# Patient Record
Sex: FEMALE | Race: WHITE | NOT HISPANIC OR LATINO | Employment: OTHER | URBAN - METROPOLITAN AREA
[De-identification: names, ages, dates, MRNs, and addresses within clinical notes are randomized per-mention and may not be internally consistent; named-entity substitution may affect disease eponyms.]

---

## 2017-01-11 ENCOUNTER — GENERIC CONVERSION - ENCOUNTER (OUTPATIENT)
Dept: OTHER | Facility: OTHER | Age: 57
End: 2017-01-11

## 2017-02-08 ENCOUNTER — GENERIC CONVERSION - ENCOUNTER (OUTPATIENT)
Dept: OTHER | Facility: OTHER | Age: 57
End: 2017-02-08

## 2017-02-13 ENCOUNTER — GENERIC CONVERSION - ENCOUNTER (OUTPATIENT)
Dept: OTHER | Facility: OTHER | Age: 57
End: 2017-02-13

## 2017-02-23 ENCOUNTER — ALLSCRIPTS OFFICE VISIT (OUTPATIENT)
Dept: FAMILY MEDICINE CLINIC | Facility: CLINIC | Age: 57
End: 2017-02-23
Payer: COMMERCIAL

## 2017-02-23 PROCEDURE — T1015 CLINIC SERVICE: HCPCS | Performed by: NURSE PRACTITIONER

## 2017-02-23 PROCEDURE — 83036 HEMOGLOBIN GLYCOSYLATED A1C: CPT | Performed by: NURSE PRACTITIONER

## 2017-03-10 ENCOUNTER — GENERIC CONVERSION - ENCOUNTER (OUTPATIENT)
Dept: OTHER | Facility: OTHER | Age: 57
End: 2017-03-10

## 2017-04-06 ENCOUNTER — ALLSCRIPTS OFFICE VISIT (OUTPATIENT)
Dept: FAMILY MEDICINE CLINIC | Facility: CLINIC | Age: 57
End: 2017-04-06
Payer: COMMERCIAL

## 2017-04-06 PROCEDURE — T1015 CLINIC SERVICE: HCPCS | Performed by: NURSE PRACTITIONER

## 2017-05-23 ENCOUNTER — ALLSCRIPTS OFFICE VISIT (OUTPATIENT)
Dept: FAMILY MEDICINE CLINIC | Facility: CLINIC | Age: 57
End: 2017-05-23
Payer: COMMERCIAL

## 2017-05-23 LAB — HBA1C MFR BLD HPLC: 6.7 %

## 2017-05-23 PROCEDURE — T1015 CLINIC SERVICE: HCPCS | Performed by: NURSE PRACTITIONER

## 2017-05-23 PROCEDURE — 83036 HEMOGLOBIN GLYCOSYLATED A1C: CPT | Performed by: NURSE PRACTITIONER

## 2017-05-26 ENCOUNTER — TRANSCRIBE ORDERS (OUTPATIENT)
Dept: ADMINISTRATIVE | Facility: HOSPITAL | Age: 57
End: 2017-05-26

## 2017-05-26 DIAGNOSIS — J44.9 OBSTRUCTIVE CHRONIC BRONCHITIS WITHOUT EXACERBATION (HCC): Primary | ICD-10-CM

## 2017-06-09 ENCOUNTER — GENERIC CONVERSION - ENCOUNTER (OUTPATIENT)
Dept: OTHER | Facility: OTHER | Age: 57
End: 2017-06-09

## 2017-06-09 ENCOUNTER — HOSPITAL ENCOUNTER (OUTPATIENT)
Dept: PULMONOLOGY | Facility: HOSPITAL | Age: 57
Discharge: HOME | End: 2017-06-09
Payer: COMMERCIAL

## 2017-06-09 DIAGNOSIS — J44.9 OBSTRUCTIVE CHRONIC BRONCHITIS WITHOUT EXACERBATION (HCC): ICD-10-CM

## 2017-06-09 PROCEDURE — 94727 GAS DIL/WSHOT DETER LNG VOL: CPT

## 2017-06-09 PROCEDURE — 94060 EVALUATION OF WHEEZING: CPT

## 2017-06-09 PROCEDURE — 94760 N-INVAS EAR/PLS OXIMETRY 1: CPT

## 2017-06-09 PROCEDURE — 94729 DIFFUSING CAPACITY: CPT

## 2017-06-09 RX ORDER — ALBUTEROL SULFATE 2.5 MG/3ML
2.5 SOLUTION RESPIRATORY (INHALATION) ONCE AS NEEDED
Status: COMPLETED | OUTPATIENT
Start: 2017-06-09 | End: 2017-06-09

## 2017-06-09 RX ADMIN — ALBUTEROL SULFATE 2.5 MG: 2.5 SOLUTION RESPIRATORY (INHALATION) at 11:22

## 2017-08-02 ENCOUNTER — APPOINTMENT (OUTPATIENT)
Dept: LAB | Facility: HOSPITAL | Age: 57
End: 2017-08-02
Payer: COMMERCIAL

## 2017-08-02 ENCOUNTER — TRANSCRIBE ORDERS (OUTPATIENT)
Dept: ADMINISTRATIVE | Facility: HOSPITAL | Age: 57
End: 2017-08-02

## 2017-08-02 DIAGNOSIS — Z79.899 ENCOUNTER FOR LONG-TERM (CURRENT) USE OF OTHER MEDICATIONS: Primary | ICD-10-CM

## 2017-08-02 DIAGNOSIS — Z79.899 ENCOUNTER FOR LONG-TERM (CURRENT) USE OF OTHER MEDICATIONS: ICD-10-CM

## 2017-08-02 LAB
ALBUMIN SERPL BCP-MCNC: 3.7 G/DL (ref 3.5–5)
ALP SERPL-CCNC: 55 U/L (ref 46–116)
ALT SERPL W P-5'-P-CCNC: 28 U/L (ref 12–78)
AST SERPL W P-5'-P-CCNC: 14 U/L (ref 5–45)
BASOPHILS # BLD AUTO: 0 THOUSANDS/ΜL (ref 0–0.1)
BASOPHILS NFR BLD AUTO: 0 % (ref 0–1)
BILIRUB DIRECT SERPL-MCNC: 0.07 MG/DL (ref 0–0.2)
BILIRUB SERPL-MCNC: 0.3 MG/DL (ref 0.2–1)
CHOLEST SERPL-MCNC: 271 MG/DL (ref 50–200)
EOSINOPHIL # BLD AUTO: 0.02 THOUSAND/ΜL (ref 0–0.61)
EOSINOPHIL NFR BLD AUTO: 0 % (ref 0–6)
ERYTHROCYTE [DISTWIDTH] IN BLOOD BY AUTOMATED COUNT: 13.4 % (ref 11.6–15.1)
EST. AVERAGE GLUCOSE BLD GHB EST-MCNC: 157 MG/DL
GLUCOSE P FAST SERPL-MCNC: 141 MG/DL (ref 65–99)
HBA1C MFR BLD: 7.1 % (ref 4.2–6.3)
HCT VFR BLD AUTO: 41.4 % (ref 34.8–46.1)
HDLC SERPL-MCNC: 34 MG/DL (ref 40–60)
HGB BLD-MCNC: 14 G/DL (ref 11.5–15.4)
LDLC SERPL CALC-MCNC: 195 MG/DL (ref 0–100)
LYMPHOCYTES # BLD AUTO: 1.75 THOUSANDS/ΜL (ref 0.6–4.47)
LYMPHOCYTES NFR BLD AUTO: 33 % (ref 14–44)
MCH RBC QN AUTO: 30.8 PG (ref 26.8–34.3)
MCHC RBC AUTO-ENTMCNC: 33.8 G/DL (ref 31.4–37.4)
MCV RBC AUTO: 91 FL (ref 82–98)
MONOCYTES # BLD AUTO: 0.47 THOUSAND/ΜL (ref 0.17–1.22)
MONOCYTES NFR BLD AUTO: 9 % (ref 4–12)
NEUTROPHILS # BLD AUTO: 3.01 THOUSANDS/ΜL (ref 1.85–7.62)
NEUTS SEG NFR BLD AUTO: 58 % (ref 43–75)
PLATELET # BLD AUTO: 164 THOUSANDS/UL (ref 149–390)
PMV BLD AUTO: 10.1 FL (ref 8.9–12.7)
PROT SERPL-MCNC: 6.8 G/DL (ref 6.4–8.2)
RBC # BLD AUTO: 4.55 MILLION/UL (ref 3.81–5.12)
TRIGL SERPL-MCNC: 208 MG/DL
WBC # BLD AUTO: 5.25 THOUSAND/UL (ref 4.31–10.16)

## 2017-08-02 PROCEDURE — 83036 HEMOGLOBIN GLYCOSYLATED A1C: CPT

## 2017-08-02 PROCEDURE — 82947 ASSAY GLUCOSE BLOOD QUANT: CPT

## 2017-08-02 PROCEDURE — 36415 COLL VENOUS BLD VENIPUNCTURE: CPT

## 2017-08-02 PROCEDURE — 85025 COMPLETE CBC W/AUTO DIFF WBC: CPT

## 2017-08-02 PROCEDURE — 80076 HEPATIC FUNCTION PANEL: CPT

## 2017-08-02 PROCEDURE — 80061 LIPID PANEL: CPT

## 2017-08-23 ENCOUNTER — ALLSCRIPTS OFFICE VISIT (OUTPATIENT)
Dept: FAMILY MEDICINE CLINIC | Facility: CLINIC | Age: 57
End: 2017-08-23
Payer: COMMERCIAL

## 2017-08-23 PROCEDURE — T1015 CLINIC SERVICE: HCPCS | Performed by: FAMILY MEDICINE

## 2017-09-20 ENCOUNTER — GENERIC CONVERSION - ENCOUNTER (OUTPATIENT)
Dept: OTHER | Facility: OTHER | Age: 57
End: 2017-09-20

## 2017-09-20 ENCOUNTER — APPOINTMENT (OUTPATIENT)
Dept: FAMILY MEDICINE CLINIC | Facility: CLINIC | Age: 57
End: 2017-09-20
Payer: COMMERCIAL

## 2017-09-20 PROCEDURE — T1015 CLINIC SERVICE: HCPCS | Performed by: FAMILY MEDICINE

## 2017-09-26 ENCOUNTER — HOSPITAL ENCOUNTER (EMERGENCY)
Facility: HOSPITAL | Age: 57
Discharge: HOME | End: 2017-09-26
Attending: EMERGENCY MEDICINE
Payer: COMMERCIAL

## 2017-09-26 VITALS
TEMPERATURE: 98 F | HEIGHT: 67 IN | WEIGHT: 230 LBS | DIASTOLIC BLOOD PRESSURE: 85 MMHG | HEART RATE: 84 BPM | BODY MASS INDEX: 36.1 KG/M2 | RESPIRATION RATE: 18 BRPM | SYSTOLIC BLOOD PRESSURE: 130 MMHG | OXYGEN SATURATION: 97 %

## 2017-09-26 DIAGNOSIS — J02.9 ACUTE VIRAL PHARYNGITIS: Primary | ICD-10-CM

## 2017-09-26 PROCEDURE — 99283 EMERGENCY DEPT VISIT LOW MDM: CPT

## 2017-09-26 RX ORDER — GABAPENTIN 600 MG/1
300 TABLET ORAL 3 TIMES DAILY
COMMUNITY
End: 2018-07-09 | Stop reason: SDUPTHER

## 2017-09-26 RX ORDER — LEVOTHYROXINE SODIUM 0.03 MG/1
25 TABLET ORAL DAILY
COMMUNITY
End: 2018-04-19

## 2017-09-26 RX ORDER — CHLORHEXIDINE GLUCONATE 0.12 MG/ML
15 RINSE ORAL 2 TIMES DAILY
Qty: 120 ML | Refills: 0 | Status: SHIPPED | OUTPATIENT
Start: 2017-09-26 | End: 2019-06-21

## 2017-09-26 RX ORDER — HYDROXYZINE HYDROCHLORIDE 25 MG/1
25 TABLET, FILM COATED ORAL DAILY PRN
COMMUNITY
End: 2019-06-21

## 2017-09-26 RX ORDER — LISINOPRIL 2.5 MG/1
2.5 TABLET ORAL DAILY
COMMUNITY
End: 2019-06-21

## 2017-09-26 RX ADMIN — DEXAMETHASONE SODIUM PHOSPHATE 10 MG: 10 INJECTION INTRAMUSCULAR; INTRAVENOUS at 10:31

## 2017-09-26 NOTE — ED PROVIDER NOTES
History  Chief Complaint   Patient presents with    Allergic Reaction     Patient states she was started on lisinopril last week and her tongue began swelling last night     Patient complains of soreness and swelling in her throat and left anterior neck  She thought her tongue was large this morning as well  She reports having upper respiratory symptoms for the past 2 weeks for which she saw her PCP and was told it was viral pharyngitis  A week ago she was started on low-dose lisinopril for her kidneys  She has no difficulty breathing or swallowing  She continues to smoke  No recent travel or known sick contacts  Denies fever/chills, LH/dizziness, CP, SOB, cough, abdominal pain, n/v/d  Denies other complaints  History provided by:  Patient and medical records  Allergic Reaction   Presenting symptoms: swelling    Presenting symptoms: no difficulty breathing, no difficulty swallowing, no itching, no rash and no wheezing    Swelling:     Location:  Neck    Onset quality:  Unable to specify    Duration:  1 day    Timing:  Constant    Progression:  Unchanged    Chronicity:  New  Severity:  Mild  Duration:  1 day  Prior allergic episodes:  No prior episodes  Sore Throat   Location:  Generalized  Quality:  Unable to specify  Severity:  No pain  Onset quality:  Gradual  Duration:  2 weeks  Timing:  Constant  Progression:  Worsening  Chronicity:  New  Relieved by:  None tried  Worsened by:  Nothing  Ineffective treatments:  None tried  Associated symptoms: adenopathy    Associated symptoms: no abdominal pain, no chest pain, no chills, no cough, no drooling, no ear pain, no epistaxis, no fever, no headaches, no neck stiffness, no rash, no rhinorrhea, no shortness of breath, no trouble swallowing and no voice change    Risk factors: no sick contacts        Prior to Admission Medications   Prescriptions Last Dose Informant Patient Reported? Taking?    Calcium Carbonate-Vit D-Min (CALCIUM 1200 PO)   Yes Yes Sig: Take 1,200 mg by mouth daily  Cariprazine HCl (VRAYLAR) 3 MG CAPS   Yes Yes   Sig: Take 3 mg by mouth daily at bedtime  atorvastatin (LIPITOR) 40 mg tablet   Yes Yes   Sig: Take 40 mg by mouth    gabapentin (NEURONTIN) 600 MG tablet   Yes Yes   Sig: Take 300 mg by mouth 3 (three) times a day   hydrOXYzine HCL (ATARAX) 25 mg tablet   Yes Yes   Sig: Take 25 mg by mouth daily as needed for itching   levothyroxine 25 mcg tablet   Yes Yes   Sig: Take 25 mcg by mouth daily   lisinopril (ZESTRIL) 2 5 mg tablet   Yes Yes   Sig: Take 2 5 mg by mouth daily   metFORMIN (GLUCOPHAGE) 500 mg tablet   Yes Yes   Sig: Take 500 mg by mouth 2 (two) times a day with meals   pantoprazole (PROTONIX) 20 mg tablet   Yes Yes   Sig: Take 20 mg by mouth 2 (two) times a day     traZODone (DESYREL) 150 mg tablet   Yes Yes   Sig: Take 150 mg by mouth daily at bedtime  3 tablets at bedtime   vilazodone (VIIBRYD) 40 mg tablet   Yes Yes   Sig: Take 40 mg by mouth daily  Facility-Administered Medications: None       Past Medical History:   Diagnosis Date    Colitis     COPD (chronic obstructive pulmonary disease)     GERD (gastroesophageal reflux disease)     Sleep apnea        Past Surgical History:   Procedure Laterality Date    IVC FILTER INSERTION         History reviewed  No pertinent family history  I have reviewed and agree with the history as documented  Social History   Substance Use Topics    Smoking status: Current Every Day Smoker    Smokeless tobacco: Never Used    Alcohol use No        Review of Systems   Constitutional: Negative for chills and fever  HENT: Positive for sore throat  Negative for congestion, drooling, ear pain, facial swelling, nosebleeds, rhinorrhea, trouble swallowing and voice change  Eyes: Negative  Respiratory: Negative for cough, chest tightness, shortness of breath and wheezing  Cardiovascular: Negative for chest pain, palpitations and leg swelling     Gastrointestinal: Negative for abdominal pain, diarrhea, nausea and vomiting  Genitourinary: Negative for dysuria, flank pain, frequency and urgency  Musculoskeletal: Negative for back pain, neck pain and neck stiffness  Skin: Negative for itching, pallor and rash  Neurological: Negative for dizziness, syncope, weakness, light-headedness, numbness and headaches  Hematological: Positive for adenopathy  Psychiatric/Behavioral: Negative for confusion  The patient is not nervous/anxious  All other systems reviewed and are negative  Physical Exam  ED Triage Vitals [09/26/17 1010]   Temperature Pulse Respirations Blood Pressure SpO2   98 °F (36 7 °C) 84 18 130/85 97 %      Temp Source Heart Rate Source Patient Position - Orthostatic VS BP Location FiO2 (%)   Temporal Monitor Sitting Left arm --      Pain Score       No Pain           Physical Exam   Constitutional: She is oriented to person, place, and time  She appears well-developed and well-nourished  No distress  HENT:   Head: Normocephalic  Right Ear: External ear normal    Left Ear: External ear normal    Numerous scattered ulcerative lesions on the soft palate, posterior oropharynx and bilateral tonsillar pillars without exudate or edema  No tongue edema suggestive of angioedema  Full range of motion of the tongue without difficulty  Eyes: EOM are normal  Pupils are equal, round, and reactive to light  Neck: Normal range of motion  Neck supple  Cardiovascular: Normal rate and regular rhythm  No murmur heard  Pulmonary/Chest: Effort normal and breath sounds normal    Abdominal: Soft  Bowel sounds are normal  She exhibits no distension  There is no tenderness  Musculoskeletal: Normal range of motion  She exhibits no edema or tenderness  Lymphadenopathy:     She has cervical adenopathy (Left greater than right, anterior cervical chains)  Neurological: She is alert and oriented to person, place, and time  No cranial nerve deficit   She exhibits normal muscle tone  Skin: Skin is warm and dry  No rash noted  She is not diaphoretic  No erythema  No pallor  Psychiatric: She has a normal mood and affect  Her behavior is normal    Vitals reviewed  ED Medications  Medications   dexamethasone (DECADRON) injection 10 mg (10 mg Oral Given 9/26/17 1031)       Diagnostic Studies  Labs Reviewed - No data to display    No orders to display       Procedures  Procedures      Phone Contacts  ED Phone Contact    ED Course  ED Course                                MDM  Number of Diagnoses or Management Options  Acute viral pharyngitis:   Diagnosis management comments: DDx:  Sore throat/neck swelling - viral pharyngitis, lymphadenopathy, doubt angioedema, peritonsillar abscess, retropharyngeal abscess, strep or mono  A/P: Will treat symptoms and recommend follow-up with PCP  Amount and/or Complexity of Data Reviewed  Review and summarize past medical records: yes      CritCare Time    Disposition  Final diagnoses:   Acute viral pharyngitis     ED Disposition     ED Disposition Condition Comment    Discharge  Dilcia Trotter discharge to home/self care  Condition at discharge: Stable        Follow-up Information     Follow up With Specialties Details Why [address], [address] [facility] Schedule an appointment as soon as possible for a visit If symptoms worsen [facility]  [address]   [phone]          Discharge Medication List as of 9/26/2017 10:34 AM      START taking these medications    Details   chlorhexidine (PERIDEX) 0 12 % solution Apply 15 mL to the mouth or throat 2 (two) times a day, Starting Tue 9/26/2017, Print         CONTINUE these medications which have NOT CHANGED    Details   atorvastatin (LIPITOR) 40 mg tablet Take 40 mg by mouth , Until Discontinued, Historical Med      Calcium Carbonate-Vit D-Min (CALCIUM 1200 PO) Take 1,200 mg by mouth daily  , Until Discontinued, Historical Med Cariprazine HCl (VRAYLAR) 3 MG CAPS Take 3 mg by mouth daily at bedtime  , Until Discontinued, Historical Med      gabapentin (NEURONTIN) 600 MG tablet Take 300 mg by mouth 3 (three) times a day, Historical Med      hydrOXYzine HCL (ATARAX) 25 mg tablet Take 25 mg by mouth daily as needed for itching, Historical Med      levothyroxine 25 mcg tablet Take 25 mcg by mouth daily, Historical Med      lisinopril (ZESTRIL) 2 5 mg tablet Take 2 5 mg by mouth daily, Historical Med      metFORMIN (GLUCOPHAGE) 500 mg tablet Take 500 mg by mouth 2 (two) times a day with meals, Historical Med      pantoprazole (PROTONIX) 20 mg tablet Take 20 mg by mouth 2 (two) times a day  , Historical Med      traZODone (DESYREL) 150 mg tablet Take 150 mg by mouth daily at bedtime  3 tablets at bedtime, Until Discontinued, Historical Med      vilazodone (VIIBRYD) 40 mg tablet Take 40 mg by mouth daily  , Until Discontinued, Historical Med           No discharge procedures on file      ED Provider  Electronically Signed by       Antonio Elizondo DO  09/26/17 1302

## 2017-09-26 NOTE — DISCHARGE INSTRUCTIONS

## 2017-10-25 NOTE — PROGRESS NOTES
Assessment   1  Diabetes mellitus type II, controlled (250 00) (E11 9)  2  Gastroesophageal reflux disease with esophagitis (530 11) (K21 0)    Plan    Diabetes mellitus type II, controlled    · Start: OneTouch Ultra 2 w/Device Kit; TEST BLOOD SUGARS 3 TIMES A DAY  Diabetes mellitus with neurological manifestation    · Renew: Lisinopril 2 5 MG Oral Tablet; One daily to protect kidneys  Gastroesophageal reflux disease with esophagitis    · Renew: Pantoprazole Sodium 20 MG Oral Tablet Delayed Release; TAKE 1 TABLET    TWICE DAILY  Hypercholesterolemia    · Renew: Atorvastatin Calcium 40 MG Oral Tablet (Lipitor); Take 1 tablet by mouth at    bedtime  Hypothyroidism    · Renew: Levothyroxine Sodium 25 MCG Oral Tablet (Synthroid); one tablet daily take on    an empty stomach 45 minutes before breakfast  Neuropathy    · Renew: Gabapentin 600 MG Oral Tablet; TAKE 1 TABLET 3 times daily      Januvia 50 MG Oral Tablet; TAKE ONE TABLET BY MOUTH ONCE DAILY; Therapy: 23Knn5942 to (Mare Vogt)  Requested for: 65Zmj8323; Last Rx:[id number]; Status: ACTIVE Ordered     Rx By: Del Espino; Dispense: 90 Days ; #:1 X 90 Tablet Bottle; Refill: 0;      For: Diabetes mellitus type II, controlled; HUGO = N; Verified Transmission to [id number] Last Updated By: System, [employer]; 9/1/2017 12:12:38 PM       Discussion/Summary      Follow up in 3 months  The patient was counseled regarding instructions for management,-- importance of compliance with treatment  total time of encounter was 20 minutes-- and-- 10 minutes was spent counseling  Chief Complaint   pt is here for a check up, she states she has BW results      History of Present Illness   62year old female here today for follow up  SHe reports loose stools daily after every meal  she has been seen by dr Antonella Vicente, colonoscopy in 2016  SHe was started on steroids, states she felt bloated, pain, with worsening symptoms  She has known type 2 dm   SHe has not been taking her medications daily, no unknown reason  Review of Systems        Constitutional: No fever, no chills, feels well, no tiredness, no recent weight gain or weight loss  Eyes: No complaints of eye pain, no red eyes, no eyesight problems, no discharge, no dry eyes, no itching of eyes  ENT: no complaints of earache, no loss of hearing, no nose bleeds, no nasal discharge, no sore throat, no hoarseness  Cardiovascular: No complaints of slow heart rate, no fast heart rate, no chest pain, no palpitations, no leg claudication, no lower extremity edema  Respiratory: No complaints of shortness of breath, no wheezing, no cough, no SOB on exertion, no orthopnea, no PND  Gastrointestinal: diarrhea  Genitourinary: No complaints of dysuria, no incontinence, no pelvic pain, no dysmenorrhea, no vaginal discharge or bleeding  Musculoskeletal: No complaints of arthralgias, no myalgias, no joint swelling or stiffness, no limb pain or swelling  Integumentary: No complaints of skin rash or lesions, no itching, no skin wounds, no breast pain or lump  Neurological: No complaints of headache, no confusion, no convulsions, no numbness, no dizziness or fainting, no tingling, no limb weakness, no difficulty walking  Psychiatric: Not suicidal, no sleep disturbance, no anxiety or depression, no change in personality, no emotional problems  Endocrine: No complaints of proptosis, no hot flashes, no muscle weakness, no deepening of the voice, no feelings of weakness  Hematologic/Lymphatic: No complaints of swollen glands, no swollen glands in the neck, does not bleed easily, does not bruise easily  Active Problems   1  Acute laryngitis (464 00) (J04 0)  2  Acute pharyngitis, unspecified etiology (462) (J02 9)  3  Acute upper respiratory infection (465 9) (J06 9)  4  Blood in stool (578 1) (K92 1)  5  Cataract (366 9) (H26 9)  6   Chronic low back pain (724 2,338 29) (M54 5,G89 29)  7  Chronic obstructive pulmonary disease (496) (J44 9)  8  COPD exacerbation (491 21) (J44 1)  9  Cramps of lower extremity (729 82) (R25 2)  10  Deep vein thrombophlebitis of leg, unspecified laterality (451 19) (I80 209)  11  Depression (311) (F32 9)  12  Diabetes mellitus type II, controlled (250 00) (E11 9)  13  Diabetes mellitus with neurological manifestation (250 60) (E11 49)  14  Diarrhea (787 91) (R19 7)  15  Double vision (368 2) (H53 2)  16  Edema (782 3) (R60 9)  17  Encounter for screening colonoscopy (V76 51) (Z12 11)  18  Encounter for screening mammogram for malignant neoplasm of breast (V76 12)      (Z12 31)  19  Encounter for smoking cessation counseling (V65 42,305 1) (Z71 6,Z72 0)  20  Epidermal cyst (706 2) (L72 0)  21  Fatigue (780 79) (R53 83)  22  Flu vaccine need (V04 81) (Z23)  23  Gastroesophageal reflux disease with esophagitis (530 11) (K21 0)  24  Hand joint pain, unspecified laterality  25  Headache (784 0) (R51)  26  Hypercholesterolemia (272 0) (E78 00)  27  Hypercoagulable state (289 81) (D68 59)  28  Hypertension (401 9) (I10)  29  Hypothyroidism (244 9) (E03 9)  30  Hypoxia (799 02) (R09 02)  31  Labyrinthitis (386 30) (H83 09)  32  Mammogram abnormal (793 80) (R92 8)  33  Migraine (346 90) (G43 909)  34  Myositis (729 1) (M60 9)  35  Nausea (787 02) (R11 0)  36  Neuropathy (355 9) (G62 9)  37  Osteoporosis screening (V82 81) (Z13 820)  38  Pulmonary embolism (415 19) (I26 99)  39  Sciatica (724 3) (M54 30)  40  Shortness of breath (786 05) (R06 02)  41  Stroke Syndrome (436)  42  Tobacco use (305 1) (Z72 0)    Past Medical History   1  History of Acute deep vein thrombosis of lower limb, unspecified laterality  2  History of Acute upper respiratory infection (465 9) (J06 9)  3  History of Anxiety disorder (300 00) (F41 9)  4  History of Chronic obstructive pulmonary disease with acute exacerbation (491 21)     (J44 1)  5   Deep vein thrombophlebitis of leg, unspecified laterality (451 19) (I80 209)  6  History of Depression (311) (F32 9)  7  History of acute sinusitis (V12 69) (Z87 09)  8  History of bipolar disorder (V11 1) (Z86 59)  9  History of chronic obstructive lung disease (V12 69) (Z87 09)  10  History of diarrhea (V12 79) (Z87 898)  11  History of influenza (V12 09) (Z87 09)  12  History of transient cerebral ischemia (V12 54) (Z86 73)  13  Pulmonary embolism (415 19) (I26 99)  14  History of Pulmonary Embolism (V12 55)  15  History of Rales (786 7) (R09 89)     The active problems and past medical history were reviewed and updated today  Surgical History   1  History of Interruption Inferior Vena Cava [place] Filter Placement  2  History of Right Breast Lumpectomy     The surgical history was reviewed and updated today  Family History   Mother   1  Family history of Breast Cancer (V16 3)  2  Family history of Chronic Obstructive Pulmonary Disease  3  Family history of Coronary Artery Disease (V17 49)  Father   4  Family history of Coronary Artery Disease (V17 49)  5  Family history of Stroke Syndrome (V17 1)     The family history was reviewed and updated today  Social History    · Current Every Day Smoker (305 1)   · Tobacco use (305 1) (Z72 0)  The social history was reviewed and updated today  The social history was reviewed and is unchanged  Current Meds   1  Albuterol Sulfate (2 5 MG/3ML) 0 083% Inhalation Nebulization Solution; USE 1 UNIT     DOSE VIA NEBULIZER  4 TIMES A DAY AS NEEDED; Therapy: 89PYO1572 to (Dian Stevens)  Requested for: 60HRW8378; Last     Rx:90Eah6510 Ordered  2  Atorvastatin Calcium 40 MG Oral Tablet; Take 1 tablet by mouth at bedtime; Therapy: 74QVJ1776 to (Evaluate:18Jan2017)  Requested for: 32HDK8920; Last     Rx:01Zwe6068 Ordered  3  Butalbital-APAP-Caffeine -40 MG Oral Capsule; One capsule every 8 hours as     needed for headache;      Therapy: 70OTQ4529 to (Last Rx:06Apr2017) Requested for: 03FXG3971 Ordered  4  Dulera 100-5 MCG/ACT Inhalation Aerosol; INHALE 2 PUFFS TWICE DAILY  RINSE     MOUTH AFTER USE; Therapy: 38FSC7733 to (Last Rx:23May2017)  Requested for: 36HTL5806 Ordered  5  Fluticasone Propionate 50 MCG/ACT Nasal Suspension; use 2 sprays in each nostril     once daily; Therapy: 62GVK7257 to (Last Rx:64Gig0718)  Requested for: 91Xge9527 Ordered  6  Gabapentin 600 MG Oral Tablet; TAKE 1 TABLET 3 times daily; Therapy: 60Pho8584 to (Evaluate:15Bnc1048)  Requested for: 72Utb5972; Last     Rx:00Kae7399 Ordered  7  Levothyroxine Sodium 25 MCG Oral Tablet; one tablet daily take on an empty stomach 45     minutes before breakfast;     Therapy: 33Poh9702 to (Evaluate:16May2016)  Requested for: 44VJV4809; Last     Rx:17Jan2016 Ordered  8  Lisinopril 2 5 MG Oral Tablet; One daily to protect kidneys; Therapy: 95RMF7899 to (Evaluate:14Mar2017)  Requested for: 57KZZ9690; Last     Rx:90Vwe2501 Ordered  9  Nicoderm CQ 21 MG/24HR Transdermal Patch 24 Hour; APPLY 1 PATCH DAILY AS     DIRECTED; Therapy: 16VRB9420 to (0474 [id number] 19 07)  Requested for: 55YZD6633; Last     Rx:92Rzn4030 Ordered  10  OneTouch Delica Lancets Fine Miscellaneous; USE AS DIRECTED; Therapy: 07FZB0972 to (Rita Chacon)  Requested for: 27WTT9436; Last      Rx:39Cud4983 Ordered  11  OneTouch Ultra Blue In [employer]; USE 1 STRIP DAILY; Therapy: 62LCU9011 to (Evaluate:52Dvf9613)  Requested for: 84VGS2923; Last      Rx:96Sit8334 Ordered  12  Pantoprazole Sodium 20 MG Oral Tablet Delayed Release; TAKE 1 TABLET TWICE DAILY       Requested for: 42Ved0615; Last Rx:75Fsf6826 Ordered  13  TraZODone HCl - 50 MG Oral Tablet; Therapy: 76Lnr3862 to Recorded  14  Ventolin  (90 Base) MCG/ACT Inhalation Aerosol Solution; INHALE 2 PUFFS 15      MINUTES PRIOR TO EXERCISE; Therapy: 02Vfo7475 to (Last Rx:23Nov2016)  Requested for: 23Nov2016 Ordered  15   Viibryd 10 MG Oral Tablet; Therapy: 23Aug2017 to Recorded     The medication list was reviewed and updated today  Allergies   1  Augmentin TABS    Vitals   Vital Signs    Recorded: 23Aug2017 01:13PM   Temperature 96 9 F   Heart Rate 88   Respiration 16   Systolic 912   Diastolic 72   Height 5 ft 6 in   Weight 232 lb    BMI Calculated 37 45   BSA Calculated 2 13   O2 Saturation 96     Physical Exam        Constitutional      General appearance: Abnormal   obese  Pulmonary      Respiratory effort: No increased work of breathing or signs of respiratory distress  Auscultation of lungs: Clear to auscultation  Cardiovascular      Auscultation of heart: Normal rate and rhythm, normal S1 and S2, without murmurs  Examination of extremities for edema and/or varicosities: Normal        Abdomen      Abdomen: Non-tender, no masses  Skin      Skin and subcutaneous tissue: Normal without rashes or lesions  Psychiatric      Orientation to person, place, and time: Normal        Mood and affect: Normal           Future Appointments      Date/Time Provider Specialty Site   11/28/2017 11:15 AM Jennifer Patricia, [address] [place] [address]     Signatures    Electronically signed by : SHU Grant;  Aug 23 2017  1:59PM EST                       (Author)     Electronically signed by : Adán January, DO; Sep  1 2017  5:30PM EST                       (Author)     Electronically signed by : Amy Mejia MD; Sahil 10 2018  2:54PM EST                       (Author)

## 2017-11-16 ENCOUNTER — ALLSCRIPTS OFFICE VISIT (OUTPATIENT)
Dept: FAMILY MEDICINE CLINIC | Facility: CLINIC | Age: 57
End: 2017-11-16
Payer: COMMERCIAL

## 2017-11-16 DIAGNOSIS — R49.0 DYSPHONIA: ICD-10-CM

## 2017-11-16 DIAGNOSIS — R92.8 OTHER ABNORMAL AND INCONCLUSIVE FINDINGS ON DIAGNOSTIC IMAGING OF BREAST: ICD-10-CM

## 2017-11-16 DIAGNOSIS — E11.9 TYPE 2 DIABETES MELLITUS WITHOUT COMPLICATIONS (HCC): ICD-10-CM

## 2017-11-16 DIAGNOSIS — R53.83 OTHER FATIGUE: ICD-10-CM

## 2017-11-16 DIAGNOSIS — Z12.31 ENCOUNTER FOR SCREENING MAMMOGRAM FOR MALIGNANT NEOPLASM OF BREAST: ICD-10-CM

## 2017-11-16 DIAGNOSIS — N64.59 OTHER SIGNS AND SYMPTOMS IN BREAST (CODE): ICD-10-CM

## 2017-11-16 DIAGNOSIS — J44.9 CHRONIC OBSTRUCTIVE PULMONARY DISEASE (HCC): ICD-10-CM

## 2017-11-16 PROCEDURE — T1015 CLINIC SERVICE: HCPCS | Performed by: FAMILY MEDICINE

## 2017-11-20 ENCOUNTER — TRANSCRIBE ORDERS (OUTPATIENT)
Dept: ADMINISTRATIVE | Facility: HOSPITAL | Age: 57
End: 2017-11-20

## 2017-11-20 DIAGNOSIS — R49.8 NEUROLOGIC VOICE DISORDER: Primary | ICD-10-CM

## 2017-11-21 NOTE — PROGRESS NOTES
Assessment    1  Chronic hoarseness (784 42) (R49 0)   2  Chronic obstructive pulmonary disease (496) (J44 9)   3  Fatigue (780 79) (R53 83)   4  Diabetes mellitus type II, controlled (250 00) (E11 9)    Plan  Chronic hoarseness    · (1) CBC/ PLT (NO DIFF); Status:Active; Requested for:16Nov2017;    · (1) TSH; Status:Active; Requested for:16Nov2017;    · CT SOFT TISSUE NECK W WO CONTRAST; Status:Need Information - FinancialAuthorization; Requested for:16Nov2017;   Chronic obstructive pulmonary disease    · * XR CHEST PA & LATERAL; Status:Active; Requested for:16Nov2017;   Diabetes mellitus type II, controlled    · (1) HEMOGLOBIN A1C; Status:Active; Requested for:16Nov2017;    · (1) MICROALBUMIN CREATININE RATIO, RANDOM URINE; Status:Active; Requestedfor:16Nov2017;   Fatigue    · (1) IRON; Status:Active; Requested for:16Nov2017;    · (1) VITAMIN B12; Status:Active; Requested for:16Nov2017;    · (1) VITAMIN D 25-HYDROXY; Status:Active; Requested for:16Nov2017;     Discussion/Summary    Follow-up in 1-2 weeks  Complete diagnostic studies and lab testing  The patient was counseled regarding instructions for management,-- importance of compliance with treatment  total time of encounter was 20 minutes-- and-- 10 minutes was spent counseling  Possible side effects of new medications were reviewed with the patient/guardian today  The treatment plan was reviewed with the patient/guardian  The patient/guardian understands and agrees with the treatment plan      Chief Complaint  pt states she isn't feeling her normal self, she states she is getting these pounding headaches not like her normal ones  She still doesn't have her voice  History of Present Illness  HPI: 19-year-old female presents to the office today with her boyfriend for acute sick visit  Patient reports not feeling well over the last few months  Patient is a 2 pack smoker daily  She has no intentions of quitting   She reports her voice has raspy and hoarse, worsening over the last few months  She denies cough or shortness of breath  She has frequent headaches  She has known type 2 diabetes  She reports stopping her metformin  Her last A1c in August was 7 1  She has known mental health problems she is followed by Dr Juice [facility]   Constitutional: No fever, no chills, feels well, no tiredness, no recent weight gain or loss  ENT: hoarseness  Cardiovascular: no complaints of slow or fast heart rate, no chest pain, no palpitations, no leg claudication or lower extremity edema  Respiratory: no complaints of shortness of breath, no wheezing, no dyspnea on exertion, no orthopnea or PND  Breasts: no complaints of breast pain, breast lump or nipple discharge  Gastrointestinal: no complaints of abdominal pain, no constipation, no nausea or diarrhea, no vomiting, no bloody stools  Genitourinary: no complaints of dysuria, no incontinence, no pelvic pain, no dysmenorrhea, no vaginal discharge or abnormal vaginal bleeding  Musculoskeletal: no complaints of arthralgia, no myalgia, no joint swelling or stiffness, no limb pain or swelling  Integumentary: no complaints of skin rash or lesion, no itching or dry skin, no skin wounds  Neurological: no complaints of headache, no confusion, no numbness or tingling, no dizziness or fainting  Active Problems    1  Acute laryngitis (464 00) (J04 0)   2  Acute pharyngitis, unspecified etiology (462) (J02 9)   3  Acute upper respiratory infection (465 9) (J06 9)   4  Blood in stool (578 1) (K92 1)   5  Cataract (366 9) (H26 9)   6  Cervical cancer screening (V76 2) (Z12 4)   7  Chronic low back pain (724 2,338 29) (M54 5,G89 29)   8  Chronic obstructive pulmonary disease (496) (J44 9)   9  COPD exacerbation (491 21) (J44 1)   10  Cramps of lower extremity (729 82) (R25 2)   11  Deep vein thrombophlebitis of leg, unspecified laterality (451 19) (I80 209)   12  Depression (311) (F32 9)   13   Diabetes mellitus type II, controlled (250 00) (E11 9)   14  Diabetes mellitus with neurological manifestation (250 60) (E11 49)   15  Diarrhea (787 91) (R19 7)   16  Double vision (368 2) (H53 2)   17  Edema (782 3) (R60 9)   18  Encounter for screening colonoscopy (V76 51) (Z12 11)   19  Encounter for screening mammogram for malignant neoplasm of breast (V76 12)  (Z12 31)   20  Encounter for smoking cessation counseling (V65 42,305 1) (Z71 6,Z72 0)   21  Epidermal cyst (706 2) (L72 0)   22  Fatigue (780 79) (R53 83)   23  Flu vaccine need (V04 81) (Z23)   24  Gastroesophageal reflux disease with esophagitis (530 11) (K21 0)   25  Hand joint pain, unspecified laterality   26  Headache (784 0) (R51)   27  Hypercholesterolemia (272 0) (E78 00)   28  Hypercoagulable state (289 81) (D68 59)   29  Hypertension (401 9) (I10)   30  Hypothyroidism (244 9) (E03 9)   31  Hypoxia (799 02) (R09 02)   32  Labyrinthitis (386 30) (H83 09)   33  Mammogram abnormal (793 80) (R92 8)   34  Migraine (346 90) (G43 909)   35  Myositis (729 1) (M60 9)   36  Nausea (787 02) (R11 0)   37  Neuropathy (355 9) (G62 9)   38  Osteoporosis screening (V82 81) (Z13 820)   39  Pulmonary embolism (415 19) (I26 99)   40  Sciatica (724 3) (M54 30)   41  Shortness of breath (786 05) (R06 02)   42  Stroke Syndrome (436)   43  Tobacco use (305 1) (Z72 0)   44  Type 2 diabetes mellitus (250 00) (E11 9)    Past Medical History    1  History of Acute deep vein thrombosis of lower limb, unspecified laterality   2  History of Acute upper respiratory infection (465 9) (J06 9)   3  History of Anxiety disorder (300 00) (F41 9)   4  History of Chronic obstructive pulmonary disease with acute exacerbation (491 21) (J44 1)   5  Deep vein thrombophlebitis of leg, unspecified laterality (451 19) (I80 209)   6  History of Depression (311) (F32 9)   7  History of acute sinusitis (V12 69) (Z87 09)   8  History of allergic rhinitis (V12 69) (Z87 09)   9   History of bipolar disorder (V11 1) (Z86 59)   10  History of chronic obstructive lung disease (V12 69) (Z87 09)   11  History of diarrhea (V12 79) (Z87 898)   12  History of influenza (V12 09) (Z87 09)   13  History of transient cerebral ischemia (V12 54) (Z86 73)   14  Pulmonary embolism (415 19) (I26 99)   15  History of Pulmonary Embolism (V12 55)   16  History of Rales (786 7) (R09 89)  Active Problems And Past Medical History Reviewed: The active problems and past medical history were reviewed and updated today  Family History  Mother    1  Family history of Breast Cancer (V16 3)   2  Family history of Chronic Obstructive Pulmonary Disease   3  Family history of Coronary Artery Disease (V17 49)  Father    4  Family history of Coronary Artery Disease (V17 49)   5  Family history of Stroke Syndrome (V17 1)  Family History Reviewed: The family history was reviewed and updated today  Social History   · Current Every Day Smoker (305 1)   · Tobacco use (305 1) (Z72 0)  The social history was reviewed and updated today  The social history was reviewed and is unchanged  Surgical History    1  History of Interruption Inferior Vena Cava Mike Filter Placement   2  History of Right Breast Lumpectomy  Surgical History Reviewed: The surgical history was reviewed and updated today  Current Meds   1  Albuterol Sulfate (2 5 MG/3ML) 0 083% Inhalation Nebulization Solution; USE 1 UNIT DOSE VIA NEBULIZER  4 TIMES A DAY AS NEEDED; Therapy: 58NCI3849 to (Evaluate:18Jan2018)  Requested for: 33RZU0610; Last Rx:95Nox2399 Ordered   2  Atorvastatin Calcium 40 MG Oral Tablet; Take 1 tablet by mouth at bedtime; Therapy: 09APE1401 to (Evaluate:00Ipm0042)  Requested for: 89Wsx9416; Last Rx:77Gnm2379 Ordered   3  Butalbital-APAP-Caffeine -40 MG Oral Capsule; One capsule every 8 hours as needed for headache; Therapy: 30QFU7223 to (Last Rx:06Apr2017)  Requested for: 06Apr2017 Ordered   4   Dulera 100-5 MCG/ACT Inhalation Aerosol; INHALE 2 PUFFS TWICE DAILY  RINSE MOUTH AFTER USE; Therapy: 94CPW0923 to (Last Rx:91Kcf8072)  Requested for: 92UBZ4939 Ordered   5  Fluticasone Propionate 50 MCG/ACT Nasal Suspension; use 2 sprays in each nostril once daily; Therapy: 82BZT6806 to (Last Collin Newbury)  Requested for: 19KMF9721 Ordered   6  Gabapentin 600 MG Oral Tablet; TAKE 1 TABLET 3 times daily; Therapy: 91Dsb6968 to (Evaluate:18Tqp7668)  Requested for: 12Tuu4924; Last Rx:06Yoh8211 Ordered   7  Levothyroxine Sodium 25 MCG Oral Tablet; one tablet daily take on an empty stomach 45 minutes before breakfast; Therapy: 26Vtl5724 to (Evaluate:53Gjv5317)  Requested for: 73Xle7731; Last Rx:61Seu4035 Ordered   8  Lisinopril 2 5 MG Oral Tablet; One daily to protect kidneys; Therapy: 36MZI9302 to (Evaluate:18Jan2018)  Requested for: 98ULN4144; Last Rx:10Hoa5903 Ordered   9  MetFORMIN HCl - 500 MG Oral Tablet; Take 1 tablet twice daily with meals; Therapy: 41PPD8678 to (Last Collin Newbury)  Requested for: 58IVM5843 Ordered   10  Nicoderm CQ 21 MG/24HR Transdermal Patch 24 Hour; APPLY 1 PATCH DAILY AS  DIRECTED; Therapy: 98VTR7963 to (0474 [id number] 19 [id number])  Requested for: 39IXO3301; Last  Rx:03Shh7435 Ordered   11  OneTouch Delica Lancets Fine Miscellaneous; USE AS DIRECTED; Therapy: 98SFB6878 to (Leticia Jameson)  Requested for: 30KPZ7650; Last  Rx:43Via9274 Ordered   12  OneTouch Ultra 2 w/Device Kit; TEST BLOOD SUGARS 3 TIMES A DAY; Therapy: 85Lwe9393 to (Last Tiara Goo)  Requested for: 17Dzo1930 Ordered   13  OneTouch Ultra Blue In [employer]; USE 1 STRIP DAILY; Therapy: 62IYJ3687 to (Evaluate:03Kda9441)  Requested for: 60KAM2017; Last  Rx:76Bol3698 Ordered   14  Pantoprazole Sodium 20 MG Oral Tablet Delayed Release; TAKE 1 TABLET TWICE  DAILY  Requested for: 37Jtl3083; Last Rx:91Rkj8504 Ordered   15  TraZODone HCl - 50 MG Oral Tablet; Therapy: 23Aug2017 to Recorded   16   Ventolin  (90 Base) MCG/ACT Inhalation Aerosol Solution; INHALE 2 PUFFS 15  MINUTES PRIOR TO EXERCISE; Therapy: 64Doc3579 to (Last Rx:23Nov2016)  Requested for: 23Nov2016 Ordered   17  Viibryd 10 MG Oral Tablet; Therapy: 80Alr7584 to Recorded    The medication list was reviewed and updated today  Allergies  1  Augmentin TABS    Vitals   Recorded: 95QGZ3083 12:54PM   Temperature 97 4 F   Heart Rate 89   Respiration 16   Systolic 527   Diastolic 62   Height 5 ft 6 in   Weight 237 lb    BMI Calculated 38 25   BSA Calculated 2 15   O2 Saturation 94       Physical Exam   Constitutional  General appearance: Abnormal   obese  Ears, Nose, Mouth, and Throat  External inspection of ears and nose: Normal    Otoscopic examination: Tympanic membranes translucent with normal light reflex  Canals patent without erythema  Nasal mucosa, septum, and turbinates: Normal without edema or erythema  Oropharynx: Normal with no erythema, edema, exudate or lesions  Pulmonary  Respiratory effort: No increased work of breathing or signs of respiratory distress  Auscultation of lungs: Clear to auscultation  Cardiovascular  Auscultation of heart: Normal rate and rhythm, normal S1 and S2, without murmurs  Examination of extremities for edema and/or varicosities: Normal    Abdomen  Abdomen: Non-tender, no masses  Skin  Skin and subcutaneous tissue: Normal without rashes or lesions  Psychiatric  Orientation to person, place, and time: Normal    Mood and affect: Normal          Future Appointments    Date/Time Provider Specialty Site   11/28/2017 11:15 AM Luzmaria Terrell, [address] [place] [address]   11/30/2017 01:15 PM Luzmaria Terrell, [address] [place] [address]       Signatures   Electronically signed by :  SHU King; Nov 16 2017  1:26PM EST                       (Author)    Electronically signed by : Miller Robert DO; Nov 20 2017 12:27PM EST                       (Author)

## 2017-11-24 ENCOUNTER — HOSPITAL ENCOUNTER (OUTPATIENT)
Dept: RADIOLOGY | Facility: HOSPITAL | Age: 57
Discharge: HOME | End: 2017-11-24
Payer: COMMERCIAL

## 2017-11-24 ENCOUNTER — HOSPITAL ENCOUNTER (OUTPATIENT)
Dept: CT IMAGING | Facility: HOSPITAL | Age: 57
Discharge: HOME | End: 2017-11-24
Payer: COMMERCIAL

## 2017-11-24 DIAGNOSIS — R49.8 NEUROLOGIC VOICE DISORDER: ICD-10-CM

## 2017-11-24 DIAGNOSIS — J44.9 CHRONIC OBSTRUCTIVE PULMONARY DISEASE (HCC): ICD-10-CM

## 2017-11-24 PROCEDURE — 71020 HB CHEST X-RAY 2VW FRONTAL&LATL: CPT

## 2017-11-27 ENCOUNTER — APPOINTMENT (OUTPATIENT)
Dept: LAB | Facility: HOSPITAL | Age: 57
End: 2017-11-27
Payer: COMMERCIAL

## 2017-11-27 ENCOUNTER — TRANSCRIBE ORDERS (OUTPATIENT)
Dept: ADMINISTRATIVE | Facility: HOSPITAL | Age: 57
End: 2017-11-27

## 2017-11-27 DIAGNOSIS — R53.83 OTHER FATIGUE: ICD-10-CM

## 2017-11-27 DIAGNOSIS — R49.0 DYSPHONIA: ICD-10-CM

## 2017-11-27 DIAGNOSIS — E11.9 TYPE 2 DIABETES MELLITUS WITHOUT COMPLICATIONS (HCC): ICD-10-CM

## 2017-11-27 LAB
25(OH)D3 SERPL-MCNC: 32.8 NG/ML (ref 30–100)
BUN SERPL-MCNC: 3 MG/DL (ref 5–25)
CREAT SERPL-MCNC: 0.91 MG/DL (ref 0.6–1.3)
CREAT UR-MCNC: 85.6 MG/DL
ERYTHROCYTE [DISTWIDTH] IN BLOOD BY AUTOMATED COUNT: 13.6 % (ref 11.6–15.1)
EST. AVERAGE GLUCOSE BLD GHB EST-MCNC: 203 MG/DL
GFR SERPL CREATININE-BSD FRML MDRD: 70 ML/MIN/1.73SQ M
HBA1C MFR BLD: 8.7 % (ref 4.2–6.3)
HCT VFR BLD AUTO: 40.9 % (ref 34.8–46.1)
HGB BLD-MCNC: 13.6 G/DL (ref 11.5–15.4)
IRON SERPL-MCNC: 48 UG/DL (ref 50–170)
MCH RBC QN AUTO: 30.8 PG (ref 26.8–34.3)
MCHC RBC AUTO-ENTMCNC: 33.3 G/DL (ref 31.4–37.4)
MCV RBC AUTO: 93 FL (ref 82–98)
MICROALBUMIN UR-MCNC: 6.6 MG/L (ref 0–20)
MICROALBUMIN/CREAT 24H UR: 8 MG/G CREATININE (ref 0–30)
PLATELET # BLD AUTO: 194 THOUSANDS/UL (ref 149–390)
PMV BLD AUTO: 10.4 FL (ref 8.9–12.7)
RBC # BLD AUTO: 4.42 MILLION/UL (ref 3.81–5.12)
TSH SERPL DL<=0.05 MIU/L-ACNC: 2.21 UIU/ML (ref 0.36–3.74)
VIT B12 SERPL-MCNC: 522 PG/ML (ref 100–900)
WBC # BLD AUTO: 7.49 THOUSAND/UL (ref 4.31–10.16)

## 2017-11-27 PROCEDURE — 83540 ASSAY OF IRON: CPT

## 2017-11-27 PROCEDURE — 82043 UR ALBUMIN QUANTITATIVE: CPT

## 2017-11-27 PROCEDURE — 82306 VITAMIN D 25 HYDROXY: CPT

## 2017-11-27 PROCEDURE — 82565 ASSAY OF CREATININE: CPT

## 2017-11-27 PROCEDURE — 82570 ASSAY OF URINE CREATININE: CPT

## 2017-11-27 PROCEDURE — 83036 HEMOGLOBIN GLYCOSYLATED A1C: CPT

## 2017-11-27 PROCEDURE — 84520 ASSAY OF UREA NITROGEN: CPT

## 2017-11-27 PROCEDURE — 82607 VITAMIN B-12: CPT

## 2017-11-27 PROCEDURE — 85027 COMPLETE CBC AUTOMATED: CPT

## 2017-11-27 PROCEDURE — 36415 COLL VENOUS BLD VENIPUNCTURE: CPT

## 2017-11-27 PROCEDURE — 84443 ASSAY THYROID STIM HORMONE: CPT

## 2017-11-30 ENCOUNTER — HOSPITAL ENCOUNTER (OUTPATIENT)
Dept: CT IMAGING | Facility: HOSPITAL | Age: 57
Discharge: HOME | End: 2017-11-30
Payer: COMMERCIAL

## 2017-11-30 PROCEDURE — 70491 CT SOFT TISSUE NECK W/DYE: CPT

## 2017-11-30 RX ADMIN — IOHEXOL 85 ML: 350 INJECTION, SOLUTION INTRAVENOUS at 13:29

## 2017-12-05 ENCOUNTER — APPOINTMENT (OUTPATIENT)
Dept: FAMILY MEDICINE CLINIC | Facility: CLINIC | Age: 57
End: 2017-12-05
Payer: COMMERCIAL

## 2017-12-05 ENCOUNTER — GENERIC CONVERSION - ENCOUNTER (OUTPATIENT)
Dept: OTHER | Facility: OTHER | Age: 57
End: 2017-12-05

## 2017-12-05 PROCEDURE — T1015 CLINIC SERVICE: HCPCS | Performed by: FAMILY MEDICINE

## 2017-12-18 ENCOUNTER — GENERIC CONVERSION - ENCOUNTER (OUTPATIENT)
Dept: OTHER | Facility: OTHER | Age: 57
End: 2017-12-18

## 2017-12-18 ENCOUNTER — APPOINTMENT (OUTPATIENT)
Dept: FAMILY MEDICINE CLINIC | Facility: CLINIC | Age: 57
End: 2017-12-18
Payer: COMMERCIAL

## 2017-12-18 PROCEDURE — T1015 CLINIC SERVICE: HCPCS | Performed by: FAMILY MEDICINE

## 2018-01-02 DIAGNOSIS — N63.0 MASS OF BREAST: ICD-10-CM

## 2018-01-12 VITALS
BODY MASS INDEX: 35.68 KG/M2 | RESPIRATION RATE: 16 BRPM | HEART RATE: 75 BPM | HEIGHT: 66 IN | TEMPERATURE: 97.3 F | OXYGEN SATURATION: 90 % | DIASTOLIC BLOOD PRESSURE: 70 MMHG | WEIGHT: 222 LBS | SYSTOLIC BLOOD PRESSURE: 120 MMHG

## 2018-01-13 VITALS
RESPIRATION RATE: 16 BRPM | OXYGEN SATURATION: 93 % | HEIGHT: 66 IN | SYSTOLIC BLOOD PRESSURE: 122 MMHG | WEIGHT: 228 LBS | TEMPERATURE: 97.7 F | HEART RATE: 87 BPM | DIASTOLIC BLOOD PRESSURE: 80 MMHG | BODY MASS INDEX: 36.64 KG/M2

## 2018-01-13 VITALS
SYSTOLIC BLOOD PRESSURE: 120 MMHG | OXYGEN SATURATION: 94 % | RESPIRATION RATE: 16 BRPM | HEART RATE: 89 BPM | DIASTOLIC BLOOD PRESSURE: 62 MMHG | BODY MASS INDEX: 38.09 KG/M2 | TEMPERATURE: 97.4 F | HEIGHT: 66 IN | WEIGHT: 237 LBS

## 2018-01-14 VITALS
SYSTOLIC BLOOD PRESSURE: 148 MMHG | WEIGHT: 224 LBS | BODY MASS INDEX: 36 KG/M2 | DIASTOLIC BLOOD PRESSURE: 90 MMHG | RESPIRATION RATE: 16 BRPM | HEIGHT: 66 IN | TEMPERATURE: 97.2 F | OXYGEN SATURATION: 97 % | HEART RATE: 89 BPM

## 2018-01-15 VITALS
HEIGHT: 66 IN | TEMPERATURE: 96.9 F | WEIGHT: 232 LBS | DIASTOLIC BLOOD PRESSURE: 72 MMHG | HEART RATE: 88 BPM | OXYGEN SATURATION: 96 % | RESPIRATION RATE: 16 BRPM | SYSTOLIC BLOOD PRESSURE: 114 MMHG | BODY MASS INDEX: 37.28 KG/M2

## 2018-01-15 NOTE — MISCELLANEOUS
Message  Reviewed lab results with patient, advised on viral infection, self limiting  Encourage fluids and rest  Discussed adding Vit D3 to daily regimen  Active Problems    1  Acute laryngitis (464 00) (J04 0)   2  Acute pharyngitis, unspecified etiology (462) (J02 9)   3  Cataract (366 9) (H26 9)   4  Chronic low back pain (724 2,338 29) (M54 5,G89 29)   5  Chronic obstructive pulmonary disease (496) (J44 9)   6  COPD exacerbation (491 21) (J44 1)   7  Cramps of lower extremity (729 82) (R25 2)   8  Deep vein thrombophlebitis of leg, unspecified laterality (451 19) (I80 209)   9  Depression (311) (F32 9)   10  Diabetes mellitus with neurological manifestation (250 60) (E11 49)   11  Diarrhea (787 91) (R19 7)   12  Double vision (368 2) (H53 2)   13  Edema (782 3) (R60 9)   14  Encounter for screening mammogram for malignant neoplasm of breast (V76 12)    (Z12 31)   15  Epidermal cyst (706 2) (L72 0)   16  Fatigue (780 79) (R53 83)   17  Flu vaccine need (V04 81) (Z23)   18  Gastroesophageal reflux disease with esophagitis (530 11) (K21 0)   19  Headache (784 0) (R51)   20  Hypercholesterolemia (272 0) (E78 0)   21  Hypercoagulable state (289 81) (D68 59)   22  Hypertension (401 9) (I10)   23  Hypothyroidism (244 9) (E03 9)   24  Hypoxia (799 02) (R09 02)   25  Labyrinthitis (386 30) (H83 09)   26  Mammogram abnormal (793 80) (R92 8)   27  Myositis (729 1) (M60 9)   28  Nausea (787 02) (R11 0)   29  Osteoporosis screening (V82 81) (Z13 820)   30  Pulmonary embolism (415 19) (I26 99)   31  Sciatica (724 3) (M54 30)   32  Shortness of breath (786 05) (R06 02)   33  Stroke Syndrome (436)   34  Tobacco use (305 1) (Z72 0)    Current Meds   1  Abilify 15 MG Oral Tablet (ARIPiprazole); TAKE 1 TABLET DAILY; Therapy: 42YEH2536 to Recorded   2  Albuterol Sulfate (2 5 MG/3ML) 0 083% Inhalation Nebulization Solution; USE 1 UNIT   DOSE VIA NEBULIZER  4 TIMES A DAY AS NEEDED;    Therapy: 20Nov2013 to (Adalberto Ache)  Requested for: 29TIJ1259; Last   Rx:50Ijg2594 Ordered   3  Benztropine Mesylate 2 MG Oral Tablet; TAKE 1 TABLET DAILY AS DIRECTED; Therapy: 69JAN5581 to Recorded   4  ClonazePAM 0 5 MG Oral Tablet; Take 1 tablet twice daily as needed Recorded   5  Entocort EC 3 MG Oral Capsule Extended Release 24 Hour (Budesonide ER); Therapy: (Recorded:36Vny9637) to Recorded   6  Kombiglyze XR 5-1000 MG Oral Tablet Extended Release 24 Hour; one tablet daily for   Diabetes; Therapy: 54Twb9199 to (Evaluate:02Apr2016)  Requested for: 34QYD0264; Last   Rx:05Jov0879 Ordered   7  Levothyroxine Sodium 25 MCG Oral Tablet (Synthroid); one tablet daily take on an   empty stomach 45 minutes before breakfast;   Therapy: 28Fqr6994 to (Last Rx:08Pfl8268)  Requested for: 60Gpm3268 Ordered   8  Lipitor 40 MG Oral Tablet (Atorvastatin Calcium); Therapy: (Recorded:34Tve6964) to Recorded   9  Meclizine HCl - 25 MG Oral Tablet; Take 1 three times daily as needed; Therapy: 55YQS0596 to (Last Rx:94Vez7483)  Requested for: 38FGK2407 Ordered   10  OneTouch Delica Lancets Fine Miscellaneous; USE AS DIRECTED; Therapy: 76WKV6197 to (Jae Josselyn)  Requested for: 99ZQU0772; Last    Rx:92Gbq1711 Ordered   11  OneTouch Ultra Blue In [employer]; USE 1 STRIP DAILY; Therapy: 74VXE5621 to (Evaluate:34Fys2971)  Requested for: 37GBI0839; Last    Rx:31Iqj0080 Ordered   12  Protonix TBEC (Pantoprazole Sodium); Therapy: (Recorded:88Ecm1724) to Recorded   13  Ventolin  (90 Base) MCG/ACT Inhalation Aerosol Solution; INHALE 2 PUFFS    EVERY 4 HOURS AS NEEDED; Therapy: 40Sof3451 to (Jamaal Elida)  Requested for: 03Pjh0840; Last    Rx:97Rzf8692 Ordered    Allergies    1  Augmentin TABS    Signatures   Electronically signed by :  SHU Vanegas; Jan 11 2016 12:15PM EST                       (Author)

## 2018-01-15 NOTE — PROGRESS NOTES
Chief Complaint  flu shot      Active Problems    1  Acute laryngitis (464 00) (J04 0)   2  Acute pharyngitis, unspecified etiology (462) (J02 9)   3  Blood in stool (578 1) (K92 1)   4  Cataract (366 9) (H26 9)   5  Chronic low back pain (724 2,338 29) (M54 5,G89 29)   6  Chronic obstructive pulmonary disease (496) (J44 9)   7  COPD exacerbation (491 21) (J44 1)   8  Cramps of lower extremity (729 82) (R25 2)   9  Deep vein thrombophlebitis of leg, unspecified laterality (451 19) (I80 209)   10  Depression (311) (F32 9)   11  Diabetes mellitus type II, controlled (250 00) (E11 9)   12  Diabetes mellitus with neurological manifestation (250 60) (E11 49)   13  Diarrhea (787 91) (R19 7)   14  Double vision (368 2) (H53 2)   15  Edema (782 3) (R60 9)   16  Encounter for screening mammogram for malignant neoplasm of breast (V76 12)    (Z12 31)   17  Epidermal cyst (706 2) (L72 0)   18  Fatigue (780 79) (R53 83)   19  Flu vaccine need (V04 81) (Z23)   20  Gastroesophageal reflux disease with esophagitis (530 11) (K21 0)   21  Hand joint pain, unspecified laterality   22  Headache (784 0) (R51)   23  Hypercholesterolemia (272 0) (E78 00)   24  Hypercoagulable state (289 81) (D68 59)   25  Hypertension (401 9) (I10)   26  Hypothyroidism (244 9) (E03 9)   27  Hypoxia (799 02) (R09 02)   28  Labyrinthitis (386 30) (H83 09)   29  Mammogram abnormal (793 80) (R92 8)   30  Myositis (729 1) (M60 9)   31  Nausea (787 02) (R11 0)   32  Neuropathy (355 9) (G62 9)   33  Osteoporosis screening (V82 81) (Z13 820)   34  Pulmonary embolism (415 19) (I26 99)   35  Sciatica (724 3) (M54 30)   36  Shortness of breath (786 05) (R06 02)   37  Stroke Syndrome (436)   38  Tobacco use (305 1) (Z72 0)    Current Meds   1  Abilify 15 MG Oral Tablet; TAKE 1 TABLET DAILY; Therapy: 37SNK1737 to Recorded   2   Albuterol Sulfate (2 5 MG/3ML) 0 083% Inhalation Nebulization Solution; USE 1 UNIT   DOSE VIA NEBULIZER  4 TIMES A DAY AS NEEDED; Therapy: 45XQV0975 to (Simone Mend)  Requested for: 35ODZ4663; Last   Rx:07Jky9817 Ordered   3  Atorvastatin Calcium 40 MG Oral Tablet; Take 1 tablet by mouth at bedtime; Therapy: 01BVE2000 to (Evaluate:18Jan2017)  Requested for: 45KXW8646; Last   Rx:00Jwn0956 Ordered   4  ClonazePAM 0 5 MG Oral Tablet; Take 1 tablet twice daily as needed Recorded   5  Entocort EC 3 MG CP24;   Therapy: (Recorded:79Ejh2654) to Recorded   6  Gabapentin 600 MG Oral Tablet; TAKE 1 TABLET 3 times daily; Therapy: 41Rqt0354 to (Evaluate:70Dgj1863)  Requested for: 65Jxb9714; Last   Rx:47Qpy4132 Ordered   7  Kombiglyze XR 5-1000 MG Oral Tablet Extended Release 24 Hour; take 1 tablet by   mouth once daily; Therapy: 95Ipl6905 to (Evaluate:06Jan2017)  Requested for: 89ITR4798; Last   Rx:08Oct2016 Ordered   8  Levothyroxine Sodium 25 MCG Oral Tablet; one tablet daily take on an empty stomach   45 minutes before breakfast;   Therapy: 69Qwa5585 to (Evaluate:30Nrv7820)  Requested for: 53RAD0454; Last   Rx:17Jan2016 Ordered   9  Meloxicam 7 5 MG Oral Tablet; TAKE 1 TABLET DAILY; Therapy: 23Zyw7159 to (Evaluate:82Imk7201)  Requested for: 44Zgi9468; Last   Rx:19Znu7244 Ordered   10  OneTouch Delica Lancets Fine Miscellaneous; USE AS DIRECTED; Therapy: 04ZMA3950 to (Margi Piedra)  Requested for: 70TVL3666; Last    Rx:65Dsj0409 Ordered   11  OneTouch Ultra Blue In [employer]; USE 1 STRIP DAILY; Therapy: 33EXH6682 to (Evaluate:36Fjl0663)  Requested for: 45NMR5516; Last    Rx:72Xfp6010 Ordered   12  Pantoprazole Sodium 20 MG Oral Tablet Delayed Release; TAKE 1 TABLET TWICE    DAILY  Requested for: 26Xme9445; Last Rx:16Ulf8985 Ordered   13  Ventolin  (90 Base) MCG/ACT Inhalation Aerosol Solution; inhale 2 puffs every 4    hours as needed; Therapy: 35Wuy8651 to (Suyapa Null)  Requested for: 35QET4892; Last    Rx:99Eho2519 Ordered    Allergies    1   Augmentin TABS    Vitals  Signs    Systolic: 168  Diastolic: 80  Heart Rate: 77  Respiration: 16  Temperature: 96 8 F  O2 Saturation: 96  Height: 5 ft 6 in  Weight: 218 lb   BMI Calculated: 35 19  BSA Calculated: 2 07    Future Appointments    Date/Time Provider Specialty Site   11/23/2016 01:15 PM Jacquelin Shake, [address] [place] [address] 22     Signatures   Electronically signed by : Mauricio Carpenter, ; Nov 1 2016 10:28AM EST                       (Author)    Electronically signed by :  SHU López; Nov 1 2016 10:30AM EST                       (Author)    Electronically signed by : Iván Acosta MD; Jan 16 2017  8:18AM EST                       (Author)

## 2018-01-22 VITALS
TEMPERATURE: 97.1 F | RESPIRATION RATE: 16 BRPM | BODY MASS INDEX: 36.8 KG/M2 | DIASTOLIC BLOOD PRESSURE: 68 MMHG | HEIGHT: 66 IN | HEART RATE: 81 BPM | SYSTOLIC BLOOD PRESSURE: 104 MMHG | OXYGEN SATURATION: 96 % | WEIGHT: 229 LBS

## 2018-01-24 VITALS
RESPIRATION RATE: 16 BRPM | TEMPERATURE: 97.8 F | HEIGHT: 66 IN | SYSTOLIC BLOOD PRESSURE: 108 MMHG | BODY MASS INDEX: 38.25 KG/M2 | HEART RATE: 82 BPM | OXYGEN SATURATION: 98 % | WEIGHT: 238 LBS | DIASTOLIC BLOOD PRESSURE: 68 MMHG

## 2018-01-24 VITALS
HEART RATE: 98 BPM | SYSTOLIC BLOOD PRESSURE: 124 MMHG | TEMPERATURE: 98.3 F | BODY MASS INDEX: 37.93 KG/M2 | DIASTOLIC BLOOD PRESSURE: 74 MMHG | WEIGHT: 236 LBS | OXYGEN SATURATION: 96 % | RESPIRATION RATE: 16 BRPM | HEIGHT: 66 IN

## 2018-01-24 DIAGNOSIS — J41.0 SIMPLE CHRONIC BRONCHITIS (HCC): Primary | ICD-10-CM

## 2018-01-30 DIAGNOSIS — J41.0 SIMPLE CHRONIC BRONCHITIS (HCC): ICD-10-CM

## 2018-02-19 DIAGNOSIS — E11.9 TYPE 2 DIABETES MELLITUS WITHOUT COMPLICATION, WITHOUT LONG-TERM CURRENT USE OF INSULIN (HCC): Primary | ICD-10-CM

## 2018-04-19 ENCOUNTER — OFFICE VISIT (OUTPATIENT)
Dept: FAMILY MEDICINE CLINIC | Facility: CLINIC | Age: 58
End: 2018-04-19
Payer: COMMERCIAL

## 2018-04-19 VITALS
TEMPERATURE: 97.7 F | OXYGEN SATURATION: 96 % | HEART RATE: 87 BPM | DIASTOLIC BLOOD PRESSURE: 68 MMHG | BODY MASS INDEX: 37.98 KG/M2 | WEIGHT: 242 LBS | RESPIRATION RATE: 18 BRPM | HEIGHT: 67 IN | SYSTOLIC BLOOD PRESSURE: 114 MMHG

## 2018-04-19 DIAGNOSIS — G43.809 OTHER MIGRAINE WITHOUT STATUS MIGRAINOSUS, NOT INTRACTABLE: ICD-10-CM

## 2018-04-19 DIAGNOSIS — E11.9 TYPE 2 DIABETES MELLITUS WITHOUT COMPLICATION, WITHOUT LONG-TERM CURRENT USE OF INSULIN (HCC): Primary | ICD-10-CM

## 2018-04-19 PROBLEM — R49.0 CHRONIC HOARSENESS: Status: ACTIVE | Noted: 2017-11-16

## 2018-04-19 PROBLEM — G43.909 MIGRAINE: Status: ACTIVE | Noted: 2017-04-06

## 2018-04-19 PROBLEM — R91.1 PULMONARY NODULE SEEN ON IMAGING STUDY: Status: ACTIVE | Noted: 2017-12-05

## 2018-04-19 LAB — SL AMB POCT HEMOGLOBIN AIC: 8.5

## 2018-04-19 PROCEDURE — 83036 HEMOGLOBIN GLYCOSYLATED A1C: CPT | Performed by: FAMILY MEDICINE

## 2018-04-19 PROCEDURE — T1015 CLINIC SERVICE: HCPCS | Performed by: FAMILY MEDICINE

## 2018-04-19 RX ORDER — LANCETS 33 GAUGE
EACH MISCELLANEOUS
COMMUNITY
Start: 2014-01-29 | End: 2020-09-08 | Stop reason: SDUPTHER

## 2018-04-19 RX ORDER — AMITRIPTYLINE HYDROCHLORIDE 100 MG/1
50 TABLET, FILM COATED ORAL
Qty: 30 TABLET | Refills: 1 | Status: SHIPPED | OUTPATIENT
Start: 2018-04-19 | End: 2018-04-25 | Stop reason: SDUPTHER

## 2018-04-19 RX ORDER — ALBUTEROL SULFATE 90 UG/1
2 AEROSOL, METERED RESPIRATORY (INHALATION)
COMMUNITY
Start: 2015-09-14 | End: 2018-10-15 | Stop reason: SDUPTHER

## 2018-04-19 RX ORDER — BLOOD-GLUCOSE METER
EACH MISCELLANEOUS
COMMUNITY
Start: 2017-08-23

## 2018-04-19 RX ORDER — TOPIRAMATE 50 MG/1
50 TABLET, FILM COATED ORAL
Refills: 0 | COMMUNITY
Start: 2018-03-20 | End: 2018-04-19 | Stop reason: DRUGHIGH

## 2018-04-19 RX ORDER — ALBUTEROL SULFATE 2.5 MG/3ML
1 SOLUTION RESPIRATORY (INHALATION) 4 TIMES DAILY PRN
COMMUNITY
Start: 2013-11-20 | End: 2018-10-15 | Stop reason: SDUPTHER

## 2018-04-19 RX ORDER — FLUTICASONE PROPIONATE 50 MCG
2 SPRAY, SUSPENSION (ML) NASAL DAILY PRN
COMMUNITY
Start: 2017-09-20 | End: 2020-09-23 | Stop reason: SDUPTHER

## 2018-04-19 RX ORDER — CARIPRAZINE 4.5 MG/1
1 CAPSULE, GELATIN COATED ORAL
Refills: 0 | COMMUNITY
Start: 2018-04-11 | End: 2018-07-20

## 2018-04-19 RX ORDER — LEVOTHYROXINE SODIUM 0.03 MG/1
1 TABLET ORAL
COMMUNITY
Start: 2015-09-14 | End: 2018-05-08 | Stop reason: SDUPTHER

## 2018-04-25 DIAGNOSIS — G43.809 OTHER MIGRAINE WITHOUT STATUS MIGRAINOSUS, NOT INTRACTABLE: ICD-10-CM

## 2018-04-25 DIAGNOSIS — E11.9 TYPE 2 DIABETES MELLITUS WITHOUT COMPLICATION, WITHOUT LONG-TERM CURRENT USE OF INSULIN (HCC): ICD-10-CM

## 2018-04-25 RX ORDER — AMITRIPTYLINE HYDROCHLORIDE 100 MG/1
50 TABLET, FILM COATED ORAL
Qty: 30 TABLET | Refills: 0 | Status: SHIPPED | OUTPATIENT
Start: 2018-04-25 | End: 2018-07-09 | Stop reason: SDUPTHER

## 2018-04-26 ENCOUNTER — OFFICE VISIT (OUTPATIENT)
Dept: FAMILY MEDICINE CLINIC | Facility: CLINIC | Age: 58
End: 2018-04-26
Payer: COMMERCIAL

## 2018-04-26 VITALS
HEART RATE: 98 BPM | HEIGHT: 67 IN | OXYGEN SATURATION: 89 % | WEIGHT: 244 LBS | DIASTOLIC BLOOD PRESSURE: 80 MMHG | SYSTOLIC BLOOD PRESSURE: 120 MMHG | RESPIRATION RATE: 18 BRPM | BODY MASS INDEX: 38.3 KG/M2 | TEMPERATURE: 97.7 F

## 2018-04-26 DIAGNOSIS — H66.90 ACUTE OTITIS MEDIA, UNSPECIFIED OTITIS MEDIA TYPE: Primary | ICD-10-CM

## 2018-04-26 PROCEDURE — T1015 CLINIC SERVICE: HCPCS | Performed by: FAMILY MEDICINE

## 2018-04-26 RX ORDER — PREDNISONE 10 MG/1
10 TABLET ORAL DAILY
Qty: 3 TABLET | Refills: 0 | Status: SHIPPED | OUTPATIENT
Start: 2018-04-26 | End: 2019-01-22

## 2018-04-26 RX ORDER — AZITHROMYCIN 250 MG/1
250 TABLET, FILM COATED ORAL DAILY
Qty: 6 TABLET | Refills: 0 | Status: SHIPPED | OUTPATIENT
Start: 2018-04-26 | End: 2018-05-01

## 2018-04-26 NOTE — PROGRESS NOTES
OFFICE VISIT  Angelica Mendieta 62 y o  female MRN: [id number]      Assessment / Plan:  Diagnoses and all orders for this visit:    Acute otitis media, unspecified otitis media type  -     predniSONE 10 mg tablet; Take 1 tablet (10 mg total) by mouth daily  -     azithromycin (ZITHROMAX) 250 mg tablet; Take 1 tablet (250 mg total) by mouth daily for 5 days 2 pills today, then one daily for 4 more days          Reason For Visit / Chief Complaint  Chief Complaint   Patient presents with   Mariann Renny     left ear pain        HPI:  Angelica Mendieta is a 62 y o  female presents today with left ear pain, started Saturday   She reports ear feeling "rumbling", she has pain today,     Historical Information   Past Medical History:   Diagnosis Date    Chronic back pain     Colitis     COPD (chronic obstructive pulmonary disease) (HCC)     Depression     DVT of lower limb, acute (HCC)     GERD (gastroesophageal reflux disease)     Sleep apnea      Past Surgical History:   Procedure Laterality Date    BREAST LUMPECTOMY Right     IVC FILTER INSERTION       Social History   History   Alcohol Use No     History   Drug Use No     History   Smoking Status    Current Every Day Smoker   Smokeless Tobacco    Never Used     Family History   Problem Relation Age of Onset    Breast cancer Mother     COPD Mother     Coronary artery disease Mother     Coronary artery disease Father     Stroke Father        Meds/Allergies   Allergies   Allergen Reactions    Augmentin [Amoxicillin-Pot Clavulanate] GI Intolerance       Meds:    Current Outpatient Prescriptions:     albuterol (2 5 mg/3 mL) 0 083 % nebulizer solution, Inhale 1 each 4 (four) times a day as needed, Disp: , Rfl:     albuterol (VENTOLIN HFA) 90 mcg/act inhaler, Inhale 2 puffs, Disp: , Rfl:     amitriptyline (ELAVIL) 100 mg tablet, Take 0 5 tablets (50 mg total) by mouth daily at bedtime, Disp: 30 tablet, Rfl: 0    atorvastatin (LIPITOR) 40 mg tablet, Take 40 mg by mouth , Disp: , Rfl:     azithromycin (ZITHROMAX) 250 mg tablet, Take 1 tablet (250 mg total) by mouth daily for 5 days 2 pills today, then one daily for 4 more days, Disp: 6 tablet, Rfl: 0    Blood Glucose Monitoring Suppl (ONE TOUCH ULTRA 2) w/Device KIT, by Does not apply route, Disp: , Rfl:     Calcium Carbonate-Vit D-Min (CALCIUM 1200 PO), Take 1,200 mg by mouth daily  , Disp: , Rfl:     chlorhexidine (PERIDEX) 0 12 % solution, Apply 15 mL to the mouth or throat 2 (two) times a day, Disp: 120 mL, Rfl: 0    CVS NTS STEP 1 21 MG/24HR TD 24 hr patch, APPLY 1 PATCH DAILY AS DIRECTED , Disp: 21 patch, Rfl: 0    fluticasone (FLONASE) 50 mcg/act nasal spray, 2 sprays into each nostril daily, Disp: , Rfl:     gabapentin (NEURONTIN) 600 MG tablet, Take 300 mg by mouth 3 (three) times a day, Disp: , Rfl:     glucose blood (ONE TOUCH ULTRA TEST) test strip, 1 Squirt by In Vitro route daily, Disp: , Rfl:     hydrOXYzine HCL (ATARAX) 25 mg tablet, Take 25 mg by mouth daily as needed for itching, Disp: , Rfl:     levothyroxine 25 mcg tablet, Take 1 tablet by mouth, Disp: , Rfl:     lisinopril (ZESTRIL) 2 5 mg tablet, Take 2 5 mg by mouth daily, Disp: , Rfl:     metFORMIN (GLUCOPHAGE) 1000 MG tablet, Take 1 tablet (1,000 mg total) by mouth 2 (two) times a day with meals, Disp: 60 tablet, Rfl: 0    Mometasone Furo-Formoterol Fum (DULERA) 100-5 MCG/ACT AERO, Inhale 2 puffs 2 (two) times a day, Disp: , Rfl:     ONETOUCH DELICA LANCETS O MISC, by Does not apply route, Disp: , Rfl:     pantoprazole (PROTONIX) 20 mg tablet, Take 20 mg by mouth 2 (two) times a day  , Disp: , Rfl:     predniSONE 10 mg tablet, Take 1 tablet (10 mg total) by mouth daily, Disp: 3 tablet, Rfl: 0    vilazodone (VIIBRYD) 40 mg tablet, Take 40 mg by mouth daily  , Disp: , Rfl:     VRAYLAR 4 5 MG CAPS, Take 1 capsule by mouth daily at bedtime, Disp: , Rfl: 0      REVIEW OF SYSTEMS  A comprehensive review of systems was negative except for: Ears, nose, mouth, throat, and face: positive for earaches      Current Vitals:   Blood Pressure: 120/80 (04/26/18 1040)  Pulse: 98 (04/26/18 1040)  Temperature: 97 7 °F (36 5 °C) (04/26/18 1040)  Respirations: 18 (04/26/18 1040)  Height: 5' 7" (170 2 cm) (04/26/18 1040)  Weight - Scale: 111 kg (244 lb) (04/26/18 1040)  SpO2: (!) 89 % (04/26/18 1040)  [unfilled]    PHYSICAL EXAMS:  General appearance: alert and oriented, in no acute distress  Head: Normocephalic, without obvious abnormality, atraumatic  Eyes: conjunctivae/corneas clear  PERRL, EOM's intact  Fundi benign  Ears: abnormal TM left ear - bulging  Nose: Nares normal  Septum midline  Mucosa normal  No drainage or sinus tenderness  Throat: lips, mucosa, and tongue normal; teeth and gums normal  Lungs: clear to auscultation bilaterally  Heart: regular rate and rhythm, S1, S2 normal, no murmur, click, rub or LONVAT{XMS/QT:11        Follow up at this office if not better     Counseling / Coordination of Care  Total floor / unit time spent today 20 minutes  Greater than 50% of total time was spent with the patient and / or family counseling and / or coordination of care

## 2018-05-07 DIAGNOSIS — E78.2 MIXED HYPERLIPIDEMIA: Primary | ICD-10-CM

## 2018-05-07 RX ORDER — ATORVASTATIN CALCIUM 40 MG/1
TABLET, FILM COATED ORAL
Qty: 30 TABLET | Refills: 3 | Status: SHIPPED | OUTPATIENT
Start: 2018-05-07 | End: 2018-07-09 | Stop reason: SDUPTHER

## 2018-05-08 DIAGNOSIS — K21.9 GERD WITHOUT ESOPHAGITIS: Primary | ICD-10-CM

## 2018-05-08 DIAGNOSIS — E03.9 ACQUIRED HYPOTHYROIDISM: ICD-10-CM

## 2018-05-08 RX ORDER — LEVOTHYROXINE SODIUM 0.03 MG/1
TABLET ORAL
Qty: 30 TABLET | Refills: 3 | Status: SHIPPED | OUTPATIENT
Start: 2018-05-08 | End: 2018-10-22 | Stop reason: SDUPTHER

## 2018-05-08 RX ORDER — PANTOPRAZOLE SODIUM 20 MG/1
TABLET, DELAYED RELEASE ORAL
Qty: 60 TABLET | Refills: 3 | Status: SHIPPED | OUTPATIENT
Start: 2018-05-08 | End: 2018-10-22 | Stop reason: SDUPTHER

## 2018-05-10 RX ORDER — TRAZODONE HYDROCHLORIDE 150 MG/1
150 TABLET ORAL
COMMUNITY
Start: 2018-05-06

## 2018-05-11 ENCOUNTER — OFFICE VISIT (OUTPATIENT)
Dept: FAMILY MEDICINE CLINIC | Facility: CLINIC | Age: 58
End: 2018-05-11
Payer: COMMERCIAL

## 2018-05-11 VITALS
OXYGEN SATURATION: 91 % | HEART RATE: 100 BPM | WEIGHT: 241 LBS | BODY MASS INDEX: 37.83 KG/M2 | HEIGHT: 67 IN | DIASTOLIC BLOOD PRESSURE: 78 MMHG | SYSTOLIC BLOOD PRESSURE: 139 MMHG | TEMPERATURE: 98.2 F

## 2018-05-11 DIAGNOSIS — H66.005 RECURRENT ACUTE SUPPURATIVE OTITIS MEDIA WITHOUT SPONTANEOUS RUPTURE OF LEFT TYMPANIC MEMBRANE: Primary | ICD-10-CM

## 2018-05-11 PROCEDURE — T1015 CLINIC SERVICE: HCPCS | Performed by: FAMILY MEDICINE

## 2018-05-11 RX ORDER — SULFAMETHOXAZOLE AND TRIMETHOPRIM 800; 160 MG/1; MG/1
1 TABLET ORAL EVERY 12 HOURS SCHEDULED
Qty: 20 TABLET | Refills: 0 | Status: SHIPPED | OUTPATIENT
Start: 2018-05-11 | End: 2018-05-11 | Stop reason: SDUPTHER

## 2018-05-11 RX ORDER — SULFAMETHOXAZOLE AND TRIMETHOPRIM 800; 160 MG/1; MG/1
1 TABLET ORAL EVERY 12 HOURS SCHEDULED
Qty: 20 TABLET | Refills: 0 | Status: SHIPPED | OUTPATIENT
Start: 2018-05-11 | End: 2018-05-21

## 2018-05-11 NOTE — PROGRESS NOTES
Assessment/Plan:     Diagnoses and all orders for this visit:    Recurrent acute suppurative otitis media without spontaneous rupture of left tympanic membrane  -     Discontinue: sulfamethoxazole-trimethoprim (BACTRIM DS) 800-160 mg per tablet; Take 1 tablet by mouth every 12 (twelve) hours for 10 days  -     sulfamethoxazole-trimethoprim (BACTRIM DS) 800-160 mg per tablet; Take 1 tablet by mouth every 12 (twelve) hours for 10 days      Discussion/Plan:  Recurrent otitis media of left ear - Bactrim x 10 days sent to pharmacy  Advised rest, hydration, supportive measures  Advised to take with food  F/U if not better    Subjective:      Patient ID: Marizol Mejia is a 62 y o  female  Chief Complaint   Patient presents with   Cheryal Snider     left ear       Patient presents to the office today c/o left ear pain  Patient was seen in office on 4/26/18 and was diagnosed with acute OM of left ear  She was given Z pack and prednisone  Patient states this helped, but pain came back approximately 2 days ago  She is experiencing "thumping" noise in ear, headache, and ear pain           The following portions of the patient's history were reviewed and updated as appropriate: allergies, current medications, past family history, past medical history, past social history, past surgical history and problem list   Patient Active Problem List   Diagnosis    Cataract    Chronic hoarseness    Chronic low back pain    Chronic obstructive pulmonary disease ([address] )    Deep vein thrombophlebitis of leg, unspecified laterality ([address] )    Depression    Diabetes mellitus with neurological manifestation ([address] )    Gastroesophageal reflux disease with esophagitis    Headache    Hypercholesterolemia    Hypercoagulable state ([address] )    Hypertension    Hypothyroidism    Mammogram abnormal    Migraine    Myositis    Neuropathy    Pulmonary embolism ([address] )    Pulmonary nodule seen on imaging study    Type 2 diabetes mellitus ([address] )    Acute ill-defined cerebrovascular disease     Current Outpatient Prescriptions on File Prior to Visit   Medication Sig Dispense Refill    albuterol (2 5 mg/3 mL) 0 083 % nebulizer solution Inhale 1 each 4 (four) times a day as needed      albuterol (VENTOLIN HFA) 90 mcg/act inhaler Inhale 2 puffs      amitriptyline (ELAVIL) 100 mg tablet Take 0 5 tablets (50 mg total) by mouth daily at bedtime 30 tablet 0    atorvastatin (LIPITOR) 40 mg tablet TAKE 1 TABLET BY MOUTH AT BEDTIME 30 tablet 3    Blood Glucose Monitoring Suppl (ONE TOUCH ULTRA 2) w/Device KIT by Does not apply route      Calcium Carbonate-Vit D-Min (CALCIUM 1200 PO) Take 1,200 mg by mouth daily   chlorhexidine (PERIDEX) 0 12 % solution Apply 15 mL to the mouth or throat 2 (two) times a day 120 mL 0    CVS NTS STEP 1 21 MG/24HR TD 24 hr patch APPLY 1 PATCH DAILY AS DIRECTED  21 patch 0    fluticasone (FLONASE) 50 mcg/act nasal spray 2 sprays into each nostril daily      gabapentin (NEURONTIN) 600 MG tablet Take 300 mg by mouth 3 (three) times a day      glucose blood (ONE TOUCH ULTRA TEST) test strip 1 Squirt by In Vitro route daily      hydrOXYzine HCL (ATARAX) 25 mg tablet Take 25 mg by mouth daily as needed for itching      levothyroxine 25 mcg tablet ONE TABLET DAILY TAKE ON AN EMPTY STOMACH 45 MINUTES BEFORE BREAKFAST 30 tablet 3    lisinopril (ZESTRIL) 2 5 mg tablet Take 2 5 mg by mouth daily      metFORMIN (GLUCOPHAGE) 1000 MG tablet Take 1 tablet (1,000 mg total) by mouth 2 (two) times a day with meals 60 tablet 0    Mometasone Furo-Formoterol Fum (DULERA) 100-5 MCG/ACT AERO Inhale 2 puffs 2 (two) times a day      ONETOUCH DELICA LANCETS 03R MISC by Does not apply route      pantoprazole (PROTONIX) 20 mg tablet TAKE 1 TABLET TWICE DAILY   60 tablet 3    predniSONE 10 mg tablet Take 1 tablet (10 mg total) by mouth daily 3 tablet 0    traZODone (DESYREL) 150 mg tablet       vilazodone (VIIBRYD) 40 mg tablet Take 40 mg by mouth daily   VRAYLAR 4 5 MG CAPS Take 1 capsule by mouth daily at bedtime  0     No current facility-administered medications on file prior to visit  Review of Systems   Constitutional: Negative  HENT: Positive for ear pain  Negative for congestion, postnasal drip, rhinorrhea and sore throat  Respiratory: Negative  Cardiovascular: Negative  Gastrointestinal: Negative  Genitourinary: Negative  Neurological: Positive for headaches  Negative for dizziness, syncope and weakness  Objective:      /78 (BP Location: Left arm, Patient Position: Sitting, Cuff Size: Large)   Pulse 100   Temp 98 2 °F (36 8 °C) (Tympanic)   Ht 5' 7" (1 702 m)   Wt 109 kg (241 lb)   SpO2 91%   BMI 37 75 kg/m²          Physical Exam   Constitutional: She is oriented to person, place, and time  She appears well-developed and well-nourished  HENT:   Head: Normocephalic and atraumatic  Right Ear: Hearing, tympanic membrane, external ear and ear canal normal    Left Ear: Hearing, external ear and ear canal normal  Tympanic membrane is erythematous  A middle ear effusion is present  Eyes: Conjunctivae are normal  Pupils are equal, round, and reactive to light  Neck: Neck supple  Cardiovascular: Normal rate and regular rhythm  Pulmonary/Chest: Effort normal  She has rhonchi  Abdominal: Soft  Bowel sounds are normal    Lymphadenopathy:     She has no cervical adenopathy  Neurological: She is alert and oriented to person, place, and time  Skin: Skin is warm and dry  Psychiatric: She has a normal mood and affect   Her behavior is normal

## 2018-07-09 DIAGNOSIS — G43.809 OTHER MIGRAINE WITHOUT STATUS MIGRAINOSUS, NOT INTRACTABLE: ICD-10-CM

## 2018-07-09 DIAGNOSIS — E11.9 DIABETES MELLITUS WITHOUT COMPLICATION (HCC): ICD-10-CM

## 2018-07-09 DIAGNOSIS — E78.2 MIXED HYPERLIPIDEMIA: ICD-10-CM

## 2018-07-09 DIAGNOSIS — G89.29 OTHER CHRONIC PAIN: Primary | ICD-10-CM

## 2018-07-09 RX ORDER — GABAPENTIN 600 MG/1
TABLET ORAL
Qty: 90 TABLET | Refills: 3 | Status: SHIPPED | OUTPATIENT
Start: 2018-07-09 | End: 2018-11-12 | Stop reason: SDUPTHER

## 2018-07-09 RX ORDER — AMITRIPTYLINE HYDROCHLORIDE 100 MG/1
50 TABLET, FILM COATED ORAL
Qty: 30 TABLET | Refills: 3 | Status: SHIPPED | OUTPATIENT
Start: 2018-07-09 | End: 2019-05-17 | Stop reason: SDUPTHER

## 2018-07-09 RX ORDER — ATORVASTATIN CALCIUM 40 MG/1
40 TABLET, FILM COATED ORAL
Qty: 30 TABLET | Refills: 3 | Status: SHIPPED | OUTPATIENT
Start: 2018-07-09 | End: 2018-10-22 | Stop reason: SDUPTHER

## 2018-07-20 ENCOUNTER — OFFICE VISIT (OUTPATIENT)
Dept: FAMILY MEDICINE CLINIC | Facility: CLINIC | Age: 58
End: 2018-07-20
Payer: COMMERCIAL

## 2018-07-20 VITALS
HEART RATE: 103 BPM | HEIGHT: 67 IN | OXYGEN SATURATION: 92 % | WEIGHT: 246 LBS | TEMPERATURE: 99.4 F | DIASTOLIC BLOOD PRESSURE: 74 MMHG | BODY MASS INDEX: 38.61 KG/M2 | RESPIRATION RATE: 18 BRPM | SYSTOLIC BLOOD PRESSURE: 120 MMHG

## 2018-07-20 DIAGNOSIS — E11.8 TYPE 2 DIABETES MELLITUS WITH COMPLICATION, UNSPECIFIED WHETHER LONG TERM INSULIN USE: Primary | ICD-10-CM

## 2018-07-20 LAB — SL AMB POCT HEMOGLOBIN AIC: 9.9

## 2018-07-20 PROCEDURE — 82570 ASSAY OF URINE CREATININE: CPT | Performed by: NURSE PRACTITIONER

## 2018-07-20 PROCEDURE — T1015 CLINIC SERVICE: HCPCS | Performed by: FAMILY MEDICINE

## 2018-07-20 PROCEDURE — 83036 HEMOGLOBIN GLYCOSYLATED A1C: CPT | Performed by: FAMILY MEDICINE

## 2018-07-20 PROCEDURE — 82043 UR ALBUMIN QUANTITATIVE: CPT | Performed by: NURSE PRACTITIONER

## 2018-07-20 RX ORDER — CARIPRAZINE 6 MG/1
6 CAPSULE, GELATIN COATED ORAL DAILY
Refills: 0 | COMMUNITY
Start: 2018-05-10

## 2018-07-20 NOTE — PROGRESS NOTES
OFFICE VISIT  Malcolm Acosta 62 y o  female MRN: [id number]      Assessment / Plan:  Diagnoses and all orders for this visit:    Type 2 diabetes mellitus with complication, unspecified whether long term insulin use (HCC)  -     POCT hemoglobin A1c  -     sitaGLIPtin (JANUVIA) 50 mg tablet; Take 1 tablet (50 mg total) by mouth daily  -     Ambulatory referral to Diabetic Education; Future  -     Comprehensive metabolic panel; Future  -     TSH, 3rd generation with T4 reflex; Future  -     Microalbumin / creatinine urine ratio  -     Lipid Panel with Direct LDL reflex; Future  -     CBC and differential; Future        Where a medical alert identification to carry  Check your feet each day for sores, make sure your socks and shoes fit correctly  Did not trim year nails  Established with a podiatrist   Maintain a healthy weight, this can help you control her diabetes  Follow a proper meal plan  Keep track of her carbohydrates, eat low-fat foods, low-salt, high-fiber foods, limit alcohol  Exercise can help her blood sugar level study, decrease her risk of heart disease, and help you lose weight  Smoking cessation advised if applicable  Good blood pressure control is recommended  A normal blood pressure is 119/78 or lower  In neural blood pressure can help prevent certain complications from diabetes  Recommend yearly eye exams to assess for retinopathy  Consider vaccination against flu, pneumonia, and hepatitis  Reason For Visit / Chief Complaint  Chief Complaint   Patient presents with    Diabetes     A1C        HPI:  Malcolm Acosta is a 62 y o  female presents today for diabetes check up  She is on oral agents, glucophage only, She does not check her sugars  She denies any highs or lows  Her AIC today is 9 9  She is using "tons" of sugar, per boyfriend  She has had some weight gain       Historical Information   Past Medical History:   Diagnosis Date    Chronic back pain     Colitis     [facility] obstructive [facility]) [facility] [address]     Depression     DVT of lower limb, acute (HCC)     GERD (gastroesophageal reflux disease)     Sleep apnea      Past Surgical History:   Procedure Laterality Date    BREAST LUMPECTOMY Right     IVC FILTER INSERTION       Social History   History   Alcohol Use No     History   Drug Use No     History   Smoking Status    Current Every Day Smoker   Smokeless Tobacco    Never Used     Family History   Problem Relation Age of Onset    Breast cancer Mother     COPD Mother     Coronary artery disease Mother     Coronary artery disease Father     Stroke Father        Meds/Allergies   Allergies   Allergen Reactions    Augmentin [Amoxicillin-Pot Clavulanate] GI Intolerance       Meds:    Current Outpatient Prescriptions:     albuterol (2 5 mg/3 mL) 0 083 % nebulizer solution, Inhale 1 each 4 (four) times a day as needed, Disp: , Rfl:     albuterol (VENTOLIN HFA) 90 mcg/act inhaler, Inhale 2 puffs, Disp: , Rfl:     amitriptyline (ELAVIL) 100 mg tablet, Take 0 5 tablets (50 mg total) by mouth daily at bedtime, Disp: 30 tablet, Rfl: 3    atorvastatin (LIPITOR) 40 mg tablet, Take 1 tablet (40 mg total) by mouth daily at bedtime, Disp: 30 tablet, Rfl: 3    Blood Glucose Monitoring Suppl (ONE TOUCH ULTRA 2) w/Device KIT, by Does not apply route, Disp: , Rfl:     Calcium Carbonate-Vit D-Min (CALCIUM 1200 PO), Take 1,200 mg by mouth daily  , Disp: , Rfl:     chlorhexidine (PERIDEX) 0 12 % solution, Apply 15 mL to the mouth or throat 2 (two) times a day, Disp: 120 mL, Rfl: 0    CVS NTS STEP 1 21 MG/24HR TD 24 hr patch, APPLY 1 PATCH DAILY AS DIRECTED , Disp: 21 patch, Rfl: 0    fluticasone (FLONASE) 50 mcg/act nasal spray, 2 sprays into each nostril daily, Disp: , Rfl:     gabapentin (NEURONTIN) 600 MG tablet, TAKE 1 TABLET 3 TIMES DAILY, Disp: 90 tablet, Rfl: 3    glucose blood (ONE TOUCH ULTRA TEST) test strip, 1 Squirt by In Vitro route daily, Disp: , Rfl:    hydrOXYzine HCL (ATARAX) 25 mg tablet, Take 25 mg by mouth daily as needed for itching, Disp: , Rfl:     levothyroxine 25 mcg tablet, ONE TABLET DAILY TAKE ON AN EMPTY STOMACH 45 MINUTES BEFORE BREAKFAST, Disp: 30 tablet, Rfl: 3    lisinopril (ZESTRIL) 2 5 mg tablet, Take 2 5 mg by mouth daily, Disp: , Rfl:     metFORMIN (GLUCOPHAGE) 1000 MG tablet, Take 1 tablet (1,000 mg total) by mouth 2 (two) times a day with meals, Disp: 60 tablet, Rfl: 0    metFORMIN (GLUCOPHAGE) 500 mg tablet, Take 1 tablet (500 mg total) by mouth 2 (two) times a day with meals, Disp: 60 tablet, Rfl: 3    Mometasone Furo-Formoterol Fum (DULERA) 100-5 MCG/ACT AERO, Inhale 2 puffs 2 (two) times a day, Disp: , Rfl:     ONETOUCH DELICA LANCETS 49L MISC, by Does not apply route, Disp: , Rfl:     pantoprazole (PROTONIX) 20 mg tablet, TAKE 1 TABLET TWICE DAILY  , Disp: 60 tablet, Rfl: 3    predniSONE 10 mg tablet, Take 1 tablet (10 mg total) by mouth daily, Disp: 3 tablet, Rfl: 0    sitaGLIPtin (JANUVIA) 50 mg tablet, Take 1 tablet (50 mg total) by mouth daily, Disp: 30 tablet, Rfl: 0    traZODone (DESYREL) 150 mg tablet, , Disp: , Rfl:     vilazodone (VIIBRYD) 40 mg tablet, Take 40 mg by mouth daily  , Disp: , Rfl:     VRAYLAR 6 MG capsule, Take 6 mg by mouth daily, Disp: , Rfl: 0      REVIEW OF SYSTEMS  A comprehensive review of systems was negative        Current Vitals:   Blood Pressure: 120/74 (07/20/18 1131)  Pulse: 103 (07/20/18 1131)  Temperature: 99 4 °F (37 4 °C) (07/20/18 1131)  Respirations: 18 (07/20/18 1131)  Height: 5' 7" (170 2 cm) (07/20/18 1131)  Weight - Scale: 112 kg (246 lb) (07/20/18 1131)  SpO2: 92 % (07/20/18 1131)  [unfilled]    PHYSICAL EXAMS:  General appearance: alert and oriented, in no acute distress and mildly obese  Lungs: clear to auscultation bilaterally  Heart: regular rate and rhythm, S1, S2 normal, no murmur, click, rub or gallop  Extremities: extremities normal, warm and well-perfused; no cyanosis, clubbing, or edema  Pulses: 2+ and symmetric  Skin: Skin color, texture, turgor normal  No rashes or lesions  Neurologic: Grossly normal{YES/NO:20        Follow up at this office in 3 months     Counseling / Coordination of Care  Total floor / unit time spent today 20 minutes  Greater than 50% of total time was spent with the patient and / or family counseling and / or coordination of care

## 2018-07-23 ENCOUNTER — TELEPHONE (OUTPATIENT)
Dept: FAMILY MEDICINE CLINIC | Facility: CLINIC | Age: 58
End: 2018-07-23

## 2018-07-23 DIAGNOSIS — E11.65 UNCONTROLLED TYPE 2 DIABETES MELLITUS WITH HYPERGLYCEMIA, WITHOUT LONG-TERM CURRENT USE OF INSULIN (HCC): Primary | ICD-10-CM

## 2018-07-23 NOTE — TELEPHONE ENCOUNTER
Pt referred to the DSMT program by Heath White  Call to pt today  Agreeable to start the DSMT program  Appt made for Initial assessment  7/25/18 at 11am at the [place] office  (pt uses [facility] transportation)  Will then come to August classes at [address] [facility] information to call her insurance to be sure the program is covered  Pt notes she eill call now and asked that she call me back with approval information

## 2018-07-23 NOTE — TELEPHONE ENCOUNTER
Pt called back and states her insurance smith not cover the DSMT program  Aware I would notify her provider and call her back     Esther, please advise

## 2018-07-24 NOTE — TELEPHONE ENCOUNTER
Call to [facility] [place] [facility]  Was advised by agent they do not cover for Diabetes education except for kids under age of 24   Note to provider and pt notified as well

## 2018-07-24 NOTE — TELEPHONE ENCOUNTER
Called and left a message for pt today  Will cancel tomorrow appt for initial assessment   Will attempt to contact her insurance for further details

## 2018-08-14 DIAGNOSIS — E11.9 DIABETES MELLITUS WITHOUT COMPLICATION (HCC): Primary | ICD-10-CM

## 2018-09-19 ENCOUNTER — LAB REQUISITION (OUTPATIENT)
Dept: LAB | Facility: HOSPITAL | Age: 58
End: 2018-09-19
Payer: COMMERCIAL

## 2018-09-19 DIAGNOSIS — E11.8 TYPE 2 DIABETES MELLITUS WITH COMPLICATION, UNSPECIFIED WHETHER LONG TERM INSULIN USE: ICD-10-CM

## 2018-09-19 DIAGNOSIS — Z79.899 OTHER LONG TERM (CURRENT) DRUG THERAPY: ICD-10-CM

## 2018-09-19 LAB
ALBUMIN SERPL BCP-MCNC: 3.9 G/DL (ref 3.5–5)
ALBUMIN SERPL BCP-MCNC: 4 G/DL (ref 3.5–5)
ALP SERPL-CCNC: 61 U/L (ref 46–116)
ALP SERPL-CCNC: 62 U/L (ref 46–116)
ALT SERPL W P-5'-P-CCNC: 43 U/L (ref 12–78)
ALT SERPL W P-5'-P-CCNC: 45 U/L (ref 12–78)
ANION GAP SERPL CALCULATED.3IONS-SCNC: 9 MMOL/L (ref 4–13)
AST SERPL W P-5'-P-CCNC: 21 U/L (ref 5–45)
AST SERPL W P-5'-P-CCNC: 21 U/L (ref 5–45)
BASOPHILS # BLD AUTO: 0 THOUSANDS/ΜL (ref 0–0.1)
BASOPHILS NFR BLD AUTO: 0 % (ref 0–1)
BILIRUB DIRECT SERPL-MCNC: 0.08 MG/DL (ref 0–0.2)
BILIRUB SERPL-MCNC: 0.26 MG/DL (ref 0.2–1)
BILIRUB SERPL-MCNC: 0.3 MG/DL (ref 0.2–1)
BUN SERPL-MCNC: 7 MG/DL (ref 5–25)
CALCIUM SERPL-MCNC: 9 MG/DL (ref 8.3–10.1)
CHLORIDE SERPL-SCNC: 104 MMOL/L (ref 100–108)
CHOLEST SERPL-MCNC: 117 MG/DL (ref 50–200)
CO2 SERPL-SCNC: 27 MMOL/L (ref 21–32)
CREAT SERPL-MCNC: 0.87 MG/DL (ref 0.6–1.3)
CREAT UR-MCNC: 50 MG/DL
EOSINOPHIL # BLD AUTO: 0.08 THOUSAND/ΜL (ref 0–0.61)
EOSINOPHIL NFR BLD AUTO: 1 % (ref 0–6)
ERYTHROCYTE [DISTWIDTH] IN BLOOD BY AUTOMATED COUNT: 13.4 % (ref 11.6–15.1)
EST. AVERAGE GLUCOSE BLD GHB EST-MCNC: 180 MG/DL
GFR SERPL CREATININE-BSD FRML MDRD: 74 ML/MIN/1.73SQ M
GLUCOSE P FAST SERPL-MCNC: 111 MG/DL (ref 65–99)
HBA1C MFR BLD: 7.9 % (ref 4.2–6.3)
HCT VFR BLD AUTO: 44.1 % (ref 34.8–46.1)
HDLC SERPL-MCNC: 36 MG/DL (ref 40–60)
HGB BLD-MCNC: 14.1 G/DL (ref 11.5–15.4)
IMM GRANULOCYTES # BLD AUTO: 0.02 THOUSAND/UL (ref 0–0.2)
IMM GRANULOCYTES NFR BLD AUTO: 0 % (ref 0–2)
LDLC SERPL CALC-MCNC: 44 MG/DL (ref 0–100)
LYMPHOCYTES # BLD AUTO: 2.3 THOUSANDS/ΜL (ref 0.6–4.47)
LYMPHOCYTES NFR BLD AUTO: 31 % (ref 14–44)
MCH RBC QN AUTO: 31 PG (ref 26.8–34.3)
MCHC RBC AUTO-ENTMCNC: 32 G/DL (ref 31.4–37.4)
MCV RBC AUTO: 97 FL (ref 82–98)
MICROALBUMIN UR-MCNC: 15.8 MG/L (ref 0–20)
MICROALBUMIN/CREAT 24H UR: 32 MG/G CREATININE (ref 0–30)
MONOCYTES # BLD AUTO: 0.52 THOUSAND/ΜL (ref 0.17–1.22)
MONOCYTES NFR BLD AUTO: 7 % (ref 4–12)
NEUTROPHILS # BLD AUTO: 4.56 THOUSANDS/ΜL (ref 1.85–7.62)
NEUTS SEG NFR BLD AUTO: 61 % (ref 43–75)
NRBC BLD AUTO-RTO: 0 /100 WBCS
PLATELET # BLD AUTO: 185 THOUSANDS/UL (ref 149–390)
PMV BLD AUTO: 10.5 FL (ref 8.9–12.7)
POTASSIUM SERPL-SCNC: 3.8 MMOL/L (ref 3.5–5.3)
PROT SERPL-MCNC: 6.8 G/DL (ref 6.4–8.2)
PROT SERPL-MCNC: 6.9 G/DL (ref 6.4–8.2)
RBC # BLD AUTO: 4.55 MILLION/UL (ref 3.81–5.12)
SODIUM SERPL-SCNC: 140 MMOL/L (ref 136–145)
TRIGL SERPL-MCNC: 186 MG/DL
TSH SERPL DL<=0.05 MIU/L-ACNC: 3.64 UIU/ML (ref 0.36–3.74)
WBC # BLD AUTO: 7.48 THOUSAND/UL (ref 4.31–10.16)

## 2018-09-19 PROCEDURE — 84443 ASSAY THYROID STIM HORMONE: CPT | Performed by: NURSE PRACTITIONER

## 2018-09-19 PROCEDURE — 85025 COMPLETE CBC W/AUTO DIFF WBC: CPT | Performed by: NURSE PRACTITIONER

## 2018-09-19 PROCEDURE — 83036 HEMOGLOBIN GLYCOSYLATED A1C: CPT | Performed by: PSYCHIATRY & NEUROLOGY

## 2018-09-19 PROCEDURE — 80076 HEPATIC FUNCTION PANEL: CPT | Performed by: PSYCHIATRY & NEUROLOGY

## 2018-09-19 PROCEDURE — 80053 COMPREHEN METABOLIC PANEL: CPT | Performed by: NURSE PRACTITIONER

## 2018-09-19 PROCEDURE — 80061 LIPID PANEL: CPT | Performed by: NURSE PRACTITIONER

## 2018-10-15 ENCOUNTER — OFFICE VISIT (OUTPATIENT)
Dept: FAMILY MEDICINE CLINIC | Facility: CLINIC | Age: 58
End: 2018-10-15
Payer: COMMERCIAL

## 2018-10-15 VITALS
RESPIRATION RATE: 18 BRPM | HEIGHT: 67 IN | WEIGHT: 236 LBS | TEMPERATURE: 99 F | OXYGEN SATURATION: 90 % | SYSTOLIC BLOOD PRESSURE: 122 MMHG | BODY MASS INDEX: 37.04 KG/M2 | DIASTOLIC BLOOD PRESSURE: 72 MMHG | HEART RATE: 98 BPM

## 2018-10-15 DIAGNOSIS — E11.65 TYPE 2 DIABETES MELLITUS WITH HYPERGLYCEMIA, WITHOUT LONG-TERM CURRENT USE OF INSULIN (HCC): ICD-10-CM

## 2018-10-15 DIAGNOSIS — J44.1 COPD EXACERBATION (HCC): Primary | ICD-10-CM

## 2018-10-15 PROCEDURE — T1015 CLINIC SERVICE: HCPCS | Performed by: FAMILY MEDICINE

## 2018-10-15 RX ORDER — AZITHROMYCIN 250 MG/1
250 TABLET, FILM COATED ORAL DAILY
Qty: 6 TABLET | Refills: 0 | Status: SHIPPED | OUTPATIENT
Start: 2018-10-15 | End: 2018-10-20

## 2018-10-15 RX ORDER — ALBUTEROL SULFATE 90 UG/1
2 AEROSOL, METERED RESPIRATORY (INHALATION) EVERY 4 HOURS PRN
Qty: 1 INHALER | Refills: 3 | Status: SHIPPED | OUTPATIENT
Start: 2018-10-15 | End: 2020-09-08 | Stop reason: SDUPTHER

## 2018-10-15 RX ORDER — PREDNISONE 10 MG/1
TABLET ORAL
Qty: 18 TABLET | Refills: 0 | Status: SHIPPED | OUTPATIENT
Start: 2018-10-15 | End: 2018-11-29

## 2018-10-15 RX ORDER — ALBUTEROL SULFATE 2.5 MG/3ML
2.5 SOLUTION RESPIRATORY (INHALATION) 4 TIMES DAILY PRN
Qty: 120 VIAL | Refills: 3 | Status: SHIPPED | OUTPATIENT
Start: 2018-10-15 | End: 2021-05-04 | Stop reason: SDUPTHER

## 2018-10-15 NOTE — PROGRESS NOTES
OFFICE VISIT  Serena Cardona 62 y o  female MRN: [id number]      Assessment / Plan:  Diagnoses and all orders for this visit:    COPD exacerbation ([address] )  -     albuterol (2 5 mg/3 mL) 0 083 % nebulizer solution; Take 1 vial (2 5 mg total) by nebulization 4 (four) times a day as needed for shortness of breath  -     glucose blood (ONE TOUCH ULTRA TEST) test strip; 1 each by Other route daily  -     albuterol (VENTOLIN HFA) 90 mcg/act inhaler; Inhale 2 puffs every 4 (four) hours as needed for wheezing  -     azithromycin (ZITHROMAX) 250 mg tablet; Take 1 tablet (250 mg total) by mouth daily for 5 days 2 pills today, then one daily for 4 more days  -     predniSONE 10 mg tablet; 30 mg by mouth daily for 3 days, then 20 mg by mouth daily for 3 days, then 10 mg by mouth daily for 3 days, then stop    Type 2 diabetes mellitus with hyperglycemia, without long-term current use of insulin ([facility])  -     mometasone-formoterol (DULERA) 100-5 MCG/ACT inhaler; Inhale 2 puffs 2 (two) times a day          Reason For Visit / Chief Complaint  Chief Complaint   Patient presents with    Cold Like Symptoms        HPI:  Serena Cardona is a 62 y o  female who reports having congestion  She reports feeling hot  She has not taken her temperature  She has nasal drainage, clear to yellow  She is a smoker  She has not been using her inhalers  She reports wheeze with sob       Historical Information   Past Medical History:   Diagnosis Date    Chronic back pain     Colitis     COPD (chronic obstructive pulmonary disease) ([facility])     Depression     DVT of lower limb, acute ([facility])     GERD (gastroesophageal reflux disease)     Sleep apnea      Past Surgical History:   Procedure Laterality Date    BREAST LUMPECTOMY Right     IVC FILTER INSERTION       Social History   History   Alcohol Use No     History   Drug Use No     History   Smoking Status    Current Every Day Smoker   Smokeless Tobacco    Never Used     Family History   Problem Relation Age of Onset    Breast cancer Mother     COPD Mother     Coronary artery disease Mother     Coronary artery disease Father     Stroke Father        Meds/Allergies   Allergies   Allergen Reactions    Augmentin [Amoxicillin-Pot Clavulanate] GI Intolerance       Meds:    Current Outpatient Prescriptions:     albuterol (2 5 mg/3 mL) 0 083 % nebulizer solution, Take 1 vial (2 5 mg total) by nebulization 4 (four) times a day as needed for shortness of breath, Disp: 120 vial, Rfl: 3    albuterol (VENTOLIN HFA) 90 mcg/act inhaler, Inhale 2 puffs every 4 (four) hours as needed for wheezing, Disp: 1 Inhaler, Rfl: 3    amitriptyline (ELAVIL) 100 mg tablet, Take 0 5 tablets (50 mg total) by mouth daily at bedtime, Disp: 30 tablet, Rfl: 3    atorvastatin (LIPITOR) 40 mg tablet, Take 1 tablet (40 mg total) by mouth daily at bedtime, Disp: 30 tablet, Rfl: 3    azithromycin (ZITHROMAX) 250 mg tablet, Take 1 tablet (250 mg total) by mouth daily for 5 days 2 pills today, then one daily for 4 more days, Disp: 6 tablet, Rfl: 0    Blood Glucose Monitoring Suppl (ONE TOUCH ULTRA 2) w/Device KIT, by Does not apply route, Disp: , Rfl:     Calcium Carbonate-Vit D-Min (CALCIUM 1200 PO), Take 1,200 mg by mouth daily  , Disp: , Rfl:     chlorhexidine (PERIDEX) 0 12 % solution, Apply 15 mL to the mouth or throat 2 (two) times a day, Disp: 120 mL, Rfl: 0    [employer] NTS STEP 1 21 MG/24HR TD 24 hr patch, APPLY 1 PATCH DAILY AS DIRECTED , Disp: 21 patch, Rfl: 0    fluticasone (FLONASE) 50 mcg/act nasal spray, 2 sprays into each nostril daily, Disp: , Rfl:     gabapentin (NEURONTIN) 600 MG tablet, TAKE 1 TABLET 3 TIMES DAILY, Disp: 90 tablet, Rfl: 3    glucose blood (ONE TOUCH ULTRA TEST) test strip, 1 each by Other route daily, Disp: 100 each, Rfl: 1    hydrOXYzine HCL (ATARAX) 25 mg tablet, Take 25 mg by mouth daily as needed for itching, Disp: , Rfl:     levothyroxine 25 mcg tablet, ONE TABLET DAILY TAKE ON AN EMPTY STOMACH 45 MINUTES BEFORE BREAKFAST, Disp: 30 tablet, Rfl: 3    Linagliptin (TRADJENTA) 5 MG TABS, Take 5 mg by mouth daily, Disp: 30 tablet, Rfl: 3    lisinopril (ZESTRIL) 2 5 mg tablet, Take 2 5 mg by mouth daily, Disp: , Rfl:     metFORMIN (GLUCOPHAGE) 1000 MG tablet, Take 1 tablet (1,000 mg total) by mouth 2 (two) times a day with meals, Disp: 60 tablet, Rfl: 0    metFORMIN (GLUCOPHAGE) 500 mg tablet, Take 1 tablet (500 mg total) by mouth 2 (two) times a day with meals, Disp: 60 tablet, Rfl: 3    mometasone-formoterol (DULERA) 100-5 MCG/ACT inhaler, Inhale 2 puffs 2 (two) times a day, Disp: 1 Inhaler, Rfl: 3    ONETOUCH DELICA LANCETS 14Y MISC, by Does not apply route, Disp: , Rfl:     pantoprazole (PROTONIX) 20 mg tablet, TAKE 1 TABLET TWICE DAILY  , Disp: 60 tablet, Rfl: 3    predniSONE 10 mg tablet, Take 1 tablet (10 mg total) by mouth daily, Disp: 3 tablet, Rfl: 0    predniSONE 10 mg tablet, 30 mg by mouth daily for 3 days, then 20 mg by mouth daily for 3 days, then 10 mg by mouth daily for 3 days, then stop, Disp: 18 tablet, Rfl: 0    sitaGLIPtin (JANUVIA) 50 mg tablet, Take 1 tablet (50 mg total) by mouth daily, Disp: 30 tablet, Rfl: 0    traZODone (DESYREL) 150 mg tablet, , Disp: , Rfl:     vilazodone (VIIBRYD) 40 mg tablet, Take 40 mg by mouth daily  , Disp: , Rfl:     VRAYLAR 6 MG capsule, Take 6 mg by mouth daily, Disp: , Rfl: 0      REVIEW OF SYSTEMS  Review of Systems   Constitutional: Negative for activity change, chills, fatigue and fever  HENT: Positive for rhinorrhea  Negative for congestion, ear discharge, ear pain, sinus pain, sinus pressure, sore throat, tinnitus and trouble swallowing  Eyes: Negative for photophobia, pain, discharge, itching and visual disturbance  Respiratory: Positive for cough, shortness of breath and wheezing  Negative for chest tightness  Cardiovascular: Negative for chest pain and leg swelling     Gastrointestinal: Negative for abdominal distention, abdominal pain, constipation, diarrhea, nausea and vomiting  Endocrine: Negative for polydipsia, polyphagia and polyuria  Genitourinary: Negative for dysuria and frequency  Musculoskeletal: Negative for arthralgias, myalgias, neck pain and neck stiffness  Skin: Negative for color change  Neurological: Negative for dizziness, syncope, weakness, numbness and headaches  Hematological: Does not bruise/bleed easily  Psychiatric/Behavioral: Negative for behavioral problems, confusion, self-injury, sleep disturbance and suicidal ideas  The patient is not nervous/anxious  Current Vitals:   Blood Pressure: 122/72 (10/15/18 1023)  Pulse: 98 (10/15/18 1023)  Temperature: 99 °F (37 2 °C) (10/15/18 1023)  Respirations: 18 (10/15/18 1023)  Height: 5' 7" (170 2 cm) (10/15/18 1023)  Weight - Scale: 107 kg (236 lb) (10/15/18 1023)  SpO2: 90 % (10/15/18 1023)  [unfilled]    PHYSICAL EXAMS:  Physical Exam   Constitutional: She is oriented to person, place, and time  She appears well-developed and well-nourished  HENT:   Head: Normocephalic  Right Ear: External ear normal    Left Ear: External ear normal    Nose: Rhinorrhea present  Mouth/Throat: Oropharynx is clear and moist    Eyes: Pupils are equal, round, and reactive to light  Conjunctivae are normal    Neck: Neck supple  Cardiovascular: Normal rate and regular rhythm  Pulmonary/Chest: Effort normal  She has wheezes  Abdominal: Soft  Bowel sounds are normal  She exhibits no distension  There is no tenderness  Musculoskeletal: Normal range of motion  Neurological: She is alert and oriented to person, place, and time  Skin: Skin is warm and dry  Psychiatric: She has a normal mood and affect  Follow up at this office in if not better     Counseling / Coordination of Care  Total floor / unit time spent today 20 minutes    Greater than 50% of total time was spent with the patient and / or family counseling and / or coordination of care

## 2018-10-22 ENCOUNTER — OFFICE VISIT (OUTPATIENT)
Dept: FAMILY MEDICINE CLINIC | Facility: CLINIC | Age: 58
End: 2018-10-22
Payer: COMMERCIAL

## 2018-10-22 VITALS
OXYGEN SATURATION: 90 % | HEART RATE: 104 BPM | WEIGHT: 236 LBS | HEIGHT: 67 IN | SYSTOLIC BLOOD PRESSURE: 122 MMHG | TEMPERATURE: 99.2 F | BODY MASS INDEX: 37.04 KG/M2 | DIASTOLIC BLOOD PRESSURE: 68 MMHG | RESPIRATION RATE: 18 BRPM

## 2018-10-22 DIAGNOSIS — K21.9 GERD WITHOUT ESOPHAGITIS: ICD-10-CM

## 2018-10-22 DIAGNOSIS — R91.1 PULMONARY NODULE: ICD-10-CM

## 2018-10-22 DIAGNOSIS — E03.9 ACQUIRED HYPOTHYROIDISM: ICD-10-CM

## 2018-10-22 DIAGNOSIS — Z79.4 TYPE 2 DIABETES MELLITUS WITH HYPERGLYCEMIA, WITH LONG-TERM CURRENT USE OF INSULIN (HCC): Primary | ICD-10-CM

## 2018-10-22 DIAGNOSIS — E78.2 MIXED HYPERLIPIDEMIA: ICD-10-CM

## 2018-10-22 DIAGNOSIS — E11.65 TYPE 2 DIABETES MELLITUS WITH HYPERGLYCEMIA, WITH LONG-TERM CURRENT USE OF INSULIN (HCC): Primary | ICD-10-CM

## 2018-10-22 PROCEDURE — T1015 CLINIC SERVICE: HCPCS | Performed by: FAMILY MEDICINE

## 2018-10-22 RX ORDER — ATORVASTATIN CALCIUM 40 MG/1
40 TABLET, FILM COATED ORAL
Qty: 90 TABLET | Refills: 0 | Status: SHIPPED | OUTPATIENT
Start: 2018-10-22 | End: 2019-01-25 | Stop reason: SDUPTHER

## 2018-10-22 RX ORDER — PANTOPRAZOLE SODIUM 20 MG/1
20 TABLET, DELAYED RELEASE ORAL DAILY
Qty: 180 TABLET | Refills: 0 | Status: SHIPPED | OUTPATIENT
Start: 2018-10-22 | End: 2019-04-29 | Stop reason: SDUPTHER

## 2018-10-22 RX ORDER — LEVOTHYROXINE SODIUM 0.03 MG/1
25 TABLET ORAL
Qty: 30 TABLET | Refills: 0 | Status: SHIPPED | OUTPATIENT
Start: 2018-10-22 | End: 2020-07-23 | Stop reason: ALTCHOICE

## 2018-10-22 NOTE — PROGRESS NOTES
OFFICE VISIT  Vanessa Santana 62 y o  female MRN: [id number]      Assessment / Plan:  Diagnoses and all orders for this visit:    Type 2 diabetes mellitus with hyperglycemia, with long-term current use of insulin ([facility])    Mixed hyperlipidemia  -     atorvastatin (LIPITOR) 40 mg tablet; Take 1 tablet (40 mg total) by mouth daily at bedtime    Acquired hypothyroidism  -     levothyroxine 25 mcg tablet; Take 1 tablet (25 mcg total) by mouth daily before breakfast Take on empty stomach 45 minutes before    GERD without esophagitis  -     pantoprazole (PROTONIX) 20 mg tablet; Take 1 tablet (20 mg total) by mouth daily    Pulmonary nodule  -     CT chest high resolution; Future      Reminder given to complete mammogram/US  Reason For Visit / Chief Complaint  Chief Complaint   Patient presents with    Diabetes     Had A1C last month thru labs  HPI:  Vanessa Santana is a 62 y o  female who presents today for diabetes check up  She had adjustments made through medication and lifestyle changes  She reports checking her sugars in am 3x weekly  Sugars reported 124-138  She reports having symptoms of hypoglycemia under 120  She has had weight loss with dietary changes  She continues to smoke two pack daily with congestion       Historical Information   Past Medical History:   Diagnosis Date    Chronic back pain     Colitis     COPD (chronic obstructive pulmonary disease) ([facility])     Depression     DVT of lower limb, acute ([facility])     GERD (gastroesophageal reflux disease)     Sleep apnea      Past Surgical History:   Procedure Laterality Date    BREAST LUMPECTOMY Right     IVC FILTER INSERTION       Social History   History   Alcohol Use No     History   Drug Use No     History   Smoking Status    Current Every Day Smoker   Smokeless Tobacco    Never Used     Family History   Problem Relation Age of Onset    Breast cancer Mother     COPD Mother     Coronary artery disease Mother     Coronary artery disease Father     Stroke Father        Meds/Allergies   Allergies   Allergen Reactions    Augmentin [Amoxicillin-Pot Clavulanate] GI Intolerance       Meds:    Current Outpatient Prescriptions:     albuterol (2 5 mg/3 mL) 0 083 % nebulizer solution, Take 1 vial (2 5 mg total) by nebulization 4 (four) times a day as needed for shortness of breath, Disp: 120 vial, Rfl: 3    albuterol (VENTOLIN HFA) 90 mcg/act inhaler, Inhale 2 puffs every 4 (four) hours as needed for wheezing, Disp: 1 Inhaler, Rfl: 3    amitriptyline (ELAVIL) 100 mg tablet, Take 0 5 tablets (50 mg total) by mouth daily at bedtime, Disp: 30 tablet, Rfl: 3    atorvastatin (LIPITOR) 40 mg tablet, Take 1 tablet (40 mg total) by mouth daily at bedtime, Disp: 90 tablet, Rfl: 0    Blood Glucose Monitoring Suppl (ONE TOUCH ULTRA 2) w/Device KIT, by Does not apply route, Disp: , Rfl:     Calcium Carbonate-Vit D-Min (CALCIUM 1200 PO), Take 1,200 mg by mouth daily  , Disp: , Rfl:     chlorhexidine (PERIDEX) 0 12 % solution, Apply 15 mL to the mouth or throat 2 (two) times a day, Disp: 120 mL, Rfl: 0    [employer]S NTS STEP 1 21 MG/24HR TD 24 hr patch, APPLY 1 PATCH DAILY AS DIRECTED , Disp: 21 patch, Rfl: 0    fluticasone (FLONASE) 50 mcg/act nasal spray, 2 sprays into each nostril daily, Disp: , Rfl:     gabapentin (NEURONTIN) 600 MG tablet, TAKE 1 TABLET 3 TIMES DAILY, Disp: 90 tablet, Rfl: 3    glucose blood (ONE TOUCH ULTRA TEST) test strip, 1 each by Other route daily, Disp: 100 each, Rfl: 1    hydrOXYzine HCL (ATARAX) 25 mg tablet, Take 25 mg by mouth daily as needed for itching, Disp: , Rfl:     levothyroxine 25 mcg tablet, Take 1 tablet (25 mcg total) by mouth daily before breakfast Take on empty stomach 45 minutes before, Disp: 30 tablet, Rfl: 0    Linagliptin (TRADJENTA) 5 MG TABS, Take 5 mg by mouth daily, Disp: 30 tablet, Rfl: 3    lisinopril (ZESTRIL) 2 5 mg tablet, Take 2 5 mg by mouth daily, Disp: , Rfl:     metFORMIN (GLUCOPHAGE) 1000 MG tablet, Take 1 tablet (1,000 mg total) by mouth 2 (two) times a day with meals, Disp: 60 tablet, Rfl: 0    metFORMIN (GLUCOPHAGE) 500 mg tablet, Take 1 tablet (500 mg total) by mouth 2 (two) times a day with meals, Disp: 60 tablet, Rfl: 3    mometasone-formoterol (DULERA) 100-5 MCG/ACT inhaler, Inhale 2 puffs 2 (two) times a day, Disp: 1 Inhaler, Rfl: 3    ONETOUCH DELICA LANCETS 63Q MISC, by Does not apply route, Disp: , Rfl:     pantoprazole (PROTONIX) 20 mg tablet, Take 1 tablet (20 mg total) by mouth daily, Disp: 180 tablet, Rfl: 0    predniSONE 10 mg tablet, Take 1 tablet (10 mg total) by mouth daily, Disp: 3 tablet, Rfl: 0    predniSONE 10 mg tablet, 30 mg by mouth daily for 3 days, then 20 mg by mouth daily for 3 days, then 10 mg by mouth daily for 3 days, then stop, Disp: 18 tablet, Rfl: 0    sitaGLIPtin (JANUVIA) 50 mg tablet, Take 1 tablet (50 mg total) by mouth daily, Disp: 30 tablet, Rfl: 0    traZODone (DESYREL) 150 mg tablet, , Disp: , Rfl:     vilazodone (VIIBRYD) 40 mg tablet, Take 40 mg by mouth daily  , Disp: , Rfl:     VRAYLAR 6 MG capsule, Take 6 mg by mouth daily, Disp: , Rfl: 0      REVIEW OF SYSTEMS  Review of Systems   Constitutional: Negative for chills, fatigue and fever  HENT: Positive for rhinorrhea  Negative for congestion, ear discharge, ear pain, sore throat, trouble swallowing and voice change  Eyes: Negative for pain and redness  Respiratory: Positive for cough  Negative for chest tightness, shortness of breath and wheezing  Gastrointestinal: Negative for abdominal pain, blood in stool, constipation, diarrhea, nausea and vomiting  Endocrine: Negative for cold intolerance, heat intolerance, polydipsia, polyphagia and polyuria  Genitourinary: Negative for decreased urine volume, dysuria, frequency and urgency  Musculoskeletal: Negative for arthralgias, back pain, myalgias and neck pain  Skin: Negative for color change and rash     Neurological: Negative for dizziness, syncope, weakness, light-headedness, numbness and headaches  Psychiatric/Behavioral: Negative for sleep disturbance and suicidal ideas  The patient is not nervous/anxious  Current Vitals:   Blood Pressure: 122/68 (10/22/18 0943)  Pulse: 104 (10/22/18 0943)  Temperature: 99 2 °F (37 3 °C) (10/22/18 0943)  Respirations: 18 (10/22/18 0943)  Height: 5' 7" (170 2 cm) (10/22/18 0943)  Weight - Scale: 107 kg (236 lb) (10/22/18 0943)  SpO2: 90 % (10/22/18 0943)  [unfilled]    PHYSICAL EXAMS:  Physical Exam   Constitutional: She is oriented to person, place, and time  She appears well-developed and well-nourished  HENT:   Head: Normocephalic  Right Ear: External ear normal    Left Ear: External ear normal    Mouth/Throat: Oropharynx is clear and moist    Eyes: Pupils are equal, round, and reactive to light  Conjunctivae are normal    Neck: Neck supple  Cardiovascular: Normal rate and regular rhythm  Pulmonary/Chest: Effort normal  She has wheezes  Abdominal: Soft  Bowel sounds are normal  She exhibits no distension  There is no tenderness  Musculoskeletal: Normal range of motion  Neurological: She is alert and oriented to person, place, and time  Skin: Skin is warm and dry  Psychiatric: She has a normal mood and affect  Follow up at this office in 2 months     Counseling / Coordination of Care  Total floor / unit time spent today 20 minutes  Greater than 50% of total time was spent with the patient and / or family counseling and / or coordination of care

## 2018-11-05 DIAGNOSIS — E11.9 DIABETES MELLITUS WITHOUT COMPLICATION (HCC): ICD-10-CM

## 2018-11-12 DIAGNOSIS — G89.29 OTHER CHRONIC PAIN: ICD-10-CM

## 2018-11-12 RX ORDER — GABAPENTIN 600 MG/1
TABLET ORAL
Qty: 90 TABLET | Refills: 1 | Status: SHIPPED | OUTPATIENT
Start: 2018-11-12 | End: 2019-01-06 | Stop reason: SDUPTHER

## 2018-11-29 DIAGNOSIS — E11.9 TYPE 2 DIABETES MELLITUS WITHOUT COMPLICATION, WITHOUT LONG-TERM CURRENT USE OF INSULIN (HCC): ICD-10-CM

## 2019-01-06 DIAGNOSIS — G89.29 OTHER CHRONIC PAIN: ICD-10-CM

## 2019-01-07 RX ORDER — GABAPENTIN 600 MG/1
TABLET ORAL
Qty: 90 TABLET | Refills: 1 | Status: SHIPPED | OUTPATIENT
Start: 2019-01-07 | End: 2019-03-30 | Stop reason: SDUPTHER

## 2019-01-22 ENCOUNTER — OFFICE VISIT (OUTPATIENT)
Dept: FAMILY MEDICINE CLINIC | Facility: CLINIC | Age: 59
End: 2019-01-22
Payer: COMMERCIAL

## 2019-01-22 VITALS
DIASTOLIC BLOOD PRESSURE: 80 MMHG | HEIGHT: 67 IN | BODY MASS INDEX: 37.04 KG/M2 | RESPIRATION RATE: 18 BRPM | OXYGEN SATURATION: 97 % | SYSTOLIC BLOOD PRESSURE: 122 MMHG | TEMPERATURE: 97.7 F | WEIGHT: 236 LBS | HEART RATE: 98 BPM

## 2019-01-22 DIAGNOSIS — Z11.59 NEED FOR HEPATITIS C SCREENING TEST: ICD-10-CM

## 2019-01-22 DIAGNOSIS — E03.9 ACQUIRED HYPOTHYROIDISM: ICD-10-CM

## 2019-01-22 DIAGNOSIS — J43.8 OTHER EMPHYSEMA (HCC): ICD-10-CM

## 2019-01-22 DIAGNOSIS — Z23 NEED FOR PNEUMOCOCCAL VACCINATION: ICD-10-CM

## 2019-01-22 DIAGNOSIS — Z23 FLU VACCINE NEED: ICD-10-CM

## 2019-01-22 DIAGNOSIS — E11.8 TYPE 2 DIABETES MELLITUS WITH COMPLICATION, UNSPECIFIED WHETHER LONG TERM INSULIN USE: Primary | ICD-10-CM

## 2019-01-22 DIAGNOSIS — I10 ESSENTIAL HYPERTENSION: ICD-10-CM

## 2019-01-22 LAB — SL AMB POCT HEMOGLOBIN AIC: 6.3 (ref ?–6.5)

## 2019-01-22 PROCEDURE — T1015 CLINIC SERVICE: HCPCS | Performed by: FAMILY MEDICINE

## 2019-01-22 PROCEDURE — 83036 HEMOGLOBIN GLYCOSYLATED A1C: CPT | Performed by: FAMILY MEDICINE

## 2019-01-22 RX ORDER — CLONAZEPAM 0.5 MG/1
0.5 TABLET ORAL
Refills: 0 | COMMUNITY
Start: 2018-12-06 | End: 2019-06-21

## 2019-01-22 NOTE — PROGRESS NOTES
OFFICE VISIT  Blanca Serra 62 y o  female MRN: [id number]      Assessment / Plan:  Diagnoses and all orders for this visit:    Type 2 diabetes mellitus with complication, unspecified whether long term insulin use ([facility])  -     POCT hemoglobin A1c  -     CBC and differential; Future  -     Comprehensive metabolic panel; Future  -     Lipid Panel with Direct LDL reflex; Future  -     TSH, 3rd generation with Free T4 reflex; Future    Other emphysema ([address] )    Essential hypertension    Acquired hypothyroidism    Need for hepatitis C screening test  -     Hepatitis C antibody; Future      Type 2 dm- AIC good control, continue with lifestyle changes, weight loss, appt with eye doctor in June, est with [facility], appt Feb    COPD- no changes in inhalers, discussed smoking cessation  Ess HTN- no changes, good control today, lifestyle  changes advised  Hypothyroidism - obtain tsh level today  Preventative measures- mammogram scheduled this week, hep c screen ordered today  Colonoscopy UTD, completed 2 years ago  Reminder to obtain pap test      Reason For Visit / Chief Complaint  Chief Complaint   Patient presents with    Diabetes     A1C        HPI:  Blanca Serra is a 62 y o  female who presents today for diabetes check up  Pt is taking Glucophage and tradjenta  She is checking his sugars in frequently  Previous AIC 7 9, today she is down to 6 3  She is taking her medications regularly  She has known copd, remains smoking one pack daily, no intentions on quitting  She is using dulera and ventolin daily  She is also est with Dr Duncan Lira, known depression, symptoms controlled, feels good today       Historical Information   Past Medical History:   Diagnosis Date    Chronic back pain     Colitis     COPD (chronic obstructive pulmonary disease) ([facility]CC)     Depression     DVT of lower limb, acute (HCC)     GERD (gastroesophageal reflux disease)     Sleep apnea      Past Surgical History: Procedure Laterality Date    BREAST LUMPECTOMY Right     IVC FILTER INSERTION       Social History   History   Alcohol Use No     History   Drug Use No     History   Smoking Status    Current Every Day Smoker   Smokeless Tobacco    Never Used     Family History   Problem Relation Age of Onset    Breast cancer Mother     COPD Mother     Coronary artery disease Mother     Coronary artery disease Father     Stroke Father        Meds/Allergies   Allergies   Allergen Reactions    Augmentin [Amoxicillin-Pot Clavulanate] GI Intolerance       Meds:    Current Outpatient Prescriptions:     albuterol (2 5 mg/3 mL) 0 083 % nebulizer solution, Take 1 vial (2 5 mg total) by nebulization 4 (four) times a day as needed for shortness of breath, Disp: 120 vial, Rfl: 3    albuterol (VENTOLIN HFA) 90 mcg/act inhaler, Inhale 2 puffs every 4 (four) hours as needed for wheezing, Disp: 1 Inhaler, Rfl: 3    amitriptyline (ELAVIL) 100 mg tablet, Take 0 5 tablets (50 mg total) by mouth daily at bedtime, Disp: 30 tablet, Rfl: 3    atorvastatin (LIPITOR) 40 mg tablet, Take 1 tablet (40 mg total) by mouth daily at bedtime, Disp: 90 tablet, Rfl: 0    Blood Glucose Monitoring Suppl (ONE TOUCH ULTRA 2) w/Device KIT, by Does not apply route, Disp: , Rfl:     Calcium Carbonate-Vit D-Min (CALCIUM 1200 PO), Take 1,200 mg by mouth daily  , Disp: , Rfl:     chlorhexidine (PERIDEX) 0 12 % solution, Apply 15 mL to the mouth or throat 2 (two) times a day, Disp: 120 mL, Rfl: 0    [employer]S NTS STEP 1 21 MG/24HR TD 24 hr patch, APPLY 1 PATCH DAILY AS DIRECTED , Disp: 21 patch, Rfl: 0    fluticasone (FLONASE) 50 mcg/act nasal spray, 2 sprays into each nostril daily, Disp: , Rfl:     gabapentin (NEURONTIN) 600 MG tablet, take 1 tablet by mouth three times a day, Disp: 90 tablet, Rfl: 1    glucose blood (ONE TOUCH ULTRA TEST) test strip, 1 each by Other route daily, Disp: 100 each, Rfl: 1    hydrOXYzine HCL (ATARAX) 25 mg tablet, Take 25 mg by mouth daily as needed for itching, Disp: , Rfl:     levothyroxine 25 mcg tablet, Take 1 tablet (25 mcg total) by mouth daily before breakfast Take on empty stomach 45 minutes before, Disp: 30 tablet, Rfl: 0    Linagliptin (TRADJENTA) 5 MG TABS, Take 5 mg by mouth daily, Disp: 30 tablet, Rfl: 3    lisinopril (ZESTRIL) 2 5 mg tablet, Take 2 5 mg by mouth daily, Disp: , Rfl:     metFORMIN (GLUCOPHAGE) 1000 MG tablet, Take 1 tablet (1,000 mg total) by mouth 2 (two) times a day with meals, Disp: 60 tablet, Rfl: 2    mometasone-formoterol (DULERA) 100-5 MCG/ACT inhaler, Inhale 2 puffs 2 (two) times a day, Disp: 1 Inhaler, Rfl: 3    ONETOUCH DELICA LANCETS 23L MISC, by Does not apply route, Disp: , Rfl:     pantoprazole (PROTONIX) 20 mg tablet, Take 1 tablet (20 mg total) by mouth daily, Disp: 180 tablet, Rfl: 0    sitaGLIPtin (JANUVIA) 50 mg tablet, Take 1 tablet (50 mg total) by mouth daily, Disp: 30 tablet, Rfl: 0    traZODone (DESYREL) 150 mg tablet, , Disp: , Rfl:     vilazodone (VIIBRYD) 40 mg tablet, Take 40 mg by mouth daily  , Disp: , Rfl:     VRAYLAR 6 MG capsule, Take 6 mg by mouth daily, Disp: , Rfl: 0      REVIEW OF SYSTEMS  Review of Systems   Constitutional: Negative for chills, fatigue and fever  HENT: Negative for congestion, ear discharge, ear pain, sore throat, trouble swallowing and voice change  Eyes: Negative for pain and redness  Respiratory: Negative for cough, chest tightness, shortness of breath and wheezing  Gastrointestinal: Negative for abdominal pain, blood in stool, constipation, diarrhea, nausea and vomiting  Endocrine: Negative for cold intolerance, heat intolerance, polydipsia, polyphagia and polyuria  Genitourinary: Negative for decreased urine volume, dysuria, frequency and urgency  Musculoskeletal: Negative for arthralgias, back pain, myalgias and neck pain  Skin: Negative for color change and rash     Neurological: Negative for dizziness, syncope, weakness, light-headedness, numbness and headaches  Psychiatric/Behavioral: Negative for sleep disturbance and suicidal ideas  The patient is not nervous/anxious  Current Vitals:   Blood Pressure: 122/80 (01/22/19 0801)  Pulse: 98 (01/22/19 0801)  Temperature: 97 7 °F (36 5 °C) (01/22/19 0801)  Respirations: 18 (01/22/19 0801)  Height: 5' 7" (170 2 cm) (01/22/19 0801)  Weight - Scale: 107 kg (236 lb) (01/22/19 0801)  SpO2: 97 % (01/22/19 0801)  [unfilled]    PHYSICAL EXAMS:  Physical Exam   Constitutional: She is oriented to person, place, and time  She appears well-developed and well-nourished  HENT:   Head: Normocephalic  Right Ear: External ear normal    Left Ear: External ear normal    Mouth/Throat: Oropharynx is clear and moist    Eyes: Pupils are equal, round, and reactive to light  Conjunctivae are normal    Neck: Neck supple  Cardiovascular: Normal rate and regular rhythm  Pulmonary/Chest: Effort normal and breath sounds normal    Abdominal: Soft  Bowel sounds are normal  She exhibits no distension  There is no tenderness  Musculoskeletal: Normal range of motion  Neurological: She is alert and oriented to person, place, and time  Skin: Skin is warm and dry  Psychiatric: She has a normal mood and affect  Follow up at this office in 3 months     Counseling / Coordination of Care  Total floor / unit time spent today 20 minutes  Greater than 50% of total time was spent with the patient and / or family counseling and / or coordination of care

## 2019-01-25 DIAGNOSIS — E11.9 DIABETES MELLITUS WITHOUT COMPLICATION (HCC): ICD-10-CM

## 2019-01-25 DIAGNOSIS — E78.2 MIXED HYPERLIPIDEMIA: ICD-10-CM

## 2019-01-25 RX ORDER — LINAGLIPTIN 5 MG/1
TABLET, FILM COATED ORAL
Qty: 30 TABLET | Refills: 3 | Status: SHIPPED | OUTPATIENT
Start: 2019-01-25 | End: 2019-03-11

## 2019-01-25 RX ORDER — ATORVASTATIN CALCIUM 40 MG/1
TABLET, FILM COATED ORAL
Qty: 90 TABLET | Refills: 0 | Status: SHIPPED | OUTPATIENT
Start: 2019-01-25 | End: 2019-04-29 | Stop reason: SDUPTHER

## 2019-03-03 DIAGNOSIS — E11.65 TYPE 2 DIABETES MELLITUS WITH HYPERGLYCEMIA, WITHOUT LONG-TERM CURRENT USE OF INSULIN (HCC): ICD-10-CM

## 2019-03-03 DIAGNOSIS — E11.9 TYPE 2 DIABETES MELLITUS WITHOUT COMPLICATION, WITHOUT LONG-TERM CURRENT USE OF INSULIN (HCC): ICD-10-CM

## 2019-03-05 DIAGNOSIS — E11.9 TYPE 2 DIABETES MELLITUS WITHOUT COMPLICATION, WITHOUT LONG-TERM CURRENT USE OF INSULIN (HCC): ICD-10-CM

## 2019-03-07 ENCOUNTER — APPOINTMENT (OUTPATIENT)
Dept: LAB | Facility: HOSPITAL | Age: 59
End: 2019-03-07
Attending: PSYCHIATRY & NEUROLOGY
Payer: COMMERCIAL

## 2019-03-07 ENCOUNTER — TRANSCRIBE ORDERS (OUTPATIENT)
Dept: ADMINISTRATIVE | Facility: HOSPITAL | Age: 59
End: 2019-03-07

## 2019-03-07 DIAGNOSIS — Z11.59 NEED FOR HEPATITIS C SCREENING TEST: ICD-10-CM

## 2019-03-07 DIAGNOSIS — E11.8 TYPE 2 DIABETES MELLITUS WITH COMPLICATION, UNSPECIFIED WHETHER LONG TERM INSULIN USE: ICD-10-CM

## 2019-03-07 DIAGNOSIS — Z79.899 LONG TERM USE OF DRUG: Primary | ICD-10-CM

## 2019-03-07 DIAGNOSIS — Z79.899 LONG TERM USE OF DRUG: ICD-10-CM

## 2019-03-07 LAB
ALBUMIN SERPL BCP-MCNC: 4.2 G/DL (ref 3.5–5.7)
ALP SERPL-CCNC: 51 U/L (ref 40–150)
ALT SERPL W P-5'-P-CCNC: 17 U/L (ref 7–52)
ANION GAP SERPL CALCULATED.3IONS-SCNC: 8 MMOL/L (ref 4–13)
AST SERPL W P-5'-P-CCNC: 12 U/L (ref 13–39)
BASOPHILS # BLD AUTO: 0 THOUSANDS/ΜL (ref 0–0.1)
BASOPHILS NFR BLD AUTO: 0 % (ref 0–2)
BILIRUB DIRECT SERPL-MCNC: 0.1 MG/DL (ref 0–0.2)
BILIRUB SERPL-MCNC: 0.3 MG/DL (ref 0.2–1)
BUN SERPL-MCNC: 10 MG/DL (ref 7–25)
CALCIUM SERPL-MCNC: 9.6 MG/DL (ref 8.6–10.5)
CHLORIDE SERPL-SCNC: 101 MMOL/L (ref 98–107)
CHOLEST SERPL-MCNC: 165 MG/DL (ref 0–200)
CO2 SERPL-SCNC: 30 MMOL/L (ref 21–31)
CREAT SERPL-MCNC: 0.82 MG/DL (ref 0.6–1.2)
EOSINOPHIL # BLD AUTO: 0.1 THOUSAND/ΜL (ref 0–0.61)
EOSINOPHIL NFR BLD AUTO: 1 % (ref 0–5)
ERYTHROCYTE [DISTWIDTH] IN BLOOD BY AUTOMATED COUNT: 14.4 % (ref 11.5–14.5)
EST. AVERAGE GLUCOSE BLD GHB EST-MCNC: 160 MG/DL
GFR SERPL CREATININE-BSD FRML MDRD: 79 ML/MIN/1.73SQ M
GLUCOSE P FAST SERPL-MCNC: 146 MG/DL (ref 65–99)
HBA1C MFR BLD: 7.2 % (ref 4.2–6.3)
HCT VFR BLD AUTO: 42.1 % (ref 42–47)
HCV AB SER QL: NORMAL
HDLC SERPL-MCNC: 42 MG/DL (ref 40–60)
HGB BLD-MCNC: 14.3 G/DL (ref 12–16)
LDLC SERPL CALC-MCNC: 79 MG/DL (ref 75–193)
LYMPHOCYTES # BLD AUTO: 1.8 THOUSANDS/ΜL (ref 0.6–4.47)
LYMPHOCYTES NFR BLD AUTO: 24 % (ref 21–51)
MCH RBC QN AUTO: 31.5 PG (ref 26–34)
MCHC RBC AUTO-ENTMCNC: 34 G/DL (ref 31–37)
MCV RBC AUTO: 93 FL (ref 81–99)
MONOCYTES # BLD AUTO: 0.6 THOUSAND/ΜL (ref 0.17–1.22)
MONOCYTES NFR BLD AUTO: 8 % (ref 2–12)
NEUTROPHILS # BLD AUTO: 5.1 THOUSANDS/ΜL (ref 1.4–6.5)
NEUTS SEG NFR BLD AUTO: 67 % (ref 42–75)
NRBC BLD AUTO-RTO: 0 /100 WBCS
PLATELET # BLD AUTO: 196 THOUSANDS/UL (ref 149–390)
PMV BLD AUTO: 8.3 FL (ref 8.6–11.7)
POTASSIUM SERPL-SCNC: 4.3 MMOL/L (ref 3.5–5.5)
PROT SERPL-MCNC: 7.1 G/DL (ref 6.4–8.9)
RBC # BLD AUTO: 4.55 MILLION/UL (ref 3.9–5.2)
SODIUM SERPL-SCNC: 139 MMOL/L (ref 134–143)
TRIGL SERPL-MCNC: 222 MG/DL (ref 44–166)
TSH SERPL DL<=0.05 MIU/L-ACNC: 4.01 UIU/ML (ref 0.45–5.33)
WBC # BLD AUTO: 7.5 THOUSAND/UL (ref 4.8–10.8)

## 2019-03-07 PROCEDURE — 82248 BILIRUBIN DIRECT: CPT

## 2019-03-07 PROCEDURE — 86803 HEPATITIS C AB TEST: CPT

## 2019-03-07 PROCEDURE — 80061 LIPID PANEL: CPT

## 2019-03-07 PROCEDURE — 85025 COMPLETE CBC W/AUTO DIFF WBC: CPT

## 2019-03-07 PROCEDURE — 83036 HEMOGLOBIN GLYCOSYLATED A1C: CPT

## 2019-03-07 PROCEDURE — 36415 COLL VENOUS BLD VENIPUNCTURE: CPT

## 2019-03-07 PROCEDURE — 84443 ASSAY THYROID STIM HORMONE: CPT

## 2019-03-07 PROCEDURE — 80053 COMPREHEN METABOLIC PANEL: CPT

## 2019-03-11 DIAGNOSIS — E11.8 TYPE 2 DIABETES MELLITUS WITH COMPLICATION, UNSPECIFIED WHETHER LONG TERM INSULIN USE: ICD-10-CM

## 2019-03-30 DIAGNOSIS — G89.29 OTHER CHRONIC PAIN: ICD-10-CM

## 2019-04-02 RX ORDER — GABAPENTIN 600 MG/1
TABLET ORAL
Qty: 90 TABLET | Refills: 1 | Status: SHIPPED | OUTPATIENT
Start: 2019-04-02 | End: 2019-05-28 | Stop reason: SDUPTHER

## 2019-04-22 ENCOUNTER — OFFICE VISIT (OUTPATIENT)
Dept: FAMILY MEDICINE CLINIC | Facility: HOME HEALTHCARE | Age: 59
End: 2019-04-22
Payer: COMMERCIAL

## 2019-04-22 VITALS
HEIGHT: 67 IN | BODY MASS INDEX: 37.35 KG/M2 | WEIGHT: 238 LBS | DIASTOLIC BLOOD PRESSURE: 72 MMHG | HEART RATE: 121 BPM | RESPIRATION RATE: 18 BRPM | OXYGEN SATURATION: 90 % | TEMPERATURE: 99 F | SYSTOLIC BLOOD PRESSURE: 112 MMHG

## 2019-04-22 DIAGNOSIS — E03.9 ACQUIRED HYPOTHYROIDISM: ICD-10-CM

## 2019-04-22 DIAGNOSIS — E11.9 TYPE 2 DIABETES MELLITUS WITHOUT COMPLICATION, WITHOUT LONG-TERM CURRENT USE OF INSULIN (HCC): Primary | ICD-10-CM

## 2019-04-22 PROCEDURE — T1015 CLINIC SERVICE: HCPCS | Performed by: FAMILY MEDICINE

## 2019-04-29 DIAGNOSIS — E78.2 MIXED HYPERLIPIDEMIA: ICD-10-CM

## 2019-04-29 DIAGNOSIS — K21.9 GERD WITHOUT ESOPHAGITIS: ICD-10-CM

## 2019-04-29 RX ORDER — ATORVASTATIN CALCIUM 40 MG/1
TABLET, FILM COATED ORAL
Qty: 90 TABLET | Refills: 0 | Status: SHIPPED | OUTPATIENT
Start: 2019-04-29 | End: 2019-11-04 | Stop reason: SDUPTHER

## 2019-04-29 RX ORDER — PANTOPRAZOLE SODIUM 20 MG/1
TABLET, DELAYED RELEASE ORAL
Qty: 180 TABLET | Refills: 0 | Status: SHIPPED | OUTPATIENT
Start: 2019-04-29 | End: 2019-10-22 | Stop reason: SDUPTHER

## 2019-04-30 LAB
LEFT EYE DIABETIC RETINOPATHY: NORMAL
RIGHT EYE DIABETIC RETINOPATHY: NORMAL

## 2019-05-17 DIAGNOSIS — G43.809 OTHER MIGRAINE WITHOUT STATUS MIGRAINOSUS, NOT INTRACTABLE: ICD-10-CM

## 2019-05-20 RX ORDER — AMITRIPTYLINE HYDROCHLORIDE 100 MG/1
TABLET, FILM COATED ORAL
Qty: 30 TABLET | Refills: 0 | Status: SHIPPED | OUTPATIENT
Start: 2019-05-20 | End: 2019-07-15 | Stop reason: SDUPTHER

## 2019-05-26 DIAGNOSIS — G89.29 OTHER CHRONIC PAIN: ICD-10-CM

## 2019-05-26 DIAGNOSIS — E11.9 DIABETES MELLITUS WITHOUT COMPLICATION (HCC): ICD-10-CM

## 2019-05-28 DIAGNOSIS — G89.29 OTHER CHRONIC PAIN: ICD-10-CM

## 2019-05-28 RX ORDER — LINAGLIPTIN 5 MG/1
TABLET, FILM COATED ORAL
Qty: 30 TABLET | Refills: 3 | Status: SHIPPED | OUTPATIENT
Start: 2019-05-28 | End: 2019-06-26 | Stop reason: SDUPTHER

## 2019-05-28 RX ORDER — GABAPENTIN 600 MG/1
TABLET ORAL
Qty: 90 TABLET | Refills: 1 | OUTPATIENT
Start: 2019-05-28

## 2019-05-28 RX ORDER — GABAPENTIN 600 MG/1
600 TABLET ORAL 3 TIMES DAILY
Qty: 90 TABLET | Refills: 1 | Status: SHIPPED | OUTPATIENT
Start: 2019-05-28 | End: 2019-06-26 | Stop reason: SDUPTHER

## 2019-06-21 ENCOUNTER — OFFICE VISIT (OUTPATIENT)
Dept: FAMILY MEDICINE CLINIC | Facility: HOME HEALTHCARE | Age: 59
End: 2019-06-21
Payer: COMMERCIAL

## 2019-06-21 VITALS
DIASTOLIC BLOOD PRESSURE: 62 MMHG | OXYGEN SATURATION: 98 % | WEIGHT: 237 LBS | HEIGHT: 67 IN | RESPIRATION RATE: 16 BRPM | TEMPERATURE: 97.1 F | HEART RATE: 101 BPM | SYSTOLIC BLOOD PRESSURE: 110 MMHG | BODY MASS INDEX: 37.2 KG/M2

## 2019-06-21 DIAGNOSIS — E11.9 TYPE 2 DIABETES MELLITUS WITHOUT COMPLICATION, WITHOUT LONG-TERM CURRENT USE OF INSULIN (HCC): Primary | ICD-10-CM

## 2019-06-21 DIAGNOSIS — I10 ESSENTIAL HYPERTENSION: ICD-10-CM

## 2019-06-21 LAB — SL AMB POCT HEMOGLOBIN AIC: 7 (ref ?–6.5)

## 2019-06-21 PROCEDURE — T1015 CLINIC SERVICE: HCPCS | Performed by: FAMILY MEDICINE

## 2019-06-21 PROCEDURE — 83036 HEMOGLOBIN GLYCOSYLATED A1C: CPT | Performed by: FAMILY MEDICINE

## 2019-06-21 RX ORDER — BENZTROPINE MESYLATE 0.5 MG/1
0.5 TABLET ORAL
COMMUNITY

## 2019-06-26 DIAGNOSIS — E11.9 TYPE 2 DIABETES MELLITUS WITHOUT COMPLICATION, WITHOUT LONG-TERM CURRENT USE OF INSULIN (HCC): ICD-10-CM

## 2019-06-26 DIAGNOSIS — E11.9 DIABETES MELLITUS WITHOUT COMPLICATION (HCC): ICD-10-CM

## 2019-06-26 DIAGNOSIS — G89.29 OTHER CHRONIC PAIN: ICD-10-CM

## 2019-06-26 RX ORDER — GABAPENTIN 600 MG/1
600 TABLET ORAL 3 TIMES DAILY
Qty: 270 TABLET | Refills: 0 | Status: SHIPPED | OUTPATIENT
Start: 2019-06-26 | End: 2020-01-13 | Stop reason: SDUPTHER

## 2019-07-05 DIAGNOSIS — E11.65 TYPE 2 DIABETES MELLITUS WITH HYPERGLYCEMIA, WITHOUT LONG-TERM CURRENT USE OF INSULIN (HCC): ICD-10-CM

## 2019-07-15 DIAGNOSIS — G43.809 OTHER MIGRAINE WITHOUT STATUS MIGRAINOSUS, NOT INTRACTABLE: ICD-10-CM

## 2019-07-15 RX ORDER — AMITRIPTYLINE HYDROCHLORIDE 100 MG/1
50 TABLET, FILM COATED ORAL
Qty: 30 TABLET | Refills: 0 | Status: SHIPPED | OUTPATIENT
Start: 2019-07-15 | End: 2019-09-06 | Stop reason: SDUPTHER

## 2019-09-03 ENCOUNTER — OFFICE VISIT (OUTPATIENT)
Dept: FAMILY MEDICINE CLINIC | Facility: HOME HEALTHCARE | Age: 59
End: 2019-09-03
Payer: COMMERCIAL

## 2019-09-03 VITALS
RESPIRATION RATE: 18 BRPM | DIASTOLIC BLOOD PRESSURE: 64 MMHG | OXYGEN SATURATION: 93 % | WEIGHT: 238 LBS | BODY MASS INDEX: 37.35 KG/M2 | HEIGHT: 67 IN | HEART RATE: 98 BPM | TEMPERATURE: 97.5 F | SYSTOLIC BLOOD PRESSURE: 120 MMHG

## 2019-09-03 DIAGNOSIS — M54.42 CHRONIC BILATERAL LOW BACK PAIN WITH LEFT-SIDED SCIATICA: Primary | ICD-10-CM

## 2019-09-03 DIAGNOSIS — R91.8 MULTIPLE LUNG NODULES ON CT: ICD-10-CM

## 2019-09-03 DIAGNOSIS — J44.9 CHRONIC OBSTRUCTIVE PULMONARY DISEASE, UNSPECIFIED COPD TYPE (HCC): ICD-10-CM

## 2019-09-03 DIAGNOSIS — G89.29 CHRONIC BILATERAL LOW BACK PAIN WITH LEFT-SIDED SCIATICA: Primary | ICD-10-CM

## 2019-09-03 PROCEDURE — T1015 CLINIC SERVICE: HCPCS | Performed by: FAMILY MEDICINE

## 2019-09-03 RX ORDER — NAPROXEN 500 MG/1
500 TABLET ORAL 2 TIMES DAILY WITH MEALS
Qty: 30 TABLET | Refills: 0 | Status: SHIPPED | OUTPATIENT
Start: 2019-09-03 | End: 2020-01-28 | Stop reason: SDUPTHER

## 2019-09-03 NOTE — PATIENT INSTRUCTIONS
Take naproxen with food  Make sure follow-up with pulmonary secondary to your COPD and lung nodules that were noted on previous CT of neck

## 2019-09-03 NOTE — PROGRESS NOTES
Assessment/Plan:    No problem-specific Assessment & Plan notes found for this encounter  Diagnoses and all orders for this visit:    Chronic bilateral low back pain with left-sided sciatica  -     naproxen (NAPROSYN) 500 mg tablet; Take 1 tablet (500 mg total) by mouth 2 (two) times a day with meals  -     XR spine lumbar minimum 4 views non injury; Future    Chronic obstructive pulmonary disease, unspecified COPD type ([facility] [address] )  -     Ambulatory referral to Pulmonology; Future    Multiple lung nodules on CT  -     Ambulatory referral to Pulmonology; Future         Lumbar x-ray and naproxen ordered  Instructed to take naproxen with food  Denies any history peptic ulcer disease  Normal renal function noted on labs  Patient scheduled to return on September 23rd for regular office visit  Will re-evaluate her symptoms at that time and place orthopedic consult if necessary  Patient instructed to go to the emergency room with any worsening  Symptoms or pain  Subjective:      Patient ID: Frances Hoffman is a 61 y o  female  HPI  35-year-old female presents today for left leg pain and lumbar pain  Which started approximately 9 years ago  Waxing and waning in nature  Patient states pain is progressive  over past week  Kevin Garza Described as combination of numbness and occasional shooting pain  Mostly anterior left thigh with radiation down back of leg to the calf  No calf pain with palpation or edema noted  Patient denies any recent trauma however has noticed some lower back pain over the past week or so  Patient denies any saddle paresthesia, loss of bowel or bladder control  Patient has never seen anyone in the past for the symptoms  Pain is exacerbated with flexion and extension at waste  She has no lumbar or back x-rays on file  However reviewing radiology reports noted history of lung nodules on CT of neck 2017    Patient was post of follow-up with pulmonary and has high-resolution CT chest ordered however failed to do so  The following portions of the patient's history were reviewed and updated as appropriate: allergies, current medications, past family history, past medical history, past social history, past surgical history and problem list     Review of Systems   Constitutional: Negative for activity change, appetite change, chills, diaphoresis, fatigue and fever  HENT: Negative  Negative for congestion, postnasal drip, rhinorrhea, sinus pressure, sinus pain, sneezing and sore throat  Respiratory: Negative for apnea, cough, choking, chest tightness, shortness of breath, wheezing and stridor  Cardiovascular: Negative  Negative for chest pain, palpitations and leg swelling  Gastrointestinal: Negative  Negative for abdominal distention, abdominal pain, anal bleeding, blood in stool, constipation, diarrhea, nausea, rectal pain and vomiting  Genitourinary: Negative  Musculoskeletal: Negative for myalgias, neck pain and neck stiffness  Skin: Negative for rash  Neurological: Negative for dizziness and headaches  Psychiatric/Behavioral: Negative  Objective:      /64   Pulse 98   Temp 97 5 °F (36 4 °C)   Resp 18   Ht 5' 7" (1 702 m)   Wt 108 kg (238 lb)   SpO2 93%   BMI 37 28 kg/m²          Physical Exam   Constitutional: She is oriented to person, place, and time  No distress  Eyes: Pupils are equal, round, and reactive to light  Conjunctivae are normal    Neck: Normal range of motion  Neck supple  No tracheal deviation present  Cardiovascular: Normal rate, regular rhythm, normal heart sounds and intact distal pulses  Pulmonary/Chest: Effort normal and breath sounds normal    Abdominal: Soft  Bowel sounds are normal  She exhibits no distension and no mass  There is no tenderness  There is no rebound and no guarding  No hernia  Musculoskeletal: She exhibits no edema or deformity          Lumbar back: She exhibits normal range of motion, no bony tenderness, no swelling, no edema, no deformity, no laceration, no pain, no spasm and normal pulse  Back:         Left upper leg: She exhibits no tenderness, no bony tenderness, no swelling, no edema, no deformity and no laceration  Legs:  Lymphadenopathy:     She has no cervical adenopathy  Neurological: She is alert and oriented to person, place, and time  She displays normal reflexes  No sensory deficit  She exhibits normal muscle tone  Coordination normal    Skin: Skin is warm and dry  Capillary refill takes less than 2 seconds  No rash noted  She is not diaphoretic  Psychiatric: She has a normal mood and affect  Nursing note and vitals reviewed

## 2019-09-06 DIAGNOSIS — G43.809 OTHER MIGRAINE WITHOUT STATUS MIGRAINOSUS, NOT INTRACTABLE: ICD-10-CM

## 2019-09-06 RX ORDER — AMITRIPTYLINE HYDROCHLORIDE 100 MG/1
TABLET, FILM COATED ORAL
Qty: 30 TABLET | Refills: 0 | Status: SHIPPED | OUTPATIENT
Start: 2019-09-06 | End: 2019-11-09 | Stop reason: SDUPTHER

## 2019-09-10 ENCOUNTER — HOSPITAL ENCOUNTER (OUTPATIENT)
Dept: RADIOLOGY | Facility: HOSPITAL | Age: 59
Discharge: HOME | End: 2019-09-10
Payer: COMMERCIAL

## 2019-09-10 DIAGNOSIS — G89.29 CHRONIC BILATERAL LOW BACK PAIN WITH LEFT-SIDED SCIATICA: ICD-10-CM

## 2019-09-10 DIAGNOSIS — M54.42 CHRONIC BILATERAL LOW BACK PAIN WITH LEFT-SIDED SCIATICA: ICD-10-CM

## 2019-09-10 PROCEDURE — 72110 X-RAY EXAM L-2 SPINE 4/>VWS: CPT

## 2019-09-18 ENCOUNTER — TELEPHONE (OUTPATIENT)
Dept: FAMILY MEDICINE CLINIC | Facility: HOME HEALTHCARE | Age: 59
End: 2019-09-18

## 2019-10-22 DIAGNOSIS — K21.9 GERD WITHOUT ESOPHAGITIS: ICD-10-CM

## 2019-10-22 RX ORDER — PANTOPRAZOLE SODIUM 20 MG/1
20 TABLET, DELAYED RELEASE ORAL DAILY
Qty: 180 TABLET | Refills: 0 | Status: SHIPPED | OUTPATIENT
Start: 2019-10-22 | End: 2019-12-13

## 2019-11-04 DIAGNOSIS — E11.9 DIABETES MELLITUS WITHOUT COMPLICATION (HCC): ICD-10-CM

## 2019-11-04 DIAGNOSIS — E78.2 MIXED HYPERLIPIDEMIA: ICD-10-CM

## 2019-11-04 RX ORDER — ATORVASTATIN CALCIUM 40 MG/1
40 TABLET, FILM COATED ORAL
Qty: 90 TABLET | Refills: 0 | Status: SHIPPED | OUTPATIENT
Start: 2019-11-04 | End: 2020-02-20 | Stop reason: SDUPTHER

## 2019-11-09 DIAGNOSIS — G43.809 OTHER MIGRAINE WITHOUT STATUS MIGRAINOSUS, NOT INTRACTABLE: ICD-10-CM

## 2019-11-13 RX ORDER — AMITRIPTYLINE HYDROCHLORIDE 100 MG/1
TABLET, FILM COATED ORAL
Qty: 30 TABLET | Refills: 0 | Status: SHIPPED | OUTPATIENT
Start: 2019-11-13 | End: 2020-01-10

## 2019-11-27 ENCOUNTER — CLINICAL SUPPORT (OUTPATIENT)
Dept: FAMILY MEDICINE CLINIC | Facility: HOME HEALTHCARE | Age: 59
End: 2019-11-27

## 2019-11-27 DIAGNOSIS — E11.9 TYPE 2 DIABETES MELLITUS WITHOUT COMPLICATION, WITHOUT LONG-TERM CURRENT USE OF INSULIN (HCC): Primary | ICD-10-CM

## 2019-12-13 ENCOUNTER — OFFICE VISIT (OUTPATIENT)
Dept: FAMILY MEDICINE CLINIC | Facility: HOME HEALTHCARE | Age: 59
End: 2019-12-13
Payer: COMMERCIAL

## 2019-12-13 VITALS
BODY MASS INDEX: 37.04 KG/M2 | TEMPERATURE: 97.6 F | SYSTOLIC BLOOD PRESSURE: 128 MMHG | HEART RATE: 102 BPM | OXYGEN SATURATION: 93 % | RESPIRATION RATE: 18 BRPM | WEIGHT: 236 LBS | HEIGHT: 67 IN | DIASTOLIC BLOOD PRESSURE: 78 MMHG

## 2019-12-13 DIAGNOSIS — E11.9 TYPE 2 DIABETES MELLITUS WITHOUT COMPLICATION, WITHOUT LONG-TERM CURRENT USE OF INSULIN (HCC): Primary | ICD-10-CM

## 2019-12-13 DIAGNOSIS — Z12.39 BREAST CANCER SCREENING: ICD-10-CM

## 2019-12-13 DIAGNOSIS — K21.9 GERD WITHOUT ESOPHAGITIS: ICD-10-CM

## 2019-12-13 DIAGNOSIS — Z12.4 CERVICAL CANCER SCREENING: ICD-10-CM

## 2019-12-13 DIAGNOSIS — K51.919 ULCERATIVE COLITIS WITH COMPLICATION, UNSPECIFIED LOCATION (HCC): ICD-10-CM

## 2019-12-13 LAB — SL AMB POCT HEMOGLOBIN AIC: 6.9 (ref ?–6.5)

## 2019-12-13 PROCEDURE — T1015 CLINIC SERVICE: HCPCS | Performed by: FAMILY MEDICINE

## 2019-12-13 PROCEDURE — 83036 HEMOGLOBIN GLYCOSYLATED A1C: CPT | Performed by: FAMILY MEDICINE

## 2019-12-13 RX ORDER — PANTOPRAZOLE SODIUM 20 MG/1
20 TABLET, DELAYED RELEASE ORAL
Qty: 120 TABLET | Refills: 0 | Status: SHIPPED | OUTPATIENT
Start: 2019-12-13 | End: 2020-01-13 | Stop reason: SDUPTHER

## 2019-12-13 NOTE — PROGRESS NOTES
[address]       NAME: Chely Marinelli is a 61 y o  female  : 1960    MRN: [id number]  DATE: 2019  TIME: 2:56 PM    Assessment and Plan   Diagnoses and all orders for this visit:    Type 2 diabetes mellitus without complication, without long-term current use of insulin ([facility] [address] )  Comments:  A 1c stable, will check labs at next visit  Orders:  -     POCT hemoglobin A1c    Ulcerative colitis with complication, unspecified location [facility])  Comments: Follow-up with GI  Orders:  -     Ambulatory referral to Gastroenterology; Future    GERD without esophagitis  Comments:  B i d  Protonix x2 months  Follow up with GI  Orders:  -     pantoprazole (PROTONIX) 20 mg tablet; Take 1 tablet (20 mg total) by mouth 2 (two) times daily after meals    Breast cancer screening  -     Ambulatory referral to Gynecology; Future    Cervical cancer screening  -     Mammo screening bilateral w cad; Future        No problem-specific Assessment & Plan notes found for this encounter  Patient Instructions           Chief Complaint     Chief Complaint   Patient presents with    Diabetes     f/u    Diarrhea     patient states she wants something for her ulcerative colitis    Heartburn         History of Present Illness       Xiomara Angeles is here for routine check up, pt has known type 2 diabetes, AIC 6 9  She is on oral agents long-term and has tolerated them well  She denies any s/s of hypo/hyperglycema  Reports history of ulcerative colitis for which she saw GI a couple of years ago  She feels her symptoms are worsening, and would like to reestablish with them  Also complaining of reflux  Previously was treated with Protonix twice daily, which did relieve symptoms  She would like to try this again  I have agreed to do this 1 time, with the understanding that she follow up with GI he may need a scope  She verbalizes agreement and understanding of this    Denies any bloody stools or unintentional weight loss  Review of Systems   Review of Systems   Constitutional: Negative  HENT: Negative  Respiratory: Negative  Gastrointestinal: Positive for diarrhea  Negative for abdominal distention, abdominal pain, anal bleeding, blood in stool, constipation and nausea  Genitourinary: Negative  Musculoskeletal: Negative  Neurological: Negative  Psychiatric/Behavioral: Negative  Current Medications       Current Outpatient Medications:     albuterol (2 5 mg/3 mL) 0 083 % nebulizer solution, Take 1 vial (2 5 mg total) by nebulization 4 (four) times a day as needed for shortness of breath, Disp: 120 vial, Rfl: 3    albuterol (VENTOLIN HFA) 90 mcg/act inhaler, Inhale 2 puffs every 4 (four) hours as needed for wheezing, Disp: 1 Inhaler, Rfl: 3    amitriptyline (ELAVIL) 100 mg tablet, take 1/2 tablet by mouth at bedtime, Disp: 30 tablet, Rfl: 0    atorvastatin (LIPITOR) 40 mg tablet, Take 1 tablet (40 mg total) by mouth daily at bedtime, Disp: 90 tablet, Rfl: 0    benztropine (COGENTIN) 0 5 mg tablet, Take 0 5 mg by mouth 2 (two) times a day, Disp: , Rfl:     Blood Glucose Monitoring Suppl (ONE TOUCH ULTRA 2) w/Device KIT, by Does not apply route, Disp: , Rfl:     Calcium Carbonate-Vit D-Min (CALCIUM 1200 PO), Take 1,200 mg by mouth daily  , Disp: , Rfl:     DULERA 100-5 MCG/ACT inhaler, inhale 2 puffs by mouth twice a day, Disp: 13 g, Rfl: 3    fluticasone (FLONASE) 50 mcg/act nasal spray, 2 sprays into each nostril daily, Disp: , Rfl:     gabapentin (NEURONTIN) 600 MG tablet, Take 1 tablet (600 mg total) by mouth 3 (three) times a day, Disp: 270 tablet, Rfl: 0    glucose blood (ONE TOUCH ULTRA TEST) test strip, 1 each by Other route daily, Disp: 100 each, Rfl: 1    levothyroxine 25 mcg tablet, Take 1 tablet (25 mcg total) by mouth daily before breakfast Take on empty stomach 45 minutes before, Disp: 30 tablet, Rfl: 0    linaGLIPtin (TRADJENTA) 5 MG TABS, Take 5 mg by mouth daily, Disp: 90 tablet, Rfl: 0    metFORMIN (GLUCOPHAGE) 1000 MG tablet, Take 1 tablet (1,000 mg total) by mouth 2 (two) times a day with meals, Disp: 180 tablet, Rfl: 0    naproxen (NAPROSYN) 500 mg tablet, Take 1 tablet (500 mg total) by mouth 2 (two) times a day with meals, Disp: 30 tablet, Rfl: 0    ONETOUCH DELICA LANCETS 80H MISC, by Does not apply route, Disp: , Rfl:     pantoprazole (PROTONIX) 20 mg tablet, Take 1 tablet (20 mg total) by mouth 2 (two) times daily after meals, Disp: 120 tablet, Rfl: 0    traZODone (DESYREL) 150 mg tablet, 150 mg daily at bedtime , Disp: , Rfl:     vilazodone (VIIBRYD) 40 mg tablet, Take 40 mg by mouth daily  , Disp: , Rfl:     VRAYLAR 6 MG capsule, Take 6 mg by mouth daily, Disp: , Rfl: 0    Current Allergies     Allergies as of 12/13/2019 - Reviewed 12/13/2019   Allergen Reaction Noted    Augmentin [amoxicillin-pot clavulanate] GI Intolerance 08/10/2016            The following portions of the patient's history were reviewed and updated as appropriate: allergies, current medications, past family history, past medical history, past social history, past surgical history and problem list      Past Medical History:   Diagnosis Date    Chronic back pain     Colitis     [facility]chronic obstructive pulmonary disease) ([facility] [address] )     Depression     DVT of lower limb, acute ([facility] [address] )     GERD (gastroesophageal reflux disease)     Sleep apnea        Past Surgical History:   Procedure Laterality Date    BREAST LUMPECTOMY Right     IVC FILTER INSERTION         Family History   Problem Relation Age of Onset    Breast cancer Mother     COPD Mother     Coronary artery disease Mother     Coronary artery disease Father     Stroke Father          Medications have been verified          Objective   /78   Pulse 102   Temp 97 6 °F (36 4 °C)   Resp 18   Ht 5' 7" (1 702 m)   Wt 107 kg (236 lb)   SpO2 93%   BMI 36 96 kg/m²        Physical Exam     Physical Exam   Constitutional: She is oriented to person, place, and time  She appears well-developed and well-nourished  HENT:   Mouth/Throat: Oropharynx is clear and moist    Eyes: Pupils are equal, round, and reactive to light  Conjunctivae and EOM are normal    Neck: Normal range of motion  Neck supple  Cardiovascular: Normal rate, regular rhythm, normal heart sounds and intact distal pulses  Pulmonary/Chest: Effort normal and breath sounds normal    Abdominal: Soft  Bowel sounds are normal    Musculoskeletal: Normal range of motion  Neurological: She is alert and oriented to person, place, and time  Skin: Skin is warm and dry  Capillary refill takes less than 2 seconds  Psychiatric: She has a normal mood and affect  Her behavior is normal  Judgment and thought content normal    Nursing note and vitals reviewed

## 2019-12-13 NOTE — PATIENT INSTRUCTIONS
Where a medical alert identification to carry  Check your feet each day for sores, make sure your socks and shoes fit correctly  Did not trim year nails  Established with a podiatrist   Maintain a healthy weight, this can help you control her diabetes  Follow a proper meal plan  Keep track of her carbohydrates, eat low-fat foods, low-salt, high-fiber foods, limit alcohol  Exercise can help her blood sugar level study, decrease her risk of heart disease, and help you lose weight  Smoking cessation advised if applicable  Good blood pressure control is recommended  A normal blood pressure is 119/78 or lower  In neural blood pressure can help prevent certain complications from diabetes  Recommend yearly eye exams to assess for retinopathy  Consider vaccination against flu, pneumonia, and hepatitis  Regular exercise and physical activity may help you control your weight  Diet and exercise play an important role in controlling your weight  Exercise strengthens your heart and improves your circulation  This lowers risks of heart disease  Exercise can lower your blood sugar level and help your insulin work better  This will cut down your risk of type 2 diabetes  Exercise can improve your mood and make you feel more relaxed  This can reduce your risk of depression  Hyperlipidemia-eat a heart healthy diet, this includes reduction of saturated fat and trans fat  Reducing red meat and home health     Regular exercise  40 minutes of aerobic exercise or vigorous walking can lower cholesterol and blood pressure  Avoid tobacco smoke  Smoking lowers HDL cholesterol  This places a person at risk for coronary artery disease  Weight loss recommended  Being overweight or obese tends to raise LDL cholesterol and lower HDL cholesterol

## 2019-12-19 DIAGNOSIS — E11.9 TYPE 2 DIABETES MELLITUS WITHOUT COMPLICATION, WITHOUT LONG-TERM CURRENT USE OF INSULIN (HCC): ICD-10-CM

## 2020-01-02 DIAGNOSIS — E11.9 TYPE 2 DIABETES MELLITUS WITHOUT COMPLICATION, WITHOUT LONG-TERM CURRENT USE OF INSULIN (HCC): ICD-10-CM

## 2020-01-10 DIAGNOSIS — G43.809 OTHER MIGRAINE WITHOUT STATUS MIGRAINOSUS, NOT INTRACTABLE: ICD-10-CM

## 2020-01-10 RX ORDER — AMITRIPTYLINE HYDROCHLORIDE 100 MG/1
TABLET, FILM COATED ORAL
Qty: 30 TABLET | Refills: 0 | Status: SHIPPED | OUTPATIENT
Start: 2020-01-10 | End: 2020-04-03 | Stop reason: SDUPTHER

## 2020-01-13 DIAGNOSIS — G89.29 OTHER CHRONIC PAIN: ICD-10-CM

## 2020-01-13 DIAGNOSIS — K21.9 GERD WITHOUT ESOPHAGITIS: ICD-10-CM

## 2020-01-13 RX ORDER — GABAPENTIN 600 MG/1
600 TABLET ORAL 3 TIMES DAILY
Qty: 270 TABLET | Refills: 0 | Status: SHIPPED | OUTPATIENT
Start: 2020-01-13 | End: 2020-06-26 | Stop reason: SDUPTHER

## 2020-01-13 RX ORDER — PANTOPRAZOLE SODIUM 20 MG/1
20 TABLET, DELAYED RELEASE ORAL
Qty: 180 TABLET | Refills: 0 | Status: SHIPPED | OUTPATIENT
Start: 2020-01-13 | End: 2020-06-26 | Stop reason: SDUPTHER

## 2020-01-21 ENCOUNTER — OFFICE VISIT (OUTPATIENT)
Dept: FAMILY MEDICINE CLINIC | Facility: HOME HEALTHCARE | Age: 60
End: 2020-01-21
Payer: COMMERCIAL

## 2020-01-21 VITALS
SYSTOLIC BLOOD PRESSURE: 120 MMHG | HEART RATE: 100 BPM | OXYGEN SATURATION: 92 % | TEMPERATURE: 98.2 F | BODY MASS INDEX: 37.26 KG/M2 | HEIGHT: 67 IN | DIASTOLIC BLOOD PRESSURE: 70 MMHG | RESPIRATION RATE: 18 BRPM | WEIGHT: 237.4 LBS

## 2020-01-21 DIAGNOSIS — J40 BRONCHITIS: ICD-10-CM

## 2020-01-21 DIAGNOSIS — E55.9 VITAMIN D DEFICIENCY: Primary | ICD-10-CM

## 2020-01-21 PROCEDURE — T1015 CLINIC SERVICE: HCPCS | Performed by: FAMILY MEDICINE

## 2020-01-21 RX ORDER — DEXTROMETHORPHAN HYDROBROMIDE AND PROMETHAZINE HYDROCHLORIDE 15; 6.25 MG/5ML; MG/5ML
5 SOLUTION ORAL 4 TIMES DAILY PRN
Qty: 180 ML | Refills: 0 | Status: SHIPPED | OUTPATIENT
Start: 2020-01-21 | End: 2020-01-31

## 2020-01-21 RX ORDER — METHYLPREDNISOLONE 4 MG/1
TABLET ORAL
Qty: 1 EACH | Refills: 0 | Status: SHIPPED | OUTPATIENT
Start: 2020-01-21 | End: 2020-04-14 | Stop reason: ALTCHOICE

## 2020-01-21 RX ORDER — ACETAMINOPHEN 500 MG
1 TABLET ORAL DAILY
Qty: 90 EACH | Refills: 1 | Status: SHIPPED | OUTPATIENT
Start: 2020-01-21 | End: 2020-06-26 | Stop reason: SDUPTHER

## 2020-01-21 RX ORDER — AZITHROMYCIN 250 MG/1
TABLET, FILM COATED ORAL
Qty: 6 TABLET | Refills: 0 | Status: SHIPPED | OUTPATIENT
Start: 2020-01-21 | End: 2020-01-25

## 2020-01-21 RX ORDER — MINERAL OIL/PETROLATUM,WHITE 42.5-57.3%
OINTMENT (GRAM) OPHTHALMIC (EYE)
Qty: 1 EACH | Refills: 0 | Status: SHIPPED | OUTPATIENT
Start: 2020-01-21 | End: 2020-04-14 | Stop reason: ALTCHOICE

## 2020-01-21 NOTE — PROGRESS NOTES
Subjective     Dain Jim is a 61 y o  female who presents for evaluation of symptoms of a URI  Symptoms include congestion, low grade fever, nasal congestion, non productive cough, post nasal drip and productive cough with  yellow colored sputum  Onset of symptoms was 1 week ago and has been unchanged since that time  Treatment to date: none  The following portions of the patient's history were reviewed and updated as appropriate: allergies, current medications, past family history, past medical history, past social history, past surgical history and problem list     Review of Systems  Constitutional: negative except for chills  Ears, nose, mouth, throat, and face: negative except for hoarseness, nasal congestion and sore throat  Respiratory: negative except for cough, sputum, wheezing and smoker  Cardiovascular: negative  Gastrointestinal: negative  Integument/breast: negative  Objective     /70 (BP Location: Right arm, Patient Position: Sitting, Cuff Size: Adult)   Pulse 100   Temp 98 2 °F (36 8 °C) (Temporal)   Resp 18   Ht 5' 7" (1 702 m)   Wt 108 kg (237 lb 6 4 oz)   SpO2 92%   BMI 37 18 kg/m²   General appearance: alert and oriented, in no acute distress  Ears: normal TM's and external ear canals both ears  Nose: Nares normal  Septum midline  Mucosa normal  No drainage or sinus tenderness  , no sinus tenderness  Throat: lips, mucosa, and tongue normal; teeth and gums normal  Lungs: wheezes  Heart: regular rate and rhythm, S1, S2 normal, no murmur, click, rub or gallop  Skin: Skin color, texture, turgor normal  No rashes or lesions  Assessment/Plan     Ricci Song was seen today for uri  Diagnoses and all orders for this visit:    Vitamin D deficiency  -     Calcium Carbonate-Vit D-Min (CALCIUM 1200) 0693-0637 MG-UNIT CHEW; Chew 1 each daily    Bronchitis  Comments:  Complicated bronchitis  Patient is a smoker    Start supportive measures and oral steroids, hold off on picking up the antibiotic to see if this helps  Orders:  -     methylPREDNISolone 4 MG tablet therapy pack; Use as directed on package  -     Humidifiers (COOL MIST HUMIDIFIER 0 8 GAL) MISC; by Does not apply route daily at bedtime  -     Promethazine-DM (PHENERGAN-DM) 6 25-15 mg/5 mL oral syrup; Take 5 mL by mouth 4 (four) times a day as needed for cough for up to 10 days  -     sodium chloride (OCEAN) 0 65 % nasal spray; 1 spray into each nostril as needed for congestion  -     azithromycin (ZITHROMAX) 250 mg tablet; Take 2 tablets today then 1 tablet daily x 4 days     Patient to start steroids, hold off on filling antibiotic unless not improving within a few days  Does verbalize agreement understanding of this plan  Discussed diagnosis and treatment of URI  Suggested symptomatic OTC remedies  Nasal saline spray for congestion  Follow up as needed  Joey Woo

## 2020-01-28 DIAGNOSIS — M54.42 CHRONIC BILATERAL LOW BACK PAIN WITH LEFT-SIDED SCIATICA: ICD-10-CM

## 2020-01-28 DIAGNOSIS — G89.29 CHRONIC BILATERAL LOW BACK PAIN WITH LEFT-SIDED SCIATICA: ICD-10-CM

## 2020-01-28 RX ORDER — NAPROXEN 500 MG/1
500 TABLET ORAL 2 TIMES DAILY WITH MEALS
Qty: 30 TABLET | Refills: 0 | Status: SHIPPED | OUTPATIENT
Start: 2020-01-28 | End: 2020-07-23 | Stop reason: ALTCHOICE

## 2020-02-12 ENCOUNTER — TELEPHONE (OUTPATIENT)
Dept: ADMINISTRATIVE | Facility: OTHER | Age: 60
End: 2020-02-12

## 2020-02-12 NOTE — LETTER
Diabetic Eye Exam Form    Date Requested: 20  Patient: Jonah Mix  Patient : 1960   Referring Provider: SHU Sharma    Dilated Retinal Exam, Optomap-Iris Exam, or Fundus Photography Done         Yes (Kasaan one above)         No     Date of Diabetic Eye Exam ______________________________  Left Eye      Exam did show retinopathy    Exam did not show retinopathy         Mild       Moderate       None       Proliferative       Severe     Right Eye     Exam did show retinopathy    Exam did not show retinopathy         Mild       Moderate       None       Proliferative       Severe     Comments __________________________________________________________    Practice Providing Exam ______________________________________________    Exam Performed By (print name) _______________________________________      Provider Signature ___________________________________________________      These reports are needed for  compliance    Please fax this completed form and a copy of the Diabetic Eye Exam report to our office located at [place] [address] as soon as possible to [phone] mary ann Jo: Phone [phone]    We thank you for your assistance in treating our mutual patient
Encephalopathy

## 2020-02-12 NOTE — TELEPHONE ENCOUNTER
Upon review of the In Basket request we were able to locate, review, and update the patient chart as requested for Diabetic Eye Exam     Any additional questions or concerns should be emailed to the Practice Liaisons via [email or web address] email, please do not reply via In Basket      Thank you  Jerman Gaytan

## 2020-02-12 NOTE — TELEPHONE ENCOUNTER
Upon review of the In Basket request and the patient's chart, initial outreach has been made via fax, please see Contacts section for details  A second outreach attempt will be made within 3 business days      Thank you  Gabino Lopez

## 2020-02-12 NOTE — TELEPHONE ENCOUNTER
----- Message from Uzair Umana MA sent at 2/11/2020  4:11 PM EST -----  02/11/20 4:12 PM    Hello, our patient Narciso Sandhoff has had Diabetic Eye Exam completed/performed  Please assist in updating the patient chart by making an External outreach to [facility] facility located in [place]  The date of service is sometime in the past year as per patient      Thank you,  Uzair Umana MA  [address]

## 2020-02-20 DIAGNOSIS — E11.9 DIABETES MELLITUS WITHOUT COMPLICATION (HCC): ICD-10-CM

## 2020-02-20 DIAGNOSIS — E78.2 MIXED HYPERLIPIDEMIA: ICD-10-CM

## 2020-02-21 RX ORDER — ATORVASTATIN CALCIUM 40 MG/1
40 TABLET, FILM COATED ORAL
Qty: 90 TABLET | Refills: 0 | Status: SHIPPED | OUTPATIENT
Start: 2020-02-21 | End: 2020-07-23 | Stop reason: SDUPTHER

## 2020-04-03 DIAGNOSIS — G43.809 OTHER MIGRAINE WITHOUT STATUS MIGRAINOSUS, NOT INTRACTABLE: ICD-10-CM

## 2020-04-03 RX ORDER — AMITRIPTYLINE HYDROCHLORIDE 100 MG/1
50 TABLET, FILM COATED ORAL
Qty: 30 TABLET | Refills: 1 | Status: SHIPPED | OUTPATIENT
Start: 2020-04-03 | End: 2020-08-03 | Stop reason: SDUPTHER

## 2020-04-07 DIAGNOSIS — E11.9 TYPE 2 DIABETES MELLITUS WITHOUT COMPLICATION, WITHOUT LONG-TERM CURRENT USE OF INSULIN (HCC): ICD-10-CM

## 2020-04-14 ENCOUNTER — TELEMEDICINE (OUTPATIENT)
Dept: FAMILY MEDICINE CLINIC | Facility: HOME HEALTHCARE | Age: 60
End: 2020-04-14

## 2020-04-14 DIAGNOSIS — E11.9 TYPE 2 DIABETES MELLITUS WITHOUT COMPLICATION, WITHOUT LONG-TERM CURRENT USE OF INSULIN (HCC): Primary | ICD-10-CM

## 2020-04-14 DIAGNOSIS — E11.9 TYPE 2 DIABETES MELLITUS WITHOUT COMPLICATION, WITHOUT LONG-TERM CURRENT USE OF INSULIN (HCC): ICD-10-CM

## 2020-04-27 ENCOUNTER — TRANSCRIBE ORDERS (OUTPATIENT)
Dept: ADMINISTRATIVE | Facility: HOSPITAL | Age: 60
End: 2020-04-27

## 2020-04-27 ENCOUNTER — APPOINTMENT (OUTPATIENT)
Dept: LAB | Facility: HOSPITAL | Age: 60
End: 2020-04-27
Payer: COMMERCIAL

## 2020-04-27 DIAGNOSIS — Z79.899 PHARMACOLOGIC THERAPY: Primary | ICD-10-CM

## 2020-04-27 DIAGNOSIS — Z79.899 PHARMACOLOGIC THERAPY: ICD-10-CM

## 2020-04-27 LAB
ALBUMIN SERPL BCP-MCNC: 3.9 G/DL (ref 3.5–5)
ALP SERPL-CCNC: 82 U/L (ref 46–116)
ALT SERPL W P-5'-P-CCNC: 41 U/L (ref 12–78)
ANION GAP SERPL CALCULATED.3IONS-SCNC: 6 MMOL/L (ref 4–13)
AST SERPL W P-5'-P-CCNC: 26 U/L (ref 5–45)
BASOPHILS # BLD AUTO: 0.02 THOUSANDS/ΜL (ref 0–0.1)
BASOPHILS NFR BLD AUTO: 0 % (ref 0–1)
BILIRUB SERPL-MCNC: 0.4 MG/DL (ref 0.2–1)
BUN SERPL-MCNC: 9 MG/DL (ref 5–25)
CALCIUM SERPL-MCNC: 9.1 MG/DL (ref 8.3–10.1)
CHLORIDE SERPL-SCNC: 99 MMOL/L (ref 100–108)
CHOLEST SERPL-MCNC: 168 MG/DL (ref 50–200)
CO2 SERPL-SCNC: 30 MMOL/L (ref 21–32)
CREAT SERPL-MCNC: 1.13 MG/DL (ref 0.6–1.3)
EOSINOPHIL # BLD AUTO: 0.11 THOUSAND/ΜL (ref 0–0.61)
EOSINOPHIL NFR BLD AUTO: 2 % (ref 0–6)
ERYTHROCYTE [DISTWIDTH] IN BLOOD BY AUTOMATED COUNT: 13.1 % (ref 11.6–15.1)
EST. AVERAGE GLUCOSE BLD GHB EST-MCNC: 220 MG/DL
GFR SERPL CREATININE-BSD FRML MDRD: 53 ML/MIN/1.73SQ M
GLUCOSE P FAST SERPL-MCNC: 308 MG/DL (ref 65–99)
HBA1C MFR BLD: 9.3 %
HCT VFR BLD AUTO: 40.3 % (ref 34.8–46.1)
HDLC SERPL-MCNC: 28 MG/DL
HGB BLD-MCNC: 13.4 G/DL (ref 11.5–15.4)
IMM GRANULOCYTES # BLD AUTO: 0.04 THOUSAND/UL (ref 0–0.2)
IMM GRANULOCYTES NFR BLD AUTO: 1 % (ref 0–2)
LDLC SERPL CALC-MCNC: 66 MG/DL (ref 0–100)
LYMPHOCYTES # BLD AUTO: 2.08 THOUSANDS/ΜL (ref 0.6–4.47)
LYMPHOCYTES NFR BLD AUTO: 32 % (ref 14–44)
MCH RBC QN AUTO: 31.5 PG (ref 26.8–34.3)
MCHC RBC AUTO-ENTMCNC: 33.3 G/DL (ref 31.4–37.4)
MCV RBC AUTO: 95 FL (ref 82–98)
MONOCYTES # BLD AUTO: 0.43 THOUSAND/ΜL (ref 0.17–1.22)
MONOCYTES NFR BLD AUTO: 7 % (ref 4–12)
NEUTROPHILS # BLD AUTO: 3.89 THOUSANDS/ΜL (ref 1.85–7.62)
NEUTS SEG NFR BLD AUTO: 58 % (ref 43–75)
NONHDLC SERPL-MCNC: 140 MG/DL
NRBC BLD AUTO-RTO: 0 /100 WBCS
PLATELET # BLD AUTO: 184 THOUSANDS/UL (ref 149–390)
PMV BLD AUTO: 10.2 FL (ref 8.9–12.7)
POTASSIUM SERPL-SCNC: 4 MMOL/L (ref 3.5–5.3)
PROT SERPL-MCNC: 7.4 G/DL (ref 6.4–8.2)
RBC # BLD AUTO: 4.25 MILLION/UL (ref 3.81–5.12)
SODIUM SERPL-SCNC: 135 MMOL/L (ref 136–145)
T4 FREE SERPL-MCNC: 1.03 NG/DL (ref 0.76–1.46)
TRIGL SERPL-MCNC: 372 MG/DL
TSH SERPL DL<=0.05 MIU/L-ACNC: 3.7 UIU/ML (ref 0.36–3.74)
WBC # BLD AUTO: 6.57 THOUSAND/UL (ref 4.31–10.16)

## 2020-04-27 PROCEDURE — 80061 LIPID PANEL: CPT

## 2020-04-27 PROCEDURE — 83036 HEMOGLOBIN GLYCOSYLATED A1C: CPT

## 2020-04-27 PROCEDURE — 84443 ASSAY THYROID STIM HORMONE: CPT

## 2020-04-27 PROCEDURE — 3046F HEMOGLOBIN A1C LEVEL >9.0%: CPT | Performed by: INTERNAL MEDICINE

## 2020-04-27 PROCEDURE — 85025 COMPLETE CBC W/AUTO DIFF WBC: CPT

## 2020-04-27 PROCEDURE — 36415 COLL VENOUS BLD VENIPUNCTURE: CPT

## 2020-04-27 PROCEDURE — 80053 COMPREHEN METABOLIC PANEL: CPT

## 2020-04-27 PROCEDURE — 84439 ASSAY OF FREE THYROXINE: CPT

## 2020-05-21 ENCOUNTER — TELEMEDICINE (OUTPATIENT)
Dept: FAMILY MEDICINE CLINIC | Facility: HOME HEALTHCARE | Age: 60
End: 2020-05-21
Payer: COMMERCIAL

## 2020-05-21 DIAGNOSIS — K21.9 GERD WITHOUT ESOPHAGITIS: ICD-10-CM

## 2020-05-21 PROCEDURE — G0071 COMM SVCS BY RHC/FQHC 5 MIN: HCPCS | Performed by: NURSE PRACTITIONER

## 2020-05-28 ENCOUNTER — OFFICE VISIT (OUTPATIENT)
Dept: FAMILY MEDICINE CLINIC | Facility: HOME HEALTHCARE | Age: 60
End: 2020-05-28
Payer: COMMERCIAL

## 2020-05-28 VITALS
WEIGHT: 242 LBS | HEART RATE: 101 BPM | OXYGEN SATURATION: 94 % | BODY MASS INDEX: 37.98 KG/M2 | HEIGHT: 67 IN | SYSTOLIC BLOOD PRESSURE: 110 MMHG | DIASTOLIC BLOOD PRESSURE: 60 MMHG | RESPIRATION RATE: 18 BRPM | TEMPERATURE: 97.5 F

## 2020-05-28 DIAGNOSIS — Z13.0 SCREENING FOR ENDOCRINE, NUTRITIONAL, METABOLIC AND IMMUNITY DISORDER: ICD-10-CM

## 2020-05-28 DIAGNOSIS — R94.4 DECREASED GFR: Primary | ICD-10-CM

## 2020-05-28 DIAGNOSIS — Z13.21 SCREENING FOR ENDOCRINE, NUTRITIONAL, METABOLIC AND IMMUNITY DISORDER: ICD-10-CM

## 2020-05-28 DIAGNOSIS — Z13.228 SCREENING FOR ENDOCRINE, NUTRITIONAL, METABOLIC AND IMMUNITY DISORDER: ICD-10-CM

## 2020-05-28 DIAGNOSIS — Z12.4 CERVICAL CANCER SCREENING: ICD-10-CM

## 2020-05-28 DIAGNOSIS — E11.9 TYPE 2 DIABETES MELLITUS WITHOUT COMPLICATION, WITHOUT LONG-TERM CURRENT USE OF INSULIN (HCC): ICD-10-CM

## 2020-05-28 DIAGNOSIS — Z13.29 SCREENING FOR ENDOCRINE, NUTRITIONAL, METABOLIC AND IMMUNITY DISORDER: ICD-10-CM

## 2020-05-28 LAB
25(OH)D3 SERPL-MCNC: 33.1 NG/ML (ref 30–100)
ALBUMIN SERPL BCP-MCNC: 4.1 G/DL (ref 3.5–5)
ALP SERPL-CCNC: 65 U/L (ref 46–116)
ALT SERPL W P-5'-P-CCNC: 41 U/L (ref 12–78)
ANION GAP SERPL CALCULATED.3IONS-SCNC: 5 MMOL/L (ref 4–13)
AST SERPL W P-5'-P-CCNC: 22 U/L (ref 5–45)
BILIRUB SERPL-MCNC: 0.34 MG/DL (ref 0.2–1)
BUN SERPL-MCNC: 16 MG/DL (ref 5–25)
CALCIUM SERPL-MCNC: 9.3 MG/DL (ref 8.3–10.1)
CHLORIDE SERPL-SCNC: 102 MMOL/L (ref 100–108)
CHOLEST SERPL-MCNC: 195 MG/DL (ref 50–200)
CO2 SERPL-SCNC: 28 MMOL/L (ref 21–32)
CREAT SERPL-MCNC: 0.98 MG/DL (ref 0.6–1.3)
CREAT UR-MCNC: 72.1 MG/DL
GFR SERPL CREATININE-BSD FRML MDRD: 63 ML/MIN/1.73SQ M
GLUCOSE P FAST SERPL-MCNC: 165 MG/DL (ref 65–99)
HDLC SERPL-MCNC: 32 MG/DL
LDLC SERPL CALC-MCNC: 113 MG/DL (ref 0–100)
MICROALBUMIN UR-MCNC: 10.8 MG/L (ref 0–20)
MICROALBUMIN/CREAT 24H UR: 15 MG/G CREATININE (ref 0–30)
POTASSIUM SERPL-SCNC: 4.2 MMOL/L (ref 3.5–5.3)
PROT SERPL-MCNC: 7.7 G/DL (ref 6.4–8.2)
SODIUM SERPL-SCNC: 135 MMOL/L (ref 136–145)
TRIGL SERPL-MCNC: 250 MG/DL

## 2020-05-28 PROCEDURE — 82570 ASSAY OF URINE CREATININE: CPT | Performed by: NURSE PRACTITIONER

## 2020-05-28 PROCEDURE — T1015 CLINIC SERVICE: HCPCS | Performed by: FAMILY MEDICINE

## 2020-05-28 PROCEDURE — 87389 HIV-1 AG W/HIV-1&-2 AB AG IA: CPT | Performed by: NURSE PRACTITIONER

## 2020-05-28 PROCEDURE — 82306 VITAMIN D 25 HYDROXY: CPT | Performed by: NURSE PRACTITIONER

## 2020-05-28 PROCEDURE — 82043 UR ALBUMIN QUANTITATIVE: CPT | Performed by: NURSE PRACTITIONER

## 2020-05-28 PROCEDURE — 80061 LIPID PANEL: CPT | Performed by: NURSE PRACTITIONER

## 2020-05-28 PROCEDURE — 80053 COMPREHEN METABOLIC PANEL: CPT | Performed by: NURSE PRACTITIONER

## 2020-05-28 PROCEDURE — 3061F NEG MICROALBUMINURIA REV: CPT | Performed by: INTERNAL MEDICINE

## 2020-05-29 LAB — HIV 1+2 AB+HIV1 P24 AG SERPL QL IA: NORMAL

## 2020-06-03 DIAGNOSIS — E11.9 DIABETES MELLITUS WITHOUT COMPLICATION (HCC): ICD-10-CM

## 2020-06-11 ENCOUNTER — OFFICE VISIT (OUTPATIENT)
Dept: FAMILY MEDICINE CLINIC | Facility: HOME HEALTHCARE | Age: 60
End: 2020-06-11
Payer: COMMERCIAL

## 2020-06-11 VITALS
RESPIRATION RATE: 18 BRPM | HEIGHT: 67 IN | BODY MASS INDEX: 37.98 KG/M2 | SYSTOLIC BLOOD PRESSURE: 134 MMHG | DIASTOLIC BLOOD PRESSURE: 82 MMHG | OXYGEN SATURATION: 97 % | WEIGHT: 242 LBS | HEART RATE: 103 BPM | TEMPERATURE: 98.3 F

## 2020-06-11 DIAGNOSIS — E11.9 DIABETES MELLITUS WITHOUT COMPLICATION (HCC): ICD-10-CM

## 2020-06-11 DIAGNOSIS — J30.2 SEASONAL ALLERGIES: ICD-10-CM

## 2020-06-11 DIAGNOSIS — J44.9 CHRONIC OBSTRUCTIVE PULMONARY DISEASE, UNSPECIFIED COPD TYPE (HCC): ICD-10-CM

## 2020-06-11 DIAGNOSIS — E78.1 HYPERTRIGLYCERIDEMIA: Primary | ICD-10-CM

## 2020-06-11 PROCEDURE — T1015 CLINIC SERVICE: HCPCS | Performed by: FAMILY MEDICINE

## 2020-06-11 RX ORDER — LORATADINE 10 MG/1
10 TABLET ORAL DAILY
Qty: 30 TABLET | Refills: 2 | Status: SHIPPED | OUTPATIENT
Start: 2020-06-11 | End: 2020-09-08 | Stop reason: SDUPTHER

## 2020-06-11 RX ORDER — FENOFIBRATE 48 MG/1
48 TABLET, COATED ORAL DAILY
Qty: 30 TABLET | Refills: 2 | Status: SHIPPED | OUTPATIENT
Start: 2020-06-11 | End: 2020-07-23 | Stop reason: SDUPTHER

## 2020-06-16 DIAGNOSIS — E11.9 DIABETES MELLITUS WITHOUT COMPLICATION (HCC): ICD-10-CM

## 2020-06-16 DIAGNOSIS — E13.40 OTHER SPECIFIED DIABETES MELLITUS WITH DIABETIC NEUROPATHY, UNSPECIFIED WHETHER LONG TERM INSULIN USE (HCC): Primary | ICD-10-CM

## 2020-06-16 RX ORDER — PEN NEEDLE, DIABETIC 30 GX3/16"
NEEDLE, DISPOSABLE MISCELLANEOUS DAILY
Qty: 90 EACH | Refills: 2 | Status: SHIPPED | OUTPATIENT
Start: 2020-06-16 | End: 2020-07-23 | Stop reason: SDUPTHER

## 2020-06-18 DIAGNOSIS — E11.9 DIABETES MELLITUS WITHOUT COMPLICATION (HCC): Primary | ICD-10-CM

## 2020-06-18 DIAGNOSIS — J44.1 COPD EXACERBATION (HCC): ICD-10-CM

## 2020-06-18 RX ORDER — BLOOD-GLUCOSE METER
EACH MISCELLANEOUS DAILY
Qty: 100 EACH | Refills: 0 | Status: SHIPPED | OUTPATIENT
Start: 2020-06-18

## 2020-06-22 ENCOUNTER — TELEPHONE (OUTPATIENT)
Dept: FAMILY MEDICINE CLINIC | Facility: HOME HEALTHCARE | Age: 60
End: 2020-06-22

## 2020-06-26 DIAGNOSIS — K21.9 GERD WITHOUT ESOPHAGITIS: ICD-10-CM

## 2020-06-26 DIAGNOSIS — E55.9 VITAMIN D DEFICIENCY: ICD-10-CM

## 2020-06-26 DIAGNOSIS — G89.29 OTHER CHRONIC PAIN: ICD-10-CM

## 2020-06-26 RX ORDER — ACETAMINOPHEN 500 MG
1 TABLET ORAL DAILY
Qty: 90 EACH | Refills: 1 | Status: SHIPPED | OUTPATIENT
Start: 2020-06-26 | End: 2020-09-08 | Stop reason: SDUPTHER

## 2020-06-26 RX ORDER — GABAPENTIN 600 MG/1
600 TABLET ORAL 3 TIMES DAILY
Qty: 270 TABLET | Refills: 0 | Status: SHIPPED | OUTPATIENT
Start: 2020-06-26 | End: 2020-09-08 | Stop reason: SDUPTHER

## 2020-06-26 RX ORDER — PANTOPRAZOLE SODIUM 20 MG/1
20 TABLET, DELAYED RELEASE ORAL
Qty: 180 TABLET | Refills: 0 | Status: SHIPPED | OUTPATIENT
Start: 2020-06-26 | End: 2020-07-23 | Stop reason: ALTCHOICE

## 2020-07-10 ENCOUNTER — CONSULT (OUTPATIENT)
Dept: NEPHROLOGY | Facility: CLINIC | Age: 60
End: 2020-07-10
Payer: COMMERCIAL

## 2020-07-10 VITALS
DIASTOLIC BLOOD PRESSURE: 82 MMHG | WEIGHT: 233 LBS | BODY MASS INDEX: 36.57 KG/M2 | SYSTOLIC BLOOD PRESSURE: 138 MMHG | TEMPERATURE: 97.5 F | HEART RATE: 100 BPM | OXYGEN SATURATION: 91 % | HEIGHT: 67 IN

## 2020-07-10 DIAGNOSIS — N18.2 STAGE 2 CHRONIC KIDNEY DISEASE: Primary | ICD-10-CM

## 2020-07-10 DIAGNOSIS — E11.9 TYPE 2 DIABETES MELLITUS WITHOUT COMPLICATION, WITHOUT LONG-TERM CURRENT USE OF INSULIN (HCC): ICD-10-CM

## 2020-07-10 DIAGNOSIS — R94.4 DECREASED GFR: ICD-10-CM

## 2020-07-10 DIAGNOSIS — N17.9 ACUTE RENAL FAILURE, UNSPECIFIED ACUTE RENAL FAILURE TYPE (HCC): ICD-10-CM

## 2020-07-10 DIAGNOSIS — E78.00 HYPERCHOLESTEROLEMIA: ICD-10-CM

## 2020-07-10 PROCEDURE — 3066F NEPHROPATHY DOC TX: CPT | Performed by: INTERNAL MEDICINE

## 2020-07-10 PROCEDURE — 99244 OFF/OP CNSLTJ NEW/EST MOD 40: CPT | Performed by: INTERNAL MEDICINE

## 2020-07-10 PROCEDURE — 4010F ACE/ARB THERAPY RXD/TAKEN: CPT | Performed by: INTERNAL MEDICINE

## 2020-07-10 PROCEDURE — 3046F HEMOGLOBIN A1C LEVEL >9.0%: CPT | Performed by: INTERNAL MEDICINE

## 2020-07-10 RX ORDER — LISINOPRIL 2.5 MG/1
2.5 TABLET ORAL DAILY
Qty: 90 TABLET | Refills: 1 | Status: SHIPPED | OUTPATIENT
Start: 2020-07-10 | End: 2020-09-08 | Stop reason: SDUPTHER

## 2020-07-10 NOTE — ASSESSMENT & PLAN NOTE
She had an episode of acute kidney injury with a drop in her GFR  To 53 mils per minute which corresponded to a creatinine of 1 13 mg/dL  She did lose 10 lb of weight and her kidney function has improved  However she has not reached a prior level of functioning last year with a creatinine of 0 8  Explained at length what the meaning of the creatinine and GFR were  She understands that dietary indiscretion will increase the work of her kidneys and possibly cause renal decompensation  Will obtain a kidney ultrasound  Would aim for a blood pressure of less than 130/80 and an LDL of 70 or less  I have asked her to avoid Naprosyn completely  Long-term pantoprazole can be nephrotoxic in cause interstitial nephritis    She might want to consider switching to Pepcid which is less problematic

## 2020-07-10 NOTE — ASSESSMENT & PLAN NOTE
I explained the meaning of the kidney stage is and her last GFR was 63 mils per minute which is stage II and acceptable for an adult    Start lisinopril 2 5mg daily for renal protection

## 2020-07-10 NOTE — PROGRESS NOTES
[address] [facility], [place]    Name: Seble Mariano  YOB: 1960      Assessment/Plan:         Problem List Items Addressed This Visit        Endocrine    Type 2 diabetes mellitus ([address] )      Other Visit Diagnoses     Decreased GFR                Subjective:      Patient ID: Seble Mariano is a 61 y o  female  HPI  Was noted to have a decline in kidney function in April with a creatinine of 0 82 -> 1 13 mg/dl (eGFR of 53ml/min)  She states she was eating without discretion and her A1c was 9 3%  She is following a diet now and her sugars are under better control  She does take naproxen intermittently and has been on pantoprazole for a long time   She does not take any other NSAIDs or decongestants    Repeat labs 5/28/2020 revealed a creatinine of 0 98 mg/dl (eGFR  63ml/min)    She has known diabetes for 10 years  No retinopathy but has cataracts  She denies any cardiac disease but has  family history of kidney disease with diabetic nephropathy and ESRD on dialysis in her sister  Microalbumin/Creat ratio was 15 (WNL)  The following portions of the patient's history were reviewed and updated as appropriate: allergies, current medications, past family history, past medical history, past social history, past surgical history and problem list     Review of Systems   Constitutional: Negative for appetite change and fatigue  HENT: Negative for hearing loss  Eyes: Positive for visual disturbance  Diplopia   Respiratory: Negative for cough, chest tightness and shortness of breath  Cardiovascular: Negative for chest pain, palpitations and leg swelling  Genitourinary: Negative for decreased urine volume, difficulty urinating, dysuria and hematuria  Musculoskeletal: Negative for arthralgias and back pain  Neurological: Positive for headaches  Negative for dizziness, weakness and light-headedness     Hematological: Does not bruise/bleed easily           Social History     Socioeconomic History    Marital status: Legally      Spouse name: None    Number of children: None    Years of education: None    Highest education level: None   Occupational History    None   Social Needs    Financial resource strain: None    Food insecurity:     Worry: None     Inability: None    Transportation needs:     Medical: None     Non-medical: None   Tobacco Use    Smoking status: Current Every Day Smoker     Packs/day: 1 00     Types: Cigarettes    Smokeless tobacco: Never Used   Substance and Sexual Activity    Alcohol use: No    Drug use: No    Sexual activity: None   Lifestyle    Physical activity:     Days per week: None     Minutes per session: None    Stress: None   Relationships    Social connections:     Talks on phone: None     Gets together: None     Attends Orthodox service: None     Active member of club or organization: None     Attends meetings of clubs or organizations: None     Relationship status: None    Intimate partner violence:     Fear of current or ex partner: None     Emotionally abused: None     Physically abused: None     Forced sexual activity: None   Other Topics Concern    None   Social History Narrative    None     Past Medical History:   Diagnosis Date    Chronic back pain     Colitis     COPD (chronic obstructive pulmonary disease) ([address] )     Depression     DVT of lower limb, acute ([address] )     GERD (gastroesophageal reflux disease)     Sleep apnea      Past Surgical History:   Procedure Laterality Date    BREAST LUMPECTOMY Right     IVC FILTER INSERTION         Current Outpatient Medications:     albuterol (VENTOLIN HFA) 90 mcg/act inhaler, Inhale 2 puffs every 4 (four) hours as needed for wheezing, Disp: 1 Inhaler, Rfl: 3    amitriptyline (ELAVIL) 100 mg tablet, Take 0 5 tablets (50 mg total) by mouth daily at bedtime, Disp: 30 tablet, Rfl: 1    atorvastatin (LIPITOR) 40 mg tablet, Take 1 tablet (40 mg total) by mouth daily at bedtime, Disp: 90 tablet, Rfl: 0    benztropine (COGENTIN) 0 5 mg tablet, Take 0 5 mg by mouth 2 (two) times a day, Disp: , Rfl:     Blood Glucose Monitoring Suppl (ONE TOUCH ULTRA 2) w/Device KIT, by Does not apply route, Disp: , Rfl:     Blood Glucose Monitoring Suppl (ONE TOUCH ULTRA 2) w/Device KIT, by Does not apply route daily, Disp: 100 each, Rfl: 0    Calcium Carbonate-Vit D-Min (CALCIUM 1200) 5365-1359 MG-UNIT CHEW, Chew 1 each daily, Disp: 90 each, Rfl: 1    DULERA 100-5 MCG/ACT inhaler, inhale 2 puffs by mouth twice a day, Disp: 13 g, Rfl: 3    fenofibrate (TRICOR) 48 mg tablet, Take 1 tablet (48 mg total) by mouth daily, Disp: 30 tablet, Rfl: 2    fluticasone (FLONASE) 50 mcg/act nasal spray, 2 sprays into each nostril daily as needed for allergies , Disp: , Rfl:     gabapentin (NEURONTIN) 600 MG tablet, Take 1 tablet (600 mg total) by mouth 3 (three) times a day, Disp: 270 tablet, Rfl: 0    glucose blood (ONE TOUCH ULTRA TEST) test strip, 1 each by Other route daily, Disp: 100 each, Rfl: 1    Insulin Pen Needle (PEN NEEDLES) 32G X 4 MM MISC, by Does not apply route daily, Disp: 90 each, Rfl: 2    linaGLIPtin (Tradjenta) 5 MG TABS, Take 5 mg by mouth daily, Disp: 90 tablet, Rfl: 0    liraglutide (VICTOZA) injection, Inject 0 1 mL (0 6 mg total) under the skin daily for 7 days, THEN 0 2 mL (1 2 mg total) daily  , Disp: 7 mL, Rfl: 0    loratadine (CLARITIN) 10 mg tablet, Take 1 tablet (10 mg total) by mouth daily, Disp: 30 tablet, Rfl: 2    metFORMIN (GLUCOPHAGE) 1000 MG tablet, Take 1 tablet (1,000 mg total) by mouth 2 (two) times a day with meals, Disp: 180 tablet, Rfl: 0    naproxen (NAPROSYN) 500 mg tablet, Take 1 tablet (500 mg total) by mouth 2 (two) times a day with meals (Patient taking differently: Take 500 mg by mouth 2 (two) times a day as needed for mild pain ), Disp: 30 tablet, Rfl: 0    ONETOUCH DELICA LANCETS 42S MISC, by Does not apply route, Disp: , Rfl:     pantoprazole (PROTONIX) 20 mg tablet, Take 1 tablet (20 mg total) by mouth 2 (two) times daily after meals, Disp: 180 tablet, Rfl: 0    traZODone (DESYREL) 150 mg tablet, 150 mg daily at bedtime , Disp: , Rfl:     vilazodone (VIIBRYD) 40 mg tablet, Take 40 mg by mouth daily  , Disp: , Rfl:     VRAYLAR 6 MG capsule, Take 6 mg by mouth daily, Disp: , Rfl: 0    albuterol (2 5 mg/3 mL) 0 083 % nebulizer solution, Take 1 vial (2 5 mg total) by nebulization 4 (four) times a day as needed for shortness of breath (Patient not taking: Reported on 7/10/2020), Disp: 120 vial, Rfl: 3    levothyroxine 25 mcg tablet, Take 1 tablet (25 mcg total) by mouth daily before breakfast Take on empty stomach 45 minutes before (Patient not taking: Reported on 7/10/2020), Disp: 30 tablet, Rfl: 0    sodium chloride (OCEAN) 0 65 % nasal spray, 1 spray into each nostril as needed for congestion, Disp: 30 mL, Rfl: 0    Lab Results   Component Value Date     01/08/2016    SODIUM 135 (L) 05/28/2020    K 4 2 05/28/2020     05/28/2020    CO2 28 05/28/2020    ANIONGAP 10 01/08/2016    AGAP 5 05/28/2020    BUN 16 05/28/2020    CREATININE 0 98 05/28/2020    GLUC 132 09/24/2016    GLUF 165 (H) 05/28/2020    CALCIUM 9 3 05/28/2020    AST 22 05/28/2020    ALT 41 05/28/2020    ALKPHOS 65 05/28/2020    PROT 7 5 11/30/2015    TP 7 7 05/28/2020    BILITOT 0 53 11/30/2015    TBILI 0 34 05/28/2020    EGFR 63 05/28/2020     Lab Results   Component Value Date    WBC 6 57 04/27/2020    HGB 13 4 04/27/2020    HCT 40 3 04/27/2020    MCV 95 04/27/2020     04/27/2020     Lab Results   Component Value Date    CHOLESTEROL 195 05/28/2020    CHOLESTEROL 168 04/27/2020    CHOLESTEROL 165 03/07/2019     Lab Results   Component Value Date    HDL 32 (L) 05/28/2020    HDL 28 (L) 04/27/2020    HDL 42 03/07/2019     Lab Results   Component Value Date    LDLCALC 113 (H) 05/28/2020    LDLCALC 66 04/27/2020    LDLCALC 79 03/07/2019     Lab Results   Component Value Date    TRIG 250 (H) 05/28/2020    TRIG 372 (H) 04/27/2020    TRIG 222 (H) 03/07/2019     No results found for: [place], [place]  Lab Results   Component Value Date    XGI6ETOKLKPU 3 700 04/27/2020     Lab Results   Component Value Date    CALCIUM 9 3 05/28/2020     No results found for: SPEP, UPEP  No results found for: MICROALBUR, DGEH27OIL        Objective:      /82 (BP Location: Left arm, Patient Position: Sitting, Cuff Size: Standard)   Pulse 100   Temp 97 5 °F (36 4 °C) (Tympanic)   Ht 5' 7" (1 702 m)   Wt 106 kg (233 lb)   SpO2 91%   BMI 36 49 kg/m²          Physical Exam   Constitutional: She is oriented to person, place, and time  She appears well-developed and well-nourished  No distress  HENT:   Head: Normocephalic and atraumatic  Right Ear: External ear normal    Left Ear: External ear normal    Neck: Normal range of motion  Neck supple  No JVD present  Cardiovascular: Normal rate, regular rhythm and normal heart sounds  Pulmonary/Chest: Effort normal and breath sounds normal  No respiratory distress  Abdominal: Soft  Bowel sounds are normal  There is no tenderness  Musculoskeletal: Normal range of motion  She exhibits no edema  Neurological: She is oriented to person, place, and time  Skin: Skin is dry  She is not diaphoretic  Psychiatric: She has a normal mood and affect   Her behavior is normal  Judgment and thought content normal

## 2020-07-20 DIAGNOSIS — E11.9 TYPE 2 DIABETES MELLITUS WITHOUT COMPLICATION, WITHOUT LONG-TERM CURRENT USE OF INSULIN (HCC): ICD-10-CM

## 2020-07-23 ENCOUNTER — OFFICE VISIT (OUTPATIENT)
Dept: FAMILY MEDICINE CLINIC | Facility: HOME HEALTHCARE | Age: 60
End: 2020-07-23
Payer: COMMERCIAL

## 2020-07-23 VITALS
HEART RATE: 100 BPM | BODY MASS INDEX: 36.98 KG/M2 | OXYGEN SATURATION: 93 % | TEMPERATURE: 98.6 F | SYSTOLIC BLOOD PRESSURE: 108 MMHG | RESPIRATION RATE: 18 BRPM | HEIGHT: 67 IN | WEIGHT: 235.6 LBS | DIASTOLIC BLOOD PRESSURE: 70 MMHG

## 2020-07-23 DIAGNOSIS — E78.2 MIXED HYPERLIPIDEMIA: ICD-10-CM

## 2020-07-23 DIAGNOSIS — E78.1 HYPERTRIGLYCERIDEMIA: ICD-10-CM

## 2020-07-23 DIAGNOSIS — E11.9 DIABETES MELLITUS WITHOUT COMPLICATION (HCC): Primary | ICD-10-CM

## 2020-07-23 DIAGNOSIS — K64.4 EXTERNAL HEMORRHOID: ICD-10-CM

## 2020-07-23 PROCEDURE — T1015 CLINIC SERVICE: HCPCS | Performed by: FAMILY MEDICINE

## 2020-07-23 PROCEDURE — 3008F BODY MASS INDEX DOCD: CPT | Performed by: INTERNAL MEDICINE

## 2020-07-23 RX ORDER — FENOFIBRATE 48 MG/1
48 TABLET, COATED ORAL DAILY
Qty: 30 TABLET | Refills: 2 | Status: SHIPPED | OUTPATIENT
Start: 2020-07-23 | End: 2020-09-08 | Stop reason: SDUPTHER

## 2020-07-23 RX ORDER — HYDROCORTISONE 25 MG/G
CREAM TOPICAL 2 TIMES DAILY
Qty: 28 G | Refills: 0 | Status: SHIPPED | OUTPATIENT
Start: 2020-07-23 | End: 2021-09-22

## 2020-07-23 RX ORDER — ATORVASTATIN CALCIUM 40 MG/1
40 TABLET, FILM COATED ORAL
Qty: 90 TABLET | Refills: 0 | Status: SHIPPED | OUTPATIENT
Start: 2020-07-23 | End: 2020-09-08 | Stop reason: SDUPTHER

## 2020-07-23 RX ORDER — PEN NEEDLE, DIABETIC 30 GX3/16"
NEEDLE, DISPOSABLE MISCELLANEOUS DAILY
Qty: 90 EACH | Refills: 2 | Status: SHIPPED | OUTPATIENT
Start: 2020-07-23 | End: 2020-09-08 | Stop reason: SDUPTHER

## 2020-07-23 NOTE — PROGRESS NOTES
Assessment/Plan:    No problem-specific Assessment & Plan notes found for this encounter  Diagnoses and all orders for this visit:    Diabetes mellitus without complication ([address] )  -     liraglutide (VICTOZA) injection; Inject 0 2 mL (1 2 mg total) under the skin daily  -     Insulin Pen Needle (PEN NEEDLES) 32G X 4 MM MISC; by Does not apply route daily    Mixed hyperlipidemia  -     atorvastatin (LIPITOR) 40 mg tablet; Take 1 tablet (40 mg total) by mouth daily at bedtime    External hemorrhoid  -     hydrocortisone (ANUSOL-HC) 2 5 % rectal cream; Apply topically 2 (two) times a day for 7 days    Hypertriglyceridemia  -     fenofibrate (TRICOR) 48 mg tablet; Take 1 tablet (48 mg total) by mouth daily    Other orders  -     Famotidine (PEPCID PO); Take 1 tablet by mouth 2 (two) times a day as needed  -     Cancel: HEMOGLOBIN A1C W/ EAG ESTIMATION; Future        -patient's home blood sugars largely at goal  Cont  Current regimen  Unfortunately her A1C is not due for another 4 days  Instead of risking insurance not covering the cost, she will return Monday to have A1C drawn  We will call with any medication adjustments  -educated patient on benefit of statin therapy in treatment of type 2 DM  She will restart medication  Refill sent to [facility]  -urged compliance with inhaler medications, specifically daily use of dulera for maintenance treatment    -will treat hemorrhoid with steroid cream  Advised patient to call office for appointment if they do not resolve after 1 week  -RTC 3 months or sooner if needed     Subjective:      Patient ID: Carina Asif is a 61 y o  female presents for follow up  She has been monitoring her sugars which have been ranging from 108-190  She is compliant with her regimen of metformin, Tradjenta, and victoza (1 2 mg) She denies episodes of hypoglycemia  Her BP is at goal on her current regimen       She reported she is currently not taking her statin because she "ran out " She will restart if ordered  She has also not started the Tricor  She reports her SOB has mildly increased with the humidity  She admits to noncompliance with her inhaler regimen  She has a history of external hemorrhoids which have been "acting up " She is using Preparation H without relief  They are tender but not bleeding  No other complaints today  HPI    The following portions of the patient's history were reviewed and updated as appropriate: allergies, current medications, past family history, past medical history, past social history, past surgical history and problem list     Review of Systems   Constitutional: Negative  HENT: Negative  Eyes: Negative  Respiratory: Positive for shortness of breath  Negative for cough, wheezing and stridor  Cardiovascular: Negative for chest pain, palpitations and leg swelling  Gastrointestinal: Negative  Endocrine: Negative  Genitourinary: Negative  External hemorroids   Musculoskeletal: Negative  Skin: Negative  Allergic/Immunologic: Negative  Neurological: Negative  Hematological: Negative  Psychiatric/Behavioral: Negative  Objective:      /70 (BP Location: Right arm, Patient Position: Sitting, Cuff Size: Adult)   Pulse 100   Temp 98 6 °F (37 °C) (Tympanic)   Resp 18   Ht 5' 7" (1 702 m)   Wt 107 kg (235 lb 9 6 oz)   SpO2 93%   BMI 36 90 kg/m²          Physical Exam   Constitutional: She is oriented to person, place, and time  She appears well-developed and well-nourished  No distress  HENT:   Head: Normocephalic and atraumatic  Eyes: Pupils are equal, round, and reactive to light  EOM are normal    Neck: Normal range of motion  No thyromegaly present  Cardiovascular: Normal rate, regular rhythm, normal heart sounds and intact distal pulses  No murmur heard  Pulmonary/Chest: Effort normal and breath sounds normal  No respiratory distress  She has no wheezes  Abdominal: Soft   Normal appearance and bowel sounds are normal  She exhibits no distension  There is no tenderness  Genitourinary:   Genitourinary Comments: Rectal exam declined by patient   Musculoskeletal: Normal range of motion  Lymphadenopathy:     She has no cervical adenopathy  Neurological: She is alert and oriented to person, place, and time  Skin: Skin is warm and dry  Capillary refill takes less than 2 seconds  Psychiatric: She has a normal mood and affect  Her behavior is normal  Judgment and thought content normal    Vitals reviewed

## 2020-07-24 ENCOUNTER — HOSPITAL ENCOUNTER (OUTPATIENT)
Dept: ULTRASOUND IMAGING | Facility: HOSPITAL | Age: 60
Discharge: HOME | End: 2020-07-24
Attending: INTERNAL MEDICINE
Payer: COMMERCIAL

## 2020-07-24 DIAGNOSIS — R94.4 DECREASED GFR: ICD-10-CM

## 2020-07-24 PROCEDURE — 76770 US EXAM ABDO BACK WALL COMP: CPT

## 2020-08-03 DIAGNOSIS — G43.809 OTHER MIGRAINE WITHOUT STATUS MIGRAINOSUS, NOT INTRACTABLE: ICD-10-CM

## 2020-08-03 RX ORDER — AMITRIPTYLINE HYDROCHLORIDE 100 MG/1
50 TABLET, FILM COATED ORAL
Qty: 30 TABLET | Refills: 1 | Status: SHIPPED | OUTPATIENT
Start: 2020-08-03 | End: 2020-09-08 | Stop reason: SDUPTHER

## 2020-08-08 ENCOUNTER — HOSPITAL ENCOUNTER (EMERGENCY)
Facility: HOSPITAL | Age: 60
Discharge: HOME | End: 2020-08-08
Attending: EMERGENCY MEDICINE | Admitting: EMERGENCY MEDICINE
Payer: COMMERCIAL

## 2020-08-08 VITALS
BODY MASS INDEX: 36.88 KG/M2 | WEIGHT: 235.45 LBS | SYSTOLIC BLOOD PRESSURE: 131 MMHG | HEART RATE: 103 BPM | RESPIRATION RATE: 20 BRPM | DIASTOLIC BLOOD PRESSURE: 65 MMHG | TEMPERATURE: 96.5 F | OXYGEN SATURATION: 95 %

## 2020-08-08 DIAGNOSIS — L08.9: Primary | ICD-10-CM

## 2020-08-08 DIAGNOSIS — S00.462A: Primary | ICD-10-CM

## 2020-08-08 PROCEDURE — 99281 EMR DPT VST MAYX REQ PHY/QHP: CPT

## 2020-08-08 PROCEDURE — 99283 EMERGENCY DEPT VISIT LOW MDM: CPT | Performed by: EMERGENCY MEDICINE

## 2020-08-08 RX ORDER — DOXYCYCLINE HYCLATE 100 MG/1
100 CAPSULE ORAL EVERY 12 HOURS SCHEDULED
Qty: 20 CAPSULE | Refills: 0 | Status: SHIPPED | OUTPATIENT
Start: 2020-08-08 | End: 2020-08-18

## 2020-08-08 NOTE — ED PROVIDER NOTES
History  Chief Complaint   Patient presents with    Insect Bite     insect bite behind left ear which is swollen and painful into ear and jaw     HPI     Pt presents from home, hx of DM, COPD, c/o left ear pain over the external pinna and anti-helix after she was bit by an "insect of some sort," approx 2 days ago  Pt states it is swollen and red, however there is no obvious signs of infection  Pt o/w denies any symptoms  Pt denies ha, fevers, cough, cp, sob, n/v/d/c, abd pain, dysuria, focal def or syncope  Prior to Admission Medications   Prescriptions Last Dose Informant Patient Reported? Taking?    Blood Glucose Monitoring Suppl (ONE TOUCH ULTRA 2) w/Device KIT  Self Yes No   Sig: by Does not apply route   Blood Glucose Monitoring Suppl (ONE TOUCH ULTRA 2) w/Device KIT  Self No No   Sig: by Does not apply route daily   Calcium Carbonate-Vit D-Min (CALCIUM 1200) 4324-0963 MG-UNIT CHEW  Self No No   Sig: Chew 1 each daily   DULERA 100-5 MCG/ACT inhaler  Self No No   Sig: inhale 2 puffs by mouth twice a day   Famotidine (PEPCID PO)   Yes No   Sig: Take 1 tablet by mouth 2 (two) times a day as needed   Insulin Pen Needle (PEN NEEDLES) 32G X 4 MM MISC   No No   Sig: by Does not apply route daily   ONETOUCH DELICA LANCETS 72L MISC  Self Yes No   Sig: by Does not apply route   VRAYLAR 6 MG capsule  Self Yes No   Sig: Take 6 mg by mouth daily   albuterol (2 5 mg/3 mL) 0 083 % nebulizer solution  Self No No   Sig: Take 1 vial (2 5 mg total) by nebulization 4 (four) times a day as needed for shortness of breath   Patient not taking: Reported on 7/10/2020   albuterol (VENTOLIN HFA) 90 mcg/act inhaler  Self No No   Sig: Inhale 2 puffs every 4 (four) hours as needed for wheezing   amitriptyline (ELAVIL) 100 mg tablet   No No   Sig: Take 0 5 tablets (50 mg total) by mouth daily at bedtime   atorvastatin (LIPITOR) 40 mg tablet   No No   Sig: Take 1 tablet (40 mg total) by mouth daily at bedtime   benztropine (COGENTIN) 0 5 mg tablet  Self Yes No   Sig: Take 0 5 mg by mouth 2 (two) times a day   fenofibrate (TRICOR) 48 mg tablet   No No   Sig: Take 1 tablet (48 mg total) by mouth daily   fluticasone (FLONASE) 50 mcg/act nasal spray  Self Yes No   Si sprays into each nostril daily as needed for allergies    gabapentin (NEURONTIN) 600 MG tablet  Self No No   Sig: Take 1 tablet (600 mg total) by mouth 3 (three) times a day   glucose blood (ONE TOUCH ULTRA TEST) test strip  Self No No   Si each by Other route daily   hydrocortisone (ANUSOL-HC) 2 5 % rectal cream   No No   Sig: Apply topically 2 (two) times a day for 7 days   linaGLIPtin (Tradjenta) 5 MG TABS  Self No No   Sig: Take 5 mg by mouth daily   liraglutide (VICTOZA) injection   No No   Sig: Inject 0 2 mL (1 2 mg total) under the skin daily   lisinopril (ZESTRIL) 2 5 mg tablet   No No   Sig: Take 1 tablet (2 5 mg total) by mouth daily   loratadine (CLARITIN) 10 mg tablet  Self No No   Sig: Take 1 tablet (10 mg total) by mouth daily   metFORMIN (GLUCOPHAGE) 1000 MG tablet   No No   Sig: Take 1 tablet (1,000 mg total) by mouth 2 (two) times a day with meals   sodium chloride (OCEAN) 0 65 % nasal spray   No No   Si spray into each nostril as needed for congestion   traZODone (DESYREL) 150 mg tablet  Self Yes No   Si mg daily at bedtime    vilazodone (VIIBRYD) 40 mg tablet  Self Yes No   Sig: Take 40 mg by mouth daily        Facility-Administered Medications: None       Past Medical History:   Diagnosis Date    Chronic back pain     Colitis     COPD (chronic obstructive pulmonary disease) (HCC)     Depression     DVT of lower limb, acute (HCC)     GERD (gastroesophageal reflux disease)     Sleep apnea        Past Surgical History:   Procedure Laterality Date    BREAST LUMPECTOMY Right     IVC FILTER INSERTION         Family History   Problem Relation Age of Onset    Breast cancer Mother     COPD Mother     Coronary artery disease Mother     Coronary artery disease Father     Stroke Father      I have reviewed and agree with the history as documented  E-Cigarette/Vaping    E-Cigarette Use Never User      E-Cigarette/Vaping Substances     Social History     Tobacco Use    Smoking status: Current Every Day Smoker     Packs/day: 1 00     Types: Cigarettes    Smokeless tobacco: Never Used   Substance Use Topics    Alcohol use: No    Drug use: No       Review of Systems   Constitutional: Negative for activity change, appetite change, diaphoresis, fatigue and fever  HENT: Positive for ear pain  Negative for congestion, facial swelling, mouth sores and trouble swallowing  Eyes: Negative for photophobia, discharge and visual disturbance  Respiratory: Negative for apnea, cough, shortness of breath and wheezing  Cardiovascular: Negative for chest pain and leg swelling  Gastrointestinal: Negative for abdominal pain, constipation, diarrhea, nausea and vomiting  Endocrine: Negative for heat intolerance and polydipsia  Genitourinary: Negative for dysuria, flank pain, frequency and hematuria  Musculoskeletal: Negative for back pain, gait problem, myalgias and neck pain  Skin: Negative for rash and wound  Allergic/Immunologic: Negative for immunocompromised state  Neurological: Negative for dizziness, syncope, weakness, light-headedness and headaches  Hematological: Negative for adenopathy  Psychiatric/Behavioral: Negative for agitation, confusion and self-injury  The patient is not nervous/anxious  Physical Exam  Physical Exam  Vitals signs and nursing note reviewed  Constitutional:       General: She is not in acute distress  Appearance: She is well-developed  She is not diaphoretic  HENT:      Head: Normocephalic and atraumatic  Right Ear: External ear normal       Left Ear: External ear normal       Nose: Nose normal       Mouth/Throat:      Pharynx: No oropharyngeal exudate     Eyes:      General: No scleral icterus  Right eye: No discharge  Left eye: No discharge  Conjunctiva/sclera: Conjunctivae normal       Pupils: Pupils are equal, round, and reactive to light  Neck:      Musculoskeletal: Normal range of motion and neck supple  Thyroid: No thyromegaly  Vascular: No JVD  Trachea: No tracheal deviation  Cardiovascular:      Rate and Rhythm: Normal rate and regular rhythm  Heart sounds: Normal heart sounds  No murmur  Pulmonary:      Effort: Pulmonary effort is normal  No respiratory distress  Breath sounds: Normal breath sounds  No stridor  No wheezing or rales  Chest:      Chest wall: No tenderness  Abdominal:      General: Bowel sounds are normal  There is no distension  Palpations: Abdomen is soft  There is no mass  Tenderness: There is no abdominal tenderness  There is no guarding or rebound  Musculoskeletal: Normal range of motion  General: No tenderness or deformity  Lymphadenopathy:      Cervical: No cervical adenopathy  Skin:     General: Skin is warm and dry  Coloration: Skin is not pale  Findings: No erythema or rash  Neurological:      Mental Status: She is alert and oriented to person, place, and time  Cranial Nerves: No cranial nerve deficit  Motor: No abnormal muscle tone  Coordination: Coordination normal       Deep Tendon Reflexes: Reflexes are normal and symmetric  Reflexes normal    Psychiatric:         Behavior: Behavior normal          Thought Content:  Thought content normal          Judgment: Judgment normal          Vital Signs  ED Triage Vitals [08/08/20 1416]   Temperature Pulse Respirations Blood Pressure SpO2   (!) 96 5 °F (35 8 °C) 103 20 131/65 95 %      Temp Source Heart Rate Source Patient Position - Orthostatic VS BP Location FiO2 (%)   Temporal Monitor Sitting Left arm --      Pain Score       8           Vitals:    08/08/20 1416   BP: 131/65   Pulse: 103   Patient Position - Orthostatic VS: Sitting         Visual Acuity      ED Medications  Medications - No data to display    Diagnostic Studies  Results Reviewed     None                 No orders to display              Procedures  Procedures         ED Course       US AUDIT      Most Recent Value   Initial Alcohol Screen: US AUDIT-C    1  How often do you have a drink containing alcohol?  0 Filed at: 08/08/2020 1417   2  How many drinks containing alcohol do you have on a typical day you are drinking? 0 Filed at: 08/08/2020 1417   3b  FEMALE Any Age, or MALE 65+: How often do you have 4 or more drinks on one occassion? 0 Filed at: 08/08/2020 1417   Audit-C Score  0 Filed at: 08/08/2020 1417                  HUSAM/DAST-10      Most Recent Value   How many times in the past year have you    Used an illegal drug or used a prescription medication for non-medical reasons? Never Filed at: 08/08/2020 1417                                [facility]  Number of Diagnoses or Management Options  Insect bite of ear, infected, left, initial encounter:   Diagnosis management comments: IMP: allergic reaction to an insect bite versus cellulitis, musc pain  Doubt meningitis, bacteremia, surgical abd process  Plan: Pt is well appearing         Disposition  Final diagnoses:   Insect bite of ear, infected, left, initial encounter     Time reflects when diagnosis was documented in both MDM as applicable and the Disposition within this note     Time User Action Codes Description Comment    8/8/2020  2:32 PM Clementina Lafleur Add [[id number],  L08 9] Insect bite of ear, infected, left, initial encounter       ED Disposition     ED Disposition Condition Date/Time Comment    Discharge Stable Sat Aug 8, 2020 [address] discharge to home/self care              Follow-up Information     Follow up With Specialties Details Why Contact Info    SHU Avendaño Nurse Practitioner Schedule an appointment as soon as possible for a visit in 2 days As needed  Return immediately, If symptoms worsen [address]  [phone]            Patient's Medications   Discharge Prescriptions    DOXYCYCLINE HYCLATE (VIBRAMYCIN) 100 MG CAPSULE    Take 1 capsule (100 mg total) by mouth every 12 (twelve) hours for 10 days       Start Date: 8/8/2020  End Date: 8/18/2020       Order Dose: 100 mg       Quantity: 20 capsule    Refills: 0     No discharge procedures on file      PDMP Review     None          ED Provider  Electronically Signed by           George Tinoco DO  08/12/20 [id number]

## 2020-09-08 DIAGNOSIS — G89.29 OTHER CHRONIC PAIN: ICD-10-CM

## 2020-09-08 DIAGNOSIS — E11.9 TYPE 2 DIABETES MELLITUS WITHOUT COMPLICATION, WITHOUT LONG-TERM CURRENT USE OF INSULIN (HCC): ICD-10-CM

## 2020-09-08 DIAGNOSIS — J30.2 SEASONAL ALLERGIES: ICD-10-CM

## 2020-09-08 DIAGNOSIS — E11.9 DIABETES MELLITUS WITHOUT COMPLICATION (HCC): ICD-10-CM

## 2020-09-08 DIAGNOSIS — J44.1 COPD EXACERBATION (HCC): ICD-10-CM

## 2020-09-08 DIAGNOSIS — G43.809 OTHER MIGRAINE WITHOUT STATUS MIGRAINOSUS, NOT INTRACTABLE: ICD-10-CM

## 2020-09-08 DIAGNOSIS — E78.2 MIXED HYPERLIPIDEMIA: ICD-10-CM

## 2020-09-08 DIAGNOSIS — J40 BRONCHITIS: ICD-10-CM

## 2020-09-08 DIAGNOSIS — E55.9 VITAMIN D DEFICIENCY: ICD-10-CM

## 2020-09-08 DIAGNOSIS — R94.4 DECREASED GFR: ICD-10-CM

## 2020-09-08 DIAGNOSIS — E78.1 HYPERTRIGLYCERIDEMIA: ICD-10-CM

## 2020-09-08 RX ORDER — LINAGLIPTIN 5 MG/1
5 TABLET, FILM COATED ORAL DAILY
Qty: 90 TABLET | Refills: 0 | Status: SHIPPED | OUTPATIENT
Start: 2020-09-08 | End: 2020-12-01

## 2020-09-08 RX ORDER — ALBUTEROL SULFATE 90 UG/1
2 AEROSOL, METERED RESPIRATORY (INHALATION) EVERY 4 HOURS PRN
Qty: 1 INHALER | Refills: 3 | Status: SHIPPED | OUTPATIENT
Start: 2020-09-08 | End: 2021-05-04 | Stop reason: SDUPTHER

## 2020-09-08 RX ORDER — FENOFIBRATE 48 MG/1
48 TABLET, COATED ORAL DAILY
Qty: 30 TABLET | Refills: 2 | Status: SHIPPED | OUTPATIENT
Start: 2020-09-08 | End: 2020-12-16

## 2020-09-08 RX ORDER — AMITRIPTYLINE HYDROCHLORIDE 100 MG/1
50 TABLET, FILM COATED ORAL
Qty: 30 TABLET | Refills: 1 | Status: SHIPPED | OUTPATIENT
Start: 2020-09-08 | End: 2021-03-29

## 2020-09-08 RX ORDER — LANCETS 33 GAUGE
EACH MISCELLANEOUS ONCE
Qty: 100 EACH | Refills: 5 | Status: SHIPPED | OUTPATIENT
Start: 2020-09-08 | End: 2020-09-08

## 2020-09-08 RX ORDER — ACETAMINOPHEN 500 MG
1 TABLET ORAL DAILY
Qty: 90 EACH | Refills: 1 | Status: SHIPPED | OUTPATIENT
Start: 2020-09-08 | End: 2021-09-22

## 2020-09-08 RX ORDER — GABAPENTIN 600 MG/1
600 TABLET ORAL 3 TIMES DAILY
Qty: 270 TABLET | Refills: 0 | Status: SHIPPED | OUTPATIENT
Start: 2020-09-08 | End: 2020-09-23 | Stop reason: SDUPTHER

## 2020-09-08 RX ORDER — LISINOPRIL 2.5 MG/1
2.5 TABLET ORAL DAILY
Qty: 90 TABLET | Refills: 1 | Status: SHIPPED | OUTPATIENT
Start: 2020-09-08 | End: 2021-03-16

## 2020-09-08 RX ORDER — LORATADINE 10 MG/1
10 TABLET ORAL DAILY
Qty: 30 TABLET | Refills: 2 | Status: SHIPPED | OUTPATIENT
Start: 2020-09-08 | End: 2022-07-22

## 2020-09-08 RX ORDER — ATORVASTATIN CALCIUM 40 MG/1
40 TABLET, FILM COATED ORAL
Qty: 90 TABLET | Refills: 0 | Status: SHIPPED | OUTPATIENT
Start: 2020-09-08 | End: 2021-10-14 | Stop reason: SDUPTHER

## 2020-09-08 RX ORDER — PEN NEEDLE, DIABETIC 30 GX3/16"
NEEDLE, DISPOSABLE MISCELLANEOUS DAILY
Qty: 90 EACH | Refills: 2 | Status: SHIPPED | OUTPATIENT
Start: 2020-09-08 | End: 2020-11-17 | Stop reason: SDUPTHER

## 2020-09-17 ENCOUNTER — OFFICE VISIT (OUTPATIENT)
Dept: FAMILY MEDICINE CLINIC | Facility: HOME HEALTHCARE | Age: 60
End: 2020-09-17
Payer: COMMERCIAL

## 2020-09-17 VITALS
OXYGEN SATURATION: 90 % | DIASTOLIC BLOOD PRESSURE: 70 MMHG | TEMPERATURE: 97.4 F | HEART RATE: 116 BPM | WEIGHT: 235 LBS | SYSTOLIC BLOOD PRESSURE: 118 MMHG | HEIGHT: 67 IN | BODY MASS INDEX: 36.88 KG/M2 | RESPIRATION RATE: 18 BRPM

## 2020-09-17 DIAGNOSIS — Z23 ENCOUNTER FOR IMMUNIZATION: ICD-10-CM

## 2020-09-17 DIAGNOSIS — E13.40 OTHER SPECIFIED DIABETES MELLITUS WITH DIABETIC NEUROPATHY, UNSPECIFIED WHETHER LONG TERM INSULIN USE (HCC): Primary | ICD-10-CM

## 2020-09-17 DIAGNOSIS — Z12.11 SCREENING FOR COLORECTAL CANCER: ICD-10-CM

## 2020-09-17 DIAGNOSIS — Z12.12 SCREENING FOR COLORECTAL CANCER: ICD-10-CM

## 2020-09-17 DIAGNOSIS — Z12.11 COLON CANCER SCREENING: ICD-10-CM

## 2020-09-17 DIAGNOSIS — F17.200 SMOKER: ICD-10-CM

## 2020-09-17 DIAGNOSIS — Z12.4 SCREENING FOR CERVICAL CANCER: ICD-10-CM

## 2020-09-17 LAB — SL AMB POCT HEMOGLOBIN AIC: 6.9 (ref ?–6.5)

## 2020-09-17 PROCEDURE — 83036 HEMOGLOBIN GLYCOSYLATED A1C: CPT | Performed by: FAMILY MEDICINE

## 2020-09-17 PROCEDURE — T1015 CLINIC SERVICE: HCPCS | Performed by: FAMILY MEDICINE

## 2020-09-17 RX ORDER — PANTOPRAZOLE SODIUM 40 MG/1
40 TABLET, DELAYED RELEASE ORAL 2 TIMES DAILY
COMMUNITY
End: 2020-11-12 | Stop reason: SDUPTHER

## 2020-09-17 NOTE — PATIENT INSTRUCTIONS
Smoking cessation advised  Reduction of personal exposure to occupation dusts, fumes, and gases  Reduction to indoor and outdoor air pollutants  Advised influenza vaccine  Pneumococcal vaccine advised  Where a medical alert identification to carry  Check your feet each day for sores, make sure your socks and shoes fit correctly  Did not trim year nails  Established with a podiatrist   Maintain a healthy weight, this can help you control her diabetes  Follow a proper meal plan  Keep track of her carbohydrates, eat low-fat foods, low-salt, high-fiber foods, limit alcohol  Exercise can help her blood sugar level study, decrease her risk of heart disease, and help you lose weight  Smoking cessation advised if applicable  Good blood pressure control is recommended  A normal blood pressure is 119/78 or lower  In neural blood pressure can help prevent certain complications from diabetes  Recommend yearly eye exams to assess for retinopathy  Consider vaccination against flu, pneumonia, and hepatitis  Check blood pressure regularly  Purchase home monitoring of blood pressure device if insurance does not cover  Eat a healthy diet, reviewed DASH diet  Do not add salt salt to food  Reduce saturated fats  Encourage physical activity, 30 minutes daily  Limit alcohol use  Smoking cessation if applicable  Stress reduction  Regular exercise and physical activity may help you control your weight  Diet and exercise play an important role in controlling your weight  Exercise strengthens your heart and improves your circulation  This lowers risks of heart disease  Exercise can lower your blood sugar level and help your insulin work better  This will cut down your risk of type 2 diabetes  Exercise can improve your mood and make you feel more relaxed  This can reduce your risk of depression

## 2020-09-17 NOTE — PROGRESS NOTES
[address]       NAME: Arnold Summers is a 61 y o  female  : 1960    MRN: [id number]  DATE: 2020  TIME: 8:53 AM    Assessment and Plan   Diagnoses and all orders for this visit:    Other specified diabetes mellitus with diabetic neuropathy, unspecified whether long term insulin use (HCC)  -     POCT hemoglobin A1c    Screening for colorectal cancer  -     Ambulatory referral to Gastroenterology; Future    Encounter for immunization  -     TDAP VACCINE GREATER THAN OR EQUAL TO 8YO IM    Screening for cervical cancer  -     Ambulatory referral to Obstetrics / Gynecology; Future    Smoker  -     CT lung screening program; Future    Colon cancer screening  -     Ambulatory referral to Gastroenterology; Future    Other orders  -     pantoprazole (PROTONIX) 40 mg tablet; Take 40 mg by mouth 2 (two) times a day        No problem-specific Assessment & Plan notes found for this encounter  Patient Instructions           Chief Complaint     Chief Complaint   Patient presents with    Diabetes     routine check per pt         History of Present Illness       Tanya Hilliard presents for routine follow-up  She is due for A1c, previous A1c 9 3 has much improved today, to 6 9  Denies any episodes of hypoglycemia  She has been working on diet and walking daily  Reports compliance with medication regimen  Recently restarted statin for hyperlipidemia, will consider recheck in about 3 months  Her previous complaint of hemorrhoids have resolved  She has a long history of Chronic Obstructive Pulmonary Disease and has home oxygen  She reports oxygen requirements are stable, she does present to office without oxygen, 90% on room air  Denies any shortness of breath or fevers  She reports that she is now compliant with her inhaler regimen  Unfortunately she continues to smoke, she is not ready for smoking cessation today but does agree to try to decrease her smoking    She is a candidate for CT lung cancer screening  She is due for colonoscopy, GI referral placed again  No bloody stools  Review of Systems   Review of Systems   Constitutional: Negative  Negative for chills, fatigue and fever  HENT: Negative  Negative for sinus pain  Respiratory: Negative  Negative for cough and shortness of breath  Cardiovascular: Negative  Gastrointestinal: Negative  Negative for blood in stool, constipation, diarrhea, nausea and vomiting  Genitourinary: Negative  Musculoskeletal: Positive for arthralgias  Skin: Negative  Negative for rash  Neurological: Negative  Psychiatric/Behavioral: Negative  Negative for suicidal ideas           Current Medications       Current Outpatient Medications:     albuterol (2 5 mg/3 mL) 0 083 % nebulizer solution, Take 1 vial (2 5 mg total) by nebulization 4 (four) times a day as needed for shortness of breath, Disp: 120 vial, Rfl: 3    albuterol (Ventolin HFA) 90 mcg/act inhaler, Inhale 2 puffs every 4 (four) hours as needed for wheezing, Disp: 1 Inhaler, Rfl: 3    amitriptyline (ELAVIL) 100 mg tablet, Take 0 5 tablets (50 mg total) by mouth daily at bedtime, Disp: 30 tablet, Rfl: 1    atorvastatin (LIPITOR) 40 mg tablet, Take 1 tablet (40 mg total) by mouth daily at bedtime, Disp: 90 tablet, Rfl: 0    benztropine (COGENTIN) 0 5 mg tablet, Take 0 5 mg by mouth daily at bedtime , Disp: , Rfl:     Blood Glucose Monitoring Suppl (ONE TOUCH ULTRA 2) w/Device KIT, by Does not apply route, Disp: , Rfl:     Blood Glucose Monitoring Suppl (ONE TOUCH ULTRA 2) w/Device KIT, by Does not apply route daily, Disp: 100 each, Rfl: 0    Calcium Carbonate-Vit D-Min (Calcium 1200) 3007-1704 MG-UNIT CHEW, Chew 1 each daily, Disp: 90 each, Rfl: 1    DULERA 100-5 MCG/ACT inhaler, inhale 2 puffs by mouth twice a day, Disp: 13 g, Rfl: 3    fenofibrate (TRICOR) 48 mg tablet, Take 1 tablet (48 mg total) by mouth daily, Disp: 30 tablet, Rfl: 2    fluticasone (FLONASE) 50 mcg/act nasal spray, 2 sprays into each nostril daily as needed for allergies , Disp: , Rfl:     gabapentin (NEURONTIN) 600 MG tablet, Take 1 tablet (600 mg total) by mouth 3 (three) times a day, Disp: 270 tablet, Rfl: 0    glucose blood (ONE TOUCH ULTRA TEST) test strip, 1 each by Other route daily, Disp: 100 each, Rfl: 1    hydrocortisone (ANUSOL-HC) 2 5 % rectal cream, Apply topically 2 (two) times a day for 7 days, Disp: 28 g, Rfl: 0    Insulin Pen Needle (Pen Needles) 32G X 4 MM MISC, by Does not apply route daily, Disp: 90 each, Rfl: 2    linaGLIPtin (Tradjenta) 5 MG TABS, Take 5 mg by mouth daily, Disp: 90 tablet, Rfl: 0    liraglutide (VICTOZA) injection, Inject 0 2 mL (1 2 mg total) under the skin daily, Disp: 6 mL, Rfl: 0    lisinopril (ZESTRIL) 2 5 mg tablet, Take 1 tablet (2 5 mg total) by mouth daily, Disp: 90 tablet, Rfl: 1    loratadine (CLARITIN) 10 mg tablet, Take 1 tablet (10 mg total) by mouth daily, Disp: 30 tablet, Rfl: 2    metFORMIN (GLUCOPHAGE) 1000 MG tablet, Take 1 tablet (1,000 mg total) by mouth 2 (two) times a day with meals, Disp: 180 tablet, Rfl: 0    pantoprazole (PROTONIX) 40 mg tablet, Take 40 mg by mouth 2 (two) times a day, Disp: , Rfl:     sodium chloride (OCEAN) 0 65 % nasal spray, 1 spray into each nostril as needed for congestion, Disp: 30 mL, Rfl: 0    traZODone (DESYREL) 150 mg tablet, 150 mg daily at bedtime , Disp: , Rfl:     vilazodone (VIIBRYD) 40 mg tablet, Take 40 mg by mouth daily  , Disp: , Rfl:     VRAYLAR 6 MG capsule, Take 6 mg by mouth daily, Disp: , Rfl: 0    Current Allergies     Allergies as of 09/17/2020 - Reviewed 09/17/2020   Allergen Reaction Noted    Amoxicillin-pot clavulanate GI Intolerance 08/10/2016            The following portions of the patient's history were reviewed and updated as appropriate: allergies, current medications, past family history, past medical history, past social history, past surgical history and problem list      Past Medical History:   Diagnosis Date    Chronic back pain     Colitis     COPD (chronic obstructive pulmonary disease) (HCC)     Depression     DVT of lower limb, acute (HCC)     GERD (gastroesophageal reflux disease)     Sleep apnea        Past Surgical History:   Procedure Laterality Date    BREAST LUMPECTOMY Right     IVC FILTER INSERTION         Family History   Problem Relation Age of Onset    Breast cancer Mother     COPD Mother     Coronary artery disease Mother     Coronary artery disease Father     Stroke Father          Medications have been verified  Objective   /70 (BP Location: Right arm, Patient Position: Sitting, Cuff Size: Large)   Pulse (!) 116   Temp (!) 97 4 °F (36 3 °C) (Temporal)   Resp 18   Ht 5' 7" (1 702 m)   Wt 107 kg (235 lb)   SpO2 90%   BMI 36 81 kg/m²        Physical Exam     Physical Exam  Vitals signs and nursing note reviewed  Constitutional:       Appearance: Normal appearance  HENT:      Mouth/Throat:      Mouth: Mucous membranes are moist       Pharynx: Oropharynx is clear  Eyes:      Conjunctiva/sclera: Conjunctivae normal       Pupils: Pupils are equal, round, and reactive to light  Neck:      Musculoskeletal: Normal range of motion and neck supple  Cardiovascular:      Rate and Rhythm: Normal rate and regular rhythm  Pulses: Normal pulses  Heart sounds: Normal heart sounds  Pulmonary:      Effort: Pulmonary effort is normal  No respiratory distress  Breath sounds: Normal breath sounds  Comments: Diminished breath sounds bilaterally with no signs of respiratory distress  Abdominal:      General: Bowel sounds are normal       Palpations: Abdomen is soft  Musculoskeletal: Normal range of motion  Skin:     General: Skin is warm and dry  Capillary Refill: Capillary refill takes less than 2 seconds  Neurological:      Mental Status: She is alert and oriented to person, place, and time  Psychiatric:         Mood and Affect: Mood normal          Behavior: Behavior normal          Thought Content:  Thought content normal          Judgment: Judgment normal

## 2020-09-23 DIAGNOSIS — G89.29 OTHER CHRONIC PAIN: ICD-10-CM

## 2020-09-23 DIAGNOSIS — J30.2 SEASONAL ALLERGIES: Primary | ICD-10-CM

## 2020-09-23 RX ORDER — FLUTICASONE PROPIONATE 50 MCG
2 SPRAY, SUSPENSION (ML) NASAL DAILY PRN
Qty: 1 BOTTLE | Refills: 3 | Status: SHIPPED | OUTPATIENT
Start: 2020-09-23 | End: 2021-09-22

## 2020-09-23 RX ORDER — GABAPENTIN 600 MG/1
600 TABLET ORAL 3 TIMES DAILY
Qty: 270 TABLET | Refills: 0 | Status: SHIPPED | OUTPATIENT
Start: 2020-09-23 | End: 2021-03-15

## 2020-10-09 ENCOUNTER — OFFICE VISIT (OUTPATIENT)
Dept: FAMILY MEDICINE CLINIC | Facility: HOME HEALTHCARE | Age: 60
End: 2020-10-09
Payer: COMMERCIAL

## 2020-10-09 VITALS
RESPIRATION RATE: 18 BRPM | SYSTOLIC BLOOD PRESSURE: 128 MMHG | TEMPERATURE: 98.2 F | HEART RATE: 120 BPM | WEIGHT: 232.4 LBS | BODY MASS INDEX: 36.47 KG/M2 | DIASTOLIC BLOOD PRESSURE: 70 MMHG | OXYGEN SATURATION: 94 % | HEIGHT: 67 IN

## 2020-10-09 DIAGNOSIS — T14.8XXA MUSCLE STRAIN: ICD-10-CM

## 2020-10-09 DIAGNOSIS — M54.42 CHRONIC BILATERAL LOW BACK PAIN WITH LEFT-SIDED SCIATICA: Primary | ICD-10-CM

## 2020-10-09 DIAGNOSIS — G89.29 CHRONIC BILATERAL LOW BACK PAIN WITH LEFT-SIDED SCIATICA: Primary | ICD-10-CM

## 2020-10-09 DIAGNOSIS — G89.29 CHRONIC BILATERAL LOW BACK PAIN WITH RIGHT-SIDED SCIATICA: ICD-10-CM

## 2020-10-09 DIAGNOSIS — Z23 IMMUNIZATION DUE: ICD-10-CM

## 2020-10-09 DIAGNOSIS — M54.41 CHRONIC BILATERAL LOW BACK PAIN WITH RIGHT-SIDED SCIATICA: ICD-10-CM

## 2020-10-09 PROCEDURE — T1015 CLINIC SERVICE: HCPCS | Performed by: FAMILY MEDICINE

## 2020-10-09 RX ORDER — METHYLPREDNISOLONE 4 MG/1
TABLET ORAL
Qty: 1 EACH | Refills: 0 | Status: SHIPPED | OUTPATIENT
Start: 2020-10-09 | End: 2021-01-19 | Stop reason: ALTCHOICE

## 2020-10-09 RX ORDER — CYCLOBENZAPRINE HCL 10 MG
10 TABLET ORAL 3 TIMES DAILY PRN
Qty: 42 TABLET | Refills: 0 | Status: SHIPPED | OUTPATIENT
Start: 2020-10-09 | End: 2020-10-24

## 2020-10-24 DIAGNOSIS — M54.41 CHRONIC BILATERAL LOW BACK PAIN WITH RIGHT-SIDED SCIATICA: ICD-10-CM

## 2020-10-24 DIAGNOSIS — G89.29 CHRONIC BILATERAL LOW BACK PAIN WITH RIGHT-SIDED SCIATICA: ICD-10-CM

## 2020-10-24 RX ORDER — CYCLOBENZAPRINE HCL 10 MG
TABLET ORAL
Qty: 42 TABLET | Refills: 0 | Status: SHIPPED | OUTPATIENT
Start: 2020-10-24 | End: 2021-09-28 | Stop reason: ALTCHOICE

## 2020-11-12 DIAGNOSIS — K21.9 GERD WITHOUT ESOPHAGITIS: Primary | ICD-10-CM

## 2020-11-12 RX ORDER — PANTOPRAZOLE SODIUM 40 MG/1
40 TABLET, DELAYED RELEASE ORAL 2 TIMES DAILY
Qty: 180 TABLET | Refills: 0 | Status: SHIPPED | OUTPATIENT
Start: 2020-11-12 | End: 2021-02-05

## 2020-11-17 DIAGNOSIS — E11.9 DIABETES MELLITUS WITHOUT COMPLICATION (HCC): ICD-10-CM

## 2020-11-17 RX ORDER — PEN NEEDLE, DIABETIC 30 GX3/16"
NEEDLE, DISPOSABLE MISCELLANEOUS DAILY
Qty: 90 EACH | Refills: 2 | Status: SHIPPED | OUTPATIENT
Start: 2020-11-17 | End: 2022-02-28

## 2020-12-01 DIAGNOSIS — E11.9 DIABETES MELLITUS WITHOUT COMPLICATION (HCC): ICD-10-CM

## 2020-12-01 RX ORDER — LINAGLIPTIN 5 MG/1
TABLET, FILM COATED ORAL
Qty: 90 TABLET | Refills: 0 | Status: SHIPPED | OUTPATIENT
Start: 2020-12-01 | End: 2021-03-01

## 2020-12-16 DIAGNOSIS — E78.1 HYPERTRIGLYCERIDEMIA: ICD-10-CM

## 2020-12-16 RX ORDER — FENOFIBRATE 48 MG/1
TABLET, COATED ORAL
Qty: 30 TABLET | Refills: 2 | Status: SHIPPED | OUTPATIENT
Start: 2020-12-16 | End: 2021-03-16

## 2021-01-19 ENCOUNTER — TELEMEDICINE (OUTPATIENT)
Dept: FAMILY MEDICINE CLINIC | Facility: HOME HEALTHCARE | Age: 61
End: 2021-01-19
Payer: COMMERCIAL

## 2021-01-19 DIAGNOSIS — Z12.4 SCREENING FOR CERVICAL CANCER: ICD-10-CM

## 2021-01-19 DIAGNOSIS — E11.9 TYPE 2 DIABETES MELLITUS WITHOUT COMPLICATION, WITHOUT LONG-TERM CURRENT USE OF INSULIN (HCC): Primary | ICD-10-CM

## 2021-01-19 DIAGNOSIS — Z12.39 ENCOUNTER FOR SCREENING FOR MALIGNANT NEOPLASM OF BREAST, UNSPECIFIED SCREENING MODALITY: ICD-10-CM

## 2021-01-19 DIAGNOSIS — E13.40 OTHER SPECIFIED DIABETES MELLITUS WITH DIABETIC NEUROPATHY, UNSPECIFIED WHETHER LONG TERM INSULIN USE (HCC): ICD-10-CM

## 2021-01-19 DIAGNOSIS — E03.9 ACQUIRED HYPOTHYROIDISM: ICD-10-CM

## 2021-01-19 DIAGNOSIS — J43.8 OTHER EMPHYSEMA (HCC): ICD-10-CM

## 2021-01-19 DIAGNOSIS — E78.1 HYPERTRIGLYCERIDEMIA: ICD-10-CM

## 2021-01-19 PROCEDURE — 99213 OFFICE O/P EST LOW 20 MIN: CPT | Performed by: FAMILY MEDICINE

## 2021-01-19 NOTE — PATIENT INSTRUCTIONS
Regular exercise and physical activity may help you control your weight  Diet and exercise play an important role in controlling your weight  Exercise strengthens your heart and improves your circulation  This lowers risks of heart disease  Exercise can lower your blood sugar level and help your insulin work better  This will cut down your risk of type 2 diabetes  Exercise can improve your mood and make you feel more relaxed  This can reduce your risk of depression  Where a medical alert identification to carry  Check your feet each day for sores, make sure your socks and shoes fit correctly  Did not trim year nails  Established with a podiatrist   Maintain a healthy weight, this can help you control her diabetes  Follow a proper meal plan  Keep track of her carbohydrates, eat low-fat foods, low-salt, high-fiber foods, limit alcohol  Exercise can help her blood sugar level study, decrease her risk of heart disease, and help you lose weight  Smoking cessation advised if applicable  Good blood pressure control is recommended  A normal blood pressure is 119/78 or lower  In neural blood pressure can help prevent certain complications from diabetes  Recommend yearly eye exams to assess for retinopathy  Consider vaccination against flu, pneumonia, and hepatitis  Hyperlipidemia-eat a heart healthy diet, this includes reduction of saturated fat and trans fat  Reducing red meat and home health     Regular exercise  40 minutes of aerobic exercise or vigorous walking can lower cholesterol and blood pressure  Avoid tobacco smoke  Smoking lowers HDL cholesterol  This places a person at risk for coronary artery disease  Weight loss recommended  Being overweight or obese tends to raise LDL cholesterol and lower HDL cholesterol  Smoking cessation advised  Reduction of personal exposure to occupation dusts, fumes, and gases  Reduction to indoor and outdoor air pollutants  Advised influenza vaccine   Pneumococcal vaccine advised

## 2021-01-19 NOTE — PROGRESS NOTES
Virtual Brief Visit  It was my intent to perform this visit via video technology but the patient was not able to do a video connections of the visit was completed via audio telephone only  Assessment/Plan:    Problem List Items Addressed This Visit        Endocrine    Diabetes mellitus with neurological manifestation ([facility] )    Hypothyroidism    Relevant Orders    TSH, 3rd generation with Free T4 reflex    Type 2 diabetes mellitus ([facility] ) - Primary    Relevant Orders    HEMOGLOBIN A1C W/ EAG ESTIMATION    Comprehensive metabolic panel       Respiratory    Chronic obstructive pulmonary disease ([facility] )      Other Visit Diagnoses     Encounter for screening for malignant neoplasm of breast, unspecified screening modality        Relevant Orders    Mammo screening bilateral w 3d & cad    Screening for cervical cancer        Relevant Orders    Ambulatory referral to Obstetrics / Gynecology    Hypertriglyceridemia        Relevant Orders    Lipid Panel with Direct LDL reflex                Reason for visit is   Chief Complaint   Patient presents with    Virtual Brief Visit        Encounter provider Asberry Opitz, CRNP    Provider located at [address] [place] [address]  [phone]    Recent Visits  No visits were found meeting these conditions  Showing recent visits within past 7 days and meeting all other requirements     Today's Visits  Date Type Provider Dept   [facility] today's visits and meeting all other requirements     Future Appointments  No visits were found meeting these conditions  Showing future appointments within next 150 days and meeting all other requirements        After connecting through telephone, the patient was identified by name and date of birth   Gilbert Lamb was informed that this is a telemedicine visit and that the visit is being conducted through telephone  My office door was closed  No one else was in the room  She acknowledged consent and understanding of privacy and security of the platform  The patient has agreed to participate and understands she can discontinue the visit at any time  Patient is aware this is a billable service  Subjective    Gumaro Jaffe is a 61 y o  female for follow up  Ezequiel Link presents  Via tele visit for follow-up  She has a history of diabetes, A1c is due  Also due for repeat lipid panel, and CMP  Previous complaint of back pain has resolved  She does have a history of Chronic Obstructive Pulmonary Disease, with chronic home oxygen  She reports oxygen requirements are stable  Follow-up in 1 month to review labs and address plan of care              Past Medical History:   Diagnosis Date    Chronic back pain     Colitis     COPD (chronic obstructive pulmonary disease) (HCC)     Depression     DVT of lower limb, acute (HCC)     GERD (gastroesophageal reflux disease)     Sleep apnea        Past Surgical History:   Procedure Laterality Date    BREAST LUMPECTOMY Right     IVC FILTER INSERTION         Current Outpatient Medications   Medication Sig Dispense Refill    albuterol (2 5 mg/3 mL) 0 083 % nebulizer solution Take 1 vial (2 5 mg total) by nebulization 4 (four) times a day as needed for shortness of breath 120 vial 3    albuterol (Ventolin HFA) 90 mcg/act inhaler Inhale 2 puffs every 4 (four) hours as needed for wheezing 1 Inhaler 3    amitriptyline (ELAVIL) 100 mg tablet Take 0 5 tablets (50 mg total) by mouth daily at bedtime 30 tablet 1    atorvastatin (LIPITOR) 40 mg tablet Take 1 tablet (40 mg total) by mouth daily at bedtime 90 tablet 0    benztropine (COGENTIN) 0 5 mg tablet Take 0 5 mg by mouth daily at bedtime       Blood Glucose Monitoring Suppl (ONE TOUCH ULTRA 2) w/Device KIT by Does not apply route      Blood Glucose Monitoring Suppl (ONE TOUCH ULTRA 2) w/Device KIT by Does not apply route daily 100 each 0    Calcium Carbonate-Vit D-Min (Calcium 1200) 7722-7306 MG-UNIT CHEW Chew 1 each daily 90 each 1    cyclobenzaprine (FLEXERIL) 10 mg tablet take 1 tablet by mouth three times a day if needed for muscle spasm for up to 14 days 42 tablet 0    DULERA 100-5 MCG/ACT inhaler inhale 2 puffs by mouth twice a day 13 g 3    fenofibrate (TRICOR) 48 mg tablet take 1 tablet by mouth once daily 30 tablet 2    fluticasone (FLONASE) 50 mcg/act nasal spray 2 sprays into each nostril daily as needed for allergies 1 Bottle 3    gabapentin (NEURONTIN) 600 MG tablet Take 1 tablet (600 mg total) by mouth 3 (three) times a day 270 tablet 0    hydrocortisone (ANUSOL-HC) 2 5 % rectal cream Apply topically 2 (two) times a day for 7 days 28 g 0    Insulin Pen Needle (Pen Needles) 32G X 4 MM MISC Use daily 90 each 2    liraglutide (VICTOZA) injection Inject 0 2 mL (1 2 mg total) under the skin daily 6 mL 0    lisinopril (ZESTRIL) 2 5 mg tablet Take 1 tablet (2 5 mg total) by mouth daily 90 tablet 1    loratadine (CLARITIN) 10 mg tablet Take 1 tablet (10 mg total) by mouth daily 30 tablet 2    metFORMIN (GLUCOPHAGE) 1000 MG tablet Take 1 tablet (1,000 mg total) by mouth 2 (two) times a day with meals 180 tablet 0    pantoprazole (PROTONIX) 40 mg tablet Take 1 tablet (40 mg total) by mouth 2 (two) times a day 180 tablet 0    sodium chloride (OCEAN) 0 65 % nasal spray 1 spray into each nostril as needed for congestion 30 mL 0    Tradjenta 5 MG TABS take 1 tablet by mouth daily 90 tablet 0    traZODone (DESYREL) 150 mg tablet 150 mg daily at bedtime       vilazodone (VIIBRYD) 40 mg tablet Take 40 mg by mouth daily   VRAYLAR 6 MG capsule Take 6 mg by mouth daily  0     No current facility-administered medications for this visit  Allergies   Allergen Reactions    Amoxicillin-Pot Clavulanate GI Intolerance       Review of Systems   Constitutional: Negative    Negative for chills, fatigue and fever    HENT: Negative  Negative for sinus pain  Respiratory: Negative  Negative for cough and shortness of breath  Cardiovascular: Negative  Gastrointestinal: Negative  Negative for blood in stool, constipation, diarrhea, nausea and vomiting  Genitourinary: Negative  Musculoskeletal: Positive for arthralgias  Skin: Negative  Negative for rash  Neurological: Negative  Psychiatric/Behavioral: Negative  Negative for suicidal ideas  There were no vitals filed for this visit  I spent 15 minutes with patient today in which greater than 50% of the time was spent in counseling/coordination of care regarding assessment and plan of care    Levi Kong acknowledges that she has consented to an online visit or consultation  She understands that the online visit is based solely on information provided by her, and that, in the absence of a face-to-face physical evaluation by the physician, the diagnosis she receives is both limited and provisional in terms of accuracy and completeness  This is not intended to replace a full medical face-to-face evaluation by the physician  Elieser Moody understands and accepts these terms

## 2021-02-03 DIAGNOSIS — K21.9 GERD WITHOUT ESOPHAGITIS: ICD-10-CM

## 2021-02-05 RX ORDER — PANTOPRAZOLE SODIUM 40 MG/1
TABLET, DELAYED RELEASE ORAL
Qty: 180 TABLET | Refills: 0 | Status: SHIPPED | OUTPATIENT
Start: 2021-02-05 | End: 2021-05-04

## 2021-02-08 DIAGNOSIS — E11.9 DIABETES MELLITUS WITHOUT COMPLICATION (HCC): ICD-10-CM

## 2021-02-08 RX ORDER — LIRAGLUTIDE 6 MG/ML
INJECTION SUBCUTANEOUS
Qty: 6 ML | Refills: 1 | Status: SHIPPED | OUTPATIENT
Start: 2021-02-08 | End: 2021-10-07 | Stop reason: SDUPTHER

## 2021-02-17 DIAGNOSIS — Z12.4 SCREENING FOR CERVICAL CANCER: Primary | ICD-10-CM

## 2021-03-01 DIAGNOSIS — E11.9 DIABETES MELLITUS WITHOUT COMPLICATION (HCC): ICD-10-CM

## 2021-03-01 RX ORDER — LINAGLIPTIN 5 MG/1
TABLET, FILM COATED ORAL
Qty: 90 TABLET | Refills: 0 | Status: SHIPPED | OUTPATIENT
Start: 2021-03-01 | End: 2021-06-01

## 2021-03-14 DIAGNOSIS — G89.29 OTHER CHRONIC PAIN: ICD-10-CM

## 2021-03-15 RX ORDER — GABAPENTIN 600 MG/1
TABLET ORAL
Qty: 270 TABLET | Refills: 0 | Status: SHIPPED | OUTPATIENT
Start: 2021-03-15 | End: 2021-06-10

## 2021-03-16 DIAGNOSIS — E78.1 HYPERTRIGLYCERIDEMIA: ICD-10-CM

## 2021-03-16 DIAGNOSIS — R94.4 DECREASED GFR: ICD-10-CM

## 2021-03-16 RX ORDER — LISINOPRIL 2.5 MG/1
TABLET ORAL
Qty: 90 TABLET | Refills: 1 | Status: SHIPPED | OUTPATIENT
Start: 2021-03-16 | End: 2021-09-10

## 2021-03-16 RX ORDER — FENOFIBRATE 48 MG/1
TABLET, COATED ORAL
Qty: 30 TABLET | Refills: 2 | Status: SHIPPED | OUTPATIENT
Start: 2021-03-16 | End: 2022-03-28 | Stop reason: SDUPTHER

## 2021-03-27 DIAGNOSIS — G43.809 OTHER MIGRAINE WITHOUT STATUS MIGRAINOSUS, NOT INTRACTABLE: ICD-10-CM

## 2021-03-29 RX ORDER — AMITRIPTYLINE HYDROCHLORIDE 100 MG/1
50 TABLET, FILM COATED ORAL
Qty: 45 TABLET | Refills: 1 | Status: SHIPPED | OUTPATIENT
Start: 2021-03-29 | End: 2021-09-28

## 2021-05-04 ENCOUNTER — TELEMEDICINE (OUTPATIENT)
Dept: FAMILY MEDICINE CLINIC | Facility: HOME HEALTHCARE | Age: 61
End: 2021-05-04
Payer: COMMERCIAL

## 2021-05-04 DIAGNOSIS — E11.9 TYPE 2 DIABETES MELLITUS WITHOUT COMPLICATION, WITHOUT LONG-TERM CURRENT USE OF INSULIN (HCC): ICD-10-CM

## 2021-05-04 DIAGNOSIS — K21.9 GERD WITHOUT ESOPHAGITIS: ICD-10-CM

## 2021-05-04 DIAGNOSIS — J44.1 CHRONIC OBSTRUCTIVE PULMONARY DISEASE WITH ACUTE EXACERBATION (HCC): Primary | ICD-10-CM

## 2021-05-04 DIAGNOSIS — J44.1 COPD EXACERBATION (HCC): ICD-10-CM

## 2021-05-04 PROCEDURE — G0071 COMM SVCS BY RHC/FQHC 5 MIN: HCPCS | Performed by: FAMILY MEDICINE

## 2021-05-04 RX ORDER — ALBUTEROL SULFATE 90 UG/1
2 AEROSOL, METERED RESPIRATORY (INHALATION) EVERY 4 HOURS PRN
Qty: 18 G | Refills: 4 | Status: SHIPPED | OUTPATIENT
Start: 2021-05-04

## 2021-05-04 RX ORDER — ALBUTEROL SULFATE 2.5 MG/3ML
2.5 SOLUTION RESPIRATORY (INHALATION) EVERY 4 HOURS PRN
Qty: 120 ML | Refills: 3 | Status: SHIPPED | OUTPATIENT
Start: 2021-05-04

## 2021-05-04 RX ORDER — PANTOPRAZOLE SODIUM 40 MG/1
TABLET, DELAYED RELEASE ORAL
Qty: 180 TABLET | Refills: 0 | Status: SHIPPED | OUTPATIENT
Start: 2021-05-04 | End: 2021-08-02

## 2021-05-04 NOTE — PROGRESS NOTES
Virtual Brief Visit    Assessment/Plan:    Problem List Items Addressed This Visit        Respiratory    Chronic obstructive pulmonary disease ([address] ) - Primary    Relevant Medications    mometasone-formoterol (Dulera) 100-5 MCG/ACT inhaler    albuterol (Ventolin HFA) 90 mcg/act inhaler    albuterol (2 5 mg/3 mL) 0 083 % nebulizer solution      Other Visit Diagnoses     COPD exacerbation (HCC)        Relevant Medications    mometasone-formoterol (Dulera) 100-5 MCG/ACT inhaler    albuterol (Ventolin HFA) 90 mcg/act inhaler    albuterol (2 5 mg/3 mL) 0 083 % nebulizer solution          Plan:  Patient ran out of her albuterol and Dulera  Reorder sent to pharmacy  Patient instructed to use her Dulera daily and for the next several days to her albuterol nebulization or HFA every 4-6 hours  Prednisone burst 40 mg x 5 days  Patient instructed to go to the emergency room with any worsening symptoms  Patient instructed to call office tomorrow with update  Patient also instructed to use her oxygen at 3 liters/minute continuously as instructed in the past   patient also instructed not to let her pulmonary medications run out and to call for refills  Reason for visit is   Chief Complaint   Patient presents with    COPD      worsening COPD symptoms x1 week  Encounter provider Zabrina Roa PA-C    Provider located at [address] [place] [address]  [phone]    Recent Visits  No visits were found meeting these conditions  Showing recent visits within past 7 days and meeting all other requirements     Today's Visits  Date Type Provider Dept   05/04/21 Justine Lin PA-C [facility] today's visits and meeting all other requirements     Future Appointments  No visits were found meeting these conditions     Showing future appointments within next 150 days and meeting all other requirements        After connecting through   Telephone and patient was informed that this is not a secure, HIPAA-compliant platform  She agrees to proceed  , the patient was identified by name and date of birth  Thaddeus Harris was informed that this is a telemedicine visit and that the visit is being conducted through telephone and patient was informed that this is not a secure, HIPAA-compliant platform  She agrees to proceed     My office door was closed  No one else was in the room  She acknowledged consent and understanding of privacy and security of the platform  The patient has agreed to participate and understands she can discontinue the visit at any time  Patient is aware this is a billable service  Subjective    Thaddeus Harris is a 64 y o  female   HPI    66-year-old female called in today for sick visit  Patient having worsening shortness of breath for approximately 1 week  Patient has chronic hypoxic respiratory failure on 3 L O2  Patient was supposed to wear continuously however states only wears at bedtime  Patient also has Dulera albuterol HFA/ nebulization as ran out of her medications over a month ago  Patient denies any fevers, chills  Denies any chest pain  Positive cough mostly nonproductive  No other complaints at this time  Positive dyspnea with rest and with activity  Questionable wheezing    Past Medical History:   Diagnosis Date    Chronic back pain     Colitis     COPD (chronic obstructive pulmonary disease) (HCC)     Depression     DVT of lower limb, acute (HCC)     GERD (gastroesophageal reflux disease)     Sleep apnea        Past Surgical History:   Procedure Laterality Date    BREAST LUMPECTOMY Right     IVC FILTER INSERTION         Current Outpatient Medications   Medication Sig Dispense Refill    albuterol (2 5 mg/3 mL) 0 083 % nebulizer solution Take 1 vial (2 5 mg total) by nebulization every 4 (four) hours as needed for shortness of breath 120 mL 3    albuterol (Ventolin HFA) 90 mcg/act inhaler Inhale 2 puffs every 4 (four) hours as needed for wheezing 18 g 4    amitriptyline (ELAVIL) 100 mg tablet Take 0 5 tablets (50 mg total) by mouth daily at bedtime 45 tablet 1    atorvastatin (LIPITOR) 40 mg tablet Take 1 tablet (40 mg total) by mouth daily at bedtime 90 tablet 0    benztropine (COGENTIN) 0 5 mg tablet Take 0 5 mg by mouth daily at bedtime       Blood Glucose Monitoring Suppl (ONE TOUCH ULTRA 2) w/Device KIT by Does not apply route      Blood Glucose Monitoring Suppl (ONE TOUCH ULTRA 2) w/Device KIT by Does not apply route daily 100 each 0    Calcium Carbonate-Vit D-Min (Calcium 1200) 9412-5467 MG-UNIT CHEW Chew 1 each daily 90 each 1    cyclobenzaprine (FLEXERIL) 10 mg tablet take 1 tablet by mouth three times a day if needed for muscle spasm for up to 14 days 42 tablet 0    fenofibrate (TRICOR) 48 mg tablet take 1 tablet by mouth once daily 30 tablet 2    fluticasone (FLONASE) 50 mcg/act nasal spray 2 sprays into each nostril daily as needed for allergies 1 Bottle 3    gabapentin (NEURONTIN) 600 MG tablet take 1 tablet by mouth three times a day 270 tablet 0    hydrocortisone (ANUSOL-HC) 2 5 % rectal cream Apply topically 2 (two) times a day for 7 days 28 g 0    Insulin Pen Needle (Pen Needles) 32G X 4 MM MISC Use daily 90 each 2    lisinopril (ZESTRIL) 2 5 mg tablet take 1 tablet by mouth once daily 90 tablet 1    loratadine (CLARITIN) 10 mg tablet Take 1 tablet (10 mg total) by mouth daily 30 tablet 2    metFORMIN (GLUCOPHAGE) 1000 MG tablet take 1 tablet by mouth twice a day with food 180 tablet 0    mometasone-formoterol (Dulera) 100-5 MCG/ACT inhaler Inhale 2 puffs 2 (two) times a day Rinse mouth after use   13 g 3    pantoprazole (PROTONIX) 40 mg tablet take 1 tablet by mouth twice a day 180 tablet 0    sodium chloride (OCEAN) 0 65 % nasal spray 1 spray into each nostril as needed for congestion 30 mL 0    Tradjenta 5 MG TABS take 1 tablet by mouth once daily 90 tablet 0    traZODone (DESYREL) 150 mg tablet 150 mg daily at bedtime       Victoza injection inject 1 2 milligram subcutaneously daily 6 mL 1    vilazodone (VIIBRYD) 40 mg tablet Take 40 mg by mouth daily   VRAYLAR 6 MG capsule Take 6 mg by mouth daily  0     No current facility-administered medications for this visit  Allergies   Allergen Reactions    Amoxicillin-Pot Clavulanate GI Intolerance       Review of Systems    Per HPI   all other systems negative  There were no vitals filed for this visit  patient afebrile   no conversational dyspnea noted  No respiratory distress noted  I spent Fifteen minutes directly with the patient during this visit    Levi Kong acknowledges that she has consented to an online visit or consultation  She understands that the online visit is based solely on information provided by her, and that, in the absence of a face-to-face physical evaluation by the physician, the diagnosis she receives is both limited and provisional in terms of accuracy and completeness  This is not intended to replace a full medical face-to-face evaluation by the physician  Serena Cardona understands and accepts these terms

## 2021-05-05 ENCOUNTER — TELEPHONE (OUTPATIENT)
Dept: FAMILY MEDICINE CLINIC | Facility: HOME HEALTHCARE | Age: 61
End: 2021-05-05

## 2021-05-05 DIAGNOSIS — J44.1 CHRONIC OBSTRUCTIVE PULMONARY DISEASE WITH ACUTE EXACERBATION (HCC): Primary | ICD-10-CM

## 2021-05-05 DIAGNOSIS — J44.1 COPD EXACERBATION (HCC): ICD-10-CM

## 2021-05-05 RX ORDER — PREDNISONE 20 MG/1
40 TABLET ORAL DAILY
Qty: 10 TABLET | Refills: 0 | Status: SHIPPED | OUTPATIENT
Start: 2021-05-05 | End: 2021-05-10

## 2021-05-05 NOTE — TELEPHONE ENCOUNTER
Pt called office stating Normal needs new script for oxygen if need is now continuous  I called [facility] to confirm  They need new order for DME for oxygen stating the 3L continuous need  They also need "testing" (records)  I did explain that you will not be in the office until tomorrow  She verbalized understanding

## 2021-05-05 NOTE — TELEPHONE ENCOUNTER
Pt was to have prednisone sent to pharmacy yesterday per Anthony's note  However, this was not sent to pharmacy  Please send to pharmacy as Shi Pascual is not in the office today  Thank you!

## 2021-05-31 DIAGNOSIS — E11.9 DIABETES MELLITUS WITHOUT COMPLICATION (HCC): ICD-10-CM

## 2021-06-01 RX ORDER — LINAGLIPTIN 5 MG/1
TABLET, FILM COATED ORAL
Qty: 90 TABLET | Refills: 0 | Status: SHIPPED | OUTPATIENT
Start: 2021-06-01 | End: 2021-08-30

## 2021-06-03 ENCOUNTER — TELEPHONE (OUTPATIENT)
Dept: FAMILY MEDICINE CLINIC | Facility: CLINIC | Age: 61
End: 2021-06-03

## 2021-06-03 DIAGNOSIS — R91.1 PULMONARY NODULE SEEN ON IMAGING STUDY: ICD-10-CM

## 2021-06-03 DIAGNOSIS — J43.9 PULMONARY EMPHYSEMA, UNSPECIFIED EMPHYSEMA TYPE (HCC): ICD-10-CM

## 2021-06-03 DIAGNOSIS — J44.1 CHRONIC OBSTRUCTIVE PULMONARY DISEASE WITH ACUTE EXACERBATION (HCC): Primary | ICD-10-CM

## 2021-06-03 DIAGNOSIS — J44.9 CHRONIC OBSTRUCTIVE PULMONARY DISEASE, UNSPECIFIED COPD TYPE (HCC): ICD-10-CM

## 2021-06-03 DIAGNOSIS — R91.8 MULTIPLE PULMONARY NODULES DETERMINED BY COMPUTED TOMOGRAPHY OF LUNG: ICD-10-CM

## 2021-06-03 NOTE — TELEPHONE ENCOUNTER
I called the pt to find out when she is available to have an appt with pulmonary, she did not answer but I left a message on her machine to call when she is available

## 2021-06-03 NOTE — TELEPHONE ENCOUNTER
Please call pulmonary to make an appointment  Patient was supposed to follow up with Pulmonary back in September 2019 for COPD and lung nodules

## 2021-06-03 NOTE — TELEPHONE ENCOUNTER
Pt called and asked if you could send a prescription for portable oxygen to [facility] at [address]    His Direct line is [phone]

## 2021-06-07 NOTE — TELEPHONE ENCOUNTER
Pt called back  I helped pt schedule with pulmonology in [place] for 06/23/21 at 1:40pm  Pt was given phone number  Pt declined address, saying she knows where it is  Pt states she thought she was supposed to get a ct of her lungs to follow up on nodules  Is this something she is supposed to get now or is it just the referral to pulmonology?

## 2021-06-07 NOTE — TELEPHONE ENCOUNTER
I am sure pulmonary will reorder chest Ct/imaging for evaluation of pulmonary nodules  If for some reason they do not I will order the CT at next visit

## 2021-06-10 DIAGNOSIS — G89.29 OTHER CHRONIC PAIN: ICD-10-CM

## 2021-06-10 RX ORDER — GABAPENTIN 600 MG/1
TABLET ORAL
Qty: 270 TABLET | Refills: 0 | Status: SHIPPED | OUTPATIENT
Start: 2021-06-10 | End: 2021-09-10

## 2021-06-23 ENCOUNTER — CONSULT (OUTPATIENT)
Dept: PULMONOLOGY | Facility: CLINIC | Age: 61
End: 2021-06-23
Payer: COMMERCIAL

## 2021-06-23 VITALS
TEMPERATURE: 97.2 F | HEIGHT: 67 IN | DIASTOLIC BLOOD PRESSURE: 63 MMHG | WEIGHT: 237 LBS | BODY MASS INDEX: 37.2 KG/M2 | HEART RATE: 109 BPM | OXYGEN SATURATION: 94 % | SYSTOLIC BLOOD PRESSURE: 100 MMHG

## 2021-06-23 DIAGNOSIS — E66.01 CLASS 2 SEVERE OBESITY DUE TO EXCESS CALORIES WITH SERIOUS COMORBIDITY AND BODY MASS INDEX (BMI) OF 37.0 TO 37.9 IN ADULT (HCC): ICD-10-CM

## 2021-06-23 DIAGNOSIS — J44.9 CHRONIC OBSTRUCTIVE PULMONARY DISEASE, UNSPECIFIED COPD TYPE (HCC): ICD-10-CM

## 2021-06-23 DIAGNOSIS — R91.8 MULTIPLE PULMONARY NODULES DETERMINED BY COMPUTED TOMOGRAPHY OF LUNG: ICD-10-CM

## 2021-06-23 DIAGNOSIS — E13.40 OTHER SPECIFIED DIABETES MELLITUS WITH DIABETIC NEUROPATHY, UNSPECIFIED WHETHER LONG TERM INSULIN USE (HCC): ICD-10-CM

## 2021-06-23 DIAGNOSIS — J96.11 CHRONIC HYPOXEMIC RESPIRATORY FAILURE (HCC): Primary | ICD-10-CM

## 2021-06-23 DIAGNOSIS — F17.210 CIGARETTE NICOTINE DEPENDENCE WITHOUT COMPLICATION: ICD-10-CM

## 2021-06-23 PROBLEM — E11.9 TYPE 2 DIABETES MELLITUS (HCC): Status: RESOLVED | Noted: 2017-09-20 | Resolved: 2021-06-23

## 2021-06-23 PROBLEM — E66.812 CLASS 2 SEVERE OBESITY DUE TO EXCESS CALORIES WITH SERIOUS COMORBIDITY AND BODY MASS INDEX (BMI) OF 37.0 TO 37.9 IN ADULT (HCC): Status: ACTIVE | Noted: 2021-06-23

## 2021-06-23 PROCEDURE — 3008F BODY MASS INDEX DOCD: CPT | Performed by: INTERNAL MEDICINE

## 2021-06-23 PROCEDURE — 94010 BREATHING CAPACITY TEST: CPT | Performed by: INTERNAL MEDICINE

## 2021-06-23 PROCEDURE — 94618 PULMONARY STRESS TESTING: CPT | Performed by: INTERNAL MEDICINE

## 2021-06-23 PROCEDURE — 99245 OFF/OP CONSLTJ NEW/EST HI 55: CPT | Performed by: INTERNAL MEDICINE

## 2021-06-23 RX ORDER — TIOTROPIUM BROMIDE INHALATION SPRAY 3.12 UG/1
2 SPRAY, METERED RESPIRATORY (INHALATION) DAILY
Qty: 4 G | Refills: 3 | Status: SHIPPED | OUTPATIENT
Start: 2021-06-23 | End: 2021-09-22 | Stop reason: SDUPTHER

## 2021-06-23 NOTE — ASSESSMENT & PLAN NOTE
-  Needs massive weight loss for meaningful chance of survival   -  Suspect her obesity in addition to deconditioning is biggest contributor to her breathlessness

## 2021-06-23 NOTE — ASSESSMENT & PLAN NOTE
-  Office 6 minutes test today shows patient does not require oxygen with ambulation  Instructed her that she does not need to use  It with ambulation  She should continue using it nightly  -Check nocturnal pulse ox study to see if she still needs oxygen at night  - Does not qualify for Inogene since 6MWT shows she does not need oxygen

## 2021-06-23 NOTE — ASSESSMENT & PLAN NOTE
- no evidence of fixed obstruction on pulmonary function testing in the office today  She also did not have fixed obstruction on testing in 2017  Unclear she carries a diagnosis of COPD  Await CT scan to evaluate for emphysema     -discontinue Dulera  Start Spiriva daily   -  Instructed to use albuterol only as needed  -Increase regular activity  Consider pulmonary rehab in future     -she declines COVID vaccine despite my encouragement to receive it

## 2021-06-23 NOTE — PROGRESS NOTES
Pulmonary Consultation   Leda Pearson 64 y o  female MRN: [id number]  6/23/2021      Referring provider: Ruby Jones PA-C  Reason for consult: multiple pulmonary nodules    Assessment and Plan:  Diabetes mellitus with neurological manifestation [facility])    Lab Results   Component Value Date    HGBA1C 6 9 (A) 09/17/2020      monitor blood sugars carefully  Modified diet  Insulin per regimen  Chronic hypoxemic respiratory failure (HCC)  -  Office 6 minutes test today shows patient does not require oxygen with ambulation  Instructed her that she does not need to use  It with ambulation  She should continue using it nightly  -Check nocturnal pulse ox study to see if she still needs oxygen at night  - Does not qualify for Inogene since 6MWT shows she does not need oxygen  Chronic obstructive pulmonary disease (HCC)  - no evidence of fixed obstruction on pulmonary function testing in the office today  She also did not have fixed obstruction on testing in 2017  Unclear she carries a diagnosis of COPD  Await CT scan to evaluate for emphysema     -discontinue Dulera  Start Spiriva daily   -  Instructed to use albuterol only as needed  -Increase regular activity  Consider pulmonary rehab in future     -she declines COVID vaccine despite my encouragement to receive it  Cigarette nicotine dependence without complication  - encouraged cessation  She is not ready to quit  -qualifies for yearly screening CT scan  Order today  Class 2 severe obesity due to excess calories with serious comorbidity and body mass index (BMI) of 37 0 to 37 9 in adult [facility])  -  Needs massive weight loss for meaningful chance of survival   -  Suspect her obesity in addition to deconditioning is biggest contributor to her breathlessness        Diagnoses and all orders for this visit:    Chronic hypoxemic respiratory failure (HCC)  -     POCT 6 minute walk  -     Pulse oximetry overnight    Chronic obstructive pulmonary disease, unspecified COPD type ([facility] )  -     Ambulatory referral to Pulmonology  -     POCT spirometry  -     tiotropium (Spiriva Respimat) 2 5 MCG/ACT AERS inhaler; Inhale 2 puffs daily  -     Complete PFT without post bronchodilator; Future    Multiple pulmonary nodules determined by computed tomography of lung  -     Ambulatory referral to Pulmonology    Cigarette nicotine dependence without complication  -     CT lung screening program; Future    Other specified diabetes mellitus with diabetic neuropathy, unspecified whether long term insulin use (HCC)    Class 2 severe obesity due to excess calories with serious comorbidity and body mass index (BMI) of 37 0 to 37 9 in [facility])      Plan of care discussed in detail with patient  Concerns and questions addressed  Return for follow-up in 4-6 weeks  History of Present Illness   HPI:  Serena Cardona is a 64 y o  female who presents for evaluation of COPD and lung nodules  Has known COPD  Recent flare treated with steroids, feels better  States it was her first exacerbation  Has shortness of breath on exertion  Can ambulate about a block and then stops to catch her breath  She can go up flight of stairs but will be winded at the top  Been on oxygen for sleep for many years  She was recently told to wear oxygen at 3L continuously when she had her COPD flare  Wants Rx for Inogene  She uses her albuterol nebulizer as needed, which is not often  She uses her Albuterol inhaler as needed, which is once daily  She is on [place]  She uses it once daily as needed  Rinses mouth after use  Known ZOË, had two sleep studies in past  Can not tolerate CPAP  She is here today with her sister        Review of Systems    Positive as mentioned above and negative otherwise    Historical Information   Past Medical History:   Diagnosis Date    Chronic back pain     Colitis     COPD (chronic obstructive pulmonary disease) (HCC)     Depression  DVT of lower limb, acute (HCC)     GERD (gastroesophageal reflux disease)     Sleep apnea      Past Surgical History:   Procedure Laterality Date    BREAST LUMPECTOMY Right     IVC FILTER INSERTION       Family History   Problem Relation Age of Onset    Breast cancer Mother     COPD Mother     Coronary artery disease Mother     Coronary artery disease Father     Stroke Father      Grandfather - lung disease, heavy smoker    Occupational History: 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    Social History: smokes 2-3 ppd x 45 years; cut back to <1ppd few months ago  Pets: dog  Secondhand smoke exposure: yes    Meds/Allergies     Current Outpatient Medications:     albuterol (2 5 mg/3 mL) 0 083 % nebulizer solution, Take 1 vial (2 5 mg total) by nebulization every 4 (four) hours as needed for shortness of breath, Disp: 120 mL, Rfl: 3    albuterol (Ventolin HFA) 90 mcg/act inhaler, Inhale 2 puffs every 4 (four) hours as needed for wheezing, Disp: 18 g, Rfl: 4    amitriptyline (ELAVIL) 100 mg tablet, Take 0 5 tablets (50 mg total) by mouth daily at bedtime, Disp: 45 tablet, Rfl: 1    atorvastatin (LIPITOR) 40 mg tablet, Take 1 tablet (40 mg total) by mouth daily at bedtime, Disp: 90 tablet, Rfl: 0    benztropine (COGENTIN) 0 5 mg tablet, Take 0 5 mg by mouth daily at bedtime , Disp: , Rfl:     Blood Glucose Monitoring Suppl (ONE TOUCH ULTRA 2) w/Device KIT, by Does not apply route, Disp: , Rfl:     Blood Glucose Monitoring Suppl (ONE TOUCH ULTRA 2) w/Device KIT, by Does not apply route daily, Disp: 100 each, Rfl: 0    Calcium Carbonate-Vit D-Min (Calcium 1200) 0783-2506 MG-UNIT CHEW, Chew 1 each daily, Disp: 90 each, Rfl: 1    cyclobenzaprine (FLEXERIL) 10 mg tablet, take 1 tablet by mouth three times a day if needed for muscle spasm for up to 14 days, Disp: 42 tablet, Rfl: 0    fenofibrate (TRICOR) 48 mg tablet, take 1 tablet by mouth once daily, Disp: 30 tablet, Rfl: 2    fluticasone (FLONASE) 50 mcg/act nasal spray, 2 sprays into each nostril daily as needed for allergies, Disp: 1 Bottle, Rfl: 3    gabapentin (NEURONTIN) 600 MG tablet, take 1 tablet by mouth three times a day, Disp: 270 tablet, Rfl: 0    Insulin Pen Needle (Pen Needles) 32G X 4 MM MISC, Use daily, Disp: 90 each, Rfl: 2    lisinopril (ZESTRIL) 2 5 mg tablet, take 1 tablet by mouth once daily, Disp: 90 tablet, Rfl: 1    loratadine (CLARITIN) 10 mg tablet, Take 1 tablet (10 mg total) by mouth daily, Disp: 30 tablet, Rfl: 2    metFORMIN (GLUCOPHAGE) 1000 MG tablet, take 1 tablet by mouth twice a day with food, Disp: 180 tablet, Rfl: 0    mometasone-formoterol (Dulera) 100-5 MCG/ACT inhaler, Inhale 2 puffs 2 (two) times a day Rinse mouth after use , Disp: 13 g, Rfl: 3    pantoprazole (PROTONIX) 40 mg tablet, take 1 tablet by mouth twice a day, Disp: 180 tablet, Rfl: 0    sodium chloride (OCEAN) 0 65 % nasal spray, 1 spray into each nostril as needed for congestion, Disp: 30 mL, Rfl: 0    Tradjenta 5 MG TABS, take 1 tablet by mouth once daily, Disp: 90 tablet, Rfl: 0    traZODone (DESYREL) 150 mg tablet, 150 mg daily at bedtime , Disp: , Rfl:     Victoza injection, inject 1 2 milligram subcutaneously daily, Disp: 6 mL, Rfl: 1    vilazodone (VIIBRYD) 40 mg tablet, Take 40 mg by mouth daily  , Disp: , Rfl:     VRAYLAR 6 MG capsule, Take 6 mg by mouth daily, Disp: , Rfl: 0    hydrocortisone (ANUSOL-HC) 2 5 % rectal cream, Apply topically 2 (two) times a day for 7 days, Disp: 28 g, Rfl: 0  Allergies   Allergen Reactions    Amoxicillin-Pot Clavulanate GI Intolerance       Vitals: Blood pressure 100/63, pulse (!) 109, temperature (!) 97 2 °F (36 2 °C), temperature source Tympanic, height 5' 7" (1 702 m), weight 108 kg (237 lb), SpO2 94 %  , Body mass index is 37 12 kg/m²   Oxygen Therapy  SpO2: 94 %  Oxygen Therapy: None (Room air)    Physical Exam    GEN: WDWN, nad, comfortable  HEENT: NCAT, EOMI  CVS: Regular, no m/r/g  LUNGS: CTA b/l, no wheezing  ABD: soft  EXT: No c/c/e  NEURO: No focal deficits  MS: Moving all extremities  SKIN: warm, dry  PSYCH: calm, cooperative      Labs: I have personally reviewed pertinent lab results  Lab Results   Component Value Date    WBC 6 57 2020    HGB 13 4 2020    HCT 40 3 2020    MCV 95 2020     2020     Lab Results   Component Value Date    GLUCOSE 104 2016    CALCIUM 9 3 2020     2016    K 4 2 2020    CO2 28 2020     2020    BUN 16 2020    CREATININE 0 98 2020     Lab Results   Component Value Date    IGE 4 2016     Lab Results   Component Value Date    ALT 41 2020    AST 22 2020    ALKPHOS 65 2020    BILITOT 0 53 2015       Labs per my interpretation show   Normal renal function, mild hyponatremia in May 2020  Imaging and other studies: I have personally reviewed pertinent films in PACS   two-view chest x-ray 2017: Normal chest    Pulmonary function testin  Per my review no significant obstruction, possible air trapping, normal diffusion  [facility]

## 2021-06-23 NOTE — ASSESSMENT & PLAN NOTE
- encouraged cessation  She is not ready to quit  -qualifies for yearly screening CT scan  Order today

## 2021-06-23 NOTE — ASSESSMENT & PLAN NOTE
Lab Results   Component Value Date    HGBA1C 6 9 (A) 09/17/2020      monitor blood sugars carefully  Modified diet  Insulin per regimen

## 2021-06-24 ENCOUNTER — TELEPHONE (OUTPATIENT)
Dept: FAMILY MEDICINE CLINIC | Facility: HOME HEALTHCARE | Age: 61
End: 2021-06-24

## 2021-06-24 ENCOUNTER — TELEPHONE (OUTPATIENT)
Dept: PULMONOLOGY | Facility: CLINIC | Age: 61
End: 2021-06-24

## 2021-06-24 DIAGNOSIS — K64.4 EXTERNAL HEMORRHOIDS: Primary | ICD-10-CM

## 2021-06-24 NOTE — TELEPHONE ENCOUNTER
Pt has hemorrhoids and would like to know if you could prescribe anything to help with this  She has tried OTC A&D ointment and preparation H without relief  Please advise

## 2021-07-01 ENCOUNTER — HOSPITAL ENCOUNTER (OUTPATIENT)
Dept: PULMONOLOGY | Facility: HOSPITAL | Age: 61
Discharge: HOME | End: 2021-07-01
Attending: INTERNAL MEDICINE
Payer: COMMERCIAL

## 2021-07-01 ENCOUNTER — HOSPITAL ENCOUNTER (OUTPATIENT)
Dept: CT IMAGING | Facility: HOSPITAL | Age: 61
Discharge: HOME | End: 2021-07-01
Attending: INTERNAL MEDICINE
Payer: COMMERCIAL

## 2021-07-01 DIAGNOSIS — F17.210 CIGARETTE NICOTINE DEPENDENCE WITHOUT COMPLICATION: ICD-10-CM

## 2021-07-01 DIAGNOSIS — J44.9 CHRONIC OBSTRUCTIVE PULMONARY DISEASE, UNSPECIFIED COPD TYPE (HCC): ICD-10-CM

## 2021-07-01 PROCEDURE — 94729 DIFFUSING CAPACITY: CPT | Performed by: INTERNAL MEDICINE

## 2021-07-01 PROCEDURE — 94727 GAS DIL/WSHOT DETER LNG VOL: CPT

## 2021-07-01 PROCEDURE — 94010 BREATHING CAPACITY TEST: CPT | Performed by: INTERNAL MEDICINE

## 2021-07-01 PROCEDURE — 71271 CT THORAX LUNG CANCER SCR C-: CPT

## 2021-07-01 PROCEDURE — 94729 DIFFUSING CAPACITY: CPT

## 2021-07-01 PROCEDURE — 94760 N-INVAS EAR/PLS OXIMETRY 1: CPT

## 2021-07-01 PROCEDURE — 94010 BREATHING CAPACITY TEST: CPT

## 2021-07-01 PROCEDURE — 94727 GAS DIL/WSHOT DETER LNG VOL: CPT | Performed by: INTERNAL MEDICINE

## 2021-07-14 ENCOUNTER — TELEPHONE (OUTPATIENT)
Dept: PULMONOLOGY | Facility: CLINIC | Age: 61
End: 2021-07-14

## 2021-07-22 ENCOUNTER — TELEPHONE (OUTPATIENT)
Dept: PULMONOLOGY | Facility: CLINIC | Age: 61
End: 2021-07-22

## 2021-07-22 NOTE — TELEPHONE ENCOUNTER
Pt's PCP called and asked if you could take over oxygen recert since she is already a patient here  They will fax the form over   She has an appt tomorrow and I added it to the appt notes

## 2021-07-23 ENCOUNTER — TELEPHONE (OUTPATIENT)
Dept: PULMONOLOGY | Facility: CLINIC | Age: 61
End: 2021-07-23

## 2021-07-23 ENCOUNTER — OFFICE VISIT (OUTPATIENT)
Dept: PULMONOLOGY | Facility: CLINIC | Age: 61
End: 2021-07-23
Payer: COMMERCIAL

## 2021-07-23 VITALS
SYSTOLIC BLOOD PRESSURE: 115 MMHG | DIASTOLIC BLOOD PRESSURE: 70 MMHG | TEMPERATURE: 98.3 F | BODY MASS INDEX: 37.29 KG/M2 | HEIGHT: 67 IN | HEART RATE: 103 BPM | WEIGHT: 237.6 LBS | OXYGEN SATURATION: 92 %

## 2021-07-23 DIAGNOSIS — J43.9 PULMONARY EMPHYSEMA, UNSPECIFIED EMPHYSEMA TYPE (HCC): Primary | ICD-10-CM

## 2021-07-23 DIAGNOSIS — F17.210 CIGARETTE NICOTINE DEPENDENCE WITHOUT COMPLICATION: ICD-10-CM

## 2021-07-23 DIAGNOSIS — J96.11 CHRONIC HYPOXEMIC RESPIRATORY FAILURE (HCC): ICD-10-CM

## 2021-07-23 DIAGNOSIS — N63.0 BREAST NODULE: ICD-10-CM

## 2021-07-23 DIAGNOSIS — R06.02 SHORTNESS OF BREATH: ICD-10-CM

## 2021-07-23 PROCEDURE — 3008F BODY MASS INDEX DOCD: CPT | Performed by: PHYSICIAN ASSISTANT

## 2021-07-23 PROCEDURE — 99214 OFFICE O/P EST MOD 30 MIN: CPT | Performed by: PHYSICIAN ASSISTANT

## 2021-07-23 NOTE — ASSESSMENT & PLAN NOTE
Still smoking about a pack a day  She has no interest in quitting as of right now  I encouraged her that if she ever changed her mind of be happy to help her  Due for low-dose lung cancer screening CT July 2022

## 2021-07-23 NOTE — ASSESSMENT & PLAN NOTE
Reviewed low-dose lung cancer screening CT  No suspicious nodules but there was a right breast nodule that was highly concerning considering her strong family history of breast cancer  I encouraged her to reach out to her PCP to arrange a mammogram previously ordered but not scheduled  She states that she will but I also asked Piotr Olmos to reach out to their office as well just in case

## 2021-07-23 NOTE — ASSESSMENT & PLAN NOTE
No fixed obstruction on  Spirometry that I was able to view  For some reason I cannot see full pulmonary function test done 07/01/2021  None the less, she does have emphysema seen on her CT careful will continue treatment with Spiriva Respimat 2 puffs daily  Continue albuterol HFA/ nebs q 6 hours p r n  Efraín Pedroza

## 2021-07-23 NOTE — PROGRESS NOTES
Pulmonary Follow Up Note   Angelica Mendieta 64 y o  female MRN: [id number]  7/23/2021      Assessment:    Chronic obstructive pulmonary disease ([facility] [address] )  No fixed obstruction on  Spirometry that I was able to view  For some reason I cannot see full pulmonary function test done 07/01/2021  None the less, she does have emphysema seen on her CT careful will continue treatment with Spiriva Respimat 2 puffs daily  Continue albuterol HFA/ nebs q 6 hours p r n  Lynita Ped Chronic hypoxemic respiratory failure (HCC)   Resent overnight pulse ox to reassess if she needs O2 during sleeping hours  Breast nodule   Reviewed low-dose lung cancer screening CT  No suspicious nodules but there was a right breast nodule that was highly concerning considering her strong family history of breast cancer  I encouraged her to reach out to her PCP to arrange a mammogram previously ordered but not scheduled  She states that she will but I also asked Rickey Gallegos to reach out to their office as well just in case  Cigarette nicotine dependence without complication    Still smoking about a pack a day  She has no interest in quitting as of right now  I encouraged her that if she ever changed her mind of be happy to help her  Due for low-dose lung cancer screening CT July 2022  Shortness of breath  In addition to treatment above  Will also check an echocardiogram to rule out cardiac etiology  Plan:    Diagnoses and all orders for this visit:    Pulmonary emphysema, unspecified emphysema type ([facility] [address] )    Shortness of breath  -     Echo complete with contrast if indicated; Future    Chronic hypoxemic respiratory failure (HCC)  -     Pulse oximetry overnight    Cigarette nicotine dependence without complication    Breast nodule        Return in about 3 months (around 10/23/2021)  History of Present Illness   HPI:  Angelica Mendieta is a 64 y o  female who   Presents to the office today for 6 week follow-up    Patient was seen by Dr Tico Weaver in June for COPD, chronic hypoxic respiratory failure, nicotine dependence, ZOË, pulmonary nodules  At that visit she was switched from [facility] to [employer]  Today's visit is to see how she is doing and to go over lung cancer screening CT  Patient  States she had pulmonary function tests but I am unable to view results for some reason at this time  Patient reports/Spiriva seems to be helping better with her chronic dyspnea  She also has a chronic cough which she relates this to her smoking  Only 1st thing in the morning productive of clear sputum  No hemoptysis  Denies wheezing or chest tightness  She does tell me that she has substernal chest pain from time to time described as sharp and usually only last few seconds and goes away on its own  Denies dizziness or headaches  Denies diaphoresis  She denies nausea, vomiting, abdominal pain or diarrhea  She rarely uses her rescue inhaler or nebulizer  Patient continues to smoke and states that she has no desire to quit smoking at this time  She is averaging about a pack a day  Patient has known ZOË but cannot tolerate CPAP  She has been wearing oxygen at night  For many years  Review of Systems   All other systems reviewed and are negative        Historical Information   Past Medical History:   Diagnosis Date    Chronic back pain     Colitis     COPD (chronic obstructive pulmonary disease) (HCC)     Depression     DVT of lower limb, acute (HCC)     GERD (gastroesophageal reflux disease)     Sleep apnea      Past Surgical History:   Procedure Laterality Date    BREAST LUMPECTOMY Right     IVC FILTER INSERTION       Family History   Problem Relation Age of Onset    Breast cancer Mother     COPD Mother     Coronary artery disease Mother     Coronary artery disease Father     Stroke Father        Social History     Tobacco Use   Smoking Status Current Every Day Smoker    Packs/day: 1 50    Types: Cigarettes   Smokeless Tobacco Never Used         Meds/Allergies     Current Outpatient Medications:     albuterol (2 5 mg/3 mL) 0 083 % nebulizer solution, Take 1 vial (2 5 mg total) by nebulization every 4 (four) hours as needed for shortness of breath, Disp: 120 mL, Rfl: 3    albuterol (Ventolin HFA) 90 mcg/act inhaler, Inhale 2 puffs every 4 (four) hours as needed for wheezing, Disp: 18 g, Rfl: 4    amitriptyline (ELAVIL) 100 mg tablet, Take 0 5 tablets (50 mg total) by mouth daily at bedtime, Disp: 45 tablet, Rfl: 1    atorvastatin (LIPITOR) 40 mg tablet, Take 1 tablet (40 mg total) by mouth daily at bedtime, Disp: 90 tablet, Rfl: 0    benztropine (COGENTIN) 0 5 mg tablet, Take 0 5 mg by mouth daily at bedtime , Disp: , Rfl:     Blood Glucose Monitoring Suppl (ONE TOUCH ULTRA 2) w/Device KIT, by Does not apply route, Disp: , Rfl:     Blood Glucose Monitoring Suppl (ONE TOUCH ULTRA 2) w/Device KIT, by Does not apply route daily, Disp: 100 each, Rfl: 0    cyclobenzaprine (FLEXERIL) 10 mg tablet, take 1 tablet by mouth three times a day if needed for muscle spasm for up to 14 days, Disp: 42 tablet, Rfl: 0    fenofibrate (TRICOR) 48 mg tablet, take 1 tablet by mouth once daily, Disp: 30 tablet, Rfl: 2    gabapentin (NEURONTIN) 600 MG tablet, take 1 tablet by mouth three times a day, Disp: 270 tablet, Rfl: 0    Insulin Pen Needle (Pen Needles) 32G X 4 MM MISC, Use daily, Disp: 90 each, Rfl: 2    lisinopril (ZESTRIL) 2 5 mg tablet, take 1 tablet by mouth once daily, Disp: 90 tablet, Rfl: 1    loratadine (CLARITIN) 10 mg tablet, Take 1 tablet (10 mg total) by mouth daily, Disp: 30 tablet, Rfl: 2    metFORMIN (GLUCOPHAGE) 1000 MG tablet, take 1 tablet by mouth twice a day with food, Disp: 180 tablet, Rfl: 0    pantoprazole (PROTONIX) 40 mg tablet, take 1 tablet by mouth twice a day, Disp: 180 tablet, Rfl: 0    tiotropium (Spiriva Respimat) 2 5 MCG/ACT AERS inhaler, Inhale 2 puffs daily, Disp: 4 g, Rfl: 3    Tradjenta 5 MG TABS, take 1 tablet by mouth once daily, Disp: 90 tablet, Rfl: 0    traZODone (DESYREL) 150 mg tablet, 150 mg daily at bedtime , Disp: , Rfl:     Victoza injection, inject 1 2 milligram subcutaneously daily, Disp: 6 mL, Rfl: 1    vilazodone (VIIBRYD) 40 mg tablet, Take 40 mg by mouth daily  , Disp: , Rfl:     VRAYLAR 6 MG capsule, Take 6 mg by mouth daily, Disp: , Rfl: 0    benzocaine (AMERICAINE) 20 % rectal ointment, Insert into the rectum every 3 (three) hours as needed for itching or hemorrhoids (Patient not taking: Reported on 7/23/2021), Disp: 28 4 g, Rfl: 0    Calcium Carbonate-Vit D-Min (Calcium 1200) 3060-1647 MG-UNIT CHEW, Chew 1 each daily (Patient not taking: Reported on 7/23/2021), Disp: 90 each, Rfl: 1    fluticasone (FLONASE) 50 mcg/act nasal spray, 2 sprays into each nostril daily as needed for allergies (Patient not taking: Reported on 7/23/2021), Disp: 1 Bottle, Rfl: 3    hydrocortisone (ANUSOL-HC) 2 5 % rectal cream, Apply topically 2 (two) times a day for 7 days, Disp: 28 g, Rfl: 0    sodium chloride (OCEAN) 0 65 % nasal spray, 1 spray into each nostril as needed for congestion, Disp: 30 mL, Rfl: 0  Allergies   Allergen Reactions    Amoxicillin-Pot Clavulanate GI Intolerance       Vitals: Blood pressure 115/70, pulse 103, temperature 98 3 °F (36 8 °C), temperature source Tympanic, height 5' 7" (1 702 m), weight 108 kg (237 lb 9 6 oz), SpO2 92 %  Body mass index is 37 21 kg/m²  Oxygen Therapy  SpO2: 92 %  Oxygen Therapy: None (Room air)    Physical Exam  Physical Exam  Vitals reviewed  Constitutional:       Appearance: Normal appearance  She is well-developed  She is obese  HENT:      Head: Normocephalic and atraumatic  Right Ear: External ear normal       Left Ear: External ear normal    Eyes:      Extraocular Movements: Extraocular movements intact  Pupils: Pupils are equal, round, and reactive to light     Cardiovascular:      Rate and Rhythm: Normal rate and regular rhythm  Pulses: Normal pulses  Heart sounds: Normal heart sounds  No murmur heard  Pulmonary:      Effort: Pulmonary effort is normal  No respiratory distress  Breath sounds: Normal breath sounds  No stridor  No wheezing, rhonchi or rales  Abdominal:      Palpations: Abdomen is soft  Tenderness: There is no abdominal tenderness  Hernia: No hernia is present  Musculoskeletal:         General: No swelling, tenderness or deformity  Normal range of motion  Cervical back: Normal range of motion and neck supple  Skin:     General: Skin is warm and dry  Capillary Refill: Capillary refill takes less than 2 seconds  Neurological:      General: No focal deficit present  Mental Status: She is alert and oriented to person, place, and time  Mental status is at baseline  Psychiatric:         Behavior: Behavior normal          Thought Content: Thought content normal          Judgment: Judgment normal          Labs: I have personally reviewed pertinent lab results  , ABG: No results found for: PHART, [id number], PO2ART, PYG8ZKO, I4OWSTQW, BEART, SOURCE, BNP: No results found for: BNP, CBC: No results found for: WBC, HGB, HCT, MCV, PLT, ADJUSTEDWBC, MCH, MCHC, RDW, MPV, NRBC, CMP: No results found for: SODIUM, K, CL, CO2, ANIONGAP, BUN, CREATININE, GLUCOSE, CALCIUM, AST, ALT, ALKPHOS, PROT, BILITOT, EGFR, PT/INR: No results found for: PT, INR, Troponin: No results found for: TROPONINI  Lab Results   Component Value Date    WBC 6 57 04/27/2020    HGB 13 4 04/27/2020    HCT 40 3 04/27/2020    MCV 95 04/27/2020     04/27/2020     Lab Results   Component Value Date    GLUCOSE 104 01/08/2016    CALCIUM 9 3 05/28/2020     01/08/2016    K 4 2 05/28/2020    CO2 28 05/28/2020     05/28/2020    BUN 16 05/28/2020    CREATININE 0 98 05/28/2020     Lab Results   Component Value Date    IGE 4 01/08/2016     Lab Results   Component Value Date    ALT 41 05/28/2020    AST 22 05/28/2020    ALKPHOS 65 05/28/2020    BILITOT 0 53 11/30/2015       Imaging and other studies: I have personally reviewed pertinent reports  and I have personally reviewed pertinent films in PACS       CT lung cancer screening 07/01/2021   Mild background emphysema  Stable 2 mm nodule in right apex  No new or suspicious pulmonary nodules  Right upper lobe breast nodule measuring 1 6 x 1 6 cm with adjacent smaller 7 mm nodule  Pulmonary function testing:  Performed   Spirometry 06/23/2021  FEV1/FVC ratio  78%   FEV1  64% predicted  FVC  65% predicted   impression: No obstruction, possible restriction

## 2021-08-02 DIAGNOSIS — K21.9 GERD WITHOUT ESOPHAGITIS: ICD-10-CM

## 2021-08-02 DIAGNOSIS — E11.9 TYPE 2 DIABETES MELLITUS WITHOUT COMPLICATION, WITHOUT LONG-TERM CURRENT USE OF INSULIN (HCC): ICD-10-CM

## 2021-08-02 DIAGNOSIS — Z12.31 ENCOUNTER FOR SCREENING MAMMOGRAM FOR MALIGNANT NEOPLASM OF BREAST: Primary | ICD-10-CM

## 2021-08-02 RX ORDER — PANTOPRAZOLE SODIUM 40 MG/1
TABLET, DELAYED RELEASE ORAL
Qty: 180 TABLET | Refills: 0 | Status: SHIPPED | OUTPATIENT
Start: 2021-08-02 | End: 2021-10-26

## 2021-08-03 ENCOUNTER — HOSPITAL ENCOUNTER (OUTPATIENT)
Dept: NON INVASIVE DIAGNOSTICS | Facility: HOSPITAL | Age: 61
Discharge: HOME | End: 2021-08-03
Payer: COMMERCIAL

## 2021-08-03 DIAGNOSIS — R06.02 SHORTNESS OF BREATH: ICD-10-CM

## 2021-08-03 PROCEDURE — 93306 TTE W/DOPPLER COMPLETE: CPT

## 2021-08-03 PROCEDURE — 93306 TTE W/DOPPLER COMPLETE: CPT | Performed by: INTERNAL MEDICINE

## 2021-08-03 RX ADMIN — PERFLUTREN 6 ML/MIN: 6.52 INJECTION, SUSPENSION INTRAVENOUS at 15:49

## 2021-08-10 ENCOUNTER — VBI (OUTPATIENT)
Dept: ADMINISTRATIVE | Facility: OTHER | Age: 61
End: 2021-08-10

## 2021-08-23 ENCOUNTER — TELEPHONE (OUTPATIENT)
Dept: PULMONOLOGY | Facility: CLINIC | Age: 61
End: 2021-08-23

## 2021-08-23 NOTE — TELEPHONE ENCOUNTER
Pt called, states someone called her about her pulmonary test results, not sure if you call er or not?

## 2021-08-24 NOTE — TELEPHONE ENCOUNTER
Reviewed results of pulmonary function tests and echocardiogram with patient today in detail  Answered all questions  She agrees to as planned

## 2021-08-27 NOTE — TELEPHONE ENCOUNTER
Pt would like another call back  She has additional questions from the conversation she had on 8/24  Ex: Is there a treatment, med or does she just have to live with it

## 2021-08-27 NOTE — TELEPHONE ENCOUNTER
Called patient again because she had questions specifically about her echo  She wanted to know if there was anything else she should be doing  I explained that she has mild diastolic dysfunction but no overt signs of CHF or significant pulmonary hypertension  I recommended that she maintain a heart healthy diet, try to exercise, and make sure she keeps her BP under control  I also reviewed signs and symptoms to look for in cases of CHF exacerbation as in worsening SOB, orthopnea, ALESSANDRO, abdominal bloating, and weight gain  She states that she does have belly bloating now that is worse after she eats but denies other symptoms  It gest so bad sometimes that it causes pain under her breasts/epigastric area  I recommended that she contact her PCP regarding this  Also instructed her to go to urgent care/ED should she have any worsening symptoms over the weekend  She verbalized understanding and agrees to the plan       Milad Mitchell PA-C

## 2021-08-30 DIAGNOSIS — E11.9 DIABETES MELLITUS WITHOUT COMPLICATION (HCC): ICD-10-CM

## 2021-08-30 RX ORDER — LINAGLIPTIN 5 MG/1
TABLET, FILM COATED ORAL
Qty: 90 TABLET | Refills: 0 | Status: SHIPPED | OUTPATIENT
Start: 2021-08-30 | End: 2021-12-03

## 2021-09-01 ENCOUNTER — TELEPHONE (OUTPATIENT)
Dept: FAMILY MEDICINE CLINIC | Facility: CLINIC | Age: 61
End: 2021-09-01

## 2021-09-01 NOTE — TELEPHONE ENCOUNTER
Patient left a message asking for a return call with more information regarding her heart condition

## 2021-09-02 NOTE — TELEPHONE ENCOUNTER
Nadeen Briceno can you call patient schedule an appointment so we can address any of her questions    Thank you

## 2021-09-09 DIAGNOSIS — G89.29 OTHER CHRONIC PAIN: ICD-10-CM

## 2021-09-09 DIAGNOSIS — R94.4 DECREASED GFR: ICD-10-CM

## 2021-09-10 ENCOUNTER — OFFICE VISIT (OUTPATIENT)
Dept: FAMILY MEDICINE CLINIC | Facility: HOME HEALTHCARE | Age: 61
End: 2021-09-10
Payer: COMMERCIAL

## 2021-09-10 VITALS
HEIGHT: 67 IN | RESPIRATION RATE: 18 BRPM | HEART RATE: 108 BPM | DIASTOLIC BLOOD PRESSURE: 60 MMHG | TEMPERATURE: 97.5 F | BODY MASS INDEX: 37.23 KG/M2 | SYSTOLIC BLOOD PRESSURE: 120 MMHG | OXYGEN SATURATION: 92 % | WEIGHT: 237.2 LBS

## 2021-09-10 DIAGNOSIS — G47.33 OBSTRUCTIVE SLEEP APNEA SYNDROME: ICD-10-CM

## 2021-09-10 DIAGNOSIS — F31.9 BIPOLAR 1 DISORDER (HCC): ICD-10-CM

## 2021-09-10 DIAGNOSIS — E11.65 TYPE 2 DIABETES MELLITUS WITH HYPERGLYCEMIA, WITHOUT LONG-TERM CURRENT USE OF INSULIN (HCC): ICD-10-CM

## 2021-09-10 DIAGNOSIS — J43.9 PULMONARY EMPHYSEMA, UNSPECIFIED EMPHYSEMA TYPE (HCC): ICD-10-CM

## 2021-09-10 DIAGNOSIS — I27.20 PULMONARY HYPERTENSION (HCC): ICD-10-CM

## 2021-09-10 DIAGNOSIS — N63.0 BREAST NODULE: ICD-10-CM

## 2021-09-10 DIAGNOSIS — F17.210 CIGARETTE NICOTINE DEPENDENCE WITHOUT COMPLICATION: ICD-10-CM

## 2021-09-10 DIAGNOSIS — E13.40 OTHER SPECIFIED DIABETES MELLITUS WITH DIABETIC NEUROPATHY, UNSPECIFIED WHETHER LONG TERM INSULIN USE (HCC): ICD-10-CM

## 2021-09-10 DIAGNOSIS — Z82.49 FAMILY HISTORY OF HEART DISEASE: ICD-10-CM

## 2021-09-10 DIAGNOSIS — F25.9 SCHIZOAFFECTIVE DISORDER, UNSPECIFIED TYPE (HCC): ICD-10-CM

## 2021-09-10 DIAGNOSIS — Z53.20 COLONOSCOPY REFUSED: ICD-10-CM

## 2021-09-10 DIAGNOSIS — J96.11 CHRONIC HYPOXEMIC RESPIRATORY FAILURE (HCC): ICD-10-CM

## 2021-09-10 DIAGNOSIS — E66.01 CLASS 2 SEVERE OBESITY DUE TO EXCESS CALORIES WITH SERIOUS COMORBIDITY AND BODY MASS INDEX (BMI) OF 37.0 TO 37.9 IN ADULT (HCC): ICD-10-CM

## 2021-09-10 DIAGNOSIS — Z00.00 ANNUAL PHYSICAL EXAM: Primary | ICD-10-CM

## 2021-09-10 LAB — SL AMB POCT HEMOGLOBIN AIC: 9.3 (ref ?–6.5)

## 2021-09-10 PROCEDURE — 4010F ACE/ARB THERAPY RXD/TAKEN: CPT | Performed by: INTERNAL MEDICINE

## 2021-09-10 PROCEDURE — T1015 CLINIC SERVICE: HCPCS | Performed by: FAMILY MEDICINE

## 2021-09-10 PROCEDURE — 83036 HEMOGLOBIN GLYCOSYLATED A1C: CPT | Performed by: FAMILY MEDICINE

## 2021-09-10 RX ORDER — LISINOPRIL 2.5 MG/1
TABLET ORAL
Qty: 90 TABLET | Refills: 1 | Status: SHIPPED | OUTPATIENT
Start: 2021-09-10 | End: 2022-03-07

## 2021-09-10 RX ORDER — CLONAZEPAM 0.5 MG/1
0.5 TABLET ORAL DAILY PRN
COMMUNITY
Start: 2021-08-18

## 2021-09-10 RX ORDER — GABAPENTIN 600 MG/1
TABLET ORAL
Qty: 270 TABLET | Refills: 0 | Status: SHIPPED | OUTPATIENT
Start: 2021-09-10 | End: 2021-12-09

## 2021-09-10 NOTE — PROGRESS NOTES
Patient's shoes and socks removed  Right Foot/Ankle   Right Foot Inspection  Skin Exam: skin normal, skin intact, callus and callus no dry skin, no warmth, no erythema, no maceration, no abnormal color, no pre-ulcer and no ulcer                            Sensory       Monofilament testing: diminished      Left Foot/Ankle  Left Foot Inspection  Skin Exam: skin normal, skin intact and callusno dry skin, no warmth, no erythema, no maceration, normal color, no pre-ulcer and no ulcer                                         Sensory       Monofilament: diminished    Assign Risk Category:  No deformity present; Loss of protective sensation;  No weak pulses       Risk: 1

## 2021-09-10 NOTE — PATIENT INSTRUCTIONS

## 2021-09-10 NOTE — PROGRESS NOTES
[facility]    NAME: Arabella Link  AGE: 64 y o  SEX: female  : 1960     DATE: 9/10/2021     Assessment and Plan:     Problem List Items Addressed This Visit        Endocrine    Diabetes mellitus with neurological manifestation ([facility] )    Relevant Orders    POCT hemoglobin A1c (Completed)       Respiratory    Chronic hypoxemic respiratory failure (HCC)    Sleep apnea       Other    Cigarette nicotine dependence without complication    Class 2 severe obesity due to excess calories with serious comorbidity and body mass index (BMI) of 37 0 to 37 9 in adult [facility])    Breast nodule      Other Visit Diagnoses     Annual physical exam    -  Primary    Type 2 diabetes mellitus with hyperglycemia, without long-term current use of insulin (HCC)        Relevant Orders    Microalbumin / creatinine urine ratio    Ambulatory referral to Cardiology    Pulmonary hypertension [facility])        Relevant Orders    Ambulatory referral to Cardiology    Family history of heart disease        Relevant Orders    Ambulatory referral to Cardiology    Colonoscopy refused        Pulmonary emphysema, unspecified emphysema type ([facility] )        Bipolar 1 disorder ([place] [address] )        Schizoaffective disorder, unspecified type ([place] [address] )              Echocardiogram reviewed with patient  Mild pulmonary hypertension noted with PA P of 30 mmHg  Estimated EF 60  Right breast nodule noted on CT chest patient has mammogram pending which was ordered last visit however patient states she promises she will have a completed  Patient has not been taking her diabetic medications as prescribed  Patient states only occasionally takes her Victoza  Patient following with psychiatry  Patient with known sleep apnea refused to wear her CPAP    Had discussion that her ZOË will complicate her under lying pulmonary hypertension  And increase chances of stroke and worsening cardiac disease  Immunizations and preventive care screenings were discussed with patient today  Appropriate education was printed on patient's after visit summary  Counseling:  Alcohol/drug use: discussed moderation in alcohol intake, the recommendations for healthy alcohol use, and avoidance of illicit drug use  Dental Health: discussed importance of regular tooth brushing, flossing, and dental visits  · Exercise: the importance of regular exercise/physical activity was discussed  Recommend exercise 3-5 times per week for at least 30 minutes  BMI Counseling: Body mass index is 37 15 kg/m²  The BMI is above normal  Nutrition recommendations include decreasing portion sizes, encouraging healthy choices of fruits and vegetables, decreasing fast food intake, consuming healthier snacks, limiting drinks that contain sugar, moderation in carbohydrate intake, increasing intake of lean protein, reducing intake of saturated and trans fat and reducing intake of cholesterol  Exercise recommendations include exercising 3-5 times per week  Patient refused nutritionist      Tobacco Cessation Counseling: The patient is sincerely urged to quit consumption of tobacco  She is not ready to quit tobacco        Return in 3 months (on 12/10/2021)  Chief Complaint:     Chief Complaint   Patient presents with    Follow-up     had an echocardiogram in August and wants to follow up      History of Present Illness:     Adult Annual Physical   Patient here for a comprehensive physical exam  The patient reports No new complaints today       Diet and Physical Activity  · Diet/Nutrition: poor diet and frequent junk food  · Exercise: walking        Depression Screening  PHQ-9 Depression Screening    PHQ-9:   Frequency of the following problems over the past two weeks:      Little interest or pleasure in doing things: 0 - not at all  Feeling down, depressed, or hopeless: 1 - several days  Trouble falling or staying asleep, or sleeping too much: 3 - nearly every day  Feeling tired or having little energy: 3 - nearly every day  Poor appetite or overeating: 3 - nearly every day  Feeling bad about yourself - or that you are a failure or have let yourself or your family down: 0 - not at all  Trouble concentrating on things, such as reading the newspaper or watching television: 0 - not at all  Moving or speaking so slowly that other people could have noticed  Or the opposite - being so fidgety or restless that you have been moving around a lot more than usual: 0 - not at all  Thoughts that you would be better off dead, or of hurting yourself in some way: 0 - not at all  PHQ-2 Score: 1  PHQ-9 Score: 10       General Health  · Sleep: gets more than 8 hours of sleep on average  · Hearing: normal - bilateral   · Vision: goes for regular eye exams  · Dental: Patient has dentures  /GYN Health  · Patient is: postmenopaus     Review of Systems:     Review of Systems   Constitutional: Negative for chills and fever  HENT: Negative  Respiratory: Negative for cough  Shortness of breath:  occasional dyspnea with exertion  Cardiovascular: Negative for chest pain  Gastrointestinal: Negative for abdominal pain  Genitourinary: Negative  Neurological: Negative for seizures  Headaches:  occasional headaches  Hematological: Negative for adenopathy  Does not bruise/bleed easily  Psychiatric/Behavioral: Negative for confusion, hallucinations, self-injury and suicidal ideas  All other systems reviewed and are negative       Past Medical History:     Past Medical History:   Diagnosis Date    Chronic back pain     Colitis     COPD (chronic obstructive pulmonary disease) (HCC)     Depression     DVT of lower limb, acute (HCC)     GERD (gastroesophageal reflux disease)     Sleep apnea       Past Surgical History:     Past Surgical History:   Procedure Laterality Date    BREAST LUMPECTOMY Right     IVC FILTER INSERTION        Social History:     Social History     Socioeconomic History    Marital status: Legally      Spouse name: None    Number of children: None    Years of education: None    Highest education level: None   Occupational History    None   Tobacco Use    Smoking status: Current Every Day Smoker     Packs/day: 1 50     Types: Cigarettes    Smokeless tobacco: Never Used   Vaping Use    Vaping Use: Never used   Substance and Sexual Activity    Alcohol use: No    Drug use: No    Sexual activity: None   Other Topics Concern    None   Social History Narrative    None     Social Determinants of Health     Financial Resource Strain:     Difficulty of Paying Living Expenses:    Food Insecurity:     Worried About Running Out of Food in the Last Year:     Ran Out of Food in the Last Year:    Transportation Needs:     Lack of Transportation (Medical):      Lack of Transportation (Non-Medical):    Physical Activity:     Days of Exercise per Week:     Minutes of Exercise per Session:    Stress:     Feeling of Stress :    Social Connections:     Frequency of Communication with Friends and Family:     Frequency of Social Gatherings with Friends and Family:     Attends Congregational Services:     Active Member of Clubs or Organizations:     Attends Club or Organization Meetings:     Marital Status:    Intimate Partner Violence:     Fear of Current or Ex-Partner:     Emotionally Abused:     Physically Abused:     Sexually Abused:       Family History:     Family History   Problem Relation Age of Onset    Breast cancer Mother     COPD Mother     Coronary artery disease Mother     Coronary artery disease Father     Stroke Father       Current Medications:     Current Outpatient Medications   Medication Sig Dispense Refill    albuterol (2 5 mg/3 mL) 0 083 % nebulizer solution Take 1 vial (2 5 mg total) by nebulization every 4 (four) hours as needed for shortness of breath 120 mL 3    albuterol (Ventolin HFA) 90 mcg/act inhaler Inhale 2 puffs every 4 (four) hours as needed for wheezing 18 g 4    amitriptyline (ELAVIL) 100 mg tablet Take 0 5 tablets (50 mg total) by mouth daily at bedtime 45 tablet 1    atorvastatin (LIPITOR) 40 mg tablet Take 1 tablet (40 mg total) by mouth daily at bedtime 90 tablet 0    benztropine (COGENTIN) 0 5 mg tablet Take 0 5 mg by mouth daily at bedtime       Blood Glucose Monitoring Suppl (ONE TOUCH ULTRA 2) w/Device KIT by Does not apply route      Blood Glucose Monitoring Suppl (ONE TOUCH ULTRA 2) w/Device KIT by Does not apply route daily 100 each 0    clonazePAM (KlonoPIN) 0 5 mg tablet Take 0 5 mg by mouth daily as needed      cyclobenzaprine (FLEXERIL) 10 mg tablet take 1 tablet by mouth three times a day if needed for muscle spasm for up to 14 days 42 tablet 0    fenofibrate (TRICOR) 48 mg tablet take 1 tablet by mouth once daily 30 tablet 2    gabapentin (NEURONTIN) 600 MG tablet take 1 tablet by mouth three times a day 270 tablet 0    Insulin Pen Needle (Pen Needles) 32G X 4 MM MISC Use daily 90 each 2    lisinopril (ZESTRIL) 2 5 mg tablet take 1 tablet by mouth once daily 90 tablet 1    loratadine (CLARITIN) 10 mg tablet Take 1 tablet (10 mg total) by mouth daily 30 tablet 2    metFORMIN (GLUCOPHAGE) 1000 MG tablet take 1 tablet by mouth twice a day with food 180 tablet 0    pantoprazole (PROTONIX) 40 mg tablet take 1 tablet by mouth twice a day 180 tablet 0    tiotropium (Spiriva Respimat) 2 5 MCG/ACT AERS inhaler Inhale 2 puffs daily 4 g 3    Tradjenta 5 MG TABS take 1 tablet by mouth once daily 90 tablet 0    traZODone (DESYREL) 150 mg tablet 150 mg daily at bedtime       Victoza injection inject 1 2 milligram subcutaneously daily 6 mL 1    vilazodone (VIIBRYD) 40 mg tablet Take 40 mg by mouth daily        VRAYLAR 6 MG capsule Take 6 mg by mouth daily  0    benzocaine (AMERICAINE) 20 % rectal ointment Insert into the rectum every 3 (three) hours as needed for itching or hemorrhoids (Patient not taking: Reported on 7/23/2021) 28 4 g 0    Calcium Carbonate-Vit D-Min (Calcium 1200) 6648-0366 MG-UNIT CHEW Chew 1 each daily (Patient not taking: Reported on 7/23/2021) 90 each 1    fluticasone (FLONASE) 50 mcg/act nasal spray 2 sprays into each nostril daily as needed for allergies (Patient not taking: Reported on 7/23/2021) 1 Bottle 3    hydrocortisone (ANUSOL-HC) 2 5 % rectal cream Apply topically 2 (two) times a day for 7 days (Patient not taking: Reported on 9/10/2021) 28 g 0    sodium chloride (OCEAN) 0 65 % nasal spray 1 spray into each nostril as needed for congestion (Patient not taking: Reported on 9/10/2021) 30 mL 0     No current facility-administered medications for this visit  Allergies: Allergies   Allergen Reactions    Amoxicillin-Pot Clavulanate GI Intolerance      Physical Exam:     /60   Pulse (!) 108   Temp 97 5 °F (36 4 °C) (Temporal)   Resp 18   Ht 5' 7" (1 702 m)   Wt 108 kg (237 lb 3 2 oz)   SpO2 92%   BMI 37 15 kg/m²     Physical Exam  Vitals reviewed  Constitutional:       General: She is not in acute distress  Appearance: She is obese  HENT:      Head: Normocephalic  Nose: Nose normal       Mouth/Throat:      Mouth: Mucous membranes are moist       Pharynx: Oropharynx is clear  Eyes:      General: No scleral icterus  Conjunctiva/sclera: Conjunctivae normal    Cardiovascular:      Rate and Rhythm: Regular rhythm  Tachycardia present  Heart sounds: No murmur heard  Pulmonary:      Effort: Pulmonary effort is normal       Breath sounds: Normal breath sounds  Abdominal:      General: Bowel sounds are normal       Tenderness: There is no abdominal tenderness  Musculoskeletal:      Cervical back: Neck supple  Right lower leg: No edema  Left lower leg: No edema  Lymphadenopathy:      Cervical: No cervical adenopathy     Neurological:      Mental Status: She is alert and oriented to person, place, and time  Psychiatric:         Mood and Affect: Mood normal           Suleiman Choi PA-C  [facility]  BMI Counseling: Body mass index is 37 15 kg/m²  The BMI is above normal  Nutrition recommendations include reducing portion sizes, decreasing overall calorie intake, 3-5 servings of fruits/vegetables daily, reducing fast food intake, consuming healthier snacks, decreasing soda and/or juice intake, moderation in carbohydrate intake, increasing intake of lean protein, reducing intake of saturated fat and trans fat and reducing intake of cholesterol  Exercise recommendations include exercising 3-5 times per week

## 2021-09-13 ENCOUNTER — CLINICAL SUPPORT (OUTPATIENT)
Dept: FAMILY MEDICINE CLINIC | Facility: HOME HEALTHCARE | Age: 61
End: 2021-09-13
Payer: COMMERCIAL

## 2021-09-13 LAB
CREAT UR-MCNC: 186 MG/DL
MICROALBUMIN UR-MCNC: 42.3 MG/L (ref 0–20)
MICROALBUMIN/CREAT 24H UR: 23 MG/G CREATININE (ref 0–30)

## 2021-09-13 PROCEDURE — 82043 UR ALBUMIN QUANTITATIVE: CPT | Performed by: PHYSICIAN ASSISTANT

## 2021-09-13 PROCEDURE — 83036 HEMOGLOBIN GLYCOSYLATED A1C: CPT | Performed by: NURSE PRACTITIONER

## 2021-09-13 PROCEDURE — 80061 LIPID PANEL: CPT | Performed by: NURSE PRACTITIONER

## 2021-09-13 PROCEDURE — 84443 ASSAY THYROID STIM HORMONE: CPT | Performed by: NURSE PRACTITIONER

## 2021-09-13 PROCEDURE — 3060F POS MICROALBUMINURIA REV: CPT | Performed by: INTERNAL MEDICINE

## 2021-09-13 PROCEDURE — 82570 ASSAY OF URINE CREATININE: CPT | Performed by: PHYSICIAN ASSISTANT

## 2021-09-13 PROCEDURE — 80053 COMPREHEN METABOLIC PANEL: CPT | Performed by: NURSE PRACTITIONER

## 2021-09-20 ENCOUNTER — TELEPHONE (OUTPATIENT)
Dept: ADMINISTRATIVE | Facility: OTHER | Age: 61
End: 2021-09-20

## 2021-09-20 ENCOUNTER — HOSPITAL ENCOUNTER (OUTPATIENT)
Dept: MAMMOGRAPHY | Facility: HOSPITAL | Age: 61
Discharge: HOME | End: 2021-09-20
Payer: COMMERCIAL

## 2021-09-20 VITALS — WEIGHT: 237 LBS | BODY MASS INDEX: 37.2 KG/M2 | HEIGHT: 67 IN

## 2021-09-20 DIAGNOSIS — Z12.31 ENCOUNTER FOR SCREENING MAMMOGRAM FOR MALIGNANT NEOPLASM OF BREAST: ICD-10-CM

## 2021-09-20 PROCEDURE — 77067 SCR MAMMO BI INCL CAD: CPT

## 2021-09-20 PROCEDURE — 77063 BREAST TOMOSYNTHESIS BI: CPT

## 2021-09-22 ENCOUNTER — OFFICE VISIT (OUTPATIENT)
Dept: PULMONOLOGY | Facility: CLINIC | Age: 61
End: 2021-09-22
Payer: COMMERCIAL

## 2021-09-22 VITALS
SYSTOLIC BLOOD PRESSURE: 102 MMHG | OXYGEN SATURATION: 91 % | TEMPERATURE: 98.5 F | BODY MASS INDEX: 36.88 KG/M2 | WEIGHT: 235 LBS | DIASTOLIC BLOOD PRESSURE: 62 MMHG | HEIGHT: 67 IN | HEART RATE: 105 BPM

## 2021-09-22 DIAGNOSIS — E66.01 CLASS 2 SEVERE OBESITY DUE TO EXCESS CALORIES WITH SERIOUS COMORBIDITY AND BODY MASS INDEX (BMI) OF 37.0 TO 37.9 IN ADULT (HCC): ICD-10-CM

## 2021-09-22 DIAGNOSIS — J43.2 CENTRILOBULAR EMPHYSEMA (HCC): ICD-10-CM

## 2021-09-22 DIAGNOSIS — G47.33 OBSTRUCTIVE SLEEP APNEA SYNDROME: ICD-10-CM

## 2021-09-22 DIAGNOSIS — N63.0 BREAST NODULE: ICD-10-CM

## 2021-09-22 DIAGNOSIS — J44.9 CHRONIC OBSTRUCTIVE PULMONARY DISEASE, UNSPECIFIED COPD TYPE (HCC): ICD-10-CM

## 2021-09-22 DIAGNOSIS — F17.210 CIGARETTE NICOTINE DEPENDENCE WITHOUT COMPLICATION: ICD-10-CM

## 2021-09-22 DIAGNOSIS — J96.11 CHRONIC HYPOXEMIC RESPIRATORY FAILURE (HCC): Primary | ICD-10-CM

## 2021-09-22 PROBLEM — G47.34 NOCTURNAL HYPOXIA: Status: ACTIVE | Noted: 2021-09-22

## 2021-09-22 PROCEDURE — 99214 OFFICE O/P EST MOD 30 MIN: CPT | Performed by: INTERNAL MEDICINE

## 2021-09-22 PROCEDURE — 3008F BODY MASS INDEX DOCD: CPT | Performed by: INTERNAL MEDICINE

## 2021-09-22 RX ORDER — TIOTROPIUM BROMIDE INHALATION SPRAY 3.12 UG/1
2 SPRAY, METERED RESPIRATORY (INHALATION) DAILY
Qty: 4 G | Refills: 3 | Status: SHIPPED | OUTPATIENT
Start: 2021-09-22

## 2021-09-22 NOTE — ASSESSMENT & PLAN NOTE
- Smokes 1 ppd, does not want to quit  Reviewed deleterious effects of smoking on her health including MI, cad, PVD, worsened emphysema, etc    - qualifies for screening CT scan  Next CT 7/22  She is agreeable, order at next visit

## 2021-09-22 NOTE — PROGRESS NOTES
Pulmonary Follow Up Note   Marizol Mejia 64 y o  female MRN: [id number]  9/22/2021      Referring provider: Hina Reno PA-C    Assessment and Plan:    Centrilobular emphysema ([address] )  No diagnosis of COPD based on PFTs  Has emphysema on imaging   - Cont  Spiriva daily  - Cont  Albuterol as needed  - Does not want to go to pulmonary rehab although I do recommend it    - does not want flu shot this year  Cigarette nicotine dependence without complication  - Smokes 1 ppd, does not want to quit  Reviewed deleterious effects of smoking on her health including MI, cad, PVD, worsened emphysema, etc    - qualifies for screening CT scan  Next CT 7/22  She is agreeable, order at next visit  Class 2 severe obesity due to excess calories with serious comorbidity and body mass index (BMI) of 37 0 to 37 9 in [facility])  - She would like to lose weight  Discussed limiting "junk food" and processed foods  Breast nodule  - Mammogram findings noted  Planned for follow up test later this month  Obstructive sleep apnea syndrome  Known sleep apnea, can't tolerate CPAP  Cont  Oxygen with sleep  Chronic hypoxemic respiratory failure (HCC)  - cont oxygen with all sleep  Diagnoses and all orders for this visit:    Chronic hypoxemic respiratory failure (HCC)    Centrilobular emphysema (HCC)    Cigarette nicotine dependence without complication    Class 2 severe obesity due to excess calories with serious comorbidity and body mass index (BMI) of 37 0 to 37 9 in [facility])    Breast nodule    Obstructive sleep apnea syndrome    Chronic obstructive pulmonary disease, unspecified COPD type (HCC)  -     tiotropium (Spiriva Respimat) 2 5 MCG/ACT AERS inhaler; Inhale 2 puffs daily    Discussed with pt and sister  Return in six months  History of Present Illness   HPI:  Marizol Mejia is a 64 y o  female who presents for follow up of COPD and chronic hypoxic respiratory failure  Last seen here in late July  Since then, she states that she has ongoing breathlessness with exertion  She estimates she can walk about half block and then has to stop  Thinks her breathing is better than when she was here in July  Has a chronic dry cough  Worse in AM      Uses Spiriva daily  Likes it better than Dulera  Uses Albuterol as needed, which is once a day  Barely uses nebulizer  Sedentary lifestyle  No hospital or ER visits since she was here in July  Last need for Prednisone was in August      States she saw her PCP last week and her DM was "out of control"  Here with her sister who provides additional history      Review of Systems  Positive as mentioned above and negative otherwise    Historical Information   Past Medical History:   Diagnosis Date    Chronic back pain     Colitis     COPD (chronic obstructive pulmonary disease) (HCC)     Depression     DVT of lower limb, acute (HCC)     GERD (gastroesophageal reflux disease)     Sleep apnea      Past Surgical History:   Procedure Laterality Date    BREAST LUMPECTOMY Right     IVC FILTER INSERTION       Family History   Problem Relation Age of Onset    Breast cancer Mother 67    COPD Mother     Coronary artery disease Mother     Coronary artery disease Father     Stroke Father     Lung cancer Sister     Breast cancer Maternal Grandmother     Colon cancer Paternal Grandfather     No Known Problems Maternal Aunt     No Known Problems Maternal Aunt     No Known Problems Maternal Aunt     No Known Problems Paternal Aunt          Meds/Allergies     Current Outpatient Medications:     albuterol (2 5 mg/3 mL) 0 083 % nebulizer solution, Take 1 vial (2 5 mg total) by nebulization every 4 (four) hours as needed for shortness of breath, Disp: 120 mL, Rfl: 3    albuterol (Ventolin HFA) 90 mcg/act inhaler, Inhale 2 puffs every 4 (four) hours as needed for wheezing, Disp: 18 g, Rfl: 4    amitriptyline (ELAVIL) 100 mg tablet, Take 0 5 tablets (50 mg total) by mouth daily at bedtime, Disp: 45 tablet, Rfl: 1    atorvastatin (LIPITOR) 40 mg tablet, Take 1 tablet (40 mg total) by mouth daily at bedtime, Disp: 90 tablet, Rfl: 0    benzocaine (AMERICAINE) 20 % rectal ointment, Insert into the rectum every 3 (three) hours as needed for itching or hemorrhoids (Patient not taking: Reported on 7/23/2021), Disp: 28 4 g, Rfl: 0    benztropine (COGENTIN) 0 5 mg tablet, Take 0 5 mg by mouth daily at bedtime , Disp: , Rfl:     Blood Glucose Monitoring Suppl (ONE TOUCH ULTRA 2) w/Device KIT, by Does not apply route, Disp: , Rfl:     Blood Glucose Monitoring Suppl (ONE TOUCH ULTRA 2) w/Device KIT, by Does not apply route daily, Disp: 100 each, Rfl: 0    Calcium Carbonate-Vit D-Min (Calcium 1200) 5824-3006 MG-UNIT CHEW, Chew 1 each daily (Patient not taking: Reported on 7/23/2021), Disp: 90 each, Rfl: 1    clonazePAM (KlonoPIN) 0 5 mg tablet, Take 0 5 mg by mouth daily as needed, Disp: , Rfl:     cyclobenzaprine (FLEXERIL) 10 mg tablet, take 1 tablet by mouth three times a day if needed for muscle spasm for up to 14 days, Disp: 42 tablet, Rfl: 0    fenofibrate (TRICOR) 48 mg tablet, take 1 tablet by mouth once daily, Disp: 30 tablet, Rfl: 2    fluticasone (FLONASE) 50 mcg/act nasal spray, 2 sprays into each nostril daily as needed for allergies (Patient not taking: Reported on 7/23/2021), Disp: 1 Bottle, Rfl: 3    gabapentin (NEURONTIN) 600 MG tablet, take 1 tablet by mouth three times a day, Disp: 270 tablet, Rfl: 0    hydrocortisone (ANUSOL-HC) 2 5 % rectal cream, Apply topically 2 (two) times a day for 7 days (Patient not taking: Reported on 9/10/2021), Disp: 28 g, Rfl: 0    Insulin Pen Needle (Pen Needles) 32G X 4 MM MISC, Use daily, Disp: 90 each, Rfl: 2    lisinopril (ZESTRIL) 2 5 mg tablet, take 1 tablet by mouth once daily, Disp: 90 tablet, Rfl: 1    loratadine (CLARITIN) 10 mg tablet, Take 1 tablet (10 mg total) by mouth daily, Disp: 30 tablet, Rfl: 2   metFORMIN (GLUCOPHAGE) 1000 MG tablet, take 1 tablet by mouth twice a day with food, Disp: 180 tablet, Rfl: 0    pantoprazole (PROTONIX) 40 mg tablet, take 1 tablet by mouth twice a day, Disp: 180 tablet, Rfl: 0    sodium chloride (OCEAN) 0 65 % nasal spray, 1 spray into each nostril as needed for congestion (Patient not taking: Reported on 9/10/2021), Disp: 30 mL, Rfl: 0    tiotropium (Spiriva Respimat) 2 5 MCG/ACT AERS inhaler, Inhale 2 puffs daily, Disp: 4 g, Rfl: 3    Tradjenta 5 MG TABS, take 1 tablet by mouth once daily, Disp: 90 tablet, Rfl: 0    traZODone (DESYREL) 150 mg tablet, 150 mg daily at bedtime , Disp: , Rfl:     Victoza injection, inject 1 2 milligram subcutaneously daily, Disp: 6 mL, Rfl: 1    vilazodone (VIIBRYD) 40 mg tablet, Take 40 mg by mouth daily  , Disp: , Rfl:     VRAYLAR 6 MG capsule, Take 6 mg by mouth daily, Disp: , Rfl: 0  Allergies   Allergen Reactions    Amoxicillin-Pot Clavulanate GI Intolerance       Vitals: Blood pressure 102/62, pulse 105, temperature 98 5 °F (36 9 °C), height 5' 7" (1 702 m), weight 107 kg (235 lb), SpO2 91 %  Body mass index is 36 81 kg/m²  Oxygen Therapy  SpO2: 91 %      Physical Exam  GEN: NAD, comfortable  HEENT: NCAT; EOMI  CVS: Regular  Lungs: CTA b/l  Abd: soft  Ext: no c/c/e  Skin: no visible rash  Neuro: nonfocal  Psych: cooperative    Labs: I have personally reviewed pertinent lab results    Lab Results   Component Value Date    WBC 6 57 04/27/2020    HGB 13 4 04/27/2020    HCT 40 3 04/27/2020    MCV 95 04/27/2020     04/27/2020     Lab Results   Component Value Date    GLUCOSE 104 01/08/2016    CALCIUM 9 3 09/13/2021     01/08/2016    K 4 6 09/13/2021    CO2 25 09/13/2021     09/13/2021    BUN 26 (H) 09/13/2021    CREATININE 1 34 (H) 09/13/2021     Lab Results   Component Value Date    IGE 4 01/08/2016     Lab Results   Component Value Date    ALT 28 09/13/2021    AST 22 09/13/2021    ALKPHOS 68 09/13/2021    BILITOT 0 53 2015       Labs per my interpretation show elevated creatinine, normal hemoglobin    Imaging and other studies: I have personally reviewed pertinent films in PACS  CT chest per my review shows emphysema, right breast nodules  Pulmonary function testin/21 per my review show no obstruction, air trapping, decreased diffusion    EKG, Pathology, and Other Studies: I have personally reviewed pertinent reports  ECHO : EF 60%    DANIKA Juarez [facility]

## 2021-09-22 NOTE — ASSESSMENT & PLAN NOTE
No diagnosis of COPD based on PFTs  Has emphysema on imaging   - Cont  Spiriva daily  - Cont  Albuterol as needed  - Does not want to go to pulmonary rehab although I do recommend it    - does not want flu shot this year

## 2021-09-25 DIAGNOSIS — G43.809 OTHER MIGRAINE WITHOUT STATUS MIGRAINOSUS, NOT INTRACTABLE: ICD-10-CM

## 2021-09-28 RX ORDER — AMITRIPTYLINE HYDROCHLORIDE 100 MG/1
TABLET, FILM COATED ORAL
Qty: 45 TABLET | Refills: 1 | Status: SHIPPED | OUTPATIENT
Start: 2021-09-28 | End: 2022-07-12

## 2021-09-30 ENCOUNTER — HOSPITAL ENCOUNTER (OUTPATIENT)
Dept: ULTRASOUND IMAGING | Facility: HOSPITAL | Age: 61
Discharge: HOME | End: 2021-09-30
Payer: COMMERCIAL

## 2021-09-30 DIAGNOSIS — R92.8 ABNORMAL MAMMOGRAM: ICD-10-CM

## 2021-09-30 PROCEDURE — 76642 ULTRASOUND BREAST LIMITED: CPT

## 2021-10-07 DIAGNOSIS — E11.9 DIABETES MELLITUS WITHOUT COMPLICATION (HCC): ICD-10-CM

## 2021-10-07 DIAGNOSIS — E11.65 TYPE 2 DIABETES MELLITUS WITH HYPERGLYCEMIA, WITHOUT LONG-TERM CURRENT USE OF INSULIN (HCC): Primary | ICD-10-CM

## 2021-10-07 RX ORDER — BLOOD SUGAR DIAGNOSTIC
1 STRIP MISCELLANEOUS DAILY
Qty: 100 STRIP | Refills: 2 | Status: SHIPPED | OUTPATIENT
Start: 2021-10-07

## 2021-10-07 RX ORDER — LIRAGLUTIDE 6 MG/ML
1.2 INJECTION SUBCUTANEOUS DAILY
Qty: 6 ML | Refills: 5 | Status: SHIPPED | OUTPATIENT
Start: 2021-10-07 | End: 2021-12-20

## 2021-10-12 ENCOUNTER — CONSULT (OUTPATIENT)
Dept: CARDIOLOGY CLINIC | Facility: HOSPITAL | Age: 61
End: 2021-10-12
Payer: COMMERCIAL

## 2021-10-12 VITALS
DIASTOLIC BLOOD PRESSURE: 94 MMHG | HEIGHT: 66 IN | SYSTOLIC BLOOD PRESSURE: 154 MMHG | BODY MASS INDEX: 36.9 KG/M2 | HEART RATE: 99 BPM | WEIGHT: 229.6 LBS

## 2021-10-12 DIAGNOSIS — R07.9 CHEST PAIN, UNSPECIFIED TYPE: ICD-10-CM

## 2021-10-12 DIAGNOSIS — E78.2 MIXED HYPERLIPIDEMIA: ICD-10-CM

## 2021-10-12 DIAGNOSIS — E11.65 TYPE 2 DIABETES MELLITUS WITH HYPERGLYCEMIA, WITHOUT LONG-TERM CURRENT USE OF INSULIN (HCC): ICD-10-CM

## 2021-10-12 DIAGNOSIS — I27.20 PULMONARY HYPERTENSION (HCC): ICD-10-CM

## 2021-10-12 DIAGNOSIS — I80.209 DEEP VEIN THROMBOPHLEBITIS OF LEG, UNSPECIFIED LATERALITY (HCC): ICD-10-CM

## 2021-10-12 DIAGNOSIS — Z82.49 FAMILY HISTORY OF HEART DISEASE: ICD-10-CM

## 2021-10-12 DIAGNOSIS — R06.00 DOE (DYSPNEA ON EXERTION): Primary | ICD-10-CM

## 2021-10-12 PROBLEM — R06.09 DOE (DYSPNEA ON EXERTION): Status: ACTIVE | Noted: 2021-07-23

## 2021-10-12 PROCEDURE — 99204 OFFICE O/P NEW MOD 45 MIN: CPT | Performed by: INTERNAL MEDICINE

## 2021-10-12 PROCEDURE — 93000 ELECTROCARDIOGRAM COMPLETE: CPT | Performed by: INTERNAL MEDICINE

## 2021-10-12 RX ORDER — CLOPIDOGREL 300 MG/1
300 TABLET, FILM COATED ORAL ONCE
Qty: 1 TABLET | Refills: 0 | Status: SHIPPED | OUTPATIENT
Start: 2021-10-12 | End: 2021-11-04 | Stop reason: ALTCHOICE

## 2021-10-12 RX ORDER — ASPIRIN 81 MG/1
81 TABLET ORAL DAILY
Qty: 90 TABLET | Refills: 3
Start: 2021-10-12

## 2021-10-13 ENCOUNTER — HOSPITAL ENCOUNTER (OUTPATIENT)
Dept: ULTRASOUND IMAGING | Facility: HOSPITAL | Age: 61
Discharge: HOME | End: 2021-10-13
Payer: COMMERCIAL

## 2021-10-13 ENCOUNTER — HOSPITAL ENCOUNTER (OUTPATIENT)
Dept: MAMMOGRAPHY | Facility: HOSPITAL | Age: 61
Discharge: HOME | End: 2021-10-13
Payer: COMMERCIAL

## 2021-10-13 VITALS — DIASTOLIC BLOOD PRESSURE: 59 MMHG | SYSTOLIC BLOOD PRESSURE: 112 MMHG

## 2021-10-13 DIAGNOSIS — R92.8 ABNORMAL MAMMOGRAM: ICD-10-CM

## 2021-10-13 PROCEDURE — 88305 TISSUE EXAM BY PATHOLOGIST: CPT | Performed by: PATHOLOGY

## 2021-10-13 PROCEDURE — 19084 BX BREAST ADD LESION US IMAG: CPT

## 2021-10-13 PROCEDURE — A4648 IMPLANTABLE TISSUE MARKER: HCPCS

## 2021-10-13 PROCEDURE — 88361 TUMOR IMMUNOHISTOCHEM/COMPUT: CPT | Performed by: PATHOLOGY

## 2021-10-13 PROCEDURE — 19083 BX BREAST 1ST LESION US IMAG: CPT

## 2021-10-13 PROCEDURE — 88341 IMHCHEM/IMCYTCHM EA ADD ANTB: CPT | Performed by: PATHOLOGY

## 2021-10-13 PROCEDURE — 88342 IMHCHEM/IMCYTCHM 1ST ANTB: CPT | Performed by: PATHOLOGY

## 2021-10-13 RX ORDER — LIDOCAINE HYDROCHLORIDE 10 MG/ML
8 INJECTION, SOLUTION EPIDURAL; INFILTRATION; INTRACAUDAL; PERINEURAL
Status: COMPLETED | OUTPATIENT
Start: 2021-10-13 | End: 2021-10-13

## 2021-10-13 RX ADMIN — LIDOCAINE HYDROCHLORIDE 8 ML: 10 INJECTION, SOLUTION EPIDURAL; INFILTRATION; INTRACAUDAL; PERINEURAL at 09:07

## 2021-10-14 ENCOUNTER — PREP FOR PROCEDURE (OUTPATIENT)
Dept: CARDIOLOGY CLINIC | Facility: CLINIC | Age: 61
End: 2021-10-14

## 2021-10-14 DIAGNOSIS — R07.9 CHEST PAIN, UNSPECIFIED TYPE: Primary | ICD-10-CM

## 2021-10-14 DIAGNOSIS — E78.2 MIXED HYPERLIPIDEMIA: ICD-10-CM

## 2021-10-14 RX ORDER — ATORVASTATIN CALCIUM 40 MG/1
40 TABLET, FILM COATED ORAL
Qty: 90 TABLET | Refills: 1 | Status: SHIPPED | OUTPATIENT
Start: 2021-10-14 | End: 2022-03-28 | Stop reason: SDUPTHER

## 2021-10-18 ENCOUNTER — TELEPHONE (OUTPATIENT)
Dept: MAMMOGRAPHY | Facility: CLINIC | Age: 61
End: 2021-10-18

## 2021-10-18 ENCOUNTER — LAB (OUTPATIENT)
Dept: LAB | Facility: HOSPITAL | Age: 61
End: 2021-10-18
Attending: INTERNAL MEDICINE
Payer: COMMERCIAL

## 2021-10-18 DIAGNOSIS — E78.2 MIXED HYPERLIPIDEMIA: ICD-10-CM

## 2021-10-18 DIAGNOSIS — R07.9 CHEST PAIN, UNSPECIFIED TYPE: ICD-10-CM

## 2021-10-18 DIAGNOSIS — R06.00 DOE (DYSPNEA ON EXERTION): ICD-10-CM

## 2021-10-18 LAB
ALBUMIN SERPL BCP-MCNC: 3.9 G/DL (ref 3.5–5)
ALP SERPL-CCNC: 58 U/L (ref 46–116)
ALT SERPL W P-5'-P-CCNC: 28 U/L (ref 12–78)
ANION GAP SERPL CALCULATED.3IONS-SCNC: 10 MMOL/L (ref 4–13)
AST SERPL W P-5'-P-CCNC: 16 U/L (ref 5–45)
BASOPHILS # BLD AUTO: 0.01 THOUSANDS/ΜL (ref 0–0.1)
BASOPHILS NFR BLD AUTO: 0 % (ref 0–1)
BILIRUB SERPL-MCNC: 0.37 MG/DL (ref 0.2–1)
BUN SERPL-MCNC: 20 MG/DL (ref 5–25)
CALCIUM SERPL-MCNC: 9.2 MG/DL (ref 8.3–10.1)
CHLORIDE SERPL-SCNC: 102 MMOL/L (ref 100–108)
CHOLEST SERPL-MCNC: 228 MG/DL (ref 50–200)
CO2 SERPL-SCNC: 27 MMOL/L (ref 21–32)
CREAT SERPL-MCNC: 1.25 MG/DL (ref 0.6–1.3)
EOSINOPHIL # BLD AUTO: 0.01 THOUSAND/ΜL (ref 0–0.61)
EOSINOPHIL NFR BLD AUTO: 0 % (ref 0–6)
ERYTHROCYTE [DISTWIDTH] IN BLOOD BY AUTOMATED COUNT: 13.5 % (ref 11.6–15.1)
GFR SERPL CREATININE-BSD FRML MDRD: 47 ML/MIN/1.73SQ M
GLUCOSE P FAST SERPL-MCNC: 109 MG/DL (ref 65–99)
HCT VFR BLD AUTO: 39.9 % (ref 34.8–46.1)
HDLC SERPL-MCNC: 29 MG/DL
HGB BLD-MCNC: 12.9 G/DL (ref 11.5–15.4)
IMM GRANULOCYTES # BLD AUTO: 0.02 THOUSAND/UL (ref 0–0.2)
IMM GRANULOCYTES NFR BLD AUTO: 0 % (ref 0–2)
INR PPP: 1 (ref 0.84–1.19)
LDLC SERPL CALC-MCNC: 129 MG/DL (ref 0–100)
LYMPHOCYTES # BLD AUTO: 1.99 THOUSANDS/ΜL (ref 0.6–4.47)
LYMPHOCYTES NFR BLD AUTO: 29 % (ref 14–44)
MCH RBC QN AUTO: 29.5 PG (ref 26.8–34.3)
MCHC RBC AUTO-ENTMCNC: 32.3 G/DL (ref 31.4–37.4)
MCV RBC AUTO: 91 FL (ref 82–98)
MONOCYTES # BLD AUTO: 0.55 THOUSAND/ΜL (ref 0.17–1.22)
MONOCYTES NFR BLD AUTO: 8 % (ref 4–12)
NEUTROPHILS # BLD AUTO: 4.26 THOUSANDS/ΜL (ref 1.85–7.62)
NEUTS SEG NFR BLD AUTO: 63 % (ref 43–75)
NRBC BLD AUTO-RTO: 0 /100 WBCS
PLATELET # BLD AUTO: 201 THOUSANDS/UL (ref 149–390)
PMV BLD AUTO: 9.8 FL (ref 8.9–12.7)
POTASSIUM SERPL-SCNC: 4.7 MMOL/L (ref 3.5–5.3)
PROT SERPL-MCNC: 7.4 G/DL (ref 6.4–8.2)
PROTHROMBIN TIME: 12.7 SECONDS (ref 11.6–14.5)
RBC # BLD AUTO: 4.37 MILLION/UL (ref 3.81–5.12)
SODIUM SERPL-SCNC: 139 MMOL/L (ref 136–145)
TRIGL SERPL-MCNC: 348 MG/DL
WBC # BLD AUTO: 6.84 THOUSAND/UL (ref 4.31–10.16)

## 2021-10-18 PROCEDURE — 80061 LIPID PANEL: CPT

## 2021-10-18 PROCEDURE — 36415 COLL VENOUS BLD VENIPUNCTURE: CPT

## 2021-10-18 PROCEDURE — 80053 COMPREHEN METABOLIC PANEL: CPT

## 2021-10-18 PROCEDURE — 85610 PROTHROMBIN TIME: CPT

## 2021-10-18 PROCEDURE — 85025 COMPLETE CBC W/AUTO DIFF WBC: CPT

## 2021-10-26 ENCOUNTER — TELEPHONE (OUTPATIENT)
Dept: OTHER | Facility: OTHER | Age: 61
End: 2021-10-26

## 2021-10-26 DIAGNOSIS — E11.9 TYPE 2 DIABETES MELLITUS WITHOUT COMPLICATION, WITHOUT LONG-TERM CURRENT USE OF INSULIN (HCC): ICD-10-CM

## 2021-10-26 DIAGNOSIS — K21.9 GERD WITHOUT ESOPHAGITIS: ICD-10-CM

## 2021-10-26 RX ORDER — PANTOPRAZOLE SODIUM 40 MG/1
TABLET, DELAYED RELEASE ORAL
Qty: 180 TABLET | Refills: 0 | Status: SHIPPED | OUTPATIENT
Start: 2021-10-26 | End: 2022-01-24

## 2021-10-27 ENCOUNTER — CONSULT (OUTPATIENT)
Dept: SURGERY | Facility: CLINIC | Age: 61
End: 2021-10-27
Payer: COMMERCIAL

## 2021-10-27 VITALS
DIASTOLIC BLOOD PRESSURE: 66 MMHG | WEIGHT: 231 LBS | RESPIRATION RATE: 18 BRPM | HEIGHT: 66 IN | TEMPERATURE: 97.6 F | BODY MASS INDEX: 37.12 KG/M2 | HEART RATE: 112 BPM | SYSTOLIC BLOOD PRESSURE: 132 MMHG

## 2021-10-27 DIAGNOSIS — C50.211 MALIGNANT NEOPLASM OF UPPER-INNER QUADRANT OF RIGHT BREAST IN FEMALE, ESTROGEN RECEPTOR POSITIVE (HCC): ICD-10-CM

## 2021-10-27 DIAGNOSIS — Z17.0 MALIGNANT NEOPLASM OF UPPER-INNER QUADRANT OF RIGHT BREAST IN FEMALE, ESTROGEN RECEPTOR POSITIVE (HCC): ICD-10-CM

## 2021-10-27 DIAGNOSIS — C50.911 MUCINOUS CARCINOMA OF BREAST, RIGHT (HCC): ICD-10-CM

## 2021-10-27 DIAGNOSIS — C50.411 PRIMARY CANCER OF UPPER OUTER QUADRANT OF RIGHT BREAST (HCC): Primary | ICD-10-CM

## 2021-10-27 PROCEDURE — 3008F BODY MASS INDEX DOCD: CPT | Performed by: SURGERY

## 2021-10-27 PROCEDURE — 99204 OFFICE O/P NEW MOD 45 MIN: CPT | Performed by: SURGERY

## 2021-10-27 RX ORDER — SODIUM CHLORIDE 9 MG/ML
125 INJECTION, SOLUTION INTRAVENOUS CONTINUOUS
Status: CANCELLED | OUTPATIENT
Start: 2021-10-27

## 2021-10-27 RX ORDER — CLOPIDOGREL BISULFATE 75 MG/1
TABLET ORAL
COMMUNITY
Start: 2021-10-20 | End: 2021-11-04 | Stop reason: ALTCHOICE

## 2021-10-27 RX ORDER — ASPIRIN 81 MG/1
324 TABLET, CHEWABLE ORAL ONCE
Status: CANCELLED | OUTPATIENT
Start: 2021-10-27 | End: 2021-10-27

## 2021-10-28 ENCOUNTER — HOSPITAL ENCOUNTER (OUTPATIENT)
Facility: HOSPITAL | Age: 61
Setting detail: OUTPATIENT SURGERY
Discharge: HOME | End: 2021-10-28
Attending: INTERNAL MEDICINE | Admitting: INTERNAL MEDICINE
Payer: COMMERCIAL

## 2021-10-28 VITALS
DIASTOLIC BLOOD PRESSURE: 75 MMHG | BODY MASS INDEX: 36.26 KG/M2 | OXYGEN SATURATION: 90 % | RESPIRATION RATE: 18 BRPM | SYSTOLIC BLOOD PRESSURE: 119 MMHG | HEART RATE: 94 BPM | WEIGHT: 231 LBS | HEIGHT: 67 IN

## 2021-10-28 DIAGNOSIS — R07.9 CHEST PAIN: Primary | ICD-10-CM

## 2021-10-28 DIAGNOSIS — R07.9 CHEST PAIN, UNSPECIFIED TYPE: ICD-10-CM

## 2021-10-28 DIAGNOSIS — I25.10 MILD CORONARY ARTERY DISEASE: ICD-10-CM

## 2021-10-28 LAB
ANION GAP SERPL CALCULATED.3IONS-SCNC: -3 MMOL/L (ref 4–13)
ATRIAL RATE: 92 BPM
BUN SERPL-MCNC: 20 MG/DL (ref 5–25)
CALCIUM SERPL-MCNC: 8.9 MG/DL (ref 8.3–10.1)
CHLORIDE SERPL-SCNC: 111 MMOL/L (ref 100–108)
CO2 SERPL-SCNC: 29 MMOL/L (ref 21–32)
CREAT SERPL-MCNC: 0.99 MG/DL (ref 0.6–1.3)
GFR SERPL CREATININE-BSD FRML MDRD: 62 ML/MIN/1.73SQ M
GLUCOSE P FAST SERPL-MCNC: 122 MG/DL (ref 65–99)
GLUCOSE SERPL-MCNC: 122 MG/DL (ref 65–140)
P AXIS: 71 DEGREES
POTASSIUM SERPL-SCNC: 4.3 MMOL/L (ref 3.5–5.3)
PR INTERVAL: 136 MS
QRS AXIS: 47 DEGREES
QRSD INTERVAL: 82 MS
QT INTERVAL: 356 MS
QTC INTERVAL: 440 MS
SODIUM SERPL-SCNC: 137 MMOL/L (ref 136–145)
T WAVE AXIS: 54 DEGREES
VENTRICULAR RATE: 92 BPM

## 2021-10-28 PROCEDURE — 99153 MOD SED SAME PHYS/QHP EA: CPT | Performed by: INTERNAL MEDICINE

## 2021-10-28 PROCEDURE — 93454 CORONARY ARTERY ANGIO S&I: CPT | Performed by: INTERNAL MEDICINE

## 2021-10-28 PROCEDURE — C1894 INTRO/SHEATH, NON-LASER: HCPCS | Performed by: INTERNAL MEDICINE

## 2021-10-28 PROCEDURE — C1769 GUIDE WIRE: HCPCS | Performed by: INTERNAL MEDICINE

## 2021-10-28 PROCEDURE — 80048 BASIC METABOLIC PNL TOTAL CA: CPT | Performed by: INTERNAL MEDICINE

## 2021-10-28 PROCEDURE — 99152 MOD SED SAME PHYS/QHP 5/>YRS: CPT | Performed by: INTERNAL MEDICINE

## 2021-10-28 PROCEDURE — 93010 ELECTROCARDIOGRAM REPORT: CPT | Performed by: INTERNAL MEDICINE

## 2021-10-28 PROCEDURE — 93005 ELECTROCARDIOGRAM TRACING: CPT

## 2021-10-28 RX ORDER — NITROGLYCERIN 20 MG/100ML
INJECTION INTRAVENOUS AS NEEDED
Status: DISCONTINUED | OUTPATIENT
Start: 2021-10-28 | End: 2021-10-28 | Stop reason: HOSPADM

## 2021-10-28 RX ORDER — SODIUM CHLORIDE 9 MG/ML
50 INJECTION, SOLUTION INTRAVENOUS CONTINUOUS
Status: DISPENSED | OUTPATIENT
Start: 2021-10-28 | End: 2021-10-28

## 2021-10-28 RX ORDER — VERAPAMIL HYDROCHLORIDE 2.5 MG/ML
INJECTION, SOLUTION INTRAVENOUS AS NEEDED
Status: DISCONTINUED | OUTPATIENT
Start: 2021-10-28 | End: 2021-10-28 | Stop reason: HOSPADM

## 2021-10-28 RX ORDER — ACETAMINOPHEN 325 MG/1
650 TABLET ORAL EVERY 4 HOURS PRN
Status: DISCONTINUED | OUTPATIENT
Start: 2021-10-28 | End: 2021-10-28 | Stop reason: HOSPADM

## 2021-10-28 RX ORDER — ONDANSETRON 2 MG/ML
4 INJECTION INTRAMUSCULAR; INTRAVENOUS EVERY 6 HOURS PRN
Status: DISCONTINUED | OUTPATIENT
Start: 2021-10-28 | End: 2021-10-28 | Stop reason: HOSPADM

## 2021-10-28 RX ORDER — LIDOCAINE HYDROCHLORIDE 10 MG/ML
INJECTION, SOLUTION EPIDURAL; INFILTRATION; INTRACAUDAL; PERINEURAL AS NEEDED
Status: DISCONTINUED | OUTPATIENT
Start: 2021-10-28 | End: 2021-10-28 | Stop reason: HOSPADM

## 2021-10-28 RX ORDER — FENTANYL CITRATE 50 UG/ML
INJECTION, SOLUTION INTRAMUSCULAR; INTRAVENOUS AS NEEDED
Status: DISCONTINUED | OUTPATIENT
Start: 2021-10-28 | End: 2021-10-28 | Stop reason: HOSPADM

## 2021-10-28 RX ORDER — ASPIRIN 81 MG/1
324 TABLET, CHEWABLE ORAL ONCE
Status: COMPLETED | OUTPATIENT
Start: 2021-10-28 | End: 2021-10-28

## 2021-10-28 RX ORDER — MIDAZOLAM HYDROCHLORIDE 2 MG/2ML
INJECTION, SOLUTION INTRAMUSCULAR; INTRAVENOUS AS NEEDED
Status: DISCONTINUED | OUTPATIENT
Start: 2021-10-28 | End: 2021-10-28 | Stop reason: HOSPADM

## 2021-10-28 RX ORDER — SODIUM CHLORIDE 9 MG/ML
125 INJECTION, SOLUTION INTRAVENOUS CONTINUOUS
Status: DISPENSED | OUTPATIENT
Start: 2021-10-28 | End: 2021-10-28

## 2021-10-28 RX ORDER — HEPARIN SODIUM 1000 [USP'U]/ML
INJECTION, SOLUTION INTRAVENOUS; SUBCUTANEOUS AS NEEDED
Status: DISCONTINUED | OUTPATIENT
Start: 2021-10-28 | End: 2021-10-28 | Stop reason: HOSPADM

## 2021-10-28 RX ADMIN — SODIUM CHLORIDE 315 ML: 0.9 INJECTION, SOLUTION INTRAVENOUS at 07:48

## 2021-10-28 RX ADMIN — ASPIRIN 81 MG CHEWABLE TABLET 324 MG: 81 TABLET CHEWABLE at 07:48

## 2021-11-01 ENCOUNTER — OFFICE VISIT (OUTPATIENT)
Dept: SURGERY | Facility: CLINIC | Age: 61
End: 2021-11-01
Payer: COMMERCIAL

## 2021-11-01 ENCOUNTER — TELEPHONE (OUTPATIENT)
Dept: GENETICS | Facility: CLINIC | Age: 61
End: 2021-11-01

## 2021-11-01 DIAGNOSIS — C50.411 PRIMARY CANCER OF UPPER OUTER QUADRANT OF RIGHT BREAST (HCC): Primary | ICD-10-CM

## 2021-11-01 DIAGNOSIS — C50.211 MALIGNANT NEOPLASM OF UPPER-INNER QUADRANT OF RIGHT BREAST IN FEMALE, ESTROGEN RECEPTOR POSITIVE (HCC): ICD-10-CM

## 2021-11-01 DIAGNOSIS — Z17.0 MALIGNANT NEOPLASM OF UPPER-INNER QUADRANT OF RIGHT BREAST IN FEMALE, ESTROGEN RECEPTOR POSITIVE (HCC): ICD-10-CM

## 2021-11-01 DIAGNOSIS — Z72.0 TOBACCO ABUSE: ICD-10-CM

## 2021-11-01 DIAGNOSIS — C50.911 MUCINOUS CARCINOMA OF BREAST, RIGHT (HCC): ICD-10-CM

## 2021-11-01 PROCEDURE — 99214 OFFICE O/P EST MOD 30 MIN: CPT | Performed by: SURGERY

## 2021-11-01 RX ORDER — SODIUM CHLORIDE, SODIUM LACTATE, POTASSIUM CHLORIDE, CALCIUM CHLORIDE 600; 310; 30; 20 MG/100ML; MG/100ML; MG/100ML; MG/100ML
125 INJECTION, SOLUTION INTRAVENOUS CONTINUOUS
Status: CANCELLED | OUTPATIENT
Start: 2021-11-09

## 2021-11-01 RX ORDER — LEVOFLOXACIN 5 MG/ML
500 INJECTION, SOLUTION INTRAVENOUS ONCE
Status: CANCELLED | OUTPATIENT
Start: 2021-11-09 | End: 2021-11-01

## 2021-11-01 RX ORDER — NICOTINE 21 MG/24HR
1 PATCH, TRANSDERMAL 24 HOURS TRANSDERMAL EVERY 24 HOURS
Qty: 28 PATCH | Refills: 0 | Status: SHIPPED | OUTPATIENT
Start: 2021-11-01 | End: 2021-12-07 | Stop reason: ALTCHOICE

## 2021-11-02 ENCOUNTER — LAB (OUTPATIENT)
Dept: LAB | Facility: HOSPITAL | Age: 61
End: 2021-11-02
Attending: SURGERY
Payer: COMMERCIAL

## 2021-11-02 ENCOUNTER — HOSPITAL ENCOUNTER (OUTPATIENT)
Dept: NON INVASIVE DIAGNOSTICS | Facility: HOSPITAL | Age: 61
Discharge: HOME | End: 2021-11-02
Attending: SURGERY
Payer: COMMERCIAL

## 2021-11-02 ENCOUNTER — HOSPITAL ENCOUNTER (OUTPATIENT)
Dept: RADIOLOGY | Facility: HOSPITAL | Age: 61
Discharge: HOME | End: 2021-11-02
Attending: SURGERY
Payer: COMMERCIAL

## 2021-11-02 DIAGNOSIS — Z17.0 MALIGNANT NEOPLASM OF UPPER-INNER QUADRANT OF RIGHT BREAST IN FEMALE, ESTROGEN RECEPTOR POSITIVE (HCC): ICD-10-CM

## 2021-11-02 DIAGNOSIS — C50.211 MALIGNANT NEOPLASM OF UPPER-INNER QUADRANT OF RIGHT BREAST IN FEMALE, ESTROGEN RECEPTOR POSITIVE (HCC): ICD-10-CM

## 2021-11-02 DIAGNOSIS — C50.911 MUCINOUS CARCINOMA OF BREAST, RIGHT (HCC): ICD-10-CM

## 2021-11-02 DIAGNOSIS — C50.411 PRIMARY CANCER OF UPPER OUTER QUADRANT OF RIGHT BREAST (HCC): ICD-10-CM

## 2021-11-02 LAB
ANION GAP SERPL CALCULATED.3IONS-SCNC: 10 MMOL/L (ref 4–13)
BUN SERPL-MCNC: 17 MG/DL (ref 5–25)
CALCIUM SERPL-MCNC: 9.1 MG/DL (ref 8.3–10.1)
CHLORIDE SERPL-SCNC: 100 MMOL/L (ref 100–108)
CO2 SERPL-SCNC: 27 MMOL/L (ref 21–32)
CREAT SERPL-MCNC: 1.24 MG/DL (ref 0.6–1.3)
ERYTHROCYTE [DISTWIDTH] IN BLOOD BY AUTOMATED COUNT: 14.2 % (ref 11.6–15.1)
GFR SERPL CREATININE-BSD FRML MDRD: 47 ML/MIN/1.73SQ M
GLUCOSE SERPL-MCNC: 164 MG/DL (ref 65–140)
HCT VFR BLD AUTO: 38.7 % (ref 34.8–46.1)
HGB BLD-MCNC: 12.3 G/DL (ref 11.5–15.4)
MCH RBC QN AUTO: 28.9 PG (ref 26.8–34.3)
MCHC RBC AUTO-ENTMCNC: 31.8 G/DL (ref 31.4–37.4)
MCV RBC AUTO: 91 FL (ref 82–98)
PLATELET # BLD AUTO: 217 THOUSANDS/UL (ref 149–390)
PMV BLD AUTO: 9.3 FL (ref 8.9–12.7)
POTASSIUM SERPL-SCNC: 4.4 MMOL/L (ref 3.5–5.3)
RBC # BLD AUTO: 4.25 MILLION/UL (ref 3.81–5.12)
SODIUM SERPL-SCNC: 137 MMOL/L (ref 136–145)
WBC # BLD AUTO: 7.61 THOUSAND/UL (ref 4.31–10.16)

## 2021-11-02 PROCEDURE — 36415 COLL VENOUS BLD VENIPUNCTURE: CPT

## 2021-11-02 PROCEDURE — 93005 ELECTROCARDIOGRAM TRACING: CPT

## 2021-11-02 PROCEDURE — 80048 BASIC METABOLIC PNL TOTAL CA: CPT

## 2021-11-02 PROCEDURE — 85027 COMPLETE CBC AUTOMATED: CPT

## 2021-11-02 PROCEDURE — 71046 X-RAY EXAM CHEST 2 VIEWS: CPT

## 2021-11-03 ENCOUNTER — TELEPHONE (OUTPATIENT)
Dept: SURGERY | Facility: CLINIC | Age: 61
End: 2021-11-03

## 2021-11-03 LAB
ATRIAL RATE: 104 BPM
P AXIS: 73 DEGREES
PR INTERVAL: 136 MS
QRS AXIS: 59 DEGREES
QRSD INTERVAL: 74 MS
QT INTERVAL: 334 MS
QTC INTERVAL: 439 MS
T WAVE AXIS: 49 DEGREES
VENTRICULAR RATE: 104 BPM

## 2021-11-03 PROCEDURE — 93010 ELECTROCARDIOGRAM REPORT: CPT | Performed by: INTERNAL MEDICINE

## 2021-11-04 ENCOUNTER — ANESTHESIA EVENT (OUTPATIENT)
Dept: PERIOP | Facility: HOSPITAL | Age: 61
End: 2021-11-04
Payer: COMMERCIAL

## 2021-11-04 ENCOUNTER — APPOINTMENT (OUTPATIENT)
Dept: LAB | Facility: HOSPITAL | Age: 61
End: 2021-11-04
Attending: ANESTHESIOLOGY
Payer: COMMERCIAL

## 2021-11-04 DIAGNOSIS — Z01.818 PRE-OP TESTING: ICD-10-CM

## 2021-11-04 LAB
EST. AVERAGE GLUCOSE BLD GHB EST-MCNC: 160 MG/DL
HBA1C MFR BLD: 7.2 %

## 2021-11-04 PROCEDURE — 83036 HEMOGLOBIN GLYCOSYLATED A1C: CPT

## 2021-11-04 PROCEDURE — 36415 COLL VENOUS BLD VENIPUNCTURE: CPT

## 2021-11-04 RX ORDER — OFLOXACIN 3 MG/ML
SOLUTION/ DROPS OPHTHALMIC
COMMUNITY
Start: 2021-10-29 | End: 2022-03-28 | Stop reason: ALTCHOICE

## 2021-11-04 RX ORDER — PREDNISOLONE ACETATE 10 MG/ML
SUSPENSION/ DROPS OPHTHALMIC
COMMUNITY
Start: 2021-10-29 | End: 2022-03-28 | Stop reason: ALTCHOICE

## 2021-11-05 ENCOUNTER — TELEPHONE (OUTPATIENT)
Dept: GENETICS | Facility: CLINIC | Age: 61
End: 2021-11-05

## 2021-11-08 ENCOUNTER — CONSULT (OUTPATIENT)
Dept: FAMILY MEDICINE CLINIC | Facility: HOME HEALTHCARE | Age: 61
End: 2021-11-08
Payer: COMMERCIAL

## 2021-11-08 VITALS
TEMPERATURE: 97.6 F | WEIGHT: 224 LBS | OXYGEN SATURATION: 93 % | HEIGHT: 67 IN | SYSTOLIC BLOOD PRESSURE: 122 MMHG | HEART RATE: 100 BPM | BODY MASS INDEX: 35.16 KG/M2 | DIASTOLIC BLOOD PRESSURE: 70 MMHG | RESPIRATION RATE: 18 BRPM

## 2021-11-08 DIAGNOSIS — I10 PRIMARY HYPERTENSION: ICD-10-CM

## 2021-11-08 DIAGNOSIS — Z72.0 TOBACCO ABUSE: ICD-10-CM

## 2021-11-08 DIAGNOSIS — G47.33 OBSTRUCTIVE SLEEP APNEA SYNDROME: ICD-10-CM

## 2021-11-08 DIAGNOSIS — E11.65 TYPE 2 DIABETES MELLITUS WITH HYPERGLYCEMIA, WITHOUT LONG-TERM CURRENT USE OF INSULIN (HCC): ICD-10-CM

## 2021-11-08 DIAGNOSIS — Z01.818 PREOPERATIVE CLEARANCE: Primary | ICD-10-CM

## 2021-11-08 DIAGNOSIS — Z17.0 MALIGNANT NEOPLASM OF UPPER-INNER QUADRANT OF RIGHT BREAST IN FEMALE, ESTROGEN RECEPTOR POSITIVE (HCC): ICD-10-CM

## 2021-11-08 DIAGNOSIS — F25.9 SCHIZOAFFECTIVE DISORDER, UNSPECIFIED TYPE (HCC): ICD-10-CM

## 2021-11-08 DIAGNOSIS — C50.211 MALIGNANT NEOPLASM OF UPPER-INNER QUADRANT OF RIGHT BREAST IN FEMALE, ESTROGEN RECEPTOR POSITIVE (HCC): ICD-10-CM

## 2021-11-08 DIAGNOSIS — J43.2 CENTRILOBULAR EMPHYSEMA (HCC): ICD-10-CM

## 2021-11-08 DIAGNOSIS — F31.9 BIPOLAR 1 DISORDER (HCC): ICD-10-CM

## 2021-11-08 PROCEDURE — T1015 CLINIC SERVICE: HCPCS | Performed by: FAMILY MEDICINE

## 2021-11-09 ENCOUNTER — ANESTHESIA (OUTPATIENT)
Dept: PERIOP | Facility: HOSPITAL | Age: 61
End: 2021-11-09
Payer: COMMERCIAL

## 2021-11-09 ENCOUNTER — HOSPITAL ENCOUNTER (OUTPATIENT)
Facility: HOSPITAL | Age: 61
Setting detail: OUTPATIENT SURGERY
Discharge: HOME HEALTH | End: 2021-11-10
Attending: SURGERY | Admitting: SURGERY
Payer: COMMERCIAL

## 2021-11-09 ENCOUNTER — HOSPITAL ENCOUNTER (OUTPATIENT)
Dept: NUCLEAR MEDICINE | Facility: HOSPITAL | Age: 61
Discharge: HOME | End: 2021-11-09
Attending: SURGERY
Payer: COMMERCIAL

## 2021-11-09 DIAGNOSIS — N18.2 STAGE 2 CHRONIC KIDNEY DISEASE: Primary | ICD-10-CM

## 2021-11-09 DIAGNOSIS — C50.411 PRIMARY CANCER OF UPPER OUTER QUADRANT OF RIGHT BREAST (HCC): ICD-10-CM

## 2021-11-09 DIAGNOSIS — Z17.0 MALIGNANT NEOPLASM OF UPPER-INNER QUADRANT OF RIGHT BREAST IN FEMALE, ESTROGEN RECEPTOR POSITIVE (HCC): ICD-10-CM

## 2021-11-09 DIAGNOSIS — C50.211 MALIGNANT NEOPLASM OF UPPER-INNER QUADRANT OF RIGHT BREAST IN FEMALE, ESTROGEN RECEPTOR POSITIVE (HCC): ICD-10-CM

## 2021-11-09 DIAGNOSIS — C50.911 MUCINOUS CARCINOMA OF BREAST, RIGHT (HCC): ICD-10-CM

## 2021-11-09 LAB
GLUCOSE SERPL-MCNC: 125 MG/DL (ref 65–140)
GLUCOSE SERPL-MCNC: 95 MG/DL (ref 65–140)

## 2021-11-09 PROCEDURE — 82948 REAGENT STRIP/BLOOD GLUCOSE: CPT

## 2021-11-09 PROCEDURE — 19307 MAST MOD RAD: CPT | Performed by: SURGERY

## 2021-11-09 PROCEDURE — 88342 IMHCHEM/IMCYTCHM 1ST ANTB: CPT | Performed by: PATHOLOGY

## 2021-11-09 PROCEDURE — 38792 RA TRACER ID OF SENTINL NODE: CPT | Performed by: SURGERY

## 2021-11-09 PROCEDURE — 88307 TISSUE EXAM BY PATHOLOGIST: CPT | Performed by: PATHOLOGY

## 2021-11-09 PROCEDURE — A9541 TC99M SULFUR COLLOID: HCPCS

## 2021-11-09 PROCEDURE — 88341 IMHCHEM/IMCYTCHM EA ADD ANTB: CPT | Performed by: PATHOLOGY

## 2021-11-09 PROCEDURE — 19307 MAST MOD RAD: CPT | Performed by: PHYSICIAN ASSISTANT

## 2021-11-09 PROCEDURE — 78195 LYMPH SYSTEM IMAGING: CPT

## 2021-11-09 RX ORDER — HYDROMORPHONE HCL IN WATER/PF 6 MG/30 ML
0.2 PATIENT CONTROLLED ANALGESIA SYRINGE INTRAVENOUS
Status: DISCONTINUED | OUTPATIENT
Start: 2021-11-09 | End: 2021-11-09 | Stop reason: HOSPADM

## 2021-11-09 RX ORDER — ONDANSETRON 2 MG/ML
4 INJECTION INTRAMUSCULAR; INTRAVENOUS EVERY 6 HOURS PRN
Status: DISCONTINUED | OUTPATIENT
Start: 2021-11-09 | End: 2021-11-10 | Stop reason: HOSPADM

## 2021-11-09 RX ORDER — AMITRIPTYLINE HYDROCHLORIDE 25 MG/1
50 TABLET, FILM COATED ORAL
Status: DISCONTINUED | OUTPATIENT
Start: 2021-11-09 | End: 2021-11-10 | Stop reason: HOSPADM

## 2021-11-09 RX ORDER — FENTANYL CITRATE/PF 50 MCG/ML
50 SYRINGE (ML) INJECTION
Status: DISCONTINUED | OUTPATIENT
Start: 2021-11-09 | End: 2021-11-09 | Stop reason: HOSPADM

## 2021-11-09 RX ORDER — LISINOPRIL 2.5 MG/1
2.5 TABLET ORAL DAILY
Status: DISCONTINUED | OUTPATIENT
Start: 2021-11-10 | End: 2021-11-10

## 2021-11-09 RX ORDER — PANTOPRAZOLE SODIUM 40 MG/1
40 TABLET, DELAYED RELEASE ORAL
Status: DISCONTINUED | OUTPATIENT
Start: 2021-11-09 | End: 2021-11-10 | Stop reason: HOSPADM

## 2021-11-09 RX ORDER — FENTANYL CITRATE 50 UG/ML
INJECTION, SOLUTION INTRAMUSCULAR; INTRAVENOUS AS NEEDED
Status: DISCONTINUED | OUTPATIENT
Start: 2021-11-09 | End: 2021-11-09

## 2021-11-09 RX ORDER — ROCURONIUM BROMIDE 10 MG/ML
INJECTION, SOLUTION INTRAVENOUS AS NEEDED
Status: DISCONTINUED | OUTPATIENT
Start: 2021-11-09 | End: 2021-11-09

## 2021-11-09 RX ORDER — ALBUTEROL SULFATE 90 UG/1
AEROSOL, METERED RESPIRATORY (INHALATION) AS NEEDED
Status: DISCONTINUED | OUTPATIENT
Start: 2021-11-09 | End: 2021-11-09

## 2021-11-09 RX ORDER — LORATADINE 10 MG/1
10 TABLET ORAL DAILY
Status: DISCONTINUED | OUTPATIENT
Start: 2021-11-09 | End: 2021-11-10 | Stop reason: HOSPADM

## 2021-11-09 RX ORDER — BENZTROPINE MESYLATE 0.5 MG/1
0.5 TABLET ORAL
Status: DISCONTINUED | OUTPATIENT
Start: 2021-11-09 | End: 2021-11-10 | Stop reason: HOSPADM

## 2021-11-09 RX ORDER — GABAPENTIN 600 MG/1
600 TABLET ORAL 3 TIMES DAILY
Status: DISCONTINUED | OUTPATIENT
Start: 2021-11-09 | End: 2021-11-10 | Stop reason: HOSPADM

## 2021-11-09 RX ORDER — ATORVASTATIN CALCIUM 40 MG/1
40 TABLET, FILM COATED ORAL
Status: DISCONTINUED | OUTPATIENT
Start: 2021-11-09 | End: 2021-11-10 | Stop reason: HOSPADM

## 2021-11-09 RX ORDER — VILAZODONE HYDROCHLORIDE 40 MG/1
40 TABLET ORAL DAILY
Status: DISCONTINUED | OUTPATIENT
Start: 2021-11-10 | End: 2021-11-10 | Stop reason: HOSPADM

## 2021-11-09 RX ORDER — PROPOFOL 10 MG/ML
INJECTION, EMULSION INTRAVENOUS AS NEEDED
Status: DISCONTINUED | OUTPATIENT
Start: 2021-11-09 | End: 2021-11-09

## 2021-11-09 RX ORDER — NEOSTIGMINE METHYLSULFATE 1 MG/ML
INJECTION INTRAVENOUS AS NEEDED
Status: DISCONTINUED | OUTPATIENT
Start: 2021-11-09 | End: 2021-11-09

## 2021-11-09 RX ORDER — MIDAZOLAM HYDROCHLORIDE 2 MG/2ML
INJECTION, SOLUTION INTRAMUSCULAR; INTRAVENOUS AS NEEDED
Status: DISCONTINUED | OUTPATIENT
Start: 2021-11-09 | End: 2021-11-09

## 2021-11-09 RX ORDER — EPHEDRINE SULFATE 50 MG/ML
INJECTION INTRAVENOUS AS NEEDED
Status: DISCONTINUED | OUTPATIENT
Start: 2021-11-09 | End: 2021-11-09

## 2021-11-09 RX ORDER — ACETAMINOPHEN 325 MG/1
650 TABLET ORAL EVERY 6 HOURS PRN
Status: DISCONTINUED | OUTPATIENT
Start: 2021-11-09 | End: 2021-11-10 | Stop reason: HOSPADM

## 2021-11-09 RX ORDER — NICOTINE 21 MG/24HR
1 PATCH, TRANSDERMAL 24 HOURS TRANSDERMAL EVERY 24 HOURS
Status: DISCONTINUED | OUTPATIENT
Start: 2021-11-09 | End: 2021-11-10 | Stop reason: HOSPADM

## 2021-11-09 RX ORDER — ONDANSETRON 2 MG/ML
INJECTION INTRAMUSCULAR; INTRAVENOUS AS NEEDED
Status: DISCONTINUED | OUTPATIENT
Start: 2021-11-09 | End: 2021-11-09

## 2021-11-09 RX ORDER — LEVOFLOXACIN 5 MG/ML
500 INJECTION, SOLUTION INTRAVENOUS ONCE
Status: COMPLETED | OUTPATIENT
Start: 2021-11-09 | End: 2021-11-09

## 2021-11-09 RX ORDER — LIDOCAINE HYDROCHLORIDE 10 MG/ML
INJECTION, SOLUTION EPIDURAL; INFILTRATION; INTRACAUDAL; PERINEURAL AS NEEDED
Status: DISCONTINUED | OUTPATIENT
Start: 2021-11-09 | End: 2021-11-09

## 2021-11-09 RX ORDER — MAGNESIUM HYDROXIDE 1200 MG/15ML
LIQUID ORAL AS NEEDED
Status: DISCONTINUED | OUTPATIENT
Start: 2021-11-09 | End: 2021-11-09 | Stop reason: HOSPADM

## 2021-11-09 RX ORDER — HEPARIN SODIUM 5000 [USP'U]/ML
5000 INJECTION, SOLUTION INTRAVENOUS; SUBCUTANEOUS EVERY 8 HOURS SCHEDULED
Status: DISCONTINUED | OUTPATIENT
Start: 2021-11-10 | End: 2021-11-10 | Stop reason: HOSPADM

## 2021-11-09 RX ORDER — ASPIRIN 81 MG/1
81 TABLET ORAL DAILY
Status: DISCONTINUED | OUTPATIENT
Start: 2021-11-09 | End: 2021-11-10 | Stop reason: HOSPADM

## 2021-11-09 RX ORDER — GLYCOPYRROLATE 0.2 MG/ML
INJECTION INTRAMUSCULAR; INTRAVENOUS AS NEEDED
Status: DISCONTINUED | OUTPATIENT
Start: 2021-11-09 | End: 2021-11-09

## 2021-11-09 RX ORDER — FENOFIBRATE 48 MG/1
48 TABLET, COATED ORAL DAILY
Status: DISCONTINUED | OUTPATIENT
Start: 2021-11-10 | End: 2021-11-10 | Stop reason: HOSPADM

## 2021-11-09 RX ORDER — SODIUM CHLORIDE, SODIUM LACTATE, POTASSIUM CHLORIDE, CALCIUM CHLORIDE 600; 310; 30; 20 MG/100ML; MG/100ML; MG/100ML; MG/100ML
100 INJECTION, SOLUTION INTRAVENOUS CONTINUOUS
Status: DISPENSED | OUTPATIENT
Start: 2021-11-09 | End: 2021-11-09

## 2021-11-09 RX ORDER — HYDROMORPHONE HCL/PF 1 MG/ML
0.5 SYRINGE (ML) INJECTION
Status: DISCONTINUED | OUTPATIENT
Start: 2021-11-09 | End: 2021-11-10 | Stop reason: HOSPADM

## 2021-11-09 RX ORDER — HYDROCODONE BITARTRATE AND ACETAMINOPHEN 5; 325 MG/1; MG/1
1 TABLET ORAL EVERY 6 HOURS PRN
Status: DISCONTINUED | OUTPATIENT
Start: 2021-11-09 | End: 2021-11-10 | Stop reason: HOSPADM

## 2021-11-09 RX ORDER — TRAZODONE HYDROCHLORIDE 150 MG/1
150 TABLET ORAL
Status: DISCONTINUED | OUTPATIENT
Start: 2021-11-09 | End: 2021-11-10 | Stop reason: HOSPADM

## 2021-11-09 RX ORDER — SODIUM CHLORIDE, SODIUM LACTATE, POTASSIUM CHLORIDE, CALCIUM CHLORIDE 600; 310; 30; 20 MG/100ML; MG/100ML; MG/100ML; MG/100ML
125 INJECTION, SOLUTION INTRAVENOUS CONTINUOUS
Status: DISCONTINUED | OUTPATIENT
Start: 2021-11-09 | End: 2021-11-10 | Stop reason: HOSPADM

## 2021-11-09 RX ORDER — HYDROCODONE BITARTRATE AND ACETAMINOPHEN 5; 325 MG/1; MG/1
2 TABLET ORAL EVERY 6 HOURS PRN
Status: DISCONTINUED | OUTPATIENT
Start: 2021-11-09 | End: 2021-11-10 | Stop reason: HOSPADM

## 2021-11-09 RX ORDER — CLONAZEPAM 0.5 MG/1
0.5 TABLET ORAL DAILY PRN
Status: DISCONTINUED | OUTPATIENT
Start: 2021-11-09 | End: 2021-11-10 | Stop reason: HOSPADM

## 2021-11-09 RX ORDER — ALBUTEROL SULFATE 90 UG/1
2 AEROSOL, METERED RESPIRATORY (INHALATION) EVERY 4 HOURS PRN
Status: DISCONTINUED | OUTPATIENT
Start: 2021-11-09 | End: 2021-11-10 | Stop reason: HOSPADM

## 2021-11-09 RX ADMIN — HYDROCODONE BITARTRATE AND ACETAMINOPHEN 1 TABLET: 5; 325 TABLET ORAL at 17:25

## 2021-11-09 RX ADMIN — GABAPENTIN 600 MG: 600 TABLET, FILM COATED ORAL at 21:24

## 2021-11-09 RX ADMIN — PANTOPRAZOLE SODIUM 40 MG: 40 TABLET, DELAYED RELEASE ORAL at 17:25

## 2021-11-09 RX ADMIN — LORATADINE 10 MG: 10 TABLET ORAL at 17:24

## 2021-11-09 RX ADMIN — AMITRIPTYLINE HYDROCHLORIDE 50 MG: 25 TABLET, FILM COATED ORAL at 21:25

## 2021-11-09 RX ADMIN — ROCURONIUM BROMIDE 50 MG: 10 INJECTION INTRAVENOUS at 13:52

## 2021-11-09 RX ADMIN — ONDANSETRON 4 MG: 2 INJECTION INTRAMUSCULAR; INTRAVENOUS at 15:24

## 2021-11-09 RX ADMIN — ROCURONIUM BROMIDE 10 MG: 10 INJECTION INTRAVENOUS at 14:29

## 2021-11-09 RX ADMIN — LIDOCAINE HYDROCHLORIDE 50 MG: 10 INJECTION, SOLUTION EPIDURAL; INFILTRATION; INTRACAUDAL; PERINEURAL at 13:52

## 2021-11-09 RX ADMIN — FENTANYL CITRATE 50 MCG: 50 INJECTION INTRAMUSCULAR; INTRAVENOUS at 13:51

## 2021-11-09 RX ADMIN — EPHEDRINE SULFATE 10 MG: 50 INJECTION, SOLUTION INTRAVENOUS at 15:38

## 2021-11-09 RX ADMIN — EPHEDRINE SULFATE 10 MG: 50 INJECTION, SOLUTION INTRAVENOUS at 15:32

## 2021-11-09 RX ADMIN — MIDAZOLAM HYDROCHLORIDE 2 MG: 1 INJECTION, SOLUTION INTRAMUSCULAR; INTRAVENOUS at 13:50

## 2021-11-09 RX ADMIN — NICOTINE 1 PATCH: 21 PATCH, EXTENDED RELEASE TRANSDERMAL at 17:26

## 2021-11-09 RX ADMIN — HYDROMORPHONE HYDROCHLORIDE 0.5 MG: 1 INJECTION, SOLUTION INTRAMUSCULAR; INTRAVENOUS; SUBCUTANEOUS at 19:43

## 2021-11-09 RX ADMIN — NEOSTIGMINE METHYLSULFATE 3 MG: 1 INJECTION INTRAVENOUS at 15:38

## 2021-11-09 RX ADMIN — PROPOFOL 150 MG: 10 INJECTION, EMULSION INTRAVENOUS at 13:52

## 2021-11-09 RX ADMIN — GABAPENTIN 600 MG: 600 TABLET, FILM COATED ORAL at 17:25

## 2021-11-09 RX ADMIN — FENTANYL CITRATE 50 MCG: 50 INJECTION INTRAMUSCULAR; INTRAVENOUS at 14:25

## 2021-11-09 RX ADMIN — LEVOFLOXACIN: 5 INJECTION, SOLUTION INTRAVENOUS at 13:45

## 2021-11-09 RX ADMIN — ALBUTEROL SULFATE 2 PUFF: 90 AEROSOL, METERED RESPIRATORY (INHALATION) at 15:56

## 2021-11-09 RX ADMIN — TRAZODONE HYDROCHLORIDE 150 MG: 150 TABLET ORAL at 21:25

## 2021-11-09 RX ADMIN — BENZTROPINE MESYLATE 0.5 MG: 0.5 TABLET ORAL at 21:25

## 2021-11-09 RX ADMIN — GLYCOPYRROLATE 0.4 MG: 0.2 INJECTION, SOLUTION INTRAMUSCULAR; INTRAVENOUS at 15:38

## 2021-11-09 RX ADMIN — SODIUM CHLORIDE, SODIUM LACTATE, POTASSIUM CHLORIDE, AND CALCIUM CHLORIDE 125 ML/HR: .6; .31; .03; .02 INJECTION, SOLUTION INTRAVENOUS at 16:27

## 2021-11-09 RX ADMIN — SODIUM CHLORIDE, SODIUM LACTATE, POTASSIUM CHLORIDE, AND CALCIUM CHLORIDE 100 ML/HR: .6; .31; .03; .02 INJECTION, SOLUTION INTRAVENOUS at 17:31

## 2021-11-09 RX ADMIN — ATORVASTATIN CALCIUM 40 MG: 40 TABLET, FILM COATED ORAL at 21:25

## 2021-11-09 RX ADMIN — METFORMIN HYDROCHLORIDE 1000 MG: 500 TABLET, FILM COATED ORAL at 17:24

## 2021-11-09 RX ADMIN — FENTANYL CITRATE 50 MCG: 50 INJECTION INTRAMUSCULAR; INTRAVENOUS at 16:25

## 2021-11-09 RX ADMIN — ASPIRIN 81 MG: 81 TABLET, COATED ORAL at 17:25

## 2021-11-09 RX ADMIN — CLONAZEPAM 0.5 MG: 0.5 TABLET ORAL at 17:25

## 2021-11-09 RX ADMIN — SODIUM CHLORIDE, SODIUM LACTATE, POTASSIUM CHLORIDE, AND CALCIUM CHLORIDE 125 ML/HR: .6; .31; .03; .02 INJECTION, SOLUTION INTRAVENOUS at 11:21

## 2021-11-10 VITALS
DIASTOLIC BLOOD PRESSURE: 60 MMHG | HEIGHT: 67 IN | HEART RATE: 89 BPM | RESPIRATION RATE: 16 BRPM | TEMPERATURE: 96.5 F | SYSTOLIC BLOOD PRESSURE: 110 MMHG | WEIGHT: 204 LBS | OXYGEN SATURATION: 98 % | BODY MASS INDEX: 32.02 KG/M2

## 2021-11-10 LAB
ALBUMIN SERPL BCP-MCNC: 3.6 G/DL (ref 3.5–5.7)
ALP SERPL-CCNC: 44 U/L (ref 55–165)
ALT SERPL W P-5'-P-CCNC: 13 U/L (ref 7–52)
ANION GAP SERPL CALCULATED.3IONS-SCNC: 4 MMOL/L (ref 4–13)
AST SERPL W P-5'-P-CCNC: 12 U/L (ref 13–39)
BASOPHILS # BLD AUTO: 0 THOUSANDS/ΜL (ref 0–0.1)
BASOPHILS NFR BLD AUTO: 0 % (ref 0–2)
BILIRUB SERPL-MCNC: 0.4 MG/DL (ref 0.2–1)
BUN SERPL-MCNC: 16 MG/DL (ref 7–25)
CALCIUM SERPL-MCNC: 8.5 MG/DL (ref 8.6–10.5)
CHLORIDE SERPL-SCNC: 101 MMOL/L (ref 98–107)
CO2 SERPL-SCNC: 30 MMOL/L (ref 21–31)
CREAT SERPL-MCNC: 0.96 MG/DL (ref 0.6–1.2)
EOSINOPHIL # BLD AUTO: 0 THOUSAND/ΜL (ref 0–0.61)
EOSINOPHIL NFR BLD AUTO: 0 % (ref 0–5)
ERYTHROCYTE [DISTWIDTH] IN BLOOD BY AUTOMATED COUNT: 15.2 % (ref 11.5–14.5)
GFR SERPL CREATININE-BSD FRML MDRD: 64 ML/MIN/1.73SQ M
GLUCOSE SERPL-MCNC: 111 MG/DL (ref 65–140)
GLUCOSE SERPL-MCNC: 121 MG/DL (ref 65–140)
GLUCOSE SERPL-MCNC: 134 MG/DL (ref 65–99)
HCT VFR BLD AUTO: 33.9 % (ref 42–47)
HGB BLD-MCNC: 11.3 G/DL (ref 12–16)
LYMPHOCYTES # BLD AUTO: 1.6 THOUSANDS/ΜL (ref 0.6–4.47)
LYMPHOCYTES NFR BLD AUTO: 22 % (ref 21–51)
MCH RBC QN AUTO: 29.7 PG (ref 26–34)
MCHC RBC AUTO-ENTMCNC: 33.3 G/DL (ref 31–37)
MCV RBC AUTO: 89 FL (ref 81–99)
MONOCYTES # BLD AUTO: 0.6 THOUSAND/ΜL (ref 0.17–1.22)
MONOCYTES NFR BLD AUTO: 9 % (ref 2–12)
NEUTROPHILS # BLD AUTO: 5.1 THOUSANDS/ΜL (ref 1.4–6.5)
NEUTS SEG NFR BLD AUTO: 69 % (ref 42–75)
PLATELET # BLD AUTO: 183 THOUSANDS/UL (ref 149–390)
PMV BLD AUTO: 7.8 FL (ref 8.6–11.7)
POTASSIUM SERPL-SCNC: 4.2 MMOL/L (ref 3.5–5.5)
PROT SERPL-MCNC: 6.2 G/DL (ref 6.4–8.9)
RBC # BLD AUTO: 3.79 MILLION/UL (ref 3.9–5.2)
SODIUM SERPL-SCNC: 135 MMOL/L (ref 134–143)
WBC # BLD AUTO: 7.3 THOUSAND/UL (ref 4.8–10.8)

## 2021-11-10 PROCEDURE — 99244 OFF/OP CNSLTJ NEW/EST MOD 40: CPT | Performed by: NURSE PRACTITIONER

## 2021-11-10 PROCEDURE — 99024 POSTOP FOLLOW-UP VISIT: CPT | Performed by: PHYSICIAN ASSISTANT

## 2021-11-10 PROCEDURE — 82948 REAGENT STRIP/BLOOD GLUCOSE: CPT

## 2021-11-10 PROCEDURE — 80053 COMPREHEN METABOLIC PANEL: CPT | Performed by: NURSE PRACTITIONER

## 2021-11-10 PROCEDURE — 85025 COMPLETE CBC W/AUTO DIFF WBC: CPT | Performed by: NURSE PRACTITIONER

## 2021-11-10 RX ORDER — LISINOPRIL 2.5 MG/1
2.5 TABLET ORAL DAILY
Status: DISCONTINUED | OUTPATIENT
Start: 2021-11-11 | End: 2021-11-10 | Stop reason: HOSPADM

## 2021-11-10 RX ORDER — HYDROCODONE BITARTRATE AND ACETAMINOPHEN 5; 325 MG/1; MG/1
1 TABLET ORAL EVERY 6 HOURS PRN
Qty: 12 TABLET | Refills: 0 | Status: SHIPPED | OUTPATIENT
Start: 2021-11-10 | End: 2021-11-20

## 2021-11-10 RX ADMIN — LORATADINE 10 MG: 10 TABLET ORAL at 08:40

## 2021-11-10 RX ADMIN — HEPARIN SODIUM 5000 UNITS: 5000 INJECTION, SOLUTION INTRAVENOUS; SUBCUTANEOUS at 06:07

## 2021-11-10 RX ADMIN — CARIPRAZINE 6 MG: 4.5 CAPSULE, GELATIN COATED ORAL at 08:39

## 2021-11-10 RX ADMIN — VILAZODONE HYDROCHLORIDE 40 MG: 40 TABLET ORAL at 08:40

## 2021-11-10 RX ADMIN — FENOFIBRATE 48 MG: 48 TABLET, FILM COATED ORAL at 08:40

## 2021-11-10 RX ADMIN — GABAPENTIN 600 MG: 600 TABLET, FILM COATED ORAL at 08:40

## 2021-11-10 RX ADMIN — SITAGLIPTIN 25 MG: 25 TABLET, FILM COATED ORAL at 08:40

## 2021-11-10 RX ADMIN — METFORMIN HYDROCHLORIDE 1000 MG: 500 TABLET, FILM COATED ORAL at 08:40

## 2021-11-10 RX ADMIN — PANTOPRAZOLE SODIUM 40 MG: 40 TABLET, DELAYED RELEASE ORAL at 06:07

## 2021-11-10 RX ADMIN — LISINOPRIL 2.5 MG: 2.5 TABLET ORAL at 08:40

## 2021-11-10 RX ADMIN — HYDROCODONE BITARTRATE AND ACETAMINOPHEN 2 TABLET: 5; 325 TABLET ORAL at 00:52

## 2021-11-10 RX ADMIN — ASPIRIN 81 MG: 81 TABLET, COATED ORAL at 08:41

## 2021-11-10 RX ADMIN — TIOTROPIUM BROMIDE 18 MCG: 18 CAPSULE ORAL; RESPIRATORY (INHALATION) at 08:39

## 2021-11-10 RX ADMIN — SODIUM CHLORIDE, SODIUM LACTATE, POTASSIUM CHLORIDE, AND CALCIUM CHLORIDE 125 ML/HR: .6; .31; .03; .02 INJECTION, SOLUTION INTRAVENOUS at 00:52

## 2021-11-15 ENCOUNTER — OFFICE VISIT (OUTPATIENT)
Dept: SURGERY | Facility: CLINIC | Age: 61
End: 2021-11-15

## 2021-11-15 VITALS — TEMPERATURE: 97.2 F

## 2021-11-15 DIAGNOSIS — Z17.0 MALIGNANT NEOPLASM OF UPPER-INNER QUADRANT OF RIGHT BREAST IN FEMALE, ESTROGEN RECEPTOR POSITIVE (HCC): Primary | ICD-10-CM

## 2021-11-15 DIAGNOSIS — C50.211 MALIGNANT NEOPLASM OF UPPER-INNER QUADRANT OF RIGHT BREAST IN FEMALE, ESTROGEN RECEPTOR POSITIVE (HCC): Primary | ICD-10-CM

## 2021-11-15 LAB — SCAN RESULT: NORMAL

## 2021-11-15 PROCEDURE — 99024 POSTOP FOLLOW-UP VISIT: CPT | Performed by: PHYSICIAN ASSISTANT

## 2021-11-17 ENCOUNTER — TELEPHONE (OUTPATIENT)
Dept: GENETICS | Facility: CLINIC | Age: 61
End: 2021-11-17

## 2021-11-17 ENCOUNTER — TELEPHONE (OUTPATIENT)
Dept: SURGERY | Facility: CLINIC | Age: 61
End: 2021-11-17

## 2021-11-22 ENCOUNTER — OFFICE VISIT (OUTPATIENT)
Dept: SURGERY | Facility: CLINIC | Age: 61
End: 2021-11-22

## 2021-11-22 VITALS — TEMPERATURE: 98 F

## 2021-11-22 DIAGNOSIS — C50.411 PRIMARY CANCER OF UPPER OUTER QUADRANT OF RIGHT BREAST (HCC): ICD-10-CM

## 2021-11-22 DIAGNOSIS — C50.211 MALIGNANT NEOPLASM OF UPPER-INNER QUADRANT OF RIGHT BREAST IN FEMALE, ESTROGEN RECEPTOR POSITIVE (HCC): Primary | ICD-10-CM

## 2021-11-22 DIAGNOSIS — Z17.0 MALIGNANT NEOPLASM OF UPPER-INNER QUADRANT OF RIGHT BREAST IN FEMALE, ESTROGEN RECEPTOR POSITIVE (HCC): Primary | ICD-10-CM

## 2021-11-22 PROCEDURE — 99024 POSTOP FOLLOW-UP VISIT: CPT | Performed by: SURGERY

## 2021-11-23 ENCOUNTER — TELEPHONE (OUTPATIENT)
Dept: SURGERY | Facility: CLINIC | Age: 61
End: 2021-11-23

## 2021-11-29 ENCOUNTER — PATIENT OUTREACH (OUTPATIENT)
Dept: OTHER | Facility: CLINIC | Age: 61
End: 2021-11-29

## 2021-11-29 ENCOUNTER — VBI (OUTPATIENT)
Dept: ADMINISTRATIVE | Facility: OTHER | Age: 61
End: 2021-11-29

## 2021-11-30 ENCOUNTER — TELEPHONE (OUTPATIENT)
Dept: HEMATOLOGY ONCOLOGY | Facility: CLINIC | Age: 61
End: 2021-11-30

## 2021-11-30 ENCOUNTER — DOCUMENTATION (OUTPATIENT)
Dept: HEMATOLOGY ONCOLOGY | Facility: CLINIC | Age: 61
End: 2021-11-30

## 2021-12-01 DIAGNOSIS — E11.9 DIABETES MELLITUS WITHOUT COMPLICATION (HCC): ICD-10-CM

## 2021-12-02 ENCOUNTER — TELEPHONE (OUTPATIENT)
Dept: FAMILY MEDICINE CLINIC | Facility: HOME HEALTHCARE | Age: 61
End: 2021-12-02

## 2021-12-03 ENCOUNTER — OFFICE VISIT (OUTPATIENT)
Dept: CARDIOLOGY CLINIC | Facility: HOSPITAL | Age: 61
End: 2021-12-03
Payer: COMMERCIAL

## 2021-12-03 VITALS
BODY MASS INDEX: 34.56 KG/M2 | SYSTOLIC BLOOD PRESSURE: 126 MMHG | DIASTOLIC BLOOD PRESSURE: 68 MMHG | HEIGHT: 67 IN | WEIGHT: 220.2 LBS | HEART RATE: 88 BPM

## 2021-12-03 DIAGNOSIS — E78.00 HYPERCHOLESTEROLEMIA: Primary | ICD-10-CM

## 2021-12-03 DIAGNOSIS — R06.00 DOE (DYSPNEA ON EXERTION): ICD-10-CM

## 2021-12-03 DIAGNOSIS — R07.9 CHEST PAIN, UNSPECIFIED TYPE: ICD-10-CM

## 2021-12-03 DIAGNOSIS — Z72.0 TOBACCO ABUSE: ICD-10-CM

## 2021-12-03 DIAGNOSIS — I73.9 CLAUDICATION (HCC): ICD-10-CM

## 2021-12-03 PROCEDURE — 99214 OFFICE O/P EST MOD 30 MIN: CPT | Performed by: INTERNAL MEDICINE

## 2021-12-03 RX ORDER — LINAGLIPTIN 5 MG/1
TABLET, FILM COATED ORAL
Qty: 90 TABLET | Refills: 0 | Status: SHIPPED | OUTPATIENT
Start: 2021-12-03 | End: 2021-12-28 | Stop reason: ALTCHOICE

## 2021-12-07 ENCOUNTER — OFFICE VISIT (OUTPATIENT)
Dept: FAMILY MEDICINE CLINIC | Facility: HOME HEALTHCARE | Age: 61
End: 2021-12-07
Payer: COMMERCIAL

## 2021-12-07 ENCOUNTER — PATIENT OUTREACH (OUTPATIENT)
Dept: OTHER | Facility: CLINIC | Age: 61
End: 2021-12-07

## 2021-12-07 VITALS
HEART RATE: 104 BPM | DIASTOLIC BLOOD PRESSURE: 54 MMHG | WEIGHT: 220.2 LBS | SYSTOLIC BLOOD PRESSURE: 114 MMHG | BODY MASS INDEX: 34.49 KG/M2 | OXYGEN SATURATION: 93 % | RESPIRATION RATE: 16 BRPM | TEMPERATURE: 99.3 F

## 2021-12-07 DIAGNOSIS — Z90.11 STATUS POST RIGHT MASTECTOMY: ICD-10-CM

## 2021-12-07 DIAGNOSIS — C50.411 PRIMARY CANCER OF UPPER OUTER QUADRANT OF RIGHT BREAST (HCC): Primary | ICD-10-CM

## 2021-12-07 DIAGNOSIS — Z72.0 TOBACCO ABUSE: ICD-10-CM

## 2021-12-07 DIAGNOSIS — J43.9 PULMONARY EMPHYSEMA, UNSPECIFIED EMPHYSEMA TYPE (HCC): ICD-10-CM

## 2021-12-07 DIAGNOSIS — F31.9 BIPOLAR 1 DISORDER (HCC): ICD-10-CM

## 2021-12-07 DIAGNOSIS — F25.9 SCHIZOAFFECTIVE DISORDER, UNSPECIFIED TYPE (HCC): ICD-10-CM

## 2021-12-07 DIAGNOSIS — Z12.11 COLON CANCER SCREENING: ICD-10-CM

## 2021-12-07 DIAGNOSIS — E11.65 TYPE 2 DIABETES MELLITUS WITH HYPERGLYCEMIA, WITHOUT LONG-TERM CURRENT USE OF INSULIN (HCC): ICD-10-CM

## 2021-12-07 DIAGNOSIS — E78.00 HYPERCHOLESTEROLEMIA: ICD-10-CM

## 2021-12-07 DIAGNOSIS — F17.210 CIGARETTE NICOTINE DEPENDENCE WITHOUT COMPLICATION: ICD-10-CM

## 2021-12-07 PROCEDURE — T1015 CLINIC SERVICE: HCPCS | Performed by: PHYSICIAN ASSISTANT

## 2021-12-07 RX ORDER — POLYETHYLENE GLYCOL 3350 17 G
4 POWDER IN PACKET (EA) ORAL AS NEEDED
Qty: 100 EACH | Refills: 0 | Status: SHIPPED | OUTPATIENT
Start: 2021-12-07 | End: 2022-07-22

## 2021-12-08 ENCOUNTER — HOSPITAL ENCOUNTER (OUTPATIENT)
Dept: NON INVASIVE DIAGNOSTICS | Facility: HOSPITAL | Age: 61
Discharge: HOME | End: 2021-12-08
Attending: INTERNAL MEDICINE
Payer: COMMERCIAL

## 2021-12-08 DIAGNOSIS — R07.9 CHEST PAIN, UNSPECIFIED TYPE: ICD-10-CM

## 2021-12-08 DIAGNOSIS — E78.00 HYPERCHOLESTEROLEMIA: ICD-10-CM

## 2021-12-08 PROCEDURE — 93226 XTRNL ECG REC<48 HR SCAN A/R: CPT

## 2021-12-08 PROCEDURE — 93225 XTRNL ECG REC<48 HRS REC: CPT

## 2021-12-09 DIAGNOSIS — G89.29 OTHER CHRONIC PAIN: ICD-10-CM

## 2021-12-09 RX ORDER — GABAPENTIN 600 MG/1
TABLET ORAL
Qty: 270 TABLET | Refills: 0 | Status: SHIPPED | OUTPATIENT
Start: 2021-12-09 | End: 2022-03-07

## 2021-12-13 PROCEDURE — 93227 XTRNL ECG REC<48 HR R&I: CPT | Performed by: INTERNAL MEDICINE

## 2021-12-17 ENCOUNTER — HOSPITAL ENCOUNTER (OUTPATIENT)
Dept: NON INVASIVE DIAGNOSTICS | Facility: HOSPITAL | Age: 61
Discharge: HOME | End: 2021-12-17
Attending: INTERNAL MEDICINE
Payer: COMMERCIAL

## 2021-12-17 DIAGNOSIS — E78.00 HYPERCHOLESTEROLEMIA: ICD-10-CM

## 2021-12-17 DIAGNOSIS — I73.9 CLAUDICATION (HCC): ICD-10-CM

## 2021-12-17 PROCEDURE — 93922 UPR/L XTREMITY ART 2 LEVELS: CPT | Performed by: SURGERY

## 2021-12-17 PROCEDURE — 93925 LOWER EXTREMITY STUDY: CPT | Performed by: SURGERY

## 2021-12-17 PROCEDURE — 93925 LOWER EXTREMITY STUDY: CPT

## 2021-12-17 PROCEDURE — 93923 UPR/LXTR ART STDY 3+ LVLS: CPT

## 2021-12-20 DIAGNOSIS — E11.9 DIABETES MELLITUS WITHOUT COMPLICATION (HCC): ICD-10-CM

## 2021-12-20 RX ORDER — LIRAGLUTIDE 6 MG/ML
INJECTION SUBCUTANEOUS
Qty: 6 ML | Refills: 5 | Status: SHIPPED | OUTPATIENT
Start: 2021-12-20

## 2021-12-22 ENCOUNTER — OFFICE VISIT (OUTPATIENT)
Dept: SURGERY | Facility: CLINIC | Age: 61
End: 2021-12-22

## 2021-12-22 VITALS
BODY MASS INDEX: 34.53 KG/M2 | DIASTOLIC BLOOD PRESSURE: 60 MMHG | SYSTOLIC BLOOD PRESSURE: 110 MMHG | WEIGHT: 220 LBS | TEMPERATURE: 97.9 F | HEART RATE: 103 BPM | HEIGHT: 67 IN

## 2021-12-22 DIAGNOSIS — C50.411 PRIMARY CANCER OF UPPER OUTER QUADRANT OF RIGHT BREAST (HCC): Primary | ICD-10-CM

## 2021-12-22 PROCEDURE — 99024 POSTOP FOLLOW-UP VISIT: CPT | Performed by: SURGERY

## 2021-12-22 PROCEDURE — 3008F BODY MASS INDEX DOCD: CPT | Performed by: INTERNAL MEDICINE

## 2021-12-28 ENCOUNTER — TELEPHONE (OUTPATIENT)
Dept: FAMILY MEDICINE CLINIC | Facility: HOME HEALTHCARE | Age: 61
End: 2021-12-28

## 2022-01-06 ENCOUNTER — CONSULT (OUTPATIENT)
Dept: HEMATOLOGY ONCOLOGY | Facility: CLINIC | Age: 62
End: 2022-01-06
Payer: COMMERCIAL

## 2022-01-06 ENCOUNTER — TELEPHONE (OUTPATIENT)
Dept: VASCULAR SURGERY | Facility: CLINIC | Age: 62
End: 2022-01-06

## 2022-01-06 VITALS
OXYGEN SATURATION: 96 % | BODY MASS INDEX: 35.5 KG/M2 | RESPIRATION RATE: 20 BRPM | HEIGHT: 67 IN | TEMPERATURE: 97.2 F | DIASTOLIC BLOOD PRESSURE: 62 MMHG | HEART RATE: 102 BPM | SYSTOLIC BLOOD PRESSURE: 124 MMHG | WEIGHT: 226.2 LBS

## 2022-01-06 DIAGNOSIS — C50.211 MALIGNANT NEOPLASM OF UPPER-INNER QUADRANT OF RIGHT BREAST IN FEMALE, ESTROGEN RECEPTOR POSITIVE (HCC): Primary | ICD-10-CM

## 2022-01-06 DIAGNOSIS — Z15.89 GENE MUTATION: ICD-10-CM

## 2022-01-06 DIAGNOSIS — Z17.0 MALIGNANT NEOPLASM OF UPPER-INNER QUADRANT OF RIGHT BREAST IN FEMALE, ESTROGEN RECEPTOR POSITIVE (HCC): Primary | ICD-10-CM

## 2022-01-06 DIAGNOSIS — Z79.811 AROMATASE INHIBITOR USE: ICD-10-CM

## 2022-01-06 PROCEDURE — 3008F BODY MASS INDEX DOCD: CPT | Performed by: INTERNAL MEDICINE

## 2022-01-06 PROCEDURE — 99245 OFF/OP CONSLTJ NEW/EST HI 55: CPT | Performed by: INTERNAL MEDICINE

## 2022-01-06 RX ORDER — ANASTROZOLE 1 MG/1
1 TABLET ORAL DAILY
Qty: 30 TABLET | Refills: 4 | Status: SHIPPED | OUTPATIENT
Start: 2022-01-06 | End: 2022-07-22

## 2022-01-12 ENCOUNTER — TELEPHONE (OUTPATIENT)
Dept: GENETICS | Facility: CLINIC | Age: 62
End: 2022-01-12

## 2022-01-12 NOTE — TELEPHONE ENCOUNTER
I called [place] to schedule a new patient appointment with the [facility]       Outcome:   I left a voicemail to patient regarding Dr Adri Sesay referral for genetic counseling  I encouraged patient to call back at [phone]  Patient spoke with Melissa Victor in November to review her genetic test results  Follow-up:   At this time the referral will be closed and we will wait to hear back from the patient regarding scheduling this appointment

## 2022-01-23 DIAGNOSIS — E11.9 TYPE 2 DIABETES MELLITUS WITHOUT COMPLICATION, WITHOUT LONG-TERM CURRENT USE OF INSULIN (HCC): ICD-10-CM

## 2022-01-23 DIAGNOSIS — K21.9 GERD WITHOUT ESOPHAGITIS: ICD-10-CM

## 2022-01-24 ENCOUNTER — TELEPHONE (OUTPATIENT)
Dept: HEMATOLOGY ONCOLOGY | Facility: CLINIC | Age: 62
End: 2022-01-24

## 2022-01-24 RX ORDER — PANTOPRAZOLE SODIUM 40 MG/1
TABLET, DELAYED RELEASE ORAL
Qty: 180 TABLET | Refills: 0 | Status: SHIPPED | OUTPATIENT
Start: 2022-01-24 | End: 2022-03-28 | Stop reason: SDUPTHER

## 2022-01-24 NOTE — TELEPHONE ENCOUNTER
Pt phoned to report that she stopped taking Anastrazole 2 days ago due to side effects  Pt stated she had diarrhea very bad and could not stop going  Pt also c/o tiredness and weak  I asked pt to return a call to me next Mon to see how she is doing as we can change the medication if need be  Pt verbalized understanding

## 2022-02-25 DIAGNOSIS — E11.9 DIABETES MELLITUS WITHOUT COMPLICATION (HCC): ICD-10-CM

## 2022-02-27 DIAGNOSIS — E11.9 TYPE 2 DIABETES MELLITUS WITHOUT COMPLICATION, WITHOUT LONG-TERM CURRENT USE OF INSULIN (HCC): ICD-10-CM

## 2022-02-28 RX ORDER — PEN NEEDLE, DIABETIC 32GX 5/32"
NEEDLE, DISPOSABLE MISCELLANEOUS
Qty: 90 EACH | Refills: 3 | Status: SHIPPED | OUTPATIENT
Start: 2022-02-28

## 2022-02-28 NOTE — TELEPHONE ENCOUNTER
Awaiting monitor results    Was he symptomatic?  If no, then proceed/resume Cardiac rehab phase 2     If yes, then hold off on rehab for now pending monitor results to assess PVC burden   Please see below

## 2022-03-02 ENCOUNTER — CONSULT (OUTPATIENT)
Dept: VASCULAR SURGERY | Facility: HOSPITAL | Age: 62
End: 2022-03-02
Attending: INTERNAL MEDICINE
Payer: COMMERCIAL

## 2022-03-02 VITALS
WEIGHT: 223 LBS | DIASTOLIC BLOOD PRESSURE: 70 MMHG | HEIGHT: 67 IN | HEART RATE: 96 BPM | SYSTOLIC BLOOD PRESSURE: 124 MMHG | BODY MASS INDEX: 35 KG/M2

## 2022-03-02 DIAGNOSIS — I73.9 PERIPHERAL ARTERIAL DISEASE (HCC): Primary | ICD-10-CM

## 2022-03-02 PROCEDURE — 4004F PT TOBACCO SCREEN RCVD TLK: CPT | Performed by: NURSE PRACTITIONER

## 2022-03-02 PROCEDURE — 99243 OFF/OP CNSLTJ NEW/EST LOW 30: CPT | Performed by: NURSE PRACTITIONER

## 2022-03-02 NOTE — PROGRESS NOTES
Assessment/Plan:    Peripheral arterial disease ([facility] [address] )  64 y o  female patient w/ a PMH significant for DM, HLD, chronic and ongoing tobacco use-about 64 pack years, currently smoking up to a pack and half a day, COPD, a history of DVT and IVC filter presents to the vascular office today w/ c/o BLE pain with ambulation and to review CAESAR duplex results  Imaging   CAESAR duplex (12/17/21): CAESAR duplex demonstrates diffuse disease throughout the BL femoral-popliteal arteries without significant focal stenosis  R STEFANI 1 13- normal range (Prior: 1 23), Metatarsal pressure of 140 mmHg, Great toe pressure of 94 mmHg, within the healing range  L STEFANI 1 08-normal range (Prior: 1 23), Metatarsal pressure of 121 mmHg, Great toe pressure of 76 mmHg, within the healing range  PVR/ PPG tracings are normal      Recommendations   Diffuse arterial disease throughout the femoral-popliteal arteries without focal stenosis  BL STEFANI's within the normal category and GT pressures are within healing range   We discussed the pathophysiology of arterial occlusive disease, available treatment options and indications for treatment   Continue medical optimization  Currently on ASA and statin   Goal hgb A1c<7 0, LDL<100, BP <140/90   Recommend walking program, ambulating at least 30 minutes 3-5 times weekly   Discussed the importance of smoking cessation  She defers   Recommend moisturization of the lower extremities, avoidance of injury and close observation of bilateral feet for any new wounds   Discussed that a portion of her symptoms could be neurogenic in nature as she does report a history of back pain  Review of a previous XR shows multilevel spondylosis with a grade 1 spondylolisthesis at the L3-4 level  An MRI would be beneficial for further evaluation of possible stenosis  She will follow up with her pain specialist     Nhung Rhodes Recommend repeat CAESAR duplex and follow up in 1 year      Instructed patient to contact office with any new symptoms or concerns  Diagnoses and all orders for this visit:    Peripheral arterial disease ([address] )  -     Ambulatory referral to Vascular Surgery  -     VAS lower limb arterial duplex, complete bilateral; Future          Subjective:      Patient ID: Renny Jim is a 64 y o  female  HPI:     64 y o  female with a past medical history significant for DM, HLD, chronic and ongoing tobacco use-about 64 pack years, currently smoking up to a pack and half a day, COPD, a history of DVT and IVC filter presents to the vascular office today w/ c/o BLE pain with ambulation and to review CAESAR duplex results  Patient is new and referred by Mary Alice Morgan MD  Patient is here to review CAESAR duplex completed on 12/17/21  Patient reports BLE cramping w/ walking after 1 block  Patient denies open wounds or ulcers  Patient does have a history of DVT w/ IVC filter  Patient is taking ASA 81mg and Atorvastatin  She is a current smoker (1 ppd)  Review of Systems   Constitutional: Negative  HENT: Negative  Eyes: Negative  Respiratory: Negative  Cardiovascular: Negative  Gastrointestinal: Negative  Endocrine: Negative  Genitourinary: Negative  Musculoskeletal: Negative  Skin: Negative  Allergic/Immunologic: Negative  Neurological: Negative  Hematological: Negative  Psychiatric/Behavioral: Negative  The following portions of the patient's history were reviewed and updated as appropriate: allergies, current medications, past family history, past medical history, past social history, past surgical history, and problem list     I have reviewed and made appropriate changes to the review of systems input by the medical assistant      Vitals:    03/02/22 1301   BP: 124/70   BP Location: Left arm   Patient Position: Sitting   Cuff Size: Standard   Pulse: 96   Weight: 101 kg (223 lb)   Height: 5' 7" (1 702 m)       Patient Active Problem List   Diagnosis  Cataract    Chronic hoarseness    Chronic low back pain    Centrilobular emphysema (HCC)    Deep vein thrombophlebitis of leg, unspecified laterality (HCC)    Depression    Diabetes mellitus with neurological manifestation (HCC)    Gastroesophageal reflux disease with esophagitis    Headache    Hypercholesterolemia    Hypercoagulable state ([address] )    Hypertension    Hypothyroidism    Mammogram abnormal    Migraine    Myositis    Neuropathy    Pulmonary embolism (HCC)    Pulmonary nodule seen on imaging study    Type 2 diabetes mellitus with hyperglycemia, without long-term current use of insulin (HCC)    Stage 2 chronic kidney disease    Acute renal failure (HCC)    Insect bite of ear, infected, left, initial encounter    Chronic hypoxemic respiratory failure (HCC)    Cigarette nicotine dependence without complication    Class 2 severe obesity due to excess calories with serious comorbidity and body mass index (BMI) of 37 0 to 37 9 in adult (HCC)    Breast nodule    MARQUEZ (dyspnea on exertion)    Sleep apnea    Obstructive sleep apnea syndrome    Family history of heart disease    Pulmonary hypertension (HCC)    Chest pain    Primary fibromyalgia syndrome    Primary cancer of upper outer quadrant of right breast ([address] )    Malignant neoplasm of upper-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, estrogen receptor positive ([address] )    Mucinous carcinoma of breast, right ([address] )    Tobacco abuse    Peripheral arterial disease ([address] )       Past Surgical History:   Procedure Laterality Date    BREAST LUMPECTOMY Right     CARDIAC CATHETERIZATION N/A 10/28/2021    Procedure: Cardiac Coronary Angiogram;  Surgeon: Dorothy George DO;  Location: [facility];   Service: Cardiology    IVC FILTER INSERTION      MASTECTOMY W/ SENTINEL NODE BIOPSY Right 11/9/2021    Procedure: BREAST MASTECTOMY WITH BIOPSY LYMPH NODE SENTINEL and axillary node dissection  ([place] Lymph Node Injection @ 12:30);  Surgeon: Claudette Brock, MD;  Location: [address] [place] [address] MAIN OR;  Service: General    US GUIDANCE BREAST BIOPSY RIGHT EACH ADDITIONAL Right 10/13/2021    US GUIDANCE BREAST BIOPSY RIGHT EACH ADDITIONAL Right 10/13/2021    US GUIDED BREAST BIOPSY RIGHT COMPLETE Right 10/13/2021       Family History   Problem Relation Age of Onset    Breast cancer Mother 67    COPD Mother     Coronary artery disease Mother     Coronary artery disease Father     Stroke Father     Lung cancer Sister     Breast cancer Maternal Grandmother     Colon cancer Paternal Grandfather     No Known Problems Maternal Aunt     No Known Problems Maternal Aunt     No Known Problems Maternal Aunt     No Known Problems Paternal Aunt     Breast cancer Cousin        Social History     Socioeconomic History    Marital status: Legally      Spouse name: Not on file    Number of children: Not on file    Years of education: Not on file    Highest education level: Not on file   Occupational History    Not on file   Tobacco Use    Smoking status: Current Every Day Smoker     Packs/day: 1 50     Types: Cigarettes     Last attempt to quit: 2021     Years since quittin 3    Smokeless tobacco: Never Used   Vaping Use    Vaping Use: Never used   Substance and Sexual Activity    Alcohol use: Never    Drug use: Never    Sexual activity: Not Currently   Other Topics Concern    Not on file   Social History Narrative    Not on file     Social Determinants of Health     Financial Resource Strain: Not on file   Food Insecurity: Not on file   Transportation Needs: No Transportation Needs    Lack of Transportation (Medical): No    Lack of Transportation (Non-Medical):  No   Physical Activity: Not on file   Stress: Not on file   Social Connections: Not on file   Intimate Partner Violence: Not on file   Housing Stability: Not on file       Allergies   Allergen Reactions    Amoxicillin-Pot Clavulanate GI Intolerance         Current Outpatient Medications:     albuterol (2 5 mg/3 mL) 0 083 % nebulizer solution, Take 1 vial (2 5 mg total) by nebulization every 4 (four) hours as needed for shortness of breath, Disp: 120 mL, Rfl: 3    albuterol (Ventolin HFA) 90 mcg/act inhaler, Inhale 2 puffs every 4 (four) hours as needed for wheezing, Disp: 18 g, Rfl: 4    amitriptyline (ELAVIL) 100 mg tablet, take 1/2 tablet by mouth once daily at bedtime, Disp: 45 tablet, Rfl: 1    anastrozole (ARIMIDEX) 1 mg tablet, Take 1 tablet (1 mg total) by mouth daily, Disp: 30 tablet, Rfl: 4    aspirin (ECOTRIN LOW STRENGTH) 81 mg EC tablet, Take 1 tablet (81 mg total) by mouth daily, Disp: 90 tablet, Rfl: 3    BD Pen Needle Jasmine 2nd Gen 32G X 4 MM MISC, use 1 PEN NEEDLE to inject MEDICATION subcutaneously once daily, Disp: 90 each, Rfl: 3    benztropine (COGENTIN) 0 5 mg tablet, Take 0 5 mg by mouth daily at bedtime  , Disp: , Rfl:     Blood Glucose Monitoring Suppl (ONE TOUCH ULTRA 2) w/Device KIT, by Does not apply route, Disp: , Rfl:     Blood Glucose Monitoring Suppl (ONE TOUCH ULTRA 2) w/Device KIT, by Does not apply route daily, Disp: 100 each, Rfl: 0    clonazePAM (KlonoPIN) 0 5 mg tablet, Take 0 5 mg by mouth daily as needed  , Disp: , Rfl:     gabapentin (NEURONTIN) 600 MG tablet, take 1 tablet by mouth three times a day, Disp: 270 tablet, Rfl: 0    glucose blood (OneTouch Ultra) test strip, Use 1 each daily Use as instructed, Disp: 100 strip, Rfl: 2    lisinopril (ZESTRIL) 2 5 mg tablet, take 1 tablet by mouth once daily, Disp: 90 tablet, Rfl: 1    metFORMIN (GLUCOPHAGE) 1000 MG tablet, take 1 tablet by mouth twice a day with food, Disp: 180 tablet, Rfl: 3    nicotine polacrilex (COMMIT) 4 MG lozenge, Apply 1 lozenge (4 mg total) to the mouth or throat as needed for smoking cessation, Disp: 100 each, Rfl: 0    pantoprazole (PROTONIX) 40 mg tablet, take 1 tablet by mouth twice a day, Disp: 180 tablet, Rfl: 0    tiotropium (Spiriva Respimat) 2  5 MCG/ACT AERS inhaler, Inhale 2 puffs daily, Disp: 4 g, Rfl: 3    traZODone (DESYREL) 150 mg tablet, 150 mg daily at bedtime , Disp: , Rfl:     Victoza injection, inject 1 2 milligram subcutaneously daily, Disp: 6 mL, Rfl: 5    vilazodone (VIIBRYD) 40 mg tablet, Take 40 mg by mouth daily  , Disp: , Rfl:     VRAYLAR 6 MG capsule, Take 6 mg by mouth daily, Disp: , Rfl: 0    atorvastatin (LIPITOR) 40 mg tablet, Take 1 tablet (40 mg total) by mouth daily at bedtime (Patient not taking: Reported on 3/2/2022 ), Disp: 90 tablet, Rfl: 1    fenofibrate (TRICOR) 48 mg tablet, take 1 tablet by mouth once daily (Patient not taking: Reported on 3/2/2022), Disp: 30 tablet, Rfl: 2    loratadine (CLARITIN) 10 mg tablet, Take 1 tablet (10 mg total) by mouth daily, Disp: 30 tablet, Rfl: 2    ofloxacin (OCUFLOX) 0 3 % ophthalmic solution, instill 1 drop into left eye four times a day 3 DAYS PRIOR TO COLTON     (REFER TO PRESCRIPTION NOTES)  (Patient not taking: Reported on 3/2/2022), Disp: , Rfl:     prednisoLONE acetate (PRED FORTE) 1 % ophthalmic suspension, instill 1 drop into left eye 6 TIMES A DAY AND TO BE STARTED AFTER SURGERY IS COMPLETED (Patient not taking: Reported on 3/2/2022), Disp: , Rfl:       Objective:    /70 (BP Location: Left arm, Patient Position: Sitting, Cuff Size: Standard)   Pulse 96   Ht 5' 7" (1 702 m)   Wt 101 kg (223 lb)   BMI 34 93 kg/m²        Physical Exam  Vitals reviewed  HENT:      Head: Normocephalic  Mouth/Throat:      Mouth: Mucous membranes are moist    Eyes:      Extraocular Movements: Extraocular movements intact  Cardiovascular:      Rate and Rhythm: Normal rate and regular rhythm  Pulses:           Femoral pulses are 2+ on the right side and 2+ on the left side  Popliteal pulses are 2+ on the right side and 2+ on the left side  Dorsalis pedis pulses are 2+ on the right side and 2+ on the left side          Posterior tibial pulses are 1+ on the right side and 1+ on the left side  Heart sounds: Normal heart sounds  No murmur heard  No friction rub  No gallop  Pulmonary:      Effort: Pulmonary effort is normal       Breath sounds: Normal breath sounds  No wheezing, rhonchi or rales  Musculoskeletal:         General: Normal range of motion  Right lower leg: No edema  Left lower leg: No edema  Skin:     General: Skin is warm and dry  Capillary Refill: Capillary refill takes less than 2 seconds  Findings: No erythema, lesion, rash or wound  Neurological:      General: No focal deficit present  Mental Status: She is alert and oriented to person, place, and time  Cranial Nerves: Cranial nerves are intact  Motor: Motor function is intact  Gait: Gait is intact     Psychiatric:         Mood and Affect: Mood normal          Behavior: Behavior normal              Ajay Nam [address] [facility]  Vascular Surgery  3/2/2022 1:42 PM

## 2022-03-02 NOTE — ASSESSMENT & PLAN NOTE
64 y o  female patient w/ a PMH significant for DM, HLD, chronic and ongoing tobacco use-about 64 pack years, currently smoking up to a pack and half a day, COPD, a history of DVT and IVC filter presents to the vascular office today w/ c/o BLE pain with ambulation and to review CAESAR duplex results  Imaging   CAESAR duplex (12/17/21): CAESAR duplex demonstrates diffuse disease throughout the BL femoral-popliteal arteries without significant focal stenosis  R STEFANI 1 13- normal range (Prior: 1 23), Metatarsal pressure of 140 mmHg, Great toe pressure of 94 mmHg, within the healing range  L STEFANI 1 08-normal range (Prior: 1 23), Metatarsal pressure of 121 mmHg, Great toe pressure of 76 mmHg, within the healing range  PVR/ PPG tracings are normal      Recommendations   Diffuse arterial disease throughout the femoral-popliteal arteries without focal stenosis  BL STEFANI's within the normal category and GT pressures are within healing range   We discussed the pathophysiology of arterial occlusive disease, available treatment options and indications for treatment   Continue medical optimization  Currently on ASA and statin   Goal hgb A1c<7 0, LDL<100, BP <140/90   Recommend walking program, ambulating at least 30 minutes 3-5 times weekly   Discussed the importance of smoking cessation  She defers   Recommend moisturization of the lower extremities, avoidance of injury and close observation of bilateral feet for any new wounds   Discussed that a portion of her symptoms could be neurogenic in nature as she does report a history of back pain  Review of a previous XR shows multilevel spondylosis with a grade 1 spondylolisthesis at the L3-4 level  An MRI would be beneficial for further evaluation of possible stenosis  She will follow up with her pain specialist     Kathleen Saul Recommend repeat CAESAR duplex and follow up in 1 year   Instructed patient to contact office with any new symptoms or concerns

## 2022-03-02 NOTE — PATIENT INSTRUCTIONS
Peripheral Artery Disease   WHAT YOU NEED TO KNOW:   Peripheral artery disease (PAD) is narrow, weak, or blocked arteries  It may affect any arteries outside of your heart and brain  PAD is usually the result of a buildup of fat and cholesterol, also called plaque, along your artery walls  Inflammation, a blood clot, or abnormal cell growth could also block your arteries  PAD prevents normal blood flow to your legs and arms  You are at risk of an amputation if poor blood flow keeps wounds from healing or causes gangrene (tissue death)  Without treatment, PAD can also cause a heart attack or stroke  DISCHARGE INSTRUCTIONS:   Call your local emergency number (911 in the [address] [facility]) if:   · You have any of the following signs of a heart attack:      ? Squeezing, pressure, or pain in your chest    ? You may  also have any of the following:     § Discomfort or pain in your back, neck, jaw, stomach, or arm    § Shortness of breath    § Nausea or vomiting    § Lightheadedness or a sudden cold sweat    · You have any of the following signs of a stroke:      ? Numbness or drooping on one side of your face     ? Weakness in an arm or leg    ? Confusion or difficulty speaking    ? Dizziness, a severe headache, or vision loss    Return to the emergency department if:   · You have sores or wounds that will not heal     · You notice black or discolored skin on your arm or leg  · Your skin is cool to the touch  Call your doctor if:   · You have leg pain when you walk 1/8 mile (200 meters) or less, even with treatment  · Your legs are red, dry, or pale, even with treatment  · You have questions or concerns about your condition or care  Manage PAD:   · Walk for 30 to 60 minutes at least 4 times a week  Your healthcare provider may also refer you to a supervised exercise program  The program helps increase how far you can walk without pain  It also helps you stay active in normal daily activities  · Do not smoke  Nicotine and other chemicals in cigarettes and cigars can worsen PAD  They can also increase your risk for a heart attack or stroke  Ask your healthcare provider for information if you currently smoke and need help to quit  E-cigarettes or smokeless tobacco still contain nicotine  Talk to your healthcare provider before you use these products  · Manage other health conditions  Take your medicines as directed and follow your healthcare provider's instructions if you have high blood pressure or high cholesterol  Perform foot care and check your blood sugar levels as directed if you have diabetes  · Eat heart-healthy foods  Eat whole grains, fruits, and vegetables every day  Limit salt and high-fat foods  Ask your healthcare provider for more information on a heart healthy diet  Ask what a healthy weight is for you  Your healthcare provider can help you create a healthy weight-loss plan, if needed  Medicines: You may need any of the following:  · Antiplatelets , such as aspirin, help prevent blood clots  Take your antiplatelet medicine exactly as directed  These medicines make it more likely for you to bleed or bruise  If you are told to take aspirin, do not take acetaminophen or ibuprofen instead  · Statin medicine  helps lower your cholesterol and prevents your PAD from getting worse  · Take your medicine as directed  Contact your healthcare provider if you think your medicine is not helping or if you have side effects  Tell him or her if you are allergic to any medicine  Keep a list of the medicines, vitamins, and herbs you take  Include the amounts, and when and why you take them  Bring the list or the pill bottles to follow-up visits  Carry your medicine list with you in case of an emergency  Follow up with your doctor as directed:  Write down your questions so you remember to ask them during your visits    © Copyright [employer] 2022 Information is for End User's use only and may not be sold, redistributed or otherwise used for commercial purposes  All illustrations and images included in CareNotes® are the copyrighted property of [employer]  or Lisseth Hernandes  The above information is an  only  It is not intended as medical advice for individual conditions or treatments  Talk to your doctor, nurse or pharmacist before following any medical regimen to see if it is safe and effective for you

## 2022-03-07 DIAGNOSIS — I10 PRIMARY HYPERTENSION: Primary | ICD-10-CM

## 2022-03-07 DIAGNOSIS — G89.29 OTHER CHRONIC PAIN: ICD-10-CM

## 2022-03-07 DIAGNOSIS — R94.4 DECREASED GFR: ICD-10-CM

## 2022-03-07 PROCEDURE — 4010F ACE/ARB THERAPY RXD/TAKEN: CPT | Performed by: NURSE PRACTITIONER

## 2022-03-07 RX ORDER — LISINOPRIL 2.5 MG/1
TABLET ORAL
Qty: 90 TABLET | Refills: 1 | Status: SHIPPED | OUTPATIENT
Start: 2022-03-07 | End: 2022-07-20

## 2022-03-07 RX ORDER — GABAPENTIN 600 MG/1
TABLET ORAL
Qty: 270 TABLET | Refills: 1 | Status: SHIPPED | OUTPATIENT
Start: 2022-03-07

## 2022-03-16 ENCOUNTER — TELEPHONE (OUTPATIENT)
Dept: PLASTIC SURGERY | Facility: CLINIC | Age: 62
End: 2022-03-16

## 2022-03-23 ENCOUNTER — OFFICE VISIT (OUTPATIENT)
Dept: SURGERY | Facility: CLINIC | Age: 62
End: 2022-03-23
Payer: COMMERCIAL

## 2022-03-23 VITALS
OXYGEN SATURATION: 99 % | WEIGHT: 216 LBS | HEIGHT: 67 IN | SYSTOLIC BLOOD PRESSURE: 120 MMHG | RESPIRATION RATE: 16 BRPM | BODY MASS INDEX: 33.9 KG/M2 | TEMPERATURE: 97.3 F | DIASTOLIC BLOOD PRESSURE: 68 MMHG | HEART RATE: 98 BPM

## 2022-03-23 DIAGNOSIS — C50.411 PRIMARY CANCER OF UPPER OUTER QUADRANT OF RIGHT BREAST (HCC): Primary | ICD-10-CM

## 2022-03-23 PROCEDURE — 99213 OFFICE O/P EST LOW 20 MIN: CPT | Performed by: SURGERY

## 2022-03-23 NOTE — ASSESSMENT & PLAN NOTE
Patient returns for 3 month follow-up status post right mastectomy for the definitive treatment of her multifocal stage I A right breast carcinoma on November 9, 2021  Patient denies all new complaints referable to her breasts  She is scheduled to see a plastic surgery regarding breast reduction mammoplasty on the left side on August 6th 2021  Today on physical examination she is a well-nourished well-appearing female  She is in no acute distress  Awake alert oriented  Pleasant competent reliable as a historian  She has no cervical supraclavicular or axillary lymphadenopathy bilaterally  The right chest scar smooth and flat without signs of locally recurrent disease  The left breast is pendulous  No dominant lumps masses skin changes or nipple retraction  The patient is due for her next left mammogram on September 20, 2022  I have asked that she return to the office after that mammogram to undergo her next physical examination

## 2022-03-23 NOTE — PROGRESS NOTES
Assessment/Plan:    Primary cancer of upper outer quadrant of right breast [facility])  Patient returns for 3 month follow-up status post right mastectomy for the definitive treatment of her multifocal stage I A right breast carcinoma on November 9, 2021  Patient denies all new complaints referable to her breasts  She is scheduled to see a plastic surgery regarding breast reduction mammoplasty on the left side on August 6th 2021  Today on physical examination she is a well-nourished well-appearing female  She is in no acute distress  Awake alert oriented  Pleasant competent reliable as a historian  She has no cervical supraclavicular or axillary lymphadenopathy bilaterally  The right chest scar smooth and flat without signs of locally recurrent disease  The left breast is pendulous  No dominant lumps masses skin changes or nipple retraction  The patient is due for her next left mammogram on September 20, 2022  I have asked that she return to the office after that mammogram to undergo her next physical examination  Subjective:      Patient ID: Unique Oakley is a 64 y o  female  Patient came in today for 3 mo s/p right breast mastectomy w/ sln bx 11-9-2021  Patient states she has been having a numbing feeling on her right breast where the surgery was done, patient is unsure how long she had the numbness but she explain every since she has healed the numbing has occur  Patient has no complaints of discharge coming from the nipple or any pain around the breast area  Patient also explain that the cancer pills gives her side affects of nausea, diarrhea and stomach pain so she stopped taking it  No fevers or chills  Sami BRADFORD MA       The following portions of the patient's history were reviewed and updated as appropriate: allergies, current medications, past family history, past medical history, past social history, past surgical history and problem list     Review of Systems Constitutional: Negative for chills and fever  HENT: Negative for ear pain and sore throat  Eyes: Negative for pain and visual disturbance  Respiratory: Negative for cough and shortness of breath  Cardiovascular: Negative for chest pain and palpitations  Gastrointestinal: Negative for abdominal pain and vomiting  Genitourinary: Negative for dysuria and hematuria  Musculoskeletal: Negative for arthralgias and back pain  Skin: Negative for color change and rash  Neurological: Negative for seizures and syncope  All other systems reviewed and are negative  Objective:      /68 (BP Location: Left arm, Patient Position: Sitting, Cuff Size: Standard)   Pulse 98   Temp (!) 97 3 °F (36 3 °C) (Temporal)   Resp 16   Ht 5' 7" (1 702 m)   Wt 98 kg (216 lb)   SpO2 99%   BMI 33 83 kg/m²          Physical Exam  Constitutional:       Appearance: She is well-developed  HENT:      Head: Normocephalic and atraumatic  Eyes:      Conjunctiva/sclera: Conjunctivae normal       Pupils: Pupils are equal, round, and reactive to light  Cardiovascular:      Rate and Rhythm: Normal rate and regular rhythm  Pulmonary:      Effort: Pulmonary effort is normal       Breath sounds: Normal breath sounds  Abdominal:      General: Bowel sounds are normal       Palpations: Abdomen is soft  Musculoskeletal:         General: Normal range of motion  Cervical back: Normal range of motion and neck supple  Skin:     General: Skin is warm and dry  Comments: Breast exam as described above   Neurological:      Mental Status: She is alert and oriented to person, place, and time  Psychiatric:         Behavior: Behavior normal          Thought Content:  Thought content normal          Judgment: Judgment normal

## 2022-03-23 NOTE — LETTER
March 23, 2022     RAHEL Baeza [address]  [address]  [address]    Patient: Zaid Romero   YOB: 1960   Date of Visit: 3/23/2022       Dear Dr Eli Soliman: Thank you for referring Stephany Rod to me for evaluation  Below are my notes for this consultation  If you have questions, please do not hesitate to call me  I look forward to following your patient along with you  Sincerely,        Page Pike MD        CC: No Recipients  Page Pike MD  3/23/2022  1:20 PM  Incomplete  Assessment/Plan:    Primary cancer of upper outer quadrant of right breast [facility])  Patient returns for 3 month follow-up status post right mastectomy for the definitive treatment of her multifocal stage I A right breast carcinoma on November 9, 2021  Patient denies all new complaints referable to her breasts  She is scheduled to see a plastic surgery regarding breast reduction mammoplasty on the left side on August 6th 2021  Today on physical examination she is a well-nourished well-appearing female  She is in no acute distress  Awake alert oriented  Pleasant competent reliable as a historian  She has no cervical supraclavicular or axillary lymphadenopathy bilaterally  The right chest scar smooth and flat without signs of locally recurrent disease  The left breast is pendulous  No dominant lumps masses skin changes or nipple retraction  The patient is due for her next left mammogram on September 20, 2022  I have asked that she return to the office after that mammogram to undergo her next physical examination  Subjective:      Patient ID: Zaid Romero is a 64 y o  female  Patient came in today for 3 mo s/p right breast mastectomy w/ sln bx 11-9-2021   Patient states she has been having a numbing feeling on her right breast where the surgery was done, patient is unsure how long she had the numbness but she explain every since she has healed the numbing has occur  Patient has no complaints of discharge coming from the nipple or any pain around the breast area  Patient also explain that the cancer pills gives her side affects of nausea, diarrhea and stomach pain so she stopped taking it  No fevers or chills  Sami BRADFORD MA       The following portions of the patient's history were reviewed and updated as appropriate: allergies, current medications, past family history, past medical history, past social history, past surgical history and problem list     Review of Systems   Constitutional: Negative for chills and fever  HENT: Negative for ear pain and sore throat  Eyes: Negative for pain and visual disturbance  Respiratory: Negative for cough and shortness of breath  Cardiovascular: Negative for chest pain and palpitations  Gastrointestinal: Negative for abdominal pain and vomiting  Genitourinary: Negative for dysuria and hematuria  Musculoskeletal: Negative for arthralgias and back pain  Skin: Negative for color change and rash  Neurological: Negative for seizures and syncope  All other systems reviewed and are negative  Objective:      /68 (BP Location: Left arm, Patient Position: Sitting, Cuff Size: Standard)   Pulse 98   Temp (!) 97 3 °F (36 3 °C) (Temporal)   Resp 16   Ht 5' 7" (1 702 m)   Wt 98 kg (216 lb)   SpO2 99%   BMI 33 83 kg/m²          Physical Exam  Constitutional:       Appearance: She is well-developed  HENT:      Head: Normocephalic and atraumatic  Eyes:      Conjunctiva/sclera: Conjunctivae normal       Pupils: Pupils are equal, round, and reactive to light  Cardiovascular:      Rate and Rhythm: Normal rate and regular rhythm  Pulmonary:      Effort: Pulmonary effort is normal       Breath sounds: Normal breath sounds  Abdominal:      General: Bowel sounds are normal       Palpations: Abdomen is soft  Musculoskeletal:         General: Normal range of motion        Cervical back: Normal range of motion and neck supple  Skin:     General: Skin is warm and dry  Comments: Breast exam as described above   Neurological:      Mental Status: She is alert and oriented to person, place, and time  Psychiatric:         Behavior: Behavior normal          Thought Content:  Thought content normal          Judgment: Judgment normal

## 2022-03-28 ENCOUNTER — OFFICE VISIT (OUTPATIENT)
Dept: FAMILY MEDICINE CLINIC | Facility: HOME HEALTHCARE | Age: 62
End: 2022-03-28
Payer: COMMERCIAL

## 2022-03-28 VITALS
RESPIRATION RATE: 16 BRPM | BODY MASS INDEX: 33.53 KG/M2 | HEART RATE: 90 BPM | OXYGEN SATURATION: 96 % | SYSTOLIC BLOOD PRESSURE: 124 MMHG | DIASTOLIC BLOOD PRESSURE: 68 MMHG | WEIGHT: 213.6 LBS | TEMPERATURE: 97.6 F | HEIGHT: 67 IN

## 2022-03-28 DIAGNOSIS — E11.65 TYPE 2 DIABETES MELLITUS WITH HYPERGLYCEMIA, WITHOUT LONG-TERM CURRENT USE OF INSULIN (HCC): Primary | ICD-10-CM

## 2022-03-28 DIAGNOSIS — E78.2 MIXED HYPERLIPIDEMIA: ICD-10-CM

## 2022-03-28 DIAGNOSIS — Z17.0 MALIGNANT NEOPLASM OF UPPER-INNER QUADRANT OF RIGHT BREAST IN FEMALE, ESTROGEN RECEPTOR POSITIVE (HCC): ICD-10-CM

## 2022-03-28 DIAGNOSIS — Z12.12 SCREENING FOR COLORECTAL CANCER: ICD-10-CM

## 2022-03-28 DIAGNOSIS — C50.211 MALIGNANT NEOPLASM OF UPPER-INNER QUADRANT OF RIGHT BREAST IN FEMALE, ESTROGEN RECEPTOR POSITIVE (HCC): ICD-10-CM

## 2022-03-28 DIAGNOSIS — Z12.4 SCREENING FOR CERVICAL CANCER: ICD-10-CM

## 2022-03-28 DIAGNOSIS — Z12.11 SCREENING FOR COLORECTAL CANCER: ICD-10-CM

## 2022-03-28 DIAGNOSIS — R19.7 FREQUENT DIARRHEA: ICD-10-CM

## 2022-03-28 DIAGNOSIS — E78.1 HYPERTRIGLYCERIDEMIA: ICD-10-CM

## 2022-03-28 DIAGNOSIS — K21.9 GERD WITHOUT ESOPHAGITIS: ICD-10-CM

## 2022-03-28 LAB — SL AMB POCT HEMOGLOBIN AIC: 6.6 (ref ?–6.5)

## 2022-03-28 PROCEDURE — 3008F BODY MASS INDEX DOCD: CPT | Performed by: NURSE PRACTITIONER

## 2022-03-28 PROCEDURE — 83036 HEMOGLOBIN GLYCOSYLATED A1C: CPT | Performed by: FAMILY MEDICINE

## 2022-03-28 PROCEDURE — T1015 CLINIC SERVICE: HCPCS | Performed by: FAMILY MEDICINE

## 2022-03-28 PROCEDURE — 3044F HG A1C LEVEL LT 7.0%: CPT | Performed by: NURSE PRACTITIONER

## 2022-03-28 RX ORDER — PANTOPRAZOLE SODIUM 40 MG/1
40 TABLET, DELAYED RELEASE ORAL DAILY
Qty: 90 TABLET | Refills: 1 | Status: SHIPPED | OUTPATIENT
Start: 2022-03-28 | End: 2022-04-05 | Stop reason: SDUPTHER

## 2022-03-28 RX ORDER — ATORVASTATIN CALCIUM 40 MG/1
40 TABLET, FILM COATED ORAL
Qty: 90 TABLET | Refills: 1 | Status: SHIPPED | OUTPATIENT
Start: 2022-03-28 | End: 2022-03-31 | Stop reason: SDUPTHER

## 2022-03-28 RX ORDER — FENOFIBRATE 48 MG/1
48 TABLET, COATED ORAL DAILY
Qty: 30 TABLET | Refills: 5 | Status: SHIPPED | OUTPATIENT
Start: 2022-03-28 | End: 2022-07-22

## 2022-03-28 NOTE — PROGRESS NOTES
[address]       NAME: Estelita Mcrae is a 64 y o  female  : 1960    MRN: [id number]  DATE: 2022  TIME: 12:31 PM    Assessment and Plan   Diagnoses and all orders for this visit:    Type 2 diabetes mellitus with hyperglycemia, without long-term current use of insulin (HCC)  -     POCT hemoglobin A1c    Malignant neoplasm of upper-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, estrogen receptor positive ([facility] )    Screening for colorectal cancer  -     Ambulatory referral for Colonoscopy; Future    Frequent diarrhea  -     Ambulatory Referral to Gastroenterology; Future  -     Clostridium difficile toxin by PCR; Future    Mixed hyperlipidemia  -     Lipid Panel with Direct LDL reflex; Future  -     atorvastatin (LIPITOR) 40 mg tablet; Take 1 tablet (40 mg total) by mouth daily at bedtime    Hypertriglyceridemia  -     fenofibrate (TRICOR) 48 mg tablet; Take 1 tablet (48 mg total) by mouth daily    Screening for cervical cancer  -     Liquid-based pap, screening ([facility]); Future    GERD without esophagitis  -     Ambulatory Referral to Gastroenterology; Future  -     pantoprazole (PROTONIX) 40 mg tablet; Take 1 tablet (40 mg total) by mouth daily  -     H  pylori antigen, stool; Future    Tobacco Cessation Counseling: Tobacco cessation counseling and education was provided  The patient is sincerely urged to quit consumption of tobacco  She is not ready to quit tobacco  The numerous health risks of tobacco consumption were discussed  If she decides to quit, there are a number of helpful adjunctive aids, and she can see me to discuss nicotine replacement therapy,  or bupropion anytime in the future  Gastroenterology referral   Patient with chronic GERD and states has been having persistent diarrhea for approximately 2 months  Patient had lab work pending from last visit states will have completed tomorrow  Will continue her metformin and Victoza  A1c 6 6 today    Will continue to work on diabetic diet, increase activity  Smoking cessation discussed and encouraged  Patient follow-up in 3 months or sooner if needed instructed to call with any questions or concerns      Chief Complaint     Chief Complaint   Patient presents with    Follow-up    Diabetes         History of Present Illness       HPI    71-year-old female here today for diabetes type 2 follow-up visit  Patient's A1c today is 6 6  Improved from previous  Patient remains on Victoza daily and metformin 1000 mg b i d   Patient's only other complaint right now is that she has been having frequent diarrhea for approximately 2 months  Denies any recent antibiotic use or history of C diff  Patient also states has been having chronic reflux/ GERD symptoms for years  Had discontinued her Protonix because "  Woman at  [employer] told her Protonix was no good   "  Patient will re-initiate her Protonix  Patient states does note occasional abdominal bloating and diarrhea with milk products  Patient continues to follow Hematology-Oncology for malignant breast CA  Review of Systems   Review of Systems     some muscle weakness due to deconditioning  Positive dyspnea with exertion  Denies fevers, chills, headaches, shortness of breath at rest, chest pain, abdominal pain, nausea or vomiting  Denies any constipation  Has concerns she may have IBS      Current Medications       Current Outpatient Medications:     albuterol (2 5 mg/3 mL) 0 083 % nebulizer solution, Take 1 vial (2 5 mg total) by nebulization every 4 (four) hours as needed for shortness of breath, Disp: 120 mL, Rfl: 3    albuterol (Ventolin HFA) 90 mcg/act inhaler, Inhale 2 puffs every 4 (four) hours as needed for wheezing, Disp: 18 g, Rfl: 4    amitriptyline (ELAVIL) 100 mg tablet, take 1/2 tablet by mouth once daily at bedtime, Disp: 45 tablet, Rfl: 1    aspirin (ECOTRIN LOW STRENGTH) 81 mg EC tablet, Take 1 tablet (81 mg total) by mouth daily, Disp: 90 tablet, Rfl: 3    BD Pen Needle Jasmine 2nd Gen 32G X 4 MM MISC, use 1 PEN NEEDLE to inject MEDICATION subcutaneously once daily, Disp: 90 each, Rfl: 3    benztropine (COGENTIN) 0 5 mg tablet, Take 0 5 mg by mouth daily at bedtime  , Disp: , Rfl:     Blood Glucose Monitoring Suppl (ONE TOUCH ULTRA 2) w/Device KIT, by Does not apply route, Disp: , Rfl:     Blood Glucose Monitoring Suppl (ONE TOUCH ULTRA 2) w/Device KIT, by Does not apply route daily, Disp: 100 each, Rfl: 0    clonazePAM (KlonoPIN) 0 5 mg tablet, Take 0 5 mg by mouth daily as needed  , Disp: , Rfl:     gabapentin (NEURONTIN) 600 MG tablet, take 1 tablet by mouth three times a day, Disp: 270 tablet, Rfl: 1    glucose blood (OneTouch Ultra) test strip, Use 1 each daily Use as instructed, Disp: 100 strip, Rfl: 2    lisinopril (ZESTRIL) 2 5 mg tablet, take 1 tablet by mouth once daily, Disp: 90 tablet, Rfl: 1    loratadine (CLARITIN) 10 mg tablet, Take 1 tablet (10 mg total) by mouth daily, Disp: 30 tablet, Rfl: 2    metFORMIN (GLUCOPHAGE) 1000 MG tablet, take 1 tablet by mouth twice a day with food, Disp: 180 tablet, Rfl: 3    nicotine polacrilex (COMMIT) 4 MG lozenge, Apply 1 lozenge (4 mg total) to the mouth or throat as needed for smoking cessation, Disp: 100 each, Rfl: 0    tiotropium (Spiriva Respimat) 2 5 MCG/ACT AERS inhaler, Inhale 2 puffs daily, Disp: 4 g, Rfl: 3    traZODone (DESYREL) 150 mg tablet, 150 mg daily at bedtime , Disp: , Rfl:     Victoza injection, inject 1 2 milligram subcutaneously daily, Disp: 6 mL, Rfl: 5    vilazodone (VIIBRYD) 40 mg tablet, Take 40 mg by mouth daily  , Disp: , Rfl:     VRAYLAR 6 MG capsule, Take 6 mg by mouth daily, Disp: , Rfl: 0    anastrozole (ARIMIDEX) 1 mg tablet, Take 1 tablet (1 mg total) by mouth daily (Patient not taking: Reported on 3/23/2022 ), Disp: 30 tablet, Rfl: 4    atorvastatin (LIPITOR) 40 mg tablet, Take 1 tablet (40 mg total) by mouth daily at bedtime, Disp: 90 tablet, Rfl: 1   fenofibrate (TRICOR) 48 mg tablet, Take 1 tablet (48 mg total) by mouth daily, Disp: 30 tablet, Rfl: 5    pantoprazole (PROTONIX) 40 mg tablet, Take 1 tablet (40 mg total) by mouth daily, Disp: 90 tablet, Rfl: 1    Current Allergies     Allergies as of 03/28/2022 - Reviewed 03/28/2022   Allergen Reaction Noted    Amoxicillin-pot clavulanate GI Intolerance 08/10/2016    Anastrozole Other (See Comments) 03/23/2022            The following portions of the patient's history were reviewed and updated as appropriate: allergies, current medications, past family history, past medical history, past social history, past surgical history and problem list      Past Medical History:   Diagnosis Date    Cancer ([facility] )     right breast    Chronic back pain     Colitis     COPD (chronic obstructive pulmonary disease) ([facility] )     Depression     Diabetes mellitus ([facility] )     DVT of lower limb, acute ([place] [id number] ) 2004    GERD (gastroesophageal reflux disease)     Hyperlipidemia     Pneumonia     Rapid heart rate     Sleep apnea     Stroke ([place] [address] )     4 mini strokes       Past Surgical History:   Procedure Laterality Date    BREAST LUMPECTOMY Right     CARDIAC CATHETERIZATION N/A 10/28/2021    Procedure: Cardiac Coronary Angiogram;  Surgeon: Suellen Harrell DO;  Location: [facility];   Service: Cardiology    IVC FILTER INSERTION      MASTECTOMY W/ SENTINEL NODE BIOPSY Right 11/9/2021    Procedure: BREAST MASTECTOMY WITH BIOPSY LYMPH NODE SENTINEL and axillary node dissection  ([place] Lymph Node Injection @ 12:30);  Surgeon: Phuong Perez MD;  [facility] MAIN OR;  Service: General    US GUIDANCE BREAST BIOPSY RIGHT EACH ADDITIONAL Right 10/13/2021    US GUIDANCE BREAST BIOPSY RIGHT EACH ADDITIONAL Right 10/13/2021    US GUIDED BREAST BIOPSY RIGHT COMPLETE Right 10/13/2021       Family History   Problem Relation Age of Onset    Breast cancer Mother 67    COPD Mother     Coronary artery disease Mother    Balbir Singletary Coronary artery disease Father     Stroke Father     Lung cancer Sister     Breast cancer Maternal Grandmother     Colon cancer Paternal Grandfather     No Known Problems Maternal Aunt     No Known Problems Maternal Aunt     No Known Problems Maternal Aunt     No Known Problems Paternal Aunt     Breast cancer Cousin          Medications have been verified  Objective   /68   Pulse 90   Temp 97 6 °F (36 4 °C)   Resp 16   Ht 5' 7" (1 702 m)   Wt 96 9 kg (213 lb 9 6 oz)   SpO2 96%   BMI 33 45 kg/m²        Physical Exam     Physical Exam  Vitals reviewed  Constitutional:       General: She is not in acute distress  Appearance: She is not ill-appearing, toxic-appearing or diaphoretic  HENT:      Head: Normocephalic  Mouth/Throat:      Mouth: Mucous membranes are moist       Pharynx: Oropharynx is clear  Eyes:      General: No scleral icterus  Conjunctiva/sclera: Conjunctivae normal    Cardiovascular:      Rate and Rhythm: Normal rate and regular rhythm  Heart sounds: Normal heart sounds  Pulmonary:      Effort: Pulmonary effort is normal       Breath sounds: Normal breath sounds  Abdominal:      General: Bowel sounds are normal       Palpations: Abdomen is soft  Tenderness: There is no abdominal tenderness  Musculoskeletal:      Cervical back: Neck supple  Right lower leg: No edema  Left lower leg: No edema  Lymphadenopathy:      Cervical: No cervical adenopathy  Neurological:      Mental Status: She is alert and oriented to person, place, and time  Gait: Abnormal gait:   Uses cane     Psychiatric:         Mood and Affect: Mood normal

## 2022-03-29 ENCOUNTER — APPOINTMENT (OUTPATIENT)
Dept: LAB | Facility: HOSPITAL | Age: 62
End: 2022-03-29
Payer: COMMERCIAL

## 2022-03-29 DIAGNOSIS — E78.2 MIXED HYPERLIPIDEMIA: ICD-10-CM

## 2022-03-29 DIAGNOSIS — Z17.0 MALIGNANT NEOPLASM OF UPPER-INNER QUADRANT OF RIGHT BREAST IN FEMALE, ESTROGEN RECEPTOR POSITIVE (HCC): ICD-10-CM

## 2022-03-29 DIAGNOSIS — C50.211 MALIGNANT NEOPLASM OF UPPER-INNER QUADRANT OF RIGHT BREAST IN FEMALE, ESTROGEN RECEPTOR POSITIVE (HCC): ICD-10-CM

## 2022-03-29 LAB
25(OH)D3 SERPL-MCNC: 32.9 NG/ML (ref 30–100)
ALBUMIN SERPL BCP-MCNC: 4.2 G/DL (ref 3.5–5)
ALP SERPL-CCNC: 53 U/L (ref 46–116)
ALT SERPL W P-5'-P-CCNC: 26 U/L (ref 12–78)
ANION GAP SERPL CALCULATED.3IONS-SCNC: 9 MMOL/L (ref 4–13)
AST SERPL W P-5'-P-CCNC: 18 U/L (ref 5–45)
BASOPHILS # BLD AUTO: 0.01 THOUSANDS/ΜL (ref 0–0.1)
BASOPHILS NFR BLD AUTO: 0 % (ref 0–1)
BILIRUB SERPL-MCNC: 0.29 MG/DL (ref 0.2–1)
BUN SERPL-MCNC: 15 MG/DL (ref 5–25)
CALCIUM SERPL-MCNC: 9.8 MG/DL (ref 8.3–10.1)
CHLORIDE SERPL-SCNC: 100 MMOL/L (ref 100–108)
CHOLEST SERPL-MCNC: 267 MG/DL
CO2 SERPL-SCNC: 29 MMOL/L (ref 21–32)
CREAT SERPL-MCNC: 1.25 MG/DL (ref 0.6–1.3)
EOSINOPHIL # BLD AUTO: 0.1 THOUSAND/ΜL (ref 0–0.61)
EOSINOPHIL NFR BLD AUTO: 1 % (ref 0–6)
ERYTHROCYTE [DISTWIDTH] IN BLOOD BY AUTOMATED COUNT: 13 % (ref 11.6–15.1)
GFR SERPL CREATININE-BSD FRML MDRD: 46 ML/MIN/1.73SQ M
GLUCOSE P FAST SERPL-MCNC: 99 MG/DL (ref 65–99)
HCT VFR BLD AUTO: 40.1 % (ref 34.8–46.1)
HDLC SERPL-MCNC: 28 MG/DL
HGB BLD-MCNC: 13.5 G/DL (ref 11.5–15.4)
IMM GRANULOCYTES # BLD AUTO: 0.02 THOUSAND/UL (ref 0–0.2)
IMM GRANULOCYTES NFR BLD AUTO: 0 % (ref 0–2)
LDLC SERPL CALC-MCNC: 169 MG/DL (ref 0–100)
LYMPHOCYTES # BLD AUTO: 2.86 THOUSANDS/ΜL (ref 0.6–4.47)
LYMPHOCYTES NFR BLD AUTO: 34 % (ref 14–44)
MAGNESIUM SERPL-MCNC: 1.6 MG/DL (ref 1.6–2.6)
MCH RBC QN AUTO: 30.8 PG (ref 26.8–34.3)
MCHC RBC AUTO-ENTMCNC: 33.7 G/DL (ref 31.4–37.4)
MCV RBC AUTO: 92 FL (ref 82–98)
MONOCYTES # BLD AUTO: 0.58 THOUSAND/ΜL (ref 0.17–1.22)
MONOCYTES NFR BLD AUTO: 7 % (ref 4–12)
NEUTROPHILS # BLD AUTO: 4.81 THOUSANDS/ΜL (ref 1.85–7.62)
NEUTS SEG NFR BLD AUTO: 58 % (ref 43–75)
NRBC BLD AUTO-RTO: 0 /100 WBCS
PLATELET # BLD AUTO: 250 THOUSANDS/UL (ref 149–390)
PMV BLD AUTO: 9.9 FL (ref 8.9–12.7)
POTASSIUM SERPL-SCNC: 4.4 MMOL/L (ref 3.5–5.3)
PROT SERPL-MCNC: 8 G/DL (ref 6.4–8.2)
RBC # BLD AUTO: 4.38 MILLION/UL (ref 3.81–5.12)
SODIUM SERPL-SCNC: 138 MMOL/L (ref 136–145)
TRIGL SERPL-MCNC: 351 MG/DL
WBC # BLD AUTO: 8.38 THOUSAND/UL (ref 4.31–10.16)

## 2022-03-29 PROCEDURE — 83735 ASSAY OF MAGNESIUM: CPT

## 2022-03-29 PROCEDURE — 85025 COMPLETE CBC W/AUTO DIFF WBC: CPT

## 2022-03-29 PROCEDURE — 36415 COLL VENOUS BLD VENIPUNCTURE: CPT

## 2022-03-29 PROCEDURE — 80061 LIPID PANEL: CPT

## 2022-03-29 PROCEDURE — 80053 COMPREHEN METABOLIC PANEL: CPT

## 2022-03-29 PROCEDURE — 82306 VITAMIN D 25 HYDROXY: CPT

## 2022-03-30 ENCOUNTER — LAB (OUTPATIENT)
Dept: LAB | Facility: HOSPITAL | Age: 62
End: 2022-03-30
Payer: COMMERCIAL

## 2022-03-30 DIAGNOSIS — K21.9 GERD WITHOUT ESOPHAGITIS: ICD-10-CM

## 2022-03-30 DIAGNOSIS — R19.7 FREQUENT DIARRHEA: ICD-10-CM

## 2022-03-30 PROCEDURE — 87338 HPYLORI STOOL AG IA: CPT

## 2022-03-30 PROCEDURE — 87493 C DIFF AMPLIFIED PROBE: CPT

## 2022-03-31 DIAGNOSIS — E78.2 MIXED HYPERLIPIDEMIA: ICD-10-CM

## 2022-03-31 LAB — C DIFF TOX GENS STL QL NAA+PROBE: NEGATIVE

## 2022-03-31 RX ORDER — ATORVASTATIN CALCIUM 80 MG/1
80 TABLET, FILM COATED ORAL
Qty: 30 TABLET | Refills: 5 | Status: SHIPPED | OUTPATIENT
Start: 2022-03-31 | End: 2022-08-10 | Stop reason: ALTCHOICE

## 2022-04-01 DIAGNOSIS — A04.8 H. PYLORI INFECTION: Primary | ICD-10-CM

## 2022-04-01 LAB — H PYLORI AG STL QL IA: POSITIVE

## 2022-04-01 NOTE — RESULT ENCOUNTER NOTE
Called patient  Informed patient that she has H pylori  Educated patient on H pylori  Sent prescription to pharmacy for treatment patient is aware she needs to take 3 tablets 4 times daily for 10 days  Take medication as per prescription    Patient agreeable with plan and will go to pharmacy to  medication

## 2022-04-05 DIAGNOSIS — K21.9 GERD WITHOUT ESOPHAGITIS: ICD-10-CM

## 2022-04-05 DIAGNOSIS — A04.8 H. PYLORI INFECTION: Primary | ICD-10-CM

## 2022-04-05 RX ORDER — TETRACYCLINE HYDROCHLORIDE 500 MG/1
500 CAPSULE ORAL 4 TIMES DAILY
Qty: 40 CAPSULE | Refills: 0 | Status: SHIPPED | OUTPATIENT
Start: 2022-04-05 | End: 2022-04-15

## 2022-04-05 RX ORDER — METRONIDAZOLE 250 MG/1
250 TABLET ORAL 4 TIMES DAILY
Qty: 40 TABLET | Refills: 0 | Status: SHIPPED | OUTPATIENT
Start: 2022-04-05 | End: 2022-04-15

## 2022-04-05 RX ORDER — PANTOPRAZOLE SODIUM 40 MG/1
40 TABLET, DELAYED RELEASE ORAL 2 TIMES DAILY
Qty: 20 TABLET | Refills: 0 | Status: SHIPPED | OUTPATIENT
Start: 2022-04-05

## 2022-04-06 ENCOUNTER — CONSULT (OUTPATIENT)
Dept: PLASTIC SURGERY | Facility: HOSPITAL | Age: 62
End: 2022-04-06
Payer: COMMERCIAL

## 2022-04-06 VITALS
RESPIRATION RATE: 17 BRPM | TEMPERATURE: 97.8 F | WEIGHT: 213 LBS | BODY MASS INDEX: 33.43 KG/M2 | HEART RATE: 98 BPM | DIASTOLIC BLOOD PRESSURE: 71 MMHG | HEIGHT: 67 IN | SYSTOLIC BLOOD PRESSURE: 110 MMHG

## 2022-04-06 DIAGNOSIS — N62 MACROMASTIA: Primary | ICD-10-CM

## 2022-04-06 DIAGNOSIS — N64.89 BREAST ASYMMETRY: ICD-10-CM

## 2022-04-06 PROCEDURE — 3008F BODY MASS INDEX DOCD: CPT | Performed by: SURGERY

## 2022-04-06 PROCEDURE — 4004F PT TOBACCO SCREEN RCVD TLK: CPT | Performed by: SURGERY

## 2022-04-06 PROCEDURE — 99244 OFF/OP CNSLTJ NEW/EST MOD 40: CPT | Performed by: SURGERY

## 2022-04-06 NOTE — LETTER
April 6, 2022     Jolly Zeng MD  [address] [place] [address]    Patient: Ezequiel Conner   YOB: 1960   Date of Visit: 4/6/2022       Dear Dr Cori Hollis:    Thank you for referring Martin Vazquez to me for evaluation  Below are my notes for this consultation  If you have questions, please do not hesitate to call me  I look forward to following your patient along with you  Sincerely,        Sandra Gross MD        CC: No Recipients  Sandra Gross MD  4/6/2022  3:17 PM  Sign when Signing Visit  Assessment/Plan:  Please see HPI  She is interested in left breast reduction for symmetry purposes, status post right mastectomy  The left breast is very large, heavy and pendulous and would be reduced utilizing a free nipple graft technique  I discussed the procedure with her, as well as potential risks, complications limitations  She is aware that she must stop smoking prior to undergoing breast reduction surgery, given the significant risks of wound healing problems, necrosis, etcetera  She will work with her family physician in regard to smoking cessation, we will have Tomas Buck return in 2-3 months for further discussion and to assess her progress regarding smoking cessation  I suggested that she visit the [id number] web site, review the breast reduction section, and download and print out photographs that would indicate her desired postoperative breast volume  She understands, and knows to call this office should she have any questions regarding today's visit  There are no diagnoses linked to this encounter  Subjective: Interested in reduction mammoplasty     Patient ID: Ezequiel Conner is a 64 y o  female  HPI Tomas Buck is a 28-year-old female, referred by Dr Jolly Zeng    Tomas Buck is now 4 months status post right unilateral mastectomy (without reconstruction), she is interested in a left breast reduction for symmetry purposes  She had previously 1 a 44 double D cup bra, she is not quite certain of her desired postoperative breast volume, is considering half off regarding volume reduction  She is a current everyday smoker, 1 pack per day, and we discussed the importance of smoking cessation prior to reduction mammoplasty  The following portions of the patient's history were reviewed and updated as appropriate: allergies, current medications, past family history, past medical history, past social history, past surgical history and problem list     Review of Systems   Constitutional: Negative for chills and fever  HENT: Negative for hearing loss  Eyes: Positive for visual disturbance  Negative for discharge  Respiratory: Positive for shortness of breath  Negative for chest tightness  Shortness of breath with exertion   Cardiovascular: Negative for chest pain and leg swelling  Gastrointestinal: Positive for diarrhea  Negative for blood in stool, constipation and nausea  Genitourinary: Negative for dysuria  Musculoskeletal: Positive for gait problem  Ambulates with cane   Skin: Negative for rash  Allergic/Immunologic: Negative for immunocompromised state  Neurological: Negative for seizures and headaches  Hematological: Does not bruise/bleed easily  Psychiatric/Behavioral: Positive for dysphoric mood  The patient is nervous/anxious  Objective:      /71 (BP Location: Left arm, Patient Position: Sitting)   Pulse 98   Temp 97 8 °F (36 6 °C) (Tympanic)   Resp 17   Ht 5' 7" (1 702 m)   Wt 96 6 kg (213 lb)   BMI 33 36 kg/m²          Physical Exam  HENT:      Head: Normocephalic and atraumatic  Eyes:      Extraocular Movements: Extraocular movements intact  Pupils: Pupils are equal, round, and reactive to light  Pulmonary:      Effort: Pulmonary effort is normal    Abdominal:      Palpations: Abdomen is soft     Musculoskeletal:         General: Normal range of motion  Cervical back: Normal range of motion  Skin:     General: Skin is warm  Comments: Right breast absence status post mastectomy, well-healed mastectomy scar, large, heavy and pendulous left breast, sternal notch to nipple distance is 42 5 cm, inframammary fold nipple distance is 23 25 cm, see photos   Neurological:      Mental Status: She is alert and oriented to person, place, and time     Psychiatric:         Mood and Affect: Mood normal

## 2022-04-06 NOTE — PROGRESS NOTES
Assessment/Plan:  Please see HPI  She is interested in left breast reduction for symmetry purposes, status post right mastectomy  The left breast is very large, heavy and pendulous and would be reduced utilizing a free nipple graft technique  I discussed the procedure with her, as well as potential risks, complications limitations  She is aware that she must stop smoking prior to undergoing breast reduction surgery, given the significant risks of wound healing problems, necrosis, etcetera  She will work with her family physician in regard to smoking cessation, we will have Gurwinder Tan return in 2-3 months for further discussion and to assess her progress regarding smoking cessation  I suggested that she visit the [employer] [id number] [facility] web site, review the breast reduction section, and download and print out photographs that would indicate her desired postoperative breast volume  She understands, and knows to call this office should she have any questions regarding today's visit  There are no diagnoses linked to this encounter  Subjective: Interested in reduction mammoplasty     Patient ID: Mariam Hannah is a 64 y o  female  HPI Gurwinder Tna is a 44-year-old female, referred by Dr Penny Yu  Gurwinder Tan is now 4 months status post right unilateral mastectomy (without reconstruction), she is interested in a left breast reduction for symmetry purposes  She had previously 1 a 44 double D cup bra, she is not quite certain of her desired postoperative breast volume, is considering half off regarding volume reduction  She is a current everyday smoker, 1 pack per day, and we discussed the importance of smoking cessation prior to reduction mammoplasty      The following portions of the patient's history were reviewed and updated as appropriate: allergies, current medications, past family history, past medical history, past social history, past surgical history and problem list     Review of Systems   Constitutional: Negative for chills and fever  HENT: Negative for hearing loss  Eyes: Positive for visual disturbance  Negative for discharge  Respiratory: Positive for shortness of breath  Negative for chest tightness  Shortness of breath with exertion   Cardiovascular: Negative for chest pain and leg swelling  Gastrointestinal: Positive for diarrhea  Negative for blood in stool, constipation and nausea  Genitourinary: Negative for dysuria  Musculoskeletal: Positive for gait problem  Ambulates with cane   Skin: Negative for rash  Allergic/Immunologic: Negative for immunocompromised state  Neurological: Negative for seizures and headaches  Hematological: Does not bruise/bleed easily  Psychiatric/Behavioral: Positive for dysphoric mood  The patient is nervous/anxious  Objective:      /71 (BP Location: Left arm, Patient Position: Sitting)   Pulse 98   Temp 97 8 °F (36 6 °C) (Tympanic)   Resp 17   Ht 5' 7" (1 702 m)   Wt 96 6 kg (213 lb)   BMI 33 36 kg/m²          Physical Exam  HENT:      Head: Normocephalic and atraumatic  Eyes:      Extraocular Movements: Extraocular movements intact  Pupils: Pupils are equal, round, and reactive to light  Pulmonary:      Effort: Pulmonary effort is normal    Abdominal:      Palpations: Abdomen is soft  Musculoskeletal:         General: Normal range of motion  Cervical back: Normal range of motion  Skin:     General: Skin is warm  Comments: Right breast absence status post mastectomy, well-healed mastectomy scar, large, heavy and pendulous left breast, sternal notch to nipple distance is 42 5 cm, inframammary fold nipple distance is 23 25 cm, see photos   Neurological:      Mental Status: She is alert and oriented to person, place, and time     Psychiatric:         Mood and Affect: Mood normal

## 2022-04-10 ENCOUNTER — TELEPHONE (OUTPATIENT)
Dept: OTHER | Facility: OTHER | Age: 62
End: 2022-04-10

## 2022-04-10 NOTE — TELEPHONE ENCOUNTER
Pt called in stating she would like to cancel her 7/6/2022 appt as she has changed her mind about having surgery

## 2022-04-29 ENCOUNTER — VBI (OUTPATIENT)
Dept: ADMINISTRATIVE | Facility: OTHER | Age: 62
End: 2022-04-29

## 2022-04-29 NOTE — TELEPHONE ENCOUNTER
04/29/22 10:38 AM     See documentation in the [employer]       [facility] MA

## 2022-05-02 ENCOUNTER — TELEPHONE (OUTPATIENT)
Dept: PLASTIC SURGERY | Facility: CLINIC | Age: 62
End: 2022-05-02

## 2022-05-02 NOTE — TELEPHONE ENCOUNTER
Called patient to inquire how smoking is going and try to schedule surgery  Patient informed me she is no longer interested in going forward with surgery  I let her know to call if she should change her mind or need us for anything

## 2022-05-11 ENCOUNTER — CONSULT (OUTPATIENT)
Dept: GASTROENTEROLOGY | Facility: CLINIC | Age: 62
End: 2022-05-11
Payer: COMMERCIAL

## 2022-05-11 VITALS
TEMPERATURE: 98 F | SYSTOLIC BLOOD PRESSURE: 157 MMHG | HEART RATE: 112 BPM | DIASTOLIC BLOOD PRESSURE: 76 MMHG | HEIGHT: 67 IN | WEIGHT: 216 LBS | BODY MASS INDEX: 33.9 KG/M2

## 2022-05-11 DIAGNOSIS — Z80.0 FAMILY HISTORY OF COLON CANCER: Primary | ICD-10-CM

## 2022-05-11 DIAGNOSIS — K21.9 GERD WITHOUT ESOPHAGITIS: ICD-10-CM

## 2022-05-11 DIAGNOSIS — R19.7 FREQUENT DIARRHEA: ICD-10-CM

## 2022-05-11 PROCEDURE — 4004F PT TOBACCO SCREEN RCVD TLK: CPT | Performed by: INTERNAL MEDICINE

## 2022-05-11 PROCEDURE — 99244 OFF/OP CNSLTJ NEW/EST MOD 40: CPT | Performed by: INTERNAL MEDICINE

## 2022-05-11 NOTE — PROGRESS NOTES
[facility] - Outpatient Consultation  Mariam Hannah 58 y o  female MRN: [id number]  Encounter: [id number]          ASSESSMENT AND PLAN:      1  Frequent diarrhea  -     Ambulatory Referral to Gastroenterology    2  GERD without esophagitis  -     Ambulatory Referral to Gastroenterology    This is a 61-year-old white female with persistent diarrhea and a family history of colon cancer  She also has a history of colon polyps in the past   I do not have biopsy results of her last colonoscopy however she is due for repeat colonoscopy because for family history  Since she is not currently having any reflux symptoms I would not do any further workup on that at this time  Risks of perforation bleeding were discussed with the patient she is agreeable to proceed with the procedure  Thank you so much for referring this patient        ______________________________________________________________________    HPI:  Bulmaro Castro is a 61-year-old white female with a history of diarrhea for about five years  She had a prior colonoscopy five years ago which revealed some colon polyps but no obvious colitis  No biopsies for microscopic colitis were obtained at that time  She also has a family history of colon cancer (grandfather)  There is no family history of inflammatory bowel disease  She also states that she has a history of gastroesophageal reflux but she is currently asymptomatic  REVIEW OF SYSTEMS:    CONSTITUTIONAL: Denies any fever, chills, rigors, and weight loss  HEENT: No earache or tinnitus  Denies hearing loss or visual disturbances  CARDIOVASCULAR: No chest pain or palpitations  RESPIRATORY: Denies any cough, hemoptysis, shortness of breath or dyspnea on exertion  GASTROINTESTINAL: As noted in the History of Present Illness  GENITOURINARY: No problems with urination  Denies any hematuria or dysuria  NEUROLOGIC: No dizziness or vertigo, denies headaches  MUSCULOSKELETAL: Denies any muscle or joint pain  SKIN: Denies skin rashes or itching  ENDOCRINE: Denies excessive thirst  Denies intolerance to heat or cold  PSYCHOSOCIAL: Denies depression or anxiety  Denies any recent memory loss  Historical Information   Past Medical History:   Diagnosis Date    Cancer [facility])     right breast    Chronic back pain     Colitis     COPD (chronic obstructive pulmonary disease) (HCC)     Depression     Diabetes mellitus ([facility] )     DVT of lower limb, acute ([place] [address] )     GERD (gastroesophageal reflux disease)     Hyperlipidemia     Pneumonia     Rapid heart rate     Sleep apnea     Stroke ([place] [address] )     4 mini strokes     Past Surgical History:   Procedure Laterality Date    BREAST LUMPECTOMY Right     CARDIAC CATHETERIZATION N/A 10/28/2021    Procedure: Cardiac Coronary Angiogram;  Surgeon: Anastasia Aase, DO;  Location:  CARDIAC CATH LAB;   Service: Cardiology    IVC FILTER INSERTION      MASTECTOMY W/ SENTINEL NODE BIOPSY Right 2021    Procedure: BREAST MASTECTOMY WITH BIOPSY LYMPH NODE SENTINEL and axillary node dissection  ([place] Lymph Node Injection @ 12:30);  Surgeon: Polly Sullivan MD;  Location: [address] [place] [address];  Service: General    US GUIDANCE BREAST BIOPSY RIGHT EACH ADDITIONAL Right 10/13/2021    US GUIDANCE BREAST BIOPSY RIGHT EACH ADDITIONAL Right 10/13/2021    US GUIDED BREAST BIOPSY RIGHT COMPLETE Right 10/13/2021     Social History   Social History     Substance and Sexual Activity   Alcohol Use Never     Social History     Substance and Sexual Activity   Drug Use Never     Social History     Tobacco Use   Smoking Status Current Every Day Smoker    Packs/day: 1 50    Types: Cigarettes    Last attempt to quit: 2021    Years since quittin 5   Smokeless Tobacco Never Used     Family History   Problem Relation Age of Onset    Breast cancer Mother 67    COPD Mother     Coronary artery disease Mother     Coronary artery disease Father     Stroke Father     Lung cancer Sister     Breast cancer Maternal Grandmother     Colon cancer Paternal Grandfather     No Known Problems Maternal Aunt     No Known Problems Maternal Aunt     No Known Problems Maternal Aunt     No Known Problems Paternal Aunt     Breast cancer Cousin        Meds/Allergies       Current Outpatient Medications:     albuterol (2 5 mg/3 mL) 0 083 % nebulizer solution    albuterol (Ventolin HFA) 90 mcg/act inhaler    amitriptyline (ELAVIL) 100 mg tablet    anastrozole (ARIMIDEX) 1 mg tablet    aspirin (ECOTRIN LOW STRENGTH) 81 mg EC tablet    atorvastatin (LIPITOR) 80 mg tablet    BD Pen Needle Jasmine 2nd Gen 32G X 4 MM MISC    benztropine (COGENTIN) 0 5 mg tablet    Blood Glucose Monitoring Suppl (ONE TOUCH ULTRA 2) w/Device KIT    Blood Glucose Monitoring Suppl (ONE TOUCH ULTRA 2) w/Device KIT    clonazePAM (KlonoPIN) 0 5 mg tablet    fenofibrate (TRICOR) 48 mg tablet    gabapentin (NEURONTIN) 600 MG tablet    glucose blood (OneTouch Ultra) test strip    lisinopril (ZESTRIL) 2 5 mg tablet    loratadine (CLARITIN) 10 mg tablet    metFORMIN (GLUCOPHAGE) 1000 MG tablet    nicotine polacrilex (COMMIT) 4 MG lozenge    pantoprazole (PROTONIX) 40 mg tablet    tiotropium (Spiriva Respimat) 2 5 MCG/ACT AERS inhaler    traZODone (DESYREL) 150 mg tablet    Victoza injection    vilazodone (VIIBRYD) 40 mg tablet    VRAYLAR 6 MG capsule    Allergies   Allergen Reactions    Amoxicillin-Pot Clavulanate GI Intolerance    Anastrozole Other (See Comments)     Diarrhea, Nausea, Stomach pain            Objective     Blood pressure 157/76, pulse (!) 112, temperature 98 °F (36 7 °C), height 5' 7" (1 702 m), weight 98 kg (216 lb)  Body mass index is 33 83 kg/m²  PHYSICAL EXAM:      General Appearance:   Alert, cooperative, no distress   HEENT:   Normocephalic, atraumatic, anicteric       Neck:  Supple, symmetrical, trachea midline   Lungs:   Clear to auscultation bilaterally; no rales, rhonchi or wheezing; respirations unlabored    Heart[de-identified]   Regular rate and rhythm; no murmur, rub, or gallop  Abdomen:   Soft, non-tender, non-distended; normal bowel sounds; no masses, no organomegaly    Genitalia:   Deferred    Rectal:   Deferred    Extremities:  No cyanosis, clubbing or edema    Pulses:  2+ and symmetric    Skin:  No jaundice, rashes, or lesions    Lymph nodes:  No palpable cervical lymphadenopathy        Lab Results:   No visits with results within 1 Day(s) from this visit  Latest known visit with results is:   Lab on 03/30/2022   Component Date Value    C difficile toxin by PCR 03/30/2022 Negative     H pylori Ag, Stl 03/30/2022 Positive (A)         Radiology Results:   No results found

## 2022-05-16 ENCOUNTER — TELEPHONE (OUTPATIENT)
Dept: FAMILY MEDICINE CLINIC | Facility: HOME HEALTHCARE | Age: 62
End: 2022-05-16

## 2022-05-16 NOTE — TELEPHONE ENCOUNTER
Patient left message asking if you will put her on a water pill because her stomach is very full of water and she can't get rid of it

## 2022-05-16 NOTE — TELEPHONE ENCOUNTER
Please try to schedule patient for an appointment for this week  Patient should be evaluated before prescribing diuretic    Thank you

## 2022-05-17 ENCOUNTER — OFFICE VISIT (OUTPATIENT)
Dept: FAMILY MEDICINE CLINIC | Facility: HOME HEALTHCARE | Age: 62
End: 2022-05-17
Payer: COMMERCIAL

## 2022-05-17 VITALS
OXYGEN SATURATION: 91 % | DIASTOLIC BLOOD PRESSURE: 64 MMHG | RESPIRATION RATE: 18 BRPM | BODY MASS INDEX: 34.06 KG/M2 | HEART RATE: 90 BPM | TEMPERATURE: 98.5 F | SYSTOLIC BLOOD PRESSURE: 108 MMHG | WEIGHT: 217 LBS | HEIGHT: 67 IN

## 2022-05-17 DIAGNOSIS — Z23 ENCOUNTER FOR IMMUNIZATION: ICD-10-CM

## 2022-05-17 DIAGNOSIS — Z72.0 TOBACCO ABUSE: ICD-10-CM

## 2022-05-17 DIAGNOSIS — R60.9 EDEMA, UNSPECIFIED TYPE: Primary | ICD-10-CM

## 2022-05-17 PROCEDURE — T1015 CLINIC SERVICE: HCPCS | Performed by: FAMILY MEDICINE

## 2022-05-17 RX ORDER — FUROSEMIDE 40 MG/1
40 TABLET ORAL DAILY
Qty: 5 TABLET | Refills: 0 | Status: SHIPPED | OUTPATIENT
Start: 2022-05-17 | End: 2022-05-23 | Stop reason: ALTCHOICE

## 2022-05-17 NOTE — PROGRESS NOTES
[address]       NAME: Peter diamond a 58 y o  female  : 1960    MRN: [id number]  DATE: May 19, 2022  TIME: 8:22 AM    Assessment and Plan   Diagnoses and all orders for this visit:    Edema, unspecified type  -     furosemide (LASIX) 40 mg tablet; Take 1 tablet (40 mg total) by mouth in the morning  Encounter for immunization  -     PNEUMOCOCCAL CONJUGATE VACCINE 13-VALENT GREATER THAN 6 MONTHS    Tobacco abuse  -     PNEUMOCOCCAL CONJUGATE VACCINE 13-VALENT GREATER THAN 6 MONTHS        Lasix 40 mg daily x5 days  Patient instructed to call me for re-evaluation if she continues to have edema issues  Patient instructed to call with any questions or concerns  Smoking cessation discussed and encouraged  Chief Complaint     Chief Complaint   Patient presents with    Edema     Retaining fluid         History of Present Illness       HPI    59-year-old female here today for sick visit  Patient requesting diuretics due to increased edema mostly lower extremities  Patient denies any orthopnea, dyspnea above her baseline or chest pain  Denies any fevers or chills  Echocardiogram from 2021 reviewed  Systolic function appeared normal with LV normal size an EF 60%  Patient does have some mild pulmonary hypertension with PA pressure 30 mmHg  Patient has no other complaints at this time    Review of Systems   Review of Systems    As per HPI  All other systems negative    Current Medications       Current Outpatient Medications:     albuterol (2 5 mg/3 mL) 0 083 % nebulizer solution, Take 1 vial (2 5 mg total) by nebulization every 4 (four) hours as needed for shortness of breath, Disp: 120 mL, Rfl: 3    albuterol (Ventolin HFA) 90 mcg/act inhaler, Inhale 2 puffs every 4 (four) hours as needed for wheezing, Disp: 18 g, Rfl: 4    amitriptyline (ELAVIL) 100 mg tablet, take 1/2 tablet by mouth once daily at bedtime, Disp: 45 tablet, Rfl: 1    aspirin (ECOTRIN LOW STRENGTH) 81 mg EC tablet, Take 1 tablet (81 mg total) by mouth daily, Disp: 90 tablet, Rfl: 3    atorvastatin (LIPITOR) 80 mg tablet, Take 1 tablet (80 mg total) by mouth daily at bedtime, Disp: 30 tablet, Rfl: 5    BD Pen Needle Jasmine 2nd Gen 32G X 4 MM MISC, use 1 PEN NEEDLE to inject MEDICATION subcutaneously once daily, Disp: 90 each, Rfl: 3    benztropine (COGENTIN) 0 5 mg tablet, Take 0 5 mg by mouth daily at bedtime  , Disp: , Rfl:     Blood Glucose Monitoring Suppl (ONE TOUCH ULTRA 2) w/Device KIT, by Does not apply route, Disp: , Rfl:     Blood Glucose Monitoring Suppl (ONE TOUCH ULTRA 2) w/Device KIT, by Does not apply route daily, Disp: 100 each, Rfl: 0    clonazePAM (KlonoPIN) 0 5 mg tablet, Take 0 5 mg by mouth daily as needed  , Disp: , Rfl:     fenofibrate (TRICOR) 48 mg tablet, Take 1 tablet (48 mg total) by mouth daily, Disp: 30 tablet, Rfl: 5    furosemide (LASIX) 40 mg tablet, Take 1 tablet (40 mg total) by mouth in the morning , Disp: 5 tablet, Rfl: 0    gabapentin (NEURONTIN) 600 MG tablet, take 1 tablet by mouth three times a day, Disp: 270 tablet, Rfl: 1    glucose blood (OneTouch Ultra) test strip, Use 1 each daily Use as instructed, Disp: 100 strip, Rfl: 2    lisinopril (ZESTRIL) 2 5 mg tablet, take 1 tablet by mouth once daily, Disp: 90 tablet, Rfl: 1    loratadine (CLARITIN) 10 mg tablet, Take 1 tablet (10 mg total) by mouth daily, Disp: 30 tablet, Rfl: 2    metFORMIN (GLUCOPHAGE) 1000 MG tablet, take 1 tablet by mouth twice a day with food, Disp: 180 tablet, Rfl: 3    nicotine polacrilex (COMMIT) 4 MG lozenge, Apply 1 lozenge (4 mg total) to the mouth or throat as needed for smoking cessation, Disp: 100 each, Rfl: 0    pantoprazole (PROTONIX) 40 mg tablet, Take 1 tablet (40 mg total) by mouth 2 (two) times a day, Disp: 20 tablet, Rfl: 0    tiotropium (Spiriva Respimat) 2 5 MCG/ACT AERS inhaler, Inhale 2 puffs daily, Disp: 4 g, Rfl: 3    traZODone (DESYREL) 150 mg tablet, 150 mg daily at bedtime , Disp: , Rfl:     Victoza injection, inject 1 2 milligram subcutaneously daily, Disp: 6 mL, Rfl: 5    vilazodone (VIIBRYD) 40 mg tablet, Take 40 mg by mouth daily  , Disp: , Rfl:     VRAYLAR 6 MG capsule, Take 6 mg by mouth daily, Disp: , Rfl: 0    anastrozole (ARIMIDEX) 1 mg tablet, Take 1 tablet (1 mg total) by mouth daily (Patient not taking: No sig reported), Disp: 30 tablet, Rfl: 4    Current Allergies     Allergies as of 05/17/2022 - Reviewed 05/17/2022   Allergen Reaction Noted    Amoxicillin-pot clavulanate GI Intolerance 08/10/2016    Anastrozole Other (See Comments) 03/23/2022            The following portions of the patient's history were reviewed and updated as appropriate: allergies, current medications, past family history, past medical history, past social history, past surgical history and problem list      Past Medical History:   Diagnosis Date    Cancer ([facility] )     right breast    Chronic back pain     Colitis     COPD (chronic obstructive pulmonary disease) ([facility] )     Depression     Diabetes mellitus ([facility] )     DVT of lower limb, acute ([facility] ) 2004    GERD (gastroesophageal reflux disease)     Hyperlipidemia     Pneumonia     Rapid heart rate     Sleep apnea     Stroke ([facility] )     4 mini strokes       Past Surgical History:   Procedure Laterality Date    BREAST LUMPECTOMY Right     CARDIAC CATHETERIZATION N/A 10/28/2021    Procedure: Cardiac Coronary Angiogram;  Surgeon: Senia Monroe DO;  [facility];   Service: Cardiology    IVC FILTER INSERTION      MASTECTOMY W/ SENTINEL NODE BIOPSY Right 11/9/2021    Procedure: BREAST MASTECTOMY WITH BIOPSY LYMPH NODE SENTINEL and axillary node dissection  ([place] Lymph Node Injection @ 12:30);  Surgeon: Evelyn Kam MD;  Location: [address] [place] [address];  Service: General    US GUIDANCE BREAST BIOPSY RIGHT EACH ADDITIONAL Right 10/13/2021    US GUIDANCE BREAST BIOPSY RIGHT EACH ADDITIONAL Right 10/13/2021    US GUIDED BREAST BIOPSY RIGHT COMPLETE Right 10/13/2021       Family History   Problem Relation Age of Onset    Breast cancer Mother 67    COPD Mother     Coronary artery disease Mother     Coronary artery disease Father     Stroke Father     Lung cancer Sister     Breast cancer Maternal Grandmother     Colon cancer Paternal Grandfather     No Known Problems Maternal Aunt     No Known Problems Maternal Aunt     No Known Problems Maternal Aunt     No Known Problems Paternal Aunt     Breast cancer Cousin          Medications have been verified  Objective   /64 (BP Location: Left arm, Patient Position: Sitting, Cuff Size: Adult)   Pulse 90   Temp 98 5 °F (36 9 °C) (Temporal)   Resp 18   Ht 5' 7" (1 702 m)   Wt 98 4 kg (217 lb)   SpO2 91%   BMI 33 99 kg/m²        Physical Exam     Physical Exam  Vitals reviewed  Constitutional:       General: She is not in acute distress  Appearance: She is not ill-appearing, toxic-appearing or diaphoretic  HENT:      Head: Normocephalic  Neck:      Comments: No JVD noted  Cardiovascular:      Rate and Rhythm: Normal rate and regular rhythm  Pulmonary:      Effort: Pulmonary effort is normal       Breath sounds: Normal breath sounds  Abdominal:      General: Bowel sounds are normal       Palpations: Abdomen is soft  Tenderness: There is no abdominal tenderness  Musculoskeletal:      Right lower leg: Edema present  Left lower leg: Edema present  Neurological:      Mental Status: She is alert and oriented to person, place, and time     Psychiatric:         Mood and Affect: Mood normal

## 2022-05-23 ENCOUNTER — OFFICE VISIT (OUTPATIENT)
Dept: FAMILY MEDICINE CLINIC | Facility: HOME HEALTHCARE | Age: 62
End: 2022-05-23
Payer: COMMERCIAL

## 2022-05-23 VITALS
DIASTOLIC BLOOD PRESSURE: 65 MMHG | HEART RATE: 88 BPM | WEIGHT: 216.4 LBS | BODY MASS INDEX: 33.97 KG/M2 | TEMPERATURE: 98.1 F | HEIGHT: 67 IN | RESPIRATION RATE: 16 BRPM | SYSTOLIC BLOOD PRESSURE: 112 MMHG | OXYGEN SATURATION: 92 %

## 2022-05-23 DIAGNOSIS — R19.7 FREQUENT DIARRHEA: ICD-10-CM

## 2022-05-23 DIAGNOSIS — F17.210 CIGARETTE NICOTINE DEPENDENCE WITHOUT COMPLICATION: ICD-10-CM

## 2022-05-23 DIAGNOSIS — E66.09 CLASS 1 OBESITY DUE TO EXCESS CALORIES WITH SERIOUS COMORBIDITY AND BODY MASS INDEX (BMI) OF 33.0 TO 33.9 IN ADULT: ICD-10-CM

## 2022-05-23 DIAGNOSIS — E65 ABDOMINAL OBESITY: Primary | ICD-10-CM

## 2022-05-23 PROBLEM — E66.811 CLASS 1 OBESITY DUE TO EXCESS CALORIES WITH SERIOUS COMORBIDITY AND BODY MASS INDEX (BMI) OF 33.0 TO 33.9 IN ADULT: Status: ACTIVE | Noted: 2021-06-23

## 2022-05-23 PROCEDURE — 3008F BODY MASS INDEX DOCD: CPT | Performed by: INTERNAL MEDICINE

## 2022-05-23 PROCEDURE — T1015 CLINIC SERVICE: HCPCS | Performed by: FAMILY MEDICINE

## 2022-05-23 NOTE — PROGRESS NOTES
[address]       NAME: Jonah Mix is a 58 y o  female  : 1960    MRN: [id number]  DATE: May 23, 2022  TIME: 12:01 PM    Assessment and Plan   Diagnoses and all orders for this visit:    Abdominal obesity    Class 1 obesity due to excess calories with serious comorbidity and body mass index (BMI) of 33 0 to 33 9 in adult    Cigarette nicotine dependence without complication    Frequent diarrhea        Patient has colonoscopy scheduled  Patient calling gynecology make follow-up appointment  Discussed diet exercise weight loss and lifestyle modifications  Let patient know that she definitely needs to cut back on her caloric intake and increase her activity  Patient instructed to call me with any questions or concerns  Patient follow-up in next 3 months or sooner if needed         Chief Complaint     Chief Complaint   Patient presents with    Edema         History of Present Illness       HPI    51-year-old female presents today complaining of increased abdominal girth  Patient however weight has essentially remained stable however she does have a BMI of 33 8 with a weight of 216 lb  Patient states not very active  Denies any abdominal pain  States does have frequent diarrhea however was evaluated by Gastroenterology  Positive history of H pylori which was treated  C diff was negative  Last A1c 6 6    Review of Systems   Review of Systems    As per HPI   occasional dyspnea with exertion  All other systems negative    Current Medications       Current Outpatient Medications:     albuterol (2 5 mg/3 mL) 0 083 % nebulizer solution, Take 1 vial (2 5 mg total) by nebulization every 4 (four) hours as needed for shortness of breath, Disp: 120 mL, Rfl: 3    albuterol (Ventolin HFA) 90 mcg/act inhaler, Inhale 2 puffs every 4 (four) hours as needed for wheezing, Disp: 18 g, Rfl: 4    amitriptyline (ELAVIL) 100 mg tablet, take 1/2 tablet by mouth once daily at bedtime, Disp: 45 tablet, Rfl: 1    aspirin (ECOTRIN LOW STRENGTH) 81 mg EC tablet, Take 1 tablet (81 mg total) by mouth daily, Disp: 90 tablet, Rfl: 3    atorvastatin (LIPITOR) 80 mg tablet, Take 1 tablet (80 mg total) by mouth daily at bedtime, Disp: 30 tablet, Rfl: 5    BD Pen Needle Jasmine 2nd Gen 32G X 4 MM MISC, use 1 PEN NEEDLE to inject MEDICATION subcutaneously once daily, Disp: 90 each, Rfl: 3    benztropine (COGENTIN) 0 5 mg tablet, Take 0 5 mg by mouth daily at bedtime  , Disp: , Rfl:     Blood Glucose Monitoring Suppl (ONE TOUCH ULTRA 2) w/Device KIT, by Does not apply route, Disp: , Rfl:     Blood Glucose Monitoring Suppl (ONE TOUCH ULTRA 2) w/Device KIT, by Does not apply route daily, Disp: 100 each, Rfl: 0    clonazePAM (KlonoPIN) 0 5 mg tablet, Take 0 5 mg by mouth daily as needed  , Disp: , Rfl:     fenofibrate (TRICOR) 48 mg tablet, Take 1 tablet (48 mg total) by mouth daily, Disp: 30 tablet, Rfl: 5    gabapentin (NEURONTIN) 600 MG tablet, take 1 tablet by mouth three times a day, Disp: 270 tablet, Rfl: 1    glucose blood (OneTouch Ultra) test strip, Use 1 each daily Use as instructed, Disp: 100 strip, Rfl: 2    lisinopril (ZESTRIL) 2 5 mg tablet, take 1 tablet by mouth once daily, Disp: 90 tablet, Rfl: 1    loratadine (CLARITIN) 10 mg tablet, Take 1 tablet (10 mg total) by mouth daily, Disp: 30 tablet, Rfl: 2    metFORMIN (GLUCOPHAGE) 1000 MG tablet, take 1 tablet by mouth twice a day with food, Disp: 180 tablet, Rfl: 3    nicotine polacrilex (COMMIT) 4 MG lozenge, Apply 1 lozenge (4 mg total) to the mouth or throat as needed for smoking cessation, Disp: 100 each, Rfl: 0    pantoprazole (PROTONIX) 40 mg tablet, Take 1 tablet (40 mg total) by mouth 2 (two) times a day, Disp: 20 tablet, Rfl: 0    tiotropium (Spiriva Respimat) 2 5 MCG/ACT AERS inhaler, Inhale 2 puffs daily, Disp: 4 g, Rfl: 3    traZODone (DESYREL) 150 mg tablet, 150 mg daily at bedtime , Disp: , Rfl:     Victoza injection, inject 1 2 milligram subcutaneously daily, Disp: 6 mL, Rfl: 5    vilazodone (VIIBRYD) 40 mg tablet, Take 40 mg by mouth daily  , Disp: , Rfl:     VRAYLAR 6 MG capsule, Take 6 mg by mouth daily, Disp: , Rfl: 0    anastrozole (ARIMIDEX) 1 mg tablet, Take 1 tablet (1 mg total) by mouth daily (Patient not taking: No sig reported), Disp: 30 tablet, Rfl: 4    Current Allergies     Allergies as of 05/23/2022 - Reviewed 05/23/2022   Allergen Reaction Noted    Amoxicillin-pot clavulanate GI Intolerance 08/10/2016    Anastrozole Other (See Comments) 03/23/2022            The following portions of the patient's history were reviewed and updated as appropriate: allergies, current medications, past family history, past medical history, past social history, past surgical history and problem list      Past Medical History:   Diagnosis Date    [facility] )     right breast    Chronic back pain     Colitis     COPD [facility]chronic obstructive pulmonary disease) ([facility] )     Depression     Diabetes mellitus ([facility] )     DVT of lower limb, acute ([facility] ) 2004    GERD (gastroesophageal reflux disease)     Hyperlipidemia     Pneumonia     Rapid heart rate     Sleep apnea     Stroke ([facility] )     4 mini strokes       Past Surgical History:   Procedure Laterality Date    BREAST LUMPECTOMY Right     CARDIAC CATHETERIZATION N/A 10/28/2021    Procedure: Cardiac Coronary Angiogram;  Surgeon: Wilmer Monroe DO;  [facility];   Service: Cardiology    IVC FILTER INSERTION      MASTECTOMY W/ SENTINEL NODE BIOPSY Right 11/9/2021    Procedure: BREAST MASTECTOMY WITH BIOPSY LYMPH NODE SENTINEL and axillary node dissection  ([place] Lymph Node Injection @ 12:30);  Surgeon: Mable Walker MD;  Location: [facility] MAIN OR;  Service: General    US GUIDANCE BREAST BIOPSY RIGHT EACH ADDITIONAL Right 10/13/2021    US GUIDANCE BREAST BIOPSY RIGHT EACH ADDITIONAL Right 10/13/2021    US GUIDED BREAST BIOPSY RIGHT COMPLETE Right 10/13/2021 Family History   Problem Relation Age of Onset    Breast cancer Mother 67    COPD Mother     Coronary artery disease Mother     Coronary artery disease Father     Stroke Father     Lung cancer Sister     Breast cancer Maternal Grandmother     Colon cancer Paternal Grandfather     No Known Problems Maternal Aunt     No Known Problems Maternal Aunt     No Known Problems Maternal Aunt     No Known Problems Paternal Aunt     Breast cancer Cousin          Medications have been verified  Objective   /65   Pulse 88   Temp 98 1 °F (36 7 °C)   Resp 16   Ht 5' 7" (1 702 m)   Wt 98 2 kg (216 lb 6 4 oz)   SpO2 92%   BMI 33 89 kg/m²        Physical Exam     Physical Exam  Vitals reviewed  Constitutional:       General: She is not in acute distress  Appearance: She is obese  She is not ill-appearing, toxic-appearing or diaphoretic  HENT:      Head: Normocephalic  Cardiovascular:      Rate and Rhythm: Normal rate and regular rhythm  Heart sounds: Normal heart sounds  Pulmonary:      Effort: Pulmonary effort is normal       Breath sounds: Normal breath sounds  Abdominal:      General: Bowel sounds are normal       Palpations: Abdomen is soft  There is no mass  Tenderness: There is no abdominal tenderness  There is no right CVA tenderness, left CVA tenderness, guarding or rebound  Hernia: No hernia is present  Musculoskeletal:      Cervical back: Neck supple  Right lower leg: No edema  Left lower leg: No edema  Lymphadenopathy:      Cervical: No cervical adenopathy  Skin:     Capillary Refill: Capillary refill takes less than 2 seconds  Neurological:      General: No focal deficit present  Mental Status: She is alert and oriented to person, place, and time  Motor: No weakness     Psychiatric:         Mood and Affect: Mood normal

## 2022-05-26 ENCOUNTER — TELEPHONE (OUTPATIENT)
Dept: PULMONOLOGY | Facility: CLINIC | Age: 62
End: 2022-05-26

## 2022-05-26 DIAGNOSIS — J96.11 CHRONIC HYPOXEMIC RESPIRATORY FAILURE (HCC): Primary | ICD-10-CM

## 2022-05-26 NOTE — TELEPHONE ENCOUNTER
Pt called stating she needs the doctor to place an order for an overnight pulse-ox test be sent to [address]    Please advise: [phone]

## 2022-06-24 NOTE — TELEPHONE ENCOUNTER
Called and spoke with patient  I reviewed results of overnight pulse ox  She did in fact do it on room air and 109 minutes with SpO2 less than 88%  This will qualify her for supplemental oxygen  Recommend she resume her home oxygen use at 4 liters/minute during hours of sleep  She verbalized understanding and agrees to the plan  Sherial Sessions, not sure if we need to send anything to her medical supply company?

## 2022-06-28 ENCOUNTER — OFFICE VISIT (OUTPATIENT)
Dept: FAMILY MEDICINE CLINIC | Facility: HOME HEALTHCARE | Age: 62
End: 2022-06-28
Payer: COMMERCIAL

## 2022-06-28 VITALS
HEART RATE: 95 BPM | TEMPERATURE: 97.8 F | RESPIRATION RATE: 18 BRPM | HEIGHT: 67 IN | DIASTOLIC BLOOD PRESSURE: 74 MMHG | SYSTOLIC BLOOD PRESSURE: 118 MMHG | BODY MASS INDEX: 34.56 KG/M2 | OXYGEN SATURATION: 96 % | WEIGHT: 220.2 LBS

## 2022-06-28 DIAGNOSIS — M62.830 SPASM OF MUSCLE OF LOWER BACK: Primary | ICD-10-CM

## 2022-06-28 DIAGNOSIS — N18.31 STAGE 3A CHRONIC KIDNEY DISEASE (HCC): ICD-10-CM

## 2022-06-28 PROCEDURE — 3008F BODY MASS INDEX DOCD: CPT | Performed by: INTERNAL MEDICINE

## 2022-06-28 PROCEDURE — T1015 CLINIC SERVICE: HCPCS | Performed by: FAMILY MEDICINE

## 2022-06-28 RX ORDER — CYCLOBENZAPRINE HCL 10 MG
10 TABLET ORAL EVERY 12 HOURS PRN
Qty: 14 TABLET | Refills: 0 | Status: SHIPPED | OUTPATIENT
Start: 2022-06-28 | End: 2022-07-22

## 2022-06-28 NOTE — PROGRESS NOTES
[address]       NAME: Espinoza Hester is a 58 y o  female  : 1960    MRN: [id number]  DATE: 2022  TIME: 2:54 PM    Assessment and Plan   Diagnoses and all orders for this visit:    Spasm of muscle of lower back  -     cyclobenzaprine (FLEXERIL) 10 mg tablet; Take 1 tablet (10 mg total) by mouth every 12 (twelve) hours as needed for muscle spasms    Stage 3a chronic kidney disease ([address] )  -     Ambulatory Referral to Nephrology; Future      Patient states will make appointment for early August to be cleared/ preop clearance for cataract surgery  Patient instructed to call me with any questions or concerns  Patient due for her annual physical late September or early October  Will repeat A1c at that time  Patient's last A1c was 6 6  Patient will take Tylenol as well as Flexeril p r n  for muscle spasms and pain  Will avoid NSAIDs due to chronic kidney disease  Chief Complaint     Chief Complaint   Patient presents with    Follow-up    Flank Pain     Right sided, asking for something for pain  Slid off an air mattress in her sleep  Valenteanthony Nicole History of Present Illness       HPI   78-year-old female here today for sick visit  Patient states was visiting her sister and sleeping on an air mattress when air mattress deflated and she fell off of the mattress on to floor  Patient states elevation of air mattress is only 1-2 feet in height  Did not hit head or lose consciousness  Pain mostly right paraspinal muscles lower and mid back  Patient cannot take NSAIDs secondary to chronic kidney disease  Patient did not take any medication for the pain  Tenderness with palpation  Denies any bony tenderness  Denies any loss of bowel or bladder control  Denies any saddle paresthesias  Denies any chest pain otherwise, shortness of breath, cough or abdominal pain    Review of Systems   Review of Systems    As per HPI   all other systems negative    Current Medications       Current Outpatient Medications:     albuterol (2 5 mg/3 mL) 0 083 % nebulizer solution, Take 1 vial (2 5 mg total) by nebulization every 4 (four) hours as needed for shortness of breath, Disp: 120 mL, Rfl: 3    albuterol (Ventolin HFA) 90 mcg/act inhaler, Inhale 2 puffs every 4 (four) hours as needed for wheezing, Disp: 18 g, Rfl: 4    amitriptyline (ELAVIL) 100 mg tablet, take 1/2 tablet by mouth once daily at bedtime, Disp: 45 tablet, Rfl: 1    aspirin (ECOTRIN LOW STRENGTH) 81 mg EC tablet, Take 1 tablet (81 mg total) by mouth daily, Disp: 90 tablet, Rfl: 3    atorvastatin (LIPITOR) 80 mg tablet, Take 1 tablet (80 mg total) by mouth daily at bedtime, Disp: 30 tablet, Rfl: 5    BD Pen Needle Jasmine 2nd Gen 32G X 4 MM MISC, use 1 PEN NEEDLE to inject MEDICATION subcutaneously once daily, Disp: 90 each, Rfl: 3    benztropine (COGENTIN) 0 5 mg tablet, Take 0 5 mg by mouth daily at bedtime  , Disp: , Rfl:     Blood Glucose Monitoring Suppl (ONE TOUCH ULTRA 2) w/Device KIT, by Does not apply route, Disp: , Rfl:     Blood Glucose Monitoring Suppl (ONE TOUCH ULTRA 2) w/Device KIT, by Does not apply route daily, Disp: 100 each, Rfl: 0    clonazePAM (KlonoPIN) 0 5 mg tablet, Take 0 5 mg by mouth daily as needed  , Disp: , Rfl:     cyclobenzaprine (FLEXERIL) 10 mg tablet, Take 1 tablet (10 mg total) by mouth every 12 (twelve) hours as needed for muscle spasms, Disp: 14 tablet, Rfl: 0    fenofibrate (TRICOR) 48 mg tablet, Take 1 tablet (48 mg total) by mouth daily, Disp: 30 tablet, Rfl: 5    gabapentin (NEURONTIN) 600 MG tablet, take 1 tablet by mouth three times a day, Disp: 270 tablet, Rfl: 1    glucose blood (OneTouch Ultra) test strip, Use 1 each daily Use as instructed, Disp: 100 strip, Rfl: 2    lisinopril (ZESTRIL) 2 5 mg tablet, take 1 tablet by mouth once daily, Disp: 90 tablet, Rfl: 1    metFORMIN (GLUCOPHAGE) 1000 MG tablet, take 1 tablet by mouth twice a day with food, Disp: 180 tablet, Rfl: 3   pantoprazole (PROTONIX) 40 mg tablet, Take 1 tablet (40 mg total) by mouth 2 (two) times a day, Disp: 20 tablet, Rfl: 0    tiotropium (Spiriva Respimat) 2 5 MCG/ACT AERS inhaler, Inhale 2 puffs daily, Disp: 4 g, Rfl: 3    traZODone (DESYREL) 150 mg tablet, 150 mg daily at bedtime , Disp: , Rfl:     Victoza injection, inject 1 2 milligram subcutaneously daily, Disp: 6 mL, Rfl: 5    vilazodone (VIIBRYD) 40 mg tablet, Take 40 mg by mouth daily  , Disp: , Rfl:     VRAYLAR 6 MG capsule, Take 6 mg by mouth daily, Disp: , Rfl: 0    anastrozole (ARIMIDEX) 1 mg tablet, Take 1 tablet (1 mg total) by mouth daily (Patient not taking: No sig reported), Disp: 30 tablet, Rfl: 4    loratadine (CLARITIN) 10 mg tablet, Take 1 tablet (10 mg total) by mouth daily (Patient not taking: Reported on 6/28/2022), Disp: 30 tablet, Rfl: 2    nicotine polacrilex (COMMIT) 4 MG lozenge, Apply 1 lozenge (4 mg total) to the mouth or throat as needed for smoking cessation (Patient not taking: Reported on 6/28/2022), Disp: 100 each, Rfl: 0    Current Allergies     Allergies as of 06/28/2022 - Reviewed 06/28/2022   Allergen Reaction Noted    Amoxicillin-pot clavulanate GI Intolerance 08/10/2016    Anastrozole Other (See Comments) 03/23/2022            The following portions of the patient's history were reviewed and updated as appropriate: allergies, current medications, past family history, past medical history, past social history, past surgical history and problem list      Past Medical History:   Diagnosis Date    Cancer ([facility] )     right breast    Chronic back pain     Colitis     COPD (chronic obstructive pulmonary disease) ([facility] )     Depression     Diabetes mellitus ([facility] )     DVT of lower limb, acute ([facility] ) 2004    GERD (gastroesophageal reflux disease)     Hyperlipidemia     Pneumonia     Rapid heart rate     Sleep apnea     Stroke ([facility] )     4 mini strokes       Past Surgical History:   Procedure Laterality Date    BREAST LUMPECTOMY Right     CARDIAC CATHETERIZATION N/A 10/28/2021    Procedure: Cardiac Coronary Angiogram;  Surgeon: Daniel Ordoñez DO;  Location:  CARDIAC CATH LAB; Service: Cardiology    IVC FILTER INSERTION      MASTECTOMY W/ SENTINEL NODE BIOPSY Right 11/9/2021    Procedure: BREAST MASTECTOMY WITH BIOPSY LYMPH NODE SENTINEL and axillary node dissection  ([facility] Lymph Node Injection @ 12:30);  Surgeon: Alpha Gaucher, MD;  Location: [facility] MAIN OR;  Service: General    US GUIDANCE BREAST BIOPSY RIGHT EACH ADDITIONAL Right 10/13/2021    US GUIDANCE BREAST BIOPSY RIGHT EACH ADDITIONAL Right 10/13/2021    US GUIDED BREAST BIOPSY RIGHT COMPLETE Right 10/13/2021       Family History   Problem Relation Age of Onset    Breast cancer Mother 67    COPD Mother     Coronary artery disease Mother     Coronary artery disease Father     Stroke Father     Lung cancer Sister     Breast cancer Maternal Grandmother     Colon cancer Paternal Grandfather     No Known Problems Maternal Aunt     No Known Problems Maternal Aunt     No Known Problems Maternal Aunt     No Known Problems Paternal Aunt     Breast cancer Cousin          Medications have been verified  Objective   /74 (BP Location: Left arm, Patient Position: Sitting, Cuff Size: Adult)   Pulse 95   Temp 97 8 °F (36 6 °C) (Temporal)   Resp 18   Ht 5' 7" (1 702 m)   Wt 99 9 kg (220 lb 3 2 oz)   SpO2 96%   BMI 34 49 kg/m²        Physical Exam     Physical Exam  Vitals and nursing note reviewed  Constitutional:       General: She is not in acute distress  Appearance: She is not ill-appearing, toxic-appearing or diaphoretic  HENT:      Head: Normocephalic and atraumatic  Nose: Nose normal       Mouth/Throat:      Mouth: Mucous membranes are moist       Pharynx: Oropharynx is clear  Eyes:      General: No scleral icterus       Conjunctiva/sclera: Conjunctivae normal    Cardiovascular:      Rate and Rhythm: Normal rate and regular rhythm  Heart sounds: Normal heart sounds  Pulmonary:      Effort: Pulmonary effort is normal       Breath sounds: Normal breath sounds  Abdominal:      General: Bowel sounds are normal       Palpations: Abdomen is soft  Tenderness: There is no abdominal tenderness  Musculoskeletal:         General: Signs of injury present  No swelling or deformity  Tenderness:  right paraspinal /latissimus muscle tenderness with palpation  No ecchymosis noted  No rib fracture or rib tenderness noted  Cervical back: Normal range of motion and neck supple  No rigidity or tenderness  Right lower leg: No edema  Left lower leg: No edema  Lymphadenopathy:      Cervical: No cervical adenopathy  Skin:     General: Skin is warm and dry  Capillary Refill: Capillary refill takes less than 2 seconds  Neurological:      General: No focal deficit present  Mental Status: She is alert and oriented to person, place, and time     Psychiatric:         Mood and Affect: Mood normal          Behavior: Behavior normal

## 2022-06-29 ENCOUNTER — TELEPHONE (OUTPATIENT)
Dept: GASTROENTEROLOGY | Facility: CLINIC | Age: 62
End: 2022-06-29

## 2022-06-29 DIAGNOSIS — R19.7 DIARRHEA, UNSPECIFIED TYPE: Primary | ICD-10-CM

## 2022-06-29 NOTE — TELEPHONE ENCOUNTER
OV 5/11/22 Dr Yvonne Araiza  Hx: H pylori, Diarrhea, GERD   Colonoscopy: 8/2/22     Please Advise  Pt explained colonoscopy is far out and would like to know what to do about symptoms discussed at [address]  Reports "brown water" diarrhea "I can't hold it in", abdominal cramping, "lost count" regarding how many BMs in a day, and occasional lightheadedness at times  Denies N/V, fevers, BRBRP, melena, decreased oral intake, or any other appreciating symptoms  Pt has trialed imodium for diarrhea but did not work  Recs:  1  Advise to trial pepto for GI symptoms, bland diet, and drink pedialyte for hydration  Avoid anti-diarrheals incase of infeciton  2   Pt would probably benefit from stool tests, please also order H pylori as she was not retested after positive test on 3/30/22

## 2022-06-29 NOTE — TELEPHONE ENCOUNTER
Called patient to se amaral if she would go to a different locations, she would like to go to [facility] to have her procedure done  She ask that I send a message to a provider or nurse stating she has the poop runs  Can you please call the patient at [phone]

## 2022-06-29 NOTE — TELEPHONE ENCOUNTER
I did  call patient back she is going to keep her procedure date, but would like to talk to a nurse or a Dr  About her diarrhea

## 2022-06-29 NOTE — TELEPHONE ENCOUNTER
Patients GI provider:  Dr Lizarraga     Number to return call: [phone]    Reason for call: Pt calling asking if she can reschedule her procedure into July  If not please keep date as 8/02/2022  Pt also stating she has diarrhea every time after she eats      Scheduled procedure/appointment date if applicable: Procedure: 6/60/6871

## 2022-06-30 ENCOUNTER — APPOINTMENT (OUTPATIENT)
Dept: LAB | Facility: HOSPITAL | Age: 62
End: 2022-06-30
Payer: COMMERCIAL

## 2022-06-30 DIAGNOSIS — R19.7 DIARRHEA, UNSPECIFIED TYPE: ICD-10-CM

## 2022-06-30 LAB
IGA SERPL-MCNC: 257 MG/DL (ref 70–400)
TSH SERPL DL<=0.05 MIU/L-ACNC: 4.23 UIU/ML (ref 0.45–4.5)

## 2022-06-30 PROCEDURE — 36415 COLL VENOUS BLD VENIPUNCTURE: CPT

## 2022-06-30 PROCEDURE — 84443 ASSAY THYROID STIM HORMONE: CPT

## 2022-06-30 PROCEDURE — 87505 NFCT AGENT DETECTION GI: CPT

## 2022-06-30 PROCEDURE — 87177 OVA AND PARASITES SMEARS: CPT

## 2022-06-30 PROCEDURE — 82784 ASSAY IGA/IGD/IGG/IGM EACH: CPT

## 2022-06-30 PROCEDURE — 86364 TISS TRNSGLTMNASE EA IG CLAS: CPT

## 2022-06-30 PROCEDURE — 83993 ASSAY FOR CALPROTECTIN FECAL: CPT

## 2022-06-30 PROCEDURE — 87338 HPYLORI STOOL AG IA: CPT

## 2022-06-30 PROCEDURE — 87209 SMEAR COMPLEX STAIN: CPT

## 2022-06-30 NOTE — TELEPHONE ENCOUNTER
Reviewed provider recs with pt and advised to complete tests ASAP  Pt agreeable to recs and had no further questions

## 2022-07-01 LAB
CAMPYLOBACTER DNA SPEC NAA+PROBE: NORMAL
SALMONELLA DNA SPEC QL NAA+PROBE: NORMAL
SHIGA TOXIN STX GENE SPEC NAA+PROBE: NORMAL
SHIGELLA DNA SPEC QL NAA+PROBE: NORMAL
TTG IGA SER-ACNC: <2 U/ML (ref 0–3)

## 2022-07-02 LAB — H PYLORI AG STL QL IA: NEGATIVE

## 2022-07-06 LAB — CALPROTECTIN STL-MCNT: 121 UG/G (ref 0–120)

## 2022-07-07 NOTE — TELEPHONE ENCOUNTER
Stool studies all normal aside for a mildly elevated fecal calprotectin which we will assess on upcoming colonoscopy  Please have her start Imodium for her diarrhea

## 2022-07-08 NOTE — TELEPHONE ENCOUNTER
I called and talked to the patient I gave her results and reminded her of her appointment with Dr Yvonne Araiza MD  Patient expressed understanding

## 2022-07-12 DIAGNOSIS — G43.809 OTHER MIGRAINE WITHOUT STATUS MIGRAINOSUS, NOT INTRACTABLE: ICD-10-CM

## 2022-07-12 RX ORDER — AMITRIPTYLINE HYDROCHLORIDE 100 MG/1
TABLET, FILM COATED ORAL
Qty: 45 TABLET | Refills: 1 | Status: SHIPPED | OUTPATIENT
Start: 2022-07-12

## 2022-07-14 ENCOUNTER — OFFICE VISIT (OUTPATIENT)
Dept: GASTROENTEROLOGY | Facility: CLINIC | Age: 62
End: 2022-07-14
Payer: COMMERCIAL

## 2022-07-14 VITALS
TEMPERATURE: 99 F | SYSTOLIC BLOOD PRESSURE: 118 MMHG | HEIGHT: 67 IN | RESPIRATION RATE: 16 BRPM | OXYGEN SATURATION: 93 % | BODY MASS INDEX: 34.47 KG/M2 | WEIGHT: 219.6 LBS | DIASTOLIC BLOOD PRESSURE: 68 MMHG | HEART RATE: 108 BPM

## 2022-07-14 DIAGNOSIS — R19.7 DIARRHEA, UNSPECIFIED TYPE: Primary | ICD-10-CM

## 2022-07-14 PROCEDURE — 99213 OFFICE O/P EST LOW 20 MIN: CPT | Performed by: INTERNAL MEDICINE

## 2022-07-14 NOTE — PROGRESS NOTES
[facility] - Outpatient Follow-up Note  Huong Daughters 58 y o  female MRN: [id number]  Encounter: [id number]          ASSESSMENT AND PLAN:      1  Diarrhea, unspecified type    This is a 70-year-old white female with diarrhea  She is scheduled for a colonoscopy in two weeks  She has only partially responded to Imodium  And I really may be secondary to the diabetes with an autonomic neuropathy  She does have a history of neuropathy in her feet so this may be most likely cause  Microscopic colitis is also a possibility  We will be doing biopsies of the colon during the colonoscopy to rule out microscopic colitis  Once we have those results we will make a decision regarding the necessary treatment  Thank you very much for referring this patient   ______________________________________________________________________    SUBJECTIVELevern Nena returns to the office because of persistent diarrhea  She is scheduled for colonoscopy early next month  She does have a history of diabetes and the diarrhea does wake her up at night at times  A symptoms are somewhat worse than when I saw her last, two months ago  She has tried Imodium with only temporary relief  She does not use dairy products because that causes her to have more diarrhea  REVIEW OF SYSTEMS IS OTHERWISE NEGATIVE        Historical Information   Past Medical History:   Diagnosis Date    Cancer [facility])     right breast    Chronic back pain     Colitis     [facility]     Depression     [facility] mellitus [facility] )     DVT of lower limb, acute ([facility] ) 2004    GERD (gastroesophageal reflux disease)     Hyperlipidemia     Pneumonia     Rapid heart rate     Sleep apnea     Stroke ([facility] )     4 mini strokes     Past Surgical History:   Procedure Laterality Date    BREAST LUMPECTOMY Right     CARDIAC CATHETERIZATION N/A 10/28/2021    Procedure: Cardiac Coronary Angiogram;  Surgeon: Maikol Phelps DO;  Location:  CARDIAC CATH LAB;   Service: Cardiology    IVC FILTER INSERTION      MASTECTOMY W/ SENTINEL NODE BIOPSY Right 2021    Procedure: BREAST MASTECTOMY WITH BIOPSY LYMPH NODE SENTINEL and axillary node dissection  ([facility] Lymph Node Injection @ 12:30);  Surgeon: Ivone Crowley MD;  Location: [address] [place] [address] OR;  Service: General    US GUIDANCE BREAST BIOPSY RIGHT EACH ADDITIONAL Right 10/13/2021    US GUIDANCE BREAST BIOPSY RIGHT EACH ADDITIONAL Right 10/13/2021    US GUIDED BREAST BIOPSY RIGHT COMPLETE Right 10/13/2021     Social History   Social History     Substance and Sexual Activity   Alcohol Use Never     Social History     Substance and Sexual Activity   Drug Use Never     Social History     Tobacco Use   Smoking Status Current Every Day Smoker    Packs/day: 1 50    Types: Cigarettes    Last attempt to quit: 2021    Years since quittin 6   Smokeless Tobacco Never Used     Family History   Problem Relation Age of Onset    Breast cancer Mother 67    COPD Mother     Coronary artery disease Mother     Coronary artery disease Father     Stroke Father     Lung cancer Sister     Breast cancer Maternal Grandmother     Colon cancer Paternal Grandfather     No Known Problems Maternal Aunt     No Known Problems Maternal Aunt     No Known Problems Maternal Aunt     No Known Problems Paternal Aunt     Breast cancer Cousin        Meds/Allergies       Current Outpatient Medications:     albuterol (2 5 mg/3 mL) 0 083 % nebulizer solution    albuterol (Ventolin HFA) 90 mcg/act inhaler    amitriptyline (ELAVIL) 100 mg tablet    aspirin (ECOTRIN LOW STRENGTH) 81 mg EC tablet    atorvastatin (LIPITOR) 80 mg tablet    BD Pen Needle Jasmine 2nd Gen 32G X 4 MM MISC    benztropine (COGENTIN) 0 5 mg tablet    Blood Glucose Monitoring Suppl (ONE TOUCH ULTRA 2) w/Device KIT    Blood Glucose Monitoring Suppl (ONE TOUCH ULTRA 2) w/Device KIT    clonazePAM (KlonoPIN) 0 5 mg tablet    cyclobenzaprine (FLEXERIL) 10 mg tablet    fenofibrate (TRICOR) 48 mg tablet    gabapentin (NEURONTIN) 600 MG tablet    glucose blood (OneTouch Ultra) test strip    lisinopril (ZESTRIL) 2 5 mg tablet    metFORMIN (GLUCOPHAGE) 1000 MG tablet    nicotine polacrilex (COMMIT) 4 MG lozenge    pantoprazole (PROTONIX) 40 mg tablet    tiotropium (Spiriva Respimat) 2 5 MCG/ACT AERS inhaler    traZODone (DESYREL) 150 mg tablet    Victoza injection    vilazodone (VIIBRYD) 40 mg tablet    VRAYLAR 6 MG capsule    anastrozole (ARIMIDEX) 1 mg tablet    loratadine (CLARITIN) 10 mg tablet    Allergies   Allergen Reactions    Amoxicillin-Pot Clavulanate GI Intolerance    Anastrozole Other (See Comments)     Diarrhea, Nausea, Stomach pain            Objective     Blood pressure 118/68, pulse (!) 108, temperature 99 °F (37 2 °C), temperature source Tympanic, resp  rate 16, height 5' 7" (1 702 m), weight 99 6 kg (219 lb 9 6 oz), SpO2 93 %  Body mass index is 34 39 kg/m²  PHYSICAL EXAM:      General Appearance:   Alert, cooperative, no distress   HEENT:   Normocephalic, atraumatic, anicteric  Neck:  Supple, symmetrical, trachea midline   Lungs:   Clear to auscultation bilaterally; no rales, rhonchi or wheezing; respirations unlabored    Heart[de-identified]   Regular rate and rhythm; no murmur, rub, or gallop  Abdomen:   Soft, non-tender, non-distended; normal bowel sounds; no masses, no organomegaly    Genitalia:   Deferred    Rectal:   Deferred    Extremities:  No cyanosis, clubbing or edema    Pulses:  2+ and symmetric    Skin:  No jaundice, rashes, or lesions    Lymph nodes:  No palpable cervical lymphadenopathy        Lab Results:   No visits with results within 1 Day(s) from this visit     Latest known visit with results is:   Appointment on 06/30/2022   Component Date Value    IGA 06/30/2022 257 0     TISSUE TRANSGLUTAMINASE * 06/30/2022 <2     TSH 3RD GENERATON 06/30/2022 4 234     Calprotectin 06/30/2022 121 (A)    Salmonella sp PCR 06/30/2022 None Detected     Shigella sp/Enteroinvasi* 06/30/2022 None Detected     Campylobacter sp (jejuni* 06/30/2022 None Detected     Shiga toxin 1/Shiga toxi* 06/30/2022 None Detected     H pylori Ag, Stl 06/30/2022 Negative          Radiology Results:   No results found

## 2022-07-15 ENCOUNTER — NURSE TRIAGE (OUTPATIENT)
Dept: OTHER | Facility: OTHER | Age: 62
End: 2022-07-15

## 2022-07-15 NOTE — TELEPHONE ENCOUNTER
Regarding: Colonoscopy prep  ----- Message from Leonides Rincon sent at 7/15/2022  9:36 AM EDT -----  "How do I mix the Murelax for my colonoscopy?"

## 2022-07-15 NOTE — TELEPHONE ENCOUNTER
Patient was not sure she had the correct size bottle of Mralax and wanted to know how many ounces were in 236 grams  Home care advice provided  Patient advised to call back if she has any other questions/concerns       Reason for Disposition   Bowel prep for colonoscopy, questions about    Protocols used: COLONOSCOPY SYMPTOMS AND QUESTIONS-ADULT-AH

## 2022-07-18 ENCOUNTER — TELEPHONE (OUTPATIENT)
Dept: FAMILY MEDICINE CLINIC | Facility: HOME HEALTHCARE | Age: 62
End: 2022-07-18

## 2022-07-20 ENCOUNTER — OFFICE VISIT (OUTPATIENT)
Dept: NEPHROLOGY | Facility: CLINIC | Age: 62
End: 2022-07-20
Payer: COMMERCIAL

## 2022-07-20 ENCOUNTER — APPOINTMENT (EMERGENCY)
Dept: CT IMAGING | Facility: HOSPITAL | Age: 62
DRG: 720 | End: 2022-07-20
Payer: COMMERCIAL

## 2022-07-20 ENCOUNTER — HOSPITAL ENCOUNTER (INPATIENT)
Facility: HOSPITAL | Age: 62
LOS: 2 days | Discharge: HOME | DRG: 720 | End: 2022-07-22
Attending: EMERGENCY MEDICINE | Admitting: INTERNAL MEDICINE
Payer: COMMERCIAL

## 2022-07-20 ENCOUNTER — APPOINTMENT (EMERGENCY)
Dept: RADIOLOGY | Facility: HOSPITAL | Age: 62
DRG: 720 | End: 2022-07-20
Payer: COMMERCIAL

## 2022-07-20 VITALS
WEIGHT: 219 LBS | OXYGEN SATURATION: 93 % | DIASTOLIC BLOOD PRESSURE: 50 MMHG | HEART RATE: 111 BPM | BODY MASS INDEX: 34.3 KG/M2 | SYSTOLIC BLOOD PRESSURE: 100 MMHG

## 2022-07-20 DIAGNOSIS — I95.2 HYPOTENSION DUE TO DRUGS: ICD-10-CM

## 2022-07-20 DIAGNOSIS — N39.0 UTI (URINARY TRACT INFECTION): ICD-10-CM

## 2022-07-20 DIAGNOSIS — N30.00 ACUTE CYSTITIS WITHOUT HEMATURIA: ICD-10-CM

## 2022-07-20 DIAGNOSIS — R00.0 TACHYCARDIA: ICD-10-CM

## 2022-07-20 DIAGNOSIS — R09.02 HYPOXIA: ICD-10-CM

## 2022-07-20 DIAGNOSIS — E86.9 VOLUME DEPLETION: Primary | ICD-10-CM

## 2022-07-20 DIAGNOSIS — J96.01 ACUTE RESPIRATORY FAILURE WITH HYPOXIA (HCC): ICD-10-CM

## 2022-07-20 DIAGNOSIS — R19.7 DIARRHEA: ICD-10-CM

## 2022-07-20 DIAGNOSIS — R53.1 WEAKNESS: Primary | ICD-10-CM

## 2022-07-20 DIAGNOSIS — I10 PRIMARY HYPERTENSION: ICD-10-CM

## 2022-07-20 DIAGNOSIS — N18.31 STAGE 3A CHRONIC KIDNEY DISEASE (HCC): ICD-10-CM

## 2022-07-20 DIAGNOSIS — N28.1 KIDNEY CYSTS: ICD-10-CM

## 2022-07-20 PROBLEM — J44.1 CHRONIC OBSTRUCTIVE PULMONARY DISEASE WITH ACUTE EXACERBATION (HCC): Status: ACTIVE | Noted: 2022-07-20

## 2022-07-20 PROBLEM — A41.9 SEPSIS WITHOUT ACUTE ORGAN DYSFUNCTION (HCC): Status: ACTIVE | Noted: 2022-07-20

## 2022-07-20 PROBLEM — I95.9 HYPOTENSION: Status: ACTIVE | Noted: 2022-07-20

## 2022-07-20 PROBLEM — E86.1 HYPOTENSION DUE TO HYPOVOLEMIA: Status: ACTIVE | Noted: 2022-07-20

## 2022-07-20 PROBLEM — I95.89 HYPOTENSION DUE TO HYPOVOLEMIA: Status: ACTIVE | Noted: 2022-07-20

## 2022-07-20 LAB
2HR DELTA HS TROPONIN: 0 NG/L
4HR DELTA HS TROPONIN: 0 NG/L
ALBUMIN SERPL BCP-MCNC: 3.5 G/DL (ref 3.5–5)
ALP SERPL-CCNC: 63.4 U/L (ref 46–116)
ALT SERPL W P-5'-P-CCNC: 23 U/L (ref 12–78)
ANION GAP SERPL CALCULATED.3IONS-SCNC: 8 MMOL/L (ref 4–13)
APTT PPP: 27 SECONDS (ref 23–37)
AST SERPL W P-5'-P-CCNC: 12 U/L (ref 5–45)
BACTERIA UR QL AUTO: ABNORMAL /HPF
BASOPHILS # BLD AUTO: 0 THOUSANDS/ΜL (ref 0–0.1)
BASOPHILS NFR BLD AUTO: 0 % (ref 0–1)
BILIRUB SERPL-MCNC: 0.22 MG/DL (ref 0.2–1)
BILIRUB UR QL STRIP: NEGATIVE
BUN SERPL-MCNC: 14 MG/DL (ref 5–25)
CALCIUM SERPL-MCNC: 9 MG/DL (ref 8.3–10.1)
CARDIAC TROPONIN I PNL SERPL HS: 5 NG/L
CHLORIDE SERPL-SCNC: 100 MMOL/L (ref 96–108)
CLARITY UR: CLEAR
CO2 SERPL-SCNC: 32 MMOL/L (ref 21–32)
COLOR UR: YELLOW
CREAT SERPL-MCNC: 1.58 MG/DL (ref 0.6–1.3)
D DIMER PPP FEU-MCNC: 0.9 UG/ML FEU
EOSINOPHIL # BLD AUTO: 0.04 THOUSAND/ΜL (ref 0–0.61)
EOSINOPHIL NFR BLD AUTO: 1 % (ref 0–6)
ERYTHROCYTE [DISTWIDTH] IN BLOOD BY AUTOMATED COUNT: 13.2 % (ref 11.6–15.1)
FLUAV RNA RESP QL NAA+PROBE: NEGATIVE
FLUBV RNA RESP QL NAA+PROBE: NEGATIVE
GFR SERPL CREATININE-BSD FRML MDRD: 34 ML/MIN/1.73SQ M
GLUCOSE SERPL-MCNC: 95 MG/DL (ref 65–140)
GLUCOSE UR STRIP-MCNC: NEGATIVE MG/DL
HCT VFR BLD AUTO: 35 % (ref 34.8–46.1)
HGB BLD-MCNC: 11.2 G/DL (ref 11.5–15.4)
HGB UR QL STRIP.AUTO: NEGATIVE
IMM GRANULOCYTES # BLD AUTO: 0.03 THOUSAND/UL (ref 0–0.2)
IMM GRANULOCYTES NFR BLD AUTO: 0 % (ref 0–2)
INR PPP: 1.03 (ref 0.84–1.19)
KETONES UR STRIP-MCNC: NEGATIVE MG/DL
LACTATE SERPL-SCNC: 1.4 MMOL/L (ref 0.5–2)
LEUKOCYTE ESTERASE UR QL STRIP: ABNORMAL
LYMPHOCYTES # BLD AUTO: 2.13 THOUSANDS/ΜL (ref 0.6–4.47)
LYMPHOCYTES NFR BLD AUTO: 31 % (ref 14–44)
MCH RBC QN AUTO: 30.7 PG (ref 26.8–34.3)
MCHC RBC AUTO-ENTMCNC: 32 G/DL (ref 31.4–37.4)
MCV RBC AUTO: 96 FL (ref 82–98)
MONOCYTES # BLD AUTO: 0.6 THOUSAND/ΜL (ref 0.17–1.22)
MONOCYTES NFR BLD AUTO: 9 % (ref 4–12)
NEUTROPHILS # BLD AUTO: 4 THOUSANDS/ΜL (ref 1.85–7.62)
NEUTS SEG NFR BLD AUTO: 59 % (ref 43–75)
NITRITE UR QL STRIP: NEGATIVE
NON-SQ EPI CELLS URNS QL MICRO: ABNORMAL /HPF
NRBC BLD AUTO-RTO: 0 /100 WBCS
NT-PROBNP SERPL-MCNC: 106 PG/ML
PH UR STRIP.AUTO: 5.5 [PH]
PLATELET # BLD AUTO: 178 THOUSANDS/UL (ref 149–390)
PMV BLD AUTO: 9.8 FL (ref 8.9–12.7)
POTASSIUM SERPL-SCNC: 4.4 MMOL/L (ref 3.5–5.3)
PROCALCITONIN SERPL-MCNC: 0.1 NG/ML
PROT SERPL-MCNC: 6.9 G/DL (ref 6.4–8.4)
PROT UR STRIP-MCNC: NEGATIVE MG/DL
PROTHROMBIN TIME: 13.7 SECONDS (ref 11.6–14.5)
RBC # BLD AUTO: 3.65 MILLION/UL (ref 3.81–5.12)
RBC #/AREA URNS AUTO: ABNORMAL /HPF
RSV RNA RESP QL NAA+PROBE: NEGATIVE
SARS-COV-2 RNA RESP QL NAA+PROBE: NEGATIVE
SODIUM SERPL-SCNC: 140 MMOL/L (ref 135–147)
SP GR UR STRIP.AUTO: 1.01 (ref 1–1.03)
UROBILINOGEN UR QL STRIP.AUTO: 0.2 E.U./DL
WBC # BLD AUTO: 6.8 THOUSAND/UL (ref 4.31–10.16)
WBC #/AREA URNS AUTO: ABNORMAL /HPF

## 2022-07-20 PROCEDURE — 71045 X-RAY EXAM CHEST 1 VIEW: CPT

## 2022-07-20 PROCEDURE — 85379 FIBRIN DEGRADATION QUANT: CPT | Performed by: EMERGENCY MEDICINE

## 2022-07-20 PROCEDURE — 96361 HYDRATE IV INFUSION ADD-ON: CPT

## 2022-07-20 PROCEDURE — 84145 PROCALCITONIN (PCT): CPT | Performed by: EMERGENCY MEDICINE

## 2022-07-20 PROCEDURE — 87086 URINE CULTURE/COLONY COUNT: CPT | Performed by: EMERGENCY MEDICINE

## 2022-07-20 PROCEDURE — G1004 CDSM NDSC: HCPCS

## 2022-07-20 PROCEDURE — 99214 OFFICE O/P EST MOD 30 MIN: CPT | Performed by: PHYSICIAN ASSISTANT

## 2022-07-20 PROCEDURE — 83880 ASSAY OF NATRIURETIC PEPTIDE: CPT | Performed by: EMERGENCY MEDICINE

## 2022-07-20 PROCEDURE — 0241U HB NFCT DS VIR RESP RNA 4 TRGT: CPT | Performed by: EMERGENCY MEDICINE

## 2022-07-20 PROCEDURE — 99285 EMERGENCY DEPT VISIT HI MDM: CPT | Performed by: EMERGENCY MEDICINE

## 2022-07-20 PROCEDURE — 81001 URINALYSIS AUTO W/SCOPE: CPT | Performed by: EMERGENCY MEDICINE

## 2022-07-20 PROCEDURE — 36415 COLL VENOUS BLD VENIPUNCTURE: CPT | Performed by: EMERGENCY MEDICINE

## 2022-07-20 PROCEDURE — 85610 PROTHROMBIN TIME: CPT | Performed by: EMERGENCY MEDICINE

## 2022-07-20 PROCEDURE — 87040 BLOOD CULTURE FOR BACTERIA: CPT | Performed by: EMERGENCY MEDICINE

## 2022-07-20 PROCEDURE — 85025 COMPLETE CBC W/AUTO DIFF WBC: CPT | Performed by: EMERGENCY MEDICINE

## 2022-07-20 PROCEDURE — 96360 HYDRATION IV INFUSION INIT: CPT

## 2022-07-20 PROCEDURE — 71275 CT ANGIOGRAPHY CHEST: CPT

## 2022-07-20 PROCEDURE — 80053 COMPREHEN METABOLIC PANEL: CPT | Performed by: EMERGENCY MEDICINE

## 2022-07-20 PROCEDURE — 99223 1ST HOSP IP/OBS HIGH 75: CPT

## 2022-07-20 PROCEDURE — 3066F NEPHROPATHY DOC TX: CPT | Performed by: PHYSICIAN ASSISTANT

## 2022-07-20 PROCEDURE — 85730 THROMBOPLASTIN TIME PARTIAL: CPT | Performed by: EMERGENCY MEDICINE

## 2022-07-20 PROCEDURE — 84484 ASSAY OF TROPONIN QUANT: CPT | Performed by: EMERGENCY MEDICINE

## 2022-07-20 PROCEDURE — 94760 N-INVAS EAR/PLS OXIMETRY 1: CPT

## 2022-07-20 PROCEDURE — 94668 MNPJ CHEST WALL SBSQ: CPT

## 2022-07-20 PROCEDURE — 83605 ASSAY OF LACTIC ACID: CPT | Performed by: EMERGENCY MEDICINE

## 2022-07-20 PROCEDURE — 99285 EMERGENCY DEPT VISIT HI MDM: CPT

## 2022-07-20 RX ORDER — CEFTRIAXONE 1 G/50ML
1000 INJECTION, SOLUTION INTRAVENOUS ONCE
Status: COMPLETED | OUTPATIENT
Start: 2022-07-20 | End: 2022-07-20

## 2022-07-20 RX ORDER — FENOFIBRATE 48 MG/1
48 TABLET, COATED ORAL DAILY
Status: DISCONTINUED | OUTPATIENT
Start: 2022-07-21 | End: 2022-07-22 | Stop reason: HOSPADM

## 2022-07-20 RX ORDER — CYCLOBENZAPRINE HCL 10 MG
10 TABLET ORAL EVERY 12 HOURS PRN
Status: DISCONTINUED | OUTPATIENT
Start: 2022-07-20 | End: 2022-07-22 | Stop reason: HOSPADM

## 2022-07-20 RX ORDER — ACETAMINOPHEN 325 MG/1
650 TABLET ORAL EVERY 6 HOURS PRN
Status: DISCONTINUED | OUTPATIENT
Start: 2022-07-20 | End: 2022-07-22 | Stop reason: HOSPADM

## 2022-07-20 RX ORDER — CEFTRIAXONE 2 G/50ML
2000 INJECTION, SOLUTION INTRAVENOUS EVERY 24 HOURS
Status: DISCONTINUED | OUTPATIENT
Start: 2022-07-20 | End: 2022-07-22 | Stop reason: HOSPADM

## 2022-07-20 RX ORDER — INSULIN LISPRO 100 [IU]/ML
1-6 INJECTION, SOLUTION INTRAVENOUS; SUBCUTANEOUS
Status: DISCONTINUED | OUTPATIENT
Start: 2022-07-21 | End: 2022-07-22 | Stop reason: HOSPADM

## 2022-07-20 RX ORDER — NICOTINE 21 MG/24HR
1 PATCH, TRANSDERMAL 24 HOURS TRANSDERMAL DAILY
Status: DISCONTINUED | OUTPATIENT
Start: 2022-07-21 | End: 2022-07-22 | Stop reason: HOSPADM

## 2022-07-20 RX ORDER — BENZTROPINE MESYLATE 1 MG/1
0.5 TABLET ORAL
Status: DISCONTINUED | OUTPATIENT
Start: 2022-07-20 | End: 2022-07-22 | Stop reason: HOSPADM

## 2022-07-20 RX ORDER — ALBUTEROL SULFATE 2.5 MG/3ML
2.5 SOLUTION RESPIRATORY (INHALATION) EVERY 4 HOURS PRN
Status: DISCONTINUED | OUTPATIENT
Start: 2022-07-20 | End: 2022-07-22 | Stop reason: HOSPADM

## 2022-07-20 RX ORDER — METHYLPREDNISOLONE SODIUM SUCCINATE 40 MG/ML
40 INJECTION, POWDER, LYOPHILIZED, FOR SOLUTION INTRAMUSCULAR; INTRAVENOUS EVERY 8 HOURS
Status: DISCONTINUED | OUTPATIENT
Start: 2022-07-20 | End: 2022-07-22 | Stop reason: HOSPADM

## 2022-07-20 RX ORDER — PANTOPRAZOLE SODIUM 40 MG/1
40 TABLET, DELAYED RELEASE ORAL
Status: DISCONTINUED | OUTPATIENT
Start: 2022-07-21 | End: 2022-07-22 | Stop reason: HOSPADM

## 2022-07-20 RX ORDER — ALBUTEROL SULFATE 2.5 MG/3ML
5 SOLUTION RESPIRATORY (INHALATION) ONCE
Status: COMPLETED | OUTPATIENT
Start: 2022-07-20 | End: 2022-07-20

## 2022-07-20 RX ORDER — VILAZODONE HYDROCHLORIDE 20 MG/1
40 TABLET ORAL DAILY
Status: DISCONTINUED | OUTPATIENT
Start: 2022-07-21 | End: 2022-07-22 | Stop reason: HOSPADM

## 2022-07-20 RX ORDER — ASPIRIN 81 MG/1
81 TABLET ORAL DAILY
Status: DISCONTINUED | OUTPATIENT
Start: 2022-07-21 | End: 2022-07-22 | Stop reason: HOSPADM

## 2022-07-20 RX ORDER — ENOXAPARIN SODIUM 100 MG/ML
40 INJECTION SUBCUTANEOUS DAILY
Status: DISCONTINUED | OUTPATIENT
Start: 2022-07-21 | End: 2022-07-22 | Stop reason: HOSPADM

## 2022-07-20 RX ORDER — TRAZODONE HYDROCHLORIDE 50 MG/1
150 TABLET ORAL
Status: DISCONTINUED | OUTPATIENT
Start: 2022-07-20 | End: 2022-07-22 | Stop reason: HOSPADM

## 2022-07-20 RX ORDER — CLONAZEPAM 0.5 MG/1
0.5 TABLET ORAL DAILY PRN
Status: DISCONTINUED | OUTPATIENT
Start: 2022-07-20 | End: 2022-07-22 | Stop reason: HOSPADM

## 2022-07-20 RX ORDER — AMITRIPTYLINE HYDROCHLORIDE 50 MG/1
50 TABLET, FILM COATED ORAL
Status: DISCONTINUED | OUTPATIENT
Start: 2022-07-20 | End: 2022-07-22 | Stop reason: HOSPADM

## 2022-07-20 RX ORDER — ALBUTEROL SULFATE 90 UG/1
2 AEROSOL, METERED RESPIRATORY (INHALATION) EVERY 4 HOURS PRN
Status: DISCONTINUED | OUTPATIENT
Start: 2022-07-20 | End: 2022-07-22 | Stop reason: HOSPADM

## 2022-07-20 RX ORDER — SODIUM CHLORIDE, SODIUM GLUCONATE, SODIUM ACETATE, POTASSIUM CHLORIDE, MAGNESIUM CHLORIDE, SODIUM PHOSPHATE, DIBASIC, AND POTASSIUM PHOSPHATE .53; .5; .37; .037; .03; .012; .00082 G/100ML; G/100ML; G/100ML; G/100ML; G/100ML; G/100ML; G/100ML
100 INJECTION, SOLUTION INTRAVENOUS CONTINUOUS
Status: DISCONTINUED | OUTPATIENT
Start: 2022-07-20 | End: 2022-07-22 | Stop reason: HOSPADM

## 2022-07-20 RX ORDER — GABAPENTIN 300 MG/1
600 CAPSULE ORAL 3 TIMES DAILY
Status: DISCONTINUED | OUTPATIENT
Start: 2022-07-20 | End: 2022-07-22 | Stop reason: HOSPADM

## 2022-07-20 RX ADMIN — BENZTROPINE MESYLATE 0.5 MG: 1 TABLET ORAL at 22:03

## 2022-07-20 RX ADMIN — ALBUTEROL SULFATE 5 MG: 2.5 SOLUTION RESPIRATORY (INHALATION) at 16:17

## 2022-07-20 RX ADMIN — METHYLPREDNISOLONE SODIUM SUCCINATE 40 MG: 40 INJECTION, POWDER, FOR SOLUTION INTRAMUSCULAR; INTRAVENOUS at 22:02

## 2022-07-20 RX ADMIN — AMITRIPTYLINE HYDROCHLORIDE 50 MG: 50 TABLET, FILM COATED ORAL at 22:03

## 2022-07-20 RX ADMIN — CEFTRIAXONE 2000 MG: 2 INJECTION, SOLUTION INTRAVENOUS at 22:02

## 2022-07-20 RX ADMIN — IPRATROPIUM BROMIDE 0.5 MG: 0.5 SOLUTION RESPIRATORY (INHALATION) at 16:17

## 2022-07-20 RX ADMIN — GABAPENTIN 600 MG: 300 CAPSULE ORAL at 22:02

## 2022-07-20 RX ADMIN — SODIUM CHLORIDE, SODIUM GLUCONATE, SODIUM ACETATE, POTASSIUM CHLORIDE, MAGNESIUM CHLORIDE, SODIUM PHOSPHATE, DIBASIC, AND POTASSIUM PHOSPHATE 100 ML/HR: .53; .5; .37; .037; .03; .012; .00082 INJECTION, SOLUTION INTRAVENOUS at 21:59

## 2022-07-20 RX ADMIN — TRAZODONE HYDROCHLORIDE 150 MG: 50 TABLET ORAL at 22:03

## 2022-07-20 RX ADMIN — IOHEXOL 70 ML: 350 INJECTION, SOLUTION INTRAVENOUS at 19:10

## 2022-07-20 RX ADMIN — CEFTRIAXONE 1000 MG: 1 INJECTION, SOLUTION INTRAVENOUS at 20:22

## 2022-07-20 RX ADMIN — SODIUM CHLORIDE 1000 ML: 0.9 INJECTION, SOLUTION INTRAVENOUS at 16:16

## 2022-07-20 RX ADMIN — SODIUM CHLORIDE 250 ML: 0.9 INJECTION, SOLUTION INTRAVENOUS at 19:54

## 2022-07-20 NOTE — ASSESSMENT & PLAN NOTE
Lab Results   Component Value Date    EGFR 46 03/29/2022    EGFR 64 11/10/2021    EGFR 47 11/02/2021    CREATININE 1 25 03/29/2022    CREATININE 0 96 11/10/2021    CREATININE 1 24 11/02/2021   No recent labs for available for review  Patient is at high risk of acute kidney injury in the setting of prolonged volume depletion  At this time, hold ACE-inhibitor given hypotension and due to risk of FAM in the setting of volume depletion

## 2022-07-20 NOTE — ED NOTES
Patient pulseox found to be 86% on 2L  Patient oxygen increased to 4L via nasal cannula  Pt pulseox now 93% on 4L        Marilu Aguirre RN  07/20/22 [id number]

## 2022-07-20 NOTE — ED PROVIDER NOTES
History  Chief Complaint   Patient presents with    Weakness - Generalized     Sent in by dr James Allen due to feeling lightheaded, tachycardic and hypotensive  Pt complains of generalized weakness       58year old F with a PMHx of DM, COPD, DM, HTN, HLD, hx of unprovoked DVT and PE, hx of stage 1 breast cancer s/p mastectomy, who presents for lightheadedness, tachycardia, SOB  The shortness of breath has been worsening for the past 2 weeks or so, and states that it is worse with laying flat and with exertion  She is not on [facility], but states she had an unprovoked DVT and PE, even requiring an IVC filter in the past  States she was on [facility] for 6 months and taken off  She denies any chest pain  Denies any leg pain or leg swelling  Does have cancer hx  She states that she has not been taking her COPD inhaler treatments recently  She uses home O2 for sleep at night  No recent long distance travel  Patient has had recurrent diarrhea for 1-2 months, has been seen by GI who have scheduled colonoscopy for her (not performed yet)  She states that for the past week or two she had began feeling lightheaded and weak  She was referred to a nephrologist who on their evaluation were concerned for dehydration  Patient's systolic blood pressure was 100 there  She was reportedly dry, and tachycardic at the time as well  She denies any fevers or chills  Occasional headaches but nothing remarkable to her  She denies any URI symptoms except for cough she states is chronic due to her COPD  She denies this worsening  States that this has been going on for several weeks as well  Denies any nausea, does report recurrent diarrhea over 3-4 episodes a day for the past several months  Nonbloody  Denies any urinary changes  Denies abdominal pain  ROS otherwise negative  Prior to Admission Medications   Prescriptions Last Dose Informant Patient Reported? Taking?    BD Pen Needle Jasmine 2nd Gen 32G X 4 MM MISC  Self No No   Sig: use 1 PEN NEEDLE to inject MEDICATION subcutaneously once daily   Blood Glucose Monitoring Suppl (ONE TOUCH ULTRA 2) w/Device KIT  Self Yes No   Sig: by Does not apply route   Blood Glucose Monitoring Suppl (ONE TOUCH ULTRA 2) w/Device KIT  Self No No   Sig: by Does not apply route daily   VRAYLAR 6 MG capsule  Self Yes No   Sig: Take 6 mg by mouth daily   Victoza injection  Self No No   Sig: inject 1 2 milligram subcutaneously daily   albuterol (2 5 mg/3 mL) 0 083 % nebulizer solution  Self No No   Sig: Take 1 vial (2 5 mg total) by nebulization every 4 (four) hours as needed for shortness of breath   albuterol (Ventolin HFA) 90 mcg/act inhaler  Self No No   Sig: Inhale 2 puffs every 4 (four) hours as needed for wheezing   amitriptyline (ELAVIL) 100 mg tablet   No No   Sig: take 1/2 tablet by mouth once daily at bedtime   anastrozole (ARIMIDEX) 1 mg tablet   No No   Sig: Take 1 tablet (1 mg total) by mouth daily   Patient not taking: No sig reported   aspirin (ECOTRIN LOW STRENGTH) 81 mg EC tablet  Self No No   Sig: Take 1 tablet (81 mg total) by mouth daily   atorvastatin (LIPITOR) 80 mg tablet   No No   Sig: Take 1 tablet (80 mg total) by mouth daily at bedtime   benztropine (COGENTIN) 0 5 mg tablet  Self Yes No   Sig: Take 0 5 mg by mouth daily at bedtime     clonazePAM (KlonoPIN) 0 5 mg tablet  Self Yes No   Sig: Take 0 5 mg by mouth daily as needed     cyclobenzaprine (FLEXERIL) 10 mg tablet   No No   Sig: Take 1 tablet (10 mg total) by mouth every 12 (twelve) hours as needed for muscle spasms   fenofibrate (TRICOR) 48 mg tablet   No No   Sig: Take 1 tablet (48 mg total) by mouth daily   gabapentin (NEURONTIN) 600 MG tablet   No No   Sig: take 1 tablet by mouth three times a day   glucose blood (OneTouch Ultra) test strip  Self No No   Sig: Use 1 each daily Use as instructed   loratadine (CLARITIN) 10 mg tablet   No No   Sig: Take 1 tablet (10 mg total) by mouth daily   Patient not taking: Reported on 2022   metFORMIN (GLUCOPHAGE) 1000 MG tablet  Self No No   Sig: take 1 tablet by mouth twice a day with food   nicotine polacrilex (COMMIT) 4 MG lozenge   No No   Sig: Apply 1 lozenge (4 mg total) to the mouth or throat as needed for smoking cessation   pantoprazole (PROTONIX) 40 mg tablet   No No   Sig: Take 1 tablet (40 mg total) by mouth 2 (two) times a day   tiotropium (Spiriva Respimat) 2 5 MCG/ACT AERS inhaler  Self No No   Sig: Inhale 2 puffs daily   traZODone (DESYREL) 150 mg tablet  Self Yes No   Si mg daily at bedtime    vilazodone (VIIBRYD) 40 mg tablet  Self Yes No   Sig: Take 40 mg by mouth daily  Facility-Administered Medications: None       Past Medical History:   Diagnosis Date    Cancer [facility])     right breast    Chronic back pain     Colitis     [facility]     Depression     Diabetes mellitus ([facility] [address] )     [facility], [facility]     GERD (gastroesophageal reflux disease)     Hyperlipidemia     Pneumonia     Rapid heart rate     Sleep apnea     [facility]     4 mini strokes       Past Surgical History:   Procedure Laterality Date    BREAST LUMPECTOMY Right     CARDIAC CATHETERIZATION N/A 10/28/2021    Procedure: Cardiac Coronary Angiogram;  Surgeon: Sukhjinder Davey DO;  [facility];   Service: Cardiology    IVC FILTER INSERTION      MASTECTOMY W/ SENTINEL NODE BIOPSY Right 2021    Procedure: BREAST MASTECTOMY WITH BIOPSY LYMPH NODE SENTINEL and axillary node dissection  ([place] Lymph Node Injection @ 12:30);  Surgeon: Holly Fitzgerald MD;  Location: [facility] MAIN OR;  Service: General    US GUIDANCE BREAST BIOPSY RIGHT EACH ADDITIONAL Right 10/13/2021    US GUIDANCE BREAST BIOPSY RIGHT EACH ADDITIONAL Right 10/13/2021    US GUIDED BREAST BIOPSY RIGHT COMPLETE Right 10/13/2021       Family History   Problem Relation Age of Onset    Breast cancer Mother 67    COPD Mother     Coronary artery disease Mother     Coronary artery disease Father     Stroke Father     Lung cancer Sister     Breast cancer Maternal Grandmother     Colon cancer Paternal Grandfather     No Known Problems Maternal Aunt     No Known Problems Maternal Aunt     No Known Problems Maternal Aunt     No Known Problems Paternal Aunt     Breast cancer Cousin      I have reviewed and agree with the history as documented  E-Cigarette/Vaping    E-Cigarette Use Never User      E-Cigarette/Vaping Substances    Nicotine No     THC No     CBD No     Flavoring No     Other No     Unknown No      Social History     Tobacco Use    Smoking status: Current Every Day Smoker     Packs/day: 1 50     Types: Cigarettes     Last attempt to quit: 2021     Years since quittin 7    Smokeless tobacco: Never Used   Vaping Use    Vaping Use: Never used   Substance Use Topics    Alcohol use: Never    Drug use: Never       Review of Systems   Constitutional: Negative for chills and fever  HENT: Negative for congestion, rhinorrhea and sore throat  Respiratory: Positive for cough and shortness of breath  Cardiovascular: Negative for chest pain and palpitations  Gastrointestinal: Positive for diarrhea  Negative for abdominal pain, constipation, nausea and vomiting  Genitourinary: Negative for difficulty urinating and flank pain  Musculoskeletal: Negative for arthralgias  Neurological: Positive for weakness and light-headedness  Negative for dizziness and headaches  Psychiatric/Behavioral: Negative for agitation, behavioral problems and confusion  All other systems reviewed and are negative  Physical Exam  Physical Exam  Constitutional:       Appearance: She is well-developed  HENT:      Head: Normocephalic and atraumatic  Right Ear: Tympanic membrane normal       Left Ear: Tympanic membrane normal       Nose: Nose normal    Cardiovascular:      Rate and Rhythm: Normal rate and regular rhythm        Heart sounds: Normal heart sounds  No murmur heard  No friction rub  Pulmonary:      Effort: Pulmonary effort is normal  No respiratory distress  Breath sounds: Normal breath sounds  No wheezing or rales  Comments: Decreased breath sounds BL  Abdominal:      General: Bowel sounds are normal  There is no distension  Palpations: Abdomen is soft  Tenderness: There is no abdominal tenderness  Comments: Abdomen is large, however, no tenderness on palpation, no guarding or rigidity   Musculoskeletal:         General: Normal range of motion  Cervical back: Normal range of motion and neck supple  Skin:     General: Skin is warm  Neurological:      Mental Status: She is alert and oriented to person, place, and time  Coordination: Coordination normal    Psychiatric:         Behavior: Behavior normal          Thought Content:  Thought content normal          Judgment: Judgment normal          Vital Signs  ED Triage Vitals   Temperature Pulse Respirations Blood Pressure SpO2   07/20/22 1539 07/20/22 1539 07/20/22 1539 07/20/22 1539 07/20/22 1539   (!) 97 2 °F (36 2 °C) (!) 112 20 104/61 (!) 83 %      Temp src Heart Rate Source Patient Position - Orthostatic VS BP Location FiO2 (%)   -- 07/20/22 1630 -- -- --    Monitor         Pain Score       --                  Vitals:    07/20/22 1930 07/20/22 2000 07/20/22 2030 07/20/22 2056   BP: 137/75 133/78 139/71 109/67   Pulse: 91 92 91 93         Visual Acuity      ED Medications  Medications   albuterol inhalation solution 2 5 mg (has no administration in time range)   albuterol (PROVENTIL HFA,VENTOLIN HFA) inhaler 2 puff (has no administration in time range)   amitriptyline (ELAVIL) tablet 50 mg (has no administration in time range)   aspirin (ECOTRIN LOW STRENGTH) EC tablet 81 mg (has no administration in time range)   benztropine (COGENTIN) tablet 0 5 mg (has no administration in time range)   clonazePAM (KlonoPIN) tablet 0 5 mg (has no administration in time range)   cyclobenzaprine (FLEXERIL) tablet 10 mg (has no administration in time range)   fenofibrate (TRICOR) tablet 48 mg (has no administration in time range)   gabapentin (NEURONTIN) capsule 600 mg (has no administration in time range)   pantoprazole (PROTONIX) EC tablet 40 mg (has no administration in time range)   umeclidinium bromide (INCRUSE ELLIPTA) 62 5 mcg/inh inhaler AEPB 1 puff (has no administration in time range)   traZODone (DESYREL) tablet 150 mg (has no administration in time range)   vilazodone (VIIBRYD) tablet 40 mg (has no administration in time range)   acetaminophen (TYLENOL) tablet 650 mg (has no administration in time range)   nicotine (NICODERM CQ) 21 mg/24 hr TD 24 hr patch 1 patch (has no administration in time range)   methylPREDNISolone sodium succinate (Solu-MEDROL) injection 40 mg (has no administration in time range)   cefTRIAXone (ROCEPHIN) IVPB (premix in dextrose) 2,000 mg 50 mL (has no administration in time range)   enoxaparin (LOVENOX) subcutaneous injection 40 mg (has no administration in time range)   insulin lispro (HumaLOG) 100 units/mL subcutaneous injection 1-6 Units (has no administration in time range)   multi-electrolyte (PLASMALYTE-A/ISOLYTE-S PH 7 4) IV solution (has no administration in time range)   sodium chloride 0 9 % bolus 1,000 mL (0 mL Intravenous Stopped 7/20/22 1816)   albuterol inhalation solution 5 mg (5 mg Nebulization Given 7/20/22 1617)   ipratropium (ATROVENT) 0 02 % inhalation solution 0 5 mg (0 5 mg Nebulization Given 7/20/22 1617)   iohexol (OMNIPAQUE) 350 MG/ML injection (MULTI-DOSE) 70 mL (70 mL Intravenous Given 7/20/22 1910)   sodium chloride 0 9 % bolus 250 mL (250 mL Intravenous New Bag 7/20/22 1954)   cefTRIAXone (ROCEPHIN) IVPB (premix in dextrose) 1,000 mg 50 mL (0 mg Intravenous Stopped 7/20/22 2126)       Diagnostic Studies  Results Reviewed     Procedure Component Value Units Date/Time    HS Troponin I 4hr [[id number]]  (Normal) Collected: 07/20/22 1948    Lab Status: Final result Specimen: Blood from Arm, Left Updated: 07/20/22 2017     hs TnI 4hr 5 ng/L      Delta 4hr hsTnI 0 ng/L     Lactic acid, plasma [[id number]]  (Normal) Collected: 07/20/22 1948    Lab Status: Final result Specimen: Blood from Arm, Left Updated: 07/20/22 2013     LACTIC ACID 1 4 mmol/L     Narrative:      Result may be elevated if tourniquet was used during collection  Bhavik Slater [[id number]]  (Normal) Collected: 07/20/22 1948    Lab Status: Final result Specimen: Blood from Arm, Left Updated: 07/20/22 2009     Protime 13 7 seconds      INR 1 03    APTT [[id number]]  (Normal) Collected: 07/20/22 1948    Lab Status: Final result Specimen: Blood from Arm, Left Updated: 07/20/22 2009     PTT 27 seconds     Urine Microscopic [[id number]]  (Abnormal) Collected: 07/20/22 1946    Lab Status: Final result Specimen: Urine, Clean Catch Updated: 07/20/22 2001     RBC, UA 0-1 /hpf      WBC, UA 10-20 /hpf      Epithelial Cells Occasional /hpf      Bacteria, UA Moderate /hpf     Urine culture [[id number]] Collected: 07/20/22 1946    Lab Status: In process Specimen: Urine, Clean Catch Updated: 07/20/22 2001    Blood culture [[id number]] Collected: 07/20/22 1949    Lab Status: In process Specimen: Blood from Arm, Left Updated: 07/20/22 1953    Blood culture [[id number]] Collected: 07/20/22 1929    Lab Status:  In process Specimen: Blood from Arm, Left Updated: 07/20/22 1953    UA w Reflex to Microscopic w Reflex to Culture [[id number]]  (Abnormal) Collected: 07/20/22 1946    Lab Status: Final result Specimen: Urine, Clean Catch Updated: 07/20/22 1951     Color, UA Yellow     Clarity, UA Clear     Specific Gravity, UA 1 010     pH, UA 5 5     Leukocytes, UA Moderate     Nitrite, UA Negative     Protein, UA Negative mg/dl      Glucose, UA Negative mg/dl      Ketones, UA Negative mg/dl      Urobilinogen, UA 0 2 E U /dl      Bilirubin, UA Negative     Occult Blood, UA Negative    HS Troponin I 2hr [[id number]]  (Normal) Collected: 07/20/22 1817    Lab Status: Final result Specimen: Blood from Arm, Right Updated: 07/20/22 1845     hs TnI 2hr 5 ng/L      Delta 2hr hsTnI 0 ng/L     D-dimer, quantitative [[id number]]  (Abnormal) Resulted: 07/20/22 1754    Lab Status: Final result Specimen: Blood Updated: 07/20/22 1754     D-Dimer, Quant 0 90 ug/ml FEU     Narrative: In the evaluation for possible pulmonary embolism, in the appropriate (Well's Score of 4 or less) patient, the age adjusted d-dimer cutoff for this patient can be calculated as:    Age x 0 01 (in ug/mL) for Age-adjusted D-dimer exclusion threshold for a patient over 50 years  Procalcitonin [[id number]]  (Normal) Collected: 07/20/22 1612    Lab Status: Final result Specimen: Blood from Arm, Right Updated: 07/20/22 1709     Procalcitonin 0 10 ng/ml     COVID/FLU/RSV [[id number]]  (Normal) Collected: 07/20/22 1612    Lab Status: Final result Specimen: Nares from Nose Updated: 07/20/22 1705     SARS-CoV-2 Negative     INFLUENZA A PCR Negative     INFLUENZA B PCR Negative     RSV PCR Negative    Narrative:      FOR PEDIATRIC PATIENTS - copy/paste COVID Guidelines URL to browser: [email or web address]    SARS-CoV-2 assay is a Nucleic Acid Amplification assay intended for the  qualitative detection of nucleic acid from SARS-CoV-2 in nasopharyngeal  swabs  Results are for the presumptive identification of SARS-CoV-2 RNA  Positive results are indicative of infection with SARS-CoV-2, the virus  causing COVID-19, but do not rule out bacterial infection or co-infection  with other viruses  Laboratories within the [place] and its  territories are required to report all positive results to the appropriate  public health authorities   Negative results do not preclude SARS-CoV-2  infection and should not be used as the sole basis for treatment or other  patient management decisions  Negative results must be combined with  clinical observations, patient history, and epidemiological information  This test has not been FDA cleared or approved  This test has been authorized by FDA under an Emergency Use Authorization  (EUA)  This test is only authorized for the duration of time the  declaration that circumstances exist justifying the authorization of the  emergency use of an in vitro diagnostic tests for detection of SARS-CoV-2  virus and/or diagnosis of COVID-19 infection under section 564(b)(1) of  the Act, 21 U  S C  545BQF-5(H)(5), unless the authorization is terminated  or revoked sooner  The test has been validated but independent review by FDA  and CLIA is pending  Test performed using [employer] GeneXpert: This RT-PCR assay targets N2,  a region unique to SARS-CoV-2  A conserved region in the E-gene was chosen  for pan-Sarbecovirus detection which includes SARS-CoV-2      HS Troponin 0hr (reflex protocol) [[id number]]  (Normal) Collected: 07/20/22 1612    Lab Status: Final result Specimen: Blood from Arm, Right Updated: 07/20/22 1647     hs TnI 0hr 5 ng/L     NT-BNP PRO [[id number]]  (Normal) Collected: 07/20/22 1612    Lab Status: Final result Specimen: Blood from Arm, Right Updated: 07/20/22 1646     NT-proBNP 106 0 pg/mL     Comprehensive metabolic panel [[id number]]  (Abnormal) Collected: 07/20/22 1612    Lab Status: Final result Specimen: Blood from Arm, Right Updated: 07/20/22 1646     Sodium 140 mmol/L      Potassium 4 4 mmol/L      Chloride 100 mmol/L      CO2 32 mmol/L      ANION GAP 8 mmol/L      BUN 14 mg/dL      Creatinine 1 58 mg/dL      Glucose 95 mg/dL      Calcium 9 0 mg/dL      AST 12 U/L      ALT 23 U/L      Alkaline Phosphatase 63 4 U/L      Total Protein 6 9 g/dL      Albumin 3 5 g/dL      Total Bilirubin 0 22 mg/dL      eGFR 34 ml/min/1 73sq m     Narrative:      [place] guidelines for Chronic Kidney Disease (CKD):     Stage 1 with normal or high GFR (GFR > 90 mL/min/1 73 square meters)    Stage 2 Mild CKD (GFR = 60-89 mL/min/1 73 square meters)    Stage 3A Moderate CKD (GFR = 45-59 mL/min/1 73 square meters)    Stage 3B Moderate CKD (GFR = 30-44 mL/min/1 73 square meters)    Stage 4 Severe CKD (GFR = 15-29 mL/min/1 73 square meters)    Stage 5 End Stage CKD (GFR <15 mL/min/1 73 square meters)  Note: GFR calculation is accurate only with a steady state creatinine    CBC and differential [[id number]]  (Abnormal) Collected: 07/20/22 1612    Lab Status: Final result Specimen: Blood from Arm, Right Updated: 07/20/22 1624     WBC 6 80 Thousand/uL      RBC 3 65 Million/uL      Hemoglobin 11 2 g/dL      Hematocrit 35 0 %      MCV 96 fL      MCH 30 7 pg      MCHC 32 0 g/dL      RDW 13 2 %      MPV 9 8 fL      Platelets 790 Thousands/uL      nRBC 0 /100 WBCs      Neutrophils Relative 59 %      Immat GRANS % 0 %      Lymphocytes Relative 31 %      Monocytes Relative 9 %      Eosinophils Relative 1 %      Basophils Relative 0 %      Neutrophils Absolute 4 00 Thousands/µL      Immature Grans Absolute 0 03 Thousand/uL      Lymphocytes Absolute 2 13 Thousands/µL      Monocytes Absolute 0 60 Thousand/µL      Eosinophils Absolute 0 04 Thousand/µL      Basophils Absolute 0 00 Thousands/µL                  CTA ED chest PE Study   Final Result by Alize Cortez MD (07/20 1945)      No pulmonary embolus  Redemonstration of mild bilateral upper lobe predominant centrilobular nodules compatible with respiratory bronchiolitis, a smoking-related lung disease  Workstation performed: [id number]         XR chest portable   ED Interpretation by Curtis Wren MD (07/20 1615)   Abnormal   Left lower lung field opacity, pneumonia vs atelectasis? Final Result by Alize Cortez MD (07/20 1622)      Mild opacity in the left costophrenic sulcus, at least in part due to linear atelectasis and pericardial fat pad  Underlying pneumonia cannot be excluded in the appropriate clinical setting  Workstation performed: [id number]                    Procedures  Procedures         ED Course  ED Course as of 07/20/22 2140   Wed Jul 20, 2022   1603 On my evaluation, the patient is on 2L NC, the patient is satting in the 90s and HR in the 90s   1624 Mild opacity in the left costophrenic sulcus, at least in part due to linear atelectasis and pericardial fat pad  Underlying pneumonia cannot be excluded in the appropriate clinical setting  1629 WBC: 6 80   1629 No white count, not SIRS positive, given XR finding and the absence of fevers, will obtain procalcitonin to further delineate infection vs  Atelectasis  1630 Pt reports improvement in breathing with nebulizer treatment   1709 Procalcitonin: 0 10   1803 D-Dimer, Quant(!): 0 90   1803 I discussed the risks and benefits of contrast administration to the patient  Her GFR is 34  I discussed with her that the contrast may further damage her kidneys, and she may even require dialysis, but would be useful in evaluating for PE given her significant hx of DVT/PE, her initial hypoxia and tachycardia  After weighing pros and cons, patient is amenable to receiving the study  1819 On 4L currently  Initial Sepsis Screening     Row Name 07/20/22 1957                Is the patient's history suggestive of a new or worsening infection? Yes (Proceed)  -BC        Suspected source of infection pneumonia  -BC        Are two or more of the following signs & symptoms of infection both present and new to the patient? --        Indicate SIRS criteria Tachypnea > 20 resp per min; Tachycardia > 90 bpm  -BC        If the answer is yes to both questions, suspicion of sepsis is present --        If severe sepsis is present AND tissue hypoperfusion perists in the hour after fluid resuscitation or lactate > 4, the patient meets criteria for SEPTIC SHOCK --        Are any of the following organ dysfunction criteria present within 6 hours of suspected infection and SIRS criteria that are NOT considered to be chronic conditions? No  -BC        Organ dysfunction --        Date of presentation of severe sepsis --        Time of presentation of severe sepsis --        Tissue hypoperfusion persists in the hour after crystalloid fluid administration, evidenced, by either: --        Was hypotension present within one hour of the conclusion of crystalloid fluid administration? --        Date of presentation of septic shock --        Time of presentation of septic shock --              User Key  (r) = Recorded By, (t) = Taken By, (c) = Cosigned By    [address] Name Provider Type    BC Jayne Brady MD Physician                    Rajeev Anguiano' Criteria for PE    Flowsheet Row Most Recent Value   Tee' Criteria for PE    Clinical signs and symptoms of DVT 0 Filed at: 07/20/2022 1800   PE is primary diagnosis or equally likely 0 Filed at: 07/20/2022 1800   HR >100 1 5 Filed at: 07/20/2022 1800   Immobilization at least 3 days or Surgery in the previous 4 weeks 0 Filed at: 07/20/2022 1800   Previous, objectively diagnosed PE or DVT 1 5 Filed at: 07/20/2022 1800   Hemoptysis 0 Filed at: 07/20/2022 1800   Malignancy with treatment within 6 months or palliative 0 Filed at: 07/20/2022 1800   Wells' Criteria Total 3 Filed at: 07/20/2022 1800                MDM  Number of Diagnoses or Management Options  Diarrhea  Hypoxia  Tachycardia  UTI (urinary tract infection)  Weakness  Diagnosis management comments:  Patient's presentation is concerning for a number of pathologies  Given that she has ongoing diarrhea, there is concerned that she may have hypovolemia causing her tachycardia and lightheadedness  However, given that she was also short of breath and hypoxic to the low 80s, with history of DVT and PE not on [facility] and unprovoked, will obtain CTA PE study      Given patient's history of DVT and PE, and she is not on [facility], as well as borderline hypotension as reported by Nephrology office, tachycardia in 110s, and hypoxia in the low 80s on arrival, will perform D-dimer to rule out PE  Unfortunately the patient's D-dimer was positive  Patient does have elevated creatinine and decreased GFR from baseline, GFR 34, however given the context of vital abnormalities: borderline hypotension, hypoxia on 4 L, did not feel that it was appropriate to wait for possible V/Q scan, will perform CTA PE study  Will hydrate with fluids before and after scan  Later in the encounter, the patient was sirs positive given respiratory rate 23 recorded by nursing, rest of sepsis labs ordered which demonstrated a positive urine study for infection  Start the patient's ceftriaxone  Patient does not require sepsis alert, normal lactate  Admitted to [facility]      Disposition  Final diagnoses:   Weakness   Diarrhea   UTI (urinary tract infection)   Tachycardia   Hypoxia     Time reflects when diagnosis was documented in both MDM as applicable and the Disposition within this note     Time User Action Codes Description Comment    7/20/2022  8:13 PM Yvetta Royals Add [R53 1] Weakness     7/20/2022  8:13 PM Yvetta Royals Add [R19 7] Diarrhea     7/20/2022  8:13 PM Byron, [address] [place] [address] [N39 0] UTI (urinary tract infection)     7/20/2022  8:14 PM Yvetta Royals Add [R00 0] Tachycardia     7/20/2022  8:14 PM [address] [R09 02] Hypoxia       ED Disposition     ED Disposition   Admit    Condition   Stable    Date/Time   Wed Jul 20, 2022  8:13 PM    Comment   Case was discussed with TONNY and the patient's admission status was agreed to be Admission Status: inpatient status to the service of Dr Chelsey Andrade              Follow-up Information    None         Current Discharge Medication List      CONTINUE these medications which have NOT CHANGED    Details   albuterol (2 5 mg/3 mL) 0 083 % nebulizer solution Take 1 vial (2 5 mg total) by nebulization every 4 (four) hours as needed for shortness of breath  Qty: 120 mL, Refills: 3    Associated Diagnoses: COPD exacerbation (HCC)      albuterol (Ventolin HFA) 90 mcg/act inhaler Inhale 2 puffs every 4 (four) hours as needed for wheezing  Qty: 18 g, Refills: 4    Comments: Substitution to a formulary equivalent within the same pharmaceutical class is authorized  Associated Diagnoses: COPD exacerbation (HCC)      amitriptyline (ELAVIL) 100 mg tablet take 1/2 tablet by mouth once daily at bedtime  Qty: 45 tablet, Refills: 1    Associated Diagnoses: Other migraine without status migrainosus, not intractable      anastrozole (ARIMIDEX) 1 mg tablet Take 1 tablet (1 mg total) by mouth daily  Qty: 30 tablet, Refills: 4    Associated Diagnoses: Malignant neoplasm of upper-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, estrogen receptor positive (HCC)      aspirin (ECOTRIN LOW STRENGTH) 81 mg EC tablet Take 1 tablet (81 mg total) by mouth daily  Qty: 90 tablet, Refills: 3    Associated Diagnoses: Chest pain, unspecified type; MARQUEZ (dyspnea on exertion)      atorvastatin (LIPITOR) 80 mg tablet Take 1 tablet (80 mg total) by mouth daily at bedtime  Qty: 30 tablet, Refills: 5    Associated Diagnoses: Mixed hyperlipidemia      BD Pen Needle Jasmine 2nd Gen 32G X 4 MM MISC use 1 PEN NEEDLE to inject MEDICATION subcutaneously once daily  Qty: 90 each, Refills: 3    Associated Diagnoses: Diabetes mellitus without complication (HCC)      benztropine (COGENTIN) 0 5 mg tablet Take 0 5 mg by mouth daily at bedtime        !! Blood Glucose Monitoring Suppl (ONE TOUCH ULTRA 2) w/Device KIT by Does not apply route      !!  Blood Glucose Monitoring Suppl (ONE TOUCH ULTRA 2) w/Device KIT by Does not apply route daily  Qty: 100 each, Refills: 0    Associated Diagnoses: Diabetes mellitus without complication (HCC)      clonazePAM (KlonoPIN) 0 5 mg tablet Take 0 5 mg by mouth daily as needed        cyclobenzaprine (FLEXERIL) 10 mg tablet Take 1 tablet (10 mg total) by mouth every 12 (twelve) hours as needed for muscle spasms  Qty: 14 tablet, Refills: 0    Associated Diagnoses: Spasm of muscle of lower back      fenofibrate (TRICOR) 48 mg tablet Take 1 tablet (48 mg total) by mouth daily  Qty: 30 tablet, Refills: 5    Associated Diagnoses: Hypertriglyceridemia      gabapentin (NEURONTIN) 600 MG tablet take 1 tablet by mouth three times a day  Qty: 270 tablet, Refills: 1    Associated Diagnoses: Other chronic pain      glucose blood (OneTouch Ultra) test strip Use 1 each daily Use as instructed  Qty: 100 strip, Refills: 2    Associated Diagnoses: Type 2 diabetes mellitus with hyperglycemia, without long-term current use of insulin ([facility])      loratadine (CLARITIN) 10 mg tablet Take 1 tablet (10 mg total) by mouth daily  Qty: 30 tablet, Refills: 2    Associated Diagnoses: Seasonal allergies      metFORMIN (GLUCOPHAGE) 1000 MG tablet take 1 tablet by mouth twice a day with food  Qty: 180 tablet, Refills: 3    Associated Diagnoses: Type 2 diabetes mellitus without complication, without long-term current use of insulin ([facility])      nicotine polacrilex (COMMIT) 4 MG lozenge Apply 1 lozenge (4 mg total) to the mouth or throat as needed for smoking cessation  Qty: 100 each, Refills: 0    Associated Diagnoses: Tobacco abuse      pantoprazole (PROTONIX) 40 mg tablet Take 1 tablet (40 mg total) by mouth 2 (two) times a day  Qty: 20 tablet, Refills: 0    Associated Diagnoses: H  pylori infection      tiotropium (Spiriva Respimat) 2 5 MCG/ACT AERS inhaler Inhale 2 puffs daily  Qty: 4 g, Refills: 3    Associated Diagnoses: Chronic obstructive pulmonary disease, unspecified COPD type ([facility])      traZODone (DESYREL) 150 mg tablet 150 mg daily at bedtime       Victoza injection inject 1 2 milligram subcutaneously daily  Qty: 6 mL, Refills: 5    Associated Diagnoses: Diabetes mellitus without complication ([facility])      vilazodone (VIIBRYD) 40 mg tablet Take 40 mg by mouth daily  VRAYLAR 6 MG capsule Take 6 mg by mouth daily  Refills: 0       !! - Potential duplicate medications found  Please discuss with provider  No discharge procedures on file      PDMP Review     None          ED Provider  Electronically Signed by           Andrew Yoon MD  07/20/22 8783

## 2022-07-20 NOTE — ED NOTES
Ct scan tech came to nurse states "pt has IV in the mastectomy side and I don't feel comfortable injecting through that line"  When questioned pt , pt had no idea that right arm was not to be used for IV or lab sticks  Iv removed from right arm and limb alert applied  IV restarted in [address] [place] [address]       Mykel Martínez RN  07/20/22 1914

## 2022-07-20 NOTE — PATIENT INSTRUCTIONS
Recommend going to ER due to hypotension, tachycardia, volume depletion in setting of prolonged diarrhea and only 30 ounce fluid intake  At risk for FAM  Please hold lisinopril

## 2022-07-20 NOTE — ASSESSMENT & PLAN NOTE
Likely secondary to volume depletion  Further evaluation at emergency department  Patient is afebrile at this time

## 2022-07-20 NOTE — ASSESSMENT & PLAN NOTE
Blood pressure is 100/50   Afebrile  Patient does not monitor blood pressures at home and reports  Denies headaches, fast heart rate, palpitations  Reports lightheadedness, dizziness for two weeks  Patient reports adherence with antihypertensive regimen and denies adverse effects: lisinopril 2 5 mg daily  She has had longstanding diarrhea  She does feel thirsty  She is trying to drink Gatorade  She drinks half a 64 ounce bottle of Gatorade  She avoids drinking more because of a bloating sensation  Plan:  Hold lisinopril  Recommend presenting to emergency department    Likely needs volume expansion

## 2022-07-20 NOTE — ASSESSMENT & PLAN NOTE
In the setting of prolonged diarrhea, at least 4 episodes per day  She is being worked up by Gastroenterology  However, she reports that she drinks about half of the 64 oz bottle of Gatorade per day and no other fluids  Patient has dry mucous membranes to exam   She is hypotensive and tachycardic  Recommend presenting to emergency department for volume expansion  Her sister will drive her

## 2022-07-21 ENCOUNTER — APPOINTMENT (INPATIENT)
Dept: CT IMAGING | Facility: HOSPITAL | Age: 62
DRG: 720 | End: 2022-07-21
Payer: COMMERCIAL

## 2022-07-21 PROBLEM — A41.89 SEPSIS DUE TO OTHER ETIOLOGY (HCC): Status: ACTIVE | Noted: 2022-07-20

## 2022-07-21 PROBLEM — J96.01 ACUTE RESPIRATORY FAILURE WITH HYPOXIA (HCC): Status: ACTIVE | Noted: 2022-07-21

## 2022-07-21 PROBLEM — R19.7 DIARRHEA OF PRESUMED INFECTIOUS ORIGIN: Status: ACTIVE | Noted: 2022-07-21

## 2022-07-21 LAB
ALBUMIN SERPL BCP-MCNC: 3.4 G/DL (ref 3.5–5)
ALP SERPL-CCNC: 54 U/L (ref 46–116)
ALT SERPL W P-5'-P-CCNC: 21 U/L (ref 12–78)
ANION GAP SERPL CALCULATED.3IONS-SCNC: 11 MMOL/L (ref 4–13)
AST SERPL W P-5'-P-CCNC: 15 U/L (ref 5–45)
BASE EX.OXY STD BLDV CALC-SCNC: 87.9 % (ref 60–80)
BASE EXCESS BLDV CALC-SCNC: 1.2 MMOL/L
BASOPHILS # BLD AUTO: 0 THOUSANDS/ΜL (ref 0–0.1)
BASOPHILS NFR BLD AUTO: 0 % (ref 0–1)
BILIRUB SERPL-MCNC: 0.25 MG/DL (ref 0.2–1)
BUN SERPL-MCNC: 11 MG/DL (ref 5–25)
CALCIUM ALBUM COR SERPL-MCNC: 9.2 MG/DL (ref 8.3–10.1)
CALCIUM SERPL-MCNC: 8.7 MG/DL (ref 8.3–10.1)
CHLORIDE SERPL-SCNC: 100 MMOL/L (ref 96–108)
CO2 SERPL-SCNC: 27 MMOL/L (ref 21–32)
CREAT SERPL-MCNC: 1.39 MG/DL (ref 0.6–1.3)
EOSINOPHIL # BLD AUTO: 0 THOUSAND/ΜL (ref 0–0.61)
EOSINOPHIL NFR BLD AUTO: 0 % (ref 0–6)
ERYTHROCYTE [DISTWIDTH] IN BLOOD BY AUTOMATED COUNT: 13.2 % (ref 11.6–15.1)
GFR SERPL CREATININE-BSD FRML MDRD: 40 ML/MIN/1.73SQ M
GLUCOSE SERPL-MCNC: 178 MG/DL (ref 65–140)
GLUCOSE SERPL-MCNC: 185 MG/DL (ref 65–140)
GLUCOSE SERPL-MCNC: 187 MG/DL (ref 65–140)
GLUCOSE SERPL-MCNC: 231 MG/DL (ref 65–140)
GLUCOSE SERPL-MCNC: 259 MG/DL (ref 65–140)
HCO3 BLDV-SCNC: 27.8 MMOL/L (ref 24–30)
HCT VFR BLD AUTO: 35.4 % (ref 34.8–46.1)
HGB BLD-MCNC: 11.4 G/DL (ref 11.5–15.4)
IMM GRANULOCYTES # BLD AUTO: 0.04 THOUSAND/UL (ref 0–0.2)
IMM GRANULOCYTES NFR BLD AUTO: 1 % (ref 0–2)
LACTATE SERPL-SCNC: 2.6 MMOL/L (ref 0.5–2)
LACTATE SERPL-SCNC: 3.4 MMOL/L (ref 0.5–2)
LYMPHOCYTES # BLD AUTO: 1.38 THOUSANDS/ΜL (ref 0.6–4.47)
LYMPHOCYTES NFR BLD AUTO: 16 % (ref 14–44)
MCH RBC QN AUTO: 30.8 PG (ref 26.8–34.3)
MCHC RBC AUTO-ENTMCNC: 32.2 G/DL (ref 31.4–37.4)
MCV RBC AUTO: 96 FL (ref 82–98)
MONOCYTES # BLD AUTO: 0.21 THOUSAND/ΜL (ref 0.17–1.22)
MONOCYTES NFR BLD AUTO: 2 % (ref 4–12)
NASAL CANNULA: 4
NEUTROPHILS # BLD AUTO: 7.06 THOUSANDS/ΜL (ref 1.85–7.62)
NEUTS SEG NFR BLD AUTO: 81 % (ref 43–75)
NRBC BLD AUTO-RTO: 0 /100 WBCS
O2 CT BLDV-SCNC: 15.6 ML/DL
PCO2 BLDV: 52.3 MM HG (ref 42–50)
PH BLDV: 7.34 [PH] (ref 7.3–7.4)
PLATELET # BLD AUTO: 156 THOUSANDS/UL (ref 149–390)
PLATELET # BLD AUTO: 194 THOUSANDS/UL (ref 149–390)
PMV BLD AUTO: 10.2 FL (ref 8.9–12.7)
PMV BLD AUTO: 10.8 FL (ref 8.9–12.7)
PO2 BLDV: 63.1 MM HG (ref 35–45)
POTASSIUM SERPL-SCNC: 4.9 MMOL/L (ref 3.5–5.3)
PROCALCITONIN SERPL-MCNC: 0.07 NG/ML
PROT SERPL-MCNC: 6.9 G/DL (ref 6.4–8.4)
RBC # BLD AUTO: 3.7 MILLION/UL (ref 3.81–5.12)
SODIUM SERPL-SCNC: 138 MMOL/L (ref 135–147)
WBC # BLD AUTO: 8.69 THOUSAND/UL (ref 4.31–10.16)

## 2022-07-21 PROCEDURE — 82805 BLOOD GASES W/O2 SATURATION: CPT

## 2022-07-21 PROCEDURE — 85049 AUTOMATED PLATELET COUNT: CPT

## 2022-07-21 PROCEDURE — 82948 REAGENT STRIP/BLOOD GLUCOSE: CPT

## 2022-07-21 PROCEDURE — 80053 COMPREHEN METABOLIC PANEL: CPT

## 2022-07-21 PROCEDURE — 74176 CT ABD & PELVIS W/O CONTRAST: CPT

## 2022-07-21 PROCEDURE — 85025 COMPLETE CBC W/AUTO DIFF WBC: CPT

## 2022-07-21 PROCEDURE — G1004 CDSM NDSC: HCPCS

## 2022-07-21 PROCEDURE — 83605 ASSAY OF LACTIC ACID: CPT

## 2022-07-21 PROCEDURE — 99233 SBSQ HOSP IP/OBS HIGH 50: CPT | Performed by: FAMILY MEDICINE

## 2022-07-21 PROCEDURE — 84145 PROCALCITONIN (PCT): CPT

## 2022-07-21 RX ADMIN — METHYLPREDNISOLONE SODIUM SUCCINATE 40 MG: 40 INJECTION, POWDER, FOR SOLUTION INTRAMUSCULAR; INTRAVENOUS at 14:23

## 2022-07-21 RX ADMIN — CEFTRIAXONE 2000 MG: 2 INJECTION, SOLUTION INTRAVENOUS at 21:13

## 2022-07-21 RX ADMIN — SODIUM CHLORIDE, SODIUM GLUCONATE, SODIUM ACETATE, POTASSIUM CHLORIDE, MAGNESIUM CHLORIDE, SODIUM PHOSPHATE, DIBASIC, AND POTASSIUM PHOSPHATE 100 ML/HR: .53; .5; .37; .037; .03; .012; .00082 INJECTION, SOLUTION INTRAVENOUS at 18:47

## 2022-07-21 RX ADMIN — NICOTINE 1 PATCH: 21 PATCH, EXTENDED RELEASE TRANSDERMAL at 08:19

## 2022-07-21 RX ADMIN — CLONAZEPAM 0.5 MG: 0.5 TABLET ORAL at 21:15

## 2022-07-21 RX ADMIN — METHYLPREDNISOLONE SODIUM SUCCINATE 40 MG: 40 INJECTION, POWDER, FOR SOLUTION INTRAMUSCULAR; INTRAVENOUS at 21:14

## 2022-07-21 RX ADMIN — GABAPENTIN 600 MG: 300 CAPSULE ORAL at 15:37

## 2022-07-21 RX ADMIN — GABAPENTIN 600 MG: 300 CAPSULE ORAL at 21:15

## 2022-07-21 RX ADMIN — PANTOPRAZOLE SODIUM 40 MG: 40 TABLET, DELAYED RELEASE ORAL at 05:24

## 2022-07-21 RX ADMIN — ASPIRIN 81 MG: 81 TABLET, COATED ORAL at 08:19

## 2022-07-21 RX ADMIN — METHYLPREDNISOLONE SODIUM SUCCINATE 40 MG: 40 INJECTION, POWDER, FOR SOLUTION INTRAMUSCULAR; INTRAVENOUS at 05:24

## 2022-07-21 RX ADMIN — INSULIN LISPRO 1 UNITS: 100 INJECTION, SOLUTION INTRAVENOUS; SUBCUTANEOUS at 15:36

## 2022-07-21 RX ADMIN — BENZTROPINE MESYLATE 0.5 MG: 1 TABLET ORAL at 21:14

## 2022-07-21 RX ADMIN — GABAPENTIN 600 MG: 300 CAPSULE ORAL at 08:19

## 2022-07-21 RX ADMIN — TRAZODONE HYDROCHLORIDE 150 MG: 50 TABLET ORAL at 21:14

## 2022-07-21 RX ADMIN — INSULIN LISPRO 3 UNITS: 100 INJECTION, SOLUTION INTRAVENOUS; SUBCUTANEOUS at 11:30

## 2022-07-21 RX ADMIN — UMECLIDINIUM 1 PUFF: 62.5 AEROSOL, POWDER ORAL at 08:28

## 2022-07-21 RX ADMIN — VILAZODONE HYDROCHLORIDE 40 MG: 20 TABLET ORAL at 08:27

## 2022-07-21 RX ADMIN — INSULIN LISPRO 1 UNITS: 100 INJECTION, SOLUTION INTRAVENOUS; SUBCUTANEOUS at 08:22

## 2022-07-21 RX ADMIN — THIAMINE HYDROCHLORIDE 200 MG: 100 INJECTION, SOLUTION INTRAMUSCULAR; INTRAVENOUS at 14:24

## 2022-07-21 RX ADMIN — ENOXAPARIN SODIUM 40 MG: 40 INJECTION SUBCUTANEOUS at 08:19

## 2022-07-21 RX ADMIN — FENOFIBRATE 48 MG: 48 TABLET ORAL at 08:19

## 2022-07-21 RX ADMIN — ACETAMINOPHEN 650 MG: 325 TABLET, FILM COATED ORAL at 15:37

## 2022-07-21 RX ADMIN — AMITRIPTYLINE HYDROCHLORIDE 50 MG: 50 TABLET, FILM COATED ORAL at 21:15

## 2022-07-21 NOTE — ASSESSMENT & PLAN NOTE
Suspect due to mild COPD exacerbation versus chronic respiratory failure  The patient does use oxygen at night but not during the day  Her oxygen saturations were 82-83% on room air, she is currently on 4 L nasal cannula  Wean off as able to keep oxygen saturations above 88%  Continue treatment for possible mild COPD exacerbation

## 2022-07-21 NOTE — UTILIZATION REVIEW
Inpatient Admission Authorization Request   NOTIFICATION OF INPATIENT ADMISSION/INPATIENT AUTHORIZATION REQUEST   SERVICING FACILITY:   [address] [place] [address]  [address]  Tax ID:  [id number]  NPI: [id number]  Place of Service: [place] [address]  Place of Service Code: 24     ATTENDING PROVIDER:  Attending Name and NPI#: Josee Acosta Md [[id number]]  Address: [address]  Phone: [phone]     UTILIZATION REVIEW CONTACT:  Brody Acevedo Utilization   Network Utilization Review Department  Phone: [phone]  Fax [phone]  Email: [email or web address]     PHYSICIAN ADVISORY SERVICES:  FOR [id number] REVIEW - MEDICAL NECESSITY DENIAL  Phone: [phone]  Fax: [phone]  Email: [email or web address]     TYPE OF REQUEST:  Inpatient Status     ADMISSION INFORMATION:  ADMISSION DATE/TIME: 7/20/22  8:14 PM  PATIENT DIAGNOSIS CODE/DESCRIPTION:  Diarrhea [R19 7]  UTI (urinary tract infection) [N39 0]  Weakness [R53 1]  Tachycardia [R00 0]  Hypoxia [R09 02]  DISCHARGE DATE/TIME: No discharge date for patient encounter  IMPORTANT INFORMATION:  Please contact Brody Dumont (Donalynn Sidle) directly with any questions or concerns regarding this request  Department voicemails are confidential     Send requests for admission clinical reviews, concurrent reviews, approvals, and administrative denials due to lack of clinical to fax [phone]

## 2022-07-21 NOTE — ASSESSMENT & PLAN NOTE
· Patient reports decreased urination over the past several weeks  She states that this may be due to dehydration  Denies dysuria, hematuria    · Found on urinalysis to have elevated white blood cells, bacteria suggestive of UTI  · Urine culture sent  · Ceftriaxone IV 2000 mg Q 24

## 2022-07-21 NOTE — ASSESSMENT & PLAN NOTE
Patient reports subacute to chronic history of diarrhea however recently worse  Reports 4-5 watery bowel movements daily  Recently tested for ova and parasites and stool PCR at end of June, no recent test for C diff  Repeat stool studies  Continue IV fluids  Of note the patient is already scheduled with GI for colonoscopy on short-term basis

## 2022-07-21 NOTE — ASSESSMENT & PLAN NOTE
Lab Results   Component Value Date    HGBA1C 6 6 (A) 03/28/2022       Recent Labs     07/21/22  0714 07/21/22  1105   POCGLU 178* 231*       Blood Sugar Average: Last 72 hrs:  · (P) 204 5 blood glucose 95 on admission  · Patient takes metformin 1000 mg oral b i d , Victoza injection 1 2 mg subQ daily  · Will hold all home medications  · Start Humalog sliding scale insulin  · Monitor a c  HS  · LA elevated, possibly related to metformin which is on hold

## 2022-07-21 NOTE — PROGRESS NOTES
[address] [facility] [address]  Progress Note - Carina Said 1960, 58 y o  female MRN: [id number]  Unit/Bed#: 409-01 Encounter: [id number]  Primary Care Provider: Collin Thurston PA-C   Date and time admitted to hospital: 7/20/2022  3:30 PM    * Sepsis due to other etiology [facility])  Assessment & Plan  · Sepsis due to possible UTI  Patient does also report diarrhea but no evidence of intra-abdominal infection and diarrhea has been chronic  · On admission SIRS criteria was met with tachycardia and tachypnea can also consider noninfectious source related to volume depletion  · Lactic acid on admission was normal, unfortunate elevated on repeat, consider due to hypoxia which is suspected to be chronic in setting of COPD, also could be related to metformin therapy  · Follow-up infectious workup, urine and blood cultures  · Continue empiric treatment with ceftriaxone  · Trend lactic acid to normalization    Diarrhea of presumed infectious origin  Assessment & Plan  Patient reports subacute to chronic history of diarrhea however recently worse  Reports 4-5 watery bowel movements daily  Recently tested for ova and parasites and stool PCR at end of June, no recent test for C diff  Repeat stool studies  Continue IV fluids  Of note the patient is already scheduled with GI for colonoscopy on short-term basis  Acute respiratory failure with hypoxia (HCC)  Assessment & Plan  Suspect due to mild COPD exacerbation versus chronic respiratory failure  The patient does use oxygen at night but not during the day  Her oxygen saturations were 82-83% on room air, she is currently on 4 L nasal cannula  Wean off as able to keep oxygen saturations above 88%  Continue treatment for possible mild COPD exacerbation  Acute cystitis without hematuria  Assessment & Plan  · Patient reports decreased urination over the past several weeks  She states that this may be due to dehydration    Denies dysuria, hematuria  · Found on urinalysis to have elevated white blood cells, bacteria suggestive of UTI  · Urine culture sent  · Ceftriaxone IV    Chronic obstructive pulmonary disease with acute exacerbation (HCC)  Assessment & Plan  · Presents with respiratory failure SO2 in low 80s on room air, patient was tachycardic with shortness of breath  She states that when she walks outside in the heat she feels increasingly short of breath and feels as if she is wheezing  · CTA of the chest showed no evidence of pulmonary embolism  Questionable for pneumonia verses atelectasis  Procalcitonin was within normal limits as well as white blood cells  No productive cough, fevers, chills  · Procalcitonin normal  · White blood cells normal  · Patient administered breathing treatment in the ED  She does state that she feels slightly improved since  · 40 mg IV Solu-Medrol q 8  No significant wheezing and suspect chronic hypoxia  Will proceed with Solu-Medrol taper and anticipate transition to prednisone tomorrow  · Ceftriaxone was initiated due to suspected UTI  · Respiratory protocol  · Smoking cessation counseling    Hypotension due to hypovolemia  Assessment & Plan  · Patient found at nephrology appointment to have blood pressure of 100/50  Also reports generalized weakness, lightheadedness  Tachycardic upon presentation  · Patient has not been adequately hydrating in the setting of chronic diarrhea at home  She states she only drinks half a bottle of Gatorade a day    · Blood pressure now improved to 139/71 after administration of 1 25 L IV fluids in the ED  · Will continue with isolate 100 mL/hr  · Monitor blood pressure trend    Cigarette nicotine dependence without complication  Assessment & Plan  · Nicotine patch ordered  · Smoking cessation counseling provided, unfortunately patient is not interested in quitting smoking at this time    FAM (acute kidney injury) [facility])  Assessment & Plan  · Patient presents with a creatinine 1 58 with baseline of 1-1 2  · Most likely due to decreased p o  Intake over the past several days due to malaise as well as dehydration from chronic diarrhea which patient reports had improved and got worse again  · Improving with IV fluids  Continue Isolate 100 mL/hr  · Avoid nephrotoxic medications  · Renal diet  · Will monitor with a m  Metabolic panel    Type 2 diabetes mellitus with hyperglycemia, without long-term current use of insulin [facility])  Assessment & Plan  Lab Results   Component Value Date    HGBA1C 6 6 (A) 03/28/2022       Recent Labs     07/21/22  0714 07/21/22  1105   POCGLU 178* 231*       Blood Sugar Average: Last 72 hrs:  · (P) 204 5 blood glucose 95 on admission  · Patient takes metformin 1000 mg oral b i d , Victoza injection 1 2 mg subQ daily  · Will hold all home medications  · Start Humalog sliding scale insulin  · Monitor a c  HS  · LA elevated, possibly related to metformin which is on hold     Gastroesophageal reflux disease with esophagitis  Assessment & Plan  · Continue Protonix 40 mg oral daily          VTE Pharmacologic Prophylaxis: VTE Score: 15 High Risk (Score >/= 5) - Pharmacological DVT Prophylaxis Ordered: enoxaparin (Lovenox)  Sequential Compression Devices Ordered  Patient Centered Rounds: I performed bedside rounds with nursing staff today  Discussions with Specialists or Other Care Team Provider:  Multidisciplinary meeting    Education and Discussions with Family / Patient:  Patient    Time Spent for Care: 30 minutes  More than 50% of total time spent on counseling and coordination of care as described above  Current Length of Stay: 1 day(s)  Current Patient Status: Inpatient   Certification Statement: The patient will continue to require additional inpatient hospital stay due to Close monitoring, infectious workup  Discharge Plan: Anticipate discharge in 24-48 hrs to home  Code Status: Level 1 - Full Code    Subjective:   Patient seen and examined  She reports feeling improved compared to yesterday  She denies significant shortness of breath  She continues to have diarrhea reportedly up to 4-5 times daily  Denies abdominal pain and reports normal appetite  Objective:     Vitals:   Temp (24hrs), Av °F (36 7 °C), Min:97 2 °F (36 2 °C), Max:98 5 °F (36 9 °C)    Temp:  [97 2 °F (36 2 °C)-98 5 °F (36 9 °C)] 98 5 °F (36 9 °C)  HR:  [] 98  Resp:  [15-23] 18  BP: (104-139)/(54-79) 131/79  SpO2:  [83 %-94 %] 91 %  Body mass index is 35 08 kg/m²  Input and Output Summary (last 24 hours): Intake/Output Summary (Last 24 hours) at 2022 1407  Last data filed at 2022 1300  Gross per 24 hour   Intake 1468 33 ml   Output 600 ml   Net 868 33 ml       Physical Exam:   Physical Exam  Constitutional:       General: She is not in acute distress  HENT:      Head: Normocephalic and atraumatic  Nose: No congestion  Eyes:      Conjunctiva/sclera: Conjunctivae normal    Cardiovascular:      Rate and Rhythm: Regular rhythm  Tachycardia present  Heart sounds: No murmur heard  Pulmonary:      Effort: No respiratory distress  Breath sounds: Decreased breath sounds present  No wheezing or rales  Abdominal:      General: There is no distension  Tenderness: There is abdominal tenderness (mild left-sided lower )  There is no guarding  Musculoskeletal:      Right lower leg: No edema  Left lower leg: No edema  Skin:     General: Skin is warm and dry  Neurological:      Mental Status: She is oriented to person, place, and time     Psychiatric:         Mood and Affect: Mood normal           Additional Data:     Labs:  Results from last 7 days   Lab Units 22  0544   WBC Thousand/uL 8 69   HEMOGLOBIN g/dL 11 4*   HEMATOCRIT % 35 4   PLATELETS Thousands/uL 194  156   NEUTROS PCT % 81*   LYMPHS PCT % 16   MONOS PCT % 2*   EOS PCT % 0     Results from last 7 days   Lab Units 22  0544   SODIUM mmol/L 138   POTASSIUM mmol/L 4  9   CHLORIDE mmol/L 100   CO2 mmol/L 27   BUN mg/dL 11   CREATININE mg/dL 1 39*   ANION GAP mmol/L 11   CALCIUM mg/dL 8 7   ALBUMIN g/dL 3 4*   TOTAL BILIRUBIN mg/dL 0 25   ALK PHOS U/L 54   ALT U/L 21   AST U/L 15   GLUCOSE RANDOM mg/dL 185*     Results from last 7 days   Lab Units 07/20/22  1948   INR  1 03     Results from last 7 days   Lab Units 07/21/22  1105 07/21/22  0714   POC GLUCOSE mg/dl 231* 178*         Results from last 7 days   Lab Units 07/21/22  0913 07/21/22  0544 07/20/22  1948 07/20/22  1612   LACTIC ACID mmol/L 3 4* 2 6* 1 4  --    PROCALCITONIN ng/ml  --  0 07  --  0 10       Lines/Drains:  Invasive Devices  Report    Peripheral Intravenous Line  Duration           Peripheral IV Left Antecubital -- days                      Imaging: Reviewed radiology reports from this admission including: chest CT scan and Personally reviewed the following imaging: chest CT scan    Recent Cultures (last 7 days):   Results from last 7 days   Lab Units 07/20/22  1949 07/20/22  1929   BLOOD CULTURE  Received in Microbiology Lab  Culture in Progress  Received in Microbiology Lab  Culture in Progress         Last 24 Hours Medication List:   Current Facility-Administered Medications   Medication Dose Route Frequency Provider Last Rate    acetaminophen  650 mg Oral Q6H PRN Donnell Nissen, PA-LUMA      albuterol  2 puff Inhalation Q4H PRN Donnell Nissen, PA-LUMA      albuterol  2 5 mg Nebulization Q4H PRN Donnell Nissen, PA-C      amitriptyline  50 mg Oral HS Donnell Nissen, PA-LUMA      aspirin  81 mg Oral Daily Donnell Nissen, PA-LUMA      benztropine  0 5 mg Oral HS Donnell Nissen, PA-C      cefTRIAXone  2,000 mg Intravenous Q24H Donnell Nissen, PA-C 2,000 mg (07/20/22 2202)    clonazePAM  0 5 mg Oral Daily PRN Donnell Nissen, PA-LUMA      cyclobenzaprine  10 mg Oral Q12H PRN Donnell Nissen, PA-LUMA      enoxaparin  40 mg Subcutaneous Daily Donnell Nissen, PA-C  fenofibrate  48 mg Oral Daily Neel Mo PA-C      gabapentin  600 mg Oral TID Neel Mo PA-C      insulin lispro  1-6 Units Subcutaneous TID AC Abdelrahman Friend PA-C      methylPREDNISolone sodium succinate  40 mg Intravenous Q8H Neel Mo PA-C      multi-electrolyte  100 mL/hr Intravenous Continuous Neel Mo PA-C 100 mL/hr (07/20/22 6211)    nicotine  1 patch Transdermal Daily Neel Mo PA-C      pantoprazole  40 mg Oral Early Morning Neel Mo PA-C      thiamine  200 mg Intravenous Daily Lorrie Sherry Clay MD      traZODone  150 mg Oral HS Abdelrahman Friend PA-C      umeclidinium bromide  1 puff Inhalation Daily Neel Mo PA-C      vilazodone  40 mg Oral Daily Neel oM PA-C          Today, Patient Was Seen By: Bernie Walker MD    **Please Note: This note may have been constructed using a voice recognition system  **

## 2022-07-21 NOTE — ASSESSMENT & PLAN NOTE
· Patient found at nephrology appointment to have blood pressure of 100/50  Also reports generalized weakness, lightheadedness  From tachycardic upon presentation  · Patient has not been adequately hydrating in the setting of chronic diarrhea at home  She states she only drinks half a bottle of Gatorade a day    · Blood pressure now improved to 139/71 after administration of 1 25 L IV fluids in the ED  · Will continue with isolate 100 mL/hr

## 2022-07-21 NOTE — ASSESSMENT & PLAN NOTE
· Patient presents meeting sepsis criteria for tachycardia, tachypnea  · White blood cells within normal limits  · Lactic within normal limits  · Procalcitonin within normal limits  · Patient does present with UTI  May be possible source of sepsis  In addition, tachycardia and tachypnea may be due to COPD exacerbation  · CTA of the chest questionable for pneumonia  However, procalcitonin within normal limits as well as white blood cells  No fevers, chills, productive cough  · No apparent skin infection or wounds  · Will recheck procalcitonin, lactic in a m    · Trend white blood cells  · Will treat UTI with ceftriaxone IV 2000 mg Q 24  · Urine culture sent  · Blood cultures sent

## 2022-07-21 NOTE — ASSESSMENT & PLAN NOTE
· Patient presents with a creatinine 1 58 with baseline of 1-1 2  · Most likely due to decreased p o  Intake over the past several days due to malaise as well as dehydration from chronic diarrhea which patient reports had improved and got worse again  · Improving with IV fluids  Continue Isolate 100 mL/hr  · Avoid nephrotoxic medications  · Renal diet  · Will monitor with a m   Metabolic panel

## 2022-07-21 NOTE — PLAN OF CARE
Problem: Nutrition/Hydration-ADULT  Goal: Nutrient/Hydration intake appropriate for improving, restoring or maintaining nutritional needs  Description: Monitor and assess patient's nutrition/hydration status for malnutrition  Collaborate with interdisciplinary team and initiate plan and interventions as ordered  Monitor patient's weight and dietary intake as ordered or per policy  Utilize nutrition screening tool and intervene as necessary  Determine patient's food preferences and provide high-protein, high-caloric foods as appropriate       INTERVENTIONS:  - Monitor oral intake, urinary output, labs, and treatment plans  - Assess nutrition and hydration status and recommend course of action  - Evaluate amount of meals eaten  - Assist patient with eating if necessary   - Allow adequate time for meals  - Recommend/ encourage appropriate diets, oral nutritional supplements, and vitamin/mineral supplements  - Order, calculate, and assess calorie counts as needed  - Recommend, monitor, and adjust tube feedings and TPN/PPN based on assessed needs  - Assess need for intravenous fluids  - Provide specific nutrition/hydration education as appropriate  - Include patient/family/caregiver in decisions related to nutrition  Outcome: Progressing     Problem: DISCHARGE PLANNING - CARE MANAGEMENT  Goal: Discharge to post-acute care or home with appropriate resources  Description: INTERVENTIONS:  - Conduct assessment to determine patient/family and health care team treatment goals, and need for post-acute services based on payer coverage, community resources, and patient preferences, and barriers to discharge  - Address psychosocial, clinical, and financial barriers to discharge as identified in assessment in conjunction with the patient/family and health care team  - Arrange appropriate level of post-acute services according to patient's   needs and preference and payer coverage in collaboration with the physician and health care team  - Communicate with and update the patient/family, physician, and health care team regarding progress on the discharge plan  - Arrange appropriate transportation to post-acute venues  Outcome: Progressing     Problem: PAIN - ADULT  Goal: Verbalizes/displays adequate comfort level or baseline comfort level  Description: Interventions:  - Encourage patient to monitor pain and request assistance  - Assess pain using appropriate pain scale  - Administer analgesics based on type and severity of pain and evaluate response  - Implement non-pharmacological measures as appropriate and evaluate response  - Consider cultural and social influences on pain and pain management  - Notify physician/advanced practitioner if interventions unsuccessful or patient reports new pain  Outcome: Progressing     Problem: INFECTION - ADULT  Goal: Absence or prevention of progression during hospitalization  Description: INTERVENTIONS:  - Assess and monitor for signs and symptoms of infection  - Monitor lab/diagnostic results  - Monitor all insertion sites, i e  indwelling lines, tubes, and drains  - Monitor endotracheal if appropriate and nasal secretions for changes in amount and color  - [place] appropriate cooling/warming therapies per order  - Administer medications as ordered  - Instruct and encourage patient and family to use good hand hygiene technique  - Identify and instruct in appropriate isolation precautions for identified infection/condition  Outcome: Progressing  Goal: Absence of fever/infection during neutropenic period  Description: INTERVENTIONS:  - Monitor WBC    Outcome: Progressing     Problem: Knowledge Deficit  Goal: Patient/family/caregiver demonstrates understanding of disease process, treatment plan, medications, and discharge instructions  Description: Complete learning assessment and assess knowledge base    Interventions:  - Provide teaching at level of understanding  - Provide teaching via preferred learning methods  Outcome: Progressing     Problem: Potential for Falls  Goal: Patient will remain free of falls  Description: INTERVENTIONS:  - Educate patient/family on patient safety including physical limitations  - Instruct patient to call for assistance with activity   - Consult OT/PT to assist with strengthening/mobility   - Keep Call bell within reach  - Keep bed low and locked with side rails adjusted as appropriate  - Keep care items and personal belongings within reach  - Initiate and maintain comfort rounds  - Make Fall Risk Sign visible to staff  - Obtain necessary fall risk management equipment    - Apply yellow socks and bracelet for high fall risk patients  - Consider moving patient to room near nurses station  Outcome: Progressing

## 2022-07-21 NOTE — ASSESSMENT & PLAN NOTE
· Patient reports decreased urination over the past several weeks  She states that this may be due to dehydration  Denies dysuria, hematuria    · Found on urinalysis to have elevated white blood cells, bacteria suggestive of UTI  · Urine culture sent  · Ceftriaxone IV

## 2022-07-21 NOTE — CASE MANAGEMENT
Case Management Assessment & Discharge Planning Note    Patient name Sophia Jackson  Location /227-04 MRN [id number]  : 1960 Date 2022       Current Admission Date: 2022  Current Admission Diagnosis:Sepsis without acute organ dysfunction [facility])   Patient Active Problem List    Diagnosis Date Noted    Acute respiratory failure with hypoxia ([facility] ) 2022    Volume depletion 2022    Hypotension due to hypovolemia 2022    Tachycardia 2022    Stage 3a chronic kidney disease ([facility] ) 2022    [facility]eps[facility] without acute organ dysfunction [facility] ) 2022    Chronic obstructive pulmonary disease with acute exacerbation ([facility] ) 2022    Acute cystitis without hematuria 2022    Peripheral arterial disease ([facility] ) 2021    Tobacco abuse 2021    Primary cancer of upper outer quadrant of right breast ([facility] ) 10/27/2021    Malignant neoplasm of upper-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, estrogen receptor positive ([facility] ) 10/27/2021    Mucinous carcinoma of breast, right ([facility] ) 10/27/2021    Family history of heart disease 10/12/2021    Pulmonary hypertension ([facility] ) 10/12/2021    Chest pain 10/12/2021    Obstructive sleep apnea syndrome 2021    Sleep apnea     Breast nodule 2021    MARQUEZ (dyspnea on exertion) 2021    Chronic hypoxemic respiratory failure (HCC) 2021    Cigarette nicotine dependence without complication 10/32/6167    Class 1 obesity due to excess calories with serious comorbidity and body mass index (BMI) of 33 0 to 33 9 in adult 2021    Insect bite of ear, infected, left, initial encounter 2020    Stage 2 chronic kidney disease 07/10/2020    FAM (acute kidney injury) ([facility] ) 07/10/2020    Pulmonary nodule seen on imaging study 2017    Chronic hoarseness 2017    Type 2 diabetes mellitus with hyperglycemia, without long-term current use of insulin ([address] ) 2017    Migraine 04/06/2017    Neuropathy 04/25/2016    Myositis 05/20/2014    Cataract 03/14/2014    Diabetes mellitus with neurological manifestation ([facility] ) 01/22/2014    Mammogram abnormal 12/09/2013    Chronic low back pain 09/05/2013    Centrilobular emphysema ([facility] ) 09/05/2013    Depression 09/05/2013    Hypercholesterolemia 07/29/2013    Deep vein thrombophlebitis of leg, unspecified laterality ([facility] ) 05/28/2013    Pulmonary embolism ([place] [address] ) 05/28/2013    Hypercoagulable state ([place] [address] ) 05/22/2013    Hypothyroidism 12/13/2012    Headache 11/12/2012    Gastroesophageal reflux disease with esophagitis 09/26/2012    Hypertension 08/15/2012    Primary fibromyalgia syndrome 08/07/2012      LOS (days): 1  Geometric Mean LOS (GMLOS) (days):   Days to GMLOS:     OBJECTIVE:    Risk of Unplanned Readmission Score: 22 56         Current admission status: Inpatient       Preferred Pharmacy:   [id number] [address] [place] [address], [address] [place] [address]  [address]   [place]  [place] [address]  Phone: [phone] Fax: [phone]    Primary Care Provider: Long Arredondo PA-C    Primary Insurance: [facility]  Secondary Insurance:     ASSESSMENT:  [address] [facility], [address] [facility] [address] [address] Representative  Sister   Primary Phone: [phone] (Mobile)               Advance Directives  Does patient have a [address]?: No  Was patient offered paperwork?: Yes (declines)  Does patient currently have a Health Care decision maker?: Yes, please see Health Care Proxy section  Does patient have Advance Directives?: No  Was patient offered paperwork?: Yes (declines)  Primary Contact: Kaylin Monahan (Sister)   [phone] (M)         Readmission Root Cause  30 Day Readmission: No    Patient Information  Admitted from[de-identified] Home  Mental Status: Alert  During Assessment patient was accompanied by: Not accompanied during assessment  Assessment information provided by[de-identified] Patient  Primary Caregiver: Self  Support Systems: Family members (brother, sisters)  [place] of Residence: [address] do you live in?: [address] [facility] entry access options   Select all that apply : No steps to enter home  Type of Current Residence: Apartment ([facility])  Floor Level: 6  Upon entering residence, is there a bedroom on the main floor (no further steps)?: Yes  Upon entering residence, is there a bathroom on the main floor (no further steps)?: Yes  In the last 12 months, was there a time when you were not able to pay the mortgage or rent on time?: No  In the last 12 months, how many places have you lived?: 1  In the last 12 months, was there a time when you did not have a steady place to sleep or slept in a shelter (including now)?: No  Homeless/housing insecurity resource given?: N/A  Living Arrangements: Lives Alone  Is patient a ?: No    Activities of Daily Living Prior to Admission  Functional Status: Independent  Completes ADLs independently?: Yes  Ambulates independently?: Yes  Does patient use assisted devices?: Yes  Assisted Devices (DME) used: Home Oxygen concentrator  DME Company Name (respiratory supplies): [employer]  O2 Rate(s): 4l at night and as needed  Does patient currently own DME?: Yes  What DME does the patient currently own?: Home Oxygen concentrator  Does patient have a history of Outpatient Therapy (PT/OT)?: No  Does the patient have a history of Short-Term Rehab?: No  Does patient have a history of HHC?: Yes (had hhc years ago unsure of agency)  [facility] patient [facility] No         Patient Information Continued  Income Source: SSI/SSD  Does patient have prescription coverage?: Yes  Within the past 12 months, you worried that your food would run out before you got the money to buy more : Never true  Within the past 12 months, the food you bought just didn't last and you didn't have money to get more : Never true  Food insecurity resource given?: N/A  Does patient receive dialysis treatments?: No  Does patient have a history of substance abuse?: No  Does patient have a history of Mental Health Diagnosis?: No    PHQ 2/9 Screening   Reviewed PHQ 2/9 Depression Screening Score?: No    Means of Transportation  Means of Transport to Appts[de-identified] Family transport  In the past 12 months, has lack of transportation kept you from medical appointments or from getting medications?: No  In the past 12 months, has lack of transportation kept you from meetings, work, or from getting things needed for daily living?: No  Was application for public transport provided?: N/A        DISCHARGE DETAILS:    Discharge planning discussed with[de-identified] patient        CM contacted family/caregiver?: No- see comments (declines)  Were Treatment Team discharge recommendations reviewed with patient/caregiver?: Yes  Did patient/caregiver verbalize understanding of patient care needs?: Yes  Were patient/caregiver advised of the risks associated with not following Treatment Team discharge recommendations?: Yes         [address]         Is the patient interested in [facility] at discharge?: No    DME Referral Provided  Referral made for DME?: No         Would you like to participate in our [facility] service program?  : No - Declined       Discharge Destination Plan[de-identified] Home  Transport at Discharge : Family      Plans at this time are home on dc with OP follow up after dc  CM will follow and assist in dc planning

## 2022-07-21 NOTE — ASSESSMENT & PLAN NOTE
· Presents with respiratory failure SO2 in low 80s on room air, patient was tachycardic with shortness of breath  She states that when she walks outside in the heat she feels increasingly short of breath and feels as if she is wheezing  · CTA of the chest showed no evidence of pulmonary embolism  Questionable for pneumonia verses atelectasis  Procalcitonin was within normal limits as well as white blood cells  No productive cough, fevers, chills  · Procalcitonin normal  · White blood cells normal  · Patient administered breathing treatment in the ED  She does state that she feels slightly improved since  · 40 mg IV Solu-Medrol q 8  No significant wheezing and suspect chronic hypoxia    Will proceed with Solu-Medrol taper and anticipate transition to prednisone tomorrow  · Ceftriaxone was initiated due to suspected UTI  · Respiratory protocol  · Smoking cessation counseling

## 2022-07-21 NOTE — ASSESSMENT & PLAN NOTE
Lab Results   Component Value Date    HGBA1C 6 6 (A) 03/28/2022       No results for input(s): POCGLU in the last 72 hours      Blood Sugar Average: Last 72 hrs:  ·  blood glucose 95 on admission  · Patient takes metformin 1000 mg oral b i d , Victoza injection 1 2 mg subQ daily  · Will hold all home medications  · Start Humalog sliding scale insulin  · Monitor a c  HS

## 2022-07-21 NOTE — ASSESSMENT & PLAN NOTE
· Upon presentation to ED, patient was tachycardic with shortness of breath  She states that when she walks outside in the heat she feels increasingly short of breath and feels as if she is wheezing  · CTA of the chest showed no evidence of pulmonary embolism  Questionable for pneumonia verses atelectasis  Procalcitonin was within normal limits as well as white blood cells  No productive cough, fevers, chills  · Procalcitonin normal  · White blood cells normal  · Patient administered breathing treatment in the ED  She does state that she feels slightly improved since    ·  40 mg IV Solu-Medrol q 8  · Ceftriaxone 2000 mg IV Q 24  · Respiratory protocol

## 2022-07-21 NOTE — ASSESSMENT & PLAN NOTE
· Patient presents with a creatinine 1 58 which is well above patient's baseline of 1 20  · Most likely due to decreased p o  Intake over the past several days due to malaise as well as dehydration from diarrhea  · Isolate 100 mL/hr  · Avoid nephrotoxic medications  · Renal diet  · Will monitor with a m   Metabolic panel

## 2022-07-21 NOTE — ASSESSMENT & PLAN NOTE
· Nicotine patch ordered  · Smoking cessation counseling provided, unfortunately patient is not interested in quitting smoking at this time

## 2022-07-21 NOTE — H&P
[address] [facility] [address]  H&P- [place] 1960, 58 y o  female MRN: [id number]  Unit/Bed#: 409-01 Encounter: [id number]  Primary Care Provider: Herve Metz PA-C   Date and time admitted to hospital: 7/20/2022  3:30 PM    * Sepsis without acute organ dysfunction [facility])  Assessment & Plan  · Patient presents meeting sepsis criteria for tachycardia, tachypnea  · White blood cells within normal limits  · Lactic within normal limits  · Procalcitonin within normal limits  · Patient does present with UTI  May be possible source of sepsis  In addition, tachycardia and tachypnea may be due to COPD exacerbation  · CTA of the chest questionable for pneumonia  However, procalcitonin within normal limits as well as white blood cells  No fevers, chills, productive cough  · No apparent skin infection or wounds  · Will recheck procalcitonin, lactic in a m  · Trend white blood cells  · Will treat UTI with ceftriaxone IV 2000 mg Q 24  · Urine culture sent  · Blood cultures sent    Acute cystitis without hematuria  Assessment & Plan  · Patient reports decreased urination over the past several weeks  She states that this may be due to dehydration  Denies dysuria, hematuria  · Found on urinalysis to have elevated white blood cells, bacteria suggestive of UTI  · Urine culture sent  · Ceftriaxone IV 2000 mg Q 24    Chronic obstructive pulmonary disease with acute exacerbation ([facility] [address] )  Assessment & Plan  · Upon presentation to ED, patient was tachycardic with shortness of breath  She states that when she walks outside in the heat she feels increasingly short of breath and feels as if she is wheezing  · CTA of the chest showed no evidence of pulmonary embolism  Questionable for pneumonia verses atelectasis  Procalcitonin was within normal limits as well as white blood cells  No productive cough, fevers, chills    · Procalcitonin normal  · White blood cells normal  · Patient administered breathing treatment in the ED  She does state that she feels slightly improved since  ·  40 mg IV Solu-Medrol q 8  · Ceftriaxone 2000 mg IV Q 24  · Respiratory protocol    Hypotension due to hypovolemia  Assessment & Plan  · Patient found at nephrology appointment to have blood pressure of 100/50  Also reports generalized weakness, lightheadedness  From tachycardic upon presentation  · Patient has not been adequately hydrating in the setting of chronic diarrhea at home  She states she only drinks half a bottle of Gatorade a day  · Blood pressure now improved to 139/71 after administration of 1 25 L IV fluids in the ED  · Will continue with isolate 100 mL/hr    FAM (acute kidney injury) ([facility] [address] )  Assessment & Plan  · Patient presents with a creatinine 1 58 which is well above patient's baseline of 1 20  · Most likely due to decreased p o  Intake over the past several days due to malaise as well as dehydration from diarrhea  · Isolate 100 mL/hr  · Avoid nephrotoxic medications  · Renal diet  · Will monitor with a m  Metabolic panel    Cigarette nicotine dependence without complication  Assessment & Plan  · Nicotine patch ordered    Type 2 diabetes mellitus with hyperglycemia, without long-term current use of insulin ([facility])  Assessment & Plan  Lab Results   Component Value Date    HGBA1C 6 6 (A) 03/28/2022       No results for input(s): POCGLU in the last 72 hours  Blood Sugar Average: Last 72 hrs:  ·  blood glucose 95 on admission  · Patient takes metformin 1000 mg oral b i d , Victoza injection 1 2 mg subQ daily  · Will hold all home medications  · Start Humalog sliding scale insulin  · Monitor a c  HS    Gastroesophageal reflux disease with esophagitis  Assessment & Plan  · Continue Protonix 40 mg oral daily    VTE Pharmacologic Prophylaxis: VTE Score: 15 High Risk (Score >/= 5) - Pharmacological DVT Prophylaxis Ordered: heparin  Sequential Compression Devices Ordered    Code Status: Level 1 - Full Code   Discussion with family: Patient declined call to   Anticipated Length of Stay: Patient will be admitted on an inpatient basis with an anticipated length of stay of greater than 2 midnights secondary to Urosepsis, hypotension, dehydration, COPD with acute exacerbation   Total Time for Visit, including Counseling / Coordination of Care: 60 minutes Greater than 50% of this total time spent on direct patient counseling and coordination of care  Chief Complaint: Generalized weakness, hypotension     History of Present Illness:  Vanessa Santana is a 58 y o  female with a PMH of COPD, hyperlipidemia, right breast cancer, GERD, PE, chronic diarrhea who presents with generalized weakness, hypotension times 1 week  The patient states for the past the 2 months she has been dealing with chronic diarrhea  She has been following a GI for this to schedule a colonoscopy for her which is not yet been completed  She states that over the past several days she has felt increasingly lightheaded and weak  She states nothing seems to make this better or worse  She does state that she struggles to get around due to her weakness  She was seen by Nephrology today in the office who or concern for hypotension in the setting of dehydration and sent her to the ED for further workup  She denies any fevers, chills  She does report decreased urine output  No dysuria, hematuria, urinary frequency  She denies any blood in her stool  Denies any abdominal pain  Denies any headaches  She does report lightheadedness and dizziness when she stands up  Upon presentation, she was found to have blood pressure 100/50  After volume repletion with 1 2 L of IV fluids, blood pressure improved to 139/71  The patient was found to have a UTI on urinalysis  She was also found to meet sepsis criteria for tachycardia, tachypnea  She also reports increased shortness of breath since arrival to the ED    She states that she gets this when she walks outside with the heat and humidity  She is administer breathing treatment in the ED which is somewhat improved symptoms  She does still report shortness of breath  She was negative for PE on CTA  No evidence of pneumonia  Patient was found to have FAM  Review of Systems:  Review of Systems   Constitutional: Positive for fatigue  Negative for appetite change, chills and fever  HENT: Negative for ear pain and sore throat  Eyes: Negative for pain and visual disturbance  Respiratory: Positive for chest tightness and shortness of breath  Negative for cough and wheezing  Cardiovascular: Negative for chest pain, palpitations and leg swelling  Gastrointestinal: Positive for diarrhea  Negative for abdominal distention, abdominal pain, constipation, nausea and vomiting  Endocrine: Negative for polydipsia and polyuria  Genitourinary: Positive for decreased urine volume  Negative for difficulty urinating, dysuria and hematuria  Musculoskeletal: Negative for arthralgias and back pain  Skin: Negative for color change and rash  Neurological: Positive for dizziness, weakness and light-headedness  Negative for seizures, syncope, facial asymmetry and headaches  Psychiatric/Behavioral: Negative for agitation and confusion  All other systems reviewed and are negative        Past Medical and Surgical History:   Past Medical History:   Diagnosis Date    [facility])     right breast    Chronic back pain     Colitis     COPD (chronic obstructive pulmonary disease) ([facility] [address] )     Depression     Diabetes mellitus ([facility] [address] )     DVT of lower limb, acute ([facility] [address] ) 2004    GERD (gastroesophageal reflux disease)     Hyperlipidemia     Pneumonia     Rapid heart rate     Sleep apnea     [facility]     4 mini strokes       Past Surgical History:   Procedure Laterality Date    BREAST LUMPECTOMY Right     CARDIAC CATHETERIZATION N/A 10/28/2021    Procedure: Cardiac Coronary Angiogram;  Surgeon: Altaf Gonzalez DO [place];  [facility] C[facility]; Service: Cardiology    IVC FILTER INSERTION      MASTECTOMY W/ SENTINEL NODE BIOPSY Right 11/9/2021    Procedure: BREAST MASTECTOMY WITH BIOPSY LYMPH NODE SENTINEL and axillary node dissection  ([place] Lymph Node Injection @ 12:30);  Surgeon: Kenyatta Corral MD;  Location: [facility] MAIN OR;  Service: General    US GUIDANCE BREAST BIOPSY RIGHT EACH ADDITIONAL Right 10/13/2021    US GUIDANCE BREAST BIOPSY RIGHT EACH ADDITIONAL Right 10/13/2021    US GUIDED BREAST BIOPSY RIGHT COMPLETE Right 10/13/2021       Meds/Allergies:  Prior to Admission medications    Medication Sig Start Date End Date Taking?  Authorizing Provider   albuterol (2 5 mg/3 mL) 0 083 % nebulizer solution Take 1 vial (2 5 mg total) by nebulization every 4 (four) hours as needed for shortness of breath 5/4/21   Toni Landeros PA-C   albuterol (Ventolin HFA) 90 mcg/act inhaler Inhale 2 puffs every 4 (four) hours as needed for wheezing 5/4/21   Toni Landeros PA-C   amitriptyline (ELAVIL) 100 mg tablet take 1/2 tablet by mouth once daily at bedtime 7/12/22   Toni Landeros PA-C   anastrozole (ARIMIDEX) 1 mg tablet Take 1 tablet (1 mg total) by mouth daily  Patient not taking: No sig reported 1/6/22   Cece Dove MD   aspirin (ECOTRIN LOW STRENGTH) 81 mg EC tablet Take 1 tablet (81 mg total) by mouth daily 10/12/21   Lord Marianela MD   atorvastatin (LIPITOR) 80 mg tablet Take 1 tablet (80 mg total) by mouth daily at bedtime 3/31/22   Toni Landeros PA-C   BD Pen Needle Jasmine 2nd Gen 32G X 4 MM MISC use 1 PEN NEEDLE to inject MEDICATION subcutaneously once daily 2/28/22   Toni Landeros PA-C   benztropine (COGENTIN) 0 5 mg tablet Take 0 5 mg by mouth daily at bedtime      Historical Provider, MD   Blood Glucose Monitoring Suppl (ONE TOUCH ULTRA 2) w/Device KIT by Does not apply route 8/23/17   Historical Provider, MD   Blood Glucose Monitoring Suppl (ONE TOUCH ULTRA 2) w/Device KIT by Does not apply route daily 6/18/20   SHU Mosher   clonazePAM (KlonoPIN) 0 5 mg tablet Take 0 5 mg by mouth daily as needed   8/18/21   Historical Provider, MD   cyclobenzaprine (FLEXERIL) 10 mg tablet Take 1 tablet (10 mg total) by mouth every 12 (twelve) hours as needed for muscle spasms 6/28/22   Leana Barboza PA-C   fenofibrate (TRICOR) 48 mg tablet Take 1 tablet (48 mg total) by mouth daily 3/28/22   Leana Barboza PA-C   gabapentin (NEURONTIN) 600 MG tablet take 1 tablet by mouth three times a day 3/7/22   Leana Barboza PA-C   glucose blood (OneTouch Ultra) test strip Use 1 each daily Use as instructed 10/7/21   Leana Barboza PA-C   loratadine (CLARITIN) 10 mg tablet Take 1 tablet (10 mg total) by mouth daily  Patient not taking: Reported on 6/28/2022 9/8/20 6/28/22  Ashia Raymond PA-C   metFORMIN (GLUCOPHAGE) 1000 MG tablet take 1 tablet by mouth twice a day with food 2/28/22   Leana Barboza PA-C   nicotine polacrilex (COMMIT) 4 MG lozenge Apply 1 lozenge (4 mg total) to the mouth or throat as needed for smoking cessation 12/7/21   Leana Barboza PA-C   pantoprazole (PROTONIX) 40 mg tablet Take 1 tablet (40 mg total) by mouth 2 (two) times a day 4/5/22   Leana Barboza PA-C   tiotropium (Spiriva Respimat) 2 5 MCG/ACT AERS inhaler Inhale 2 puffs daily 9/22/21   Gris Laureano DO   traZODone (DESYREL) 150 mg tablet 150 mg daily at bedtime  5/6/18   Historical Provider, MD   Victoza injection inject 1 2 milligram subcutaneously daily 12/20/21   Leana Barboza PA-C   vilazodone (VIIBRYD) 40 mg tablet Take 40 mg by mouth daily  Historical Provider, MD FIGUEROA 6 MG capsule Take 6 mg by mouth daily 5/10/18   Historical Provider, MD   lisinopril (ZESTRIL) 2 5 mg tablet take 1 tablet by mouth once daily 3/7/22 7/20/22  Leana Barboza PA-C     I have reviewed home medications with patient personally  Allergies:    Allergies   Allergen Reactions    Amoxicillin-Pot Clavulanate GI Intolerance    Anastrozole Other (See Comments)     Diarrhea, Nausea, Stomach pain        Social History:  Marital Status: Legally    Occupation: disability   Patient Pre-hospital Living Situation: Home  Patient Pre-hospital Level of Mobility: walks  Patient Pre-hospital Diet Restrictions: none   Substance Use History:   Social History     Substance and Sexual Activity   Alcohol Use Never     Social History     Tobacco Use   Smoking Status Current Every Day Smoker    Packs/day: 1 50    Types: Cigarettes    Last attempt to quit: 2021    Years since quittin 7   Smokeless Tobacco Never Used     Social History     Substance and Sexual Activity   Drug Use Never       Family History:  Family History   Problem Relation Age of Onset    Breast cancer Mother 67    COPD Mother     Coronary artery disease Mother     Coronary artery disease Father     Stroke Father     Lung cancer Sister     Breast cancer Maternal Grandmother     Colon cancer Paternal Grandfather     No Known Problems Maternal Aunt     No Known Problems Maternal Aunt     No Known Problems Maternal Aunt     No Known Problems Paternal Aunt     Breast cancer Cousin        Physical Exam:     Vitals:   Blood Pressure: 109/67 (22)  Pulse: 93 (22)  Temperature: 98 3 °F (36 8 °C) (22)  Respirations: 15 (22)  SpO2: 93 % (22)    Physical Exam  Vitals and nursing note reviewed  Constitutional:       General: She is not in acute distress  Appearance: She is well-developed  She is obese  She is ill-appearing  HENT:      Head: Normocephalic and atraumatic  Nose: Nose normal  No congestion or rhinorrhea  Mouth/Throat:      Mouth: Mucous membranes are dry  Pharynx: Oropharynx is clear  No oropharyngeal exudate or posterior oropharyngeal erythema  Eyes:      General: No scleral icterus  Right eye: No discharge  Left eye: No discharge  Extraocular Movements: Extraocular movements intact  Conjunctiva/sclera: Conjunctivae normal       Pupils: Pupils are equal, round, and reactive to light  Cardiovascular:      Rate and Rhythm: Regular rhythm  Tachycardia present  Pulses: Normal pulses  Heart sounds: Normal heart sounds  No murmur heard  No friction rub  No gallop  Pulmonary:      Effort: Pulmonary effort is normal  Tachypnea present  No respiratory distress  Breath sounds: Normal breath sounds  No wheezing, rhonchi or rales  Abdominal:      General: Abdomen is flat  Bowel sounds are normal  There is no distension  Palpations: Abdomen is soft  Tenderness: There is no abdominal tenderness  There is no guarding or rebound  Musculoskeletal:      Cervical back: Normal range of motion and neck supple  No rigidity or tenderness  Right lower leg: No edema  Left lower leg: No edema  Skin:     General: Skin is warm and dry  Capillary Refill: Capillary refill takes 2 to 3 seconds  Neurological:      General: No focal deficit present  Mental Status: She is alert and oriented to person, place, and time  Mental status is at baseline  Cranial Nerves: No cranial nerve deficit  Sensory: No sensory deficit  Motor: No weakness     Psychiatric:         Mood and Affect: Mood normal          Behavior: Behavior normal           Additional Data:     Lab Results:  Results from last 7 days   Lab Units 07/20/22  1612   WBC Thousand/uL 6 80   HEMOGLOBIN g/dL 11 2*   HEMATOCRIT % 35 0   PLATELETS Thousands/uL 178   NEUTROS PCT % 59   LYMPHS PCT % 31   MONOS PCT % 9   EOS PCT % 1     Results from last 7 days   Lab Units 07/20/22  1612   SODIUM mmol/L 140   POTASSIUM mmol/L 4 4   CHLORIDE mmol/L 100   CO2 mmol/L 32   BUN mg/dL 14   CREATININE mg/dL 1 58*   ANION GAP mmol/L 8   CALCIUM mg/dL 9 0   ALBUMIN g/dL 3 5   TOTAL BILIRUBIN mg/dL 0 22   ALK PHOS U/L 63 4   ALT U/L 23   AST U/L 12   GLUCOSE RANDOM mg/dL 95     Results from last 7 days   Lab Units 07/20/22  1948   INR  1 03             Results from last 7 days   Lab Units 07/20/22 1948 07/20/22  1612   LACTIC ACID mmol/L 1 4  --    PROCALCITONIN ng/ml  --  0 10       Imaging: Reviewed radiology reports from this admission including: chest xray and chest CT scan  CTA ED chest PE Study   Final Result by Gabe Kinney MD (07/20 1945)      No pulmonary embolus  Redemonstration of mild bilateral upper lobe predominant centrilobular nodules compatible with respiratory bronchiolitis, a smoking-related lung disease  Workstation performed: [id number]         XR chest portable   ED Interpretation by Alyssa Burgos MD (07/20 1615)   Abnormal   Left lower lung field opacity, pneumonia vs atelectasis? Final Result by Gabe Kinney MD (07/20 1622)      Mild opacity in the left costophrenic sulcus, at least in part due to linear atelectasis and pericardial fat pad  Underlying pneumonia cannot be excluded in the appropriate clinical setting  Workstation performed: [id number]             EKG and Other Studies Reviewed on Admission:   · EKG: Sinus Tachycardia  HR 98     ** Please Note: This note has been constructed using a voice recognition system   **

## 2022-07-21 NOTE — ASSESSMENT & PLAN NOTE
· Sepsis due to possible UTI    Patient does also report diarrhea but no evidence of intra-abdominal infection and diarrhea has been chronic  · On admission SIRS criteria was met with tachycardia and tachypnea can also consider noninfectious source related to volume depletion  · Lactic acid on admission was normal, unfortunate elevated on repeat, consider due to hypoxia which is suspected to be chronic in setting of COPD, also could be related to metformin therapy  · Follow-up infectious workup, urine and blood cultures  · Continue empiric treatment with ceftriaxone  · Trend lactic acid to normalization

## 2022-07-21 NOTE — ASSESSMENT & PLAN NOTE
· Patient found at nephrology appointment to have blood pressure of 100/50  Also reports generalized weakness, lightheadedness  Tachycardic upon presentation  · Patient has not been adequately hydrating in the setting of chronic diarrhea at home  She states she only drinks half a bottle of Gatorade a day    · Blood pressure now improved to 139/71 after administration of 1 25 L IV fluids in the ED  · Will continue with isolate 100 mL/hr  · Monitor blood pressure trend

## 2022-07-21 NOTE — RESPIRATORY THERAPY NOTE
RT Protocol Note  Nirmala Carlos 58 y o  female MRN: [id number]  Unit/Bed#: 409-01 Encounter: [id number]    Assessment    Principal Problem:    Sepsis without acute organ dysfunction [facility])  Active Problems:    Gastroesophageal reflux disease with esophagitis    Type 2 diabetes mellitus with hyperglycemia, without long-term current use of insulin (HCC)    FAM (acute kidney injury) ([place] [address] )    Cigarette nicotine dependence without complication    Hypotension due to hypovolemia    Chronic obstructive pulmonary disease with acute exacerbation ([facility])    Acute cystitis without hematuria      Home Pulmonary Medications: On patients med list it is listed Spiriva daily and prn Alb neb/Vent MDI  Patient stated she doesn't use her breathing medications   She does not have pap therapy, she cant tolerate the therapy, she has oxygen therapy 4 lpm nasal cannula HS  Home Devices/Therapy: Home O2    Past Medical History:   Diagnosis Date    Cancer ([place] [address] )     right breast    Chronic back pain     Colitis     COPD (chronic obstructive pulmonary disease) ([place] [address] )     Depression     Diabetes mellitus ([place] [address] )     DVT of lower limb, acute ([place] [address] )     GERD (gastroesophageal reflux disease)     Hyperlipidemia     Pneumonia     Rapid heart rate     Sleep apnea     Stroke ([place] [address] )     4 mini strokes     Social History     Socioeconomic History    Marital status: Legally      Spouse name: None    Number of children: None    Years of education: None    Highest education level: None   Occupational History    None   Tobacco Use    Smoking status: Current Every Day Smoker     Packs/day: 1 50     Types: Cigarettes     Last attempt to quit: 2021     Years since quittin 7    Smokeless tobacco: Never Used   Vaping Use    Vaping Use: Never used   Substance and Sexual Activity    Alcohol use: Never    Drug use: Never    Sexual activity: Not Currently   Other Topics Concern    None   Social History Narrative    None Social Determinants of Health     Financial Resource Strain: Not on file   Food Insecurity: Not on file   Transportation Needs: No Transportation Needs    Lack of Transportation (Medical): No    Lack of Transportation (Non-Medical): No   Physical Activity: Not on file   Stress: Not on file   Social Connections: Not on file   Intimate Partner Violence: Not on file   Housing Stability: Not on file       Subjective    Subjective Data: I informed patient that she does have a prn Albuteral treatment ordered if she felt she was wheezing or sob, she stated she was not sob at this time    Objective    Physical Exam:   Assessment Type: During-treatment  General Appearance: Sleeping, Drowsy  Respiratory Pattern: Dyspnea with exertion  Chest Assessment: Chest expansion symmetrical, Trachea midline  Bilateral Breath Sounds: Diminished  Cough: Non-productive, Congested  O2 Device: 4 lpm    Vitals:  Blood pressure 109/67, pulse 90, temperature 98 3 °F (36 8 °C), resp  rate 20, SpO2 94 %  Imaging and other studies: I have personally reviewed pertinent reports  O2 Device: 4 lpm     Plan    Respiratory Plan: Home Bronchodilator Patient pathway  Airway Clearance Plan: Flutter     Resp Comments: Patient received in her room, she was on 4 lpm nasal cannula, she was sleeping, she was not in respiratory distress  It took a little while to wake her, she seemed a little annoyed about being woken  She did confirm that she uses 4 lpm nasal cannula at night, she doesn't use pap therapy  She stated at this time she doesn't use her respiratory medications, and then she stated I want a cigarette and laughed  Cough on commad seems a little congested, I have instructed and educated patient on the use of the flutter valve to help promote better bronchial hygiene, to be used Q1H   She gave a good return demonstration, cough was congested, npc

## 2022-07-21 NOTE — UTILIZATION REVIEW
Initial Clinical Review    Admission: Date/Time/Statement:   Admission Orders (From admission, onward)     Ordered        07/20/22 2014  INPATIENT ADMISSION  Once                      Orders Placed This Encounter   Procedures    INPATIENT ADMISSION     Standing Status:   Standing     Number of Occurrences:   1     Order Specific Question:   Level of Care     Answer:   Med Surg [16]     Order Specific Question:   Estimated length of stay     Answer:   More than 2 Midnights     Order Specific Question:   Certification     Answer:   I certify that inpatient services are medically necessary for this patient for a duration of greater than two midnights  See H&P and MD Progress Notes for additional information about the patient's course of treatment  ED Arrival Information     Expected   -    Arrival   7/20/2022 15:13    Acuity   Emergent            Means of arrival   Walk-In    Escorted by   Family Member    Service   Hospitalist    Admission type   Emergency            Arrival complaint   Diarrhea; Dehydrated           Chief Complaint   Patient presents with    Weakness - Generalized     Sent in by dr Augusta Monique due to feeling lightheaded, tachycardic and hypotensive  Pt complains of generalized weakness         Initial Presentation: 58 y o  female presented to the ED with generalized weakness, hypotension times 1 week  The patient states for the past the 2 months she has been dealing with chronic diarrhea  She has been following a GI for this to schedule a colonoscopy for her which is not yet been completed  She states that over the past several days she has felt increasingly lightheaded and weak  She states nothing seems to make this better or worse  She does state that she struggles to get around due to her weakness  She was seen by Nephrology today in the office who or concern for hypotension in the setting of dehydration and sent her to the ED for further workup   She does report lightheadedness and dizziness when she stands up  Upon presentation, she was found to have blood pressure 100/50  After volume repletion with 1 2 L of IV fluids, blood pressure improved to 139/71  The patient was found to have a UTI on urinalysis  She was also found to meet sepsis criteria for tachycardia, tachypnea  She also reports increased shortness of breath since arrival to the ED  She states that she gets this when she walks outside with the heat and humidity  PMH: breast cancer, colitis, COPD, depression, DM, DVT, GERD, HLD, stroke  PE: tachypnea and tachycardia  Plan: Inpatient admission for evaluation and treatment of sepsis, acute cystitis, COPD with acute exacerbation, hypotension due to hypovolemia, FAM with creatinine of 1 58(baseline 1 20): IV ceftriaxone, urine and blood cultures sent, IV solu-medrol q 8 hrs, IV fluids, renal diet, monitor BMP  Date: 7/21   Day 2:     Internal Medicine: follow up urine and blood cultures, continue IV ceftriaxone, trend lactic acid, repeat stool studies, continue IV fluids   Pt is currently on O2 4L NC, wean off as able to keep oxygen saturations > 88%    ED Triage Vitals   Temperature Pulse Respirations Blood Pressure SpO2   07/20/22 1539 07/20/22 1539 07/20/22 1539 07/20/22 1539 07/20/22 1539   (!) 97 2 °F (36 2 °C) (!) 112 20 104/61 (!) 83 %      Temp Source Heart Rate Source Patient Position - Orthostatic VS BP Location FiO2 (%)   07/21/22 0717 07/20/22 1630 07/21/22 0717 07/21/22 0717 --   Oral Monitor Lying Right arm       Pain Score       07/20/22 2118       No Pain          Wt Readings from Last 1 Encounters:   07/21/22 102 kg (223 lb 15 8 oz)     Additional Vital Signs:     Date/Time Temp Pulse Resp BP MAP (mmHg) SpO2 Calculated FIO2 (%) - Nasal Cannula Nasal Cannula O2 Flow Rate (L/min) O2 Device   07/21/22 07:17:10 98 5 °F (36 9 °C) 98 18 131/79 96 91 % -- -- None (Room air)   07/20/22 2233 -- 90 20 -- -- 94 % 36 4 L/min Nasal cannula   07/20/22 2118 -- 87 -- -- -- 93 % 36 4 L/min Nasal cannula   07/20/22 20:56:56 98 3 °F (36 8 °C) 93 15 109/67 81 93 % -- -- --   07/20/22 2030 -- 91 22 139/71 97 93 % -- -- --   07/20/22 2000 -- 92 19 133/78 98 94 % -- -- --   07/20/22 1930 -- 91 22 137/75 100 94 % 36 4 L/min Nasal cannula   07/20/22 1830 -- 92 -- 119/56 80 93 % -- -- --   07/20/22 1815 -- 96 -- -- -- 92 % -- -- --   07/20/22 1800 -- 96 20 112/54 77 87 % Abnormal  -- -- --   07/20/22 1745 -- 97 23 Abnormal  -- -- 86 % Abnormal  -- -- --   07/20/22 1730 -- 97 20 113/56 80 89 % Abnormal  -- -- --   07/20/22 1631 -- 94 -- -- -- 94 % -- -- --   07/20/22 1630 -- 95 20 105/55 76 93 % -- -- --       Pertinent Labs/Diagnostic Test Results:   CTA ED chest PE Study   Final Result by Raúl Ramírez MD (07/20 1945)      No pulmonary embolus  Redemonstration of mild bilateral upper lobe predominant centrilobular nodules compatible with respiratory bronchiolitis, a smoking-related lung disease  Workstation performed: [id number]         XR chest portable   ED Interpretation by Kenisha Small MD (07/20 1615)   Abnormal   Left lower lung field opacity, pneumonia vs atelectasis? Final Result by Raúl Ramírez MD (07/20 1622)      Mild opacity in the left costophrenic sulcus, at least in part due to linear atelectasis and pericardial fat pad  Underlying pneumonia cannot be excluded in the appropriate clinical setting              Workstation performed: [id number]           Results from last 7 days   Lab Units 07/20/22  1612   SARS-COV-2  Negative     Results from last 7 days   Lab Units 07/21/22  0544 07/20/22  1612   WBC Thousand/uL 8 69 6 80   HEMOGLOBIN g/dL 11 4* 11 2*   HEMATOCRIT % 35 4 35 0   PLATELETS Thousands/uL 194  156 178   NEUTROS ABS Thousands/µL 7 06 4 00         Results from last 7 days   Lab Units 07/21/22  0544 07/20/22  1612   SODIUM mmol/L 138 140   POTASSIUM mmol/L 4 9 4 4   CHLORIDE mmol/L 100 100   CO2 mmol/L 27 32 ANION GAP mmol/L 11 8   BUN mg/dL 11 14   CREATININE mg/dL 1 39* 1 58*   EGFR ml/min/1 73sq m 40 34   CALCIUM mg/dL 8 7 9 0     Results from last 7 days   Lab Units 07/21/22  0544 07/20/22  1612   AST U/L 15 12   ALT U/L 21 23   ALK PHOS U/L 54 63 4   TOTAL PROTEIN g/dL 6 9 6 9   ALBUMIN g/dL 3 4* 3 5   TOTAL BILIRUBIN mg/dL 0 25 0 22     Results from last 7 days   Lab Units 07/21/22  1105 07/21/22  0714   POC GLUCOSE mg/dl 231* 178*     Results from last 7 days   Lab Units 07/21/22  0544 07/20/22  1612   GLUCOSE RANDOM mg/dL 185* 95              Results from last 7 days   Lab Units 07/21/22  0544   PH CAMI  7 343   PCO2 CAMI mm Hg 52 3*   PO2 CAMI mm Hg 63 1*   HCO3 CAMI mmol/L 27 8   BASE EXC CAMI mmol/L 1 2   O2 CONTENT CAMI ml/dL 15 6   O2 HGB, VENOUS % 87 9*             Results from last 7 days   Lab Units 07/20/22  1948 07/20/22  1817 07/20/22  1612   HS TNI 0HR ng/L  --   --  5   HS TNI 2HR ng/L  --  5  --    HSTNI D2 ng/L  --  0  --    HS TNI 4HR ng/L 5  --   --    HSTNI D4 ng/L 0  --   --      Results from last 7 days   Lab Units 07/20/22  1754   D-DIMER QUANTITATIVE ug/ml FEU 0 90*     Results from last 7 days   Lab Units 07/20/22  1948   PROTIME seconds 13 7   INR  1 03   PTT seconds 27         Results from last 7 days   Lab Units 07/21/22  0544 07/20/22  1612   PROCALCITONIN ng/ml 0 07 0 10     Results from last 7 days   Lab Units 07/21/22  0913 07/21/22  0544 07/20/22  1948   LACTIC ACID mmol/L 3 4* 2 6* 1 4             Results from last 7 days   Lab Units 07/20/22  1612   NT-PRO BNP pg/mL 106 0           Results from last 7 days   Lab Units 07/20/22  1946   CLARITY UA  Clear   COLOR UA  Yellow   SPEC GRAV UA  1 010   PH UA  5 5   GLUCOSE UA mg/dl Negative   KETONES UA mg/dl Negative   BLOOD UA  Negative   PROTEIN UA mg/dl Negative   NITRITE UA  Negative   BILIRUBIN UA  Negative   UROBILINOGEN UA E U /dl 0 2   LEUKOCYTES UA  Moderate*   WBC UA /hpf 10-20*   RBC UA /hpf 0-1   BACTERIA UA /hpf Moderate* EPITHELIAL CELLS WET PREP /hpf Occasional     Results from last 7 days   Lab Units 07/20/22  1612   INFLUENZA A PCR  Negative   INFLUENZA B PCR  Negative   RSV PCR  Negative           Results from last 7 days   Lab Units 07/20/22  1949 07/20/22  1929   BLOOD CULTURE  Received in Microbiology Lab  Culture in Progress  Received in Microbiology Lab  Culture in Progress                 ED Treatment:   Medication Administration from 07/20/2022 1511 to 07/20/2022 2050       Date/Time Order Dose Route Action     07/20/2022 1616 sodium chloride 0 9 % bolus 1,000 mL 1,000 mL Intravenous New Bag     07/20/2022 1617 albuterol inhalation solution 5 mg 5 mg Nebulization Given     07/20/2022 1617 ipratropium (ATROVENT) 0 02 % inhalation solution 0 5 mg 0 5 mg Nebulization Given     07/20/2022 1910 iohexol (OMNIPAQUE) 350 MG/ML injection (MULTI-DOSE) 70 mL 70 mL Intravenous Given     07/20/2022 1954 sodium chloride 0 9 % bolus 250 mL 250 mL Intravenous New Bag     07/20/2022 2022 cefTRIAXone (ROCEPHIN) IVPB (premix in dextrose) 1,000 mg 50 mL 1,000 mg Intravenous New Bag        Past Medical History:   Diagnosis Date    Cancer ([place] [address] )     right breast    Chronic back pain     Colitis     [facility] obstructive [facility]) [facility])     Depression     Diabetes mellitus ([facility] )     DVT of lower limb, acute ([facility] ) 2004    GERD (gastroesophageal reflux disease)     Hyperlipidemia     Pneumonia     Rapid heart rate     Sleep apnea     Stroke ([place] [address] )     4 mini strokes     Present on Admission:   Gastroesophageal reflux disease with esophagitis   Type 2 diabetes mellitus with hyperglycemia, without long-term current use of insulin ([facility])   Cigarette nicotine dependence without complication      Admitting Diagnosis: Diarrhea [R19 7]  UTI (urinary tract infection) [N39 0]  Weakness [R53 1]  Tachycardia [R00 0]  Hypoxia [R09 02]  Age/Sex: 58 y o  female  Admission Orders:  Scheduled Medications:  amitriptyline, 50 mg, Oral, HS  aspirin, 81 mg, Oral, Daily  benztropine, 0 5 mg, Oral, HS  cefTRIAXone, 2,000 mg, Intravenous, Q24H  enoxaparin, 40 mg, Subcutaneous, Daily  fenofibrate, 48 mg, Oral, Daily  gabapentin, 600 mg, Oral, TID  insulin lispro, 1-6 Units, Subcutaneous, TID AC  methylPREDNISolone sodium succinate, 40 mg, Intravenous, Q8H  nicotine, 1 patch, Transdermal, Daily  pantoprazole, 40 mg, Oral, Early Morning  thiamine, 200 mg, Intravenous, Daily  traZODone, 150 mg, Oral, HS  umeclidinium bromide, 1 puff, Inhalation, Daily  vilazodone, 40 mg, Oral, Daily      Continuous IV Infusions:  multi-electrolyte, 100 mL/hr, Intravenous, Continuous      PRN Meds:  acetaminophen, 650 mg, Oral, Q6H PRN  albuterol, 2 puff, Inhalation, Q4H PRN  albuterol, 2 5 mg, Nebulization, Q4H PRN  clonazePAM, 0 5 mg, Oral, Daily PRN  cyclobenzaprine, 10 mg, Oral, Q12H PRN        None    Network Utilization Review Department  ATTENTION: Please call with any questions or concerns to [phone] and carefully listen to the prompts so that you are directed to the right person  All voicemails are confidential   Chary Bee all requests for admission clinical reviews, approved or denied determinations and any other requests to dedicated fax number below belonging to the campus where the patient is receiving treatment   List of dedicated fax numbers for the Facilities:  [address] DENIALS (Administrative/Medical Necessity) [phone]   [address] (Maternity/NICU/Pediatrics) [phone]   [address]  [phone]   Clifton Estrella 50 [facility] [facility] [address] [id number] [address] - [address] [facility] [phone]   Aster Middleton 37 [address] [place] [address] [phone]

## 2022-07-22 ENCOUNTER — TELEPHONE (OUTPATIENT)
Dept: ULTRASOUND IMAGING | Facility: HOSPITAL | Age: 62
End: 2022-07-22

## 2022-07-22 VITALS
HEIGHT: 67 IN | HEART RATE: 81 BPM | DIASTOLIC BLOOD PRESSURE: 80 MMHG | RESPIRATION RATE: 18 BRPM | BODY MASS INDEX: 35.02 KG/M2 | TEMPERATURE: 97.6 F | OXYGEN SATURATION: 92 % | SYSTOLIC BLOOD PRESSURE: 140 MMHG | WEIGHT: 223.11 LBS

## 2022-07-22 PROBLEM — N28.9 KIDNEY LESION: Status: ACTIVE | Noted: 2022-07-22

## 2022-07-22 LAB
ANION GAP SERPL CALCULATED.3IONS-SCNC: 11 MMOL/L (ref 4–13)
BACTERIA UR CULT: NORMAL
BASOPHILS # BLD AUTO: 0.01 THOUSANDS/ΜL (ref 0–0.1)
BASOPHILS NFR BLD AUTO: 0 % (ref 0–1)
BUN SERPL-MCNC: 19 MG/DL (ref 5–25)
CALCIUM SERPL-MCNC: 7.9 MG/DL (ref 8.3–10.1)
CHLORIDE SERPL-SCNC: 98 MMOL/L (ref 96–108)
CO2 SERPL-SCNC: 25 MMOL/L (ref 21–32)
CREAT SERPL-MCNC: 1.29 MG/DL (ref 0.6–1.3)
EOSINOPHIL # BLD AUTO: 0 THOUSAND/ΜL (ref 0–0.61)
EOSINOPHIL NFR BLD AUTO: 0 % (ref 0–6)
ERYTHROCYTE [DISTWIDTH] IN BLOOD BY AUTOMATED COUNT: 13.1 % (ref 11.6–15.1)
GFR SERPL CREATININE-BSD FRML MDRD: 44 ML/MIN/1.73SQ M
GLUCOSE SERPL-MCNC: 182 MG/DL (ref 65–140)
GLUCOSE SERPL-MCNC: 205 MG/DL (ref 65–140)
GLUCOSE SERPL-MCNC: 227 MG/DL (ref 65–140)
GLUCOSE SERPL-MCNC: 333 MG/DL (ref 65–140)
HCT VFR BLD AUTO: 32.3 % (ref 34.8–46.1)
HGB BLD-MCNC: 10.6 G/DL (ref 11.5–15.4)
IMM GRANULOCYTES # BLD AUTO: 0.11 THOUSAND/UL (ref 0–0.2)
IMM GRANULOCYTES NFR BLD AUTO: 1 % (ref 0–2)
LACTATE SERPL-SCNC: 2.4 MMOL/L (ref 0.5–2)
LACTATE SERPL-SCNC: 2.4 MMOL/L (ref 0.5–2)
LACTATE SERPL-SCNC: 3 MMOL/L (ref 0.5–2)
LYMPHOCYTES # BLD AUTO: 1.23 THOUSANDS/ΜL (ref 0.6–4.47)
LYMPHOCYTES NFR BLD AUTO: 9 % (ref 14–44)
MCH RBC QN AUTO: 31.7 PG (ref 26.8–34.3)
MCHC RBC AUTO-ENTMCNC: 32.8 G/DL (ref 31.4–37.4)
MCV RBC AUTO: 97 FL (ref 82–98)
MONOCYTES # BLD AUTO: 0.71 THOUSAND/ΜL (ref 0.17–1.22)
MONOCYTES NFR BLD AUTO: 5 % (ref 4–12)
NEUTROPHILS # BLD AUTO: 11.09 THOUSANDS/ΜL (ref 1.85–7.62)
NEUTS SEG NFR BLD AUTO: 85 % (ref 43–75)
NRBC BLD AUTO-RTO: 0 /100 WBCS
PLATELET # BLD AUTO: 142 THOUSANDS/UL (ref 149–390)
PMV BLD AUTO: 11.3 FL (ref 8.9–12.7)
POTASSIUM SERPL-SCNC: 4 MMOL/L (ref 3.5–5.3)
RBC # BLD AUTO: 3.34 MILLION/UL (ref 3.81–5.12)
SODIUM SERPL-SCNC: 134 MMOL/L (ref 135–147)
WBC # BLD AUTO: 13.15 THOUSAND/UL (ref 4.31–10.16)

## 2022-07-22 PROCEDURE — 94668 MNPJ CHEST WALL SBSQ: CPT

## 2022-07-22 PROCEDURE — 82948 REAGENT STRIP/BLOOD GLUCOSE: CPT

## 2022-07-22 PROCEDURE — 85025 COMPLETE CBC W/AUTO DIFF WBC: CPT | Performed by: FAMILY MEDICINE

## 2022-07-22 PROCEDURE — 83605 ASSAY OF LACTIC ACID: CPT | Performed by: FAMILY MEDICINE

## 2022-07-22 PROCEDURE — 80048 BASIC METABOLIC PNL TOTAL CA: CPT | Performed by: FAMILY MEDICINE

## 2022-07-22 PROCEDURE — 99239 HOSP IP/OBS DSCHRG MGMT >30: CPT | Performed by: FAMILY MEDICINE

## 2022-07-22 RX ORDER — CEFUROXIME AXETIL 500 MG/1
500 TABLET ORAL EVERY 12 HOURS SCHEDULED
Qty: 10 TABLET | Refills: 0 | Status: SHIPPED | OUTPATIENT
Start: 2022-07-22 | End: 2022-07-27

## 2022-07-22 RX ORDER — NICOTINE 21 MG/24HR
1 PATCH, TRANSDERMAL 24 HOURS TRANSDERMAL DAILY
Qty: 28 PATCH | Refills: 0 | Status: SHIPPED | OUTPATIENT
Start: 2022-07-23

## 2022-07-22 RX ORDER — PREDNISONE 20 MG/1
40 TABLET ORAL DAILY
Qty: 10 TABLET | Refills: 0 | Status: SHIPPED | OUTPATIENT
Start: 2022-07-22 | End: 2022-07-27

## 2022-07-22 RX ADMIN — GABAPENTIN 600 MG: 300 CAPSULE ORAL at 15:53

## 2022-07-22 RX ADMIN — METHYLPREDNISOLONE SODIUM SUCCINATE 40 MG: 40 INJECTION, POWDER, FOR SOLUTION INTRAMUSCULAR; INTRAVENOUS at 05:20

## 2022-07-22 RX ADMIN — INSULIN LISPRO 2 UNITS: 100 INJECTION, SOLUTION INTRAVENOUS; SUBCUTANEOUS at 12:54

## 2022-07-22 RX ADMIN — SODIUM CHLORIDE, SODIUM GLUCONATE, SODIUM ACETATE, POTASSIUM CHLORIDE, MAGNESIUM CHLORIDE, SODIUM PHOSPHATE, DIBASIC, AND POTASSIUM PHOSPHATE 100 ML/HR: .53; .5; .37; .037; .03; .012; .00082 INJECTION, SOLUTION INTRAVENOUS at 05:18

## 2022-07-22 RX ADMIN — ASPIRIN 81 MG: 81 TABLET, COATED ORAL at 08:11

## 2022-07-22 RX ADMIN — NICOTINE 1 PATCH: 21 PATCH, EXTENDED RELEASE TRANSDERMAL at 08:08

## 2022-07-22 RX ADMIN — FENOFIBRATE 48 MG: 48 TABLET ORAL at 08:11

## 2022-07-22 RX ADMIN — GABAPENTIN 600 MG: 300 CAPSULE ORAL at 08:10

## 2022-07-22 RX ADMIN — THIAMINE HYDROCHLORIDE 200 MG: 100 INJECTION, SOLUTION INTRAMUSCULAR; INTRAVENOUS at 08:13

## 2022-07-22 RX ADMIN — INSULIN LISPRO 2 UNITS: 100 INJECTION, SOLUTION INTRAVENOUS; SUBCUTANEOUS at 15:59

## 2022-07-22 RX ADMIN — INSULIN LISPRO 1 UNITS: 100 INJECTION, SOLUTION INTRAVENOUS; SUBCUTANEOUS at 08:05

## 2022-07-22 RX ADMIN — UMECLIDINIUM 1 PUFF: 62.5 AEROSOL, POWDER ORAL at 08:12

## 2022-07-22 RX ADMIN — VILAZODONE HYDROCHLORIDE 40 MG: 20 TABLET ORAL at 08:11

## 2022-07-22 RX ADMIN — ENOXAPARIN SODIUM 40 MG: 40 INJECTION SUBCUTANEOUS at 08:09

## 2022-07-22 RX ADMIN — PANTOPRAZOLE SODIUM 40 MG: 40 TABLET, DELAYED RELEASE ORAL at 05:20

## 2022-07-22 RX ADMIN — METHYLPREDNISOLONE SODIUM SUCCINATE 40 MG: 40 INJECTION, POWDER, FOR SOLUTION INTRAMUSCULAR; INTRAVENOUS at 12:54

## 2022-07-22 NOTE — PLAN OF CARE
Problem: DISCHARGE PLANNING - CARE MANAGEMENT  Goal: Discharge to post-acute care or home with appropriate resources  Description: INTERVENTIONS:  - Conduct assessment to determine patient/family and health care team treatment goals, and need for post-acute services based on payer coverage, community resources, and patient preferences, and barriers to discharge  - Address psychosocial, clinical, and financial barriers to discharge as identified in assessment in conjunction with the patient/family and health care team  - Arrange appropriate level of post-acute services according to patient's   needs and preference and payer coverage in collaboration with the physician and health care team  - Communicate with and update the patient/family, physician, and health care team regarding progress on the discharge plan  - Arrange appropriate transportation to post-acute venues  Outcome: Progressing

## 2022-07-22 NOTE — ASSESSMENT & PLAN NOTE
· Patient presents with a creatinine 1 58 with baseline of 1-1 2  · Most likely due to decreased p o   Intake over the past several days due to malaise as well as dehydration from chronic diarrhea which patient reports had improved and got worse again  · Resolved with IV fluids  · Patient stable for discharge

## 2022-07-22 NOTE — ASSESSMENT & PLAN NOTE
· Presents with respiratory failure SO2 in low 80s on room air, patient was tachycardic with shortness of breath  She states that when she walks outside in the heat she feels increasingly short of breath and feels as if she is wheezing  · CTA of the chest showed no evidence of pulmonary embolism  Questionable for pneumonia verses atelectasis  Procalcitonin was within normal limits as well as white blood cells  No productive cough, fevers, chills    · Procalcitonin normal  · Leukocytosis today likely related to steroid therapy, patient clinically improved  · Patient's respiratory failure has resolved, she is on room air  · Complete course of prednisone

## 2022-07-22 NOTE — ASSESSMENT & PLAN NOTE
Suspect due to mild COPD exacerbation  The patient does use oxygen at night but not during the day  Her oxygen saturations were 82-83% on room air, she is currently on 4 L nasal cannula  Wean off as able to keep oxygen saturations above 88%    Patient is able to be weaned off to room air  Recommend smoking cessation  Complete course of prednisone for COPD exacerbation

## 2022-07-22 NOTE — NURSING NOTE
AVS printed and reviewed with patient including activity level, diet, follow-up appointments, medication regimen, e-prescribed meds, information about COPD, information about diabetes, smoking cessation/VTE prevention and national suicide prevention hotline  IV discontinued  1600 dose of gabapentin administered  Accucheck- 205, 2 units of Humalog insulin administered per order with the patient understanding that she needs to eat dinner  Patient stated her understanding  Patient ambulated with brother to elevator and accompanied by Leroy De La Paz to the main exit  Patient discharged

## 2022-07-22 NOTE — ASSESSMENT & PLAN NOTE
Patient reports subacute to chronic history of diarrhea however recently worse    Reports 4-5 watery bowel movements daily  Recently tested for ova and parasites and stool PCR at end of June  Patient's diarrhea has resolved, consider noninfectious etiology, possibly related to metformin, recommend to hold the medication and monitor symptoms  Patient is already scheduled to follow-up with GI

## 2022-07-22 NOTE — ASSESSMENT & PLAN NOTE
· Possible sepsis due to suspected UTI  Patient does also report diarrhea but no evidence of intra-abdominal infection and diarrhea has been chronic  · On admission SIRS criteria was met with tachycardia and tachypnea can also consider noninfectious source related to volume depletion  · Lactic acid on admission was normal, unfortunate elevated on repeat, consider related to metformin  Patient with significant clinical improvement and is discharged on course of Ceftin  Urine cultures indeterminate    · Patient to follow-up with PCP on short-term basis

## 2022-07-22 NOTE — ASSESSMENT & PLAN NOTE
· Patient found at nephrology appointment to have blood pressure of 100/50  Also reports generalized weakness, lightheadedness  Tachycardic upon presentation  · Patient has not been adequately hydrating in the setting of chronic diarrhea at home  She states she only drinks half a bottle of Gatorade a day    · Resolved status post volume expansion

## 2022-07-22 NOTE — DISCHARGE SUMMARY
[address] [facility] [address]  Discharge- Michael Gomez 1960, 58 y o  female MRN: [id number]  Unit/Bed#: 409-01 Encounter: [id number]  Primary Care Provider: Denilson Oneill PA-C   Date and time admitted to hospital: 7/20/2022  3:30 PM    * Sepsis due to other etiology [facility])  Assessment & Plan  · Possible sepsis due to suspected UTI  Patient does also report diarrhea but no evidence of intra-abdominal infection and diarrhea has been chronic  · On admission SIRS criteria was met with tachycardia and tachypnea can also consider noninfectious source related to volume depletion  · Lactic acid on admission was normal, unfortunate elevated on repeat, consider related to metformin  Patient with significant clinical improvement and is discharged on course of Ceftin  Urine cultures indeterminate  · Patient to follow-up with PCP on short-term basis    Kidney lesion  Assessment & Plan  CT of abdomen and pelvis revealed the following findings:  "Hyperdense focus in the left kidney lower pole with adjacent stranding and trace fluid   Possible hemorrhagic cyst or focal pyelonephritis   However bilateral perinephric stranding seen and may be chronic   Correlate with renal function   Ultrasound evaluation as indicated clinically  "  The patient is willing well at the time of the discharge  She was discharged on a course of antibiotics  Diarrhea of presumed infectious origin  Assessment & Plan  Patient reports subacute to chronic history of diarrhea however recently worse  Reports 4-5 watery bowel movements daily  Recently tested for ova and parasites and stool PCR at end of June  Patient's diarrhea has resolved, consider noninfectious etiology, possibly related to metformin, recommend to hold the medication and monitor symptoms  Patient is already scheduled to follow-up with GI      Acute respiratory failure with hypoxia [facility])  Assessment & Plan  Suspect due to mild COPD exacerbation    The patient does use oxygen at night but not during the day  Her oxygen saturations were 82-83% on room air, she is currently on 4 L nasal cannula  Wean off as able to keep oxygen saturations above 88%  Patient is able to be weaned off to room air  Recommend smoking cessation  Complete course of prednisone for COPD exacerbation    Acute cystitis without hematuria  Assessment & Plan  · Patient reports decreased urination over the past several weeks  She states that this may be due to dehydration  Denies dysuria, hematuria  · Found on urinalysis to have elevated white blood cells, bacteria suggestive of UTI  · Urine culture mixed contaminants, nondiagnostic  · Due to renal lesion on ultrasound with possible pyelonephritis will complete course of antibiotics    Chronic obstructive pulmonary disease with acute exacerbation (HCC)  Assessment & Plan  · Presents with respiratory failure SO2 in low 80s on room air, patient was tachycardic with shortness of breath  She states that when she walks outside in the heat she feels increasingly short of breath and feels as if she is wheezing  · CTA of the chest showed no evidence of pulmonary embolism  Questionable for pneumonia verses atelectasis  Procalcitonin was within normal limits as well as white blood cells  No productive cough, fevers, chills  · Procalcitonin normal  · Leukocytosis today likely related to steroid therapy, patient clinically improved  · Patient's respiratory failure has resolved, she is on room air  · Complete course of prednisone    Hypotension due to hypovolemia  Assessment & Plan  · Patient found at nephrology appointment to have blood pressure of 100/50  Also reports generalized weakness, lightheadedness  Tachycardic upon presentation  · Patient has not been adequately hydrating in the setting of chronic diarrhea at home  She states she only drinks half a bottle of Gatorade a day    · Resolved status post volume expansion    Cigarette nicotine dependence without complication  Assessment & Plan  · Nicotine patch ordered  · Smoking cessation counseling provided, unfortunately patient is not interested in quitting smoking at this time    FAM (acute kidney injury) [facility])  Assessment & Plan  · Patient presents with a creatinine 1 58 with baseline of 1-1 2  · Most likely due to decreased p o  Intake over the past several days due to malaise as well as dehydration from chronic diarrhea which patient reports had improved and got worse again  · Resolved with IV fluids  · Patient stable for discharge    Type 2 diabetes mellitus with hyperglycemia, without long-term current use of insulin [facility])  Assessment & Plan  Lab Results   Component Value Date    HGBA1C 6 6 (A) 03/28/2022       Recent Labs     07/21/22  1535 07/21/22  2058 07/22/22  0725 07/22/22  1110   POCGLU 187* 259* 182* 227*       Blood Sugar Average: Last 72 hrs:  · (P) 422 [id number] blood glucose 95 on admission  · Patient takes metformin 1000 mg oral b i d , Victoza injection 1 2 mg subQ daily  · Due to recurrent GI symptoms, lactic acidosis, with GI symptoms now resolved off of metformin, would recommend discontinuation of the medication for now  · Follow-up with PCP      Gastroesophageal reflux disease with esophagitis  Assessment & Plan  · Continue Protonix 40 mg oral daily      Medical Problems             Resolved Problems  Date Reviewed: 7/22/2022   None               Discharging Physician / Practitioner: Marina Byrd MD  PCP: Carey Wells PA-C  Admission Date:   Admission Orders (From admission, onward)     Ordered        07/20/22 2014  INPATIENT ADMISSION  Once                      Discharge Date: 07/22/22    Consultations During Hospital Stay:  · Case management    Procedures Performed:   CT abdomen pelvis wo contrast   Final Result by Jim Guadarrama MD (07/21 4976)      1  Hyperdense focus in the left kidney lower pole with adjacent stranding and trace fluid    Possible hemorrhagic cyst or focal pyelonephritis  However bilateral perinephric stranding seen and may be chronic  Correlate with renal function  Ultrasound    evaluation as indicated clinically  2   Left hepatic lobe hypodensity along the capsule correlates with previously suspected hemangioma  This can be evaluated with ultrasound  Workstation performed: [id number]         CTA ED chest PE Study   Final Result by Sancho Dhaliwal MD (07/20 1945)      No pulmonary embolus  Redemonstration of mild bilateral upper lobe predominant centrilobular nodules compatible with respiratory bronchiolitis, a smoking-related lung disease  Workstation performed: [id number]         XR chest portable   ED Interpretation by Rick Pascual MD (07/20 1615)   Abnormal   Left lower lung field opacity, pneumonia vs atelectasis? Final Result by Sancho Dhaliwal MD (07/20 1622)      Mild opacity in the left costophrenic sulcus, at least in part due to linear atelectasis and pericardial fat pad  Underlying pneumonia cannot be excluded in the appropriate clinical setting  Workstation performed: [id number]         US kidney and bladder    (Results Pending)         Significant Findings / Test Results:   Results from last 7 days   Lab Units 07/22/22  1422   WBC Thousand/uL 13 15*   HEMOGLOBIN g/dL 10 6*   HEMATOCRIT % 32 3*   PLATELETS Thousands/uL 142*     Results from last 7 days   Lab Units 07/22/22  1422   SODIUM mmol/L 134*   POTASSIUM mmol/L 4 0   CHLORIDE mmol/L 98   CO2 mmol/L 25   BUN mg/dL 19   CREATININE mg/dL 1 29   CALCIUM mg/dL 7 9*         Incidental Findings:   · None     Test Results Pending at Discharge (will require follow up):    · None      Outpatient Tests Requested:  · None    Complications:  None     Reason for Admission:  Shortness of breath, sent by PCP    Hospital Course:   Jann Kaiser is a 58 y o  female patient who originally presented to the hospital on 7/20/2022 due to shortness of breath, sent by PCP for concern for dehydration  The patient was admitted with diagnosis of SIRS from possible sepsis due to UTI, FAM, and respiratory failure due to COPD exacerbation  The patient was treated with IV fluids and steroids as well as antibiotics with overall significant clinical improvement  She was deemed safe for discharge with close outpatient follow-up with PCP  Please see above list of diagnoses and related plan for additional information  Condition at Discharge: stable    Discharge Day Visit / Exam:   Subjective:  Patient seen and examined  She reports feeling well and is requesting to be discharged home  No complaints  Vitals: Blood Pressure: 140/80 (07/22/22 1516)  Pulse: 81 (07/22/22 1516)  Temperature: 97 6 °F (36 4 °C) (07/22/22 0728)  Temp Source: Oral (07/22/22 0728)  Respirations: 18 (07/22/22 1516)  Height: 5' 7" (170 2 cm) (07/20/22 2118)  Weight - Scale: 101 kg (223 lb 1 7 oz) (07/22/22 0549)  SpO2: 92 % (07/22/22 1516)  Exam:   Physical Exam  Constitutional:       General: She is not in acute distress  HENT:      Head: Normocephalic and atraumatic  Nose: No congestion  Eyes:      Conjunctiva/sclera: Conjunctivae normal    Cardiovascular:      Rate and Rhythm: Normal rate and regular rhythm  Heart sounds: No murmur heard  Pulmonary:      Effort: No respiratory distress  Breath sounds: Wheezing (mild scattered expiratory wheezes) present  No rales  Abdominal:      General: There is no distension  Tenderness: There is no abdominal tenderness  There is no guarding  Musculoskeletal:      Right lower leg: No edema  Left lower leg: No edema  Skin:     General: Skin is warm and dry  Neurological:      Mental Status: She is oriented to person, place, and time  Psychiatric:         Mood and Affect: Mood normal           Discussion with Family: Patient        Discharge instructions/Information to patient and family:   See after visit summary for information provided to patient and family  Provisions for Follow-Up Care:  See after visit summary for information related to follow-up care and any pertinent home health orders  Disposition:   Home    Planned Readmission: No     Discharge Statement:  I spent 35 minutes discharging the patient  This time was spent on the day of discharge  I had direct contact with the patient on the day of discharge  Greater than 50% of the total time was spent examining patient, answering all patient questions, arranging and discussing plan of care with patient as well as directly providing post-discharge instructions  Additional time then spent on discharge activities  Discharge Medications:  See after visit summary for reconciled discharge medications provided to patient and/or family        **Please Note: This note may have been constructed using a voice recognition system**

## 2022-07-22 NOTE — TELEPHONE ENCOUNTER
Spoke with АЛЕКСАНДР Henderson, on 4th floor  Patient does not wish to have US Kidney and Bladder done before she is discharged  She states that she will follow up with outpatient physician

## 2022-07-22 NOTE — ASSESSMENT & PLAN NOTE
Lab Results   Component Value Date    HGBA1C 6 6 (A) 03/28/2022       Recent Labs     07/21/22  1535 07/21/22  2058 07/22/22  0725 07/22/22  1110   POCGLU 187* 259* 182* 227*       Blood Sugar Average: Last 72 hrs:  · (P) 386 [id number] blood glucose 95 on admission  · Patient takes metformin 1000 mg oral b i d , Victoza injection 1 2 mg subQ daily  · Due to recurrent GI symptoms, lactic acidosis, with GI symptoms now resolved off of metformin, would recommend discontinuation of the medication for now  · Follow-up with PCP

## 2022-07-22 NOTE — RESPIRATORY THERAPY NOTE
Pt doing flutter with good technique, encouraged cont use,  no further instructions needed from therapist    07/22/22 0909   Chest Physiotherapy Tx   $ Chest Physiotherapy (CPT)  Yes   CPT Delivery Source Sony   CPT Duration 10   CPT Chest Site Full range   Breath Sounds Pre-Treatment Bilateral Diminished   Breath Sounds Post-Treatment Bilateral Diminished   Cough Non-productive;Moist;Strong   Position Semi Fitzgerald's   CPT Treatment Tolerance Tolerated well

## 2022-07-22 NOTE — CASE MANAGEMENT
Case Management Discharge Planning Note    Patient name Minerva Smith  Location [address] [id number] MRN [id number]  : 1960 Date 2022       Current Admission Date: 2022  Current Admission Diagnosis:Sepsis due to other etiology [facility])   Patient Active Problem List    Diagnosis Date Noted    [facility] with [facility] ) 2022    Diarrhea of presumed infectious origin 2022    Volume depletion 2022    Hypotension due to hypovolemia 2022    Tachycardia 2022    Stage 3a chronic kidney disease ([facility] ) 2022    Sepsis due to other etiology ([facility] ) 2022    Chronic obstructive pulmonary disease with acute exacerbation ([facility] ) 2022    Acute cystitis without hematuria 2022    Peripheral arterial disease ([facility] ) 2021    Tobacco abuse 2021    Primary cancer of upper outer quadrant of right breast ([facility] ) 10/27/2021    Malignant neoplasm of upper-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, estrogen receptor positive ([facility] ) 10/27/2021    Mucinous carcinoma of breast, right ([facility] ) 10/27/2021    Family history of heart disease 10/12/2021    Pulmonary hypertension ([facility] ) 10/12/2021    Chest pain 10/12/2021    Obstructive sleep apnea syndrome 2021    Sleep apnea     Breast nodule 2021    MARQUEZ (dyspnea on exertion) 2021    Chronic hypoxemic respiratory failure (HCC) 2021    Cigarette nicotine dependence without complication     Class 1 obesity due to excess calories with serious comorbidity and body mass index (BMI) of 33 0 to 33 9 in adult 2021    Insect bite of ear, infected, left, initial encounter 2020    Stage 2 chronic kidney disease 07/10/2020    FAM (acute kidney injury) ([facility] ) 07/10/2020    Pulmonary nodule seen on imaging study 2017    Chronic hoarseness 2017    Type 2 diabetes mellitus with hyperglycemia, without long-term current use of insulin ([facility] ) 09/20/2017    Migraine 04/06/2017    Neuropathy 04/25/2016    Myositis 05/20/2014    Cataract 03/14/2014    Diabetes mellitus with neurological manifestation ([place] [address] ) 01/22/2014    Mammogram abnormal 12/09/2013    Chronic low back pain 09/05/2013    Centrilobular emphysema ([facility] ) 09/05/2013    Depression 09/05/2013    Hypercholesterolemia 07/29/2013    Deep vein thrombophlebitis of leg, unspecified laterality ([place] [address] ) 05/28/2013    Pulmonary embolism ([place] [address] ) 05/28/2013    Hypercoagulable state ([place] [address] ) 05/22/2013    Hypothyroidism 12/13/2012    Headache 11/12/2012    Gastroesophageal reflux disease with esophagitis 09/26/2012    Hypertension 08/15/2012    Primary fibromyalgia syndrome 08/07/2012      LOS (days): 2  Geometric Mean LOS (GMLOS) (days): 3 50  Days to GMLOS:1 7     OBJECTIVE:  Risk of Unplanned Readmission Score: 23 14         Current admission status: Inpatient   Preferred Pharmacy:   [facility] [address] [place] [address], [address] [place] [address] [place] [address]  [address]  [place] [address]  Phone: [phone] Fax: [phone]    Primary Care Provider: Arlet Boothe PA-C    Primary Insurance: Jesusita Jensen MA [facility]  Secondary Insurance:     DISCHARGE DETAILS:Pt is being dc'd home on this date with OP follow up  Pt's family transporting her home

## 2022-07-22 NOTE — PLAN OF CARE
Problem: Nutrition/Hydration-ADULT  Goal: Nutrient/Hydration intake appropriate for improving, restoring or maintaining nutritional needs  Description: Monitor and assess patient's nutrition/hydration status for malnutrition  Collaborate with interdisciplinary team and initiate plan and interventions as ordered  Monitor patient's weight and dietary intake as ordered or per policy  Utilize nutrition screening tool and intervene as necessary  Determine patient's food preferences and provide high-protein, high-caloric foods as appropriate       INTERVENTIONS:  - Monitor oral intake, urinary output, labs, and treatment plans  - Assess nutrition and hydration status and recommend course of action  - Evaluate amount of meals eaten  - Assist patient with eating if necessary   - Allow adequate time for meals  - Recommend/ encourage appropriate diets, oral nutritional supplements, and vitamin/mineral supplements  - Order, calculate, and assess calorie counts as needed  - Recommend, monitor, and adjust tube feedings and TPN/PPN based on assessed needs  - Assess need for intravenous fluids  - Provide specific nutrition/hydration education as appropriate  - Include patient/family/caregiver in decisions related to nutrition  Outcome: Progressing     Problem: DISCHARGE PLANNING - CARE MANAGEMENT  Goal: Discharge to post-acute care or home with appropriate resources  Description: INTERVENTIONS:  - Conduct assessment to determine patient/family and health care team treatment goals, and need for post-acute services based on payer coverage, community resources, and patient preferences, and barriers to discharge  - Address psychosocial, clinical, and financial barriers to discharge as identified in assessment in conjunction with the patient/family and health care team  - Arrange appropriate level of post-acute services according to patient's   needs and preference and payer coverage in collaboration with the physician and health care team  - Communicate with and update the patient/family, physician, and health care team regarding progress on the discharge plan  - Arrange appropriate transportation to post-acute venues  Outcome: Progressing     Problem: PAIN - ADULT  Goal: Verbalizes/displays adequate comfort level or baseline comfort level  Description: Interventions:  - Encourage patient to monitor pain and request assistance  - Assess pain using appropriate pain scale  - Administer analgesics based on type and severity of pain and evaluate response  - Implement non-pharmacological measures as appropriate and evaluate response  - Consider cultural and social influences on pain and pain management  - Notify physician/advanced practitioner if interventions unsuccessful or patient reports new pain  Outcome: Progressing     Problem: INFECTION - ADULT  Goal: Absence or prevention of progression during hospitalization  Description: INTERVENTIONS:  - Assess and monitor for signs and symptoms of infection  - Monitor lab/diagnostic results  - Monitor all insertion sites, i e  indwelling lines, tubes, and drains  - Monitor endotracheal if appropriate and nasal secretions for changes in amount and color  - [place] appropriate cooling/warming therapies per order  - Administer medications as ordered  - Instruct and encourage patient and family to use good hand hygiene technique  - Identify and instruct in appropriate isolation precautions for identified infection/condition  Outcome: Progressing  Goal: Absence of fever/infection during neutropenic period  Description: INTERVENTIONS:  - Monitor WBC    Outcome: Progressing     Problem: Knowledge Deficit  Goal: Patient/family/caregiver demonstrates understanding of disease process, treatment plan, medications, and discharge instructions  Description: Complete learning assessment and assess knowledge base    Interventions:  - Provide teaching at level of understanding  - Provide teaching via preferred learning methods  Outcome: Progressing     Problem: Potential for Falls  Goal: Patient will remain free of falls  Description: INTERVENTIONS:  - Educate patient/family on patient safety including physical limitations  - Instruct patient to call for assistance with activity   - Consult OT/PT to assist with strengthening/mobility   - Keep Call bell within reach  - Keep bed low and locked with side rails adjusted as appropriate  - Keep care items and personal belongings within reach  - Initiate and maintain comfort rounds  - Make Fall Risk Sign visible to staff  - Obtain necessary fall risk management equipment    - Apply yellow socks and bracelet for high fall risk patients  - Consider moving patient to room near nurses station  Outcome: Progressing

## 2022-07-22 NOTE — ASSESSMENT & PLAN NOTE
CT of abdomen and pelvis revealed the following findings:  "Hyperdense focus in the left kidney lower pole with adjacent stranding and trace fluid   Possible hemorrhagic cyst or focal pyelonephritis   However bilateral perinephric stranding seen and may be chronic   Correlate with renal function   Ultrasound evaluation as indicated clinically  "  The patient is willing well at the time of the discharge  She was discharged on a course of antibiotics

## 2022-07-22 NOTE — ASSESSMENT & PLAN NOTE
· Patient reports decreased urination over the past several weeks  She states that this may be due to dehydration  Denies dysuria, hematuria    · Found on urinalysis to have elevated white blood cells, bacteria suggestive of UTI  · Urine culture mixed contaminants, nondiagnostic  · Due to renal lesion on ultrasound with possible pyelonephritis will complete course of antibiotics

## 2022-07-22 NOTE — RESPIRATORY THERAPY NOTE
07/22/22 0847   Respiratory Assessment   General Appearance Alert; Awake   Respiratory Pattern Dyspnea with exertion   Chest Assessment Chest expansion symmetrical;Trachea midline   Bilateral Breath Sounds Diminished   Cough Moist;Strong;Non-productive

## 2022-07-25 ENCOUNTER — TELEPHONE (OUTPATIENT)
Dept: GASTROENTEROLOGY | Facility: CLINIC | Age: 62
End: 2022-07-25

## 2022-07-26 LAB
BACTERIA BLD CULT: NORMAL
BACTERIA BLD CULT: NORMAL

## 2022-08-02 ENCOUNTER — HOSPITAL ENCOUNTER (OUTPATIENT)
Dept: PERIOP | Facility: HOSPITAL | Age: 62
Setting detail: OUTPATIENT SURGERY
Discharge: HOME | End: 2022-08-02
Attending: INTERNAL MEDICINE | Admitting: INTERNAL MEDICINE
Payer: COMMERCIAL

## 2022-08-02 ENCOUNTER — ANESTHESIA (OUTPATIENT)
Dept: PERIOP | Facility: HOSPITAL | Age: 62
End: 2022-08-02

## 2022-08-02 ENCOUNTER — ANESTHESIA EVENT (OUTPATIENT)
Dept: PERIOP | Facility: HOSPITAL | Age: 62
End: 2022-08-02

## 2022-08-02 VITALS
DIASTOLIC BLOOD PRESSURE: 70 MMHG | HEART RATE: 74 BPM | SYSTOLIC BLOOD PRESSURE: 126 MMHG | OXYGEN SATURATION: 94 % | RESPIRATION RATE: 18 BRPM | TEMPERATURE: 98 F

## 2022-08-02 DIAGNOSIS — R19.7 FREQUENT DIARRHEA: ICD-10-CM

## 2022-08-02 DIAGNOSIS — Z80.0 FAMILY HISTORY OF COLON CANCER: ICD-10-CM

## 2022-08-02 LAB — GLUCOSE SERPL-MCNC: 153 MG/DL (ref 65–140)

## 2022-08-02 PROCEDURE — 82948 REAGENT STRIP/BLOOD GLUCOSE: CPT

## 2022-08-02 PROCEDURE — 45380 COLONOSCOPY AND BIOPSY: CPT | Performed by: INTERNAL MEDICINE

## 2022-08-02 PROCEDURE — 88305 TISSUE EXAM BY PATHOLOGIST: CPT | Performed by: PATHOLOGY

## 2022-08-02 PROCEDURE — 45385 COLONOSCOPY W/LESION REMOVAL: CPT | Performed by: INTERNAL MEDICINE

## 2022-08-02 RX ORDER — SODIUM CHLORIDE, SODIUM LACTATE, POTASSIUM CHLORIDE, CALCIUM CHLORIDE 600; 310; 30; 20 MG/100ML; MG/100ML; MG/100ML; MG/100ML
20 INJECTION, SOLUTION INTRAVENOUS CONTINUOUS
Status: DISCONTINUED | OUTPATIENT
Start: 2022-08-02 | End: 2022-08-06 | Stop reason: HOSPADM

## 2022-08-02 RX ORDER — SODIUM CHLORIDE, SODIUM LACTATE, POTASSIUM CHLORIDE, CALCIUM CHLORIDE 600; 310; 30; 20 MG/100ML; MG/100ML; MG/100ML; MG/100ML
INJECTION, SOLUTION INTRAVENOUS CONTINUOUS PRN
Status: DISCONTINUED | OUTPATIENT
Start: 2022-08-02 | End: 2022-08-02

## 2022-08-02 RX ORDER — PROPOFOL 10 MG/ML
INJECTION, EMULSION INTRAVENOUS CONTINUOUS PRN
Status: DISCONTINUED | OUTPATIENT
Start: 2022-08-02 | End: 2022-08-02

## 2022-08-02 RX ORDER — PROPOFOL 10 MG/ML
INJECTION, EMULSION INTRAVENOUS AS NEEDED
Status: DISCONTINUED | OUTPATIENT
Start: 2022-08-02 | End: 2022-08-02

## 2022-08-02 RX ADMIN — PROPOFOL 100 MG: 10 INJECTION, EMULSION INTRAVENOUS at 11:55

## 2022-08-02 RX ADMIN — SODIUM CHLORIDE, SODIUM LACTATE, POTASSIUM CHLORIDE, AND CALCIUM CHLORIDE 20 ML/HR: .6; .31; .03; .02 INJECTION, SOLUTION INTRAVENOUS at 11:37

## 2022-08-02 RX ADMIN — PROPOFOL 120 MCG/KG/MIN: 10 INJECTION, EMULSION INTRAVENOUS at 11:55

## 2022-08-02 RX ADMIN — SODIUM CHLORIDE, SODIUM LACTATE, POTASSIUM CHLORIDE, AND CALCIUM CHLORIDE: .6; .31; .03; .02 INJECTION, SOLUTION INTRAVENOUS at 11:51

## 2022-08-02 NOTE — ANESTHESIA PREPROCEDURE EVALUATION
EF 60%, PAC's    Procedure:  COLONOSCOPY    Relevant Problems   CARDIO   (+) Chest pain   (+) MARQUEZ (dyspnea on exertion)   (+) Deep vein thrombophlebitis of leg, unspecified laterality (HCC)   (+) Hypercholesterolemia   (+) Hypertension   (+) Migraine      ENDO   (+) Hypothyroidism   (+) Type 2 diabetes mellitus with hyperglycemia, without long-term current use of insulin (HCC)      GI/HEPATIC   (+) Gastroesophageal reflux disease with esophagitis      /RENAL   (+) FAM (acute kidney injury) (HCC)   (+) Stage 2 chronic kidney disease   (+) Stage 3a chronic kidney disease (HCC)      GYN   (+) Malignant neoplasm of upper-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, estrogen receptor positive (HCC)   (+) Mucinous carcinoma of breast, right (HCC)   (+) Primary cancer of upper outer quadrant of right breast (HCC)      HEMATOLOGY   (+) Hypercoagulable state (HCC)      MUSCULOSKELETAL   (+) Chronic low back pain   (+) Myositis   (+) Primary fibromyalgia syndrome      NEURO/PSYCH   (+) Chronic low back pain   (+) Depression   (+) Headache   (+) Migraine   (+) Primary fibromyalgia syndrome      PULMONARY   (+) Acute respiratory failure with hypoxia (HCC)   (+) Centrilobular emphysema (HCC)   (+) Chronic hypoxemic respiratory failure (HCC)   (+) Chronic obstructive pulmonary disease with acute exacerbation (HCC)   (+) MARQUEZ (dyspnea on exertion)   (+) Obstructive sleep apnea syndrome   (+) Sleep apnea        Physical Exam    Airway    Mallampati score: III  TM Distance: >3 FB  Neck ROM: full     Dental       Cardiovascular  Rate: normal,     Pulmonary  Pulmonary exam normal     Other Findings  Per pt denies anything remaining that is loose or removeable        Anesthesia Plan  ASA Score- 3     Anesthesia Type- IV sedation with anesthesia with ASA Monitors  Additional Monitors:   Airway Plan:     Comment: Per patient, appropriately NPO, denies active CP/SOB/wheezing/symptoms related to heartburn/nausea/vomiting         Plan Factors-Exercise tolerance (METS): >4 METS  Chart reviewed  Patient summary reviewed  Patient is a current smoker  Induction- intravenous  Postoperative Plan-     Informed Consent- Anesthetic plan and risks discussed with patient  I personally reviewed this patient with the CRNA  Discussed and agreed on the Anesthesia Plan with the CRNA  Ishan Blanco

## 2022-08-02 NOTE — ANESTHESIA POSTPROCEDURE EVALUATION
Post-Op Assessment Note    CV Status:  Stable  Pain Score: 0    Pain management: adequate     Mental Status:  Alert and awake   Hydration Status:  Euvolemic   PONV Controlled:  Controlled   Airway Patency:  Patent      Post Op Vitals Reviewed: Yes      Staff: CRNA         No complications documented      BP   140/56   Temp   97 5   Pulse  70   Resp   12   SpO2   99

## 2022-08-02 NOTE — H&P
History and Physical - [facility]  Margie Franco 58 y o  female MRN: [id number]                  HPI: Margie Franco is a 58y o  year old female who presents for a history of diarrhea and a family history of colon cancer      REVIEW OF SYSTEMS: Per the HPI, and otherwise unremarkable  Historical Information   Past Medical History:   Diagnosis Date    Cancer [facility])     right breast    Chronic back pain     Colitis     [facility]) [facility])     Depression     Diabetes mellitus ([place] [address] )     DVT of lower limb, acute ([place] [address] )     GERD (gastroesophageal reflux disease)     Hyperlipidemia     Pneumonia     Rapid heart rate     Sleep apnea     Stroke ([place] [address] )     4 mini strokes     Past Surgical History:   Procedure Laterality Date    BREAST LUMPECTOMY Right     CARDIAC CATHETERIZATION N/A 10/28/2021    Procedure: Cardiac Coronary Angiogram;  Surgeon: Maikol Phelps DO;  Location:  CARDIAC CATH LAB;   Service: Cardiology    IVC FILTER INSERTION      MASTECTOMY W/ SENTINEL NODE BIOPSY Right 2021    Procedure: BREAST MASTECTOMY WITH BIOPSY LYMPH NODE SENTINEL and axillary node dissection  ([place] Lymph Node Injection @ 12:30);  Surgeon: Ivone Crowley MD;  Location: [facility] MAIN OR;  Service: General    US GUIDANCE BREAST BIOPSY RIGHT EACH ADDITIONAL Right 10/13/2021    US GUIDANCE BREAST BIOPSY RIGHT EACH ADDITIONAL Right 10/13/2021    US GUIDED BREAST BIOPSY RIGHT COMPLETE Right 10/13/2021     Social History   Social History     Substance and Sexual Activity   Alcohol Use Never     Social History     Substance and Sexual Activity   Drug Use Never     Social History     Tobacco Use   Smoking Status Current Every Day Smoker    Packs/day: 1 50    Types: Cigarettes    Last attempt to quit: 2021    Years since quittin 7   Smokeless Tobacco Never Used     Family History   Problem Relation Age of Onset    Breast cancer Mother 67  COPD Mother     Coronary artery disease Mother     Coronary artery disease Father     Stroke Father     Lung cancer Sister     Breast cancer Maternal Grandmother     Colon cancer Paternal Grandfather     No Known Problems Maternal Aunt     No Known Problems Maternal Aunt     No Known Problems Maternal Aunt     No Known Problems Paternal Aunt     Breast cancer Cousin        Meds/Allergies       Current Outpatient Medications:     amitriptyline (ELAVIL) 100 mg tablet    aspirin (ECOTRIN LOW STRENGTH) 81 mg EC tablet    benztropine (COGENTIN) 0 5 mg tablet    gabapentin (NEURONTIN) 600 MG tablet    traZODone (DESYREL) 150 mg tablet    vilazodone (VIIBRYD) 40 mg tablet    VRAYLAR 6 MG capsule    albuterol (2 5 mg/3 mL) 0 083 % nebulizer solution    albuterol (Ventolin HFA) 90 mcg/act inhaler    atorvastatin (LIPITOR) 80 mg tablet    BD Pen Needle Jasmine 2nd Gen 32G X 4 MM MISC    Blood Glucose Monitoring Suppl (ONE TOUCH ULTRA 2) w/Device KIT    Blood Glucose Monitoring Suppl (ONE TOUCH ULTRA 2) w/Device KIT    clonazePAM (KlonoPIN) 0 5 mg tablet    glucose blood (OneTouch Ultra) test strip    nicotine (NICODERM CQ) 21 mg/24 hr TD 24 hr patch    pantoprazole (PROTONIX) 40 mg tablet    tiotropium (Spiriva Respimat) 2 5 MCG/ACT AERS inhaler    Victoza injection    Allergies   Allergen Reactions    Amoxicillin-Pot Clavulanate GI Intolerance    Anastrozole Other (See Comments)     Diarrhea, Nausea, Stomach pain        Objective     There were no vitals taken for this visit  PHYSICAL EXAM    Gen: NAD  Head: NCAT  CV: RRR  CHEST: Clear  ABD: soft, NT/ND  EXT: no edema      ASSESSMENT/PLAN:  This is a 58y o  year old female here for a colonoscopy, and she is stable and optimized for her procedure

## 2022-08-03 ENCOUNTER — CONSULT (OUTPATIENT)
Dept: FAMILY MEDICINE CLINIC | Facility: HOME HEALTHCARE | Age: 62
End: 2022-08-03
Payer: COMMERCIAL

## 2022-08-03 VITALS
BODY MASS INDEX: 34.37 KG/M2 | WEIGHT: 219 LBS | HEART RATE: 105 BPM | DIASTOLIC BLOOD PRESSURE: 84 MMHG | RESPIRATION RATE: 16 BRPM | SYSTOLIC BLOOD PRESSURE: 136 MMHG | HEIGHT: 67 IN | OXYGEN SATURATION: 93 % | TEMPERATURE: 96.5 F

## 2022-08-03 DIAGNOSIS — E11.65 TYPE 2 DIABETES MELLITUS WITH HYPERGLYCEMIA, WITHOUT LONG-TERM CURRENT USE OF INSULIN (HCC): ICD-10-CM

## 2022-08-03 DIAGNOSIS — Z01.818 PREOPERATIVE CLEARANCE: Primary | ICD-10-CM

## 2022-08-03 DIAGNOSIS — F17.210 CONTINUOUS DEPENDENCE ON CIGARETTE SMOKING: ICD-10-CM

## 2022-08-03 DIAGNOSIS — J43.2 CENTRILOBULAR EMPHYSEMA (HCC): ICD-10-CM

## 2022-08-03 DIAGNOSIS — J96.11 CHRONIC HYPOXEMIC RESPIRATORY FAILURE (HCC): ICD-10-CM

## 2022-08-03 LAB — SL AMB POCT HEMOGLOBIN AIC: 7.1 (ref ?–6.5)

## 2022-08-03 PROCEDURE — 83036 HEMOGLOBIN GLYCOSYLATED A1C: CPT | Performed by: FAMILY MEDICINE

## 2022-08-03 PROCEDURE — T1015 CLINIC SERVICE: HCPCS | Performed by: FAMILY MEDICINE

## 2022-08-03 PROCEDURE — 3051F HG A1C>EQUAL 7.0%<8.0%: CPT | Performed by: NURSE PRACTITIONER

## 2022-08-03 NOTE — PROGRESS NOTES
[address]       NAME: Estelita Mcrae is a 58 y o  female  : 1960    MRN: [id number]  DATE: August 3, 2022  TIME: 2:13 PM    Assessment and Plan   Diagnoses and all orders for this visit:    Preoperative clearance  Comments:  Patient is medically cleared for bilateral cataract surgery  Type 2 diabetes mellitus with hyperglycemia, without long-term current use of insulin (HCC)  -     POCT hemoglobin A1c    Centrilobular emphysema (HCC)    Chronic hypoxemic respiratory failure (HCC)  Comments:  Patient wears nocturnal O2    Continuous dependence on cigarette smoking        Follow-up in 2-3 months or sooner if needed  Patient instructed to call with any questions concerns  Smoking cessation discussed and encouraged  Chief Complaint     Chief Complaint   Patient presents with    Pre-op Exam     Cataract surgery left eye 22 and right eye 22         History of Present Illness       HPI    70-year-old female here today for preop clearance for bilateral cataract surgery  First surgery scheduled for   Patient has no new complaints today  Patient was recently hospitalized for sepsis for suspected urosepsis, COPD exacerbation however did refuse visit for transition of medical care post discharge  CT with kidney lesion however patient does follow with Nephrology  Patient hypotension secondary to hypovolemia and dehydration  Patient continues to smoke  And patient has type 2 diabetes with hyperglycemia however A1c today is 7 1  Patient currently on Victoza and metformin was discontinued due to nausea and diarrhea  If worsening A1c in 3 months will consider increasing Victoza to 1 8 mg daily  Review of Systems   Review of Systems   Constitutional: Negative  HENT: Negative  Respiratory: Negative  Cardiovascular: Negative  Gastrointestinal: Negative  Skin: Negative for rash  Neurological: Negative  Psychiatric/Behavioral: Negative      All other systems reviewed and are negative          Current Medications       Current Outpatient Medications:     albuterol (2 5 mg/3 mL) 0 083 % nebulizer solution, Take 1 vial (2 5 mg total) by nebulization every 4 (four) hours as needed for shortness of breath, Disp: 120 mL, Rfl: 3    amitriptyline (ELAVIL) 100 mg tablet, take 1/2 tablet by mouth once daily at bedtime, Disp: 45 tablet, Rfl: 1    aspirin (ECOTRIN LOW STRENGTH) 81 mg EC tablet, Take 1 tablet (81 mg total) by mouth daily, Disp: 90 tablet, Rfl: 3    atorvastatin (LIPITOR) 80 mg tablet, Take 1 tablet (80 mg total) by mouth daily at bedtime, Disp: 30 tablet, Rfl: 5    BD Pen Needle Jasmine 2nd Gen 32G X 4 MM MISC, use 1 PEN NEEDLE to inject MEDICATION subcutaneously once daily, Disp: 90 each, Rfl: 3    benztropine (COGENTIN) 0 5 mg tablet, Take 0 5 mg by mouth daily at bedtime  , Disp: , Rfl:     Blood Glucose Monitoring Suppl (ONE TOUCH ULTRA 2) w/Device KIT, by Does not apply route, Disp: , Rfl:     Blood Glucose Monitoring Suppl (ONE TOUCH ULTRA 2) w/Device KIT, by Does not apply route daily, Disp: 100 each, Rfl: 0    clonazePAM (KlonoPIN) 0 5 mg tablet, Take 0 5 mg by mouth daily as needed  , Disp: , Rfl:     gabapentin (NEURONTIN) 600 MG tablet, take 1 tablet by mouth three times a day, Disp: 270 tablet, Rfl: 1    glucose blood (OneTouch Ultra) test strip, Use 1 each daily Use as instructed, Disp: 100 strip, Rfl: 2    nicotine (NICODERM CQ) 21 mg/24 hr TD 24 hr patch, Place 1 patch on the skin daily, Disp: 28 patch, Rfl: 0    pantoprazole (PROTONIX) 40 mg tablet, Take 1 tablet (40 mg total) by mouth 2 (two) times a day, Disp: 20 tablet, Rfl: 0    tiotropium (Spiriva Respimat) 2 5 MCG/ACT AERS inhaler, Inhale 2 puffs daily, Disp: 4 g, Rfl: 3    traZODone (DESYREL) 150 mg tablet, 150 mg daily at bedtime , Disp: , Rfl:     Victoza injection, inject 1 2 milligram subcutaneously daily, Disp: 6 mL, Rfl: 5    vilazodone (VIIBRYD) 40 mg tablet, Take 40 mg by mouth daily  , Disp: , Rfl:     VRAYLAR 6 MG capsule, Take 6 mg by mouth daily, Disp: , Rfl: 0    albuterol (Ventolin HFA) 90 mcg/act inhaler, Inhale 2 puffs every 4 (four) hours as needed for wheezing (Patient not taking: No sig reported), Disp: 18 g, Rfl: 4  No current facility-administered medications for this visit  Facility-Administered Medications Ordered in Other Visits:     lactated ringers infusion, 20 mL/hr, Intravenous, Continuous, Livier Hoskins CRNA, Stopped at 08/02/22 1246    Current Allergies     Allergies as of 08/03/2022 - Reviewed 08/02/2022   Allergen Reaction Noted    Amoxicillin-pot clavulanate GI Intolerance 08/10/2016    Anastrozole Other (See Comments) 03/23/2022            The following portions of the patient's history were reviewed and updated as appropriate: allergies, current medications, past family history, past medical history, past social history, past surgical history and problem list      Past Medical History:   Diagnosis Date    Cancer [facility])     right breast    Chronic back pain     Colitis     [facility] [address] [facility]     Depression     Diabetes mellitus [facility] )     DVT of lower limb, acute ([facility] [address] ) 2004    GERD (gastroesophageal reflux disease)     Hyperlipidemia     Pneumonia     Rapid heart rate     Sleep apnea     Stroke ([facility] [address] )     4 mini strokes       Past Surgical History:   Procedure Laterality Date    BREAST LUMPECTOMY Right     CARDIAC CATHETERIZATION N/A 10/28/2021    Procedure: Cardiac Coronary Angiogram;  Surgeon: Giovanni Jones DO;  Location: [facility] CARDIAC CATH LAB;   Service: Cardiology    IVC FILTER INSERTION      MASTECTOMY W/ SENTINEL NODE BIOPSY Right 11/9/2021    Procedure: BREAST MASTECTOMY WITH BIOPSY LYMPH NODE SENTINEL and axillary node dissection  ([place] Lymph Node Injection @ 12:30);  Surgeon: Dmitri Trimble MD;  Location: [facility] MAIN OR;  Service: General    US GUIDANCE BREAST BIOPSY RIGHT EACH ADDITIONAL Right 10/13/2021    US GUIDANCE BREAST BIOPSY RIGHT EACH ADDITIONAL Right 10/13/2021    US GUIDED BREAST BIOPSY RIGHT COMPLETE Right 10/13/2021       Family History   Problem Relation Age of Onset    Breast cancer Mother 67    COPD Mother     Coronary artery disease Mother     Coronary artery disease Father     Stroke Father     Lung cancer Sister     Breast cancer Maternal Grandmother     Colon cancer Paternal Grandfather     No Known Problems Maternal Aunt     No Known Problems Maternal Aunt     No Known Problems Maternal Aunt     No Known Problems Paternal Aunt     Breast cancer Cousin          Medications have been verified  Objective   /84   Pulse 105   Temp (!) 96 5 °F (35 8 °C)   Resp 16   Ht 5' 7" (1 702 m)   Wt 99 3 kg (219 lb)   SpO2 93%   BMI 34 30 kg/m²        Physical Exam     Physical Exam  Vitals reviewed  Constitutional:       General: She is not in acute distress  Appearance: She is not ill-appearing, toxic-appearing or diaphoretic  HENT:      Head: Normocephalic  Mouth/Throat:      Mouth: Mucous membranes are moist       Pharynx: Oropharynx is clear  Eyes:      General: No scleral icterus  Conjunctiva/sclera: Conjunctivae normal    Cardiovascular:      Rate and Rhythm: Normal rate and regular rhythm  Heart sounds: Normal heart sounds  Pulmonary:      Effort: Pulmonary effort is normal       Comments: Mildly diminished breath sounds diffusely  Abdominal:      General: Bowel sounds are normal       Palpations: Abdomen is soft  Tenderness: There is no abdominal tenderness  Musculoskeletal:      Cervical back: Neck supple  Right lower leg: No edema  Left lower leg: No edema  Lymphadenopathy:      Cervical: No cervical adenopathy  Skin:     General: Skin is warm and dry  Neurological:      Mental Status: She is alert and oriented to person, place, and time     Psychiatric:         Mood and Affect: Mood normal          Behavior: Behavior normal

## 2022-08-09 PROBLEM — I25.10 NONOCCLUSIVE CORONARY ATHEROSCLEROSIS OF NATIVE CORONARY ARTERY: Status: ACTIVE | Noted: 2022-08-09

## 2022-08-09 NOTE — PROGRESS NOTES
Cardiology Follow Up    Clarence Prince  1960  [id number]  [address] [facility] [address]  [phone]    1  Nonocclusive coronary atherosclerosis of native coronary artery     2  Essential hypertension     3  Dyslipidemia  rosuvastatin (CRESTOR) 40 MG tablet    Lipid Panel with Direct LDL reflex    Comprehensive metabolic panel   4  Tobacco abuse     5  Peripheral vascular disease (HCC)         Discussion/Summary:  Non-obstructive coronary artery disease: 50% D1 and 45% mid RCA stenoses noted on prior C  She has been chest pain free  On aspirin and statin therapy  Peripheral vascular disease: noted to have diffuse bilateral lower extremity disease on arterial duplex  No focal stenoses noted  She did follow up with vascular surgery who recommended f/u with repeat duplex in 1 year  Continue aspirin and statin  Dyslipidemia: last LDL was 169 - March 2022  Question compliance with medication although patient reports compliance  Explained goal LDL is <70 given PVD and CAD  I will transition her to rosuvastatin with repeat lipid panel and CMP in 3 months  She may need zetia or Leqvio/Repatha  She would be agreeable to an injectable medication if needed  Tobacco abuse: complete cessation is imperative; patient aware of the significantly increased risk of heart attack/stroke/embolism  Patient is not willing to quit at this time  Blood pressure is controlled without any antihypertensive medicaitons  Would not resume lisinopril at this time  She will RTO in 6-8 months with Dr Cruz Flank or sooner if necessary  She will call with any concerns  Interval History:   Clarence Prince is a 58 y o  female with diabetes, dyslipidemia, peripheral vascular disease, COPD continued tobacco abuse, prior DVT s/p IVC filter placement who presents to the office today for follow up      Since her last office visit she has been overall feeling well from a cardiac standpoint  She has not had any recent chest pain/pressure/discomfort  She does have intermittent dyspnea on exertion which is chronic and stable  She denies lower extremity edema, orthopnea, and PND  She does sleep with oxygen at night  She denies palpitations  She does have occasional lightheadedness but denies falls and syncope  She did have a recent hospitalization for sepsis secondary to possible abdominal source vs  UTI  She was treated for a COPD exacerbation as well  During this time she was hypotensive and had an acute kidney injury  Her lisinopril was discontinued  She continues to smoke about 1 pack of cigarettes daily      Medical Problems             Problem List     Cataract    Chronic hoarseness    Chronic low back pain    Centrilobular emphysema (HCC)    Overview Signed 4/19/2018  2:35 PM by Vahid Ayon     Transitioned From: Chronic Cough         Deep vein thrombophlebitis of leg, unspecified laterality ([facility] )    Depression    Diabetes mellitus with neurological manifestation ([facility] )    Overview Signed 4/19/2018  2:35 PM by Vahid Ayon     Transitioned From: Diabetes Mellitus           Lab Results   Component Value Date    HGBA1C 7 1 (A) 08/03/2022         Gastroesophageal reflux disease with esophagitis    Headache    Hypercholesterolemia    Hypercoagulable state ([facility] )    Hypertension    Hypothyroidism    Mammogram abnormal    Migraine    Myositis    Neuropathy    Pulmonary embolism ([facility] )    Pulmonary nodule seen on imaging study    Type 2 diabetes mellitus with hyperglycemia, without long-term current use of insulin (HCC)      Lab Results   Component Value Date    HGBA1C 7 1 (A) 08/03/2022         Stage 2 chronic kidney disease    Lab Results   Component Value Date    EGFR 44 07/22/2022    EGFR 40 07/21/2022    EGFR 34 07/20/2022    CREATININE 1 29 07/22/2022    CREATININE 1 39 (H) 07/21/2022    CREATININE 1 58 (H) 07/20/2022         FAM (acute kidney injury) ([facility] )    Insect bite of ear, infected, left, initial encounter    Chronic hypoxemic respiratory failure ([facility] )    Continuous dependence on cigarette smoking    Overview Signed 6/23/2021  2:33 PM by Amari Vela DO     smokes 2-3 ppd x 45 years; cut back to <1ppd 3/21         Class 1 obesity due to excess calories with serious comorbidity and body mass index (BMI) of 33 0 to 33 9 in adult    Overview Signed 6/23/2021  2:33 PM by Amari Vela DO     BMI 37 12         Breast nodule    MARQUEZ (dyspnea on exertion)    Sleep apnea    Obstructive sleep apnea syndrome    Overview Addendum 9/22/2021  2:52 PM by Amari Vela DO     4L with sleep         Family history of heart disease    Pulmonary hypertension ([facility] )    Chest pain    Primary fibromyalgia syndrome    Primary cancer of upper outer quadrant of right breast (HCC)    Malignant neoplasm of upper-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, estrogen receptor positive ([facility] )    Mucinous carcinoma of breast, right (HCC)    Tobacco abuse    Peripheral arterial disease ([facility] )    Volume depletion    Hypotension due to hypovolemia    Tachycardia    Stage 3a chronic kidney disease ([facility] )    Lab Results   Component Value Date    EGFR 44 07/22/2022    EGFR 40 07/21/2022    EGFR 34 07/20/2022    CREATININE 1 29 07/22/2022    CREATININE 1 39 (H) 07/21/2022    CREATININE 1 58 (H) 07/20/2022         Sepsis due to other etiology ([facility] )    Chronic obstructive pulmonary disease with acute exacerbation (HCC)    Acute cystitis without hematuria    Acute respiratory failure with hypoxia (HCC)    Diarrhea of presumed infectious origin    Kidney lesion              Past Medical History:   Diagnosis Date    Cancer ([facility] )     right breast    Chronic back pain     Colitis     COPD (chronic obstructive pulmonary disease) ([address] )     Depression     Diabetes mellitus ([address] )     DVT of lower limb, acute ([facility] ) 2004    GERD (gastroesophageal reflux disease)     Hyperlipidemia     Pneumonia     Rapid heart rate     Sleep apnea     Stroke ([facility] )     4 mini strokes     Social History     Socioeconomic History    Marital status: Legally      Spouse name: Not on file    Number of children: Not on file    Years of education: Not on file    Highest education level: Not on file   Occupational History    Not on file   Tobacco Use    Smoking status: Current Every Day Smoker     Packs/day: 1 50     Types: Cigarettes     Last attempt to quit: 2021     Years since quittin 7    Smokeless tobacco: Never Used   Vaping Use    Vaping Use: Never used   Substance and Sexual Activity    Alcohol use: Never    Drug use: Never    Sexual activity: Not Currently   Other Topics Concern    Not on file   Social History Narrative    Not on file     Social Determinants of Health     Financial Resource Strain: Not on file   Food Insecurity: No Food Insecurity    Worried About [employer] in the Last Year: Never true    [address] [facility] [address] in the Last Year: Never true   Transportation Needs: No Transportation Needs    Lack of Transportation (Medical): No    Lack of Transportation (Non-Medical):  No   Physical Activity: Not on file   Stress: Not on file   Social Connections: Not on file   Intimate Partner Violence: Not on file   Housing Stability: Low Risk     Unable to Pay for Housing in the Last Year: No    Number of Places Lived in the Last Year: 1    Unstable Housing in the Last Year: No      Family History   Problem Relation Age of Onset    Breast cancer Mother 67    COPD Mother     Coronary artery disease Mother     Coronary artery disease Father     Stroke Father     Lung cancer Sister     Breast cancer Maternal Grandmother     Colon cancer Paternal Grandfather     No Known Problems Maternal Aunt     No Known Problems Maternal Aunt     No Known Problems Maternal Aunt     No Known Problems Paternal Aunt  Breast cancer Cousin      Past Surgical History:   Procedure Laterality Date    BREAST LUMPECTOMY Right     CARDIAC CATHETERIZATION N/A 10/28/2021    Procedure: Cardiac Coronary Angiogram;  Surgeon: Octavia Alcantara DO;  Location:  CARDIAC CATH LAB;   Service: Cardiology    IVC FILTER INSERTION      MASTECTOMY W/ SENTINEL NODE BIOPSY Right 11/9/2021    Procedure: BREAST MASTECTOMY WITH BIOPSY LYMPH NODE SENTINEL and axillary node dissection  ([place] Lymph Node Injection @ 12:30);  Surgeon: Ary Najjar, MD;  Location: [address] [place] [address] OR;  Service: General    US GUIDANCE BREAST BIOPSY RIGHT EACH ADDITIONAL Right 10/13/2021    US GUIDANCE BREAST BIOPSY RIGHT EACH ADDITIONAL Right 10/13/2021    US GUIDED BREAST BIOPSY RIGHT COMPLETE Right 10/13/2021       Current Outpatient Medications:     albuterol (2 5 mg/3 mL) 0 083 % nebulizer solution, Take 1 vial (2 5 mg total) by nebulization every 4 (four) hours as needed for shortness of breath, Disp: 120 mL, Rfl: 3    amitriptyline (ELAVIL) 100 mg tablet, take 1/2 tablet by mouth once daily at bedtime, Disp: 45 tablet, Rfl: 1    aspirin (ECOTRIN LOW STRENGTH) 81 mg EC tablet, Take 1 tablet (81 mg total) by mouth daily, Disp: 90 tablet, Rfl: 3    BD Pen Needle Jasmine 2nd Gen 32G X 4 MM MISC, use 1 PEN NEEDLE to inject MEDICATION subcutaneously once daily, Disp: 90 each, Rfl: 3    benztropine (COGENTIN) 0 5 mg tablet, Take 0 5 mg by mouth daily at bedtime  , Disp: , Rfl:     Blood Glucose Monitoring Suppl (ONE TOUCH ULTRA 2) w/Device KIT, by Does not apply route, Disp: , Rfl:     Blood Glucose Monitoring Suppl (ONE TOUCH ULTRA 2) w/Device KIT, by Does not apply route daily, Disp: 100 each, Rfl: 0    clonazePAM (KlonoPIN) 0 5 mg tablet, Take 0 5 mg by mouth daily as needed  , Disp: , Rfl:     gabapentin (NEURONTIN) 600 MG tablet, take 1 tablet by mouth three times a day, Disp: 270 tablet, Rfl: 1    glucose blood (OneTouch Ultra) test strip, Use 1 each daily Use as instructed, Disp: 100 strip, Rfl: 2    pantoprazole (PROTONIX) 40 mg tablet, Take 1 tablet (40 mg total) by mouth 2 (two) times a day, Disp: 20 tablet, Rfl: 0    rosuvastatin (CRESTOR) 40 MG tablet, Take 1 tablet (40 mg total) by mouth daily, Disp: 90 tablet, Rfl: 3    traZODone (DESYREL) 150 mg tablet, 150 mg daily at bedtime , Disp: , Rfl:     Victoza injection, inject 1 2 milligram subcutaneously daily, Disp: 6 mL, Rfl: 5    vilazodone (VIIBRYD) 40 mg tablet, Take 40 mg by mouth daily  , Disp: , Rfl:     VRAYLAR 6 MG capsule, Take 6 mg by mouth daily, Disp: , Rfl: 0    albuterol (Ventolin HFA) 90 mcg/act inhaler, Inhale 2 puffs every 4 (four) hours as needed for wheezing (Patient not taking: No sig reported), Disp: 18 g, Rfl: 4    nicotine (NICODERM CQ) 21 mg/24 hr TD 24 hr patch, Place 1 patch on the skin daily (Patient not taking: Reported on 8/10/2022), Disp: 28 patch, Rfl: 0    tiotropium (Spiriva Respimat) 2 5 MCG/ACT AERS inhaler, Inhale 2 puffs daily (Patient not taking: Reported on 8/10/2022), Disp: 4 g, Rfl: 3  Allergies   Allergen Reactions    Amoxicillin-Pot Clavulanate GI Intolerance    Anastrozole Other (See Comments)     Diarrhea, Nausea, Stomach pain        Labs:     Chemistry        Component Value Date/Time     01/08/2016 1130    K 4 0 07/22/2022 1422    K 4 3 01/08/2016 1130    CL 98 07/22/2022 1422     01/08/2016 1130    CO2 25 07/22/2022 1422    CO2 29 0 01/08/2016 1130    BUN 19 07/22/2022 1422    BUN 8 01/08/2016 1130    CREATININE 1 29 07/22/2022 1422    CREATININE 0 90 01/08/2016 1130        Component Value Date/Time    CALCIUM 7 9 (L) 07/22/2022 1422    CALCIUM 8 8 01/08/2016 1130    ALKPHOS 54 07/21/2022 0544    ALKPHOS 66 11/30/2015 1109    AST 15 07/21/2022 0544    AST 15 11/30/2015 1109    ALT 21 07/21/2022 0544    ALT 39 11/30/2015 1109    BILITOT 0 53 11/30/2015 1109            Lab Results   Component Value Date    CHOL 234 09/07/2015    CHOL 250 05/20/2014    CHOL 230 04/24/2014     Lab Results   Component Value Date    HDL 28 (L) 03/29/2022    HDL 29 (L) 10/18/2021    HDL 26 (L) 09/13/2021     Lab Results   Component Value Date    LDLCALC 169 (H) 03/29/2022    LDLCALC 129 (H) 10/18/2021    [address]  09/13/2021      Comment:      Calculated LDL invalid, triglycerides >400 mg/dl     Lab Results   Component Value Date    TRIG 351 (H) 03/29/2022    TRIG 348 (H) 10/18/2021    TRIG 599 (H) 09/13/2021     No results found for: CHOLHDL    Imaging: Colonoscopy    Result Date: 8/2/2022  Narrative: [facility] Operating Room [address] [phone] DATE OF SERVICE: 8/02/22 PHYSICIAN(S): Attending: Murray Kinney MD Fellow: No Staff Documented INDICATION: Family history of colon cancer, Frequent diarrhea POST-OP DIAGNOSIS: See the impression below  HISTORY: Prior colonoscopy: 5 years ago  BOWEL PREPARATION: Miralax/Dulcolax PREPROCEDURE: Informed consent was obtained for the procedure, including sedation  Risks including but not limited to bleeding, infection, perforation, adverse drug reaction and aspiration were explained in detail  Also explained about less than 100% sensitivity with the exam and other alternatives  The patient was placed in the left lateral decubitus position  DETAILS OF PROCEDURE: Patient was taken to the procedure room where a time out was performed to confirm correct patient and correct procedure  The patient underwent monitored anesthesia care, which was administered by an anesthesia professional  The patient's blood pressure, heart rate, level of consciousness, oxygen and respirations were monitored throughout the procedure  A digital rectal exam was performed  The scope was introduced through the anus and advanced to the terminal ileum  Retroflexion was performed in the rectum   The quality of bowel preparation was evaluated using the [facility] Bowel Preparation Scale with scores of: right colon = 2, transverse colon = 2, left colon = 2  The total BBPS score was 6  Bowel prep was adequate  The patient experienced no blood loss  The procedure was not difficult  The patient tolerated the procedure well  There were no apparent complications  ANESTHESIA INFORMATION: ASA: III Anesthesia Type: IV Sedation with Anesthesia MEDICATIONS: lactated ringers infusion Not documented*  *Total volume has not been documented  View each administration to see the amount administered   (Totals for administrations occurring from 1120 to 1215 on 08/02/22) FINDINGS: The terminal ileum, cecum, ascending colon, hepatic flexure, transverse colon and splenic flexure appeared normal  One sessile polyp measuring smaller than 5 mm in the descending colon; completely removed en bloc by cold snare and retrieved specimen The sigmoid colon and rectum appeared normal  Performed forceps biopsies to rule out IBD in the terminal ileum Performed forceps biopsies to rule out colitis in the ascending colon and descending colon EVENTS: Procedure Events Event Event Time ENDO CECUM REACHED 8/2/2022 12:01 PM ENDO SCOPE OUT TIME 8/2/2022 12:14 PM SPECIMENS: ID Type Source Tests Collected by Time Destination 1 : cold fcp bx r/o chrohns Tissue Terminal Ileum TISSUE EXAM Delilah Hinojosa MD 8/2/2022 12:05 PM  2 : cold fcp bx r/o microscopic colitis Tissue Large Intestine, Right/Ascending Colon TISSUE EXAM Delilah Hinojosa MD 8/2/2022 12:06 PM  3 : cold fcp bx r/o microscopic colitis Tissue Large Intestine, Left/Descending Colon TISSUE EXAM Delilah Hinojosa MD 8/2/2022 12:07 PM  4 : cold snare polyp x1 Tissue Large Intestine, Left/Descending Colon TISSUE EXAM Delilah Hinojosa MD 8/2/2022 12:10 PM  EQUIPMENT: Colonoscope -CF [id number]     Impression: The terminal ileum, cecum, ascending colon, hepatic flexure, transverse colon and splenic flexure appeared normal  One sessile polyp measuring smaller than 5 mm in the descending colon; completely removed en bloc by cold snare and retrieved specimen The sigmoid colon and rectum appeared normal  Performed forceps biopsies to rule out IBD in the terminal ileum Performed forceps biopsies to rule out colitis in the ascending colon and descending colon RECOMMENDATION: Await pathology results Repeat colonoscopy in 5 years due to a family history of colon cancer  Teddy Rodas MD     CT abdomen pelvis wo contrast    Result Date: 7/21/2022  Narrative: CT ABDOMEN AND PELVIS WITHOUT IV CONTRAST INDICATION:   LLQ abdominal pain diarrhea, LLQ pain  COMPARISON:  Compared with CT abdomen of 8/10/2016 TECHNIQUE:  CT examination of the abdomen and pelvis was performed without intravenous contrast  This examination was performed without intravenous contrast in the context of the critical nationwide Omnipaque shortage  Axial, sagittal, and coronal 2D reformatted images were created from the source data and submitted for interpretation  Radiation dose length product (DLP) for this visit:  1037 33 mGy-cm   This examination, like all CT scans performed in the [facility], was performed utilizing techniques to minimize radiation dose exposure, including the use of iterative reconstruction and automated exposure control  Enteric contrast was administered  FINDINGS: ABDOMEN LOWER CHEST:  No clinically significant abnormality identified in the visualized lower chest  LIVER/BILIARY TREE:  Left hepatic lobe along the falciform ligament demonstrates the hypodensity along the capsule measuring 2 3 x 1 5 cm which correlates with the suspected hemangioma previously at the site  Small hypodensity more inferiorly and anteriorly in image 25 measuring 5 6 mm  Diffuse hypodensity of the liver of fatty infiltration  GALLBLADDER:  No calcified gallstones  No pericholecystic inflammatory change  SPLEEN:  Unremarkable  Small splenule in the hilar region unchanged  PANCREAS:  Unremarkable  ADRENAL GLANDS:  Unremarkable   KIDNEYS/URETERS:  Approximately 1 3 cm hypodense focus in the lower pole of the left kidney with adjacent stranding and trace fluid seen  Yamil Goody Perinephric stranding is seen bilaterally  This could be chronic  No hydronephrosis  STOMACH AND BOWEL:  Unremarkable  APPENDIX:  No findings to suggest appendicitis  ABDOMINOPELVIC CAVITY:  No ascites  No pneumoperitoneum  No lymphadenopathy  VESSELS:  Unremarkable for patient's age  IVC filter is seen in place  PELVIS REPRODUCTIVE ORGANS:  Unremarkable for patient's age  URINARY BLADDER:  Unremarkable  ABDOMINAL WALL/INGUINAL REGIONS:  Unremarkable  OSSEOUS STRUCTURES:  No acute fracture or destructive osseous lesion  Impression: 1  Hyperdense focus in the left kidney lower pole with adjacent stranding and trace fluid  Possible hemorrhagic cyst or focal pyelonephritis  However bilateral perinephric stranding seen and may be chronic  Correlate with renal function  Ultrasound evaluation as indicated clinically  2   Left hepatic lobe hypodensity along the capsule correlates with previously suspected hemangioma  This can be evaluated with ultrasound  Workstation performed: [id number]     XR chest portable    Result Date: 7/20/2022  Narrative: CHEST INDICATION:   SOB  COMPARISON:  Chest radiograph from 11/2/2021 and chest CT from 7/1/2021  EXAM PERFORMED/VIEWS:  XR CHEST PORTABLE FINDINGS: Cardiomediastinal silhouette appears unremarkable  Opacity in the left costophrenic sulcus  No pneumothorax or pleural effusion  Osseous structures appear within normal limits for patient age  Impression: Mild opacity in the left costophrenic sulcus, at least in part due to linear atelectasis and pericardial fat pad  Underlying pneumonia cannot be excluded in the appropriate clinical setting   Workstation performed: [id number]     CTA ED chest PE Study    Result Date: 7/20/2022  Narrative: CTA - CHEST WITH IV CONTRAST - PULMONARY ANGIOGRAM INDICATION:   Increased SOB, hx of PE/DVT, concern for hypotension and tachycardia on arrival   History of breast cancer status post right lumpectomy  COMPARISON: Chest radiograph from 7/20/2022 and chest CT from 7/1/2021 and 9/25/2014  TECHNIQUE: CT angiogram timed for optimal opacification of the pulmonary arteries  Axial, sagittal, and coronal 2D reformats created from source data  Coronal 3D MIP postprocessing on the acquisition scanner  Radiation dose length product (DLP):  475 44 mGy-cm   Radiation dose exposure minimized using iterative reconstruction and automated exposure control  IV Contrast:  70 mL of iohexol (OMNIPAQUE)  FINDINGS: PULMONARY ARTERIES:  No pulmonary embolus  LUNGS: Redemonstration of subtle upper lobe predominant centrilobular nodules  Stable 3 mm nodule in the posterior right apex (3/19) and inferior right upper lobe (3/48)  Benign intrapulmonary lymph node abutting the minor fissure (3/48)  Mild centrilobular emphysema  AIRWAYS: Minimal debris in a left lower lobe bronchus with no significant filling defects in the airways  PLEURA:  Unremarkable  HEART/GREAT VESSELS:  Mild cardiomegaly  Moderate coronary artery calcification indicating atherosclerotic heart disease  MEDIASTINUM AND HALEY:  Unremarkable  CHEST WALL AND LOWER NECK: Surgical changes from right lumpectomy  UPPER ABDOMEN:  Unremarkable  OSSEOUS STRUCTURES:  Mild degenerative disease in the spine  Healed bilateral rib fractures  Impression: No pulmonary embolus  Redemonstration of mild bilateral upper lobe predominant centrilobular nodules compatible with respiratory bronchiolitis, a smoking-related lung disease  Workstation performed: [id number]         Review of Systems   Cardiovascular: Positive for dyspnea on exertion  Neurological: Positive for light-headedness  All other systems reviewed and are negative        Vitals:    08/10/22 1348   BP: 132/72   Pulse: 104     Vitals:    08/10/22 1348   Weight: 99 2 kg (218 lb 12 8 oz)     Height: 5' 7" (170 2 cm)   Body mass index is 34 27 kg/m²  Physical Exam:  Physical Exam  Vitals reviewed  Constitutional:       General: She is not in acute distress  Appearance: She is obese  She is not diaphoretic  HENT:      Head: Normocephalic and atraumatic  Eyes:      Pupils: Pupils are equal, round, and reactive to light  Neck:      Vascular: No carotid bruit  Cardiovascular:      Rate and Rhythm: Normal rate and regular rhythm  Pulses:           Radial pulses are 2+ on the right side and 2+ on the left side  Heart sounds: S1 normal and S2 normal  No murmur heard  Pulmonary:      Effort: Pulmonary effort is normal  No respiratory distress  Breath sounds: No wheezing or rales  Comments: Coarse breath sounds  Abdominal:      General: There is no distension  Palpations: Abdomen is soft  Tenderness: There is no abdominal tenderness  Musculoskeletal:         General: No deformity  Normal range of motion  Cervical back: Normal range of motion  Right lower leg: No edema  Left lower leg: No edema  Skin:     General: Skin is warm and dry  Findings: No erythema  Neurological:      General: No focal deficit present  Mental Status: She is alert and oriented to person, place, and time  Gait: Gait abnormal (ambulates with cane)     Psychiatric:         Mood and Affect: Mood normal          Behavior: Behavior normal

## 2022-08-10 ENCOUNTER — OFFICE VISIT (OUTPATIENT)
Dept: CARDIOLOGY CLINIC | Facility: HOSPITAL | Age: 62
End: 2022-08-10
Payer: COMMERCIAL

## 2022-08-10 VITALS
WEIGHT: 218.8 LBS | SYSTOLIC BLOOD PRESSURE: 132 MMHG | HEIGHT: 67 IN | DIASTOLIC BLOOD PRESSURE: 72 MMHG | BODY MASS INDEX: 34.34 KG/M2 | HEART RATE: 104 BPM

## 2022-08-10 DIAGNOSIS — E78.5 DYSLIPIDEMIA: ICD-10-CM

## 2022-08-10 DIAGNOSIS — I73.9 PERIPHERAL VASCULAR DISEASE (HCC): ICD-10-CM

## 2022-08-10 DIAGNOSIS — I25.10 NONOCCLUSIVE CORONARY ATHEROSCLEROSIS OF NATIVE CORONARY ARTERY: Primary | ICD-10-CM

## 2022-08-10 DIAGNOSIS — I10 ESSENTIAL HYPERTENSION: ICD-10-CM

## 2022-08-10 DIAGNOSIS — Z72.0 TOBACCO ABUSE: ICD-10-CM

## 2022-08-10 PROCEDURE — 99214 OFFICE O/P EST MOD 30 MIN: CPT | Performed by: PHYSICIAN ASSISTANT

## 2022-08-10 PROCEDURE — 1111F DSCHRG MED/CURRENT MED MERGE: CPT | Performed by: PHYSICIAN ASSISTANT

## 2022-08-10 RX ORDER — ROSUVASTATIN CALCIUM 40 MG/1
40 TABLET, COATED ORAL DAILY
Qty: 90 TABLET | Refills: 3 | Status: SHIPPED | OUTPATIENT
Start: 2022-08-10

## 2022-08-10 NOTE — PATIENT INSTRUCTIONS
Because your cholesterol levels continue to be high, we will stop your Lipitor (atorvastatin) when you finish the prescription  You will then start Crestor (rosuvastatin) 40 mg once daily  After 3 months of being on the new medication, you will need blood work

## 2022-08-15 ENCOUNTER — CONSULT (OUTPATIENT)
Dept: UROLOGY | Facility: CLINIC | Age: 62
End: 2022-08-15
Payer: COMMERCIAL

## 2022-08-15 VITALS
WEIGHT: 219 LBS | DIASTOLIC BLOOD PRESSURE: 80 MMHG | HEIGHT: 67 IN | BODY MASS INDEX: 34.37 KG/M2 | SYSTOLIC BLOOD PRESSURE: 124 MMHG

## 2022-08-15 DIAGNOSIS — N28.1 KIDNEY CYSTS: Primary | ICD-10-CM

## 2022-08-15 PROCEDURE — 99202 OFFICE O/P NEW SF 15 MIN: CPT | Performed by: NURSE PRACTITIONER

## 2022-08-15 NOTE — PROGRESS NOTES
Assessment and plan:     Kidney cysts  · Schedule us of kidney and bladder  · Follow up prn, if imaging stable    SHU Gonzalez    History of Present Illness     Jaren Quinteros is a 58 y o  new patient who was referred for cyst on the kidney  She was in the hospital for low blood pressure and bradycardia, and UTI  She had a ct of the abdomen and pelvis that revealed a 1 3 cm hypodense focus in the lower pole of the left kidney with adjacent stranding and trace fluid seen  [place] Perinephric stranding is seen bilaterally and possibly chronic  Prior US of kidney and bladder from 2020 revealed a 14 mm left lower pole simple cyst   No hydronephrosis  She had urine microscopic 7/20/2022 with 0-1 rbc/hpf and 10-20 wbc  Her culture had mixed contaminants x 3  She was treated with antibiotics for the same  She sees nephrolology for ckd 3  She has had one uti, denies recurrence  Denies gross hematuria  Denies prior kidney stones or issues with her kidneys  She smokes 1 ppd for over 40 years  Laboratory     Lab Results   Component Value Date    BUN 19 07/22/2022    CREATININE 1 29 07/22/2022       No components found for: GFR    Lab Results   Component Value Date    GLUCOSE 104 01/08/2016    CALCIUM 7 9 (L) 07/22/2022     01/08/2016    K 4 0 07/22/2022    CO2 25 07/22/2022    CL 98 07/22/2022       Lab Results   Component Value Date    WBC 13 15 (H) 07/22/2022    HGB 10 6 (L) 07/22/2022    HCT 32 3 (L) 07/22/2022    MCV 97 07/22/2022     (L) 07/22/2022       No results found for: PSA    No results found for this or any previous visit (from the past 1 hour(s))      @RESULT(URINEMICROSCOPIC)@    @RESULT(URINECULTURE)@    Radiology     CT ABDOMEN AND PELVIS WITHOUT IV CONTRAST 7/21/2022     INDICATION:   LLQ abdominal pain  diarrhea, LLQ pain      COMPARISON:  Compared with CT abdomen of 8/10/2016     TECHNIQUE:  CT examination of the abdomen and pelvis was performed without intravenous contrast  This examination was performed without intravenous contrast in the context of the critical nationwide Omnipaque shortage  Axial, sagittal, and coronal 2D   reformatted images were created from the source data and submitted for interpretation       Radiation dose length product (DLP) for this visit:  1037 33 mGy-cm   This examination, like all CT scans performed in the [facility], was performed utilizing techniques to minimize radiation dose exposure, including the use of   iterative reconstruction and automated exposure control       Enteric contrast was administered       FINDINGS:     ABDOMEN     LOWER CHEST:  No clinically significant abnormality identified in the visualized lower chest      LIVER/BILIARY TREE:  Left hepatic lobe along the falciform ligament demonstrates the hypodensity along the capsule measuring 2 3 x 1 5 cm which correlates with the suspected hemangioma previously at the site  Small hypodensity more inferiorly and   anteriorly in image 25 measuring 5 6 mm  Diffuse hypodensity of the liver of fatty infiltration      GALLBLADDER:  No calcified gallstones  No pericholecystic inflammatory change      SPLEEN:  Unremarkable  Small splenule in the hilar region unchanged      PANCREAS:  Unremarkable      ADRENAL GLANDS:  Unremarkable      KIDNEYS/URETERS:  Approximately 1 3 cm hypodense focus in the lower pole of the left kidney with adjacent stranding and trace fluid seen  [place] Perinephric stranding is seen bilaterally  This could be chronic  No hydronephrosis      STOMACH AND BOWEL:  Unremarkable      APPENDIX:  No findings to suggest appendicitis      ABDOMINOPELVIC CAVITY:  No ascites  No pneumoperitoneum  No lymphadenopathy      VESSELS:  Unremarkable for patient's age    IVC filter is seen in place      PELVIS     REPRODUCTIVE ORGANS:  Unremarkable for patient's age      URINARY BLADDER:  Unremarkable      ABDOMINAL WALL/INGUINAL REGIONS:  Unremarkable      OSSEOUS STRUCTURES: No acute fracture or destructive osseous lesion      IMPRESSION:     1  Hyperdense focus in the left kidney lower pole with adjacent stranding and trace fluid  Possible hemorrhagic cyst or focal pyelonephritis  However bilateral perinephric stranding seen and may be chronic  Correlate with renal function  Ultrasound   evaluation as indicated clinically      2   Left hepatic lobe hypodensity along the capsule correlates with previously suspected hemangioma  This can be evaluated with ultrasound            Workstation performed: [id number]  Review of Systems     Review of Systems   Constitutional: Negative for activity change, appetite change, chills, fatigue, fever and unexpected weight change  HENT: Negative for facial swelling  Eyes: Negative for discharge  Respiratory: Negative  Negative for cough and shortness of breath  Cardiovascular: Negative for chest pain and leg swelling  Gastrointestinal: Negative  Negative for abdominal distention, abdominal pain, constipation, diarrhea, nausea and vomiting  Endocrine: Negative  Genitourinary: Negative  Negative for decreased urine volume, difficulty urinating, dysuria, enuresis, flank pain, frequency, genital sores, hematuria and urgency  Musculoskeletal: Positive for gait problem  Negative for back pain and myalgias  Skin: Negative for pallor and rash  Allergic/Immunologic: Negative  Negative for immunocompromised state  Neurological: Negative for facial asymmetry and speech difficulty  Psychiatric/Behavioral: Negative for agitation and confusion  Allergies     Allergies   Allergen Reactions    Amoxicillin-Pot Clavulanate GI Intolerance    Anastrozole Other (See Comments)     Diarrhea, Nausea, Stomach pain        Physical Exam     Physical Exam  Vitals reviewed  Constitutional:       General: She is not in acute distress  Appearance: Normal appearance  She is obese  She is not ill-appearing, toxic-appearing or diaphoretic  HENT:      Head: Normocephalic and atraumatic  Eyes:      General: No scleral icterus  Cardiovascular:      Rate and Rhythm: Normal rate  Pulmonary:      Effort: Pulmonary effort is normal  No respiratory distress  Abdominal:      General: Abdomen is flat  There is no distension  Palpations: Abdomen is soft  Tenderness: There is no abdominal tenderness  There is no right CVA tenderness, left CVA tenderness, guarding or rebound  Musculoskeletal:         General: No swelling  Cervical back: Normal range of motion  Right lower leg: No edema  Left lower leg: No edema  Skin:     General: Skin is warm and dry  Coloration: Skin is not jaundiced or pale  Findings: No rash  Neurological:      General: No focal deficit present  Mental Status: She is alert and oriented to person, place, and time  Gait: Gait normal    Psychiatric:         Mood and Affect: Mood normal          Behavior: Behavior normal          Thought Content:  Thought content normal          Judgment: Judgment normal          Vital Signs     Vitals:    08/15/22 1320   BP: 124/80   Weight: 99 3 kg (219 lb)   Height: 5' 7" (1 702 m)       Current Medications       Current Outpatient Medications:     albuterol (2 5 mg/3 mL) 0 083 % nebulizer solution, Take 1 vial (2 5 mg total) by nebulization every 4 (four) hours as needed for shortness of breath, Disp: 120 mL, Rfl: 3    amitriptyline (ELAVIL) 100 mg tablet, take 1/2 tablet by mouth once daily at bedtime, Disp: 45 tablet, Rfl: 1    aspirin (ECOTRIN LOW STRENGTH) 81 mg EC tablet, Take 1 tablet (81 mg total) by mouth daily, Disp: 90 tablet, Rfl: 3    BD Pen Needle Jasmine 2nd Gen 32G X 4 MM MISC, use 1 PEN NEEDLE to inject MEDICATION subcutaneously once daily, Disp: 90 each, Rfl: 3    benztropine (COGENTIN) 0 5 mg tablet, Take 0 5 mg by mouth daily at bedtime  , Disp: , Rfl:     Blood Glucose Monitoring Suppl (ONE TOUCH ULTRA 2) w/Device KIT, by Does not apply route, Disp: , Rfl:     Blood Glucose Monitoring Suppl (ONE TOUCH ULTRA 2) w/Device KIT, by Does not apply route daily, Disp: 100 each, Rfl: 0    clonazePAM (KlonoPIN) 0 5 mg tablet, Take 0 5 mg by mouth daily as needed  , Disp: , Rfl:     gabapentin (NEURONTIN) 600 MG tablet, take 1 tablet by mouth three times a day, Disp: 270 tablet, Rfl: 1    glucose blood (OneTouch Ultra) test strip, Use 1 each daily Use as instructed, Disp: 100 strip, Rfl: 2    pantoprazole (PROTONIX) 40 mg tablet, Take 1 tablet (40 mg total) by mouth 2 (two) times a day, Disp: 20 tablet, Rfl: 0    rosuvastatin (CRESTOR) 40 MG tablet, Take 1 tablet (40 mg total) by mouth daily, Disp: 90 tablet, Rfl: 3    traZODone (DESYREL) 150 mg tablet, 150 mg daily at bedtime , Disp: , Rfl:     Victoza injection, inject 1 2 milligram subcutaneously daily, Disp: 6 mL, Rfl: 5    vilazodone (VIIBRYD) 40 mg tablet, Take 40 mg by mouth daily  , Disp: , Rfl:     VRAYLAR 6 MG capsule, Take 6 mg by mouth daily, Disp: , Rfl: 0    albuterol (Ventolin HFA) 90 mcg/act inhaler, Inhale 2 puffs every 4 (four) hours as needed for wheezing (Patient not taking: No sig reported), Disp: 18 g, Rfl: 4    nicotine (NICODERM CQ) 21 mg/24 hr TD 24 hr patch, Place 1 patch on the skin daily (Patient not taking: No sig reported), Disp: 28 patch, Rfl: 0    tiotropium (Spiriva Respimat) 2 5 MCG/ACT AERS inhaler, Inhale 2 puffs daily (Patient not taking: No sig reported), Disp: 4 g, Rfl: 3    Active Problems     Patient Active Problem List   Diagnosis    Cataract    Chronic hoarseness    Chronic low back pain    Centrilobular emphysema (HCC)    Deep vein thrombophlebitis of leg, unspecified laterality (HCC)    Depression    Diabetes mellitus with neurological manifestation (HCC)    Gastroesophageal reflux disease with esophagitis    Headache    Hypercholesterolemia    Hypercoagulable state ([facility] [address] )    Hypertension    Hypothyroidism    Mammogram abnormal  Migraine    Myositis    Neuropathy    Pulmonary embolism (HCC)    Pulmonary nodule seen on imaging study    Type 2 diabetes mellitus with hyperglycemia, without long-term current use of insulin (HCC)    Stage 2 chronic kidney disease    FAM (acute kidney injury) (HCC)    Insect bite of ear, infected, left, initial encounter    Chronic hypoxemic respiratory failure (HCC)    Continuous dependence on cigarette smoking    Class 1 obesity due to excess calories with serious comorbidity and body mass index (BMI) of 33 0 to 33 9 in adult    Breast nodule    MARQUEZ (dyspnea on exertion)    Sleep apnea    Obstructive sleep apnea syndrome    Family history of heart disease    Pulmonary hypertension ([address] )    Chest pain    Primary fibromyalgia syndrome    Primary cancer of upper outer quadrant of right breast (HCC)    Malignant neoplasm of upper-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, estrogen receptor positive ([facility] [address] )    Mucinous carcinoma of breast, 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[facility] [address] )    Tobacco abuse    Peripheral arterial disease (HCC)    Volume depletion    Hypotension due to hypovolemia    Tachycardia    Stage 3a chronic kidney disease (HCC)    Sepsis due to other etiology ([facility] [address] )    Chronic obstructive pulmonary disease with acute exacerbation (HCC)    Acute cystitis without hematuria    Acute respiratory failure with hypoxia (HCC)    Diarrhea of presumed infectious origin    Kidney lesion    Nonocclusive coronary atherosclerosis of native coronary artery    Kidney cysts       Past Medical History     Past Medical History:   Diagnosis Date    Cancer [facility])     right breast    Chronic back pain     Colitis     COPD (chronic obstructive pulmonary disease) ([facility] [facility] )     Depression     Diabetes mellitus ([address] )     DVT of lower limb, acute ([facility] [address] [facility] ) 2004    GERD (gastroesophageal reflux disease)     Hyperlipidemia     Pneumonia     Rapid heart rate     Sleep apnea     Stroke ([facility] [facility] )     4 mini strokes Surgical History     Past Surgical History:   Procedure Laterality Date    BREAST LUMPECTOMY Right     CARDIAC CATHETERIZATION N/A 10/28/2021    Procedure: Cardiac Coronary Angiogram;  Surgeon: Dorothy George DO;  [facility]; Service: Cardiology    IVC FILTER INSERTION      MASTECTOMY W/ SENTINEL NODE BIOPSY Right 2021    Procedure: BREAST MASTECTOMY WITH BIOPSY LYMPH NODE SENTINEL and axillary node dissection  ([employer] Lymph Node Injection @ 12:30);  Surgeon: Litzy Kohli MD;  Location: [address] [place] [address] OR;  Service: General    US GUIDANCE BREAST BIOPSY RIGHT EACH ADDITIONAL Right 10/13/2021    US GUIDANCE BREAST BIOPSY RIGHT EACH ADDITIONAL Right 10/13/2021    US GUIDED BREAST BIOPSY RIGHT COMPLETE Right 10/13/2021       Family History     Family History   Problem Relation Age of Onset    Breast cancer Mother 67    COPD Mother     Coronary artery disease Mother     Coronary artery disease Father     Stroke Father     Lung cancer Sister     Breast cancer Maternal Grandmother     Colon cancer Paternal Grandfather     No Known Problems Maternal Aunt     No Known Problems Maternal Aunt     No Known Problems Maternal Aunt     No Known Problems Paternal Aunt     Breast cancer Cousin        Social History     Social History     Social History     Tobacco Use   Smoking Status Current Every Day Smoker    Packs/day: 1 50    Types: Cigarettes    Last attempt to quit: 2021    Years since quittin 7   Smokeless Tobacco Never Used       Past Surgical History:   Procedure Laterality Date    BREAST LUMPECTOMY Right     CARDIAC CATHETERIZATION N/A 10/28/2021    Procedure: Cardiac Coronary Angiogram;  Surgeon: Dorothy George DO;  [facility];   Service: Cardiology    IVC FILTER INSERTION      MASTECTOMY W/ SENTINEL NODE BIOPSY Right 2021    Procedure: BREAST MASTECTOMY WITH BIOPSY LYMPH NODE SENTINEL and axillary node dissection ([place] Lymph Node Injection @ 12:30);  Surgeon: Asif Nova MD;  Location: [facility] MAIN OR;  Service: General    US GUIDANCE BREAST BIOPSY RIGHT EACH ADDITIONAL Right 10/13/2021    US GUIDANCE BREAST BIOPSY RIGHT EACH ADDITIONAL Right 10/13/2021    US GUIDED BREAST BIOPSY RIGHT COMPLETE Right 10/13/2021         The following portions of the patient's history were reviewed and updated as appropriate: allergies, current medications, past family history, past medical history, past social history, past surgical history and problem list    Please note :  Voice dictation software has been used to create this document  There may be inadvertent transcription errors      [id number] [address]

## 2022-08-15 NOTE — PATIENT INSTRUCTIONS
Kidney Cyst   WHAT YOU NEED TO KNOW:   What is a kidney cyst?  A kidney cyst is a fluid-filled sac that grows in your kidney  A simple cyst usually does not contain cancer  A complex cyst contains calcium deposits and needs to be checked over time for cancer  You may have one or more cysts  Both kidneys may form cysts at the same time  What increases my risk for a kidney cyst?   Age older than 48 years    Male gender    A disease that affects how your kidneys work    High blood pressure    Smoking cigarettes    Family history of a kidney cyst    What are the signs and symptoms of a kidney cyst?  Kidney cysts usually do not cause symptoms  A cyst that grows large may cause pain or discomfort in your side or abdomen  You may have blood in your urine or dark urine, or develop high blood pressure  Tenderness along with pain and a fever may be a sign of an infected cyst   How are kidney cysts diagnosed? Kidney cysts are usually found during tests for another reason  Once the cyst is found, you may need the following: An ultrasound, CT, or MRI  may be used to take pictures of your kidneys and any cysts  You may be given contrast liquid to help your kidneys show up better in the pictures  Tell the healthcare provider if you have ever had an allergic reaction to contrast liquid  Do not enter the MRI room with any metal  Metal may cause serious injury  Tell the healthcare provider if you have any metal in or on your body  Blood tests  may be used to check your kidney function  How are kidney cysts treated? Your cyst may need no treatment  Your healthcare provider may want you to have tests to check the size of the cyst over time  You may also need tests if you have hard deposits in the cyst  The deposits will be checked for cancer or other material that can cause health problems  Fluid may need to be drained from a cyst that becomes infected, bursts, or blocks blood or urine flow in the kidney   Surgery may be needed if the cyst is large  What can I do to prevent or manage kidney cysts? Drink liquids as directed  Liquids help your kidneys work correctly  They can also help prevent a urinary tract infection  Ask your healthcare provider how much liquid to have each day and which liquids are best for you  Ask if you need to limit or not drink alcohol  Alcohol may damage your kidneys  Manage health conditions  Over time, conditions such as diabetes and high blood pressure that are not controlled may damage your kidneys  Do not smoke  Smoking may narrow blood vessels in your kidneys and raise your blood pressure  Smoking can also damage your kidneys  Ask your healthcare provider for information if you currently smoke and need help quitting  E-cigarettes or smokeless tobacco still contain nicotine  Talk to your healthcare provider before you use these products  When should I seek immediate care? You have blood in your urine or your urine is dark  You have trouble urinating, or you are urinating more often than usual     You have a fever along with pain or tenderness below your ribcage and above your hip bones  When should I contact my healthcare provider? You have questions or concerns about your condition or care  CARE AGREEMENT:   You have the right to help plan your care  Learn about your health condition and how it may be treated  Discuss treatment options with your healthcare providers to decide what care you want to receive  You always have the right to refuse treatment  The above information is an  only  It is not intended as medical advice for individual conditions or treatments  Talk to your doctor, nurse or pharmacist before following any medical regimen to see if it is safe and effective for you  © Copyright [employer] 2022 Information is for End User's use only and may not be sold, redistributed or otherwise used for commercial purposes   All illustrations and images included in CareNotes® are the copyrighted property of A D A M [employer]  or Lisseth Hernandes

## 2022-08-16 ENCOUNTER — TELEPHONE (OUTPATIENT)
Dept: NEPHROLOGY | Facility: CLINIC | Age: 62
End: 2022-08-16

## 2022-08-25 ENCOUNTER — OFFICE VISIT (OUTPATIENT)
Dept: NEPHROLOGY | Facility: CLINIC | Age: 62
End: 2022-08-25
Payer: COMMERCIAL

## 2022-08-25 VITALS
OXYGEN SATURATION: 94 % | BODY MASS INDEX: 34.21 KG/M2 | HEART RATE: 107 BPM | DIASTOLIC BLOOD PRESSURE: 90 MMHG | HEIGHT: 67 IN | SYSTOLIC BLOOD PRESSURE: 142 MMHG | WEIGHT: 218 LBS

## 2022-08-25 DIAGNOSIS — R00.0 TACHYCARDIA: ICD-10-CM

## 2022-08-25 DIAGNOSIS — N28.9 KIDNEY LESION: ICD-10-CM

## 2022-08-25 DIAGNOSIS — N18.31 STAGE 3A CHRONIC KIDNEY DISEASE (HCC): Primary | ICD-10-CM

## 2022-08-25 DIAGNOSIS — I10 PRIMARY HYPERTENSION: ICD-10-CM

## 2022-08-25 DIAGNOSIS — A04.8 H. PYLORI INFECTION: ICD-10-CM

## 2022-08-25 DIAGNOSIS — E11.65 TYPE 2 DIABETES MELLITUS WITH HYPERGLYCEMIA, WITHOUT LONG-TERM CURRENT USE OF INSULIN (HCC): ICD-10-CM

## 2022-08-25 DIAGNOSIS — E66.09 CLASS 1 OBESITY DUE TO EXCESS CALORIES WITH SERIOUS COMORBIDITY AND BODY MASS INDEX (BMI) OF 33.0 TO 33.9 IN ADULT: ICD-10-CM

## 2022-08-25 PROCEDURE — 99214 OFFICE O/P EST MOD 30 MIN: CPT | Performed by: PHYSICIAN ASSISTANT

## 2022-08-25 PROCEDURE — 3066F NEPHROPATHY DOC TX: CPT | Performed by: NURSE PRACTITIONER

## 2022-08-25 RX ORDER — PANTOPRAZOLE SODIUM 40 MG/1
40 TABLET, DELAYED RELEASE ORAL DAILY PRN
Qty: 30 TABLET | Refills: 1 | Status: SHIPPED | OUTPATIENT
Start: 2022-08-25 | End: 2022-09-19 | Stop reason: SDUPTHER

## 2022-08-25 RX ORDER — CARVEDILOL 3.12 MG/1
3.12 TABLET ORAL 2 TIMES DAILY WITH MEALS
Qty: 60 TABLET | Refills: 3 | Status: SHIPPED | OUTPATIENT
Start: 2022-08-25 | End: 2022-09-24

## 2022-08-25 NOTE — ASSESSMENT & PLAN NOTE
Lab Results   Component Value Date    EGFR 44 07/22/2022    EGFR 40 07/21/2022    EGFR 34 07/20/2022    CREATININE 1 29 07/22/2022    CREATININE 1 39 (H) 07/21/2022    CREATININE 1 58 (H) 07/20/2022   Acute kidney injury resolved to baseline  Personally reviewed graph of renal function trends with patient  Patient is not on an ACE-I, ARB or SGLT-2 inhibitor  She was previously on lisinopril 2 5 mg, which was held at the time of acute kidney injury  At this time my biggest concern is tachycardia, which I do not believe has been worked up  I will initiate carvedilol 3 125 mg every 12 hours asked her to follow-up with cardiology  Optimize hemodynamics  Patient was advised to avoid nephrotoxins  Continue management of healthy weight, diabetes and hypertension  Please renally dose medication for a creatinine clearance of 40 mL/min  Patient has not yet undergone Kidney Smart education  Continue to monitor renal function, anemia and bone-mineral parameters

## 2022-08-25 NOTE — ASSESSMENT & PLAN NOTE
She denies any history of atrial fibrillation or being worked up for tachycardia or a fast heart rate  Denies chest pain, sensation of heart racing or palpitations  She was just seen by Cardiology on 08/10/2022, but she has not been worked up for tachycardia in the past as far as I can tell  She did smoke a cigarette prior to our visit  She is not currently on a beta-blocker nor has been in the past   Will start carvedilol 3 125 mg twice daily, every 12 hours  I would like her to see Cardiology to follow up to see if there is any further workup needed  I did advise her regarding smoking cessation

## 2022-08-25 NOTE — PROGRESS NOTES
Assessment & Plan:    1  Stage 3a chronic kidney disease [facility])  Assessment & Plan:  Lab Results   Component Value Date    EGFR 44 07/22/2022    EGFR 40 07/21/2022    EGFR 34 07/20/2022    CREATININE 1 29 07/22/2022    CREATININE 1 39 (H) 07/21/2022    CREATININE 1 58 (H) 07/20/2022   Acute kidney injury resolved to baseline  Personally reviewed graph of renal function trends with patient  Patient is not on an ACE-I, ARB or SGLT-2 inhibitor  She was previously on lisinopril 2 5 mg, which was held at the time of acute kidney injury  At this time my biggest concern is tachycardia, which I do not believe has been worked up  I will initiate carvedilol 3 125 mg every 12 hours asked her to follow-up with cardiology  Optimize hemodynamics  Patient was advised to avoid nephrotoxins  Continue management of healthy weight, diabetes and hypertension  Please renally dose medication for a creatinine clearance of 40 mL/min  Patient has not yet undergone [facility] education  Continue to monitor renal function, anemia and bone-mineral parameters  Orders:  -     CBC and differential; Future; Expected date: 12/12/2022  -     Comprehensive metabolic panel; Future; Expected date: 12/12/2022  -     Magnesium; Future; Expected date: 12/12/2022  -     Microalbumin / creatinine urine ratio; Future; Expected date: 12/12/2022  -     Phosphorus; Future; Expected date: 12/12/2022  -     Protein / creatinine ratio, urine; Future; Expected date: 12/12/2022  -     PTH, intact; Future; Expected date: 12/12/2022  -     Urinalysis with microscopic; Future; Expected date: 12/12/2022    2  Type 2 diabetes mellitus with hyperglycemia, without long-term current use of insulin ([facility])    3  Primary hypertension  Assessment & Plan:  Blood pressure is borderline  Lisinopril is on hold from acute kidney injury  At this time, will follow with addition of carvedilol 3 125 mg twice daily    She is to follow-up with cardiology regarding new finding of tachycardia which I do not believe has been worked up  Again, a cigarette cessation  May require additional antihypertensives  Okay to restart lisinopril from renal standpoint if her follow-up CMP is stable  4  Class 1 obesity due to excess calories with serious comorbidity and body mass index (BMI) of 33 0 to 33 9 in adult  Assessment & Plan:  BMI greater than 40 is associated with greater risk of progression of renal disease secondary to glomerular hyperfiltration  Recommend weight loss  5  Tachycardia  Assessment & Plan:    She denies any history of atrial fibrillation or being worked up for tachycardia or a fast heart rate  Denies chest pain, sensation of heart racing or palpitations  She was just seen by Cardiology on 08/10/2022, but she has not been worked up for tachycardia in the past as far as I can tell  She did smoke a cigarette prior to our visit  She is not currently on a beta-blocker nor has been in the past   Will start carvedilol 3 125 mg twice daily, every 12 hours  I would like her to see Cardiology to follow up to see if there is any further workup needed  I did advise her regarding smoking cessation  Orders:  -     carvedilol (COREG) 3 125 mg tablet; Take 1 tablet (3 125 mg total) by mouth 2 (two) times a day with meals    6  Kidney lesion  Assessment & Plan:  She is to have follow-up renal ultrasound  The benefits, risks and alternatives to the treatment plan were discussed at this visit  Patient was advised of common adverse effects of any medical therapies prescribed  All questions were answered and discussed with the patient and any accompanying family members or caretakers  Subjective:      Patient ID: Sophia Jackson is a 58 y o  female seen in the [place] office  Patient was last seen by Nephrology on 07/20/2022 for volume depletion, which time she was instructed to hold ACE-inhibitor      Patient was hospitalized at [address] [place] [address] from 7/20/22-7/22/22 for sepsis in the setting of likely urinary tract infection  Nephrology was not consulted during his hospitalization  HPI     Today, patient presents for follow-up of volume depletion with risk of acute kidney injury  Blood pressure is 142/90,   She denies any history of atrial fibrillation or being worked up for tachycardia or a fast heart rate  Denies chest pain, sensation of heart racing or palpitations  She was just seen by Cardiology on 08/10/2022, but she has not been worked up for tachycardia in the past   She did smoke a cigarette prior to our visit  She is not currently on a beta-blocker nor has been in the past   Patient does not monitor blood pressures at home  Denies headaches, lightheadedness, dizziness  No longer on lisinopril  Patient denies lower extremity swelling  Reviewed and discussed the results of labs performed 07/22/2022, which time renal function was at baseline with creatinine 1 29 mg/dL with estimated GFR 44 mL/min  History is obtained from patient  The following portions of the patient's history were reviewed and updated as appropriate: allergies, current medications, past family history, past medical history, past social history, past surgical history, and problem list     Review of Systems   Constitutional: Negative for activity change, appetite change, chills, fatigue, fever and unexpected weight change  Respiratory: Negative for apnea, cough and shortness of breath  Cardiovascular: Negative for chest pain, palpitations and leg swelling  Gastrointestinal: Negative for abdominal pain, blood in stool, constipation, diarrhea, nausea and vomiting  Genitourinary: Negative for decreased urine volume, difficulty urinating, dysuria, flank pain, frequency, hematuria and urgency  Musculoskeletal: Negative for arthralgias, back pain, joint swelling and myalgias  Skin: Negative for color change and rash     Neurological: Negative for dizziness, seizures, syncope, weakness, light-headedness, numbness and headaches  Hematological: Negative for adenopathy  Does not bruise/bleed easily  Psychiatric/Behavioral: Negative for agitation, behavioral problems, confusion, decreased concentration, dysphoric mood and hallucinations  The patient is not nervous/anxious and is not hyperactive  All other systems reviewed and are negative  Objective:      /90   Pulse (!) 107   Ht 5' 7" (1 702 m)   Wt 98 9 kg (218 lb)   SpO2 94%   BMI 34 14 kg/m²          Physical Exam  Vitals and nursing note reviewed  Exam conducted with a chaperone present  Constitutional:       General: She is not in acute distress  Appearance: Normal appearance  She is normal weight  She is not ill-appearing or toxic-appearing  HENT:      Head: Normocephalic and atraumatic  Nose: Nose normal  No congestion or rhinorrhea  Mouth/Throat:      Mouth: Mucous membranes are moist       Pharynx: Oropharynx is clear  Eyes:      General: No scleral icterus  Right eye: No discharge  Left eye: No discharge  Extraocular Movements: Extraocular movements intact  Conjunctiva/sclera: Conjunctivae normal       Pupils: Pupils are equal, round, and reactive to light  Cardiovascular:      Rate and Rhythm: Regular rhythm  Tachycardia present  Pulses: Normal pulses  Heart sounds: Normal heart sounds  No murmur heard  Pulmonary:      Effort: Pulmonary effort is normal  No respiratory distress  Breath sounds: Normal breath sounds  No wheezing  Abdominal:      General: Abdomen is flat  Bowel sounds are normal  There is no distension  Palpations: Abdomen is soft  There is no mass  Tenderness: There is no abdominal tenderness  Musculoskeletal:         General: No swelling or deformity  Normal range of motion  Cervical back: Normal range of motion and neck supple  No rigidity or tenderness     Skin:     General: Skin is warm and dry  Coloration: Skin is not jaundiced or pale  Findings: No bruising  Neurological:      General: No focal deficit present  Mental Status: She is alert and oriented to person, place, and time  Mental status is at baseline  Psychiatric:         Mood and Affect: Mood normal          Behavior: Behavior normal          Thought Content:  Thought content normal          Judgment: Judgment normal              Lab Results   Component Value Date     01/08/2016    SODIUM 134 (L) 07/22/2022    K 4 0 07/22/2022    CL 98 07/22/2022    CO2 25 07/22/2022    ANIONGAP 10 01/08/2016    AGAP 11 07/22/2022    BUN 19 07/22/2022    CREATININE 1 29 07/22/2022    GLUC 333 (H) 07/22/2022    GLUF 99 03/29/2022    CALCIUM 7 9 (L) 07/22/2022    AST 15 07/21/2022    ALT 21 07/21/2022    ALKPHOS 54 07/21/2022    PROT 7 5 11/30/2015    TP 6 9 07/21/2022    BILITOT 0 53 11/30/2015    TBILI 0 25 07/21/2022    EGFR 44 07/22/2022      Lab Results   Component Value Date    CREATININE 1 29 07/22/2022    CREATININE 1 39 (H) 07/21/2022    CREATININE 1 58 (H) 07/20/2022    CREATININE 1 25 03/29/2022    CREATININE 0 96 11/10/2021    CREATININE 1 24 11/02/2021    CREATININE 0 99 10/28/2021    CREATININE 1 25 10/18/2021    CREATININE 1 34 (H) 09/13/2021    CREATININE 0 98 05/28/2020    CREATININE 1 13 04/27/2020    CREATININE 0 82 03/07/2019    CREATININE 0 87 09/19/2018    CREATININE 0 91 11/27/2017    CREATININE 0 84 09/24/2016      Lab Results   Component Value Date    COLORU Yellow 07/20/2022    CLARITYU Clear 07/20/2022    SPECGRAV 1 010 07/20/2022    PHUR 5 5 07/20/2022    LEUKOCYTESUR Moderate (A) 07/20/2022    NITRITE Negative 07/20/2022    PROTEIN UA Negative 07/20/2022    GLUCOSEU Negative 07/20/2022    KETONESU Negative 07/20/2022    UROBILINOGEN 0 2 07/20/2022    BILIRUBINUR Negative 07/20/2022    BLOODU Negative 07/20/2022    RBCUA 0-1 07/20/2022    WBCUA 10-20 (A) 07/20/2022    EPIS Occasional 07/20/2022 BACTERIA Moderate (A) 07/20/2022      No results found for: LABPROT  No results found for: Josiah Simsl  Lab Results   Component Value Date    WBC 13 15 (H) 07/22/2022    HGB 10 6 (L) 07/22/2022    HCT 32 3 (L) 07/22/2022    MCV 97 07/22/2022     (L) 07/22/2022      Lab Results   Component Value Date    HGB 10 6 (L) 07/22/2022    HGB 11 4 (L) 07/21/2022    HGB 11 2 (L) 07/20/2022    HGB 13 5 03/29/2022    HGB 11 3 (L) 11/10/2021      Lab Results   Component Value Date    IRON 48 (L) 11/27/2017      No results found for: PTHCALCIUM, [id number], PHOSPHORUS   Lab Results   Component Value Date    CHOLESTEROL 267 (H) 03/29/2022    HDL 28 (L) 03/29/2022    LDLCALC 169 (H) 03/29/2022    TRIG 351 (H) 03/29/2022      No results found for: URICACID   Lab Results   Component Value Date    HGBA1C 7 1 (A) 08/03/2022      Lab Results   Component Value Date    FREET4 1 03 04/27/2020      No results found for: JOSE MARTIN, DSDNAAB, RFIGM   Lab Results   Component Value Date    PROT 7 5 11/30/2015        Portions of the record may have been created with voice recognition software  Occasional wrong word or "sound a like" substitutions may have occurred due to the inherent limitations of voice recognition software  Read the chart carefully and recognize, using context, where substitutions have occurred  If you have any questions, please contact the dictating provider

## 2022-08-25 NOTE — ASSESSMENT & PLAN NOTE
Blood pressure is borderline  Lisinopril is on hold from acute kidney injury  At this time, will follow with addition of carvedilol 3 125 mg twice daily  She is to follow-up with cardiology regarding new finding of tachycardia which I do not believe has been worked up  Again, a cigarette cessation  May require additional antihypertensives  Okay to restart lisinopril from renal standpoint if her follow-up CMP is stable

## 2022-08-29 ENCOUNTER — HOSPITAL ENCOUNTER (OUTPATIENT)
Dept: ULTRASOUND IMAGING | Facility: HOSPITAL | Age: 62
Discharge: HOME | End: 2022-08-29
Payer: COMMERCIAL

## 2022-08-29 DIAGNOSIS — N28.1 KIDNEY CYSTS: ICD-10-CM

## 2022-08-29 PROCEDURE — 76770 US EXAM ABDO BACK WALL COMP: CPT

## 2022-09-02 DIAGNOSIS — N28.1 KIDNEY CYSTS: Primary | ICD-10-CM

## 2022-09-02 NOTE — RESULT ENCOUNTER NOTE
Please let patient know that her ultrasound and CT scan had conflicting reports, I would like her to have an mri for better determination

## 2022-09-06 ENCOUNTER — TELEPHONE (OUTPATIENT)
Dept: UROLOGY | Facility: AMBULATORY SURGERY CENTER | Age: 62
End: 2022-09-06

## 2022-09-06 NOTE — TELEPHONE ENCOUNTER
----- Message from [address] [place] [address] sent at 9/2/2022  2:50 PM EDT -----  Please let patient know that her ultrasound and CT scan had conflicting reports, I would like her to have an mri for better determination

## 2022-09-11 DIAGNOSIS — G89.29 OTHER CHRONIC PAIN: ICD-10-CM

## 2022-09-13 RX ORDER — GABAPENTIN 600 MG/1
TABLET ORAL
Qty: 270 TABLET | Refills: 1 | Status: SHIPPED | OUTPATIENT
Start: 2022-09-13

## 2022-09-19 ENCOUNTER — TELEPHONE (OUTPATIENT)
Dept: FAMILY MEDICINE CLINIC | Facility: HOME HEALTHCARE | Age: 62
End: 2022-09-19

## 2022-09-19 DIAGNOSIS — A04.8 H. PYLORI INFECTION: ICD-10-CM

## 2022-09-19 DIAGNOSIS — E11.9 DIABETES MELLITUS WITHOUT COMPLICATION (HCC): ICD-10-CM

## 2022-09-19 DIAGNOSIS — K21.9 GERD WITHOUT ESOPHAGITIS: Primary | ICD-10-CM

## 2022-09-19 RX ORDER — PEN NEEDLE, DIABETIC 32GX 5/32"
NEEDLE, DISPOSABLE MISCELLANEOUS DAILY
Qty: 90 EACH | Refills: 1 | Status: SHIPPED | OUTPATIENT
Start: 2022-09-19

## 2022-09-19 RX ORDER — LIRAGLUTIDE 6 MG/ML
1.2 INJECTION SUBCUTANEOUS DAILY
Qty: 6 ML | Refills: 2 | Status: SHIPPED | OUTPATIENT
Start: 2022-09-19 | End: 2022-12-18

## 2022-09-19 RX ORDER — PANTOPRAZOLE SODIUM 40 MG/1
40 TABLET, DELAYED RELEASE ORAL DAILY PRN
Qty: 30 TABLET | Refills: 1 | Status: SHIPPED | OUTPATIENT
Start: 2022-09-19

## 2022-09-21 ENCOUNTER — HOSPITAL ENCOUNTER (OUTPATIENT)
Dept: MAMMOGRAPHY | Facility: HOSPITAL | Age: 62
Discharge: HOME | End: 2022-09-21

## 2022-09-21 DIAGNOSIS — Z12.31 ENCOUNTER FOR SCREENING MAMMOGRAM FOR MALIGNANT NEOPLASM OF BREAST: ICD-10-CM

## 2022-09-28 LAB
LEFT EYE DIABETIC RETINOPATHY: NORMAL
RIGHT EYE DIABETIC RETINOPATHY: NORMAL

## 2022-09-29 ENCOUNTER — HOSPITAL ENCOUNTER (OUTPATIENT)
Dept: MAMMOGRAPHY | Facility: HOSPITAL | Age: 62
Discharge: HOME | End: 2022-09-29
Payer: COMMERCIAL

## 2022-09-29 VITALS — WEIGHT: 218.03 LBS | HEIGHT: 67 IN | BODY MASS INDEX: 34.22 KG/M2

## 2022-09-29 DIAGNOSIS — C50.411 PRIMARY CANCER OF UPPER OUTER QUADRANT OF RIGHT BREAST (HCC): ICD-10-CM

## 2022-09-29 DIAGNOSIS — Z90.11 HISTORY OF RIGHT MASTECTOMY: ICD-10-CM

## 2022-09-29 DIAGNOSIS — Z12.31 ENCOUNTER FOR SCREENING MAMMOGRAM FOR MALIGNANT NEOPLASM OF BREAST: ICD-10-CM

## 2022-09-29 PROCEDURE — 77067 SCR MAMMO BI INCL CAD: CPT

## 2022-09-29 PROCEDURE — 77063 BREAST TOMOSYNTHESIS BI: CPT

## 2022-10-06 ENCOUNTER — TELEPHONE (OUTPATIENT)
Dept: SURGERY | Facility: CLINIC | Age: 62
End: 2022-10-06

## 2022-10-06 NOTE — TELEPHONE ENCOUNTER
Pt called to cancel her appointment due to her falling and she states that she would get back to us when she is feeling better

## 2022-10-11 PROBLEM — R19.7 DIARRHEA OF PRESUMED INFECTIOUS ORIGIN: Status: RESOLVED | Noted: 2022-07-21 | Resolved: 2022-10-11

## 2022-10-11 PROBLEM — N30.00 ACUTE CYSTITIS WITHOUT HEMATURIA: Status: RESOLVED | Noted: 2022-07-20 | Resolved: 2022-10-11

## 2022-10-11 PROBLEM — A41.89 SEPSIS DUE TO OTHER ETIOLOGY (HCC): Status: RESOLVED | Noted: 2022-07-20 | Resolved: 2022-10-11

## 2022-11-08 ENCOUNTER — OFFICE VISIT (OUTPATIENT)
Dept: FAMILY MEDICINE CLINIC | Facility: HOME HEALTHCARE | Age: 62
End: 2022-11-08

## 2022-11-08 VITALS
RESPIRATION RATE: 18 BRPM | WEIGHT: 220.6 LBS | HEART RATE: 106 BPM | BODY MASS INDEX: 34.62 KG/M2 | DIASTOLIC BLOOD PRESSURE: 64 MMHG | HEIGHT: 67 IN | TEMPERATURE: 97.6 F | SYSTOLIC BLOOD PRESSURE: 112 MMHG | OXYGEN SATURATION: 93 %

## 2022-11-08 DIAGNOSIS — E11.9 DIABETES MELLITUS WITHOUT COMPLICATION (HCC): ICD-10-CM

## 2022-11-08 DIAGNOSIS — E11.65 TYPE 2 DIABETES MELLITUS WITH HYPERGLYCEMIA, WITHOUT LONG-TERM CURRENT USE OF INSULIN (HCC): Primary | ICD-10-CM

## 2022-11-08 DIAGNOSIS — F17.210 CONTINUOUS DEPENDENCE ON CIGARETTE SMOKING: ICD-10-CM

## 2022-11-08 DIAGNOSIS — R91.1 LUNG NODULE SEEN ON IMAGING STUDY: ICD-10-CM

## 2022-11-08 LAB
EST. AVERAGE GLUCOSE BLD GHB EST-MCNC: 289 MG/DL
HBA1C MFR BLD: 11.7 %
SL AMB POCT HEMOGLOBIN AIC: 13 (ref ?–6.5)

## 2022-11-08 NOTE — PROGRESS NOTES
[address]       NAME: Jennifer Keyes is a 58 y o  female  : 1960    MRN: [id number]  DATE: 2022  TIME: 8:02 AM    Assessment and Plan   Diagnoses and all orders for this visit:    Type 2 diabetes mellitus with hyperglycemia, without long-term current use of insulin ([facility] )  -     POCT hemoglobin A1c  -     HEMOGLOBIN A1C W/ EAG ESTIMATION; Future  -     HEMOGLOBIN A1C W/ EAG ESTIMATION    Lung nodule seen on imaging study  -     CT chest wo contrast; Future    Diabetes mellitus without complication (HCC)    Continuous dependence on cigarette smoking      Lab work pending  Will call patient with results  Discussed lifestyle modifications, diabetic diet, weight loss and exercise  Patient will follow-up in approximately 1 month or sooner if needed  Patient does not tolerate metformin states causes abdominal distension and diarrhea  Remains on  Victoza  Patient struck call any questions or concerns        Chief Complaint     Chief Complaint   Patient presents with   • Follow-up         History of Present Illness       HPI  71-year-old female here today for routine follow-up visit and repeat A1c  Patient has no new complaints  Patient claims that she has been working on her low-carbohydrate/diabetic diet  Continues to smoking has no desire to quit smoking  Patient counseled  Review of Systems   Review of Systems   denies fever, chills, headache, weakness, shortness of breath, chest pain, cough, abdominal pain, lower extremity edema      All  Other systems negative    Current Medications       Current Outpatient Medications:   •  albuterol (2 5 mg/3 mL) 0 083 % nebulizer solution, Take 1 vial (2 5 mg total) by nebulization every 4 (four) hours as needed for shortness of breath, Disp: 120 mL, Rfl: 3  •  albuterol (Ventolin HFA) 90 mcg/act inhaler, Inhale 2 puffs every 4 (four) hours as needed for wheezing, Disp: 18 g, Rfl: 4  •  amitriptyline (ELAVIL) 100 mg tablet, take 1/2 tablet by mouth once daily at bedtime, Disp: 45 tablet, Rfl: 1  •  aspirin (ECOTRIN LOW STRENGTH) 81 mg EC tablet, Take 1 tablet (81 mg total) by mouth daily, Disp: 90 tablet, Rfl: 3  •  benztropine (COGENTIN) 0 5 mg tablet, Take 0 5 mg by mouth daily at bedtime  , Disp: , Rfl:   •  Blood Glucose Monitoring Suppl (ONE TOUCH ULTRA 2) w/Device KIT, by Does not apply route, Disp: , Rfl:   •  Blood Glucose Monitoring Suppl (ONE TOUCH ULTRA 2) w/Device KIT, by Does not apply route daily, Disp: 100 each, Rfl: 0  •  clonazePAM (KlonoPIN) 0 5 mg tablet, Take 0 5 mg by mouth daily as needed  , Disp: , Rfl:   •  gabapentin (NEURONTIN) 600 MG tablet, take 1 tablet by mouth three times a day, Disp: 270 tablet, Rfl: 1  •  glucose blood (OneTouch Ultra) test strip, Use 1 each daily Use as instructed, Disp: 100 strip, Rfl: 2  •  Insulin Pen Needle (BD Pen Needle Jasmine 2nd Gen) 32G X 4 MM MISC, Inject under the skin in the morning, Disp: 90 each, Rfl: 1  •  liraglutide (Victoza) injection, Inject 0 2 mL (1 2 mg total) under the skin daily, Disp: 6 mL, Rfl: 2  •  nicotine (NICODERM CQ) 21 mg/24 hr TD 24 hr patch, Place 1 patch on the skin daily, Disp: 28 patch, Rfl: 0  •  pantoprazole (PROTONIX) 40 mg tablet, Take 1 tablet (40 mg total) by mouth daily as needed (GERD symptoms), Disp: 30 tablet, Rfl: 1  •  rosuvastatin (CRESTOR) 40 MG tablet, Take 1 tablet (40 mg total) by mouth daily, Disp: 90 tablet, Rfl: 3  •  traZODone (DESYREL) 150 mg tablet, 150 mg daily at bedtime , Disp: , Rfl:   •  vilazodone (VIIBRYD) 40 mg tablet, Take 40 mg by mouth daily  , Disp: , Rfl:   •  VRAYLAR 6 MG capsule, Take 6 mg by mouth daily, Disp: , Rfl: 0  •  carvedilol (COREG) 3 125 mg tablet, Take 1 tablet (3 125 mg total) by mouth 2 (two) times a day with meals, Disp: 60 tablet, Rfl: 3  •  tiotropium (Spiriva Respimat) 2 5 MCG/ACT AERS inhaler, Inhale 2 puffs daily (Patient not taking: No sig reported), Disp: 4 g, Rfl: 3    Current Allergies     Allergies as of 11/08/2022 - Reviewed 11/08/2022   Allergen Reaction Noted   • Amoxicillin-pot clavulanate GI Intolerance 08/10/2016   • Anastrozole Other (See Comments) 03/23/2022            The following portions of the patient's history were reviewed and updated as appropriate: allergies, current medications, past family history, past medical history, past social history, past surgical history and problem list      Past Medical History:   Diagnosis Date   • Cancer ([place] [address] )     right breast   • Chronic back pain    • Colitis    • COPD (chronic obstructive pulmonary disease) ([place] [address] )    • Depression    • Diabetes mellitus ([place] [address] )    • DVT of lower limb, acute ([place] [address] ) 2004   • GERD (gastroesophageal reflux disease)    • Hyperlipidemia    • Pneumonia    • Rapid heart rate    • Sleep apnea    • Stroke ([place] [address] )     4 mini strokes       Past Surgical History:   Procedure Laterality Date   • BREAST LUMPECTOMY Right    • CARDIAC CATHETERIZATION N/A 10/28/2021    Procedure: Cardiac Coronary Angiogram;  Surgeon: Duarte Pettit DO;  Location:  CARDIAC CATH LAB;   Service: Cardiology   • IVC FILTER INSERTION     • MASTECTOMY     • MASTECTOMY W/ SENTINEL NODE BIOPSY Right 11/09/2021    Procedure: BREAST MASTECTOMY WITH BIOPSY LYMPH NODE SENTINEL and axillary node dissection  ([facility] Lymph Node Injection @ 12:30);  Surgeon: Katlin Chisholm MD;  Location: [address] [place] [address];  Service: General   • US GUIDANCE BREAST BIOPSY RIGHT EACH ADDITIONAL Right 10/13/2021   • US GUIDANCE BREAST BIOPSY RIGHT EACH ADDITIONAL Right 10/13/2021   • US GUIDED BREAST BIOPSY RIGHT COMPLETE Right 10/13/2021       Family History   Problem Relation Age of Onset   • Breast cancer Mother 67   • COPD Mother    • Coronary artery disease Mother    • Coronary artery disease Father    • Stroke Father    • Lung cancer Sister    • No Known Problems Sister    • No Known Problems Sister    • Breast cancer Maternal Grandmother    • Colon cancer Paternal Grandfather    • No Known Problems Maternal Aunt    • No Known Problems Maternal Aunt    • No Known Problems Maternal Aunt    • No Known Problems Paternal Aunt    • Breast cancer Cousin          Medications have been verified  Objective   /64 (BP Location: Left arm, Patient Position: Sitting, Cuff Size: Large)   Pulse (!) 106   Temp 97 6 °F (36 4 °C) (Temporal)   Resp 18   Ht 5' 7" (1 702 m)   Wt 100 kg (220 lb 9 6 oz)   SpO2 93%   BMI 34 55 kg/m²        Physical Exam     Physical Exam  Vitals reviewed  Constitutional:       General: She is not in acute distress  Appearance: She is not ill-appearing, toxic-appearing or diaphoretic  HENT:      Head: Normocephalic  Cardiovascular:      Rate and Rhythm: Normal rate and regular rhythm  Heart sounds: Normal heart sounds  Pulmonary:      Effort: Pulmonary effort is normal       Breath sounds: Normal breath sounds  Abdominal:      General: Bowel sounds are normal       Palpations: Abdomen is soft  Tenderness: There is no abdominal tenderness  Musculoskeletal:      Right lower leg: No edema  Left lower leg: No edema  Skin:     General: Skin is warm and dry  Neurological:      Mental Status: She is alert and oriented to person, place, and time     Psychiatric:         Mood and Affect: Mood normal

## 2022-11-09 ENCOUNTER — TELEPHONE (OUTPATIENT)
Dept: FAMILY MEDICINE CLINIC | Facility: HOME HEALTHCARE | Age: 62
End: 2022-11-09

## 2022-11-09 DIAGNOSIS — E11.9 DIABETES MELLITUS WITHOUT COMPLICATION (HCC): ICD-10-CM

## 2022-11-09 DIAGNOSIS — E11.65 TYPE 2 DIABETES MELLITUS WITH HYPERGLYCEMIA, WITHOUT LONG-TERM CURRENT USE OF INSULIN (HCC): Primary | ICD-10-CM

## 2022-11-09 RX ORDER — LIRAGLUTIDE 6 MG/ML
1.8 INJECTION SUBCUTANEOUS DAILY
Qty: 9 ML | Refills: 2 | Status: SHIPPED | OUTPATIENT
Start: 2022-11-09 | End: 2023-02-07

## 2022-11-09 NOTE — TELEPHONE ENCOUNTER
----- Message from Albert Hayes PA-C sent at 11/9/2022  8:34 AM EST -----  Please let patient know that her hemoglobin A1c is 11 7 and did increase her Victoza to 1 8 mg daily  Prescription sent to pharmacy  Continue to work on low carbohydrate diet and increase activity  Did place referral for Endocrinology  Please have   patient follow up in 1 month to review blood glucose log

## 2022-11-09 NOTE — TELEPHONE ENCOUNTER
----- Message from Eloina Londono PA-C sent at 11/9/2022  8:34 AM EST -----  Please let patient know that her hemoglobin A1c is 11 7 and did increase her Victoza to 1 8 mg daily  Prescription sent to pharmacy  Continue to work on low carbohydrate diet and increase activity  Did place referral for Endocrinology  Please have   patient follow up in 1 month to review blood glucose log

## 2022-11-11 DIAGNOSIS — K21.9 GERD WITHOUT ESOPHAGITIS: ICD-10-CM

## 2022-11-15 RX ORDER — PANTOPRAZOLE SODIUM 40 MG/1
TABLET, DELAYED RELEASE ORAL
Qty: 30 TABLET | Refills: 1 | Status: SHIPPED | OUTPATIENT
Start: 2022-11-15

## 2022-12-15 ENCOUNTER — TELEPHONE (OUTPATIENT)
Dept: NEPHROLOGY | Facility: CLINIC | Age: 62
End: 2022-12-15

## 2022-12-27 DIAGNOSIS — R00.0 TACHYCARDIA: ICD-10-CM

## 2022-12-27 RX ORDER — CARVEDILOL 3.12 MG/1
TABLET ORAL
Qty: 60 TABLET | Refills: 3 | Status: SHIPPED | OUTPATIENT
Start: 2022-12-27

## 2022-12-29 LAB
CREAT ?TM UR-SCNC: 132 UMOL/L
EXT MICROALBUMIN URINE RANDOM: 3.4
MICROALBUMIN/CREAT UR: 26 MG/G{CREAT}

## 2023-01-10 DIAGNOSIS — G43.809 OTHER MIGRAINE WITHOUT STATUS MIGRAINOSUS, NOT INTRACTABLE: ICD-10-CM

## 2023-01-10 RX ORDER — AMITRIPTYLINE HYDROCHLORIDE 100 MG/1
TABLET, FILM COATED ORAL
Qty: 45 TABLET | Refills: 1 | Status: SHIPPED | OUTPATIENT
Start: 2023-01-10

## 2023-01-12 DIAGNOSIS — K21.9 GERD WITHOUT ESOPHAGITIS: ICD-10-CM

## 2023-01-12 RX ORDER — PANTOPRAZOLE SODIUM 40 MG/1
TABLET, DELAYED RELEASE ORAL
Qty: 30 TABLET | Refills: 1 | Status: SHIPPED | OUTPATIENT
Start: 2023-01-12

## 2023-01-28 ENCOUNTER — HOSPITAL ENCOUNTER (INPATIENT)
Facility: HOSPITAL | Age: 63
LOS: 2 days | Discharge: HOME | End: 2023-01-30
Attending: EMERGENCY MEDICINE | Admitting: INTERNAL MEDICINE

## 2023-01-28 ENCOUNTER — APPOINTMENT (EMERGENCY)
Dept: RADIOLOGY | Facility: HOSPITAL | Age: 63
End: 2023-01-28

## 2023-01-28 ENCOUNTER — APPOINTMENT (EMERGENCY)
Dept: CT IMAGING | Facility: HOSPITAL | Age: 63
End: 2023-01-28

## 2023-01-28 ENCOUNTER — APPOINTMENT (INPATIENT)
Dept: CT IMAGING | Facility: HOSPITAL | Age: 63
End: 2023-01-28

## 2023-01-28 DIAGNOSIS — E86.0 DEHYDRATION: ICD-10-CM

## 2023-01-28 DIAGNOSIS — J44.9 COPD (CHRONIC OBSTRUCTIVE PULMONARY DISEASE) (HCC): ICD-10-CM

## 2023-01-28 DIAGNOSIS — N17.9 AKI (ACUTE KIDNEY INJURY) (HCC): Primary | ICD-10-CM

## 2023-01-28 DIAGNOSIS — I82.509 CHRONIC DEEP VEIN THROMBOSIS (DVT) (HCC): ICD-10-CM

## 2023-01-28 DIAGNOSIS — K52.9 ENTERITIS: ICD-10-CM

## 2023-01-28 DIAGNOSIS — R19.7 DIARRHEA, UNSPECIFIED TYPE: ICD-10-CM

## 2023-01-28 DIAGNOSIS — E87.6 HYPOKALEMIA: ICD-10-CM

## 2023-01-28 DIAGNOSIS — R53.1 GENERALIZED WEAKNESS: ICD-10-CM

## 2023-01-28 PROBLEM — E87.1 HYPONATREMIA: Status: ACTIVE | Noted: 2023-01-28

## 2023-01-28 PROBLEM — J96.21 ACUTE ON CHRONIC RESPIRATORY FAILURE WITH HYPOXIA (HCC): Status: ACTIVE | Noted: 2023-01-28

## 2023-01-28 LAB
2HR DELTA HS TROPONIN: -2 NG/L
4HR DELTA HS TROPONIN: -1 NG/L
ALBUMIN SERPL BCP-MCNC: 4.7 G/DL (ref 3.5–5)
ALP SERPL-CCNC: 79 U/L (ref 34–104)
ALT SERPL W P-5'-P-CCNC: 25 U/L (ref 7–52)
ANION GAP SERPL CALCULATED.3IONS-SCNC: 18 MMOL/L (ref 4–13)
APTT PPP: 28 SECONDS (ref 23–37)
AST SERPL W P-5'-P-CCNC: 20 U/L (ref 13–39)
ATRIAL RATE: 113 BPM
BACTERIA UR QL AUTO: ABNORMAL /HPF
BASOPHILS # BLD AUTO: 0.02 THOUSANDS/ÂΜL (ref 0–0.1)
BASOPHILS NFR BLD AUTO: 0 % (ref 0–1)
BILIRUB SERPL-MCNC: 0.37 MG/DL (ref 0.2–1)
BILIRUB UR QL STRIP: NEGATIVE
BNP SERPL-MCNC: 9 PG/ML (ref 0–100)
BUN SERPL-MCNC: 36 MG/DL (ref 5–25)
CALCIUM SERPL-MCNC: 9.6 MG/DL (ref 8.4–10.2)
CARDIAC TROPONIN I PNL SERPL HS: 4 NG/L
CARDIAC TROPONIN I PNL SERPL HS: 5 NG/L
CARDIAC TROPONIN I PNL SERPL HS: 6 NG/L
CHLORIDE SERPL-SCNC: 89 MMOL/L (ref 96–108)
CK SERPL-CCNC: 21 U/L (ref 26–192)
CLARITY UR: ABNORMAL
CO2 SERPL-SCNC: 22 MMOL/L (ref 21–32)
COLOR UR: YELLOW
CREAT SERPL-MCNC: 3.1 MG/DL (ref 0.6–1.3)
CREAT UR-MCNC: 118.3 MG/DL
D DIMER PPP FEU-MCNC: 1.46 UG/ML FEU
EOSINOPHIL # BLD AUTO: 0.04 THOUSAND/ÂΜL (ref 0–0.61)
EOSINOPHIL NFR BLD AUTO: 0 % (ref 0–6)
ERYTHROCYTE [DISTWIDTH] IN BLOOD BY AUTOMATED COUNT: 13.6 % (ref 11.6–15.1)
FLUAV RNA RESP QL NAA+PROBE: NEGATIVE
FLUBV RNA RESP QL NAA+PROBE: NEGATIVE
GFR SERPL CREATININE-BSD FRML MDRD: 15 ML/MIN/1.73SQ M
GLUCOSE SERPL-MCNC: 253 MG/DL (ref 65–140)
GLUCOSE SERPL-MCNC: 280 MG/DL (ref 65–140)
GLUCOSE UR STRIP-MCNC: NEGATIVE MG/DL
HCT VFR BLD AUTO: 46.3 % (ref 34.8–46.1)
HGB BLD-MCNC: 15.8 G/DL (ref 11.5–15.4)
HGB UR QL STRIP.AUTO: NEGATIVE
IMM GRANULOCYTES # BLD AUTO: 0.07 THOUSAND/UL (ref 0–0.2)
IMM GRANULOCYTES NFR BLD AUTO: 1 % (ref 0–2)
KETONES UR STRIP-MCNC: NEGATIVE MG/DL
LACTATE SERPL-SCNC: 2 MMOL/L (ref 0.5–2)
LEUKOCYTE ESTERASE UR QL STRIP: ABNORMAL
LIPASE SERPL-CCNC: 47 U/L (ref 11–82)
LYMPHOCYTES # BLD AUTO: 1.89 THOUSANDS/ÂΜL (ref 0.6–4.47)
LYMPHOCYTES NFR BLD AUTO: 18 % (ref 14–44)
MAGNESIUM SERPL-MCNC: 1.5 MG/DL (ref 1.9–2.7)
MCH RBC QN AUTO: 30.5 PG (ref 26.8–34.3)
MCHC RBC AUTO-ENTMCNC: 34.1 G/DL (ref 31.4–37.4)
MCV RBC AUTO: 89 FL (ref 82–98)
MONOCYTES # BLD AUTO: 0.84 THOUSAND/ÂΜL (ref 0.17–1.22)
MONOCYTES NFR BLD AUTO: 8 % (ref 4–12)
NEUTROPHILS # BLD AUTO: 7.63 THOUSANDS/ÂΜL (ref 1.85–7.62)
NEUTS SEG NFR BLD AUTO: 73 % (ref 43–75)
NITRITE UR QL STRIP: NEGATIVE
NON-SQ EPI CELLS URNS QL MICRO: ABNORMAL /HPF
NRBC BLD AUTO-RTO: 0 /100 WBCS
P AXIS: 73 DEGREES
PH UR STRIP.AUTO: 5.5 [PH]
PLATELET # BLD AUTO: 232 THOUSANDS/UL (ref 149–390)
PMV BLD AUTO: 10.3 FL (ref 8.9–12.7)
POTASSIUM SERPL-SCNC: 3.1 MMOL/L (ref 3.5–5.3)
PR INTERVAL: 142 MS
PROCALCITONIN SERPL-MCNC: 0.4 NG/ML
PROT SERPL-MCNC: 8.2 G/DL (ref 6.4–8.4)
PROT UR STRIP-MCNC: NEGATIVE MG/DL
PROT UR-MCNC: 34 MG/DL
PROT/CREAT UR: 0.29 MG/G{CREAT} (ref 0–0.1)
QRS AXIS: 73 DEGREES
QRSD INTERVAL: 80 MS
QT INTERVAL: 336 MS
QTC INTERVAL: 460 MS
RBC # BLD AUTO: 5.18 MILLION/UL (ref 3.81–5.12)
RBC #/AREA URNS AUTO: ABNORMAL /HPF
RSV RNA RESP QL NAA+PROBE: NEGATIVE
SARS-COV-2 RNA RESP QL NAA+PROBE: NEGATIVE
SODIUM 24H UR-SCNC: 13 MOL/L
SODIUM SERPL-SCNC: 129 MMOL/L (ref 135–147)
SP GR UR STRIP.AUTO: 1.02 (ref 1–1.03)
T WAVE AXIS: 81 DEGREES
UROBILINOGEN UR QL STRIP.AUTO: 0.2 E.U./DL
VENTRICULAR RATE: 113 BPM
WBC # BLD AUTO: 10.49 THOUSAND/UL (ref 4.31–10.16)
WBC #/AREA URNS AUTO: ABNORMAL /HPF

## 2023-01-28 RX ORDER — NICOTINE 21 MG/24HR
1 PATCH, TRANSDERMAL 24 HOURS TRANSDERMAL DAILY
Status: DISCONTINUED | OUTPATIENT
Start: 2023-01-29 | End: 2023-01-28

## 2023-01-28 RX ORDER — SODIUM CHLORIDE 9 MG/ML
100 INJECTION, SOLUTION INTRAVENOUS CONTINUOUS
Status: DISPENSED | OUTPATIENT
Start: 2023-01-28 | End: 2023-01-29

## 2023-01-28 RX ORDER — PANTOPRAZOLE SODIUM 40 MG/1
40 TABLET, DELAYED RELEASE ORAL
Status: DISCONTINUED | OUTPATIENT
Start: 2023-01-28 | End: 2023-01-30 | Stop reason: HOSPADM

## 2023-01-28 RX ORDER — ALBUTEROL SULFATE 90 UG/1
2 AEROSOL, METERED RESPIRATORY (INHALATION) EVERY 4 HOURS PRN
Status: DISCONTINUED | OUTPATIENT
Start: 2023-01-28 | End: 2023-01-28

## 2023-01-28 RX ORDER — HEPARIN SODIUM 1000 [USP'U]/ML
7600 INJECTION, SOLUTION INTRAVENOUS; SUBCUTANEOUS EVERY 6 HOURS PRN
Status: DISCONTINUED | OUTPATIENT
Start: 2023-01-28 | End: 2023-01-29

## 2023-01-28 RX ORDER — MAGNESIUM SULFATE HEPTAHYDRATE 40 MG/ML
2 INJECTION, SOLUTION INTRAVENOUS ONCE
Status: COMPLETED | OUTPATIENT
Start: 2023-01-28 | End: 2023-01-28

## 2023-01-28 RX ORDER — HEPARIN SODIUM 10000 [USP'U]/100ML
3-30 INJECTION, SOLUTION INTRAVENOUS
Status: DISCONTINUED | OUTPATIENT
Start: 2023-01-28 | End: 2023-01-29

## 2023-01-28 RX ORDER — PANTOPRAZOLE SODIUM 40 MG/10ML
40 INJECTION, POWDER, LYOPHILIZED, FOR SOLUTION INTRAVENOUS ONCE
Status: COMPLETED | OUTPATIENT
Start: 2023-01-28 | End: 2023-01-28

## 2023-01-28 RX ORDER — VILAZODONE HYDROCHLORIDE 20 MG/1
40 TABLET ORAL DAILY
Status: DISCONTINUED | OUTPATIENT
Start: 2023-01-29 | End: 2023-01-30 | Stop reason: HOSPADM

## 2023-01-28 RX ORDER — AMITRIPTYLINE HYDROCHLORIDE 50 MG/1
50 TABLET, FILM COATED ORAL
Status: DISCONTINUED | OUTPATIENT
Start: 2023-01-28 | End: 2023-01-30 | Stop reason: HOSPADM

## 2023-01-28 RX ORDER — NICOTINE 21 MG/24HR
1 PATCH, TRANSDERMAL 24 HOURS TRANSDERMAL DAILY
Status: DISCONTINUED | OUTPATIENT
Start: 2023-01-29 | End: 2023-01-30 | Stop reason: HOSPADM

## 2023-01-28 RX ORDER — METRONIDAZOLE 500 MG/1
500 TABLET ORAL EVERY 8 HOURS SCHEDULED
Status: DISCONTINUED | OUTPATIENT
Start: 2023-01-28 | End: 2023-01-30 | Stop reason: HOSPADM

## 2023-01-28 RX ORDER — INSULIN LISPRO 100 [IU]/ML
1-5 INJECTION, SOLUTION INTRAVENOUS; SUBCUTANEOUS
Status: DISCONTINUED | OUTPATIENT
Start: 2023-01-28 | End: 2023-01-30 | Stop reason: HOSPADM

## 2023-01-28 RX ORDER — HEPARIN SODIUM 1000 [USP'U]/ML
3800 INJECTION, SOLUTION INTRAVENOUS; SUBCUTANEOUS EVERY 6 HOURS PRN
Status: DISCONTINUED | OUTPATIENT
Start: 2023-01-28 | End: 2023-01-29

## 2023-01-28 RX ORDER — GABAPENTIN 300 MG/1
600 CAPSULE ORAL 3 TIMES DAILY
Status: DISCONTINUED | OUTPATIENT
Start: 2023-01-28 | End: 2023-01-30 | Stop reason: HOSPADM

## 2023-01-28 RX ORDER — ENOXAPARIN SODIUM 100 MG/ML
40 INJECTION SUBCUTANEOUS DAILY
Status: DISCONTINUED | OUTPATIENT
Start: 2023-01-29 | End: 2023-01-28

## 2023-01-28 RX ORDER — ASPIRIN 81 MG/1
81 TABLET ORAL DAILY
Status: DISCONTINUED | OUTPATIENT
Start: 2023-01-29 | End: 2023-01-30 | Stop reason: HOSPADM

## 2023-01-28 RX ORDER — POTASSIUM CHLORIDE 14.9 MG/ML
20 INJECTION INTRAVENOUS ONCE
Status: COMPLETED | OUTPATIENT
Start: 2023-01-28 | End: 2023-01-28

## 2023-01-28 RX ORDER — CIPROFLOXACIN 2 MG/ML
400 INJECTION, SOLUTION INTRAVENOUS EVERY 12 HOURS
Status: DISCONTINUED | OUTPATIENT
Start: 2023-01-28 | End: 2023-01-30 | Stop reason: HOSPADM

## 2023-01-28 RX ORDER — HEPARIN SODIUM 1000 [USP'U]/ML
7600 INJECTION, SOLUTION INTRAVENOUS; SUBCUTANEOUS ONCE
Status: COMPLETED | OUTPATIENT
Start: 2023-01-28 | End: 2023-01-28

## 2023-01-28 RX ORDER — LIDOCAINE 50 MG/G
1 PATCH TOPICAL DAILY
Status: DISCONTINUED | OUTPATIENT
Start: 2023-01-29 | End: 2023-01-30 | Stop reason: HOSPADM

## 2023-01-28 RX ORDER — ATORVASTATIN CALCIUM 40 MG/1
80 TABLET, FILM COATED ORAL
Status: DISCONTINUED | OUTPATIENT
Start: 2023-01-28 | End: 2023-01-30 | Stop reason: HOSPADM

## 2023-01-28 RX ORDER — DOXEPIN HYDROCHLORIDE 25 MG/1
100 CAPSULE ORAL
Status: DISCONTINUED | OUTPATIENT
Start: 2023-01-28 | End: 2023-01-30 | Stop reason: HOSPADM

## 2023-01-28 RX ORDER — ALBUTEROL SULFATE 2.5 MG/3ML
2.5 SOLUTION RESPIRATORY (INHALATION) EVERY 4 HOURS PRN
Status: DISCONTINUED | OUTPATIENT
Start: 2023-01-28 | End: 2023-01-30 | Stop reason: HOSPADM

## 2023-01-28 RX ORDER — ALBUTEROL SULFATE 2.5 MG/3ML
5 SOLUTION RESPIRATORY (INHALATION) ONCE
Status: COMPLETED | OUTPATIENT
Start: 2023-01-28 | End: 2023-01-28

## 2023-01-28 RX ORDER — MAGNESIUM HYDROXIDE/ALUMINUM HYDROXICE/SIMETHICONE 120; 1200; 1200 MG/30ML; MG/30ML; MG/30ML
30 SUSPENSION ORAL ONCE
Status: DISCONTINUED | OUTPATIENT
Start: 2023-01-28 | End: 2023-01-28

## 2023-01-28 RX ORDER — CLONAZEPAM 0.5 MG/1
0.5 TABLET ORAL DAILY PRN
Status: DISCONTINUED | OUTPATIENT
Start: 2023-01-28 | End: 2023-01-30 | Stop reason: HOSPADM

## 2023-01-28 RX ORDER — INSULIN LISPRO 100 [IU]/ML
1-5 INJECTION, SOLUTION INTRAVENOUS; SUBCUTANEOUS
Status: DISCONTINUED | OUTPATIENT
Start: 2023-01-29 | End: 2023-01-30 | Stop reason: HOSPADM

## 2023-01-28 RX ORDER — METHYLPREDNISOLONE SODIUM SUCCINATE 40 MG/ML
40 INJECTION, POWDER, LYOPHILIZED, FOR SOLUTION INTRAMUSCULAR; INTRAVENOUS EVERY 8 HOURS
Status: DISCONTINUED | OUTPATIENT
Start: 2023-01-28 | End: 2023-01-30 | Stop reason: HOSPADM

## 2023-01-28 RX ORDER — BENZTROPINE MESYLATE 1 MG/1
0.5 TABLET ORAL
Status: DISCONTINUED | OUTPATIENT
Start: 2023-01-28 | End: 2023-01-30 | Stop reason: HOSPADM

## 2023-01-28 RX ORDER — PANTOPRAZOLE SODIUM 40 MG/1
40 TABLET, DELAYED RELEASE ORAL
Status: DISCONTINUED | OUTPATIENT
Start: 2023-01-29 | End: 2023-01-28

## 2023-01-28 RX ORDER — LEVALBUTEROL 1.25 MG/.5ML
1.25 SOLUTION, CONCENTRATE RESPIRATORY (INHALATION)
Status: DISCONTINUED | OUTPATIENT
Start: 2023-01-28 | End: 2023-01-29

## 2023-01-28 RX ORDER — DOXEPIN HYDROCHLORIDE 100 MG/1
CAPSULE ORAL
COMMUNITY
Start: 2023-01-04

## 2023-01-28 RX ADMIN — HEPARIN SODIUM 7600 UNITS: 1000 INJECTION INTRAVENOUS; SUBCUTANEOUS at 22:30

## 2023-01-28 RX ADMIN — LEVALBUTEROL HYDROCHLORIDE 1.25 MG: 1.25 SOLUTION, CONCENTRATE RESPIRATORY (INHALATION) at 22:10

## 2023-01-28 RX ADMIN — GABAPENTIN 600 MG: 300 CAPSULE ORAL at 20:02

## 2023-01-28 RX ADMIN — HEPARIN SODIUM 18 UNITS/KG/HR: 10000 INJECTION, SOLUTION INTRAVENOUS at 22:31

## 2023-01-28 RX ADMIN — PANTOPRAZOLE SODIUM 40 MG: 40 TABLET, DELAYED RELEASE ORAL at 22:29

## 2023-01-28 RX ADMIN — MAGNESIUM SULFATE HEPTAHYDRATE 2 G: 40 INJECTION, SOLUTION INTRAVENOUS at 16:37

## 2023-01-28 RX ADMIN — POTASSIUM CHLORIDE 20 MEQ: 14.9 INJECTION, SOLUTION INTRAVENOUS at 16:50

## 2023-01-28 RX ADMIN — METRONIDAZOLE 500 MG: 500 TABLET ORAL at 22:29

## 2023-01-28 RX ADMIN — ATORVASTATIN CALCIUM 80 MG: 40 TABLET, FILM COATED ORAL at 20:02

## 2023-01-28 RX ADMIN — BENZTROPINE MESYLATE 0.5 MG: 1 TABLET ORAL at 22:29

## 2023-01-28 RX ADMIN — IPRATROPIUM BROMIDE 0.5 MG: 0.5 SOLUTION RESPIRATORY (INHALATION) at 15:07

## 2023-01-28 RX ADMIN — ALBUTEROL SULFATE 5 MG: 2.5 SOLUTION RESPIRATORY (INHALATION) at 15:06

## 2023-01-28 RX ADMIN — CIPROFLOXACIN 400 MG: 2 INJECTION, SOLUTION INTRAVENOUS at 20:02

## 2023-01-28 RX ADMIN — SODIUM CHLORIDE 1000 ML: 0.9 INJECTION, SOLUTION INTRAVENOUS at 16:49

## 2023-01-28 RX ADMIN — IPRATROPIUM BROMIDE 0.5 MG: 0.5 SOLUTION RESPIRATORY (INHALATION) at 22:10

## 2023-01-28 RX ADMIN — PANTOPRAZOLE SODIUM 40 MG: 40 INJECTION, POWDER, FOR SOLUTION INTRAVENOUS at 16:33

## 2023-01-28 RX ADMIN — AMITRIPTYLINE HYDROCHLORIDE 50 MG: 50 TABLET, FILM COATED ORAL at 22:29

## 2023-01-28 RX ADMIN — INSULIN LISPRO 2 UNITS: 100 INJECTION, SOLUTION INTRAVENOUS; SUBCUTANEOUS at 22:30

## 2023-01-28 RX ADMIN — SODIUM CHLORIDE 100 ML/HR: 0.9 INJECTION, SOLUTION INTRAVENOUS at 20:02

## 2023-01-28 RX ADMIN — METHYLPREDNISOLONE SODIUM SUCCINATE 40 MG: 40 INJECTION, POWDER, FOR SOLUTION INTRAMUSCULAR; INTRAVENOUS at 22:33

## 2023-01-28 RX ADMIN — SODIUM CHLORIDE 1000 ML: 0.9 INJECTION, SOLUTION INTRAVENOUS at 15:04

## 2023-01-28 NOTE — ED PROVIDER NOTES
History  Chief Complaint   Patient presents with   • Weakness - Generalized     Pt states she started with a head cold two weeks ago and started on Thursday night with increased weakness  80-year-old female presents for evaluation of lightheadedness, generalized weakness  Patient reports 1 to 2 weeks ago suffering from a head cold including nasal congestion, rhinorrhea, cough  She believes she is recovering well from this, has residual dry cough however believes this is her smoker's cough  Patient reports diarrhea starting around this time as well, she denies bloody or dark stools  She has been tolerating p o  at home, denies nausea or vomiting episodes  She does report acid reflux sensation  She reports on Thursday an episode of lightheadedness with ambulation, she had another significant episode of lightheadedness today with ambulation that prompted her to call EMS  She denies actual syncopal event or fall due to the sensations  Patient reports she was ambulating in brought herself back to the couch  He states she has been taking her daily medications despite not feeling well  She denies dyspnea, chest pain, abdominal pain, dysuria  Patient was found to be saturating 85% on room air for EMS and dipped down to 88% with nursing while IV was placed  Patient has history of COPD, she uses oxygen at night which she states she has been doing  She has not felt the need to use a breathing treatment at home  Prior to Admission Medications   Prescriptions Last Dose Informant Patient Reported? Taking?    Blood Glucose Monitoring Suppl (ONE TOUCH ULTRA 2) w/Device KIT Not Taking  Yes No   Sig: by Does not apply route   Patient not taking: Reported on 1/28/2023   Blood Glucose Monitoring Suppl (ONE TOUCH ULTRA 2) w/Device KIT Not Taking  No No   Sig: by Does not apply route daily   Patient not taking: Reported on 1/28/2023   Insulin Pen Needle (BD Pen Needle Jasmine 2nd Gen) 32G X 4 MM MISC Not Taking  No No   Sig: Inject under the skin in the morning   Patient not taking: Reported on 1/28/2023   VRAYLAR 6 MG capsule 1/27/2023  Yes Yes   Sig: Take 6 mg by mouth daily   albuterol (2 5 mg/3 mL) 0 083 % nebulizer solution Not Taking  No No   Sig: Take 1 vial (2 5 mg total) by nebulization every 4 (four) hours as needed for shortness of breath   Patient not taking: Reported on 1/28/2023   albuterol (Ventolin HFA) 90 mcg/act inhaler Not Taking  No No   Sig: Inhale 2 puffs every 4 (four) hours as needed for wheezing   Patient not taking: Reported on 1/28/2023   amitriptyline (ELAVIL) 100 mg tablet 1/27/2023  No Yes   Sig: take 1/2 tablet by mouth once daily at bedtime   aspirin (ECOTRIN LOW STRENGTH) 81 mg EC tablet 1/28/2023  No Yes   Sig: Take 1 tablet (81 mg total) by mouth daily   benztropine (COGENTIN) 0 5 mg tablet 1/27/2023  Yes Yes   Sig: Take 0 5 mg by mouth daily at bedtime     carvedilol (COREG) 3 125 mg tablet Unknown  No No   Sig: take 1 tablet by mouth twice a day with meals   clonazePAM (KlonoPIN) 0 5 mg tablet Past Month  Yes Yes   Sig: Take 0 5 mg by mouth daily as needed     doxepin (SINEquan) 100 mg capsule 1/27/2023  Yes Yes   Sig: take 1 to 2 capsules by mouth at bedtime if needed   gabapentin (NEURONTIN) 600 MG tablet 1/28/2023  No Yes   Sig: take 1 tablet by mouth three times a day   glucose blood (OneTouch Ultra) test strip Not Taking  No No   Sig: Use 1 each daily Use as instructed   Patient not taking: Reported on 1/28/2023   liraglutide (Victoza) injection 1/27/2023  No Yes   Sig: Inject 0 3 mL (1 8 mg total) under the skin daily   nicotine (NICODERM CQ) 21 mg/24 hr TD 24 hr patch Not Taking  No No   Sig: Place 1 patch on the skin daily   Patient not taking: Reported on 1/28/2023   pantoprazole (PROTONIX) 40 mg tablet 1/28/2023  No Yes   Sig: take 1 tablet by mouth daily if needed for GERD SYMPTOMS   rosuvastatin (CRESTOR) 40 MG tablet Past Week  No Yes   Sig: Take 1 tablet (40 mg total) by mouth daily   tiotropium (Spiriva Respimat) 2 5 MCG/ACT AERS inhaler Past Week  No Yes   Sig: Inhale 2 puffs daily   traZODone (DESYREL) 150 mg tablet Not Taking  Yes No   Si mg daily at bedtime    Patient not taking: Reported on 2023   vilazodone (VIIBRYD) 40 mg tablet 2023  Yes Yes   Sig: Take 40 mg by mouth daily  Facility-Administered Medications: None       Past Medical History:   Diagnosis Date   • Cancer [facility])     right breast   • Chronic back pain    • Colitis    • COPD (chronic obstructive pulmonary disease) ([facility])    • Depression    • Diabetes mellitus ([facility] [address] )    • DVT of lower limb, acute ([facility] [address] )    • GERD (gastroesophageal reflux disease)    • Hyperlipidemia    • Pneumonia    • Rapid heart rate    • Sleep apnea    • Stroke ([facility] [address] )     4 mini strokes       Past Surgical History:   Procedure Laterality Date   • BREAST LUMPECTOMY Right    • CARDIAC CATHETERIZATION N/A 10/28/2021    Procedure: Cardiac Coronary Angiogram;  Surgeon: Lashell Phillips DO;  Location: [facility];   Service: Cardiology   • IVC FILTER INSERTION     • MASTECTOMY     • MASTECTOMY W/ SENTINEL NODE BIOPSY Right 2021    Procedure: BREAST MASTECTOMY WITH BIOPSY LYMPH NODE SENTINEL and axillary node dissection  ([place] Lymph Node Injection @ 12:30);  Surgeon: Gabby Molina MD;  Location: [address] [place] [address];  Service: General   • US GUIDANCE BREAST BIOPSY RIGHT EACH ADDITIONAL Right 10/13/2021   • US GUIDANCE BREAST BIOPSY RIGHT EACH ADDITIONAL Right 10/13/2021   • US GUIDED BREAST BIOPSY RIGHT COMPLETE Right 10/13/2021       Family History   Problem Relation Age of Onset   • Breast cancer Mother 67   • COPD Mother    • Coronary artery disease Mother    • Coronary artery disease Father    • Stroke Father    • Lung cancer Sister    • No Known Problems Sister    • No Known Problems Sister    • Breast cancer Maternal Grandmother    • Colon cancer Paternal Grandfather    • No Known Problems Maternal Aunt    • No Known Problems Maternal Aunt    • No Known Problems Maternal Aunt    • No Known Problems Paternal Aunt    • Breast cancer Cousin      I have reviewed and agree with the history as documented  E-Cigarette/Vaping   • E-Cigarette Use Never User      E-Cigarette/Vaping Substances   • Nicotine No    • THC No    • CBD No    • Flavoring No    • Other No    • Unknown No      Social History     Tobacco Use   • Smoking status: Every Day     Packs/day: 1 00     Types: Cigarettes   • Smokeless tobacco: Never   Vaping Use   • Vaping Use: Never used   Substance Use Topics   • Alcohol use: Never   • Drug use: Never       Review of Systems   Constitutional: Negative for appetite change, chills and fever  HENT: Negative for congestion  Respiratory: Positive for cough  Negative for shortness of breath  Cardiovascular: Negative for chest pain  Gastrointestinal: Positive for diarrhea  Negative for abdominal pain, blood in stool, constipation, nausea and vomiting  Genitourinary: Negative for dysuria  Musculoskeletal: Negative for myalgias  Neurological: Positive for light-headedness  Negative for syncope, weakness and numbness  All other systems reviewed and are negative  Physical Exam  Physical Exam  Vitals reviewed  Constitutional:       General: She is not in acute distress  Appearance: Normal appearance  She is not ill-appearing, toxic-appearing or diaphoretic  HENT:      Head: Normocephalic and atraumatic  Right Ear: External ear normal       Left Ear: External ear normal       Nose: Nose normal       Mouth/Throat:      Mouth: Mucous membranes are dry  Eyes:      General:         Right eye: No discharge  Left eye: No discharge  Extraocular Movements: Extraocular movements intact  Cardiovascular:      Rate and Rhythm: Regular rhythm  Tachycardia present  Pulmonary:      Effort: Pulmonary effort is normal  No respiratory distress  Breath sounds: No stridor   Wheezing (Scattered wheezing however more prominent area in patient's right middle posterior) present  No rhonchi or rales  Abdominal:      General: There is no distension  Palpations: Abdomen is soft  Tenderness: There is abdominal tenderness (Reports mild tenderness to palpation in left lower quadrant, no tenderness when not palpated, no tenderness when patient leans forward to auscultate lungs)  There is no guarding or rebound  Musculoskeletal:         General: No deformity or signs of injury  Right lower leg: No edema  Left lower leg: No edema  Skin:     General: Skin is warm and dry  Coloration: Skin is not jaundiced  Findings: No bruising  Neurological:      General: No focal deficit present  Mental Status: She is alert  Mental status is at baseline           Vital Signs  ED Triage Vitals   Temperature Pulse Respirations Blood Pressure SpO2   01/28/23 1426 01/28/23 1421 01/28/23 1421 01/28/23 1424 01/28/23 1421   97 7 °F (36 5 °C) (!) 118 16 121/76 93 %      Temp Source Heart Rate Source Patient Position - Orthostatic VS BP Location FiO2 (%)   01/28/23 1426 01/28/23 1421 01/28/23 1421 01/28/23 1421 --   Tympanic Monitor Sitting Left arm       Pain Score       01/28/23 1421       No Pain           Vitals:    01/28/23 1421 01/28/23 1424 01/28/23 1600 01/28/23 1959   BP:  121/76 117/80 116/78   Pulse: (!) 118  104 (!) 122   Patient Position - Orthostatic VS: Sitting  Sitting Lying         Visual Acuity      ED Medications  Medications   aspirin (ECOTRIN LOW STRENGTH) EC tablet 81 mg (has no administration in time range)   benztropine (COGENTIN) tablet 0 5 mg (has no administration in time range)   atorvastatin (LIPITOR) tablet 80 mg (80 mg Oral Given 1/28/23 2002)   vilazodone (VIIBRYD) tablet 40 mg (has no administration in time range)   cariprazine (VRAYLAR) capsule 6 mg (has no administration in time range)   clonazePAM (KlonoPIN) tablet 0 5 mg (has no administration in time range)   amitriptyline (ELAVIL) tablet 50 mg (has no administration in time range)   doxepin (SINEquan) capsule 100 mg (has no administration in time range)   gabapentin (NEURONTIN) capsule 600 mg (600 mg Oral Given 1/28/23 2002)   pantoprazole (PROTONIX) EC tablet 40 mg (has no administration in time range)   sodium chloride 0 9 % infusion (100 mL/hr Intravenous New Bag 1/28/23 2002)   insulin lispro (HumaLOG) 100 units/mL subcutaneous injection 1-5 Units (has no administration in time range)   insulin lispro (HumaLOG) 100 units/mL subcutaneous injection 1-5 Units (has no administration in time range)   ciprofloxacin (CIPRO) IVPB (premix in 5% dextrose) 400 mg 200 mL (400 mg Intravenous New Bag 1/28/23 2002)   metroNIDAZOLE (FLAGYL) tablet 500 mg (has no administration in time range)   nicotine (NICODERM CQ) 21 mg/24 hr TD 24 hr patch 1 patch (has no administration in time range)   albuterol inhalation solution 2 5 mg (has no administration in time range)   ipratropium (ATROVENT) 0 02 % inhalation solution 0 5 mg (has no administration in time range)   methylPREDNISolone sodium succinate (Solu-MEDROL) injection 40 mg (has no administration in time range)   heparin (porcine) injection 7,600 Units (has no administration in time range)   heparin (porcine) 25,000 units in 0 45% NaCl 250 mL infusion (premix) (has no administration in time range)   heparin (porcine) injection 7,600 Units (has no administration in time range)   heparin (porcine) injection 3,800 Units (has no administration in time range)   sodium chloride 0 9 % bolus 1,000 mL (0 mL Intravenous Stopped 1/28/23 1638)   albuterol inhalation solution 5 mg (5 mg Nebulization Given 1/28/23 1506)   ipratropium (ATROVENT) 0 02 % inhalation solution 0 5 mg (0 5 mg Nebulization Given 1/28/23 1507)   pantoprazole (PROTONIX) injection 40 mg (40 mg Intravenous Given 1/28/23 1633)   magnesium sulfate 2 g/50 mL IVPB (premix) 2 g (0 g Intravenous Stopped 1/28/23 1654) potassium chloride 20 mEq IVPB (premix) (0 mEq Intravenous Stopped 1/28/23 1859)   sodium chloride 0 9 % bolus 1,000 mL (1,000 mL Intravenous New Bag 1/28/23 1649)       Diagnostic Studies  Results Reviewed     Procedure Component Value Units Date/Time    Urinalysis with microscopic [[id number]] Collected: 01/28/23 2124    Lab Status: No result Specimen: Urine, Clean Catch     Sodium, urine, random [[id number]] Collected: 01/28/23 2124    Lab Status: No result Specimen: Urine, Clean Catch     Protein / creatinine ratio, urine [[id number]] Collected: 01/28/23 2124    Lab Status: No result Specimen: Urine, Clean Catch     CK Total with Reflex CKMB [[id number]] Collected: 01/28/23 1953    Lab Status: In process Specimen: Blood from Arm, Right Updated: 01/28/23 2042    Lipase [[id number]] Collected: 01/28/23 1953    Lab Status: In process Specimen: Blood from Arm, Right Updated: 01/28/23 2042    COVID19, Influenza A/B, RSV PCR, [facility] [[id number]]  (Normal) Collected: 01/28/23 1927    Lab Status: Final result Specimen: Nares from Nasopharyngeal Swab Updated: 01/28/23 2040     SARS-CoV-2 Negative     INFLUENZA A PCR Negative     INFLUENZA B PCR Negative     RSV PCR Negative    Narrative:      FOR PEDIATRIC PATIENTS - copy/paste COVID Guidelines URL to browser: [email or web address]    SARS-CoV-2 assay is a Nucleic Acid Amplification assay intended for the  qualitative detection of nucleic acid from SARS-CoV-2 in nasopharyngeal  swabs  Results are for the presumptive identification of SARS-CoV-2 RNA  Positive results are indicative of infection with SARS-CoV-2, the virus  causing COVID-19, but do not rule out bacterial infection or co-infection  with other viruses  Laboratories within the [place] and its  territories are required to report all positive results to the appropriate  public health authorities   Negative results do not preclude SARS-CoV-2  infection and should not be used as the sole basis for treatment or other  patient management decisions  Negative results must be combined with  clinical observations, patient history, and epidemiological information  This test has not been FDA cleared or approved  This test has been authorized by FDA under an Emergency Use Authorization  (EUA)  This test is only authorized for the duration of time the  declaration that circumstances exist justifying the authorization of the  emergency use of an in vitro diagnostic tests for detection of SARS-CoV-2  virus and/or diagnosis of COVID-19 infection under section 564(b)(1) of  the Act, 21 U  S C  768ERA-2(F)(7), unless the authorization is terminated  or revoked sooner  The test has been validated but independent review by FDA  and CLIA is pending  Test performed using [employer] Gene[employer]pert: This RT-PCR assay targets N2,  a region unique to SARS-CoV-2  A conserved region in the E-gene was chosen  for pan-Sarbecovirus detection which includes SARS-CoV-2  According to CMS-2020-01-R, this platform meets the definition of high-throughput technology  Procalcitonin [[id number]]  (Abnormal) Collected: 01/28/23 1953    Lab Status: Final result Specimen: Blood from Arm, Right Updated: 01/28/23 2028     Procalcitonin 0 40 ng/ml     HS Troponin I 4hr [[id number]]  (Normal) Collected: 01/28/23 1953    Lab Status: Final result Specimen: Blood from Arm, Right Updated: 01/28/23 2027     hs TnI 4hr 5 ng/L      Delta 4hr hsTnI -1 ng/L     D-dimer, quantitative [[id number]]  (Abnormal) Collected: 01/28/23 1953    Lab Status: Final result Specimen: Blood from Arm, Right Updated: 01/28/23 2021     D-Dimer, Quant 1 46 ug/ml FEU     Narrative:       In the evaluation for possible pulmonary embolism, in the appropriate (Well's Score of 4 or less) patient, the age adjusted d-dimer cutoff for this patient can be calculated as:    Age x 0 01 (in ug/mL) for Age-adjusted D-dimer exclusion threshold for a patient over 50 years  Lactic acid, plasma [[id number]]  (Normal) Collected: 01/28/23 1953    Lab Status: Final result Specimen: Blood from Arm, Right Updated: 01/28/23 2019     LACTIC ACID 2 0 mmol/L     Narrative:      Result may be elevated if tourniquet was used during collection  Hepatitis panel, acute [[id number]] Collected: 01/28/23 1953    Lab Status: In process Specimen: Blood from Arm, Right Updated: 01/28/23 1959    Blood culture [[id number]] Collected: 01/28/23 1953    Lab Status: In process Specimen: Blood from Arm, Right Updated: 01/28/23 1959    Blood culture [[id number]] Collected: 01/28/23 1953    Lab Status:  In process Specimen: Blood from Arm, Left Updated: 01/28/23 1959    Urine culture [[id number]]     Lab Status: No result Specimen: Urine, Clean Catch     Giardia lamblia, EIA and Ova and Parasites Examination [[id number]]     Lab Status: No result Specimen: Stool from Rectum     Rotavirus antigen, stool [[id number]]     Lab Status: No result Specimen: Stool     Fecal leukocytes [[id number]]     Lab Status: No result Specimen: Stool from Per Rectum     HS Troponin I 2hr [[id number]]  (Normal) Collected: 01/28/23 1738    Lab Status: Final result Specimen: Blood from Hand, Left Updated: 01/28/23 1807     hs TnI 2hr 4 ng/L      Delta 2hr hsTnI -2 ng/L     HS Troponin 0hr (reflex protocol) [[id number]]  (Normal) Collected: 01/28/23 1508    Lab Status: Final result Specimen: Blood from Arm, Left Updated: 01/28/23 1549     hs TnI 0hr 6 ng/L     B-Type Natriuretic Peptide(BNP) [[id number]]  (Normal) Collected: 01/28/23 1508    Lab Status: Final result Specimen: Blood from Arm, Left Updated: 01/28/23 1549     BNP 9 pg/mL     Comprehensive metabolic panel [[id number]]  (Abnormal) Collected: 01/28/23 1508    Lab Status: Final result Specimen: Blood from Arm, Left Updated: 01/28/23 1544     Sodium 129 mmol/L      Potassium 3 1 mmol/L      Chloride 89 mmol/L      CO2 22 mmol/L      ANION GAP 18 mmol/L      BUN 36 mg/dL      Creatinine 3 10 mg/dL      Glucose 253 mg/dL      Calcium 9 6 mg/dL      AST 20 U/L      ALT 25 U/L      Alkaline Phosphatase 79 U/L      Total Protein 8 2 g/dL      Albumin 4 7 g/dL      Total Bilirubin 0 37 mg/dL      eGFR 15 ml/min/1 73sq m     Narrative:      National Kidney Disease Foundation guidelines for Chronic Kidney Disease (CKD):   •  Stage 1 with normal or high GFR (GFR > 90 mL/min/1 73 square meters)  •  Stage 2 Mild CKD (GFR = 60-89 mL/min/1 73 square meters)  •  Stage 3A Moderate CKD (GFR = 45-59 mL/min/1 73 square meters)  •  Stage 3B Moderate CKD (GFR = 30-44 mL/min/1 73 square meters)  •  Stage 4 Severe CKD (GFR = 15-29 mL/min/1 73 square meters)  •  Stage 5 End Stage CKD (GFR <15 mL/min/1 73 square meters)  Note: GFR calculation is accurate only with a steady state creatinine    Magnesium [[id number]]  (Abnormal) Collected: 01/28/23 1508    Lab Status: Final result Specimen: Blood from Arm, Left Updated: 01/28/23 1544     Magnesium 1 5 mg/dL     CBC and differential [[id number]]  (Abnormal) Collected: 01/28/23 1508    Lab Status: Final result Specimen: Blood from Arm, Left Updated: 01/28/23 1526     WBC 10 49 Thousand/uL      RBC 5 18 Million/uL      Hemoglobin 15 8 g/dL      Hematocrit 46 3 %      MCV 89 fL      MCH 30 5 pg      MCHC 34 1 g/dL      RDW 13 6 %      MPV 10 3 fL      Platelets 260 Thousands/uL      nRBC 0 /100 WBCs      Neutrophils Relative 73 %      Immat GRANS % 1 %      Lymphocytes Relative 18 %      Monocytes Relative 8 %      Eosinophils Relative 0 %      Basophils Relative 0 %      Neutrophils Absolute 7 63 Thousands/µL      Immature Grans Absolute 0 07 Thousand/uL      Lymphocytes Absolute 1 89 Thousands/µL      Monocytes Absolute 0 84 Thousand/µL      Eosinophils Absolute 0 04 Thousand/µL      Basophils Absolute 0 02 Thousands/µL     UA w Reflex to Microscopic w Reflex to Culture [[id number]]     Lab Status: No result Specimen: Urine     Clostridium difficile toxin by PCR with EIA [[id number]]     Lab Status: No result Specimen: Stool from Per Rectum     Stool Enteric Bacterial Panel by PCR [[id number]]     Lab Status: No result Specimen: Stool                  CT chest wo contrast   Final Result by Ruth Bhatt DO (01/28 1936)      1  No consolidation to suggest pneumonia  2  Redemonstration of mild bilateral upper lobe predominant centrilobular nodules compatible with respiratory bronchiolitis, a smoking-related lung disease  3  Mild distal esophageal thickening, possibly accentuated by underdistention  Correlate with any clinical symptoms and if relevant consider follow-up esophagram         4  Enlarged fatty liver suggested  Workstation performed: [id number]         CT abdomen pelvis wo contrast   Final Result by Lei Mcneil MD (01/28 1727)      1  No evidence of hydronephrosis or ureteral calculus   2  Increased fluid and air within the stomach, colon and rectum  Correlate for ileus/enteritis  No evidence of small bowel obstruction   3  Hepatosplenomegaly  Hepatic steatosis  4  Circumferential wall thickening of the distal esophagus   This may be related to esophagitis               Workstation performed: [id number]         XR chest 1 view portable    (Results Pending)   VAS lower limb venous duplex study, complete bilateral    (Results Pending)   NM inpatient order    (Results Pending)              Procedures  Procedures         ED Course  ED Course as of 01/28/23 2125   Sat Jan 28, 2023   1426 Blood Pressure: 121/76   1426 Temperature: 97 7 °F (36 5 °C)   1426 Pulse(!): 118   1426 Respirations: 16   1426 SpO2: 93 %   1428 Procedure Note: EKG  Date/Time: 01/28/23 2:29 PM   Interpreted by: Gill Sierra  Indications / Diagnosis: weakness  ECG reviewed by me, the ED Provider: yes   The EKG demonstrates:  Rhythm: sinus tachycardia 113  Intervals: normal intervals  Axis: normal axis  QRS/Blocks: normal QRS  ST Changes: No acute ST Changes, no STD/MITCHEL        1445 Per EMS 85% on RA, dropped to 88% with IV and placed on 2LNC   1535 XR chest 1 view portable  No pna, ptx; no acute cardiopulmonary disease   1552 Creatinine(!): 3 10  Baseline around 1 3-1 5; will obtain CT imaging to r/o obstruction given pain in LLQ, may require treviño to monitor output   1553 Chloride(!): 89   1553 Potassium(!): 3 1   1553 Sodium(!): 129   1556 Echo from 8/3/21: "LEFT VENTRICLE:  Size was normal   Systolic function was normal  Ejection fraction was estimated to be 60 %  This study was inadequate for the evaluation of regional wall motion  Wall thickness was mildly increased  There was mild concentric hypertrophy"   1735 CT abdomen pelvis wo contrast  IMPRESSION:     1  No evidence of hydronephrosis or ureteral calculus  2  Increased fluid and air within the stomach, colon and rectum  Correlate for ileus/enteritis  No evidence of small bowel obstruction  3  Hepatosplenomegaly  Hepatic steatosis  4  Circumferential wall thickening of the distal esophagus  This may be related to esophagitis                                             Medical Decision Making  42-year-old female presents for evaluation of lightheadedness, generalized weakness, diarrhea  Patient is overall well-appearing on examination  She has history of COPD and utilizes oxygen at night, per EMS she was saturating 85% on room air and dipped to 88% while IV was being placed  She was placed on 2 L nasal cannula with improvement to the mid 90s  Patient however denies dyspnea or change in her cough  She does have diffuse wheezing, will administer a breathing treatment to attempt to improve this and her oxygen saturation  Will obtain chest x-ray to rule out pneumonia  Will obtain cardiac evaluation as a source of lightheadedness with EKG and troponin  Will obtain basic labs to rule out metabolic disturbance given patient's diarrhea    We will also place order for C  difficile and enteric stool studies given duration if patient is able to produce a bowel movement in the emergency department  Will provide IV fluids  Patient reports tenderness to palpation in her left abdomen however does not have the pain when this area is not palpated, she has no pain with movement of her torso such as leaning forward to auscultate her lungs  We will reassess this; doubt etiologies such as diverticulitis, bowel obstruction given she is tolerating p o  without nausea or vomiting  Will obtain urine sample to rule out UTI as source of pain  FAM (acute kidney injury) [facility]): acute illness or injury  COPD (chronic obstructive pulmonary disease) ([place] [id number] ): acute illness or injury  Dehydration: acute illness or injury  Enteritis: acute illness or injury  Generalized weakness: acute illness or injury  Hypokalemia: acute illness or injury  Amount and/or Complexity of Data Reviewed  Labs: ordered  Decision-making details documented in ED Course  Radiology: ordered  Decision-making details documented in ED Course  Risk  Prescription drug management  Decision regarding hospitalization            Disposition  Final diagnoses:   FAM (acute kidney injury) ([place] [address] )   Hypokalemia   Enteritis   COPD (chronic obstructive pulmonary disease) ([facility])   Generalized weakness   Dehydration     Time reflects when diagnosis was documented in both MDM as applicable and the Disposition within this note     Time User Action Codes Description Comment    1/28/2023  6:00 PM Jonda Palacios Add [N17 9] FAM (acute kidney injury) ([place] [address] )     1/28/2023  6:00 PM Jonda Palacios Add [E87 6] Hypokalemia     1/28/2023  6:01 PM Jonda Palacios Add [K52 9] Enteritis     1/28/2023  6:01 PM Jonda Palacios Add [J44 9] COPD (chronic obstructive pulmonary disease) ([place] [address] )     1/28/2023  6:01 PM Jonda Palacios Add [R53 1] Generalized weakness     1/28/2023  6:01 PM Jonda Palacios Add [E86 0] Dehydration       ED Disposition     ED Disposition   Admit    Condition   Stable Date/Time   Sat Jan 28, 2023  6:00 PM    Comment   Case was discussed with TONNY and the patient's admission status was agreed to be Admission Status: inpatient status to the service of Dr Amauri Tripp   Follow-up Information    None         Current Discharge Medication List      CONTINUE these medications which have NOT CHANGED    Details   amitriptyline (ELAVIL) 100 mg tablet take 1/2 tablet by mouth once daily at bedtime  Qty: 45 tablet, Refills: 1    Associated Diagnoses: Other migraine without status migrainosus, not intractable      aspirin (ECOTRIN LOW STRENGTH) 81 mg EC tablet Take 1 tablet (81 mg total) by mouth daily  Qty: 90 tablet, Refills: 3    Associated Diagnoses: Chest pain, unspecified type; MARQUEZ (dyspnea on exertion)      benztropine (COGENTIN) 0 5 mg tablet Take 0 5 mg by mouth daily at bedtime        clonazePAM (KlonoPIN) 0 5 mg tablet Take 0 5 mg by mouth daily as needed        doxepin (SINEquan) 100 mg capsule take 1 to 2 capsules by mouth at bedtime if needed      gabapentin (NEURONTIN) 600 MG tablet take 1 tablet by mouth three times a day  Qty: 270 tablet, Refills: 1    Associated Diagnoses: Other chronic pain      liraglutide (Victoza) injection Inject 0 3 mL (1 8 mg total) under the skin daily  Qty: 9 mL, Refills: 2    Associated Diagnoses: Diabetes mellitus without complication (HCC)      pantoprazole (PROTONIX) 40 mg tablet take 1 tablet by mouth daily if needed for GERD SYMPTOMS  Qty: 30 tablet, Refills: 1    Associated Diagnoses: GERD without esophagitis      rosuvastatin (CRESTOR) 40 MG tablet Take 1 tablet (40 mg total) by mouth daily  Qty: 90 tablet, Refills: 3    Associated Diagnoses: Dyslipidemia      tiotropium (Spiriva Respimat) 2 5 MCG/ACT AERS inhaler Inhale 2 puffs daily  Qty: 4 g, Refills: 3    Associated Diagnoses: Chronic obstructive pulmonary disease, unspecified COPD type (HCC)      vilazodone (VIIBRYD) 40 mg tablet Take 40 mg by mouth daily        VRAYLAR 6 MG capsule Take 6 mg by mouth daily  Refills: 0      albuterol (2 5 mg/3 mL) 0 083 % nebulizer solution Take 1 vial (2 5 mg total) by nebulization every 4 (four) hours as needed for shortness of breath  Qty: 120 mL, Refills: 3    Associated Diagnoses: COPD exacerbation (HCC)      albuterol (Ventolin HFA) 90 mcg/act inhaler Inhale 2 puffs every 4 (four) hours as needed for wheezing  Qty: 18 g, Refills: 4    Comments: Substitution to a formulary equivalent within the same pharmaceutical class is authorized  Associated Diagnoses: COPD exacerbation ([facility] [address] )      ! ! Blood Glucose Monitoring Suppl (ONE TOUCH ULTRA 2) w/Device KIT by Does not apply route      !! Blood Glucose Monitoring Suppl (ONE TOUCH ULTRA 2) w/Device KIT by Does not apply route daily  Qty: 100 each, Refills: 0    Associated Diagnoses: Diabetes mellitus without complication (HCC)      carvedilol (COREG) 3 125 mg tablet take 1 tablet by mouth twice a day with meals  Qty: 60 tablet, Refills: 3    Associated Diagnoses: Tachycardia      glucose blood (OneTouch Ultra) test strip Use 1 each daily Use as instructed  Qty: 100 strip, Refills: 2    Associated Diagnoses: Type 2 diabetes mellitus with hyperglycemia, without long-term current use of insulin ([facility])      Insulin Pen Needle (BD Pen Needle Jasmine 2nd Gen) 32G X 4 MM MISC Inject under the skin in the morning  Qty: 90 each, Refills: 1    Associated Diagnoses: Diabetes mellitus without complication (HCC)      nicotine (NICODERM CQ) 21 mg/24 hr TD 24 hr patch Place 1 patch on the skin daily  Qty: 28 patch, Refills: 0    Associated Diagnoses: Acute respiratory failure with hypoxia (HCC)      traZODone (DESYREL) 150 mg tablet 150 mg daily at bedtime        !! - Potential duplicate medications found  Please discuss with provider  No discharge procedures on file      PDMP Review     None          ED Provider  Electronically Signed by           Danilo Abel DO  01/28/23 6458

## 2023-01-29 LAB
ALBUMIN SERPL BCP-MCNC: 3.6 G/DL (ref 3.5–5)
ALP SERPL-CCNC: 61 U/L (ref 34–104)
ALT SERPL W P-5'-P-CCNC: 18 U/L (ref 7–52)
ANION GAP SERPL CALCULATED.3IONS-SCNC: 13 MMOL/L (ref 4–13)
APTT PPP: 94 SECONDS (ref 23–37)
AST SERPL W P-5'-P-CCNC: 13 U/L (ref 13–39)
BASOPHILS # BLD AUTO: 0.01 THOUSANDS/ÂΜL (ref 0–0.1)
BASOPHILS NFR BLD AUTO: 0 % (ref 0–1)
BILIRUB SERPL-MCNC: 0.31 MG/DL (ref 0.2–1)
BUN SERPL-MCNC: 31 MG/DL (ref 5–25)
CALCIUM SERPL-MCNC: 8.1 MG/DL (ref 8.4–10.2)
CHLORIDE SERPL-SCNC: 102 MMOL/L (ref 96–108)
CO2 SERPL-SCNC: 17 MMOL/L (ref 21–32)
CREAT SERPL-MCNC: 1.9 MG/DL (ref 0.6–1.3)
CREAT UR-MCNC: 83.5 MG/DL
EOSINOPHIL # BLD AUTO: 0.01 THOUSAND/ÂΜL (ref 0–0.61)
EOSINOPHIL NFR BLD AUTO: 0 % (ref 0–6)
ERYTHROCYTE [DISTWIDTH] IN BLOOD BY AUTOMATED COUNT: 13.6 % (ref 11.6–15.1)
GFR SERPL CREATININE-BSD FRML MDRD: 27 ML/MIN/1.73SQ M
GLUCOSE SERPL-MCNC: 296 MG/DL (ref 65–140)
GLUCOSE SERPL-MCNC: 315 MG/DL (ref 65–140)
GLUCOSE SERPL-MCNC: 347 MG/DL (ref 65–140)
GLUCOSE SERPL-MCNC: 371 MG/DL (ref 65–140)
GLUCOSE SERPL-MCNC: 376 MG/DL (ref 65–140)
HAV IGM SER QL: NORMAL
HBV CORE IGM SER QL: NORMAL
HBV SURFACE AG SER QL: NORMAL
HCT VFR BLD AUTO: 38.1 % (ref 34.8–46.1)
HCV AB SER QL: NORMAL
HGB BLD-MCNC: 12.6 G/DL (ref 11.5–15.4)
IMM GRANULOCYTES # BLD AUTO: 0.06 THOUSAND/UL (ref 0–0.2)
IMM GRANULOCYTES NFR BLD AUTO: 1 % (ref 0–2)
INR PPP: 1.09 (ref 0.84–1.19)
LYMPHOCYTES # BLD AUTO: 1.03 THOUSANDS/ÂΜL (ref 0.6–4.47)
LYMPHOCYTES NFR BLD AUTO: 15 % (ref 14–44)
MAGNESIUM SERPL-MCNC: 1.9 MG/DL (ref 1.9–2.7)
MCH RBC QN AUTO: 30.4 PG (ref 26.8–34.3)
MCHC RBC AUTO-ENTMCNC: 33.1 G/DL (ref 31.4–37.4)
MCV RBC AUTO: 92 FL (ref 82–98)
MONOCYTES # BLD AUTO: 0.14 THOUSAND/ÂΜL (ref 0.17–1.22)
MONOCYTES NFR BLD AUTO: 2 % (ref 4–12)
NEUTROPHILS # BLD AUTO: 5.63 THOUSANDS/ÂΜL (ref 1.85–7.62)
NEUTS SEG NFR BLD AUTO: 82 % (ref 43–75)
NRBC BLD AUTO-RTO: 0 /100 WBCS
PHOSPHATE SERPL-MCNC: 3.6 MG/DL (ref 2.3–4.1)
PLATELET # BLD AUTO: 177 THOUSANDS/UL (ref 149–390)
PMV BLD AUTO: 10.6 FL (ref 8.9–12.7)
POTASSIUM SERPL-SCNC: 3.6 MMOL/L (ref 3.5–5.3)
PROCALCITONIN SERPL-MCNC: 0.37 NG/ML
PROT SERPL-MCNC: 6.4 G/DL (ref 6.4–8.4)
PROTHROMBIN TIME: 14.3 SECONDS (ref 11.6–14.5)
RBC # BLD AUTO: 4.15 MILLION/UL (ref 3.81–5.12)
RV AG STL QL: NEGATIVE
SODIUM 24H UR-SCNC: 10 MOL/L
SODIUM SERPL-SCNC: 132 MMOL/L (ref 135–147)
WBC # BLD AUTO: 6.88 THOUSAND/UL (ref 4.31–10.16)

## 2023-01-29 RX ORDER — MAGNESIUM HYDROXIDE/ALUMINUM HYDROXICE/SIMETHICONE 120; 1200; 1200 MG/30ML; MG/30ML; MG/30ML
30 SUSPENSION ORAL ONCE
Status: COMPLETED | OUTPATIENT
Start: 2023-01-29 | End: 2023-01-29

## 2023-01-29 RX ORDER — ENOXAPARIN SODIUM 100 MG/ML
1 INJECTION SUBCUTANEOUS
Status: DISCONTINUED | OUTPATIENT
Start: 2023-01-29 | End: 2023-01-30 | Stop reason: HOSPADM

## 2023-01-29 RX ORDER — MAGNESIUM HYDROXIDE/ALUMINUM HYDROXICE/SIMETHICONE 120; 1200; 1200 MG/30ML; MG/30ML; MG/30ML
30 SUSPENSION ORAL EVERY 4 HOURS PRN
Status: DISCONTINUED | OUTPATIENT
Start: 2023-01-29 | End: 2023-01-30 | Stop reason: HOSPADM

## 2023-01-29 RX ORDER — INSULIN GLARGINE 100 [IU]/ML
5 INJECTION, SOLUTION SUBCUTANEOUS
Status: DISCONTINUED | OUTPATIENT
Start: 2023-01-29 | End: 2023-01-30 | Stop reason: HOSPADM

## 2023-01-29 RX ADMIN — VILAZODONE HYDROCHLORIDE 40 MG: 20 TABLET ORAL at 09:09

## 2023-01-29 RX ADMIN — METRONIDAZOLE 500 MG: 500 TABLET ORAL at 13:41

## 2023-01-29 RX ADMIN — METRONIDAZOLE 500 MG: 500 TABLET ORAL at 21:00

## 2023-01-29 RX ADMIN — INSULIN GLARGINE 5 UNITS: 100 INJECTION, SOLUTION SUBCUTANEOUS at 21:00

## 2023-01-29 RX ADMIN — GABAPENTIN 600 MG: 300 CAPSULE ORAL at 09:08

## 2023-01-29 RX ADMIN — PANTOPRAZOLE SODIUM 40 MG: 40 TABLET, DELAYED RELEASE ORAL at 05:53

## 2023-01-29 RX ADMIN — INSULIN LISPRO 3 UNITS: 100 INJECTION, SOLUTION INTRAVENOUS; SUBCUTANEOUS at 11:15

## 2023-01-29 RX ADMIN — CIPROFLOXACIN 400 MG: 2 INJECTION, SOLUTION INTRAVENOUS at 09:09

## 2023-01-29 RX ADMIN — INSULIN LISPRO 3 UNITS: 100 INJECTION, SOLUTION INTRAVENOUS; SUBCUTANEOUS at 21:03

## 2023-01-29 RX ADMIN — METHYLPREDNISOLONE SODIUM SUCCINATE 40 MG: 40 INJECTION, POWDER, FOR SOLUTION INTRAMUSCULAR; INTRAVENOUS at 13:44

## 2023-01-29 RX ADMIN — LIDOCAINE 1 PATCH: 140 PATCH CUTANEOUS at 09:09

## 2023-01-29 RX ADMIN — ALUMINUM HYDROXIDE, MAGNESIUM HYDROXIDE, AND DIMETHICONE 30 ML: 200; 20; 200 SUSPENSION ORAL at 13:40

## 2023-01-29 RX ADMIN — CIPROFLOXACIN 400 MG: 2 INJECTION, SOLUTION INTRAVENOUS at 20:57

## 2023-01-29 RX ADMIN — NICOTINE 1 PATCH: 21 PATCH, EXTENDED RELEASE TRANSDERMAL at 09:08

## 2023-01-29 RX ADMIN — ATORVASTATIN CALCIUM 80 MG: 40 TABLET, FILM COATED ORAL at 16:37

## 2023-01-29 RX ADMIN — ENOXAPARIN SODIUM 100 MG: 100 INJECTION SUBCUTANEOUS at 13:41

## 2023-01-29 RX ADMIN — METHYLPREDNISOLONE SODIUM SUCCINATE 40 MG: 40 INJECTION, POWDER, FOR SOLUTION INTRAMUSCULAR; INTRAVENOUS at 20:57

## 2023-01-29 RX ADMIN — AMITRIPTYLINE HYDROCHLORIDE 50 MG: 50 TABLET, FILM COATED ORAL at 21:00

## 2023-01-29 RX ADMIN — METRONIDAZOLE 500 MG: 500 TABLET ORAL at 05:53

## 2023-01-29 RX ADMIN — ASPIRIN 81 MG: 81 TABLET, COATED ORAL at 09:08

## 2023-01-29 RX ADMIN — INSULIN LISPRO 2 UNITS: 100 INJECTION, SOLUTION INTRAVENOUS; SUBCUTANEOUS at 09:10

## 2023-01-29 RX ADMIN — BENZTROPINE MESYLATE 0.5 MG: 1 TABLET ORAL at 21:00

## 2023-01-29 RX ADMIN — IPRATROPIUM BROMIDE 0.5 MG: 0.5 SOLUTION RESPIRATORY (INHALATION) at 07:23

## 2023-01-29 RX ADMIN — METHYLPREDNISOLONE SODIUM SUCCINATE 40 MG: 40 INJECTION, POWDER, FOR SOLUTION INTRAMUSCULAR; INTRAVENOUS at 05:53

## 2023-01-29 RX ADMIN — GABAPENTIN 600 MG: 300 CAPSULE ORAL at 16:37

## 2023-01-29 RX ADMIN — INSULIN LISPRO 4 UNITS: 100 INJECTION, SOLUTION INTRAVENOUS; SUBCUTANEOUS at 16:40

## 2023-01-29 RX ADMIN — LEVALBUTEROL HYDROCHLORIDE 1.25 MG: 1.25 SOLUTION, CONCENTRATE RESPIRATORY (INHALATION) at 07:23

## 2023-01-29 RX ADMIN — GABAPENTIN 600 MG: 300 CAPSULE ORAL at 20:58

## 2023-01-29 NOTE — RESPIRATORY THERAPY NOTE
RT Protocol Note  Carmela Cazares 58 y o  female MRN: [id number]  Unit/Bed#: 418-01 Encounter: [id number]    Assessment    Principal Problem:    Acute on chronic respiratory failure with hypoxia [facility])  Active Problems:    Depression    Neuropathy    Type 2 diabetes mellitus with hyperglycemia, without long-term current use of insulin ([facility])    FAM (acute kidney injury) ([place] [address] )    Tobacco abuse    Chronic obstructive pulmonary disease with acute exacerbation ([facility])    Hyponatremia    Diarrhea      Home Pulmonary Medications:    Home Devices/Therapy: Home O2, Other (Comment) (2 LPM baseline at night, and bronchodilator therapy prn)    Past Medical History:   Diagnosis Date   • Cancer ([place] [address] )     right breast   • Chronic back pain    • Colitis    • COPD (chronic obstructive pulmonary disease) ([facility])    • Depression    • Diabetes mellitus ([place] [address] )    • DVT of lower limb, acute ([place] [address] ) 2004   • GERD (gastroesophageal reflux disease)    • Hyperlipidemia    • Pneumonia    • Rapid heart rate    • Sleep apnea    • Stroke ([facility])     4 mini strokes     Social History     Socioeconomic History   • Marital status: Legally      Spouse name: None   • Number of children: None   • Years of education: None   • Highest education level: None   Occupational History   • None   Tobacco Use   • Smoking status: Every Day     Packs/day: 1 00     Types: Cigarettes   • Smokeless tobacco: Never   Vaping Use   • Vaping Use: Never used   Substance and Sexual Activity   • Alcohol use: Never   • Drug use: Never   • Sexual activity: Not Currently   Other Topics Concern   • None   Social History Narrative   • None     Social Determinants of Health     Financial Resource Strain: Not on file   Food Insecurity: No Food Insecurity   • Worried About Running Out of Food in the Last Year: Never true   • Ran Out of Food in the Last Year: Never true   Transportation Needs: No Transportation Needs   • Lack of Transportation (Medical):  No   • Lack of Transportation (Non-Medical): No   Physical Activity: Not on file   Stress: Not on file   Social Connections: Not on file   Intimate Partner Violence: Not on file   Housing Stability: Low Risk    • Unable to Pay for Housing in the Last Year: No   • Number of Places Lived in the Last Year: 1   • Unstable Housing in the Last Year: No       Subjective     Pt assessed per RT protocol  Pt presented to the ED with weakness and lightheadedness  She has a history of COPD, and is a current smoker  She wears 4 LPM baseline at night, and takes bronchodilator therapy PRN, which according to her, she does not use these medications every day  Pt is currently wheezing with congested rhonchi, and currently wearing 2 LPM NC  Objective    Physical Exam:   Assessment Type: Assess only  General Appearance: Sleeping, Eyes open/responds to voice  Respiratory Pattern: Normal  Chest Assessment: Chest expansion symmetrical  Bilateral Breath Sounds: Diminished, Expiratory wheezes, Rhonchi  Cough: Moist, Non-productive, Congested    Vitals:  Blood pressure 116/78, pulse (!) 115, temperature 97 7 °F (36 5 °C), temperature source Oral, resp  rate 18, height 5' 7" (1 702 m), weight 95 6 kg (210 lb 12 2 oz), SpO2 94 %  Imaging and other studies: I have personally reviewed pertinent reports  Plan    Respiratory Plan: Mild Distress pathway  Airway Clearance Plan: Flutter   TID Xopenex/Atrovent, flutter valve therapy to mobilize secretions    Resp Comments: Pt assessed per RT protocol

## 2023-01-29 NOTE — ASSESSMENT & PLAN NOTE
Corrected sodium 131 on admission  Suspect hypovolemic hyponatremia given active diarrhea    · Continuous IV fluids  · Trend BMP

## 2023-01-29 NOTE — PROGRESS NOTES
[address] [facility] [address]  Progress Note - Heather Lean 1960, 58 y o  female MRN: [id number]  Unit/Bed#: 441-79 Encounter: [id number]  Primary Care Provider: Debra Hemphill PA-C   Date and time admitted to hospital: 1/28/2023  2:19 PM    * Acute on chronic respiratory failure with hypoxia [facility])  Assessment & Plan  Patient hypoxic to mid 80s on room air, tachycardic 110s-120s with sinus tachycardia, pleuritic chest pain  D-dimer 1 46  Has a history of COPD, wears 4 L nasal cannula nightly - currently requiring 2L continuous O2 due to maintain saturations >88%  Additionally has history of pulmonary embolism 2004, found to have MTHFR gene  · High suspicion for pulmonary embolism given history of prior PE, MTHFR gene, decreased activity at home, tachycardia and hypoxia  Other differential includes COPD exacerbation in the setting of recent illness  · Given FAM on CKD, will hold off on CTA for now  · Obtain DVT ultrasound and VQ scan  · Empirically start heparin protocol, patient denies any active bleeding or recent surgery  · Continue albuterol, ipratropium nebulizer  · Start IV Solu-Medrol  · Continuous pulse oximetry  · Respiratory protocol - refusing breathing treatments currently    Chronic obstructive pulmonary disease with acute exacerbation (HCC)  Assessment & Plan  History of COPD, wears 4 L nasal cannula nightly  Scattered wheezing and present on exam along with hypoxia with spo2 <88% requiring 2L continuous O2  Suspect possible COPD exacerbation and high suspicion for PE   · Continue albuterol /ipratropium nebulizer  · Start IV Solu-Medrol  · Continuous pulse oximetry  · Respiratory protocol    FAM (acute kidney injury) ([facility] [address] )  Assessment & Plan  Creatinine 3 1 on admission, baseline appears to be around 1 2-1 3  Suspect this is in the setting of diarrhea with hypovolemia    · Continuous IV fluids  · Significant improvement to 1 9 with IVF  · discontinued nephro consult    Diarrhea  Assessment & Plan  Patient presents with several days of lightheadedness, fevers, watery diarrhea at least 5 times daily  Noted to have FAM, hypokalemia, hyponatremia on admission  · Start Cipro/Flagyl  · Continuous IV fluids  · Obtain GI PCR  · Blood cultures pending on trend procalcitonin    Hyponatremia  Assessment & Plan  Corrected sodium 131 on admission  Suspect hypovolemic hyponatremia given active diarrhea  · Continuous IV fluids  · Mild improvement to 132    Tobacco abuse  Assessment & Plan  · Currently smokes 1 pack/day  · Nicotine patch offered    Type 2 diabetes mellitus with hyperglycemia, without long-term current use of insulin [facility])  Assessment & Plan  Lab Results   Component Value Date    HGBA1C 11 7 (H) 11/08/2022       Recent Labs     01/28/23  2103 01/29/23  0732 01/29/23  1053   POCGLU 280* 296* 371*       Blood Sugar Average: Last 72 hrs:  (P) 323 [id number]   Current regimen includes Victoza  Start sliding scale insulin  With solumedrol, add on 5 units lantus at night  Hypoglycemia protocol        VTE Pharmacologic Prophylaxis: VTE Score: 5 High Risk (Score >/= 5) - Pharmacological DVT Prophylaxis Ordered: heparin drip  Sequential Compression Devices Ordered  Patient Centered Rounds: I performed bedside rounds with nursing staff today  Discussions with Specialists or Other Care Team Provider: none    Education and Discussions with Family / Patient: Updated  (brother) at bedside  Time Spent for Care: 30 minutes  More than 50% of total time spent on counseling and coordination of care as described above  Current Length of Stay: 1 day(s)  Current Patient Status: Inpatient   Certification Statement: The patient will continue to require additional inpatient hospital stay due to hypoxia, heparin drip, IV abx  Discharge Plan: Anticipate discharge in 48 hrs to home      Code Status: Level 3 - DNAR and DNI    Subjective:   Patient reports burning sensation in her chest after she ate  States diarrhea has stopped although she does feel increase in gas build up  Feels improved with breathing    Objective:     Vitals:   Temp (24hrs), Av 7 °F (36 5 °C), Min:97 7 °F (36 5 °C), Max:97 7 °F (36 5 °C)    Temp:  [97 7 °F (36 5 °C)] 97 7 °F (36 5 °C)  HR:  [] 95  Resp:  [16-18] 18  BP: (114-121)/(62-80) 114/62  SpO2:  [93 %-96 %] 93 %  Body mass index is 33 01 kg/m²  Input and Output Summary (last 24 hours): Intake/Output Summary (Last 24 hours) at 2023 1246  Last data filed at 2023 0856  Gross per 24 hour   Intake 2050 ml   Output --   Net 2050 ml       Physical Exam:   Physical Exam  Vitals and nursing note reviewed  Constitutional:       General: She is not in acute distress  Appearance: She is obese  She is ill-appearing  HENT:      Head: Normocephalic and atraumatic  Cardiovascular:      Rate and Rhythm: Normal rate and regular rhythm  Pulses: Normal pulses  Heart sounds: Normal heart sounds  No murmur heard  No gallop  Pulmonary:      Effort: Pulmonary effort is normal       Breath sounds: Wheezing present  Abdominal:      General: Bowel sounds are normal       Palpations: Abdomen is soft  Tenderness: There is no abdominal tenderness  There is no guarding or rebound  Musculoskeletal:         General: Normal range of motion  Cervical back: Normal range of motion and neck supple  Right lower leg: No edema  Left lower leg: No edema  Skin:     General: Skin is warm and dry  Neurological:      General: No focal deficit present  Mental Status: She is alert  Mental status is at baseline     Psychiatric:         Mood and Affect: Mood normal          Behavior: Behavior normal           Additional Data:     Labs:  Results from last 7 days   Lab Units 23  0452   WBC Thousand/uL 6 88   HEMOGLOBIN g/dL 12 6   HEMATOCRIT % 38 1   PLATELETS Thousands/uL 177   NEUTROS PCT % 82*   LYMPHS PCT % 15   MONOS PCT % 2*   EOS PCT % 0     Results from last 7 days   Lab Units 01/29/23  0452   SODIUM mmol/L 132*   POTASSIUM mmol/L 3 6   CHLORIDE mmol/L 102   CO2 mmol/L 17*   BUN mg/dL 31*   CREATININE mg/dL 1 90*   ANION GAP mmol/L 13   CALCIUM mg/dL 8 1*   ALBUMIN g/dL 3 6   TOTAL BILIRUBIN mg/dL 0 31   ALK PHOS U/L 61   ALT U/L 18   AST U/L 13   GLUCOSE RANDOM mg/dL 315*     Results from last 7 days   Lab Units 01/29/23  0452   INR  1 09     Results from last 7 days   Lab Units 01/29/23  1053 01/29/23  0732 01/28/23  2103   POC GLUCOSE mg/dl 371* 296* 280*         Results from last 7 days   Lab Units 01/29/23  0452 01/28/23  1953   LACTIC ACID mmol/L  --  2 0   PROCALCITONIN ng/ml 0 37* 0 40*       Lines/Drains:  Invasive Devices     Peripheral Intravenous Line  Duration           Peripheral IV 01/28/23 Dorsal (posterior); Left Hand <1 day    Peripheral IV 01/29/23 Distal;Left;Ventral (anterior) Wrist <1 day                      Imaging: Reviewed radiology reports from this admission including: chest CT scan    Recent Cultures (last 7 days):         Last 24 Hours Medication List:   Current Facility-Administered Medications   Medication Dose Route Frequency Provider Last Rate   • albuterol  2 5 mg Nebulization Q4H PRN Figueroa Garvin MD     • aluminum-magnesium hydroxide-simethicone  30 mL Oral Q4H PRN Gonzalo Sanabria PA-C     • aluminum-magnesium hydroxide-simethicone  30 mL Oral Once Gonzalo Sanabria PA-C     • amitriptyline  50 mg Oral HS Figueroa Garvin MD     • aspirin  81 mg Oral Daily Figueroa Garvin MD     • atorvastatin  80 mg Oral Daily With Nell Giraldo MD     • benztropine  0 5 mg Oral HS Figueroa Garvin MD     • cariprazine  6 mg Oral Daily Figueroa Garvin MD     • ciprofloxacin  400 mg Intravenous Q12H Figueroa Garvin  mg (01/29/23 5102)   • clonazePAM  0 5 mg Oral Daily PRN Figueroa Garvin MD     • doxepin  100 mg Oral HS PRN Figueroa Garvin MD     • gabapentin  600 mg Oral TID Singh Estrada MD     • heparin (porcine)  3-30 Units/kg/hr (Order-Specific) Intravenous Titrated Singh Estrada MD 16 Units/kg/hr (01/29/23 1028)   • heparin (porcine)  3,800 Units Intravenous Q6H PRN Singh Estrada MD     • heparin (porcine)  7,600 Units Intravenous Q6H PRN Singh Estrada MD     • insulin glargine  5 Units Subcutaneous HS Waldo Valdes PA-C     • insulin lispro  1-5 Units Subcutaneous TID AC Singh Estrada MD     • insulin lispro  1-5 Units Subcutaneous HS Singh Estrada MD     • lidocaine  1 patch Topical Daily Singh Estrada MD     • methylPREDNISolone sodium succinate  40 mg Intravenous Danilo Mills MD     • metroNIDAZOLE  500 mg Oral [facility] Singh Estrada MD     • nicotine  1 patch Transdermal Daily Singh Estrada MD     • pantoprazole  40 mg Oral Early Morning Singh Estrada MD     • vilazodone  40 mg Oral Daily Singh Estrada MD          Today, Patient Was Seen By: Waldo Valdes PA-C    **Please Note: This note may have been constructed using a voice recognition system  **

## 2023-01-29 NOTE — ASSESSMENT & PLAN NOTE
Creatinine 3 1 on admission, baseline appears to be around 1 2-1 3  Suspect this is in the setting of diarrhea with hypovolemia    · Continuous IV fluids  · Trend BMP  · Obtain urine studies

## 2023-01-29 NOTE — ASSESSMENT & PLAN NOTE
Creatinine 3 1 on admission, baseline appears to be around 1 2-1 3  Suspect this is in the setting of diarrhea with hypovolemia    · Continuous IV fluids  · Significant improvement to 1 9 with IVF  · discontinued nephro consult

## 2023-01-29 NOTE — OCCUPATIONAL THERAPY NOTE
Occupational Therapy Evaluation     Patient Name: Burt Lo  [id number] Date: 1/29/2023  Problem List  Principal Problem:    Acute on chronic respiratory failure with hypoxia [facility])  Active Problems:    Depression    Neuropathy    Type 2 diabetes mellitus with hyperglycemia, without long-term current use of insulin ([place] [address] )    AFM (acute kidney injury) ([place] [address] )    Tobacco abuse    Chronic obstructive pulmonary disease with acute exacerbation (HCC)    Hyponatremia    Diarrhea    Past Medical History  Past Medical History:   Diagnosis Date    Cancer ([place] [address] )     right breast    Chronic back pain     Colitis     COPD (chronic obstructive pulmonary disease) ([place] [address] )     Depression     Diabetes mellitus ([place] [address] )     DVT of lower limb, acute ([place] [address] ) 2004    GERD (gastroesophageal reflux disease)     Hyperlipidemia     Pneumonia     Rapid heart rate     Sleep apnea     Stroke ([place] [address] )     4 mini strokes     Past Surgical History  Past Surgical History:   Procedure Laterality Date    BREAST LUMPECTOMY Right     CARDIAC CATHETERIZATION N/A 10/28/2021    Procedure: Cardiac Coronary Angiogram;  Surgeon: Claudio Hernandez DO;  Location:  CARDIAC CATH LAB;   Service: Cardiology    IVC FILTER INSERTION      MASTECTOMY      MASTECTOMY W/ SENTINEL NODE BIOPSY Right 11/09/2021    Procedure: BREAST MASTECTOMY WITH BIOPSY LYMPH NODE SENTINEL and axillary node dissection  ([place] Lymph Node Injection @ 12:30);  Surgeon: Stuart Tirado MD;  Location: [facility] MAIN OR;  Service: General    US GUIDANCE BREAST BIOPSY RIGHT EACH ADDITIONAL Right 10/13/2021    US GUIDANCE BREAST BIOPSY RIGHT EACH ADDITIONAL Right 10/13/2021    US GUIDED BREAST BIOPSY RIGHT COMPLETE Right 10/13/2021      01/29/23 1257   OT Last Visit   OT Visit Date 01/29/23   Note Type   Note type Evaluation   Pain Assessment   Pain Assessment Tool 0-10   Pain Score No Pain   Restrictions/Precautions   Weight Bearing Precautions Per Order No   Other Precautions Contact/isolation;O2;Multiple lines   Home Living   Type of Home Apartment   Home Layout Elevator;Performs ADLs on one level   Bathroom Shower/Tub Tub/shower unit   Bathroom Toilet Standard   Bathroom Equipment Grab bars in shower; Shower chair   Bathroom Accessibility Accessible   Home Equipment Walker;Cane   Additional Comments Pt reports use of SPC for functional mobility outside of the home at baseline  No use of AD inside home   Prior Function   Level of [place] Independent with ADLs; Independent with IADLS; Independent with functional mobility   Lives With Alone   Receives Help From Family   IADLs Family/Friend/Other provides transportation; Independent with meal prep; Independent with medication management   Falls in the last 6 months 1 to 4  (1 fall 2 months ago, pt unsure what happened)   Vocational Retired   Comments Pt (-) driving  However, brother who lives in same apartment complex drives  Sister also lives in same apartment complex  Lifestyle   Autonomy Pt reporting independence in ADLs, IADLs, use of SPC for functional mobility outside of the home at baseline, no use of AD inside the home  Pt lives in apartment ,with no stairs to enter, and all on one level  Pt has siblings in the same complex, that assist with driving  Reciprocal Relationships brother   Intrinsic Gratification Pt enjoys spending time with her dog   Subjective   Subjective "I was having trouble walking before because I was dehydrated"   ADL   LB Dressing Assistance 5  Supervision/Setup   LB Dressing Deficit Don/doff R sock; Don/doff L sock; Verbal cueing   Additional Comments Pt don/doff socks while seated EOB with supervision  No LOB or unsteadiness  Bed Mobility   Additional Comments Pt leaving bathroom at start of session, however, all tangled up in lines  Helped pt safely get back to bed and untangle lines  Pt returned to bed at end of session  Pt on 2L nasal O2   Pt notes she is on 4L nightly at home, no nasal O2 during the day  Pt sat EOB for ~5 minutes with no LOB  Transfers   Sit to Stand 5  Supervision   Additional items Verbal cues   Stand to Sit 5  Supervision   Additional items Verbal cues   Additional Comments Pt performed functional transfers with supervision, no LOB or unsteadiness  No AD used  Functional Mobility   Functional Mobility 5  Supervision   Additional Comments Pt performed functional mobility in hallway, ~75 feet, with supervision  No AD  Pt encouraged to perform more functional mobility but requesting to turn around  Pt remained on 2L nasal O2, at 92% post functional mobility  V C  for safety awareness  Additional items   (no AD used)   Balance   Static Sitting Good   Dynamic Sitting Good   Static Standing Fair +   Dynamic Standing Fair   Ambulatory Fair   Activity Tolerance   Activity Tolerance Patient limited by fatigue   Medical Staff Made Aware [id number] Zarate verbalized pt appropriate for OT evaluation  PT Caio present for co-evaluation due to pt's medical complexity warranting 2 skilled therapists to safely perform evaluation  RUE Assessment   RUE Assessment WFL  (ROM WFL, strength 4+/5)   LUE Assessment   LUE Assessment WFL  (ROM WFL, strength 4+/5)   Hand Function   Gross Motor Coordination Functional   Fine Motor Coordination Functional   Psychosocial   Psychosocial (WDL) WDL   Cognition   Overall Cognitive Status WFL   Arousal/Participation Alert; Responsive; Cooperative   Attention Within functional limits   Orientation Level Oriented X4   Memory Within functional limits   Following Commands Follows all commands and directions without difficulty   Comments pt pleasant and agreeable to OT evaluation  Assessment   Limitation Decreased ADL status; Decreased UE strength;Decreased Safe judgement during ADL;Decreased endurance;Decreased high-level ADLs; Decreased self-care trans   Prognosis Good   Assessment Pt is a 58 y o  female seen for OT evaluation s/p admit to [facility] on 1/28/2023 w/ Acute on chronic respiratory failure with hypoxia ([facility] [address] )  Comorbidities affecting pt's functional performance at time of assessment include: depression, DM, neuropathy, tobacoc abuse, FAM, diarrhea, COPD, cataract, chronic hoarseness, GERD  Personal factors affecting pt at time of IE include:difficulty performing ADLS, difficulty performing IADLS  and health management   Prior to admission, pt was independent in ADls, IADLs and use of SPC for functional mobility outside of the home at baseline, no use of AD inside the home  Upon evaluation: Pt requires supervision for LB ADLs and supervision for functional mobility 2* the following deficits impacting occupational performance: weakness, decreased strength, decreased balance, decreased tolerance and decreased safety awareness  Pt to benefit from continued skilled OT tx while in the hospital to address deficits as defined above and maximize level of functional independence w ADL's and functional mobility  Occupational Performance areas to address include: bathing/shower, toilet hygiene, dressing, health maintenance and functional mobility  The patient's raw score on the AM-PAC Daily Activity inpatient short form is 21, standardized score is 44 27, greater than 39 4  Patients at this level are likely to benefit from discharge to home  Please refer to the recommendation of the Occupational Therapist for safe discharge planning  Goals   Patient Goals to go home   Plan   Treatment Interventions ADL retraining;Functional transfer training;UE strengthening/ROM; Endurance training;Patient/family training;Energy conservation; Activityengagement   Goal Expiration Date 02/12/23   OT Frequency 1-2x/wk   Recommendation   OT Discharge Recommendation No rehabilitation needs   Additional Comments  Pt left seated EOB at end of session with brother present, all needs met, call bell in reach     AM-[facility] Daily Activity Inpatient   Lower Body Dressing 3   Bathing 3   Toileting 3   Upper Body Dressing 4 Grooming 4   Eating 4   Daily Activity Raw Score 21   Daily Activity Standardized Score (Calc for Raw Score >=11) 44 27   AM-PAC Applied Cognition Inpatient   Following a Speech/Presentation 4   Understanding Ordinary Conversation 4   Taking Medications 4   Remembering Where Things Are Placed or Put Away 4   Remembering List of 4-5 Errands 4   Taking Care of Complicated Tasks 4   Applied Cognition Raw Score 24   Applied Cognition Standardized Score 62 21     Pt benefited for skilled co-evaluation by OT and PT therapists in order to most appropriately address functional deficits d/t extensive assistance required for safe functional mobility, decreased activity tolerance, and regression from functional level prior to admission  OT/PT goals addressed separately  Goals    Pt will perform LB ADLs with (I) and demonstration of good safety awareness to maximize participation in ADLs  Pt will perform toileting/toilet hygiene (I) and demonstration of good safety awareness to increase independence in self care  Pt will increase static and dynamic standing balance to good to increase safety awareness and participation in standing ADLs  Pt will increase standing tolerance to 25 minutes without LOB to increase participation in standing ADLs/purposeful tasks  Pt will complete B UE strengthening exercises to increase safety and participation in bed mobility, ADLs, and functional mobility     Dada Lala

## 2023-01-29 NOTE — PHYSICAL THERAPY NOTE
Physical Therapy Evaluation     Patient Name: Lambert Camarillo    [id number] Date: 1/29/2023     Problem List  Principal Problem:    Acute on chronic respiratory failure with hypoxia [facility])  Active Problems:    Depression    Neuropathy    Type 2 diabetes mellitus with hyperglycemia, without long-term current use of insulin ([place] [address] )    FAM (acute kidney injury) ([place] [address] )    Tobacco abuse    Chronic obstructive pulmonary disease with acute exacerbation (HCC)    Hyponatremia    Diarrhea       Past Medical History  Past Medical History:   Diagnosis Date    Cancer ([place] [address] )     right breast    Chronic back pain     Colitis     COPD (chronic obstructive pulmonary disease) ([place] [address] )     Depression     Diabetes mellitus ([place] [address] )     DVT of lower limb, acute ([place] [address] ) 2004    GERD (gastroesophageal reflux disease)     Hyperlipidemia     Pneumonia     Rapid heart rate     Sleep apnea     Stroke ([place] [address] )     4 mini strokes        Past Surgical History  Past Surgical History:   Procedure Laterality Date    BREAST LUMPECTOMY Right     CARDIAC CATHETERIZATION N/A 10/28/2021    Procedure: Cardiac Coronary Angiogram;  Surgeon: Norma Smith DO;  Location: [facility] CARDIAC CATH LAB;   Service: Cardiology    IVC FILTER INSERTION      MASTECTOMY      MASTECTOMY W/ SENTINEL NODE BIOPSY Right 11/09/2021    Procedure: BREAST MASTECTOMY WITH BIOPSY LYMPH NODE SENTINEL and axillary node dissection  ([place] Lymph Node Injection @ 12:30);  Surgeon: Ann Hernández MD;  Location: [facility] MAIN OR;  Service: General    US GUIDANCE BREAST BIOPSY RIGHT EACH ADDITIONAL Right 10/13/2021    US GUIDANCE BREAST BIOPSY RIGHT EACH ADDITIONAL Right 10/13/2021    US GUIDED BREAST BIOPSY RIGHT COMPLETE Right 10/13/2021         01/29/23 1258   Note Type   Note type Evaluation   Pain Assessment   Pain Assessment Tool 0-10   Pain Score No Pain   Restrictions/Precautions   Weight Bearing Precautions Per Order No   Other Precautions Contact/isolation;Multiple lines;O2   Home Living   Type of Home Apartment   Home Layout Elevator;Performs ADLs on one level   Bathroom Shower/Tub Tub/shower unit   Bathroom Toilet Standard   Bathroom Equipment Grab bars in shower   [address] Walker;Cane   Additional Comments SPC for community ambulation   Prior Function   Level of [place] Independent with ADLs; Independent with IADLS; Independent with functional mobility   Lives With Alone; Family   Receives Help From Family   IADLs Family/Friend/Other provides meals; Family/Friend/Other provides transportation; Family/Friend/Other provides medication management   Falls in the last 6 months 1 to 4   Vocational Retired   Comments  drives   Cognition   Overall Cognitive Status WFL   Arousal/Participation Alert   Attention Within functional limits   Orientation Level Oriented X4   Memory Within functional limits   Following Commands Follows all commands and directions without difficulty   RLE Assessment   RLE Assessment WFL   LLE Assessment   LLE Assessment WFL   Bed Mobility   Additional Comments Patient in bathroom when PT entered   Transfers   Sit to Stand 5  Supervision   Additional items Verbal cues   Stand to Sit 5  Supervision   Additional items Verbal cues   Additional Comments NO AD   Ambulation/Elevation   Gait pattern Decreased heel strike;Decreased hip extension;Decreased toe off;Decreased foot clearance  (Toes out posture)   Gait Assistance 5  Supervision   Assistive Device None   Distance 100 feet   Ambulation/Elevation Additional Comments 2L O2 SpO2 above 90 t/o session   Balance   Static Sitting Good   Dynamic Sitting Good   Static Standing Fair +   Dynamic Standing Fair   Ambulatory Fair   Activity Tolerance   Activity Tolerance Patient limited by fatigue   Medical Staff Made Aware RN [id number]   Assessment   Prognosis Good   Problem List Decreased strength;Decreased range of motion;Decreased endurance; Impaired balance;Decreased mobility   Assessment Pt is 58 y o  female seen for PT evaluation s/p admit to [address] on 1/28/2023 w/ Acute on chronic respiratory failure with hypoxia ([facility] [address] )  PT consulted to assess pt's functional mobility and d/c needs  Order placed for PT eval and tx, w/ up and OOB as tolerated order  Comorbidities affecting pt's physical performance at time of assessment include: Acute on chronic respiratory failure with hypoxia, GERD, DVT, Back pain, Stroke, DM and CA  PTA, pt was independent w/ all functional mobility w/ no AD   Personal factors affecting pt at time of IE include: inability to ambulate household distances, inability to navigate community distances, inability to navigate level surfaces w/o external assistance, unable to perform dynamic tasks in community, unable to perform physical activity, limited insight into impairments and inability to perform IADLs  Please find objective findings from PT assessment regarding body systems outlined above with impairments and limitations including weakness, decreased ROM, decreased endurance, gait deviations, pain, decreased activity tolerance and decreased functional mobility tolerance  From PT/mobility standpoint, recommendation at time of d/c would be no rehabilitation needs pending progress in order to facilitate return to PLOF     Goals   Patient Goals to go home   LTG Expiration Date 02/12/23   Long Term Goal #1 Patient will be (I) with all transfers and bed mobility   Long Term Goal #2 Patient will ambulate 250 feet (I) to help facilitate return to PLOF   Recommendation   PT Discharge Recommendation No rehabilitation needs   AM-PAC Basic Mobility Inpatient   Turning in Flat Bed Without Bedrails 4   Lying on Back to Sitting on Edge of Flat Bed Without Bedrails 4   Moving Bed to Chair 4   Standing Up From Chair Using Arms 4   Walk in Room 4   Climb 3-5 Stairs With Railing 3   Basic Mobility Inpatient Raw Score 23   Basic Mobility Standardized Score 50 88   Highest Level Of Mobility   -[facility] Goal 7: Walk 25 feet or more   -HLM Achieved 7: Walk 25 feet or more     Patient in bed at the end of the session, all lines intact  All needs within reach  Patients raw score on the AM-PAC Basic Mobility inpatient short form is 23, standardized score is 50 88, (greater than ) 42  9  patient's at this level are likely to benefit from D/C to home/, however, please refer to therapist recommendation for safe D/C Plan  Pt benefited from co-evaluation of skilled OT and PT therapists in order to most appropriately address functional deficits d/t extensive assistance required for safe functional mobility, decreased activity tolerance, and regression from functioning level prior to admission and/or onset of present illness  OT/PT objectives were addressed separately; please see OT note for specific goal areas targeted

## 2023-01-29 NOTE — ASSESSMENT & PLAN NOTE
Lab Results   Component Value Date    HGBA1C 11 7 (H) 11/08/2022       No results for input(s): POCGLU in the last 72 hours  Blood Sugar Average: Last 72 hrs:  Current regimen includes Victoza    Start sliding scale insulin  Hypoglycemia protocol

## 2023-01-29 NOTE — ASSESSMENT & PLAN NOTE
Patient hypoxic to mid 80s on room air, tachycardic 110s-120s with sinus tachycardia, pleuritic chest pain  D-dimer 1 46  Has a history of COPD, wears 4 L nasal cannula nightly  Additionally has history of pulmonary embolism 2004, found to have MTHFR gene  · High suspicion for pulmonary embolism given history of prior PE, MTHFR gene, decreased activity at home, tachycardia and hypoxia  Other differential includes COPD exacerbation in the setting of recent illness    · Given FAM on CKD, will hold off on CTA for now  · Obtain DVT ultrasound  · Empirically start heparin protocol, patient denies any active bleeding or recent surgery  · Continue albuterol, ipratropium nebulizer  · Start IV Solu-Medrol  · Continuous pulse oximetry  · Respiratory protocol

## 2023-01-29 NOTE — H&P
[address] 1960, 58 y o  female MRN: [id number]  Unit/Bed#: 019-90 Encounter: [id number]  Primary Care Provider: Yoly Sims PA-C   Date and time admitted to hospital: 1/28/2023  2:19 PM    * Acute on chronic respiratory failure with hypoxia [facility])  Assessment & Plan  Patient hypoxic to mid 80s on room air, tachycardic 110s-120s with sinus tachycardia, pleuritic chest pain  D-dimer 1 46  Has a history of COPD, wears 4 L nasal cannula nightly  Additionally has history of pulmonary embolism 2004, found to have MTHFR gene  · High suspicion for pulmonary embolism given history of prior PE, MTHFR gene, decreased activity at home, tachycardia and hypoxia  Other differential includes COPD exacerbation in the setting of recent illness  · Given FAM on CKD, will hold off on CTA for now  · Obtain DVT ultrasound  · Empirically start heparin protocol, patient denies any active bleeding or recent surgery  · Continue albuterol, ipratropium nebulizer  · Start IV Solu-Medrol  · Continuous pulse oximetry  · Respiratory protocol    Diarrhea  Assessment & Plan  Patient presents with several days of lightheadedness, fevers, watery diarrhea at least 5 times daily  Noted to have FAM, hypokalemia, hyponatremia on admission  · Start Cipro/Flagyl  · Continuous IV fluids  · Obtain GI PCR  · Blood cultures pending on trend procalcitonin    FAM (acute kidney injury) ([facility] [address] )  Assessment & Plan  Creatinine 3 1 on admission, baseline appears to be around 1 2-1 3  Suspect this is in the setting of diarrhea with hypovolemia  · Continuous IV fluids  · Trend BMP  · Obtain urine studies    Hyponatremia  Assessment & Plan  Corrected sodium 131 on admission  Suspect hypovolemic hyponatremia given active diarrhea    · Continuous IV fluids  · Trend BMP    Chronic obstructive pulmonary disease with acute exacerbation (HCC)  Assessment & Plan  History of COPD, wears 4 L nasal cannula nightly  Scattered wheezing and present on exam along with hypoxia  Suspect possible COPD exacerbation and high suspicion for PE   · Continue albuterol /ipratropium nebulizer  · Start IV Solu-Medrol  · Continuous pulse oximetry  · Respiratory protocol    Tobacco abuse  Assessment & Plan  Currently smokes 1 pack/day  Nicotine patch offered    Type 2 diabetes mellitus with hyperglycemia, without long-term current use of insulin (HCC)  Assessment & Plan  Lab Results   Component Value Date    HGBA1C 11 7 (H) 11/08/2022       No results for input(s): POCGLU in the last 72 hours  Blood Sugar Average: Last 72 hrs:  Current regimen includes Victoza  Start sliding scale insulin  Hypoglycemia protocol    Neuropathy  Assessment & Plan  Continue gabapentin    Depression  Assessment & Plan  Continue amitriptyline, Vraylar, doxepin          VTE Prophylaxis: Heparin Drip  / sequential compression device and foot pump applied   Code Status: Level 3 - DNAR and DNI       Anticipated Length of Stay:  Patient will be admitted on an Inpatient basis with an anticipated length of stay of greater than 2 midnights  Justification for Hospital Stay: Please see detailed plans noted above  Chief Complaint:     Weakness, cough    History of Present Illness:  Virgen Davis is a 58 y o  female who has past medical history significant for COPD, current tobacco use, CKD presenting with generalized weakness, diarrhea and cough  Patient reports about 1 to 2 weeks ago she had upper respiratory infection symptoms including cough, shortness of breath  She initially thought she was improving, but over the last several days she has had increased lightheadedness, fever, nonproductive cough, pleuritic chest pain and now greater than 5 episodes of watery diarrhea daily for the last several days  Denies nausea, vomiting, abdominal pain, lower extremity edema      In ED, patient found to be hypoxic to the mid 80s on room air, requiring 2 L nasal cannula for O2 sat greater than 92%  She also found to be tachycardic 110s-120s with sinus tachycardia on EKG  CT chest/abdomen/pelvis showing mild bilateral upper lobe bronchiolitis, esophageal thickening, and increased fluid and air within the stomach, colon and rectum suggestive of ileus/enteritis  Labs otherwise significant for creatinine 3 1 (baseline 1 2-1 3), corrected sodium 131, potassium 3 1, WBC 10  Of note, patient has remote history of pulmonary embolism in 2004, found to have MTHFR gene  She is not currently on anticoagulation  Review of Systems:    Constitutional: Reports malaise, fever, chills  Eyes:  Denies change in visual acuity   HENT:  Denies nasal congestion or sore throat   Respiratory: Reports nonproductive cough, shortness of breath as described above  Cardiovascular: Reports pleuritic chest pain, denies edema  GI: Reports watery diarrhea, denies nausea, vomiting, abdominal pain or bloody stools  : Reports decreased urine output, denies dysuria   Musculoskeletal:  Denies back pain or joint pain   Integument:  Denies rash   Neurologic:  Denies headache or sensory changes   Endocrine:  Denies polyuria or polydipsia   Psychiatric:  Denies depression or anxiety     Past Medical and Surgical History:   Past Medical History:   Diagnosis Date   • Cancer ([facility] )     right breast   • Chronic back pain    • Colitis    • COPD (chronic obstructive pulmonary disease) ([facility])    • Depression    • Diabetes mellitus ([facility] )    • DVT of lower limb, acute ([facility] ) 2004   • GERD (gastroesophageal reflux disease)    • Hyperlipidemia    • Pneumonia    • Rapid heart rate    • Sleep apnea    • Stroke ([facility])     4 mini strokes     Past Surgical History:   Procedure Laterality Date   • BREAST LUMPECTOMY Right    • CARDIAC CATHETERIZATION N/A 10/28/2021    Procedure: Cardiac Coronary Angiogram;  Surgeon: Marion Lester DO;  Location: [facility];   Service: Cardiology   • IVC FILTER INSERTION • MASTECTOMY     • MASTECTOMY W/ SENTINEL NODE BIOPSY Right 11/09/2021    Procedure: BREAST MASTECTOMY WITH BIOPSY LYMPH NODE SENTINEL and axillary node dissection  ([facility] Lymph Node Injection @ 12:30);  Surgeon: Estelita Atkinson MD;  Location: [address] [place] [address] OR;  Service: General   • US GUIDANCE BREAST BIOPSY RIGHT EACH ADDITIONAL Right 10/13/2021   • US GUIDANCE BREAST BIOPSY RIGHT EACH ADDITIONAL Right 10/13/2021   • US GUIDED BREAST BIOPSY RIGHT COMPLETE Right 10/13/2021       Meds/Allergies:  No current facility-administered medications on file prior to encounter  Current Outpatient Medications on File Prior to Encounter   Medication Sig Dispense Refill   • amitriptyline (ELAVIL) 100 mg tablet take 1/2 tablet by mouth once daily at bedtime 45 tablet 1   • aspirin (ECOTRIN LOW STRENGTH) 81 mg EC tablet Take 1 tablet (81 mg total) by mouth daily 90 tablet 3   • benztropine (COGENTIN) 0 5 mg tablet Take 0 5 mg by mouth daily at bedtime       • clonazePAM (KlonoPIN) 0 5 mg tablet Take 0 5 mg by mouth daily as needed       • doxepin (SINEquan) 100 mg capsule take 1 to 2 capsules by mouth at bedtime if needed     • gabapentin (NEURONTIN) 600 MG tablet take 1 tablet by mouth three times a day 270 tablet 1   • liraglutide (Victoza) injection Inject 0 3 mL (1 8 mg total) under the skin daily 9 mL 2   • pantoprazole (PROTONIX) 40 mg tablet take 1 tablet by mouth daily if needed for GERD SYMPTOMS 30 tablet 1   • rosuvastatin (CRESTOR) 40 MG tablet Take 1 tablet (40 mg total) by mouth daily 90 tablet 3   • tiotropium (Spiriva Respimat) 2 5 MCG/ACT AERS inhaler Inhale 2 puffs daily 4 g 3   • vilazodone (VIIBRYD) 40 mg tablet Take 40 mg by mouth daily       • VRAYLAR 6 MG capsule Take 6 mg by mouth daily  0   • albuterol (2 5 mg/3 mL) 0 083 % nebulizer solution Take 1 vial (2 5 mg total) by nebulization every 4 (four) hours as needed for shortness of breath (Patient not taking: Reported on 1/28/2023) 120 mL 3   • albuterol (Ventolin HFA) 90 mcg/act inhaler Inhale 2 puffs every 4 (four) hours as needed for wheezing (Patient not taking: Reported on 1/28/2023) 18 g 4   • Blood Glucose Monitoring Suppl (ONE TOUCH ULTRA 2) w/Device KIT by Does not apply route (Patient not taking: Reported on 1/28/2023)     • Blood Glucose Monitoring Suppl (ONE TOUCH ULTRA 2) w/Device KIT by Does not apply route daily (Patient not taking: Reported on 1/28/2023) 100 each 0   • carvedilol (COREG) 3 125 mg tablet take 1 tablet by mouth twice a day with meals 60 tablet 3   • glucose blood (OneTouch Ultra) test strip Use 1 each daily Use as instructed (Patient not taking: Reported on 1/28/2023) 100 strip 2   • Insulin Pen Needle (BD Pen Needle Jasmine 2nd Gen) 32G X 4 MM MISC Inject under the skin in the morning (Patient not taking: Reported on 1/28/2023) 90 each 1   • nicotine (NICODERM CQ) 21 mg/24 hr TD 24 hr patch Place 1 patch on the skin daily (Patient not taking: Reported on 1/28/2023) 28 patch 0   • traZODone (DESYREL) 150 mg tablet 150 mg daily at bedtime  (Patient not taking: Reported on 1/28/2023)           Allergies:    Allergies   Allergen Reactions   • Amoxicillin-Pot Clavulanate GI Intolerance   • Anastrozole Other (See Comments)     Diarrhea, Nausea, Stomach pain        History:  Marital Status: Legally      Substance Use History:   Social History     Substance and Sexual Activity   Alcohol Use Never     Social History     Tobacco Use   Smoking Status Every Day   • Packs/day: 1 00   • Types: Cigarettes   Smokeless Tobacco Never     Social History     Substance and Sexual Activity   Drug Use Never       Family History:  Family History   Problem Relation Age of Onset   • Breast cancer Mother 67   • COPD Mother    • Coronary artery disease Mother    • Coronary artery disease Father    • Stroke Father    • Lung cancer Sister    • No Known Problems Sister    • No Known Problems Sister    • Breast cancer Maternal Grandmother    • Colon cancer Paternal Grandfather    • No Known Problems Maternal Aunt    • No Known Problems Maternal Aunt    • No Known Problems Maternal Aunt    • No Known Problems Paternal Aunt    • Breast cancer Cousin        Physical Exam:     Vitals:   Blood Pressure: 116/78 (01/28/23 1959)  Pulse: (!) 122 (01/28/23 1959)  Temperature: 97 7 °F (36 5 °C) (01/28/23 1959)  Temp Source: Oral (01/28/23 1959)  Respirations: 17 (01/28/23 1959)  Height: 5' 7" (170 2 cm) (01/28/23 2005)  Weight - Scale: 95 6 kg (210 lb 12 2 oz) (01/28/23 2005)  SpO2: 93 % (01/28/23 1959)    Constitutional: Ill-appearing, no acute distress  Eyes:  PERRL, right pupil irregularity (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, hx cataract surgery), EOMI, No scleral icterus   HENT:   oropharynx moist, external ears normal, external nose normal   Respiratory: Wearing 2 L nasal cannula, scattered wheezing throughout  Cardiovascular: Tachycardic, regular rhythm, no murmurs   GI:  Soft, nondistended, no guarding   Musculoskeletal:  no tenderness, no deformities  Integument:  no jaundice, no rash   Neurologic:  Alert &awake, communicative, CN 2-12 normal,  no focal deficits noted   Psychiatric:  Speech and behavior appropriate       Lab Results: I have personally reviewed pertinent reports  Results from last 7 days   Lab Units 01/28/23  1508   WBC Thousand/uL 10 49*   HEMOGLOBIN g/dL 15 8*   HEMATOCRIT % 46 3*   PLATELETS Thousands/uL 232   NEUTROS PCT % 73   LYMPHS PCT % 18   MONOS PCT % 8   EOS PCT % 0     Results from last 7 days   Lab Units 01/28/23  1508   POTASSIUM mmol/L 3 1*   CHLORIDE mmol/L 89*   CO2 mmol/L 22   BUN mg/dL 36*   CREATININE mg/dL 3 10*   CALCIUM mg/dL 9 6   ALK PHOS U/L 79   ALT U/L 25   AST U/L 20             Imaging: I have personally reviewed pertinent reports  CT abdomen pelvis wo contrast    Result Date: 1/28/2023  Narrative: CT ABDOMEN AND PELVIS WITHOUT IV CONTRAST INDICATION:   Left lower quadrant pain, acute kidney insufficiency   Evaluate for obstruction  COMPARISON:  7/21/2022 TECHNIQUE:  CT examination of the abdomen and pelvis was performed without intravenous contrast  Axial, sagittal, and coronal 2D reformatted images were created from the source data and submitted for interpretation  Radiation dose length product (DLP) for this visit:  922 mGy-cm   This examination, like all CT scans performed in the [facility], was performed utilizing techniques to minimize radiation dose exposure, including the use of iterative reconstruction and automated exposure control  Enteric contrast was not administered  FINDINGS: ABDOMEN LOWER CHEST:  There is circumferential wall thickening of the distal aspect of the esophagus  LIVER/BILIARY TREE:  Hepatomegaly and hepatic steatosis  GALLBLADDER:  Gallbladder is contracted  No pericholecystic inflammatory changes or calcified gallstone SPLEEN:  Mild splenomegaly measuring 13 cm  PANCREAS:  Unremarkable  ADRENAL GLANDS:  Unremarkable  KIDNEYS/URETERS:  Probable left kidney anterior cortical cyst slightly smaller than prior study  No hydronephrosis or stone STOMACH AND BOWEL:  Fluid-filled rectum and sigmoid colon  Increased air and fluid within the colon and stomach without evidence of small bowel obstruction or focal inflammatory change  APPENDIX:  No findings to suggest appendicitis  ABDOMINOPELVIC CAVITY:  No ascites  No pneumoperitoneum  No lymphadenopathy  VESSELS:  IVC filter present, as on prior study  No AAA  PELVIS REPRODUCTIVE ORGANS:  Fibroid uterus  URINARY BLADDER:  Unremarkable  ABDOMINAL WALL/INGUINAL REGIONS:  Unremarkable  OSSEOUS STRUCTURES:  No acute fracture or destructive osseous lesion  Impression: 1  No evidence of hydronephrosis or ureteral calculus 2  Increased fluid and air within the stomach, colon and rectum  Correlate for ileus/enteritis  No evidence of small bowel obstruction 3  Hepatosplenomegaly  Hepatic steatosis   4  Circumferential wall thickening of the distal esophagus  This may be related to esophagitis Workstation performed: [id number]     CT chest wo contrast    Result Date: 1/28/2023  Narrative: CT CHEST WITHOUT IV CONTRAST INDICATION:  Sepsis, hypoxia  COMPARISON:  CT chest 7/20/2022 TECHNIQUE: CT examination of the chest was performed without intravenous contrast  Axial, sagittal, and coronal 2D reformatted images were created from the source data and submitted for interpretation  Radiation dose length product (DLP) for this visit:  259 mGy-cm  This examination, like all CT scans performed in the [facility], was performed utilizing techniques to minimize radiation dose exposure, including the use of iterative reconstruction and automated exposure control  FINDINGS: LUNGS: No consolidation  Subsegmental atelectasis or scarring inferior lingula  Mild centrilobular emphysema  -Stable 3 mm right upper lobe nodule (3/20)  Redemonstration of subtle upper lobe predominant centrilobular nodules  No central endobronchial lesions  PLEURA:  Unremarkable  HEART/GREAT VESSELS: Heart is unremarkable for patient's age  Atherosclerotic changes thoracic aorta and coronary arteries  No thoracic aortic aneurysm  MEDIASTINUM AND HALEY:  Subcentimeter mediastinal lymph nodes  Mild distal esophageal thickening, possibly accentuated by underdistention  No enlarged paraesophageal lymph nodes  CHEST WALL AND LOWER NECK:  Unremarkable  VISUALIZED STRUCTURES IN THE UPPER ABDOMEN:  Enlarged fatty liver suggested  OSSEOUS STRUCTURES:  No acute fracture or destructive osseous lesion  Multilevel degenerative changes of the spine  Impression: 1  No consolidation to suggest pneumonia  2  Redemonstration of mild bilateral upper lobe predominant centrilobular nodules compatible with respiratory bronchiolitis, a smoking-related lung disease  3  Mild distal esophageal thickening, possibly accentuated by underdistention    Correlate with any clinical symptoms and if relevant consider follow-up esophagram   4  Enlarged fatty liver suggested  Workstation performed: [id number]       Total time for visit, including counseling/coordination of care: 45 minutes  Greater than 50% of this total time spent on direct patient counseling and coorination of care  Epic Records Reviewed as well as Records in Care Everywhere    ** Please Note: Dragon 360 Dictation voice to text software was used in the creation of this document   **

## 2023-01-29 NOTE — ASSESSMENT & PLAN NOTE
History of COPD, wears 4 L nasal cannula nightly  Scattered wheezing and present on exam along with hypoxia with spo2 <88% requiring 2L continuous O2    Suspect possible COPD exacerbation and high suspicion for PE   · Continue albuterol /ipratropium nebulizer  · Start IV Solu-Medrol  · Continuous pulse oximetry  · Respiratory protocol

## 2023-01-29 NOTE — PLAN OF CARE
Problem: OCCUPATIONAL THERAPY ADULT  Goal: Performs self-care activities at highest level of function for planned discharge setting  See evaluation for individualized goals  Description: Treatment Interventions: ADL retraining, Functional transfer training, UE strengthening/ROM, Endurance training, Patient/family training, Energy conservation, Activityengagement          See flowsheet documentation for full assessment, interventions and recommendations  Note: Limitation: Decreased ADL status, Decreased UE strength, Decreased Safe judgement during ADL, Decreased endurance, Decreased high-level ADLs, Decreased self-care trans  Prognosis: Good  Assessment: Pt is a 58 y o  female seen for OT evaluation s/p admit to [facility] on 1/28/2023 w/ Acute on chronic respiratory failure with hypoxia ([facility] [address] )  Comorbidities affecting pt's functional performance at time of assessment include: depression, DM, neuropathy, tobacoc abuse, FAM, diarrhea, COPD, cataract, chronic hoarseness, GERD  Personal factors affecting pt at time of IE include:difficulty performing ADLS, difficulty performing IADLS  and health management   Prior to admission, pt was independent in ADls, IADLs and use of SPC for functional mobility outside of the home at baseline, no use of AD inside the home  Upon evaluation: Pt requires supervision for LB ADLs and supervision for functional mobility 2* the following deficits impacting occupational performance: weakness, decreased strength, decreased balance, decreased tolerance and decreased safety awareness  Pt to benefit from continued skilled OT tx while in the hospital to address deficits as defined above and maximize level of functional independence w ADL's and functional mobility  Occupational Performance areas to address include: bathing/shower, toilet hygiene, dressing, health maintenance and functional mobility  Pt to benefit from safety awareness education for functional standing tasks and IADLs   The patient's raw score on the AM-PAC Daily Activity inpatient short form is 21, standardized score is 44 27, greater than 39 4  Patients at this level are likely to benefit from discharge to home  Please refer to the recommendation of the Occupational Therapist for safe discharge planning       OT Discharge Recommendation: No rehabilitation needs

## 2023-01-29 NOTE — RESPIRATORY THERAPY NOTE
01/29/23 0726   Inhalation Therapy Tx   $ Inhalation Therapy Performed Yes   $ Pulse Oximetry Spot Check Charge Completed   SpO2 94 %   Pre-Treatment Pulse 95   Pre-Treatment Respirations 20   Duration 20   Breath Sounds Pre-Treatment Bilateral Diminished; Expiratory wheeze   Breath Sounds Post-Treatment Bilateral Diminished; Expiratory wheezes   Post-Treatment Pulse 93   Post-Treatment Respirations 20   Delivery Source Air;UDN   Position Semi Fitzgerald's   Treatment Tolerance Tolerated well   Resp Comments patient is on 2 lpm at this time, she has the flutter at bedside   Patient was going to refuse the breathing treatment, she stated she doesn't need it, I will change it to a prn status, patient does have wheezing, but patient states that is her normal     I did discuss with the hospitalist Ai Koenig that patient did not want scheduled breathing treatments, she is aware

## 2023-01-29 NOTE — PLAN OF CARE
Problem: SAFETY ADULT  Goal: Patient will remain free of falls  Description: INTERVENTIONS:  - Educate patient/family on patient safety including physical limitations  - Instruct patient to call for assistance with activity   - Consult OT/PT to assist with strengthening/mobility   - Keep Call bell within reach  - Keep bed low and locked with side rails adjusted as appropriate  - Keep care items and personal belongings within reach  - Initiate and maintain comfort rounds  - Make Fall Risk Sign visible to staff  - Offer Toileting every 2 Hours, in advance of need  - Initiate/Maintain bed alarm  - Obtain necessary fall risk management equipment: nonskid socks  - Apply yellow socks and bracelet for high fall risk patients  - Consider moving patient to room near nurses station  Outcome: Progressing  Goal: Maintain or return to baseline ADL function  Description: INTERVENTIONS:  -  Assess patient's ability to carry out ADLs; assess patient's baseline for ADL function and identify physical deficits which impact ability to perform ADLs (bathing, care of mouth/teeth, toileting, grooming, dressing, etc )  - Assess/evaluate cause of self-care deficits   - Assess range of motion  - Assess patient's mobility; develop plan if impaired  - Assess patient's need for assistive devices and provide as appropriate  - Encourage maximum independence but intervene and supervise when necessary  - Involve family in performance of ADLs  - Assess for home care needs following discharge   - Consider OT consult to assist with ADL evaluation and planning for discharge  - Provide patient education as appropriate  Outcome: Progressing  Goal: Maintains/Returns to pre admission functional level  Description: INTERVENTIONS:  - Perform BMAT or MOVE assessment daily    - Set and communicate daily mobility goal to care team and patient/family/caregiver     - Collaborate with rehabilitation services on mobility goals if consulted  - Perform Range of Motion 2 times a day  - Reposition patient every 2 hours  - Dangle patient 2 times a day  - Stand patient 2 times a day  - Ambulate patient 2 times a day  - Out of bed to chair 2 times a day   - Out of bed for meals 2 times a day  - Out of bed for toileting  - Record patient progress and toleration of activity level   Outcome: Progressing     Problem: DISCHARGE PLANNING  Goal: Discharge to home or other facility with appropriate resources  Description: INTERVENTIONS:  - Identify barriers to discharge w/patient and caregiver  - Arrange for needed discharge resources and transportation as appropriate  - Identify discharge learning needs (meds, wound care, etc )  - Arrange for interpretive services to assist at discharge as needed  - Refer to Case Management Department for coordinating discharge planning if the patient needs post-hospital services based on physician/advanced practitioner order or complex needs related to functional status, cognitive ability, or social support system  Outcome: Progressing     Problem: Knowledge Deficit  Goal: Patient/family/caregiver demonstrates understanding of disease process, treatment plan, medications, and discharge instructions  Description: Complete learning assessment and assess knowledge base    Interventions:  - Provide teaching at level of understanding  - Provide teaching via preferred learning methods  Outcome: Progressing

## 2023-01-29 NOTE — ASSESSMENT & PLAN NOTE
Patient presents with several days of lightheadedness, fevers, watery diarrhea at least 5 times daily  Noted to have FAM, hypokalemia, hyponatremia on admission    · Start Cipro/Flagyl  · Continuous IV fluids  · Obtain GI PCR  · Blood cultures pending on trend procalcitonin

## 2023-01-29 NOTE — ASSESSMENT & PLAN NOTE
Corrected sodium 131 on admission  Suspect hypovolemic hyponatremia given active diarrhea    · Continuous IV fluids  · Mild improvement to 132

## 2023-01-29 NOTE — UTILIZATION REVIEW
Initial Clinical Review    Admission: Date/Time/Statement:   Admission Orders (From admission, onward)     Ordered        01/28/23 1801  INPATIENT ADMISSION  Once                      Orders Placed This Encounter   Procedures   • INPATIENT ADMISSION     Standing Status:   Standing     Number of Occurrences:   1     Order Specific Question:   Level of Care     Answer:   Med Surg [16]     Order Specific Question:   Estimated length of stay     Answer:   Not Applicable     ED Arrival Information     Expected   1/28/2023     Arrival   1/28/2023 14:18    Acuity   Urgent            Means of arrival   Ambulance    Escorted by   [facility]FFILIATED WITH [facility]    Service   Hospitalist    Admission type   Emergency            Arrival complaint   Weakness           Chief Complaint   Patient presents with   • Weakness - Generalized     Pt states she started with a head cold two weeks ago and started on Thursday night with increased weakness  Initial Presentation: 58 y o  female presents to ED via  EMS  From h [facility] with  URI symptoms for past 1-2 weeks, including cough and shortness of breath  Felt she was improving,  But for the  Last several days had increasing lightheadedness, fever, non productive cough, pleuritic  Chest pain and now  >  5 episodes  Of watery diarrhea for past several days  Denies nausea or vomiting  PMH is  COPD, on  4L   NC  At night,  Tobacco use and CKD  Has remote history of  PE  Found hypoxic  In ED with sats  80  % on RA requiring  2 l  O2  NC to keep sats  >  92  %  Tachycardic with heart rate  110'2 - 120'2  CT  Scan  Shows bronchiolitis, increased air and fluid in stomach and ileus/enteritis  Labs show creatinine  3 1  ( baseline  1 2 - 1 3), sodium  131 and  K  3 1  Admit  Ip with  Acute/chronic respiratory failure with hypoxia, Diarrhea, FAM, hyponatremia and acute  Exacerbation  COPD and plan is   IVF,  IV  S/medrol, monitor labs, blood cultures,  NICOLE, O2  2L  NC, and nebulizers          Date: 1/29         Day 2:   Currently requiring  O2  2L  NC to keep sats  >  88 %  Continue  IV s/medrol and NICOLE  Continue nebulizers  Creatinine significantly  Improved  Sodium  Mildly  Improved to   132  Diarrhea  Stopped    + f carlene  Feels  Breathing better  Needs  PT/OT  ED Triage Vitals   Temperature Pulse Respirations Blood Pressure SpO2   01/28/23 1426 01/28/23 1421 01/28/23 1421 01/28/23 1424 01/28/23 1421   97 7 °F (36 5 °C) (!) 118 16 121/76 93 %      Temp Source Heart Rate Source Patient Position - Orthostatic VS BP Location FiO2 (%)   01/28/23 1426 01/28/23 1421 01/28/23 1421 01/28/23 1421 --   Tympanic Monitor Sitting Left arm       Pain Score       01/28/23 1421       No Pain          Wt Readings from Last 1 Encounters:   01/29/23 95 6 kg (210 lb 12 2 oz)     Additional Vital Signs:   Temp Pulse Resp BP MAP (mmHg) SpO2 Calculated FIO2 (%) - Nasal Cannula Nasal Cannula O2 Flow Rate (L/min) O2 Device Patient Position - Orthostatic VS    01/29/23 07:34:35 -- 95 18 114/62 79 93 % -- -- -- --   01/29/23 0726 -- -- -- -- -- 94 % 28 2 L/min Nasal cannula --   01/28/23 2210 -- -- -- -- -- 94 % 28 2 L/min Nasal cannula --   01/28/23 2150 -- 115 Abnormal  18 -- -- 96 % -- -- -- --   01/28/23 19:59:27 97 7 °F (36 5 °C) 122 Abnormal  17 116/78 91 93 % -- -- None (Room air) Lying   01/28/23 1600 -- 104 16 117/80 93 96 % 28 2 L/min Nasal cannula Sitting   01/28/23 1426 97 7 °F (36 5 °C) -- -- -- -- -- -- -- -- --   01/28/23 1424 -- -- -- 121/76 -- -- -- -- -- --   01/28/23 1421 -- 118 Abnormal  16 -- -- 93 % -- -- None (Room air) Sitting       Pertinent Labs/Diagnostic Test Results:   CT chest wo contrast   Final Result by Eladio Britton DO (01/28 1936)      1  No consolidation to suggest pneumonia  2  Redemonstration of mild bilateral upper lobe predominant centrilobular nodules compatible with respiratory bronchiolitis, a smoking-related lung disease        3  Mild distal esophageal thickening, possibly accentuated by underdistention  Correlate with any clinical symptoms and if relevant consider follow-up esophagram         4  Enlarged fatty liver suggested  Workstation performed: [id number]         CT abdomen pelvis wo contrast   Final Result by Natalio Harrison MD (01/28 1727)      1  No evidence of hydronephrosis or ureteral calculus   2  Increased fluid and air within the stomach, colon and rectum  Correlate for ileus/enteritis  No evidence of small bowel obstruction   3  Hepatosplenomegaly  Hepatic steatosis  4  Circumferential wall thickening of the distal esophagus  This may be related to esophagitis               Workstation performed: [id number]         XR chest 1 view portable   Final Result by Nelda Heimlich, MD (01/29 6217)      No acute cardiopulmonary disease                    Workstation performed: [id number]         VAS lower limb venous duplex study, complete bilateral    (Results Pending)   NM inpatient order    (Results Pending)     Results from last 7 days   Lab Units 01/28/23  1927   SARS-COV-2  Negative     Results from last 7 days   Lab Units 01/29/23  0452 01/28/23  1508   WBC Thousand/uL 6 88 10 49*   HEMOGLOBIN g/dL 12 6 15 8*   HEMATOCRIT % 38 1 46 3*   PLATELETS Thousands/uL 177 232   NEUTROS ABS Thousands/µL 5 63 7 63*         Results from last 7 days   Lab Units 01/29/23  0452 01/28/23  1508   SODIUM mmol/L 132* 129*   POTASSIUM mmol/L 3 6 3 1*   CHLORIDE mmol/L 102 89*   CO2 mmol/L 17* 22   ANION GAP mmol/L 13 18*   BUN mg/dL 31* 36*   CREATININE mg/dL 1 90* 3 10*   EGFR ml/min/1 73sq m 27 15   CALCIUM mg/dL 8 1* 9 6   MAGNESIUM mg/dL 1 9 1 5*   PHOSPHORUS mg/dL 3 6  --      Results from last 7 days   Lab Units 01/29/23  0452 01/28/23  1508   AST U/L 13 20   ALT U/L 18 25   ALK PHOS U/L 61 79   TOTAL PROTEIN g/dL 6 4 8 2   ALBUMIN g/dL 3 6 4 7   TOTAL BILIRUBIN mg/dL 0 31 0 37     Results from last 7 days   Lab Units 01/29/23  1053 01/29/23  0732 01/28/23  2103   POC GLUCOSE mg/dl 371* 296* 280*     Results from last 7 days   Lab Units 01/29/23  0452 01/28/23  1508   GLUCOSE RANDOM mg/dL 315* 253*           Results from last 7 days   Lab Units 01/28/23 1953   CK TOTAL U/L 21*     Results from last 7 days   Lab Units 01/28/23  1953 01/28/23  1738 01/28/23  1508   HS TNI 0HR ng/L  --   --  6   HS TNI 2HR ng/L  --  4  --    HSTNI D2 ng/L  --  -2  --    HS TNI 4HR ng/L 5  --   --    HSTNI D4 ng/L -1  --   --      Results from last 7 days   Lab Units 01/28/23 1953   D-DIMER QUANTITATIVE ug/ml FEU 1 46*     Results from last 7 days   Lab Units 01/29/23  0452 01/28/23  2147   PROTIME seconds 14 3  --    INR  1 09  --    PTT seconds 94* 28         Results from last 7 days   Lab Units 01/29/23  0452 01/28/23 1953   PROCALCITONIN ng/ml 0 37* 0 40*     Results from last 7 days   Lab Units 01/28/23 1953   LACTIC ACID mmol/L 2 0             Results from last 7 days   Lab Units 01/28/23  1508   BNP pg/mL 9         Results from last 7 days   Lab Units 01/28/23 1953   HEP B S AG  Non-reactive   HEP C AB  Non-reactive   HEP B C IGM  Non-reactive     Results from last 7 days   Lab Units 01/28/23 1953   LIPASE u/L 47                 Results from last 7 days   Lab Units 01/29/23  1105 01/28/23  2124   CLARITY UA   --  Cloudy   COLOR UA   --  Yellow   SPEC GRAV UA   --  1 025   PH UA   --  5 5   GLUCOSE UA mg/dl  --  Negative   KETONES UA mg/dl  --  Negative   BLOOD UA   --  Negative   PROTEIN UA mg/dl  --  Negative   NITRITE UA   --  Negative   BILIRUBIN UA   --  Negative   UROBILINOGEN UA E U /dl  --  0 2   LEUKOCYTES UA   --  Small*   WBC UA /hpf  --  4-10*   RBC UA /hpf  --  0-1*   BACTERIA UA /hpf  --  Occasional   EPITHELIAL CELLS WET PREP /hpf  --  Occasional   SODIUM UR  10 13   CREATININE UR mg/dL 83 5 118 3   PROTEIN UR mg/dL  --  34   PROT/CREAT RATIO UR   --  0 29*     Results from last 7 days   Lab Units 01/28/23 1927   INFLUENZA A PCR  Negative   INFLUENZA B PCR  Negative   RSV PCR  Negative             ED Treatment:   Medication Administration from 01/28/2023 1418 to 01/28/2023 1911       Date/Time Order Dose Route Action Comments     01/28/2023 1638 EST sodium chloride 0 9 % bolus 1,000 mL 0 mL Intravenous Stopped --     01/28/2023 1504 EST sodium chloride 0 9 % bolus 1,000 mL 1,000 mL Intravenous New Bag --     01/28/2023 1506 EST albuterol inhalation solution 5 mg 5 mg Nebulization Given --     01/28/2023 1507 EST ipratropium (ATROVENT) 0 02 % inhalation solution 0 5 mg 0 5 mg Nebulization Given --     01/28/2023 1633 EST pantoprazole (PROTONIX) injection 40 mg 40 mg Intravenous Given --     01/28/2023 1636 EST aluminum-magnesium hydroxide-simethicone (MYLANTA) oral suspension 30 mL 30 mL Oral Not Given --     01/28/2023 1654 EST magnesium sulfate 2 g/50 mL IVPB (premix) 2 g 0 g Intravenous Stopped --     01/28/2023 1637 EST magnesium sulfate 2 g/50 mL IVPB (premix) 2 g 2 g Intravenous New Bag --     01/28/2023 1859 EST potassium chloride 20 mEq IVPB (premix) 0 mEq Intravenous Stopped --     01/28/2023 1650 EST potassium chloride 20 mEq IVPB (premix) 20 mEq Intravenous New Bag --     01/28/2023 1649 EST sodium chloride 0 9 % bolus 1,000 mL 1,000 mL Intravenous New Bag --        Present on Admission:  • FAM (acute kidney injury) ([place] [address] )  • Tobacco abuse  • Type 2 diabetes mellitus with hyperglycemia, without long-term current use of insulin (HCC)  • Depression  • Chronic obstructive pulmonary disease with acute exacerbation (HCC)  • Neuropathy      Admitting Diagnosis: Dehydration [E86 0]  COPD (chronic obstructive pulmonary disease) ([place] [address] ) [J44 9]  Hypokalemia [E87 6]  Enteritis [K52 9]  Weakness [R53 1]  FAM (acute kidney injury) ([place] [address] ) [N17 9]  Generalized weakness [R53 1]  Age/Sex: 58 y o  female  Admission Orders:  Scheduled Medications:  aluminum-magnesium hydroxide-simethicone, 30 mL, Oral, Once  amitriptyline, 50 mg, Oral, HS  aspirin, 81 mg, Oral, Daily  atorvastatin, 80 mg, Oral, Daily With Dinner  benztropine, 0 5 mg, Oral, HS  cariprazine, 6 mg, Oral, Daily  ciprofloxacin, 400 mg, Intravenous, Q12H  enoxaparin, 1 mg/kg, Subcutaneous, Q24H HOMA  gabapentin, 600 mg, Oral, TID  insulin glargine, 5 Units, Subcutaneous, HS  insulin lispro, 1-5 Units, Subcutaneous, TID AC  insulin lispro, 1-5 Units, Subcutaneous, HS  lidocaine, 1 patch, Topical, Daily  methylPREDNISolone sodium succinate, 40 mg, Intravenous, Q8H  metroNIDAZOLE, 500 mg, Oral, Q8H [facility]  nicotine, 1 patch, Transdermal, Daily  pantoprazole, 40 mg, Oral, Early Morning  vilazodone, 40 mg, Oral, Daily      Continuous IV Infusions:     PRN Meds:  albuterol, 2 5 mg, Nebulization, Q4H PRN  aluminum-magnesium hydroxide-simethicone, 30 mL, Oral, Q4H PRN  clonazePAM, 0 5 mg, Oral, Daily PRN  doxepin, 100 mg, Oral, HS PRN      Network Utilization Review Department  ATTENTION: Please call with any questions or concerns to [phone] and carefully listen to the prompts so that you are directed to the right person  All voicemails are confidential   Jensen Luz all requests for admission clinical reviews, approved or denied determinations and any other requests to dedicated fax number below belonging to the campus where the patient is receiving treatment   List of dedicated fax numbers for the Facilities:  [address] DENIALS (Administrative/Medical Necessity) [phone]   [address] (Maternity/NICU/Pediatrics) [phone]   8 Ria Chen [phone]   [facility] [phone]   [facility] [phone]   [facility] [address] [phone]

## 2023-01-29 NOTE — ASSESSMENT & PLAN NOTE
Patient hypoxic to mid 80s on room air, tachycardic 110s-120s with sinus tachycardia, pleuritic chest pain  D-dimer 1 46  Has a history of COPD, wears 4 L nasal cannula nightly - currently requiring 2L continuous O2 due to maintain saturations >88%  Additionally has history of pulmonary embolism 2004, found to have MTHFR gene  · High suspicion for pulmonary embolism given history of prior PE, MTHFR gene, decreased activity at home, tachycardia and hypoxia  Other differential includes COPD exacerbation in the setting of recent illness    · Given FAM on CKD, will hold off on CTA for now  · Obtain DVT ultrasound and VQ scan  · Empirically start heparin protocol, patient denies any active bleeding or recent surgery  · Continue albuterol, ipratropium nebulizer  · Start IV Solu-Medrol  · Continuous pulse oximetry  · Respiratory protocol - refusing breathing treatments currently

## 2023-01-29 NOTE — PLAN OF CARE
Problem: PHYSICAL THERAPY ADULT  Goal: Performs mobility at highest level of function for planned discharge setting  See evaluation for individualized goals  Description:            See flowsheet documentation for full assessment, interventions and recommendations  Note: Prognosis: Good  Problem List: Decreased strength, Decreased range of motion, Decreased endurance, Impaired balance, Decreased mobility  Assessment: Pt is 58 y o  female seen for PT evaluation [facility] [address] [facility] [address] [facility] w/ Acute on chronic respiratory failure with hypoxia ([facility] [address] )  PT consulted to assess pt's functional mobility and d/c needs  Order placed for PT eval and tx, w/ up and OOB as tolerated order  Comorbidities affecting pt's physical performance at time of assessment include: Acute on chronic respiratory failure with hypoxia, GERD, DVT, Back pain, Stroke, DM and CA  PTA, pt was independent w/ all functional mobility w/ no AD   Personal factors affecting pt at time of IE include: inability to ambulate household distances, inability to navigate community distances, inability to navigate level surfaces w/o external assistance, unable to perform dynamic tasks in community, unable to perform physical activity, limited insight into impairments and inability to perform IADLs  Please find objective findings from PT assessment regarding body systems outlined above with impairments and limitations including weakness, decreased ROM, decreased endurance, gait deviations, pain, decreased activity tolerance and decreased functional mobility tolerance  From PT/mobility standpoint, recommendation at time of d/c would be no rehabilitation needs pending progress in order to facilitate return to PLOF  PT Discharge Recommendation: No rehabilitation needs    See flowsheet documentation for full assessment

## 2023-01-29 NOTE — ASSESSMENT & PLAN NOTE
History of COPD, wears 4 L nasal cannula nightly  Scattered wheezing and present on exam along with hypoxia    Suspect possible COPD exacerbation and high suspicion for PE   · Continue albuterol /ipratropium nebulizer  · Start IV Solu-Medrol  · Continuous pulse oximetry  · Respiratory protocol

## 2023-01-30 ENCOUNTER — APPOINTMENT (OUTPATIENT)
Dept: NON INVASIVE DIAGNOSTICS | Facility: HOSPITAL | Age: 63
End: 2023-01-30

## 2023-01-30 ENCOUNTER — APPOINTMENT (OUTPATIENT)
Dept: NUCLEAR MEDICINE | Facility: HOSPITAL | Age: 63
End: 2023-01-30

## 2023-01-30 VITALS
DIASTOLIC BLOOD PRESSURE: 66 MMHG | WEIGHT: 211.86 LBS | TEMPERATURE: 97.9 F | SYSTOLIC BLOOD PRESSURE: 122 MMHG | OXYGEN SATURATION: 88 % | BODY MASS INDEX: 33.25 KG/M2 | RESPIRATION RATE: 20 BRPM | HEART RATE: 90 BPM | HEIGHT: 67 IN

## 2023-01-30 LAB
ALBUMIN SERPL BCP-MCNC: 3.7 G/DL (ref 3.5–5)
ALP SERPL-CCNC: 61 U/L (ref 34–104)
ALT SERPL W P-5'-P-CCNC: 15 U/L (ref 7–52)
ANION GAP SERPL CALCULATED.3IONS-SCNC: 8 MMOL/L (ref 4–13)
AST SERPL W P-5'-P-CCNC: 10 U/L (ref 13–39)
BACTERIA UR CULT: ABNORMAL
BASOPHILS # BLD AUTO: 0.01 THOUSANDS/ÂΜL (ref 0–0.1)
BASOPHILS NFR BLD AUTO: 0 % (ref 0–1)
BILIRUB SERPL-MCNC: 0.31 MG/DL (ref 0.2–1)
BUN SERPL-MCNC: 34 MG/DL (ref 5–25)
C DIFF TOX GENS STL QL NAA+PROBE: NEGATIVE
CALCIUM SERPL-MCNC: 8.8 MG/DL (ref 8.4–10.2)
CAMPYLOBACTER DNA SPEC NAA+PROBE: NORMAL
CHLORIDE SERPL-SCNC: 103 MMOL/L (ref 96–108)
CO2 SERPL-SCNC: 20 MMOL/L (ref 21–32)
CREAT SERPL-MCNC: 1.63 MG/DL (ref 0.6–1.3)
EOSINOPHIL # BLD AUTO: 0 THOUSAND/ÂΜL (ref 0–0.61)
EOSINOPHIL NFR BLD AUTO: 0 % (ref 0–6)
ERYTHROCYTE [DISTWIDTH] IN BLOOD BY AUTOMATED COUNT: 13.8 % (ref 11.6–15.1)
GFR SERPL CREATININE-BSD FRML MDRD: 33 ML/MIN/1.73SQ M
GLUCOSE SERPL-MCNC: 281 MG/DL (ref 65–140)
GLUCOSE SERPL-MCNC: 299 MG/DL (ref 65–140)
GLUCOSE SERPL-MCNC: 317 MG/DL (ref 65–140)
GLUCOSE SERPL-MCNC: 366 MG/DL (ref 65–140)
HCT VFR BLD AUTO: 35.2 % (ref 34.8–46.1)
HGB BLD-MCNC: 11.5 G/DL (ref 11.5–15.4)
IMM GRANULOCYTES # BLD AUTO: 0.12 THOUSAND/UL (ref 0–0.2)
IMM GRANULOCYTES NFR BLD AUTO: 1 % (ref 0–2)
LYMPHOCYTES # BLD AUTO: 1.06 THOUSANDS/ÂΜL (ref 0.6–4.47)
LYMPHOCYTES NFR BLD AUTO: 9 % (ref 14–44)
MCH RBC QN AUTO: 30.4 PG (ref 26.8–34.3)
MCHC RBC AUTO-ENTMCNC: 32.7 G/DL (ref 31.4–37.4)
MCV RBC AUTO: 93 FL (ref 82–98)
MONOCYTES # BLD AUTO: 0.42 THOUSAND/ÂΜL (ref 0.17–1.22)
MONOCYTES NFR BLD AUTO: 4 % (ref 4–12)
NEUTROPHILS # BLD AUTO: 10.08 THOUSANDS/ÂΜL (ref 1.85–7.62)
NEUTS SEG NFR BLD AUTO: 86 % (ref 43–75)
NRBC BLD AUTO-RTO: 0 /100 WBCS
PLATELET # BLD AUTO: 171 THOUSANDS/UL (ref 149–390)
PMV BLD AUTO: 11 FL (ref 8.9–12.7)
POTASSIUM SERPL-SCNC: 4.1 MMOL/L (ref 3.5–5.3)
PROCALCITONIN SERPL-MCNC: 0.23 NG/ML
PROT SERPL-MCNC: 6.3 G/DL (ref 6.4–8.4)
RBC # BLD AUTO: 3.78 MILLION/UL (ref 3.81–5.12)
SALMONELLA DNA SPEC QL NAA+PROBE: NORMAL
SHIGA TOXIN STX GENE SPEC NAA+PROBE: NORMAL
SHIGELLA DNA SPEC QL NAA+PROBE: NORMAL
SODIUM SERPL-SCNC: 131 MMOL/L (ref 135–147)
WBC # BLD AUTO: 11.69 THOUSAND/UL (ref 4.31–10.16)

## 2023-01-30 RX ORDER — PREDNISONE 10 MG/1
TABLET ORAL
Qty: 30 TABLET | Refills: 0 | Status: SHIPPED | OUTPATIENT
Start: 2023-01-30 | End: 2023-02-11

## 2023-01-30 RX ORDER — LOPERAMIDE HYDROCHLORIDE 2 MG/1
2 CAPSULE ORAL 4 TIMES DAILY PRN
Qty: 30 CAPSULE | Refills: 0 | Status: SHIPPED | OUTPATIENT
Start: 2023-01-30

## 2023-01-30 RX ADMIN — GABAPENTIN 600 MG: 300 CAPSULE ORAL at 08:30

## 2023-01-30 RX ADMIN — NICOTINE 1 PATCH: 21 PATCH, EXTENDED RELEASE TRANSDERMAL at 08:30

## 2023-01-30 RX ADMIN — INSULIN LISPRO 3 UNITS: 100 INJECTION, SOLUTION INTRAVENOUS; SUBCUTANEOUS at 11:16

## 2023-01-30 RX ADMIN — METRONIDAZOLE 500 MG: 500 TABLET ORAL at 13:37

## 2023-01-30 RX ADMIN — METHYLPREDNISOLONE SODIUM SUCCINATE 40 MG: 40 INJECTION, POWDER, FOR SOLUTION INTRAMUSCULAR; INTRAVENOUS at 04:41

## 2023-01-30 RX ADMIN — GABAPENTIN 600 MG: 300 CAPSULE ORAL at 15:36

## 2023-01-30 RX ADMIN — INSULIN LISPRO 2 UNITS: 100 INJECTION, SOLUTION INTRAVENOUS; SUBCUTANEOUS at 08:34

## 2023-01-30 RX ADMIN — VILAZODONE HYDROCHLORIDE 40 MG: 20 TABLET ORAL at 08:33

## 2023-01-30 RX ADMIN — ASPIRIN 81 MG: 81 TABLET, COATED ORAL at 08:30

## 2023-01-30 RX ADMIN — PANTOPRAZOLE SODIUM 40 MG: 40 TABLET, DELAYED RELEASE ORAL at 05:12

## 2023-01-30 RX ADMIN — ATORVASTATIN CALCIUM 80 MG: 40 TABLET, FILM COATED ORAL at 15:36

## 2023-01-30 RX ADMIN — CIPROFLOXACIN 400 MG: 2 INJECTION, SOLUTION INTRAVENOUS at 08:30

## 2023-01-30 RX ADMIN — METRONIDAZOLE 500 MG: 500 TABLET ORAL at 05:12

## 2023-01-30 RX ADMIN — INSULIN LISPRO 2 UNITS: 100 INJECTION, SOLUTION INTRAVENOUS; SUBCUTANEOUS at 15:37

## 2023-01-30 RX ADMIN — ENOXAPARIN SODIUM 100 MG: 100 INJECTION SUBCUTANEOUS at 13:37

## 2023-01-30 RX ADMIN — METHYLPREDNISOLONE SODIUM SUCCINATE 40 MG: 40 INJECTION, POWDER, FOR SOLUTION INTRAMUSCULAR; INTRAVENOUS at 13:35

## 2023-01-30 NOTE — CASE MANAGEMENT
Case Management Discharge Planning Note    Patient name Gerson Founds  Location /237-32 MRN [id number]  : 1960 Date 2023       Current Admission Date: 2023  Current Admission Diagnosis:Acute on chronic respiratory failure with hypoxia [facility])   Patient Active Problem List    Diagnosis Date Noted   • Hyponatremia 2023   • Diarrhea 2023   • Acute on chronic respiratory failure with hypoxia ([facility] [address] ) 2023   • Kidney cysts 08/15/2022   • Nonocclusive coronary atherosclerosis of native coronary artery 2022   • Kidney lesion 2022   • Acute respiratory failure with hypoxia ([facility] [address] ) 2022   • Volume depletion 2022   • Hypotension due to hypovolemia 2022   • Tachycardia 2022   • Stage 3a chronic kidney disease ([facility] [address] ) 2022   • Chronic obstructive pulmonary disease with [facility] exacerbation [facility] ) 2022   • Peripheral arterial disease ([facility] [address] ) 2021   • Tobacco abuse 2021   • Primary cancer of upper outer quadrant of right breast ([facility] [address] ) 10/27/2021   • Malignant neoplasm of upper-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, estrogen receptor positive ([facility] ) 10/27/2021   • Mucinous carcinoma of breast, right ([facility] ) 10/27/2021   • Family history of heart disease 10/12/2021   • Pulmonary hypertension ([facility] ) 10/12/2021   • Chest pain 10/12/2021   • Obstructive sleep apnea syndrome 2021   • Sleep apnea    • Breast nodule 2021   • MARQUEZ (dyspnea on exertion) 2021   • Chronic hypoxemic respiratory failure ([address] ) 2021   • Continuous dependence on cigarette smoking 2021   • Class 1 obesity due to excess calories with serious comorbidity and body mass index (BMI) of 33 0 to 33 9 in adult 2021   • Insect bite of ear, infected, left, initial encounter 2020   • Stage 2 chronic kidney disease 07/10/2020   • FAM (acute kidney injury) ([facility] [address] ) 07/10/2020   • Pulmonary nodule seen on imaging study 2017   • Chronic hoarseness 11/16/2017   • Type 2 diabetes mellitus with hyperglycemia, without long-term current use of insulin ([place] [address] ) 09/20/2017   • Migraine 04/06/2017   • Neuropathy 04/25/2016   • Myositis 05/20/2014   • Cataract 03/14/2014   • Diabetes mellitus with neurological manifestation ([place] [address] ) 01/22/2014   • Mammogram abnormal 12/09/2013   • Chronic low back pain 09/05/2013   • Centrilobular emphysema ([place] [address] ) 09/05/2013   • Depression 09/05/2013   • Hypercholesterolemia 07/29/2013   • Deep vein thrombophlebitis of leg, unspecified laterality ([place] [address] ) 05/28/2013   • Pulmonary embolism ([place] [address] ) 05/28/2013   • Hypercoagulable state ([place] [address] ) 05/22/2013   • Hypothyroidism 12/13/2012   • Headache 11/12/2012   • Gastroesophageal reflux disease with esophagitis 09/26/2012   • Hypertension 08/15/2012   • Primary fibromyalgia syndrome 08/07/2012      LOS (days): 2  Geometric Mean LOS (GMLOS) (days):   Days to GMLOS:     OBJECTIVE:  Risk of Unplanned Readmission Score: 27 04         Current admission status: Inpatient   Preferred Pharmacy:   [address] [place], [place] [address], [address] [place] [address]  [address]  [address]  [place] [address]  Phone: [phone] Fax: [phone]    Primary Care Provider: Yoly Sims PA-C    [facility]  Secondary Insurance:     DISCHARGE DETAILS:Discussed with patient preferences on discharge;understanding how to manage health at home; purpose of taking medications; importance of follow up care/appointments; and symptoms to watch out for once discharged home  Pt discharging home  Nurse to review AVS, all follow up providers listed  Family to transport home

## 2023-01-30 NOTE — ASSESSMENT & PLAN NOTE
Lab Results   Component Value Date    HGBA1C 11 7 (H) 11/08/2022       Recent Labs     01/29/23  2103 01/30/23  0750 01/30/23  1107 01/30/23  1536   POCGLU 347* 299* 366* 281*       Blood Sugar Average: Last 72 hrs:  (P) 327   Current regimen includes Victoza    Hyperglycemia in the setting of steroids  Should improve with transition to oral steroids

## 2023-01-30 NOTE — ASSESSMENT & PLAN NOTE
Patient hypoxic to mid 80s on room air, tachycardic 110s-120s with sinus tachycardia, pleuritic chest pain  D-dimer 1 46  Has a history of COPD, wears 4 L nasal cannula nightly - on room air at discharge  Additionally has history of pulmonary embolism 2004, found to have MTHFR gene    · VQ scan low suspicion for PE  · Given FAM could not complete CTA  · Obtain DVT ultrasound with chronic DVT, sent on Eliquis 5 mg BID  · IV solumedrol maintained inpatient, can transition to PO steroid taper for discharge  · Did refuse breathing treatments while inpatinet

## 2023-01-30 NOTE — ASSESSMENT & PLAN NOTE
· History of COPD, wears 4 L nasal cannula nightly  · Scattered wheezing and present on exam along with hypoxia with spo2 <88% requiring 2L continuous O2    · Suspect possible COPD exacerbation and high suspicion for PE   · Now on room air  · Steroid taper on discharge  · Highly recommend pulm follow up outpatient

## 2023-01-30 NOTE — UTILIZATION REVIEW
NOTIFICATION OF INPATIENT ADMISSION   AUTHORIZATION REQUEST   SERVICING FACILITY:   [address] [place] [address]  [address], [place], [address]  Tax ID:  [id number]  NPI: [id number] ATTENDING PROVIDER:  Attending Name and NPI#: Rocio Gasca [[id number]]  Address:  [address]  [place] [address]  Phone: [phone]     ADMISSION INFORMATION:  Place of Service: Inpatient [facility]  [address]  Place of Service Code: 21  Inpatient Admission Date/Time: 1/28/23  6:01 PM  Discharge Date/Time: No discharge date for patient encounter  Admitting Diagnosis Code/Description:  Dehydration [E86 0]  COPD (chronic obstructive pulmonary disease) ([facility]) [J44 9]  Hypokalemia [E87 6]  Enteritis [K52 9]  Weakness [R53 1]  FAM (acute kidney injury) ([facility] [address] ) [N17 9]  Generalized weakness [R53 1]     UTILIZATION REVIEW CONTACT:  Jayne Jha Lead Utilization   Network Utilization Review Department  Phone: [phone]  Fax [phone]  Email: [email or web address]  Contact for approvals/pending authorizations, clinical reviews, and discharge  PHYSICIAN ADVISORY SERVICES:  Medical Necessity Denial & Isox-al-Weds Review  Phone: [phone]  Fax: [phone]  Email: [email or web address]

## 2023-01-30 NOTE — ASSESSMENT & PLAN NOTE
· Creatinine 3 1 on admission, baseline appears to be around 1 2-1 3   · Suspect this is in the setting of diarrhea with hypovolemia    · Provided with IVF  · Significant improvement to 1 6  · Will continue to follow bmp outpatient

## 2023-01-30 NOTE — CASE MANAGEMENT
Case Management Assessment    Patient name Melinda Nguyen  Location /091-88 MRN [id number]  : 1960 Date 2023       Current Admission Date: 2023  Current Admission Diagnosis:Acute on chronic respiratory failure with hypoxia [facility])   Patient Active Problem List    Diagnosis Date Noted   • Hyponatremia 2023   • Diarrhea 2023   • Acute on chronic respiratory failure with hypoxia ([address] ) 2023   • Kidney cysts 08/15/2022   • Nonocclusive coronary atherosclerosis of native coronary artery 2022   • Kidney lesion 2022   • Acute respiratory failure with hypoxia ([address] ) 2022   • Volume depletion 2022   • Hypotension due to hypovolemia 2022   • Tachycardia 2022   • Stage 3a chronic kidney disease ([address] ) 2022   • Chronic obstructive pulmonary disease with acute exacerbation ([facility] ) 2022   • Peripheral arterial disease ([address] ) 2021   • Tobacco abuse 2021   • Primary cancer of upper outer quadrant of right breast ([facility] ) 10/27/2021   • Malignant neoplasm of upper-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, estrogen receptor positive ([facility] ) 10/27/2021   • Mucinous carcinoma of breast, right ([facility] ) 10/27/2021   • Family history of heart disease 10/12/2021   • Pulmonary hypertension ([facility] ) 10/12/2021   • Chest pain 10/12/2021   • Obstructive sleep apnea syndrome 2021   • Sleep apnea    • Breast nodule 2021   • MARQUEZ (dyspnea on exertion) 2021   • Chronic hypoxemic respiratory failure ([address] ) 2021   • Continuous dependence on cigarette smoking 2021   • Class 1 obesity due to excess calories with serious comorbidity and body mass index (BMI) of 33 0 to 33 9 in adult 2021   • Insect bite of ear, infected, left, initial encounter 2020   • Stage 2 chronic kidney disease 07/10/2020   • FAM (acute kidney injury) ([facility] ) 07/10/2020   • Pulmonary nodule seen on imaging study 2017   • Chronic hoarseness 11/16/2017   • Type 2 diabetes mellitus with hyperglycemia, without long-term current use of insulin ([facility] ) 09/20/2017   • Migraine 04/06/2017   • Neuropathy 04/25/2016   • Myositis 05/20/2014   • Cataract 03/14/2014   • Diabetes mellitus with neurological manifestation ([facility] ) 01/22/2014   • Mammogram abnormal 12/09/2013   • Chronic low back pain 09/05/2013   • Centrilobular emphysema ([facility] ) 09/05/2013   • Depression 09/05/2013   • Hypercholesterolemia 07/29/2013   • Deep vein thrombophlebitis of leg, unspecified laterality ([place] [address] ) 05/28/2013   • Pulmonary embolism ([place] [address] ) 05/28/2013   • Hypercoagulable state ([place] [address] ) 05/22/2013   • Hypothyroidism 12/13/2012   • Headache 11/12/2012   • Gastroesophageal reflux disease with esophagitis 09/26/2012   • Hypertension 08/15/2012   • Primary fibromyalgia syndrome 08/07/2012      LOS (days): 2  Geometric Mean LOS (GMLOS) (days):   Days to GMLOS:     OBJECTIVE:    Risk of Unplanned Readmission Score: 26 98         Current admission status: Inpatient       Preferred Pharmacy:   [address] [place] [address], [address] [place] [address] [address]  [address]   [place] [address]  Phone: [phone] Fax: [phone]    Primary Care Provider: Donna Magana PA-C    Primary Insurance: [facility]  Secondary Insurance:     ASSESSMENT:  [address] [facility], [address] [facility] [address] [address] [facility]   Primary Phone: [phone] (Mobile)               Advance Directives  Does patient have a [address]?: No  Was patient offered paperwork?: Yes (declined)  Does patient currently have a Health Care decision maker?: Yes, please see Health Care Proxy section  Does patient have Advance Directives?: No  Was patient offered paperwork?: Yes (declined)  Primary Contact: Fransisco Sin         Readmission Root Cause  30 Day Readmission: No    Patient Information  Admitted from[de-identified] Home  Mental Status: Alert  During Assessment patient was accompanied by: Not accompanied during assessment  Assessment information provided by[de-identified] Patient  Primary Caregiver: Self  Support Systems: Self, Family members  [place] of Residence: [address] do you live in?: [address] [facility] entry access options   Select all that apply : Elevator  Type of Current Residence: Apartment  Floor Level: 6  Upon entering residence, is there a bedroom on the main floor (no further steps)?: Yes  Upon entering residence, is there a bathroom on the main floor (no further steps)?: Yes  In the last 12 months, was there a time when you were not able to pay the mortgage or rent on time?: No  In the last 12 months, how many places have you lived?: 1  In the last 12 months, was there a time when you did not have a steady place to sleep or slept in a shelter (including now)?: No  Homeless/housing insecurity resource given?: N/A  Living Arrangements: Lives Alone  Is patient a ?: No    Activities of Daily Living Prior to Admission  Functional Status: Independent  Completes ADLs independently?: Yes  Ambulates independently?: Yes  Does patient use assisted devices?: Yes  Assisted Devices (DME) used: Walker, Straight Cane, Nebulizer, Home Oxygen concentrator  DME Company Name (respiratory supplies): [employer]  O2 Rate(s): 4l at hs  Does patient currently own DME?: Yes  What DME does the patient currently own?: [employer]  Does patient have a history of Outpatient Therapy (PT/OT)?: No  Does the patient have a history of Short-Term Rehab?: No  Does patient have a history of HHC?: Yes (agency unknown)  Does patient currently have [address]?: No         Patient Information Continued  Income Source: SSI/SSD  Does patient have prescription coverage?: Yes  Within the past 12 months, you worried that your food would run out before you got the money to buy more : Never true  Within the past 12 months, the food you bought just didn't last and you didn't have money to get more : Never true  Food insecurity resource given?: N/A  Does patient receive dialysis treatments?: No  Does patient have a history of substance abuse?: No  Does patient have a history of Mental Health Diagnosis?: Yes  Is patient receiving treatment for mental health?: Yes (anxiety depression  follows with psychiatry out of [facility]  does phone calls every 2 months)  Has patient received inpatient treatment related to mental health in the last 2 years?: No         Means of Transportation  Means of Transport to Appts[de-identified] Family transport  In the past 12 months, has lack of transportation kept you from medical appointments or from getting medications?: No  In the past 12 months, has lack of transportation kept you from meetings, work, or from getting things needed for daily living?: No  Was application for public transport provided?: N/A  Cm met with the patient to evaluate the patients prior function and living situation and any barriers to d/c and form a safe d/c plan  Cm also evaluated the patient for any services in the home or needs for services  CM also discussed with patient that their preferences will be taken into account/consideration  Pt admitted with respiratory failure  Pt does use 4l at night only  Started on iv steriod, iv fluids  Pt states she has been having diarrhea for days  Pt is indpendent at home and therapy is not recommending any needs  No current discharge needs  CM to follow

## 2023-01-30 NOTE — ASSESSMENT & PLAN NOTE
· Upon review of history, appears to have a chronic hyponatremia   · Likely SIADH due to her antipsychotics/SSRI  · At baseline on discharge

## 2023-01-30 NOTE — ASSESSMENT & PLAN NOTE
Patient presents with several days of lightheadedness, fevers, watery diarrhea at least 5 times daily  Noted to have FAM, hypokalemia, hyponatremia on admission    · Maintained on cipro/flagyl inpatient for suspected infectious diarrhea  · All stool studies returned negative aside from giardia which is still pending  · Will continue to follow that lab but she does not require abx on discharge  · Provided with Imodium for home 26-Jul-2020 19:42

## 2023-01-30 NOTE — DISCHARGE SUMMARY
[address] [facility] [address]  Discharge- Destiny Jiménez 1960, 58 y o  female MRN: [id number]  Unit/Bed#: 491-96 Encounter: [id number]  Primary Care Provider: Catalina Wayne PA-C   Date and time admitted to hospital: 1/28/2023  2:19 PM    * Acute on chronic respiratory failure with hypoxia [facility])  Assessment & Plan  Patient hypoxic to mid 80s on room air, tachycardic 110s-120s with sinus tachycardia, pleuritic chest pain  D-dimer 1 46  Has a history of COPD, wears 4 L nasal cannula nightly - on room air at discharge  Additionally has history of pulmonary embolism 2004, found to have MTHFR gene  · VQ scan low suspicion for PE  · Given FAM could not complete CTA  · Obtain DVT ultrasound with chronic DVT, sent on Eliquis 5 mg BID  · IV solumedrol maintained inpatient, can transition to PO steroid taper for discharge  · Did refuse breathing treatments while inpatinet    Chronic obstructive pulmonary disease with acute exacerbation (HCC)  Assessment & Plan  · History of COPD, wears 4 L nasal cannula nightly  · Scattered wheezing and present on exam along with hypoxia with spo2 <88% requiring 2L continuous O2  · Suspect possible COPD exacerbation and high suspicion for PE   · Now on room air  · Steroid taper on discharge  · Highly recommend pulm follow up outpatient      FAM (acute kidney injury) ([facility] )  Assessment & Plan  · Creatinine 3 1 on admission, baseline appears to be around 1 2-1 3   · Suspect this is in the setting of diarrhea with hypovolemia  · Provided with IVF  · Significant improvement to 1 6  · Will continue to follow bmp outpatient    Diarrhea  Assessment & Plan  Patient presents with several days of lightheadedness, fevers, watery diarrhea at least 5 times daily  Noted to have FAM, hypokalemia, hyponatremia on admission    · Maintained on cipro/flagyl inpatient for suspected infectious diarrhea  · All stool studies returned negative aside from giardia which is still pending  · Will continue to follow that lab but she does not require abx on discharge  · Provided with Imodium for home    Hyponatremia  Assessment & Plan  · Upon review of history, appears to have a chronic hyponatremia   · Likely SIADH due to her antipsychotics/SSRI  · At baseline on discharge    Tobacco abuse  Assessment & Plan  · Currently smokes 1 pack/day  · Nicotine patch offered  · Cessation counseling    Type 2 diabetes mellitus with hyperglycemia, without long-term current use of insulin [facility])  Assessment & Plan  Lab Results   Component Value Date    HGBA1C 11 7 (H) 11/08/2022       Recent Labs     01/29/23  2103 01/30/23  0750 01/30/23  1107 01/30/23  1536   POCGLU 347* 299* 366* 281*       Blood Sugar Average: Last 72 hrs:  (P) 327   Current regimen includes Victoza  Hyperglycemia in the setting of steroids  Should improve with transition to oral steroids      Neuropathy  Assessment & Plan  · Continue gabapentin    Depression  Assessment & Plan  · Continue amitriptyline, Vraylar, doxepin      Medical Problems     Resolved Problems  Date Reviewed: 11/9/2022   None       Discharging Physician / Practitioner: Zahraa Martin PA-C  PCP: Leon Nevarez PA-C  Admission Date:   Admission Orders (From admission, onward)     Ordered        01/28/23 1801  INPATIENT ADMISSION  Once                      Discharge Date: 01/30/23    Consultations During Hospital Stay:  · none    Procedures Performed:   · none    Significant Findings / Test Results:     NM lung ventilation / perfusion   Final Result      The probability for pulmonary embolus is low  Please see above for details  Workstation performed: [id number]         VAS lower limb venous duplex study, complete bilateral   Final Result      CT chest wo contrast   Final Result      1  No consolidation to suggest pneumonia        2  Redemonstration of mild bilateral upper lobe predominant centrilobular nodules compatible with respiratory bronchiolitis, a smoking-related lung disease  3  Mild distal esophageal thickening, possibly accentuated by underdistention  Correlate with any clinical symptoms and if relevant consider follow-up esophagram         4  Enlarged fatty liver suggested  Workstation performed: [id number]         CT abdomen pelvis wo contrast   Final Result      1  No evidence of hydronephrosis or ureteral calculus   2  Increased fluid and air within the stomach, colon and rectum  Correlate for ileus/enteritis  No evidence of small bowel obstruction   3  Hepatosplenomegaly  Hepatic steatosis  4  Circumferential wall thickening of the distal esophagus  This may be related to esophagitis               Workstation performed: [id number]         XR chest 1 view portable   Final Result      No acute cardiopulmonary disease  Workstation performed: [id number]         VAS lower limb venous duplex study, complete bilateral    (Results Pending)         Incidental Findings:   · none    Test Results Pending at Discharge (will require follow up):   · giardia     Outpatient Tests Requested:  · Venous duplex, BMP    Complications:  none    Reason for Admission: respiratory failure    Hospital Course:   Rai Kearney is a 58 y o  female patient who originally presented to the hospital on 1/28/2023 due to weakness, diarrhea, and cough  Patient reported 1 to 2 weeks of URI symptoms with cough, shortness of breath  He stated that she was improving but then began having increased lightheadedness, fever, cough, chest pain along with 5 episodes of watery diarrhea  She was found to be hypoxic on room air in the mid 80s in the ED and was requiring 2 L of oxygen on her first day of admission  CT did not show any acute lung processes aside from her bronchiolitis  It did show however possible ileus/enteritis  She had a significant FAM of 3 1 compared to her baseline of 1 2 creatinine    She was treated with IV fluids and creatinine returned to 1 6 which is very near her baseline  She continues have episodes of diarrhea but much fewer than she was having prior to admission  All stool studies returned negative, only pending study as Giardia  She was treated with ciprofloxacin and Flagyl IV while she was inpatient but given her negative station a longer requires IV antibiotics  She is now on room air after being treated with IV steroids while inpatient  She be transition to p o  prednisone taper on discharge and was provided with Imodium on discharge as well  She states that she feels significantly less weak than when she arrived to the hospital     Please see above list of diagnoses and related plan for additional information  Condition at Discharge: stable    Discharge Day Visit / Exam:   Subjective:  Patient reports she feels she is breathing well  Still having some diarrhea  No longer feeling weak, walking well  Vitals: Blood Pressure: 122/66 (01/30/23 0752)  Pulse: 90 (01/30/23 0752)  Temperature: 97 9 °F (36 6 °C) (01/30/23 0752)  Temp Source: Oral (01/28/23 1959)  Respirations: 20 (01/30/23 0752)  Height: 5' 7" (170 2 cm) (01/28/23 2005)  Weight - Scale: 96 1 kg (211 lb 13 8 oz) (01/30/23 0600)  SpO2: (!) 88 % (01/30/23 0752) inaccurate reading, was at 93%  Exam:   Physical Exam  Vitals and nursing note reviewed  Constitutional:       General: She is not in acute distress  Appearance: She is obese  She is not ill-appearing  HENT:      Head: Normocephalic and atraumatic  Cardiovascular:      Rate and Rhythm: Normal rate and regular rhythm  Pulses: Normal pulses  Heart sounds: Normal heart sounds  No murmur heard  No gallop  Pulmonary:      Effort: Pulmonary effort is normal       Breath sounds: No wheezing, rhonchi or rales  Abdominal:      General: Bowel sounds are normal       Palpations: Abdomen is soft  Tenderness: There is no abdominal tenderness  There is no guarding or rebound     Musculoskeletal:         General: Normal range of motion  Cervical back: Normal range of motion and neck supple  Right lower leg: No edema  Left lower leg: No edema  Skin:     General: Skin is warm and dry  Neurological:      General: No focal deficit present  Mental Status: She is alert  Mental status is at baseline  Psychiatric:         Mood and Affect: Mood normal          Behavior: Behavior normal           Discussion with Family: Patient declined call to   Discharge instructions/Information to patient and family:   See after visit summary for information provided to patient and family  Provisions for Follow-Up Care:  See after visit summary for information related to follow-up care and any pertinent home health orders  Disposition:   Home    Planned Readmission: none     Discharge Statement:  I spent 45 minutes discharging the patient  This time was spent on the day of discharge  I had direct contact with the patient on the day of discharge  Greater than 50% of the total time was spent examining patient, answering all patient questions, arranging and discussing plan of care with patient as well as directly providing post-discharge instructions  Additional time then spent on discharge activities  Discharge Medications:  See after visit summary for reconciled discharge medications provided to patient and/or family        **Please Note: This note may have been constructed using a voice recognition system**

## 2023-01-31 ENCOUNTER — TRANSITIONAL CARE MANAGEMENT (OUTPATIENT)
Dept: FAMILY MEDICINE CLINIC | Facility: HOME HEALTHCARE | Age: 63
End: 2023-01-31

## 2023-01-31 DIAGNOSIS — Z71.89 COMPLEX CARE COORDINATION: Primary | ICD-10-CM

## 2023-01-31 LAB
ATRIAL RATE: 113 BPM
P AXIS: 73 DEGREES
PR INTERVAL: 142 MS
QRS AXIS: 73 DEGREES
QRSD INTERVAL: 80 MS
QT INTERVAL: 336 MS
QTC INTERVAL: 460 MS
T WAVE AXIS: 81 DEGREES
VENTRICULAR RATE: 113 BPM

## 2023-02-01 LAB — WBC SPEC QL GRAM STN: NORMAL

## 2023-02-03 ENCOUNTER — PATIENT OUTREACH (OUTPATIENT)
Dept: CASE MANAGEMENT | Facility: OTHER | Age: 63
End: 2023-02-03

## 2023-02-03 LAB
BACTERIA BLD CULT: NORMAL
BACTERIA BLD CULT: NORMAL
G LAMBLIA AG STL QL IA: NEGATIVE
O+P STL CONC: NORMAL

## 2023-02-03 NOTE — PROGRESS NOTES
In basket message reviewed by this care manager covering the imbedded care manager  Chart reviewed  Patient was admitted to [facility] on 1/28 with acute on chronic respiratory failure with hypoxia  Patient has diabetes (A1C 11 7 in November 2022)  depression, neuropathy, FAM,COPD, tobacco abuse, hyponatremia and diarrhea  She has a chronic thrombus of the left leg  Her lung perfusion scan showed a lw probability for a thrombus  She responded to IV solumedrol  She refused breathing treatments while an inpatient  All stool cultures  were negative except giardia which was pending at discharge  She was discharged on 130 to home  I spoke with patient who reports she is feeling well  She denies shortness of breath fever or chest pain  We reviewed her medications  Patient has scheduled an appointment with her PCP Teo Chen on 2/8  Patient lives alone and is independent with ADL and IADL's  Family provides transport  I explained care management and patient consents to additional calls  I explained the care manager I am covering will contact her  She reports she has no needs at this time  I will follow up with patient next week

## 2023-02-06 ENCOUNTER — PATIENT OUTREACH (OUTPATIENT)
Dept: CASE MANAGEMENT | Facility: OTHER | Age: 63
End: 2023-02-06

## 2023-02-06 NOTE — PROGRESS NOTES
I called patient to remind her she needs blood work drawn before she has her PCP appointment on 2/9  She understands and stated she will have blood drawn  I advised her she can go to any outpatient lab in [facility]

## 2023-02-07 ENCOUNTER — NURSE TRIAGE (OUTPATIENT)
Dept: OTHER | Facility: OTHER | Age: 63
End: 2023-02-07

## 2023-02-07 ENCOUNTER — HOSPITAL ENCOUNTER (EMERGENCY)
Facility: HOSPITAL | Age: 63
Discharge: HOME | End: 2023-02-07
Attending: EMERGENCY MEDICINE

## 2023-02-07 VITALS
DIASTOLIC BLOOD PRESSURE: 72 MMHG | SYSTOLIC BLOOD PRESSURE: 125 MMHG | OXYGEN SATURATION: 93 % | RESPIRATION RATE: 20 BRPM | HEART RATE: 101 BPM | HEIGHT: 66 IN | WEIGHT: 210 LBS | BODY MASS INDEX: 33.75 KG/M2 | TEMPERATURE: 98.2 F

## 2023-02-07 DIAGNOSIS — R79.89 ELEVATED LACTIC ACID LEVEL: ICD-10-CM

## 2023-02-07 DIAGNOSIS — E11.65 TYPE 2 DIABETES MELLITUS WITH HYPERGLYCEMIA, WITHOUT LONG-TERM CURRENT USE OF INSULIN (HCC): Primary | ICD-10-CM

## 2023-02-07 DIAGNOSIS — N39.0 URINARY TRACT INFECTION: ICD-10-CM

## 2023-02-07 LAB
ALBUMIN SERPL BCP-MCNC: 4 G/DL (ref 3.5–5)
ALP SERPL-CCNC: 79 U/L (ref 34–104)
ALT SERPL W P-5'-P-CCNC: 26 U/L (ref 7–52)
ANION GAP SERPL CALCULATED.3IONS-SCNC: 12 MMOL/L (ref 4–13)
AST SERPL W P-5'-P-CCNC: 16 U/L (ref 13–39)
BACTERIA UR QL AUTO: ABNORMAL /HPF
BASE EX.OXY STD BLDV CALC-SCNC: 83.7 % (ref 60–80)
BASE EXCESS BLDV CALC-SCNC: 4.2 MMOL/L
BETA-HYDROXYBUTYRATE: 0.1 MMOL/L
BILIRUB SERPL-MCNC: 0.42 MG/DL (ref 0.2–1)
BILIRUB UR QL STRIP: NEGATIVE
BUN SERPL-MCNC: 32 MG/DL (ref 5–25)
CALCIUM SERPL-MCNC: 8.8 MG/DL (ref 8.4–10.2)
CHLORIDE SERPL-SCNC: 86 MMOL/L (ref 96–108)
CLARITY UR: ABNORMAL
CO2 SERPL-SCNC: 27 MMOL/L (ref 21–32)
COLOR UR: YELLOW
CREAT SERPL-MCNC: 1.55 MG/DL (ref 0.6–1.3)
ERYTHROCYTE [DISTWIDTH] IN BLOOD BY AUTOMATED COUNT: 13.6 % (ref 11.6–15.1)
GFR SERPL CREATININE-BSD FRML MDRD: 35 ML/MIN/1.73SQ M
GLUCOSE SERPL-MCNC: 337 MG/DL (ref 65–140)
GLUCOSE SERPL-MCNC: 403 MG/DL (ref 65–140)
GLUCOSE SERPL-MCNC: 470 MG/DL (ref 65–140)
GLUCOSE SERPL-MCNC: 494 MG/DL (ref 65–140)
GLUCOSE UR STRIP-MCNC: ABNORMAL MG/DL
HCO3 BLDV-SCNC: 28.5 MMOL/L (ref 24–30)
HCT VFR BLD AUTO: 39.9 % (ref 34.8–46.1)
HGB BLD-MCNC: 13.6 G/DL (ref 11.5–15.4)
HGB UR QL STRIP.AUTO: ABNORMAL
KETONES UR STRIP-MCNC: NEGATIVE MG/DL
LACTATE SERPL-SCNC: 2.6 MMOL/L (ref 0.5–2)
LACTATE SERPL-SCNC: 3.3 MMOL/L (ref 0.5–2)
LEUKOCYTE ESTERASE UR QL STRIP: ABNORMAL
MAGNESIUM SERPL-MCNC: 2 MG/DL (ref 1.9–2.7)
MCH RBC QN AUTO: 30.7 PG (ref 26.8–34.3)
MCHC RBC AUTO-ENTMCNC: 34.1 G/DL (ref 31.4–37.4)
MCV RBC AUTO: 90 FL (ref 82–98)
NITRITE UR QL STRIP: NEGATIVE
NON-SQ EPI CELLS URNS QL MICRO: ABNORMAL /HPF
O2 CT BLDV-SCNC: 17.2 ML/DL
PCO2 BLDV: 41.7 MM HG (ref 42–50)
PH BLDV: 7.45 [PH] (ref 7.3–7.4)
PH UR STRIP.AUTO: 6 [PH]
PHOSPHATE SERPL-MCNC: 3.6 MG/DL (ref 2.3–4.1)
PLATELET # BLD AUTO: 268 THOUSANDS/UL (ref 149–390)
PMV BLD AUTO: 10.1 FL (ref 8.9–12.7)
PO2 BLDV: 79.2 MM HG (ref 35–45)
POTASSIUM SERPL-SCNC: 4.4 MMOL/L (ref 3.5–5.3)
PROT SERPL-MCNC: 6.5 G/DL (ref 6.4–8.4)
PROT UR STRIP-MCNC: NEGATIVE MG/DL
RBC # BLD AUTO: 4.43 MILLION/UL (ref 3.81–5.12)
RBC #/AREA URNS AUTO: ABNORMAL /HPF
SODIUM SERPL-SCNC: 125 MMOL/L (ref 135–147)
SP GR UR STRIP.AUTO: 1.01 (ref 1–1.03)
UROBILINOGEN UR QL STRIP.AUTO: 0.2 E.U./DL
WBC # BLD AUTO: 12.24 THOUSAND/UL (ref 4.31–10.16)
WBC #/AREA URNS AUTO: ABNORMAL /HPF

## 2023-02-07 RX ORDER — SODIUM CHLORIDE 9 MG/ML
3 INJECTION INTRAVENOUS
Status: DISCONTINUED | OUTPATIENT
Start: 2023-02-07 | End: 2023-02-07 | Stop reason: HOSPADM

## 2023-02-07 RX ORDER — CEFUROXIME AXETIL 500 MG/1
500 TABLET ORAL EVERY 12 HOURS SCHEDULED
Qty: 14 TABLET | Refills: 0 | Status: SHIPPED | OUTPATIENT
Start: 2023-02-07 | End: 2023-02-15 | Stop reason: ALTCHOICE

## 2023-02-07 RX ORDER — SODIUM CHLORIDE, SODIUM GLUCONATE, SODIUM ACETATE, POTASSIUM CHLORIDE, MAGNESIUM CHLORIDE, SODIUM PHOSPHATE, DIBASIC, AND POTASSIUM PHOSPHATE .53; .5; .37; .037; .03; .012; .00082 G/100ML; G/100ML; G/100ML; G/100ML; G/100ML; G/100ML; G/100ML
1500 INJECTION, SOLUTION INTRAVENOUS ONCE
Status: COMPLETED | OUTPATIENT
Start: 2023-02-07 | End: 2023-02-07

## 2023-02-07 RX ORDER — CEFTRIAXONE 1 G/50ML
1000 INJECTION, SOLUTION INTRAVENOUS ONCE
Status: COMPLETED | OUTPATIENT
Start: 2023-02-07 | End: 2023-02-07

## 2023-02-07 RX ADMIN — CEFTRIAXONE 1000 MG: 1 INJECTION, SOLUTION INTRAVENOUS at 11:35

## 2023-02-07 RX ADMIN — SODIUM CHLORIDE, SODIUM GLUCONATE, SODIUM ACETATE, POTASSIUM CHLORIDE, MAGNESIUM CHLORIDE, SODIUM PHOSPHATE, DIBASIC, AND POTASSIUM PHOSPHATE 1500 ML: .53; .5; .37; .037; .03; .012; .00082 INJECTION, SOLUTION INTRAVENOUS at 10:42

## 2023-02-07 RX ADMIN — INSULIN HUMAN 6 UNITS: 100 INJECTION, SOLUTION PARENTERAL at 12:14

## 2023-02-07 NOTE — ED PROVIDER NOTES
History  Chief Complaint   Patient presents with   • Hyperglycemia - Symptomatic     Pt c/o hyperglycemia starting last night  States she took her sugar and the meter read "HIGH", this morning she took it again and it read ">500"  Pt is c/o dizziness and lightheadedness      This is a 58-year-old woman with the noted past medical hx who presents to the emergency department after she noted her blood sugar to be off scale high this morning  Her blood sugars have actually been quite elevated over the past week; she reports blood sugars in the 400s to low 500s with a normal baseline about 130 mg/dL  She had been hospitalized at this facility from 28-30 January 23 due to acute respiratory failure secondary to COPD exacerbation  She did respond well to therapy in the hospital and was ultimately discharged with a steroid taper which she has been taking as prescribed  Tomorrow is the last dose of the prednisone  Since last week, she has noted urinary frequency without dysuria or urgency  She has become lightheaded at times upon standing; this was especially notable yesterday evening, when she felt somewhat syncopal upon standing upright  She has not had any presyncopal sensation when seated or lying nor any actual episodes of syncope  Her breathing is generally better; compared to time of hospitalization she is not dyspneic with normal physical exertion at home  She does not have any worsening cough  She is not any fevers at home  No abdominal pain  No nausea/vomiting  No chest pain  Has been taking her other medications as prescribed  Has approximate 12-year history of diabetes in total treated with liraglutide  She has never taken insulin  Hyperglycemia likely secondary to steroid use with some symptoms now suggestive of volume depletion  We will check labs to assess for any endorgan dysfunction or evidence of DKA  Patient likely need IV fluid repletion and recheck of blood glucose    The suspected inciting factor of steroid treatment will be self-limiting with last dose tomorrow  She will likely not need any prolonged insulin therapy but may require a dose while in the emergency department  Disposition pending  History provided by:  Medical records and patient  Hyperglycemia - Symptomatic  Associated symptoms: fatigue    Associated symptoms: no abdominal pain, no chest pain, no dizziness, no dysuria, no fever, no nausea, no shortness of breath, no vomiting and no weakness        Prior to Admission Medications   Prescriptions Last Dose Informant Patient Reported? Taking?    Blood Glucose Monitoring Suppl (ONE TOUCH ULTRA 2) w/Device KIT   Yes No   Sig: by Does not apply route   Patient not taking: Reported on 1/28/2023   Blood Glucose Monitoring Suppl (ONE TOUCH ULTRA 2) w/Device KIT   No No   Sig: by Does not apply route daily   Patient not taking: Reported on 1/28/2023   Insulin Pen Needle (BD Pen Needle Jasmine 2nd Gen) 32G X 4 MM MISC   No No   Sig: Inject under the skin in the morning   Patient not taking: Reported on 1/28/2023   VRAYLAR 6 MG capsule   Yes No   Sig: Take 6 mg by mouth daily   albuterol (Ventolin HFA) 90 mcg/act inhaler   No No   Sig: Inhale 2 puffs every 4 (four) hours as needed for wheezing   amitriptyline (ELAVIL) 100 mg tablet   No No   Sig: take 1/2 tablet by mouth once daily at bedtime   apixaban (Eliquis) 5 mg   No No   Sig: Take 1 tablet (5 mg total) by mouth 2 (two) times a day   aspirin (ECOTRIN LOW STRENGTH) 81 mg EC tablet   No No   Sig: Take 1 tablet (81 mg total) by mouth daily   benztropine (COGENTIN) 0 5 mg tablet   Yes No   Sig: Take 0 5 mg by mouth daily at bedtime     carvedilol (COREG) 3 125 mg tablet   No No   Sig: take 1 tablet by mouth twice a day with meals   clonazePAM (KlonoPIN) 0 5 mg tablet   Yes No   Sig: Take 0 5 mg by mouth daily as needed     doxepin (SINEquan) 100 mg capsule   Yes No   Sig: take 1 to 2 capsules by mouth at bedtime if needed   gabapentin (NEURONTIN) 600 MG tablet   No No   Sig: take 1 tablet by mouth three times a day   glucose blood (OneTouch Ultra) test strip   No No   Sig: Use 1 each daily Use as instructed   liraglutide (Victoza) injection   No No   Sig: Inject 0 3 mL (1 8 mg total) under the skin daily   loperamide (IMODIUM) 2 mg capsule   No No   Sig: Take 1 capsule (2 mg total) by mouth 4 (four) times a day as needed for diarrhea   pantoprazole (PROTONIX) 40 mg tablet   No No   Sig: take 1 tablet by mouth daily if needed for GERD SYMPTOMS   predniSONE 10 mg tablet   No No   Sig: Take 4 tablets (40 mg total) by mouth daily for 3 days, THEN 3 tablets (30 mg total) daily for 3 days, THEN 2 tablets (20 mg total) daily for 3 days, THEN 1 tablet (10 mg total) daily for 3 days  rosuvastatin (CRESTOR) 40 MG tablet   No No   Sig: Take 1 tablet (40 mg total) by mouth daily   tiotropium (Spiriva Respimat) 2 5 MCG/ACT AERS inhaler   No No   Sig: Inhale 2 puffs daily   vilazodone (VIIBRYD) 40 mg tablet   Yes No   Sig: Take 40 mg by mouth daily  Facility-Administered Medications: None       Past Medical History:   Diagnosis Date   • Cancer [facility])     right breast   • Chronic back pain    • Colitis    • COPD (chronic obstructive pulmonary disease) (HCC)    • Depression    • Diabetes mellitus ([facility] [address] )    • DVT of lower limb, acute ([facility] [address] ) 2004   • GERD (gastroesophageal reflux disease)    • Hyperlipidemia    • Pneumonia    • Rapid heart rate    • Sleep apnea    • Stroke ([facility] [address] )     4 mini strokes       Past Surgical History:   Procedure Laterality Date   • BREAST LUMPECTOMY Right    • CARDIAC CATHETERIZATION N/A 10/28/2021    Procedure: Cardiac Coronary Angiogram;  Surgeon: Cooper Beyer DO;  Location: [facility] CARD[facility];   Service: Cardiology   • IVC FILTER INSERTION     • MASTECTOMY     • MASTECTOMY W/ SENTINEL NODE BIOPSY Right 11/09/2021    Procedure: BREAST MASTECTOMY WITH BIOPSY LYMPH NODE SENTINEL and axillary node dissection  ([place] Lymph Node Injection @ 12:30);  Surgeon: Gabby Molina MD;  Location: [facility] MAIN OR;  Service: General   • US GUIDANCE BREAST BIOPSY RIGHT EACH ADDITIONAL Right 10/13/2021   • US GUIDANCE BREAST BIOPSY RIGHT EACH ADDITIONAL Right 10/13/2021   • US GUIDED BREAST BIOPSY RIGHT COMPLETE Right 10/13/2021       Family History   Problem Relation Age of Onset   • Breast cancer Mother 67   • COPD Mother    • Coronary artery disease Mother    • Coronary artery disease Father    • Stroke Father    • Lung cancer Sister    • No Known Problems Sister    • No Known Problems Sister    • Breast cancer Maternal Grandmother    • Colon cancer Paternal Grandfather    • No Known Problems Maternal Aunt    • No Known Problems Maternal Aunt    • No Known Problems Maternal Aunt    • No Known Problems Paternal Aunt    • Breast cancer Cousin      I have reviewed and agree with the history as documented  E-Cigarette/Vaping   • E-Cigarette Use Never User      E-Cigarette/Vaping Substances   • Nicotine No    • THC No    • CBD No    • Flavoring No    • Other No    • Unknown No      Social History     Tobacco Use   • Smoking status: Every Day     Packs/day: 1 00     Types: Cigarettes   • Smokeless tobacco: Never   Vaping Use   • Vaping Use: Never used   Substance Use Topics   • Alcohol use: Never   • Drug use: Never       Review of Systems   Constitutional: Positive for fatigue  Negative for chills and fever  Respiratory: Negative for cough, chest tightness and shortness of breath  Cardiovascular: Negative for chest pain and palpitations  Gastrointestinal: Negative for abdominal pain, diarrhea, nausea and vomiting  Genitourinary: Positive for frequency  Negative for difficulty urinating, dysuria and flank pain  Skin: Negative for color change, pallor, rash and wound  Neurological: Positive for light-headedness  Negative for dizziness, syncope, weakness and numbness  All other systems reviewed and are negative        Physical Exam  Physical Exam  Vitals and nursing note reviewed  Constitutional:       General: She is awake  She is not in acute distress  Appearance: Normal appearance  She is well-developed  HENT:      Head: Normocephalic and atraumatic  Right Ear: Hearing and external ear normal       Left Ear: Hearing and external ear normal    Neck:      Trachea: Trachea and phonation normal    Cardiovascular:      Rate and Rhythm: Regular rhythm  Tachycardia present  Pulses:           Radial pulses are 2+ on the right side and 2+ on the left side  Dorsalis pedis pulses are 2+ on the right side and 2+ on the left side  Posterior tibial pulses are 2+ on the right side and 2+ on the left side  Heart sounds: Normal heart sounds, S1 normal and S2 normal  No murmur heard  No friction rub  No gallop  Pulmonary:      Effort: Pulmonary effort is normal  No respiratory distress  Breath sounds: No stridor  Wheezing present  No decreased breath sounds, rhonchi or rales  Comments: Occasional scattered expiratory wheeze  No respiratory distress  Abdominal:      General: There is no distension  Palpations: There is no mass  Tenderness: There is no abdominal tenderness  There is no guarding or rebound  Skin:     General: Skin is warm and dry  Neurological:      Mental Status: She is alert and oriented to person, place, and time  GCS: GCS eye subscore is 4  GCS verbal subscore is 5  GCS motor subscore is 6  Cranial Nerves: No cranial nerve deficit  Sensory: No sensory deficit  Motor: No abnormal muscle tone  Comments: PERRLA; EOMI  Sensation intact to light touch over face in V1-V3 distribution bilaterally  Facial expressions symmetric  Tongue/uvula midline  Shoulder shrug equal bilaterally  Strength 5/5 in UE/LE bilaterally  Sensation intact to light touch in UE/LE bilaterally           Vital Signs  ED Triage Vitals [02/07/23 0934]   Temperature Pulse Respirations Blood Pressure SpO2   98 2 °F (36 8 °C) (!) 117 18 119/74 95 %      Temp Source Heart Rate Source Patient Position - Orthostatic VS BP Location FiO2 (%)   Temporal Monitor Sitting Left arm --      Pain Score       No Pain           Vitals:    02/07/23 0934 02/07/23 1130 02/07/23 1230   BP: 119/74 143/76 125/72   Pulse: (!) 117 98 101   Patient Position - Orthostatic VS: Sitting Sitting          Visual Acuity      ED Medications  Medications   sodium chloride (PF) 0 9 % injection 3 mL (has no administration in time range)   multi-electrolyte (ISOLYTE-S PH 7 4) bolus 1,500 mL (0 mL Intravenous Stopped 2/7/23 1142)   cefTRIAXone (ROCEPHIN) IVPB (premix in dextrose) 1,000 mg 50 mL (0 mg Intravenous Stopped 2/7/23 1205)   insulin regular (HumuLIN R,NovoLIN R) injection 6 Units (6 Units Intravenous Given 2/7/23 1214)       Diagnostic Studies  Results Reviewed     Procedure Component Value Units Date/Time    Fingerstick Glucose (POCT) [[id number]]  (Abnormal) Collected: 02/07/23 1251    Lab Status: Final result Updated: 02/07/23 1251     POC Glucose 337 mg/dl     Lactic acid 2 Hours [[id number]]  (Abnormal) Collected: 02/07/23 1214    Lab Status: Final result Specimen: Blood from Arm, Left Updated: 02/07/23 1248     LACTIC ACID 2 6 mmol/L     Narrative:      Result may be elevated if tourniquet was used during collection  Fingerstick Glucose (POCT) [[id number]]  (Abnormal) Collected: 02/07/23 1144    Lab Status: Final result Updated: 02/07/23 1147     POC Glucose 403 mg/dl     Urine Microscopic [[id number]]  (Abnormal) Collected: 02/07/23 1042    Lab Status: Final result Specimen: Urine, Clean Catch Updated: 02/07/23 1117     RBC, UA 4-10 /hpf      WBC, UA 30-50 /hpf      Epithelial Cells Moderate /hpf      Bacteria, UA Innumerable /hpf     Urine culture [[id number]] Collected: 02/07/23 1042    Lab Status:  In process Specimen: Urine, Clean Catch Updated: 02/07/23 1117    UA w Reflex to Microscopic w Reflex to Culture [[id number]]  (Abnormal) Collected: 02/07/23 1042    Lab Status: Final result Specimen: Urine, Clean Catch Updated: 02/07/23 1100     Color, UA Yellow     Clarity, UA Cloudy     Specific [place], UA 1 010     pH, UA 6 0     Leukocytes, UA Moderate     Nitrite, UA Negative     Protein, UA Negative mg/dl      Glucose, UA >=1000 (1%) mg/dl      Ketones, UA Negative mg/dl      Urobilinogen, UA 0 2 E U /dl      Bilirubin, UA Negative     Occult Blood, UA Trace-Intact    Lactic acid, plasma [[id number]]  (Abnormal) Collected: 02/07/23 0940    Lab Status: Final result Specimen: Blood from Arm, Left Updated: 02/07/23 1019     LACTIC ACID 3 3 mmol/L     Narrative:      Result may be elevated if tourniquet was used during collection      Comprehensive metabolic panel [[id number]]  (Abnormal) Collected: 02/07/23 0940    Lab Status: Final result Specimen: Blood from Arm, Left Updated: 02/07/23 1007     Sodium 125 mmol/L      Potassium 4 4 mmol/L      Chloride 86 mmol/L      CO2 27 mmol/L      ANION GAP 12 mmol/L      BUN 32 mg/dL      Creatinine 1 55 mg/dL      Glucose 494 mg/dL      Calcium 8 8 mg/dL      AST 16 U/L      ALT 26 U/L      Alkaline Phosphatase 79 U/L      Total Protein 6 5 g/dL      Albumin 4 0 g/dL      Total Bilirubin 0 42 mg/dL      eGFR 35 ml/min/1 73sq m     Narrative:      [place] guidelines for Chronic Kidney Disease (CKD):   •  Stage 1 with normal or high GFR (GFR > 90 mL/min/1 73 square meters)  •  Stage 2 Mild CKD (GFR = 60-89 mL/min/1 73 square meters)  •  Stage 3A Moderate CKD (GFR = 45-59 mL/min/1 73 square meters)  •  Stage 3B Moderate CKD (GFR = 30-44 mL/min/1 73 square meters)  •  Stage 4 Severe CKD (GFR = 15-29 mL/min/1 73 square meters)  •  Stage 5 End Stage CKD (GFR <15 mL/min/1 73 square meters)  Note: GFR calculation is accurate only with a steady state creatinine    Magnesium [[id number]]  (Normal) Collected: 02/07/23 0940    Lab Status: Final result Specimen: Blood from Arm, Left Updated: 02/07/23 1007     Magnesium 2 0 mg/dL     Phosphorus [[id number]]  (Normal) Collected: 02/07/23 0940    Lab Status: Final result Specimen: Blood from Arm, Left Updated: 02/07/23 1007     Phosphorus 3 6 mg/dL     Beta Hydroxybutyrate [[id number]]  (Normal) Collected: 02/07/23 0940    Lab Status: Final result Specimen: Blood from Arm, Left Updated: 02/07/23 0957     BETA-HYDROXYBUTYRATE 0 1 mmol/L     Blood gas, venous [[id number]]  (Abnormal) Collected: 02/07/23 0940    Lab Status: Final result Specimen: Blood from Arm, Left Updated: 02/07/23 0956     pH, Morris 7 453     pCO2, Morris 41 7 mm Hg      pO2, Morris 79 2 mm Hg      HCO3, Morris 28 5 mmol/L      Base Excess, Morris 4 2 mmol/L      O2 Content, Morris 17 2 ml/dL      O2 HGB, VENOUS 83 7 %     CBC [[id number]]  (Abnormal) Collected: 02/07/23 0940    Lab Status: Final result Specimen: Blood from Arm, Left Updated: 02/07/23 0954     WBC 12 24 Thousand/uL      RBC 4 43 Million/uL      Hemoglobin 13 6 g/dL      Hematocrit 39 9 %      MCV 90 fL      MCH 30 7 pg      MCHC 34 1 g/dL      RDW 13 6 %      Platelets 231 Thousands/uL      MPV 10 1 fL     Fingerstick Glucose (POCT) [[id number]]  (Abnormal) Collected: 02/07/23 0931    Lab Status: Final result Updated: 02/07/23 0934     POC Glucose 470 mg/dl                  No orders to display              Procedures  ECG 12 Lead Documentation Only    Date/Time: 2/7/2023 10:26 AM  Performed by: Hammad Oneill DO  Authorized by: Hammad Oneill DO     Indications / Diagnosis:  Lightheadedness  ECG reviewed by me, the ED Provider: yes    Patient location:  ED  Previous ECG:     Comparison to cardiac monitor: Yes    Quality:     Tracing quality:  Limited by artifact  Rate:     ECG rate:  97    ECG rate assessment: normal    Rhythm:     Rhythm: sinus rhythm    Ectopy:     Ectopy: none    QRS:     QRS axis:  Normal    QRS intervals:  Normal  Conduction:     Conduction: normal    ST segments:     ST segments: Normal  T waves:     T waves: normal    Comments:      Pr 126 qrs 78 qtc 431             ED Course  ED Course as of 02/07/23 1343   Tue Feb 07, 2023   0943 Fingerstick Glucose (POCT)(!)   1003 CBC(!)  There is mild leukocytosis  Hg/Hct wnl  Plt wnl   1003 Beta Hydroxybutyrate  Normal   1003 Blood gas, venous(!)  There is a respiratory alkalosis with hyperoxia   1010 Phosphorus  Normal   1010 Magnesium  Normal   1010 Comprehensive metabolic panel(!)  Hyperglycemia with mildly elevated creatinine but normal anion gap  Transaminases normal  Corrected Na 134  Creatinine appears to be near baseline and actually improving compared to prior values   1021 Lactic acid, plasma(!!)  Elevated lactic acid secondary to hypovolemia and possibly d/t infection   1117 UA w Reflex to Microscopic w Reflex to Culture(!)  Moderate leukocytes and glucosuria present   1117 Pending repeat lactic acid/FSBS after IVF bolus   1120 Urine Microscopic(!)  Markers of infection are present; with patient's symptoms of urinary frequency, I would consider this to represent infection and treat accordingly  1202 Reviewed results of workup with patient thus far  Plan for repeat lactic acid, insulin administration, and repeat FSBS  Would anticipate discharge home with outpatient therapy   1252 Lactic acid is improved although still elevated  I would expect this to continue improving with continued hydration at home as well as antibiotic therapy for the underlying infection  She should continue her other medications as scheduled  Should finish the prednisone with last dose scheduled tomorrow  From a breathing standpoint, as noted, she is much improved and I do not feel that any extension antibiotic therapy is warranted at this point  Actually has an appointment with her primary physician tomorrow which I urged her to keep    Did advise that she recheck her blood sugars about every 4 hours over the next 12 hours or so to be sure that she has not developed any hypoglycemia  She should continue aggressive hydration over the next several days to assure that she remains euvolemic  All questions were answered to her satisfaction prior to discharge  The patient expressed understanding and agreed to plan  MDM    Disposition  Final diagnoses:   Type 2 diabetes mellitus with hyperglycemia, without long-term current use of insulin ([facility])   Urinary tract infection   Elevated lactic acid level     Time reflects when diagnosis was documented in both MDM as applicable and the Disposition within this note     Time User Action Codes Description Comment    2/7/2023 12:54 PM Edilson Stewart Add [E11 65] Type 2 diabetes mellitus with hyperglycemia, without long-term current use of insulin ([facility] [address] )     2/7/2023 12:54 PM Edilson Stewart Add [N39 0] Urinary tract infection     2/7/2023 12:54 PM Edilson Stewart Add [R79 89] Elevated lactic acid level       ED Disposition     ED Disposition   Discharge    Condition   Stable    Date/Time   Tue Feb 7, 2023 12:54 PM    [address] discharge to home/self care                 Follow-up Information    None         Discharge Medication List as of 2/7/2023 12:56 PM      START taking these medications    Details   cefuroxime (CEFTIN) 500 mg tablet Take 1 tablet (500 mg total) by mouth every 12 (twelve) hours for 7 days, Starting Tue 2/7/2023, Until Tue 2/14/2023, Normal         CONTINUE these medications which have NOT CHANGED    Details   albuterol (Ventolin HFA) 90 mcg/act inhaler Inhale 2 puffs every 4 (four) hours as needed for wheezing, Starting Tue 5/4/2021, Normal      amitriptyline (ELAVIL) 100 mg tablet take 1/2 tablet by mouth once daily at bedtime, Normal      apixaban (Eliquis) 5 mg Take 1 tablet (5 mg total) by mouth 2 (two) times a day, Starting Mon 1/30/2023, Normal      aspirin (ECOTRIN LOW STRENGTH) 81 mg EC tablet Take 1 tablet (81 mg total) by mouth daily, Starting Tue 10/12/2021, No Print      benztropine (COGENTIN) 0 5 mg tablet Take 0 5 mg by mouth daily at bedtime  , Historical Med      !! Blood Glucose Monitoring Suppl (ONE TOUCH ULTRA 2) w/Device KIT by Does not apply route, Starting Wed 8/23/2017, Historical Med      !! Blood Glucose Monitoring Suppl (ONE TOUCH ULTRA 2) w/Device KIT by Does not apply route daily, Starting Thu 6/18/2020, Normal      carvedilol (COREG) 3 125 mg tablet take 1 tablet by mouth twice a day with meals, Normal      clonazePAM (KlonoPIN) 0 5 mg tablet Take 0 5 mg by mouth daily as needed  , Starting Wed 8/18/2021, Historical Med      doxepin (SINEquan) 100 mg capsule take 1 to 2 capsules by mouth at bedtime if needed, Historical Med      gabapentin (NEURONTIN) 600 MG tablet take 1 tablet by mouth three times a day, Normal      glucose blood (OneTouch Ultra) test strip Use 1 each daily Use as instructed, Starting Thu 10/7/2021, Normal      Insulin Pen Needle (BD Pen Needle Jasmine 2nd Gen) 32G X 4 MM MISC Inject under the skin in the morning, Starting Mon 9/19/2022, Normal      liraglutide (Victoza) injection Inject 0 3 mL (1 8 mg total) under the skin daily, Starting Wed 11/9/2022, Until Tue 2/7/2023, Normal      loperamide (IMODIUM) 2 mg capsule Take 1 capsule (2 mg total) by mouth 4 (four) times a day as needed for diarrhea, Starting Mon 1/30/2023, Normal      pantoprazole (PROTONIX) 40 mg tablet take 1 tablet by mouth daily if needed for GERD SYMPTOMS, Normal      predniSONE 10 mg tablet Multiple Dosages:Starting Mon 1/30/2023, Until Wed 2/1/2023 at 2359, THEN Starting Thu 2/2/2023, Until Sat 2/4/2023 at 2359, THEN Starting Sun 2/5/2023, Until Tue 2/7/2023 at 2359, THEN Starting Wed 2/8/2023, Until Fri 2/10/2023 at 2359Take 4 tablet s (40 mg total) by mouth daily for 3 days, THEN 3 tablets (30 mg total) daily for 3 days, THEN 2 tablets (20 mg total) daily for 3 days, THEN 1 tablet (10 mg total) daily for 3 days  , Normal      rosuvastatin (CRESTOR) 40 MG tablet Take 1 tablet (40 mg total) by mouth daily, Starting Wed 8/10/2022, Normal      tiotropium (Spiriva Respimat) 2 5 MCG/ACT AERS inhaler Inhale 2 puffs daily, Starting Wed 9/22/2021, Normal      vilazodone (VIIBRYD) 40 mg tablet Take 40 mg by mouth daily  , Historical Med      VRAYLAR 6 MG capsule Take 6 mg by mouth daily, Starting u 5/10/2018, Historical Med       !! - Potential duplicate medications found  Please discuss with provider  No discharge procedures on file      PDMP Review     None          ED Provider  Electronically Signed by           Sharla Akers DO  02/07/23 9571

## 2023-02-07 NOTE — DISCHARGE INSTRUCTIONS
You should check your blood sugar every 4 hours for 3 times in total to be sure that it does not go too low  After that (starting tomorrow), you can check your blood sugar as you normally do  Please take the antibiotic as prescribed for the full course  Please continue all your other medications at their current dosages and frequencies  Stay well-hydrated especially over the next several days  You should see your regular doctor in the next 1-2 weeks for recheck of your symptoms  If you develop a fever, shaking chills, become lightheaded, or pass out, please go to the ER at any point

## 2023-02-07 NOTE — TELEPHONE ENCOUNTER
Patient called in stating her blood sugars have been over 500 for the last week  Current BG is 475 and states some SOB  hasn't checked for ketones  Pt states she is hable to stand but is shaky , dizzy and lightheaded  Advised pt to ED per protocol  Verbalized understanding  States brother will drive her  Reason for Disposition  • Blood glucose > 240 mg/dL (13 3 mmol/L) and rapid breathing    Answer Assessment - Initial Assessment Questions  1  BLOOD GLUCOSE: "What is your blood glucose level?"      475  2  ONSET: "When did you check the blood glucose?"      About a weak   3  USUAL RANGE: "What is your glucose level usually?" (e g , usual fasting morning value, usual evening value)     Under 150s   4  KETONES: "Do you check for ketones (urine or blood test strips)?" If yes, ask: "What does the test show now?"     Denies   5  TYPE 1 or 2:  "Do you know what type of diabetes you have?"  (e g , Type 1, Type 2, Gestational; doesn't know)      Type 2   6  INSULIN: "Do you take insulin?" "What type of insulin(s) do you use? What is the mode of delivery? (syringe, pen; injection or pump)? "       Taking victoza  7  DIABETES PILLS: "Do you take any pills for your diabetes?" If yes, ask: "Have you missed taking any pills recently?"      Denies   8   OTHER SYMPTOMS: "Do you have any symptoms?" (e g , fever, frequent urination, difficulty breathing, dizziness, weakness, vomiting)     Lightheaded dizzy shakey    Protocols used: DIABETES - HIGH BLOOD SUGAR-ADULT-OH

## 2023-02-07 NOTE — TELEPHONE ENCOUNTER
Regarding: High sugar 500 and feeling light headed and off balance  ----- Message from Tess Paz sent at 2/7/2023  8:42 AM EST -----  My sugars have been over 500 for a few days and I also fell lightheaded and off balance  I am having trouble walking and have the shakes    Should I go to the ED?"

## 2023-02-08 ENCOUNTER — PATIENT OUTREACH (OUTPATIENT)
Dept: CASE MANAGEMENT | Facility: OTHER | Age: 63
End: 2023-02-08

## 2023-02-08 LAB
ATRIAL RATE: 97 BPM
BACTERIA UR CULT: NORMAL
P AXIS: 78 DEGREES
PR INTERVAL: 126 MS
QRS AXIS: 55 DEGREES
QRSD INTERVAL: 78 MS
QT INTERVAL: 340 MS
QTC INTERVAL: 431 MS
T WAVE AXIS: 60 DEGREES
VENTRICULAR RATE: 97 BPM

## 2023-02-08 NOTE — PROGRESS NOTES
ADT alert received with ED discharge  Patient had a blood sugar at home of 500 and was symptomatic  Also has a UTI  I spoke with patient today who reports her sugars "are still elevated" but attributes that to her UTI  I advised her the steroid taper she was on can cause elevated blood glucose  She finished the steroid today  She is taking Ceftin as prescribed  She did receive Regular insulin 10 units in the ED  Last POCT glucose in the ED was 337  She reports she is not having any symptoms  I gave her my contact information and advised her to call me if I can assist her

## 2023-02-14 ENCOUNTER — HOSPITAL ENCOUNTER (EMERGENCY)
Facility: HOSPITAL | Age: 63
Discharge: HOME | End: 2023-02-14
Attending: EMERGENCY MEDICINE

## 2023-02-14 ENCOUNTER — APPOINTMENT (OUTPATIENT)
Dept: NON INVASIVE DIAGNOSTICS | Facility: HOSPITAL | Age: 63
End: 2023-02-14

## 2023-02-14 VITALS
RESPIRATION RATE: 20 BRPM | DIASTOLIC BLOOD PRESSURE: 83 MMHG | BODY MASS INDEX: 33.77 KG/M2 | TEMPERATURE: 97.2 F | OXYGEN SATURATION: 93 % | WEIGHT: 210.1 LBS | HEIGHT: 66 IN | HEART RATE: 98 BPM | SYSTOLIC BLOOD PRESSURE: 124 MMHG

## 2023-02-14 DIAGNOSIS — M79.604 BILATERAL LEG PAIN: Primary | ICD-10-CM

## 2023-02-14 DIAGNOSIS — M79.605 BILATERAL LEG PAIN: Primary | ICD-10-CM

## 2023-02-14 DIAGNOSIS — E11.65 TYPE 2 DIABETES MELLITUS WITH HYPERGLYCEMIA, WITHOUT LONG-TERM CURRENT USE OF INSULIN (HCC): ICD-10-CM

## 2023-02-14 LAB
ALBUMIN SERPL BCP-MCNC: 3.8 G/DL (ref 3.5–5)
ALP SERPL-CCNC: 70 U/L (ref 34–104)
ALT SERPL W P-5'-P-CCNC: 25 U/L (ref 7–52)
ANION GAP SERPL CALCULATED.3IONS-SCNC: 8 MMOL/L (ref 4–13)
AST SERPL W P-5'-P-CCNC: 17 U/L (ref 13–39)
BASOPHILS # BLD AUTO: 0 THOUSANDS/ÂΜL (ref 0–0.1)
BASOPHILS NFR BLD AUTO: 0 % (ref 0–1)
BILIRUB SERPL-MCNC: 0.32 MG/DL (ref 0.2–1)
BUN SERPL-MCNC: 17 MG/DL (ref 5–25)
CALCIUM SERPL-MCNC: 9.1 MG/DL (ref 8.4–10.2)
CHLORIDE SERPL-SCNC: 95 MMOL/L (ref 96–108)
CO2 SERPL-SCNC: 27 MMOL/L (ref 21–32)
CREAT SERPL-MCNC: 1.24 MG/DL (ref 0.6–1.3)
EOSINOPHIL # BLD AUTO: 0.01 THOUSAND/ÂΜL (ref 0–0.61)
EOSINOPHIL NFR BLD AUTO: 0 % (ref 0–6)
ERYTHROCYTE [DISTWIDTH] IN BLOOD BY AUTOMATED COUNT: 13.7 % (ref 11.6–15.1)
GFR SERPL CREATININE-BSD FRML MDRD: 46 ML/MIN/1.73SQ M
GLUCOSE SERPL-MCNC: 429 MG/DL (ref 65–140)
HCT VFR BLD AUTO: 34.7 % (ref 34.8–46.1)
HGB BLD-MCNC: 11.8 G/DL (ref 11.5–15.4)
IMM GRANULOCYTES # BLD AUTO: 0.03 THOUSAND/UL (ref 0–0.2)
IMM GRANULOCYTES NFR BLD AUTO: 1 % (ref 0–2)
LYMPHOCYTES # BLD AUTO: 1.43 THOUSANDS/ÂΜL (ref 0.6–4.47)
LYMPHOCYTES NFR BLD AUTO: 23 % (ref 14–44)
MCH RBC QN AUTO: 30.8 PG (ref 26.8–34.3)
MCHC RBC AUTO-ENTMCNC: 34 G/DL (ref 31.4–37.4)
MCV RBC AUTO: 91 FL (ref 82–98)
MONOCYTES # BLD AUTO: 0.38 THOUSAND/ÂΜL (ref 0.17–1.22)
MONOCYTES NFR BLD AUTO: 6 % (ref 4–12)
NEUTROPHILS # BLD AUTO: 4.4 THOUSANDS/ÂΜL (ref 1.85–7.62)
NEUTS SEG NFR BLD AUTO: 70 % (ref 43–75)
NRBC BLD AUTO-RTO: 0 /100 WBCS
PLATELET # BLD AUTO: 156 THOUSANDS/UL (ref 149–390)
PMV BLD AUTO: 9.5 FL (ref 8.9–12.7)
POTASSIUM SERPL-SCNC: 4.6 MMOL/L (ref 3.5–5.3)
PROT SERPL-MCNC: 6.6 G/DL (ref 6.4–8.4)
RBC # BLD AUTO: 3.83 MILLION/UL (ref 3.81–5.12)
SODIUM SERPL-SCNC: 130 MMOL/L (ref 135–147)
WBC # BLD AUTO: 6.25 THOUSAND/UL (ref 4.31–10.16)

## 2023-02-14 RX ORDER — LIDOCAINE 50 MG/G
1 PATCH TOPICAL DAILY
Qty: 15 PATCH | Refills: 0 | Status: SHIPPED | OUTPATIENT
Start: 2023-02-14

## 2023-02-14 RX ORDER — ACETAMINOPHEN 325 MG/1
650 TABLET ORAL ONCE
Status: COMPLETED | OUTPATIENT
Start: 2023-02-14 | End: 2023-02-14

## 2023-02-14 RX ORDER — LIDOCAINE 50 MG/G
2 PATCH TOPICAL ONCE
Status: DISCONTINUED | OUTPATIENT
Start: 2023-02-14 | End: 2023-02-14 | Stop reason: HOSPADM

## 2023-02-14 RX ADMIN — ACETAMINOPHEN 650 MG: 325 TABLET ORAL at 10:22

## 2023-02-14 RX ADMIN — LIDOCAINE 2 PATCH: 50 PATCH CUTANEOUS at 10:22

## 2023-02-14 NOTE — Clinical Note
Vernnadiya Baltazar was seen and treated in our emergency department on 2/14/2023  Diagnosis:     Fara Roa  may return to work on return date  She may return on this date: 02/15/2023         If you have any questions or concerns, please don't hesitate to call        Johanne Jimenez PA-C    ______________________________           _______________          _______________  Hospital Representative                              Date                                Time

## 2023-02-14 NOTE — Clinical Note
Lou Nuñez accompanied Marva Shannon to the emergency department on 2/14/2023  Return date if applicable: 31/90/6139        If you have any questions or concerns, please don't hesitate to call        Na Ponce PA-C

## 2023-02-14 NOTE — ED PROVIDER NOTES
History  Chief Complaint   Patient presents with   • Leg Pain     Bilateral thigh pain since yesterday, states she is supposed to have "test on legs", unsure of what it is     Patient is a 80-year-old female with a history of type 2 diabetes, CKD, PAD, DVT status post IVC filter and anticoagulation presenting for evaluation of bilateral thigh pain for 1 day  Describes the pain as sharp, feels better when lying flat  Reports the pain occasionally radiates down to medial calf  States the pain is so bad she is worried about becoming weak and falling  No recent injuries or falls  Denies back pain, bowel or bladder dysfunction, saddle anesthesia  She took Aleve this morning with minimal relief  Denies chest pain, shortness of breath, abdominal pain, dysuria, hematuria, leg swelling  Denies history of low back pain, though chronic low back pain noted in problem list     She has a known chronic DVT in the left leg that was found on ultrasound on 1/30/23  She is scheduled for a follow up bilateral venous duplex US in March  History provided by:  Patient and medical records   used: No        Prior to Admission Medications   Prescriptions Last Dose Informant Patient Reported? Taking?    Blood Glucose Monitoring Suppl (ONE TOUCH ULTRA 2) w/Device KIT   Yes No   Sig: by Does not apply route   Patient not taking: Reported on 1/28/2023   Blood Glucose Monitoring Suppl (ONE TOUCH ULTRA 2) w/Device KIT   No No   Sig: by Does not apply route daily   Patient not taking: Reported on 1/28/2023   Insulin Pen Needle (BD Pen Needle Jasmine 2nd Gen) 32G X 4 MM MISC   No No   Sig: Inject under the skin in the morning   Patient not taking: Reported on 1/28/2023   VRAYLAR 6 MG capsule   Yes No   Sig: Take 6 mg by mouth daily   albuterol (Ventolin HFA) 90 mcg/act inhaler   No No   Sig: Inhale 2 puffs every 4 (four) hours as needed for wheezing   amitriptyline (ELAVIL) 100 mg tablet   No No   Sig: take 1/2 tablet by mouth once daily at bedtime   apixaban (Eliquis) 5 mg   No No   Sig: Take 1 tablet (5 mg total) by mouth 2 (two) times a day   aspirin (ECOTRIN LOW STRENGTH) 81 mg EC tablet   No No   Sig: Take 1 tablet (81 mg total) by mouth daily   benztropine (COGENTIN) 0 5 mg tablet   Yes No   Sig: Take 0 5 mg by mouth daily at bedtime     carvedilol (COREG) 3 125 mg tablet   No No   Sig: take 1 tablet by mouth twice a day with meals   cefuroxime (CEFTIN) 500 mg tablet   No No   Sig: Take 1 tablet (500 mg total) by mouth every 12 (twelve) hours for 7 days   clonazePAM (KlonoPIN) 0 5 mg tablet   Yes No   Sig: Take 0 5 mg by mouth daily as needed     doxepin (SINEquan) 100 mg capsule   Yes No   Sig: take 1 to 2 capsules by mouth at bedtime if needed   gabapentin (NEURONTIN) 600 MG tablet   No No   Sig: take 1 tablet by mouth three times a day   glucose blood (OneTouch Ultra) test strip   No No   Sig: Use 1 each daily Use as instructed   liraglutide (Victoza) injection   No No   Sig: Inject 0 3 mL (1 8 mg total) under the skin daily   loperamide (IMODIUM) 2 mg capsule   No No   Sig: Take 1 capsule (2 mg total) by mouth 4 (four) times a day as needed for diarrhea   pantoprazole (PROTONIX) 40 mg tablet   No No   Sig: take 1 tablet by mouth daily if needed for GERD SYMPTOMS   rosuvastatin (CRESTOR) 40 MG tablet   No No   Sig: Take 1 tablet (40 mg total) by mouth daily   tiotropium (Spiriva Respimat) 2 5 MCG/ACT AERS inhaler   No No   Sig: Inhale 2 puffs daily   vilazodone (VIIBRYD) 40 mg tablet   Yes No   Sig: Take 40 mg by mouth daily        Facility-Administered Medications: None       Past Medical History:   Diagnosis Date   • Cancer [facility])     right breast   • Chronic back pain    • Colitis    • COPD (chronic obstructive pulmonary disease) (HCC)    • Depression    • Diabetes mellitus ([facility] [address] )    • DVT of lower limb, acute ([facility] ) 2004   • GERD (gastroesophageal reflux disease)    • Hyperlipidemia    • Pneumonia    • Rapid heart rate    • Sleep apnea    • Stroke [facility])     4 mini strokes       Past Surgical History:   Procedure Laterality Date   • BREAST LUMPECTOMY Right    • CARDIAC CATHETERIZATION N/A 10/28/2021    Procedure: Cardiac Coronary Angiogram;  Surgeon: Checo Sanchez DO;  Location:  CARDIAC CATH LAB; Service: Cardiology   • IVC FILTER INSERTION     • MASTECTOMY     • MASTECTOMY W/ SENTINEL NODE BIOPSY Right 11/09/2021    Procedure: BREAST MASTECTOMY WITH BIOPSY LYMPH NODE SENTINEL and axillary node dissection  ([place] Lymph Node Injection @ 12:30);  Surgeon: Micheline Gibson MD;  Location: [address] [place] [address] OR;  Service: General   • US GUIDANCE BREAST BIOPSY RIGHT EACH ADDITIONAL Right 10/13/2021   • US GUIDANCE BREAST BIOPSY RIGHT EACH ADDITIONAL Right 10/13/2021   • US GUIDED BREAST BIOPSY RIGHT COMPLETE Right 10/13/2021       Family History   Problem Relation Age of Onset   • Breast cancer Mother 67   • COPD Mother    • Coronary artery disease Mother    • Coronary artery disease Father    • Stroke Father    • Lung cancer Sister    • No Known Problems Sister    • No Known Problems Sister    • Breast cancer Maternal Grandmother    • Colon cancer Paternal Grandfather    • No Known Problems Maternal Aunt    • No Known Problems Maternal Aunt    • No Known Problems Maternal Aunt    • No Known Problems Paternal Aunt    • Breast cancer Cousin      I have reviewed and agree with the history as documented  E-Cigarette/Vaping   • E-Cigarette Use Never User      E-Cigarette/Vaping Substances   • Nicotine No    • THC No    • CBD No    • Flavoring No    • Other No    • Unknown No      Social History     Tobacco Use   • Smoking status: Every Day     Packs/day: 1 00     Types: Cigarettes   • Smokeless tobacco: Never   Vaping Use   • Vaping Use: Never used   Substance Use Topics   • Alcohol use: Never   • Drug use: Never       Review of Systems   Constitutional: Negative for chills and fever     HENT: Negative for congestion, ear pain, sinus pressure and sore throat  Eyes: Negative for pain and visual disturbance  Respiratory: Negative for cough, chest tightness and shortness of breath  Cardiovascular: Negative for chest pain and palpitations  Gastrointestinal: Negative for abdominal pain, diarrhea, nausea and vomiting  Genitourinary: Negative for dysuria, hematuria and urgency  Musculoskeletal: Positive for myalgias  Negative for arthralgias, back pain and neck pain  Skin: Negative for color change and rash  Neurological: Negative for seizures, syncope and headaches  All other systems reviewed and are negative  Physical Exam  Physical Exam  Vitals and nursing note reviewed  Constitutional:       General: She is not in acute distress  Appearance: Normal appearance  HENT:      Head: Normocephalic and atraumatic  Right Ear: External ear normal       Left Ear: External ear normal       Nose: Nose normal       Mouth/Throat:      Mouth: Mucous membranes are moist       Pharynx: No posterior oropharyngeal erythema  Eyes:      General: No scleral icterus  Right eye: No discharge  Left eye: No discharge  Extraocular Movements: Extraocular movements intact  Pupils: Pupils are equal, round, and reactive to light  Cardiovascular:      Rate and Rhythm: Normal rate and regular rhythm  Pulses: Normal pulses  Dorsalis pedis pulses are 2+ on the right side and 2+ on the left side  Posterior tibial pulses are 2+ on the right side and 2+ on the left side  Heart sounds: Normal heart sounds  Pulmonary:      Effort: Pulmonary effort is normal  No respiratory distress  Breath sounds: Normal breath sounds  No decreased breath sounds, wheezing or rhonchi  Abdominal:      Palpations: Abdomen is soft  Tenderness: There is no abdominal tenderness  There is no right CVA tenderness or left CVA tenderness  Musculoskeletal:         General: No deformity or signs of injury  Cervical back: Normal, normal range of motion and neck supple  No rigidity or tenderness  No pain with movement, spinous process tenderness or muscular tenderness  Thoracic back: Normal       Lumbar back: Tenderness present  No bony tenderness  Back:       Right hip: No tenderness  Normal range of motion  Left hip: No tenderness  Normal range of motion  Right upper leg: No tenderness  Left upper leg: No tenderness  Right knee: Normal range of motion  No tenderness  Left knee: Normal range of motion  No tenderness  Right lower leg: No tenderness  No edema  Left lower leg: No tenderness  No edema  Comments: Tenderness to bilateral lumbar paraspinal muscles  Lymphadenopathy:      Cervical: No cervical adenopathy  Skin:     General: Skin is dry  Coloration: Skin is not jaundiced  Findings: No erythema or rash  Neurological:      General: No focal deficit present  Mental Status: She is alert and oriented to person, place, and time  Mental status is at baseline  Motor: No weakness  Gait: Gait normal    Psychiatric:         Mood and Affect: Mood normal          Behavior: Behavior normal          Thought Content:  Thought content normal          Vital Signs  ED Triage Vitals [02/14/23 0938]   Temperature Pulse Respirations Blood Pressure SpO2   (!) 97 2 °F (36 2 °C) (!) 119 20 104/57 92 %      Temp Source Heart Rate Source Patient Position - Orthostatic VS BP Location FiO2 (%)   Temporal Monitor Sitting Left arm --      Pain Score       10 - Worst Possible Pain           Vitals:    02/14/23 0938 02/14/23 1100   BP: 104/57 124/83   Pulse: (!) 119 98   Patient Position - Orthostatic VS: Sitting          Visual Acuity      ED Medications  Medications   lidocaine (LIDODERM) 5 % patch 2 patch (2 patches Topical Medication Applied 2/14/23 1022)   acetaminophen (TYLENOL) tablet 650 mg (650 mg Oral Given 2/14/23 1022)       Diagnostic Studies  Results Reviewed     Procedure Component Value Units Date/Time    Comprehensive metabolic panel [[id number]]  (Abnormal) Collected: 02/14/23 1022    Lab Status: Final result Specimen: Blood from Arm, Left Updated: 02/14/23 1049     Sodium 130 mmol/L      Potassium 4 6 mmol/L      Chloride 95 mmol/L      CO2 27 mmol/L      ANION GAP 8 mmol/L      BUN 17 mg/dL      Creatinine 1 24 mg/dL      Glucose 429 mg/dL      Calcium 9 1 mg/dL      AST 17 U/L      ALT 25 U/L      Alkaline Phosphatase 70 U/L      Total Protein 6 6 g/dL      Albumin 3 8 g/dL      Total Bilirubin 0 32 mg/dL      eGFR 46 ml/min/1 73sq m     Narrative:      [place] guidelines for Chronic Kidney Disease (CKD):   •  Stage 1 with normal or high GFR (GFR > 90 mL/min/1 73 square meters)  •  Stage 2 Mild CKD (GFR = 60-89 mL/min/1 73 square meters)  •  Stage 3A Moderate CKD (GFR = 45-59 mL/min/1 73 square meters)  •  Stage 3B Moderate CKD (GFR = 30-44 mL/min/1 73 square meters)  •  Stage 4 Severe CKD (GFR = 15-29 mL/min/1 73 square meters)  •  Stage 5 End Stage CKD (GFR <15 mL/min/1 73 square meters)  Note: GFR calculation is accurate only with a steady state creatinine    CBC and differential [[id number]]  (Abnormal) Collected: 02/14/23 1022    Lab Status: Final result Specimen: Blood from Arm, Left Updated: 02/14/23 1034     WBC 6 25 Thousand/uL      RBC 3 83 Million/uL      Hemoglobin 11 8 g/dL      Hematocrit 34 7 %      MCV 91 fL      MCH 30 8 pg      MCHC 34 0 g/dL      RDW 13 7 %      MPV 9 5 fL      Platelets 232 Thousands/uL      nRBC 0 /100 WBCs      Neutrophils Relative 70 %      Immat GRANS % 1 %      Lymphocytes Relative 23 %      Monocytes Relative 6 %      Eosinophils Relative 0 %      Basophils Relative 0 %      Neutrophils Absolute 4 40 Thousands/µL      Immature Grans Absolute 0 03 Thousand/uL      Lymphocytes Absolute 1 43 Thousands/µL      Monocytes Absolute 0 38 Thousand/µL      Eosinophils Absolute 0 01 Thousand/µL      Basophils Absolute 0 00 Thousands/µL                  VAS lower limb venous duplex study, complete bilateral    (Results Pending)              Procedures  Procedures         ED Course  ED Course as of 02/14/23 1228   Tue Feb 14, 2023   1028 Pulse(!): 119   1037 CBC and differential(!)  No leukocytosis, anemia    1101 Sodium(!): 130   1101 Glucose, Random(!): 429  Normal gap and CO2, DKA workup not indicated  Asymptomatic  She did not take her victoza this morning  1101 Sodium(!): 130  Corrected 135mmol/L    1200 US negative for DVT bilaterally per  tech  Medical Decision Making  Patient is a 58year old female presenting for evaluation of bilateral thigh pain x 1 day  Denies back pain, bowel or bladder dysfunction, saddle anesthesia  No leg swelling, erythema, warmth  She has history of DVT/PE s/p IVC filter and eliquis  Denies CP, sob, abdominal pain, dysuria, hematuria, weakness, numbness, tingling  She had recent hospital admission when it was discovered that she had chronic DVT in left leg  She is scheduled for follow up venous duplex in a few weeks  On exam, patient is well appearing  Tachycardic on presentation but remaining VS stable  Heart and lung sounds normal  Abdomen soft, nontender  No CVA tenderness bilaterally  No midline spine tenderness  +Tenderness to lumbar paraspinal muscles  Full ROM of hips and knees  No tenderness to palpation of thighs or lower legs  No leg swelling, erythema, warmth  Strength and sensation intact  DP and PT pulses intact bilaterally  DDx include lumbar radiculopathy, electrolyte abnormalities, DVT  Low suspicion for spine injury or cauda equina in absence of trauma or red flag symptoms  Gave acetaminophen and lidocaine patches  Will obtain bilateral LE venous duplex as she has an outpatient order for this  Us negative for DVT bilaterally  CBC negative for leukocytosis or anemia    Hyperglycemic at 429 but patient notes that she did not take victoza this morning  Normal anion gap and CO2 and patient is asymptomatic - no DKA workup indicated  Corrected sodium 135, no other electrolyte abnormalities  Suggesting cause of muscle cramps  Suspect radicular symptoms given distribution of bilateral pain from degenerative changes of spine as noted on previous CT scan of lumbar spine  Patient can be discharged home with supportive care  Prescribed lidocaine patches  Instructed patient to continue taking Tylenol as needed for pain  Discussed that pain management with ibuprofen or steroids are contraindicated based on comorbidities  Provided referral and contact information for comprehensive spine program   Patient has follow-up appointment with PCP tomorrow to discuss ultrasound results and further pain management/work-up of bilateral leg pain  Also discussed importance of diabetes medication compliance to control blood sugar and prevent complications of hyperglycemia  Return precautions discussed with patient as outlined in AVS and patient verbally expressed understanding  Patient stable at time of discharge and ambulated out of the emergency department  Bilateral leg pain: acute illness or injury  Type 2 diabetes mellitus with hyperglycemia, without long-term current use of insulin ([address] ): self-limited or minor problem  Amount and/or Complexity of Data Reviewed  External Data Reviewed: labs and radiology  Labs: ordered  Decision-making details documented in ED Course  ECG/medicine tests: ordered  Risk  OTC drugs  Prescription drug management            Disposition  Final diagnoses:   Bilateral leg pain   Type 2 diabetes mellitus with hyperglycemia, without long-term current use of insulin ([address] )     Time reflects when diagnosis was documented in both MDM as applicable and the Disposition within this note     Time User Action Codes Description Comment    2/14/2023 11:26 AM Joslyn Peralta [K41 081, M79 605] Bilateral leg pain     2/14/2023 11:27 AM Rosy Palomino Add [E11 65] Type 2 diabetes mellitus with hyperglycemia, without long-term current use of insulin [facility])       ED Disposition     ED Disposition   Discharge    Condition   Stable    Date/Time   Tue Feb 14, 2023 11:26 AM    Lew   Marisa Negron discharge to home/self care  Follow-up Information     Follow up With Specialties Details Why Contact Info Additional Dayana Forrest, PAKeenaC Family Medicine Schedule an appointment as soon as possible for a visit in 1 week  [address] [place] [address] [id number] [place] [address] Emergency Department Emergency Medicine Go to  If symptoms worsen [phone]  [facility] Emergency Department, [address], [address]          Discharge Medication List as of 2/14/2023 12:00 PM      START taking these medications    Details   lidocaine (Lidoderm) 5 % Apply 1 patch topically over 12 hours daily Remove & Discard patch within 12 hours or as directed by MD, Starting Tue 2/14/2023, Normal         CONTINUE these medications which have NOT CHANGED    Details   albuterol (Ventolin HFA) 90 mcg/act inhaler Inhale 2 puffs every 4 (four) hours as needed for wheezing, Starting Tue 5/4/2021, Normal      amitriptyline (ELAVIL) 100 mg tablet take 1/2 tablet by mouth once daily at bedtime, Normal      apixaban (Eliquis) 5 mg Take 1 tablet (5 mg total) by mouth 2 (two) times a day, Starting Mon 1/30/2023, Normal      aspirin (ECOTRIN LOW STRENGTH) 81 mg EC tablet Take 1 tablet (81 mg total) by mouth daily, Starting Tue 10/12/2021, No Print      benztropine (COGENTIN) 0 5 mg tablet Take 0 5 mg by mouth daily at bedtime  , Historical Med      !! Blood Glucose Monitoring Suppl (ONE TOUCH ULTRA 2) w/Device KIT by Does not apply route, Starting Wed 8/23/2017, Historical Med      !!  Blood Glucose Monitoring Suppl (ONE TOUCH ULTRA 2) w/Device KIT by Does not apply route daily, Starting Thu 6/18/2020, Normal      carvedilol (COREG) 3 125 mg tablet take 1 tablet by mouth twice a day with meals, Normal      cefuroxime (CEFTIN) 500 mg tablet Take 1 tablet (500 mg total) by mouth every 12 (twelve) hours for 7 days, Starting Tue 2/7/2023, Until Tue 2/14/2023, Normal      clonazePAM (KlonoPIN) 0 5 mg tablet Take 0 5 mg by mouth daily as needed  , Starting Wed 8/18/2021, Historical Med      doxepin (SINEquan) 100 mg capsule take 1 to 2 capsules by mouth at bedtime if needed, Historical Med      gabapentin (NEURONTIN) 600 MG tablet take 1 tablet by mouth three times a day, Normal      glucose blood (OneTouch Ultra) test strip Use 1 each daily Use as instructed, Starting Thu 10/7/2021, Normal      Insulin Pen Needle (BD Pen Needle Jasmine 2nd Gen) 32G X 4 MM MISC Inject under the skin in the morning, Starting Mon 9/19/2022, Normal      liraglutide (Victoza) injection Inject 0 3 mL (1 8 mg total) under the skin daily, Starting Wed 11/9/2022, Until Tue 2/7/2023, Normal      loperamide (IMODIUM) 2 mg capsule Take 1 capsule (2 mg total) by mouth 4 (four) times a day as needed for diarrhea, Starting Mon 1/30/2023, Normal      pantoprazole (PROTONIX) 40 mg tablet take 1 tablet by mouth daily if needed for GERD SYMPTOMS, Normal      rosuvastatin (CRESTOR) 40 MG tablet Take 1 tablet (40 mg total) by mouth daily, Starting Wed 8/10/2022, Normal      tiotropium (Spiriva Respimat) 2 5 MCG/ACT AERS inhaler Inhale 2 puffs daily, Starting Wed 9/22/2021, Normal      vilazodone (VIIBRYD) 40 mg tablet Take 40 mg by mouth daily  , Historical Med      VRAYLAR 6 MG capsule Take 6 mg by mouth daily, Starting Thu 5/10/2018, Historical Med       !! - Potential duplicate medications found  Please discuss with provider                PDMP Review     None          ED Provider  Electronically Signed by           Cleveland Grimm RAHEL  02/14/23 1220

## 2023-02-14 NOTE — DISCHARGE INSTRUCTIONS
Take tylenol and lidocaine patches as needed for pain  Follow up with PCP in 1 week  Continue taking diabetic medications to manage blood sugar

## 2023-02-14 NOTE — Clinical Note
Joshua Ira was seen and treated in our emergency department on 2023  Diagnosis:     Fred Nicole  may return to work on return date  She may return on this date: 02/15/2023         If you have any questions or concerns, please don't hesitate to call        Luzmaria Lind PA-C    ______________________________           _______________          _______________  Hospital Representative                              Date                                Time

## 2023-02-14 NOTE — Clinical Note
Tara Sherman accompanied Natali Buchanan to the emergency department on 2/14/2023  Return date if applicable: 38/92/7870        If you have any questions or concerns, please don't hesitate to call        Manjula Farooq PA-C

## 2023-02-15 ENCOUNTER — OFFICE VISIT (OUTPATIENT)
Dept: FAMILY MEDICINE CLINIC | Facility: HOME HEALTHCARE | Age: 63
End: 2023-02-15

## 2023-02-15 ENCOUNTER — TELEPHONE (OUTPATIENT)
Dept: PHYSICAL THERAPY | Facility: OTHER | Age: 63
End: 2023-02-15

## 2023-02-15 VITALS
RESPIRATION RATE: 20 BRPM | WEIGHT: 220.2 LBS | HEART RATE: 100 BPM | BODY MASS INDEX: 35.39 KG/M2 | TEMPERATURE: 97.8 F | DIASTOLIC BLOOD PRESSURE: 74 MMHG | SYSTOLIC BLOOD PRESSURE: 130 MMHG | OXYGEN SATURATION: 94 % | HEIGHT: 66 IN

## 2023-02-15 DIAGNOSIS — Z86.718 HISTORY OF DVT (DEEP VEIN THROMBOSIS): ICD-10-CM

## 2023-02-15 DIAGNOSIS — Z95.828 PRESENCE OF IVC FILTER: ICD-10-CM

## 2023-02-15 DIAGNOSIS — E11.65 TYPE 2 DIABETES MELLITUS WITH HYPERGLYCEMIA, WITHOUT LONG-TERM CURRENT USE OF INSULIN (HCC): Primary | ICD-10-CM

## 2023-02-15 DIAGNOSIS — E78.00 HYPERCHOLESTEROLEMIA: ICD-10-CM

## 2023-02-15 DIAGNOSIS — I73.9 PAD (PERIPHERAL ARTERY DISEASE) (HCC): ICD-10-CM

## 2023-02-15 DIAGNOSIS — I10 PRIMARY HYPERTENSION: ICD-10-CM

## 2023-02-15 DIAGNOSIS — Z12.4 SCREENING FOR CERVICAL CANCER: ICD-10-CM

## 2023-02-15 DIAGNOSIS — N18.31 STAGE 3A CHRONIC KIDNEY DISEASE (HCC): ICD-10-CM

## 2023-02-15 PROBLEM — E86.9 VOLUME DEPLETION: Status: RESOLVED | Noted: 2022-07-20 | Resolved: 2023-02-15

## 2023-02-15 PROBLEM — E86.1 HYPOTENSION DUE TO HYPOVOLEMIA: Status: RESOLVED | Noted: 2022-07-20 | Resolved: 2023-02-15

## 2023-02-15 PROBLEM — R06.09 DOE (DYSPNEA ON EXERTION): Status: RESOLVED | Noted: 2021-07-23 | Resolved: 2023-02-15

## 2023-02-15 PROBLEM — R07.9 CHEST PAIN: Status: RESOLVED | Noted: 2021-10-12 | Resolved: 2023-02-15

## 2023-02-15 PROBLEM — N17.9 AKI (ACUTE KIDNEY INJURY) (HCC): Status: RESOLVED | Noted: 2020-07-10 | Resolved: 2023-02-15

## 2023-02-15 PROBLEM — Z86.711 HISTORY OF PULMONARY EMBOLISM: Status: ACTIVE | Noted: 2023-02-15

## 2023-02-15 PROBLEM — N18.2 STAGE 2 CHRONIC KIDNEY DISEASE: Status: RESOLVED | Noted: 2020-07-10 | Resolved: 2023-02-15

## 2023-02-15 PROBLEM — J96.01 ACUTE RESPIRATORY FAILURE WITH HYPOXIA (HCC): Status: RESOLVED | Noted: 2022-07-21 | Resolved: 2023-02-15

## 2023-02-15 PROBLEM — I95.89 HYPOTENSION DUE TO HYPOVOLEMIA: Status: RESOLVED | Noted: 2022-07-20 | Resolved: 2023-02-15

## 2023-02-15 PROBLEM — N63.0 BREAST NODULE: Status: RESOLVED | Noted: 2021-07-23 | Resolved: 2023-02-15

## 2023-02-15 PROBLEM — G47.33 OBSTRUCTIVE SLEEP APNEA SYNDROME: Status: RESOLVED | Noted: 2021-09-22 | Resolved: 2023-02-15

## 2023-02-15 PROBLEM — L08.9: Status: RESOLVED | Noted: 2020-08-08 | Resolved: 2023-02-15

## 2023-02-15 PROBLEM — F17.210 CONTINUOUS DEPENDENCE ON CIGARETTE SMOKING: Status: RESOLVED | Noted: 2021-06-23 | Resolved: 2023-02-15

## 2023-02-15 PROBLEM — J96.21 ACUTE ON CHRONIC RESPIRATORY FAILURE WITH HYPOXIA (HCC): Status: RESOLVED | Noted: 2023-01-28 | Resolved: 2023-02-15

## 2023-02-15 PROBLEM — S00.462A: Status: RESOLVED | Noted: 2020-08-08 | Resolved: 2023-02-15

## 2023-02-15 LAB — SL AMB POCT HEMOGLOBIN AIC: 12 (ref ?–6.5)

## 2023-02-15 RX ORDER — DULAGLUTIDE 0.75 MG/.5ML
0.75 INJECTION, SOLUTION SUBCUTANEOUS
Qty: 6 ML | Refills: 1 | Status: SHIPPED | OUTPATIENT
Start: 2023-02-15 | End: 2023-08-14

## 2023-02-15 NOTE — PROGRESS NOTES
Assessment/Plan:     Diagnoses and all orders for this visit:    Type 2 diabetes mellitus with hyperglycemia, without long-term current use of insulin ([place] [address] )  -     POCT hemoglobin A1c  -     Ambulatory Referral to Ophthalmology; Future  -     dulaglutide (Trulicity) 7 92 XP/0 1XZ injection; Inject 0 5 mL (0 75 mg total) under the skin every 7 days    Primary hypertension    Hypercholesterolemia    Stage 3a chronic kidney disease ([place] [address] )    History of DVT (deep vein thrombosis)  -     Ambulatory Referral to Vascular Surgery; Future    Presence of IVC filter  -     Ambulatory Referral to Vascular Surgery; Future    PAD (peripheral artery disease) ([place] [address] )  -     Ambulatory Referral to Vascular Surgery; Future    Screening for cervical cancer  -     Ambulatory Referral to Obstetrics / Gynecology; Future      - Blood sugar currently poorly controlled on Victoza  Will initiate trulicity with plan to titrate dose  Patient advised to contact the office with BS readings in 1 week  - Follow up with vascular to discuss the need to continue Eliquis    Return in about 3 months (around 5/15/2023) for Next scheduled follow up  Subjective:        Patient ID: Marisa Negron is a 58 y o  female  Chief Complaint   Patient presents with   • Transition of Care Management       Liz Ying is a 59-year-old female with history of hypertension, hyperlipidemia, CAD, PAD, history of DVT, stage III CKD, type 2 diabetes, GERD, COPD, tobacco use, breast cancer, and depression, presenting for follow-up  Hypertension is currently managed with carvedilol 3 125 mg twice daily  /74 today  Patient denies chest pain, palpitations, or lower extremity edema  Hyperlipidemia is managed with Crestor 40 mg daily  Lipid panel from 3/2022 with total cholesterol 267,   Repeat labs have been ordered but not yet completed  Patient has a history of DVT status post IVC filter  She has been on aspirin 81 mg daily    Lower extremity Doppler from 1/30/2023 revealed non-occlusive chronic deep vein thrombosis  She was started on Eliquis 5 mg twice daily at that time  Repeat Doppler 2/14/2023 did not reveal any evidence of acute or chronic DVT  Patient continues to endorse bilateral leg pain for which she is currently taking gabapentin 600 mg 3 times daily and is using lidocaine patches as needed  Type 2 diabetes is currently managed with Victoza  Patient reports home blood sugars in the 300s  A1c 11 7 in 11/2022, 12 0 today  She has previously been on metformin which was discontinued due to diarrhea  Had also been on Tradjenta in the past   [address] with [employer] for eye exams  GERD is well controlled on pantoprazole 40 mg daily  Colonoscopy 8/2022 with recommendation to repeat in 5 years  COPD is controlled with Spiriva daily and albuterol as needed  Continues to smoke 1 pack/day  Following with Dr Luz Maria Beltran for psychiatric management  Currently taking Cogentin, Klonopin, doxepin, Viibryd, and Vraylar        The following portions of the patient's history were reviewed and updated as appropriate: allergies, current medications, past family history, past medical history, past social history, past surgical history and problem list     Patient Active Problem List   Diagnosis   • Cataract   • Chronic hoarseness   • Chronic low back pain   • Centrilobular emphysema ([address] )   • Deep vein thrombophlebitis of leg, unspecified laterality ([address] )   • Depression   • Diabetes mellitus with neurological manifestation ([address] )   • Gastroesophageal reflux disease with esophagitis   • Headache   • Hypercholesterolemia   • Hypercoagulable state ([address] )   • Hypertension   • Migraine   • Myositis   • Neuropathy   • Pulmonary nodule seen on imaging study   • Type 2 diabetes mellitus with hyperglycemia, without long-term current use of insulin (HCC)   • Chronic hypoxemic respiratory failure (HCC)   • Class 2 severe obesity due to excess calories with serious comorbidity and body mass index (BMI) of 35 0 to 35 9 in adult [facility])   • Sleep apnea   • Family history of heart disease   • Pulmonary hypertension ([facility] )   • Primary fibromyalgia syndrome   • Primary cancer of upper outer quadrant of right breast ([facility] )   • Malignant neoplasm of upper-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, estrogen receptor positive ([facility] )   • Mucinous carcinoma of breast, right ([facility] )   • Tobacco abuse   • PAD (peripheral artery disease) (HCC)   • Tachycardia   • Stage 3a chronic kidney disease (HCC)   • Chronic obstructive pulmonary disease with acute exacerbation (HCC)   • Kidney lesion   • Nonocclusive coronary atherosclerosis of native coronary artery   • Kidney cysts   • Hyponatremia   • Diarrhea   • Presence of IVC filter   • History of pulmonary embolism       Current Outpatient Medications   Medication Sig Dispense Refill   • albuterol (Ventolin HFA) 90 mcg/act inhaler Inhale 2 puffs every 4 (four) hours as needed for wheezing 18 g 4   • amitriptyline (ELAVIL) 100 mg tablet take 1/2 tablet by mouth once daily at bedtime 45 tablet 1   • apixaban (Eliquis) 5 mg Take 1 tablet (5 mg total) by mouth 2 (two) times a day 60 tablet 0   • aspirin (ECOTRIN LOW STRENGTH) 81 mg EC tablet Take 1 tablet (81 mg total) by mouth daily 90 tablet 3   • benztropine (COGENTIN) 0 5 mg tablet Take 0 5 mg by mouth daily at bedtime       • Blood Glucose Monitoring Suppl (ONE TOUCH ULTRA 2) w/Device KIT by Does not apply route daily 100 each 0   • carvedilol (COREG) 3 125 mg tablet take 1 tablet by mouth twice a day with meals 60 tablet 3   • clonazePAM (KlonoPIN) 0 5 mg tablet Take 0 5 mg by mouth daily as needed       • doxepin (SINEquan) 100 mg capsule take 1 to 2 capsules by mouth at bedtime if needed     • dulaglutide (Trulicity) 7 22 NQ/4 7XA injection Inject 0 5 mL (0 75 mg total) under the skin every 7 days 6 mL 1   • gabapentin (NEURONTIN) 600 MG tablet take 1 tablet by mouth three times a day 270 tablet 1   • glucose blood (OneTouch Ultra) test strip Use 1 each daily Use as instructed 100 strip 2   • Insulin Pen Needle (BD Pen Needle Jasmine 2nd Gen) 32G X 4 MM MISC Inject under the skin in the morning 90 each 1   • lidocaine (Lidoderm) 5 % Apply 1 patch topically over 12 hours daily Remove & Discard patch within 12 hours or as directed by MD 15 patch 0   • loperamide (IMODIUM) 2 mg capsule Take 1 capsule (2 mg total) by mouth 4 (four) times a day as needed for diarrhea 30 capsule 0   • pantoprazole (PROTONIX) 40 mg tablet take 1 tablet by mouth daily if needed for GERD SYMPTOMS 30 tablet 1   • rosuvastatin (CRESTOR) 40 MG tablet Take 1 tablet (40 mg total) by mouth daily 90 tablet 3   • tiotropium (Spiriva Respimat) 2 5 MCG/ACT AERS inhaler Inhale 2 puffs daily 4 g 3   • vilazodone (VIIBRYD) 40 mg tablet Take 40 mg by mouth daily  • VRAYLAR 6 MG capsule Take 6 mg by mouth daily  0     No current facility-administered medications for this visit  Past Medical History:   Diagnosis Date   • Cancer [facility])     right breast   • Chronic back pain    • Colitis    • COPD (chronic obstructive pulmonary disease) ([facility])    • Depression    • Diabetes mellitus ([facility] )    • DVT of lower limb, acute ([facility] ) 2004   • GERD (gastroesophageal reflux disease)    • Hyperlipidemia    • Pneumonia    • Rapid heart rate    • Sleep apnea    • Stroke [facility])     4 mini strokes        Past Surgical History:   Procedure Laterality Date   • BREAST LUMPECTOMY Right    • CARDIAC CATHETERIZATION N/A 10/28/2021    Procedure: Cardiac Coronary Angiogram;  Surgeon: Lena Francis DO;  Location: [facility];   Service: Cardiology   • IVC FILTER INSERTION     • MASTECTOMY     • MASTECTOMY W/ SENTINEL NODE BIOPSY Right 11/09/2021    Procedure: BREAST MASTECTOMY WITH BIOPSY LYMPH NODE SENTINEL and axillary node dissection  (Dorris Lymph Node Injection @ 12:30);  Surgeon: Salazar Luciano MD;  Location: [facility] MAIN OR; Service: General   • US GUIDANCE BREAST BIOPSY RIGHT EACH ADDITIONAL Right 10/13/2021   • US GUIDANCE BREAST BIOPSY RIGHT EACH ADDITIONAL Right 10/13/2021   • US GUIDED BREAST BIOPSY RIGHT COMPLETE Right 10/13/2021        Social History     Socioeconomic History   • Marital status: Legally      Spouse name: Not on file   • Number of children: Not on file   • Years of education: Not on file   • Highest education level: Not on file   Occupational History   • Not on file   Tobacco Use   • Smoking status: Every Day     Packs/day: 1 00     Types: Cigarettes   • Smokeless tobacco: Never   Vaping Use   • Vaping Use: Never used   Substance and Sexual Activity   • Alcohol use: Never   • Drug use: Never   • Sexual activity: Not Currently   Other Topics Concern   • Not on file   Social History Narrative   • Not on file     Social Determinants of Health     Financial Resource Strain: Not on file   Food Insecurity: No Food Insecurity   • Worried About Running Out of Food in the Last Year: Never true   • Ran Out of Food in the Last Year: Never true   Transportation Needs: No Transportation Needs   • Lack of Transportation (Medical): No   • Lack of Transportation (Non-Medical): No   Physical Activity: Not on file   Stress: Not on file   Social Connections: Not on file   Intimate Partner Violence: Not on file   Housing Stability: Low Risk    • Unable to Pay for Housing in the Last Year: No   • Number of Places Lived in the Last Year: 1   • Unstable Housing in the Last Year: No        Review of Systems   Constitutional: Negative for chills, diaphoresis and fever  Respiratory: Negative for cough, chest tightness, shortness of breath and wheezing  Cardiovascular: Negative for chest pain, palpitations and leg swelling  Gastrointestinal: Negative for abdominal pain, constipation, diarrhea, nausea and vomiting  Genitourinary: Negative for difficulty urinating, dysuria, frequency, hematuria and urgency  Musculoskeletal: Positive for myalgias  Skin: Negative for rash and wound  Neurological: Negative for dizziness, syncope, weakness, light-headedness and headaches  Psychiatric/Behavioral: Negative for decreased concentration, dysphoric mood, self-injury, sleep disturbance and suicidal ideas  The patient is not nervous/anxious  Objective:      /74 (BP Location: Left arm, Patient Position: Sitting, Cuff Size: Standard)   Pulse 100   Temp 97 8 °F (36 6 °C) (Tympanic)   Resp 20   Ht 5' 6" (1 676 m)   Wt 99 9 kg (220 lb 3 2 oz)   SpO2 94%   BMI 35 54 kg/m²          Physical Exam  Vitals and nursing note reviewed  Constitutional:       General: She is not in acute distress  Appearance: Normal appearance  Comments: Ambulating with cane   HENT:      Head: Normocephalic and atraumatic  Eyes:      Extraocular Movements: Extraocular movements intact  Conjunctiva/sclera: Conjunctivae normal       Pupils: Pupils are equal, round, and reactive to light  Cardiovascular:      Rate and Rhythm: Normal rate and regular rhythm  Heart sounds: Normal heart sounds  No murmur heard  Pulmonary:      Effort: Pulmonary effort is normal  No respiratory distress  Breath sounds: Normal breath sounds  No wheezing  Musculoskeletal:      Cervical back: Normal range of motion and neck supple  Right lower leg: No edema  Left lower leg: No edema  Skin:     General: Skin is warm and dry  Neurological:      General: No focal deficit present  Mental Status: She is alert and oriented to person, place, and time  Cranial Nerves: No cranial nerve deficit  Motor: No weakness     Psychiatric:         Mood and Affect: Mood normal          Behavior: Behavior normal

## 2023-02-15 NOTE — TELEPHONE ENCOUNTER
Called placed to the patient per [facility] referral     Patient states she has NO BACK/NECK/SPINE pain and she mentioned to the ED Dr  Patient has B/L leg pain only and will see family physician today 02/15/23   [facility] referral closed per protocol

## 2023-02-16 ENCOUNTER — TELEPHONE (OUTPATIENT)
Dept: FAMILY MEDICINE CLINIC | Facility: HOME HEALTHCARE | Age: 63
End: 2023-02-16

## 2023-02-16 ENCOUNTER — TELEPHONE (OUTPATIENT)
Dept: ADMINISTRATIVE | Facility: OTHER | Age: 63
End: 2023-02-16

## 2023-02-16 ENCOUNTER — APPOINTMENT (EMERGENCY)
Dept: RADIOLOGY | Facility: HOSPITAL | Age: 63
End: 2023-02-16

## 2023-02-16 ENCOUNTER — HOSPITAL ENCOUNTER (EMERGENCY)
Facility: HOSPITAL | Age: 63
Discharge: HOME | End: 2023-02-16
Attending: EMERGENCY MEDICINE

## 2023-02-16 VITALS
RESPIRATION RATE: 22 BRPM | OXYGEN SATURATION: 93 % | SYSTOLIC BLOOD PRESSURE: 143 MMHG | HEIGHT: 66 IN | BODY MASS INDEX: 33.75 KG/M2 | WEIGHT: 210 LBS | TEMPERATURE: 97.1 F | DIASTOLIC BLOOD PRESSURE: 76 MMHG | HEART RATE: 103 BPM

## 2023-02-16 DIAGNOSIS — E11.9 TYPE 2 DIABETES MELLITUS WITHOUT COMPLICATION, UNSPECIFIED WHETHER LONG TERM INSULIN USE (HCC): ICD-10-CM

## 2023-02-16 DIAGNOSIS — R73.9 HYPERGLYCEMIA: Primary | ICD-10-CM

## 2023-02-16 LAB
ALBUMIN SERPL BCP-MCNC: 3.9 G/DL (ref 3.5–5)
ALP SERPL-CCNC: 89 U/L (ref 34–104)
ALT SERPL W P-5'-P-CCNC: 26 U/L (ref 7–52)
ANION GAP SERPL CALCULATED.3IONS-SCNC: 9 MMOL/L (ref 4–13)
AST SERPL W P-5'-P-CCNC: 17 U/L (ref 13–39)
BACTERIA UR QL AUTO: ABNORMAL /HPF
BASE EX.OXY STD BLDV CALC-SCNC: 84.7 % (ref 60–80)
BASE EXCESS BLDV CALC-SCNC: 2.4 MMOL/L
BASOPHILS # BLD AUTO: 0 THOUSANDS/ÂΜL (ref 0–0.1)
BASOPHILS NFR BLD AUTO: 0 % (ref 0–1)
BETA-HYDROXYBUTYRATE: 0.1 MMOL/L
BILIRUB SERPL-MCNC: 0.32 MG/DL (ref 0.2–1)
BILIRUB UR QL STRIP: NEGATIVE
BNP SERPL-MCNC: 24 PG/ML (ref 0–100)
BUN SERPL-MCNC: 16 MG/DL (ref 5–25)
CALCIUM SERPL-MCNC: 9.7 MG/DL (ref 8.4–10.2)
CHLORIDE SERPL-SCNC: 91 MMOL/L (ref 96–108)
CLARITY UR: ABNORMAL
CO2 SERPL-SCNC: 28 MMOL/L (ref 21–32)
COLOR UR: ABNORMAL
CREAT SERPL-MCNC: 1.24 MG/DL (ref 0.6–1.3)
EOSINOPHIL # BLD AUTO: 0.01 THOUSAND/ÂΜL (ref 0–0.61)
EOSINOPHIL NFR BLD AUTO: 0 % (ref 0–6)
ERYTHROCYTE [DISTWIDTH] IN BLOOD BY AUTOMATED COUNT: 13.6 % (ref 11.6–15.1)
GFR SERPL CREATININE-BSD FRML MDRD: 46 ML/MIN/1.73SQ M
GLUCOSE SERPL-MCNC: 488 MG/DL (ref 65–140)
GLUCOSE SERPL-MCNC: 518 MG/DL (ref 65–140)
GLUCOSE UR STRIP-MCNC: ABNORMAL MG/DL
HCO3 BLDV-SCNC: 26.1 MMOL/L (ref 24–30)
HCT VFR BLD AUTO: 36.8 % (ref 34.8–46.1)
HGB BLD-MCNC: 12.5 G/DL (ref 11.5–15.4)
HGB UR QL STRIP.AUTO: ABNORMAL
IMM GRANULOCYTES # BLD AUTO: 0.03 THOUSAND/UL (ref 0–0.2)
IMM GRANULOCYTES NFR BLD AUTO: 1 % (ref 0–2)
KETONES UR STRIP-MCNC: NEGATIVE MG/DL
LACTATE SERPL-SCNC: 2 MMOL/L (ref 0.5–2)
LEUKOCYTE ESTERASE UR QL STRIP: ABNORMAL
LYMPHOCYTES # BLD AUTO: 1.56 THOUSANDS/ÂΜL (ref 0.6–4.47)
LYMPHOCYTES NFR BLD AUTO: 27 % (ref 14–44)
MCH RBC QN AUTO: 30.8 PG (ref 26.8–34.3)
MCHC RBC AUTO-ENTMCNC: 34 G/DL (ref 31.4–37.4)
MCV RBC AUTO: 91 FL (ref 82–98)
MONOCYTES # BLD AUTO: 0.41 THOUSAND/ÂΜL (ref 0.17–1.22)
MONOCYTES NFR BLD AUTO: 7 % (ref 4–12)
MUCOUS THREADS UR QL AUTO: ABNORMAL
NEUTROPHILS # BLD AUTO: 3.77 THOUSANDS/ÂΜL (ref 1.85–7.62)
NEUTS SEG NFR BLD AUTO: 65 % (ref 43–75)
NITRITE UR QL STRIP: NEGATIVE
NON-SQ EPI CELLS URNS QL MICRO: ABNORMAL /HPF
NRBC BLD AUTO-RTO: 0 /100 WBCS
O2 CT BLDV-SCNC: 16.1 ML/DL
OTHER STN SPEC: ABNORMAL
PCO2 BLDV: 37.4 MM HG (ref 42–50)
PH BLDV: 7.46 [PH] (ref 7.3–7.4)
PH UR STRIP.AUTO: 6.5 [PH]
PLATELET # BLD AUTO: 158 THOUSANDS/UL (ref 149–390)
PMV BLD AUTO: 9.5 FL (ref 8.9–12.7)
PO2 BLDV: 100.1 MM HG (ref 35–45)
POTASSIUM SERPL-SCNC: 4.4 MMOL/L (ref 3.5–5.3)
PROT SERPL-MCNC: 6.7 G/DL (ref 6.4–8.4)
PROT UR STRIP-MCNC: NEGATIVE MG/DL
RBC # BLD AUTO: 4.06 MILLION/UL (ref 3.81–5.12)
RBC #/AREA URNS AUTO: ABNORMAL /HPF
SODIUM SERPL-SCNC: 128 MMOL/L (ref 135–147)
SP GR UR STRIP.AUTO: <=1.005 (ref 1–1.03)
UROBILINOGEN UR QL STRIP.AUTO: 0.2 E.U./DL
WBC # BLD AUTO: 5.78 THOUSAND/UL (ref 4.31–10.16)
WBC #/AREA URNS AUTO: ABNORMAL /HPF

## 2023-02-16 NOTE — LETTER
Diabetic Eye Exam Form    Date Requested: 23  Patient: Destiny Jiménez  Patient : 1960   Referring Provider: Catalina Wayne PA-C      DIABETIC Eye Exam Date _______________________________      Type of Exam MUST be documented for Diabetic Eye Exams  Please CHECK ONE  Retinal Exam       Dilated Retinal Exam       OCT       Optomap-Iris Exam      Fundus Photography       Left Eye - Please check Retinopathy or No Retinopathy        Exam did show retinopathy    Exam did not show retinopathy       Right Eye - Please check Retinopathy or No Retinopathy       Exam did show retinopathy    Exam did not show retinopathy       Comments __________________________________________________________    Practice Providing Exam ______________________________________________    Exam Performed By (print name) _______________________________________      Provider Signature ___________________________________________________      These reports are needed for  compliance  Please fax this completed form and a copy of the Diabetic Eye Exam report to our office located at [place] [address] as soon as possible via Fax [phone] mary ann Suh: Phone [phone]  We thank you for your assistance in treating our mutual patient

## 2023-02-16 NOTE — DISCHARGE INSTRUCTIONS
- take your Trulicity starting today   - Follow up with PCP in one week  - Return to ED with worsening symptoms

## 2023-02-16 NOTE — TELEPHONE ENCOUNTER
----- Message from Emelia Mujica PA-C sent at 2/15/2023  2:48 PM EST -----  Regarding: Care Gap Request  02/15/23 2:48 PM    Hello, our patient Marika Gallagher has had Diabetic Eye Exam completed/performed  Please assist in updating the patient chart by making an External outreach to [facility] located in [place]  The date of service is 2022      Thank you,  Emelia Mujica PA-C  [address]

## 2023-02-16 NOTE — ED ATTENDING ATTESTATION
2/16/2023    IJim DO, saw and evaluated the patient  I have discussed the patient with Dr Jimy Millan and agree with her findings, Plan of Care, and MDM as documented in [HIS/HER] note, except where noted  All available labs and Radiology studies were reviewed  I was present for key portions of any procedure(s) performed by Dr Jimy Millan, and I was immediately available to provide assistance  At this point I agree with the current assessment done in the Emergency Department  I have conducted an independent evaluation of this patient  The history and physical is as follows:    66-year-old woman history of non-insulin-dependent diabetes presenting to the emergency department after blood glucose monitor at home demonstrated a result over 600 this morning  She does not take insulin and in fact at this point only takes Victoza for control of blood sugar  Until the latter half of last year, she had been taking metformin  This medication was discontinued due to diarrhea  In review of hemoglobin A1c's collected over the past year, patient had a marked increase in A1c's within the past 6 months with values increasing from the 6-7 range into the 11-13 range  She was seen yesterday by her family doctor and was prescribed Trulicity which he was not able to  yesterday  She intends to start the medication today  She came to the emergency part specifically because of the hyperglycemia  She has noted some urinary frequency over the past week without any dysuria or urgency  She denies any chest pain/wheeze/lightheadedness/palpitations/nausea/vomiting  No fevers over the past week  She actually felt reasonably well over the past week  ROS as per HPI; otherwise negative  General: Awake/alert/no distress  Vital signs and nursing notes reviewed    HEENT:  Normocephalic/atraumatic  External ear/hearing wnl bilaterally  Neck:  Phonation normal with no stridor/dysphonia    Normal active ROM   No palpable masses or thyromegaly  No overlying skin changes  Cardiac:  Radial pulses 2+ bilaterally  DP/PT pulses 2+ bilaterally  Tachycardia with regular rhythm with s1/s2; no m/r/g  Pulmonary:   No respiratory distress  No accessory muscle use  Scattered rhonchi and wheezes are present in all lung fields with no crackles specifically    Abdomen:  Obese; minimally distended  Nontender  No palpable masses  No guarding/rebound ttp  Skin:  Warm/dry  No diaphoresis  No visible rashes or skin changes  Neuro:  GCS 15  PERRLA; EOMI  Facial expressions symmetric  Tongue/uvula midline  Shoulder shrug equal bilaterally  Strength 5/5 in UE/LE bilaterally  Intact sensation in UE/LE bilaterally  ED course: Patient presenting with hyperglycemia with type 2 diabetes raising concern for DKA or HHS  Also concern for any significant electrolyte derangements secondary to patient's hyperglycemia and prolonged hyperglycemic diuresis  Labs were checked in the setting which did not really demonstrate any endorgan dysfunction apart from pseudohyponatremia secondary to hyperglycemia that did correct when accounting for the degree of hyperglycemia  Chest x-ray was checked as well given the wheezing/rhonchi noted on exam but did not demonstrate any focal abnormalities  Patient is a long-term smoker and she does have a diagnosis of a bronchospastic lung disease which likely accounts for these findings  After all results were obtained, they were discussed with patient at some length  We considered the option of glucose lowering therapy such as a short acting insulin being administered in the emergency department for which patient would need some period of observation versus discharge home with her initiating the big toes as she had planned to do anyway  She much preferred the latter    Given that she has had significantly elevated A1c's for several months, with average blood glucose in the 270-340 mg/dl range according to the A1c values, I do not see any indication for emergent blood glucose lowering therapy at this point  She should see her primary physician after starting the Victoza to assess its effect  This plan was discussed with the patient at length and she was in agreement there with  ED Course  ED Course as of 02/16/23 1618   u Feb 16, 2023   1108 ECG sinus tachycardia 109 bpm   QRS 76 QTc 449  No acute ST/T changes  No Q waves  Normal axis  No ectopy  Interpreted by me   1112 Fingerstick Glucose (POCT)(!)   1218 Blood gas, venous(!)  Respiratory alkalosis with hyperoxia  1227 Beta Hydroxybutyrate  Normal   1228 CBC and differential  WBC wnl  Hg/Hct wnl  Plt wnl   1250 UA w Reflex to Microscopic w Reflex to Culture(!)  Dilute sample with trace leukocytes and significant leukocytosis   1250 Comprehensive metabolic panel(!!)  Pseudohyponatremia in setting of hyperglycemia   1250 Lactic acid, plasma  Normal   1250 B-Type Natriuretic Peptide(BNP)  Normal    1252 XR chest 1 view portable  Airway is midline  Lungs are clear bilaterally with no evidence of pulmonary vascular congestion/focal infiltrate/pleural effusion/pneumothorax  Cardiac and mediastinal silhouettes are within normal limits   Osseous structures appear normal            Critical Care Time  Procedures

## 2023-02-16 NOTE — ED RE-EVALUATION NOTE
Re-evaluated patient  Lying comfortably in bed  No change in exam findings  Discussed lab results with her  Elevated Bl  Glucose level, at 518  Rest of the lab work normal  CXR did not show any evidence of pathology  Discussed regarding management options to stay in the ED and receive rapid/short acting insulin  Recheck later  Or she can go home and start Trulicity immediately  Patient would like to go home and start Trulicity       Bobby Lira MD  02/16/23 5076

## 2023-02-16 NOTE — ED PROVIDER NOTES
History  Chief Complaint   Patient presents with   • Hyperglycemia - Symptomatic     Patient is a known diabetic and says her blood sugar this AM was over 600; states she took her insulin around 0500; c/o weakness, lightheadedness     Patient is 59 yo F with Type II DM presented to the ED after elevated high blood glucose reading this morning  Patient mentions that she checks fasting blood glucose everyday  Noticing high blood glucose levels since last 2 days, 400s  But this morning it went up to 600 associated with shakiness, dry mouth, generalized weakness and excessive thirst    Patient states that she is on Victoza (liraglutide) from past 2 years  She was on metformin at one point, discontinued because of the diarrhea  Currently not on insulin  Also mentions that she was seen by a new family doctor this week and was told she need second medication  She is still need to  the new medication from pharmacy  Patient denies fever, chills, pain, chest tightness, wheezing, abdominal pain, change in bowel and bladder movements  Prior to Admission Medications   Prescriptions Last Dose Informant Patient Reported? Taking?    Blood Glucose Monitoring Suppl (ONE TOUCH ULTRA 2) w/Device KIT   No No   Sig: by Does not apply route daily   Insulin Pen Needle (BD Pen Needle Jasmine 2nd Gen) 32G X 4 MM MISC   No No   Sig: Inject under the skin in the morning   VRAYLAR 6 MG capsule   Yes No   Sig: Take 6 mg by mouth daily   albuterol (Ventolin HFA) 90 mcg/act inhaler   No No   Sig: Inhale 2 puffs every 4 (four) hours as needed for wheezing   amitriptyline (ELAVIL) 100 mg tablet   No No   Sig: take 1/2 tablet by mouth once daily at bedtime   apixaban (Eliquis) 5 mg   No No   Sig: Take 1 tablet (5 mg total) by mouth 2 (two) times a day   aspirin (ECOTRIN LOW STRENGTH) 81 mg EC tablet   No No   Sig: Take 1 tablet (81 mg total) by mouth daily   benztropine (COGENTIN) 0 5 mg tablet   Yes No   Sig: Take 0 5 mg by mouth daily at bedtime     carvedilol (COREG) 3 125 mg tablet   No No   Sig: take 1 tablet by mouth twice a day with meals   clonazePAM (KlonoPIN) 0 5 mg tablet   Yes No   Sig: Take 0 5 mg by mouth daily as needed     doxepin (SINEquan) 100 mg capsule   Yes No   Sig: take 1 to 2 capsules by mouth at bedtime if needed   dulaglutide (Trulicity) 6 80 CO/1 0AW injection   No No   Sig: Inject 0 5 mL (0 75 mg total) under the skin every 7 days   gabapentin (NEURONTIN) 600 MG tablet   No No   Sig: take 1 tablet by mouth three times a day   glucose blood (OneTouch Ultra) test strip   No No   Sig: Use 1 each daily Use as instructed   lidocaine (Lidoderm) 5 %   No No   Sig: Apply 1 patch topically over 12 hours daily Remove & Discard patch within 12 hours or as directed by MD   loperamide (IMODIUM) 2 mg capsule   No No   Sig: Take 1 capsule (2 mg total) by mouth 4 (four) times a day as needed for diarrhea   pantoprazole (PROTONIX) 40 mg tablet   No No   Sig: take 1 tablet by mouth daily if needed for GERD SYMPTOMS   rosuvastatin (CRESTOR) 40 MG tablet   No No   Sig: Take 1 tablet (40 mg total) by mouth daily   tiotropium (Spiriva Respimat) 2 5 MCG/ACT AERS inhaler   No No   Sig: Inhale 2 puffs daily   vilazodone (VIIBRYD) 40 mg tablet   Yes No   Sig: Take 40 mg by mouth daily        Facility-Administered Medications: None       Past Medical History:   Diagnosis Date   • Cancer [facility])     right breast   • Chronic back pain    • Colitis    • COPD (chronic obstructive pulmonary disease) (HCC)    • Depression    • Diabetes mellitus ([facility] )    • DVT of lower limb, acute ([facility] ) 2004   • GERD (gastroesophageal reflux disease)    • Hyperlipidemia    • Pneumonia    • Rapid heart rate    • Sleep apnea    • Stroke [facility])     4 mini strokes       Past Surgical History:   Procedure Laterality Date   • BREAST LUMPECTOMY Right    • CARDIAC CATHETERIZATION N/A 10/28/2021    Procedure: Cardiac Coronary Angiogram;  Surgeon: Jacquelin Olmos DO; Location: [facility]; Service: Cardiology   • IVC FILTER INSERTION     • MASTECTOMY     • MASTECTOMY W/ SENTINEL NODE BIOPSY Right 11/09/2021    Procedure: BREAST MASTECTOMY WITH BIOPSY LYMPH NODE SENTINEL and axillary node dissection  ([place] Lymph Node Injection @ 12:30);  Surgeon: Micheline Gibson MD;  Location: [facility] MAIN OR;  Service: General   • US GUIDANCE BREAST BIOPSY RIGHT EACH ADDITIONAL Right 10/13/2021   • US GUIDANCE BREAST BIOPSY RIGHT EACH ADDITIONAL Right 10/13/2021   • US GUIDED BREAST BIOPSY RIGHT COMPLETE Right 10/13/2021       Family History   Problem Relation Age of Onset   • Breast cancer Mother 67   • COPD Mother    • Coronary artery disease Mother    • Coronary artery disease Father    • Stroke Father    • Lung cancer Sister    • No Known Problems Sister    • No Known Problems Sister    • Breast cancer Maternal Grandmother    • Colon cancer Paternal Grandfather    • No Known Problems Maternal Aunt    • No Known Problems Maternal Aunt    • No Known Problems Maternal Aunt    • No Known Problems Paternal Aunt    • Breast cancer Cousin      I have reviewed and agree with the history as documented  E-Cigarette/Vaping   • E-Cigarette Use Never User      E-Cigarette/Vaping Substances   • Nicotine No    • THC No    • CBD No    • Flavoring No    • Other No    • Unknown No      Social History     Tobacco Use   • Smoking status: Every Day     Packs/day: 1 00     Types: Cigarettes   • Smokeless tobacco: Never   Vaping Use   • Vaping Use: Never used   Substance Use Topics   • Alcohol use: Never   • Drug use: Never        Review of Systems   Constitutional: Positive for activity change and fatigue  Negative for fever  HENT: Negative for congestion and voice change  Eyes: Negative for visual disturbance  Respiratory: Negative for cough, chest tightness, shortness of breath and wheezing  Cardiovascular: Negative for chest pain and leg swelling     Gastrointestinal: Negative for abdominal pain, diarrhea, nausea and vomiting  Endocrine: Positive for polydipsia  Genitourinary: Negative for difficulty urinating  Neurological: Negative for syncope and headaches  Psychiatric/Behavioral: Negative for agitation  Physical Exam  ED Triage Vitals [02/16/23 1058]   Temperature Pulse Respirations Blood Pressure SpO2   (!) 97 1 °F (36 2 °C) (!) 116 16 130/69 93 %      Temp Source Heart Rate Source Patient Position - Orthostatic VS BP Location FiO2 (%)   Temporal Monitor -- -- --      Pain Score       --             Orthostatic Vital Signs  Vitals:    02/16/23 1058 02/16/23 1215 02/16/23 1230 02/16/23 1300   BP: 130/69 136/76 132/76 143/76   Pulse: (!) 116 (!) 109 (!) 106 103       Physical Exam  Vitals and nursing note reviewed  Constitutional:       General: She is not in acute distress  Appearance: She is obese  She is not ill-appearing  HENT:      Head: Normocephalic and atraumatic  Right Ear: External ear normal       Left Ear: External ear normal       Nose: Nose normal       Mouth/Throat:      Mouth: Mucous membranes are dry  Eyes:      General: No scleral icterus  Extraocular Movements: Extraocular movements intact  Conjunctiva/sclera: Conjunctivae normal    Cardiovascular:      Rate and Rhythm: Regular rhythm  Tachycardia present  Pulses: Normal pulses  Heart sounds: Normal heart sounds  Pulmonary:      Effort: Pulmonary effort is normal  No respiratory distress  Breath sounds: Wheezing and rales ( at base bilaterally) present  Abdominal:      General: Bowel sounds are normal       Palpations: Abdomen is soft  Tenderness: There is no abdominal tenderness  Musculoskeletal:      Right lower leg: No edema  Left lower leg: No edema  Skin:     General: Skin is warm  Capillary Refill: Capillary refill takes less than 2 seconds  Findings: No rash  Neurological:      General: No focal deficit present        Mental Status: She is alert and oriented to person, place, and time           ED Medications  Medications - No data to display    Diagnostic Studies  Results Reviewed     Procedure Component Value Units Date/Time    Urine Microscopic [[id number]]  (Abnormal) Collected: 02/16/23 1215    Lab Status: Final result Specimen: Urine, Clean Catch Updated: 02/16/23 1250     RBC, UA 1-2 /hpf      WBC, UA 1-2 /hpf      Epithelial Cells Occasional /hpf      Bacteria, UA Occasional /hpf      OTHER OBSERVATIONS Yeast Cells Present     MUCUS THREADS Moderate    Comprehensive metabolic panel [[id number]]  (Abnormal) Collected: 02/16/23 1203    Lab Status: Final result Specimen: Blood from Arm, Left Updated: 02/16/23 1241     Sodium 128 mmol/L      Potassium 4 4 mmol/L      Chloride 91 mmol/L      CO2 28 mmol/L      ANION GAP 9 mmol/L      BUN 16 mg/dL      Creatinine 1 24 mg/dL      Glucose 518 mg/dL      Calcium 9 7 mg/dL      AST 17 U/L      ALT 26 U/L      Alkaline Phosphatase 89 U/L      Total Protein 6 7 g/dL      Albumin 3 9 g/dL      Total Bilirubin 0 32 mg/dL      eGFR 46 ml/min/1 73sq m     Narrative:      Gia guidelines for Chronic Kidney Disease (CKD):   •  Stage 1 with normal or high GFR (GFR > 90 mL/min/1 73 square meters)  •  Stage 2 Mild CKD (GFR = 60-89 mL/min/1 73 square meters)  •  Stage 3A Moderate CKD (GFR = 45-59 mL/min/1 73 square meters)  •  Stage 3B Moderate CKD (GFR = 30-44 mL/min/1 73 square meters)  •  Stage 4 Severe CKD (GFR = 15-29 mL/min/1 73 square meters)  •  Stage 5 End Stage CKD (GFR <15 mL/min/1 73 square meters)  Note: GFR calculation is accurate only with a steady state creatinine    UA w Reflex to Microscopic w Reflex to Culture [[id number]]  (Abnormal) Collected: 02/16/23 1215    Lab Status: Final result Specimen: Urine, Clean Catch Updated: 02/16/23 1239     Color, UA Light Yellow     Clarity, UA Cloudy     Specific Gravity, UA <=1 005     pH, UA 6 5     Leukocytes, UA Trace Nitrite, UA Negative     Protein, UA Negative mg/dl      Glucose, UA >=1000 (1%) mg/dl      Ketones, UA Negative mg/dl      Urobilinogen, UA 0 2 E U /dl      Bilirubin, UA Negative     Occult Blood, UA Trace-lysed    B-Type Natriuretic Peptide(BNP) [[id number]]  (Normal) Collected: 02/16/23 1203    Lab Status: Final result Specimen: Blood from Arm, Left Updated: 02/16/23 1236     BNP 24 pg/mL     Lactic acid, plasma [[id number]]  (Normal) Collected: 02/16/23 1203    Lab Status: Final result Specimen: Blood from Arm, Left Updated: 02/16/23 1229     LACTIC ACID 2 0 mmol/L     Narrative:      Result may be elevated if tourniquet was used during collection      Beta Hydroxybutyrate [[id number]]  (Normal) Collected: 02/16/23 1203    Lab Status: Final result Specimen: Blood from Arm, Left Updated: 02/16/23 1221     BETA-HYDROXYBUTYRATE 0 1 mmol/L     Blood gas, venous [[id number]]  (Abnormal) Collected: 02/16/23 1203    Lab Status: Final result Specimen: Blood from Arm, Left Updated: 02/16/23 1215     pH, Morris 7 461     pCO2, Morris 37 4 mm Hg      pO2, Morris 100 1 mm Hg      HCO3, Morris 26 1 mmol/L      Base Excess, Morris 2 4 mmol/L      O2 Content, Morris 16 1 ml/dL      O2 HGB, VENOUS 84 7 %     CBC and differential [[id number]] Collected: 02/16/23 1203    Lab Status: Final result Specimen: Blood from Arm, Left Updated: 02/16/23 1213     WBC 5 78 Thousand/uL      RBC 4 06 Million/uL      Hemoglobin 12 5 g/dL      Hematocrit 36 8 %      MCV 91 fL      MCH 30 8 pg      MCHC 34 0 g/dL      RDW 13 6 %      MPV 9 5 fL      Platelets 363 Thousands/uL      nRBC 0 /100 WBCs      Neutrophils Relative 65 %      Immat GRANS % 1 %      Lymphocytes Relative 27 %      Monocytes Relative 7 %      Eosinophils Relative 0 %      Basophils Relative 0 %      Neutrophils Absolute 3 77 Thousands/µL      Immature Grans Absolute 0 03 Thousand/uL      Lymphocytes Absolute 1 56 Thousands/µL      Monocytes Absolute 0 41 Thousand/µL      Eosinophils Absolute 0 01 Thousand/µL      Basophils Absolute 0 00 Thousands/µL     Fingerstick Glucose (POCT) [[id number]]  (Abnormal) Collected: 02/16/23 1111    Lab Status: Final result Updated: 02/16/23 1112     POC Glucose 488 mg/dl                  XR chest 1 view portable    (Results Pending)         Procedures  Procedures      ED Course       Elevated Blood glucose level  On POCT glucose at 488mg/dl  CMP- elevated Glucose at   B-OH butyric acid - [id number]/dl  Lactic acid not elevated  BNP normal  VBG with respiratory alkalosis with hyperoxia    Component      Latest Ref Rng & Units 2/16/2023   Ventricular Rate         Atrial Rate         ME Interval      ms 144   QRSD Interval      ms 76   QT Interval      ms 334   QTC Interval      ms 449   P Axis      degrees 59   QRS Axis      degrees 36   T Wave Axis      degrees 43       Patient remained stable through out the ED visit  Discussed labs with patient  Medical Decision Making  Hyperglycemia: acute illness or injury  Type 2 diabetes mellitus without complication, unspecified whether long term insulin use ([facility] ): chronic illness or injury  Amount and/or Complexity of Data Reviewed  External Data Reviewed: labs and notes  Labs: ordered  Decision-making details documented in ED Course  Radiology: ordered  Decision-making details documented in ED Course  ECG/medicine tests: ordered  Decision-making details documented in ED Course              Disposition  Final diagnoses:   Hyperglycemia   Type 2 diabetes mellitus without complication, unspecified whether long term insulin use ([facility] [address] )     Time reflects when diagnosis was documented in both MDM as applicable and the Disposition within this note     Time User Action Codes Description Comment    2/16/2023  1:07 PM Jennifer Sky [R73 9] Hyperglycemia     2/16/2023  1:07 PM Kim Sky [E11 [id number]] Type 2 diabetes mellitus without complication, unspecified whether long term insulin use [facility])       ED Disposition     ED Disposition   Discharge    Condition   Stable    Date/Time   Thu Feb 16, 2023  1:07 PM    [address] discharge to home/self care                 Follow-up Information     Follow up With Specialties Details Why Naheed El PA-C Family Medicine Schedule an appointment as soon as possible for a visit  If symptoms worsen [address] [place] [address]  [phone]            Discharge Medication List as of 2/16/2023  1:12 PM      CONTINUE these medications which have NOT CHANGED    Details   albuterol (Ventolin HFA) 90 mcg/act inhaler Inhale 2 puffs every 4 (four) hours as needed for wheezing, Starting Tue 5/4/2021, Normal      amitriptyline (ELAVIL) 100 mg tablet take 1/2 tablet by mouth once daily at bedtime, Normal      apixaban (Eliquis) 5 mg Take 1 tablet (5 mg total) by mouth 2 (two) times a day, Starting Mon 1/30/2023, Normal      aspirin (ECOTRIN LOW STRENGTH) 81 mg EC tablet Take 1 tablet (81 mg total) by mouth daily, Starting Tue 10/12/2021, No Print      benztropine (COGENTIN) 0 5 mg tablet Take 0 5 mg by mouth daily at bedtime  , Historical Med      Blood Glucose Monitoring Suppl (ONE TOUCH ULTRA 2) w/Device KIT by Does not apply route daily, Starting Thu 6/18/2020, Normal      carvedilol (COREG) 3 125 mg tablet take 1 tablet by mouth twice a day with meals, Normal      clonazePAM (KlonoPIN) 0 5 mg tablet Take 0 5 mg by mouth daily as needed  , Starting Wed 8/18/2021, Historical Med      doxepin (SINEquan) 100 mg capsule take 1 to 2 capsules by mouth at bedtime if needed, Historical Med      dulaglutide (Trulicity) 2 13 YQ/0 3ZX injection Inject 0 5 mL (0 75 mg total) under the skin every 7 days, Starting Wed 2/15/2023, Until Mon 8/14/2023, Normal      gabapentin (NEURONTIN) 600 MG tablet take 1 tablet by mouth three times a day, Normal      glucose blood (OneTouch Ultra) test strip Use 1 each daily Use as instructed, Starting Thu 10/7/2021, Normal Insulin Pen Needle (BD Pen Needle Jasmine 2nd Gen) 32G X 4 MM MISC Inject under the skin in the morning, Starting Mon 9/19/2022, Normal      lidocaine (Lidoderm) 5 % Apply 1 patch topically over 12 hours daily Remove & Discard patch within 12 hours or as directed by MD, Starting Tue 2/14/2023, Normal      loperamide (IMODIUM) 2 mg capsule Take 1 capsule (2 mg total) by mouth 4 (four) times a day as needed for diarrhea, Starting Mon 1/30/2023, Normal      pantoprazole (PROTONIX) 40 mg tablet take 1 tablet by mouth daily if needed for GERD SYMPTOMS, Normal      rosuvastatin (CRESTOR) 40 MG tablet Take 1 tablet (40 mg total) by mouth daily, Starting Wed 8/10/2022, Normal      tiotropium (Spiriva Respimat) 2 5 MCG/ACT AERS inhaler Inhale 2 puffs daily, Starting Wed 9/22/2021, Normal      vilazodone (VIIBRYD) 40 mg tablet Take 40 mg by mouth daily  , Historical Med      VRAYLAR 6 MG capsule Take 6 mg by mouth daily, Starting Thu 5/10/2018, Historical Med           No discharge procedures on file  PDMP Review     None           ED Provider  Attending physically available and evaluated Gerard Coleman  I managed the patient along with the ED Attending      Electronically Signed by         Jane Pugh MD  02/16/23 7505

## 2023-02-16 NOTE — TELEPHONE ENCOUNTER
Upon review of the In Basket request and the patient's chart, initial outreach has been made via fax to facility  Please see Contacts section for details       Thank you  James Badillo

## 2023-02-17 LAB
ATRIAL RATE: 109 BPM
P AXIS: 59 DEGREES
PR INTERVAL: 144 MS
QRS AXIS: 36 DEGREES
QRSD INTERVAL: 76 MS
QT INTERVAL: 334 MS
QTC INTERVAL: 449 MS
T WAVE AXIS: 43 DEGREES
VENTRICULAR RATE: 109 BPM

## 2023-02-17 NOTE — TELEPHONE ENCOUNTER
Upon review of the In Basket request we were able to locate, review, and update the patient chart as requested for Diabetic Eye Exam     Any additional questions or concerns should be emailed to the Practice Liaisons via the appropriate education email address, please do not reply via In Basket      Thank you  Balbir Brittons

## 2023-02-20 ENCOUNTER — OFFICE VISIT (OUTPATIENT)
Dept: FAMILY MEDICINE CLINIC | Facility: HOME HEALTHCARE | Age: 63
End: 2023-02-20

## 2023-02-20 ENCOUNTER — PATIENT OUTREACH (OUTPATIENT)
Dept: FAMILY MEDICINE CLINIC | Facility: HOME HEALTHCARE | Age: 63
End: 2023-02-20

## 2023-02-20 VITALS
OXYGEN SATURATION: 93 % | DIASTOLIC BLOOD PRESSURE: 70 MMHG | HEART RATE: 117 BPM | RESPIRATION RATE: 18 BRPM | HEIGHT: 66 IN | BODY MASS INDEX: 35.68 KG/M2 | TEMPERATURE: 98.7 F | SYSTOLIC BLOOD PRESSURE: 152 MMHG | WEIGHT: 222 LBS

## 2023-02-20 DIAGNOSIS — E11.65 TYPE 2 DIABETES MELLITUS WITH HYPERGLYCEMIA, WITHOUT LONG-TERM CURRENT USE OF INSULIN (HCC): ICD-10-CM

## 2023-02-20 RX ORDER — PEN NEEDLE, DIABETIC 32GX 5/32"
NEEDLE, DISPOSABLE MISCELLANEOUS DAILY
Qty: 90 EACH | Refills: 1 | Status: SHIPPED | OUTPATIENT
Start: 2023-02-20

## 2023-02-20 RX ORDER — BLOOD SUGAR DIAGNOSTIC
STRIP MISCELLANEOUS
Qty: 100 STRIP | Refills: 2 | Status: SHIPPED | OUTPATIENT
Start: 2023-02-20

## 2023-02-20 RX ORDER — INSULIN GLARGINE 100 [IU]/ML
10 INJECTION, SOLUTION SUBCUTANEOUS
Qty: 9 ML | Refills: 1 | Status: SHIPPED | OUTPATIENT
Start: 2023-02-20 | End: 2023-08-19

## 2023-02-20 NOTE — PROGRESS NOTES
OutPatient Care Management Complex Care Plan Note:    Message received from [address] me that patient left a vmm regarding high BS  Chart review completed  Patient seen at [facility] ED on 2/16/24 for same  It did not appear any medication changes were made  Phone call made to patient  Patient reports her BS this morning was over 500  Patient took liraglutide and reports checking her BS 30 minutes later and it was "High" over 600  Patient also reports she has an appointment with PCP today  Medication review completed with patient  Liraglutide (Victoza) was not on current medication list      I offered to discuss diabetic nutrition with patient, or to request referral to Diabetes Nutrition Educator  Patient declined stating "I know what I'm supposed to eat and not eat and I watch what I eat "    Patient agreeable that I contact her tomorrow for follow up after today's PCP visit  I provided patient with my contact information  Message sent to PCP for case communication

## 2023-02-20 NOTE — PROGRESS NOTES
Assessment/Plan:     Diagnoses and all orders for this visit:    Type 2 diabetes mellitus with hyperglycemia, without long-term current use of insulin (HCC)  -     Insulin Pen Needle (BD Pen Needle Jasmine 2nd Gen) 32G X 4 MM MISC; Inject under the skin in the morning  -     glucose blood (OneTouch Ultra) test strip; Use to test blood sugar daily  -     Insulin Glargine Solostar (Lantus SoloStar) 100 UNIT/ML SOPN; Inject 0 1 mL (10 Units total) under the skin daily at bedtime      - Continue trulicity 9 71 weekly x 3 weeks, then will increase to 1 5 weekly  Will add Lantus 10 units qhs  Patient will contact the office with BS readings later this week  Return in about 3 months (around 5/20/2023) for Next scheduled follow up  Subjective:        Patient ID: Natasha Triplett is a 58 y o  female  Chief Complaint   Patient presents with   • Elevated Blood Sugars       Soco Martin is a 20-year-old female with history of hypertension, hyperlipidemia, CAD, PAD, history of DVT, stage III CKD, type 2 diabetes, GERD, COPD, tobacco use, breast cancer, and depression, presenting for follow-up  Type 2 diabetes is currently managed with Trulicity 1 42 mg weekly, switched from Victoza at last visit due to elevated BS readings  Patient reports home blood continues to be elevated in the 400-600 range   Was evaluated in the ED 2/16 with a glucose of 518, no medication changes were made at that time  Most recent A1c was 12 0  She has previously been on metformin which was discontinued due to diarrhea  Had also been on Tradjenta in the past   Dolly Quezada with [facility] for eye exams        The following portions of the patient's history were reviewed and updated as appropriate: allergies, current medications, past family history, past medical history, past social history, past surgical history and problem list     Patient Active Problem List   Diagnosis   • Cataract   • Chronic hoarseness   • Chronic low back pain • Centrilobular emphysema (HCC)   • Deep vein thrombophlebitis of leg, unspecified laterality (HCC)   • Depression   • Diabetes mellitus with neurological manifestation (HCC)   • Gastroesophageal reflux disease with esophagitis   • Headache   • Hypercholesterolemia   • Hypercoagulable state ([facility] )   • Hypertension   • Migraine   • Myositis   • Neuropathy   • Pulmonary nodule seen on imaging study   • Type 2 diabetes mellitus with hyperglycemia, without long-term current use of insulin (HCC)   • Chronic hypoxemic respiratory failure (HCC)   • Class 2 severe obesity due to excess calories with serious comorbidity and body mass index (BMI) of 35 0 to 35 9 in adult [facility])   • Sleep apnea   • Family history of heart disease   • Pulmonary hypertension ([facility] )   • Primary fibromyalgia syndrome   • Primary cancer of upper outer quadrant of right breast ([facility] )   • Malignant neoplasm of upper-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, estrogen receptor positive ([place] [address] )   • Mucinous carcinoma of breast, right ([place] [address] )   • Tobacco abuse   • PAD (peripheral artery disease) ([place] [address] )   • Tachycardia   • Stage 3a chronic kidney disease (HCC)   • Chronic obstructive pulmonary disease with acute exacerbation ([facility])   • Kidney lesion   • Nonocclusive coronary atherosclerosis of native coronary artery   • Kidney cysts   • Hyponatremia   • Diarrhea   • Presence of IVC filter   • History of pulmonary embolism       Current Outpatient Medications   Medication Sig Dispense Refill   • albuterol (Ventolin HFA) 90 mcg/act inhaler Inhale 2 puffs every 4 (four) hours as needed for wheezing 18 g 4   • amitriptyline (ELAVIL) 100 mg tablet take 1/2 tablet by mouth once daily at bedtime 45 tablet 1   • apixaban (Eliquis) 5 mg Take 1 tablet (5 mg total) by mouth 2 (two) times a day 60 tablet 0   • aspirin (ECOTRIN LOW STRENGTH) 81 mg EC tablet Take 1 tablet (81 mg total) by mouth daily 90 tablet 3   • benztropine (COGENTIN) 0 5 mg tablet Take 0 5 mg by mouth daily at bedtime       • Blood Glucose Monitoring Suppl (ONE TOUCH ULTRA 2) w/Device KIT by Does not apply route daily 100 each 0   • carvedilol (COREG) 3 125 mg tablet take 1 tablet by mouth twice a day with meals 60 tablet 3   • clonazePAM (KlonoPIN) 0 5 mg tablet Take 0 5 mg by mouth daily as needed       • doxepin (SINEquan) 100 mg capsule take 1 to 2 capsules by mouth at bedtime if needed     • dulaglutide (Trulicity) 9 75 IB/3 8LG injection Inject 0 5 mL (0 75 mg total) under the skin every 7 days 6 mL 1   • gabapentin (NEURONTIN) 600 MG tablet take 1 tablet by mouth three times a day 270 tablet 1   • glucose blood (OneTouch Ultra) test strip Use to test blood sugar daily 100 strip 2   • Insulin Glargine Solostar (Lantus SoloStar) 100 UNIT/ML SOPN Inject 0 1 mL (10 Units total) under the skin daily at bedtime 9 mL 1   • Insulin Pen Needle (BD Pen Needle Jasmine 2nd Gen) 32G X 4 MM MISC Inject under the skin in the morning 90 each 1   • loperamide (IMODIUM) 2 mg capsule Take 1 capsule (2 mg total) by mouth 4 (four) times a day as needed for diarrhea 30 capsule 0   • pantoprazole (PROTONIX) 40 mg tablet take 1 tablet by mouth daily if needed for GERD SYMPTOMS 30 tablet 1   • rosuvastatin (CRESTOR) 40 MG tablet Take 1 tablet (40 mg total) by mouth daily 90 tablet 3   • tiotropium (Spiriva Respimat) 2 5 MCG/ACT AERS inhaler Inhale 2 puffs daily 4 g 3   • vilazodone (VIIBRYD) 40 mg tablet Take 40 mg by mouth daily  • VRAYLAR 6 MG capsule Take 6 mg by mouth daily  0   • lidocaine (Lidoderm) 5 % Apply 1 patch topically over 12 hours daily Remove & Discard patch within 12 hours or as directed by MD (Patient not taking: Reported on 2/20/2023) 15 patch 0     No current facility-administered medications for this visit          Past Medical History:   Diagnosis Date   • Cancer [facility])     right breast   • Chronic back pain    • Colitis    • COPD (chronic obstructive pulmonary disease) (HCC)    • Depression    • Diabetes mellitus ([facility] )    • DVT of lower limb, acute ([facility] ) 2004   • GERD (gastroesophageal reflux disease)    • Hyperlipidemia    • Pneumonia    • Rapid heart rate    • Sleep apnea    • Stroke [facility])     4 mini strokes        Past Surgical History:   Procedure Laterality Date   • BREAST LUMPECTOMY Right    • CARDIAC CATHETERIZATION N/A 10/28/2021    Procedure: Cardiac Coronary Angiogram;  Surgeon: Rachna Bruno DO;  Location: [facility] CARDI[facility]; Service: Cardiology   • IVC FILTER INSERTION     • MASTECTOMY     • MASTECTOMY W/ SENTINEL NODE BIOPSY Right 11/09/2021    Procedure: BREAST MASTECTOMY WITH BIOPSY LYMPH NODE SENTINEL and axillary node dissection  ([place] Lymph Node Injection @ 12:30);  Surgeon: Gilda Eid MD;  Location: [facility] MAIN OR;  Service: General   • US GUIDANCE BREAST BIOPSY RIGHT EACH ADDITIONAL Right 10/13/2021   • US GUIDANCE BREAST BIOPSY RIGHT EACH ADDITIONAL Right 10/13/2021   • US GUIDED BREAST BIOPSY RIGHT COMPLETE Right 10/13/2021        Social History     Socioeconomic History   • Marital status: Legally      Spouse name: Not on file   • Number of children: Not on file   • Years of education: Not on file   • Highest education level: Not on file   Occupational History   • Not on file   Tobacco Use   • Smoking status: Every Day     Packs/day: 1 00     Types: Cigarettes   • Smokeless tobacco: Never   Vaping Use   • Vaping Use: Never used   Substance and Sexual Activity   • Alcohol use: Never   • Drug use: Never   • Sexual activity: Not Currently   Other Topics Concern   • Not on file   Social History Narrative   • Not on file     Social Determinants of Health     Financial Resource Strain: Not on file   Food Insecurity: No Food Insecurity   • Worried About Running Out of Food in the Last Year: Never true   • Ran Out of Food in the Last Year: Never true   Transportation Needs: No Transportation Needs   • Lack of Transportation (Medical):  No   • Lack of Transportation (Non-Medical): No   Physical Activity: Not on file   Stress: Not on file   Social Connections: Not on file   Intimate Partner Violence: Not on file   Housing Stability: Low Risk    • Unable to Pay for Housing in the Last Year: No   • Number of Places Lived in the Last Year: 1   • Unstable Housing in the Last Year: No        Review of Systems   Constitutional: Negative for chills, diaphoresis and fever  Respiratory: Negative for cough, chest tightness, shortness of breath and wheezing  Cardiovascular: Negative for chest pain, palpitations and leg swelling  Gastrointestinal: Negative for abdominal pain, constipation, diarrhea, nausea and vomiting  Genitourinary: Negative for difficulty urinating, dysuria, frequency, hematuria and urgency  Skin: Negative for rash and wound  Neurological: Negative for dizziness, syncope, weakness, light-headedness and headaches  Psychiatric/Behavioral: Negative for decreased concentration, dysphoric mood, self-injury, sleep disturbance and suicidal ideas  The patient is not nervous/anxious  Objective:      /70 (BP Location: Left arm, Patient Position: Sitting, Cuff Size: Standard)   Pulse (!) 117   Temp 98 7 °F (37 1 °C) (Tympanic)   Resp 18   Ht 5' 6" (1 676 m)   Wt 101 kg (222 lb) Comment: - 4lbs for her fake boob  SpO2 93%   BMI 35 83 kg/m²          Physical Exam  Vitals and nursing note reviewed  Constitutional:       General: She is not in acute distress  Appearance: Normal appearance  She is obese  HENT:      Head: Normocephalic and atraumatic  Eyes:      Extraocular Movements: Extraocular movements intact  Conjunctiva/sclera: Conjunctivae normal       Pupils: Pupils are equal, round, and reactive to light  Cardiovascular:      Rate and Rhythm: Normal rate  Pulmonary:      Effort: Pulmonary effort is normal  No respiratory distress  Musculoskeletal:      Cervical back: Normal range of motion and neck supple     Skin: General: Skin is warm and dry  Neurological:      General: No focal deficit present  Mental Status: She is alert and oriented to person, place, and time  Cranial Nerves: No cranial nerve deficit  Motor: No weakness     Psychiatric:         Mood and Affect: Mood normal          Behavior: Behavior normal

## 2023-02-24 ENCOUNTER — PATIENT OUTREACH (OUTPATIENT)
Dept: FAMILY MEDICINE CLINIC | Facility: HOME HEALTHCARE | Age: 63
End: 2023-02-24

## 2023-02-24 DIAGNOSIS — E11.65 TYPE 2 DIABETES MELLITUS WITH HYPERGLYCEMIA, WITHOUT LONG-TERM CURRENT USE OF INSULIN (HCC): Primary | ICD-10-CM

## 2023-02-24 NOTE — PROGRESS NOTES
OutPatient Care Management Complex Care Plan Note:  Patient kept f/u appointment wit PCP on 2/20/23  Per Office visit note:   Diabetes Treatment plan:   - Insulin Pen Needle (BD Pen Needle Jasmine 2nd Gen) 32G X 4 MM MISC; Inject under the skin in the morning  -     glucose blood (OneTouch Ultra) test strip;       Use to test blood sugar daily  -     Insulin Glargine Solostar (Lantus SoloStar) 100 UNIT/ML SOPN; Inject 0 1 mL (10 Units total) under the skin daily at bedtime        - Continue trulicity 5 28 weekly x 3 weeks, then         will increase to 1 5 weekly  -  Lantus (insulin glargine) 10 units qhs           - Patient will contact the office with BS readings           later this week  Telephone outreach made to for follow up  Spoke with patient  She verbalized understanding of above plan via verbal teachback  Patient states she is checking her BS in AM and at [facility] and her BS have "come down into the 400's"  Patient states he BS this AM was 479  Patient is happy that BS is coming down but acknowledges that normal BS should be "around 130"  Patient reports she is drinking a lot of water  Patient is unsure if she is following a diabetic diet  Patient is open to receiving further education on diabetes management  Interventions:  Education sent to patient's home as per patient request:  1  Manage your BS   2   BS diary  3  What you need to know - Diabetes  4  When to get help - diabetes  5  Healthy Eating    Will plan to follow up with patient to evaluate understanding and answer any questions  Instructed patient to call PCP's office and give provider recent BS readings  Explained to patient that provider may wish to make further medication changes based on her high readings  Inbasket message sent to PCP requesting referral to Diabetes dietary education and with case communication  Patient agreeable to follow up in 3 weeks

## 2023-02-27 ENCOUNTER — TELEPHONE (OUTPATIENT)
Dept: FAMILY MEDICINE CLINIC | Facility: HOME HEALTHCARE | Age: 63
End: 2023-02-27

## 2023-02-27 NOTE — TELEPHONE ENCOUNTER
Spoke to patient regarding her BS, reports fasting qam in the 300s, up to 500s throughout the day  Will increase Lantus to 15 units qhs  She will call me later this week with readings

## 2023-02-27 NOTE — TELEPHONE ENCOUNTER
Patient called saying the nightime dose of diabetic medication you have her on makes her feel so out of it because when she tests her sugars in the morning they go from 358 to 552   Suggestions

## 2023-03-01 ENCOUNTER — PATIENT OUTREACH (OUTPATIENT)
Dept: FAMILY MEDICINE CLINIC | Facility: HOME HEALTHCARE | Age: 63
End: 2023-03-01

## 2023-03-01 ENCOUNTER — TELEPHONE (OUTPATIENT)
Dept: FAMILY MEDICINE CLINIC | Facility: HOME HEALTHCARE | Age: 63
End: 2023-03-01

## 2023-03-01 DIAGNOSIS — E11.65 TYPE 2 DIABETES MELLITUS WITH HYPERGLYCEMIA, WITHOUT LONG-TERM CURRENT USE OF INSULIN (HCC): Primary | ICD-10-CM

## 2023-03-01 RX ORDER — LANCETS 33 GAUGE
EACH MISCELLANEOUS
Qty: 300 EACH | Refills: 3 | Status: SHIPPED | OUTPATIENT
Start: 2023-03-01

## 2023-03-01 NOTE — PROGRESS NOTES
OutPatient Care Management Note:  Message received from [facility] that patient had left her 2 voice mail messages overnight  Telephone call made to patient  Patient is reporting that her BS have been running high again in the 400 - 600 range  Patient confirms she increased her Lantus dose to 15 U at HS as instructed by PCP  Patient reporting following BS:  02/28/23 3:40 AM -  328  0228/23 9AM (before breakfast) -  486  03/01/23 2 AM -  236  03/01/23 7 AM -  600  Patient also reports she then took another dose of 15 U of Lantus at 7AM     Patient wishes to follow up with endocrinology  Phone number to diabetes center in [facility] provided to patient  Referral from 11/2022 is in patient chart  Patient states she will call this AM to schedule an appointment  Patient states her sister will provide transporation  Inbasket message sent to PCP

## 2023-03-01 NOTE — TELEPHONE ENCOUNTER
" I have been sick and not feeling well  My sugar has brittany over 600 since [place] changed my medications  I have been calling and no one has called back

## 2023-03-01 NOTE — TELEPHONE ENCOUNTER
CALLED PT 2 TIMES TODAY AND LMOM FOR PT TO CALL BACK REGARDING TO HELP HER SCHEDULE HER AN APPT WITH ENDOCRINOLOGY   WILL SEND LETTER OUT TO PT

## 2023-03-07 ENCOUNTER — CONSULT (OUTPATIENT)
Dept: ENDOCRINOLOGY | Facility: CLINIC | Age: 63
End: 2023-03-07

## 2023-03-07 VITALS
BODY MASS INDEX: 36.16 KG/M2 | SYSTOLIC BLOOD PRESSURE: 136 MMHG | DIASTOLIC BLOOD PRESSURE: 70 MMHG | HEART RATE: 108 BPM | WEIGHT: 225 LBS | OXYGEN SATURATION: 94 % | HEIGHT: 66 IN | TEMPERATURE: 97.5 F | RESPIRATION RATE: 20 BRPM

## 2023-03-07 DIAGNOSIS — I10 PRIMARY HYPERTENSION: ICD-10-CM

## 2023-03-07 DIAGNOSIS — G89.29 OTHER CHRONIC PAIN: ICD-10-CM

## 2023-03-07 DIAGNOSIS — E55.9 VITAMIN D DEFICIENCY: ICD-10-CM

## 2023-03-07 DIAGNOSIS — E11.65 TYPE 2 DIABETES MELLITUS WITH HYPERGLYCEMIA, WITHOUT LONG-TERM CURRENT USE OF INSULIN (HCC): Primary | ICD-10-CM

## 2023-03-07 DIAGNOSIS — E66.01 CLASS 2 SEVERE OBESITY WITH SERIOUS COMORBIDITY AND BODY MASS INDEX (BMI) OF 35.0 TO 35.9 IN ADULT, UNSPECIFIED OBESITY TYPE (HCC): ICD-10-CM

## 2023-03-07 DIAGNOSIS — E78.2 MIXED HYPERLIPIDEMIA: ICD-10-CM

## 2023-03-07 RX ORDER — GABAPENTIN 600 MG/1
TABLET ORAL
Qty: 270 TABLET | Refills: 1 | Status: SHIPPED | OUTPATIENT
Start: 2023-03-07

## 2023-03-07 RX ORDER — INSULIN GLARGINE 100 [IU]/ML
10 INJECTION, SOLUTION SUBCUTANEOUS 2 TIMES DAILY
Qty: 18 ML | Refills: 1 | Status: SHIPPED | OUTPATIENT
Start: 2023-03-07 | End: 2023-09-03

## 2023-03-07 NOTE — PROGRESS NOTES
New Patient Progress Note      Cc: diabetes    Referring Provider  Roberto Jimenez 137  [place],  [address] [place] [address] Drive, [address]     History of Present Illness:   Carmela Cazares is a 58 y o  female with a history of type 2 diabetes who was referred by her PCP for uncontrolled diabetes  She states that she was taking Metformin in the past however she has had couple of times ED visits or admissions due to hyperglycemia and the most recent one was on February 16th,  She had hyperglycemia of 518 mg/dl, DKA was ruled out  Reports complications of TIA and neuropathy  Denies recent illness or hospitalizations  Denies recent severe hypoglycemic or severe hyperglycemic episodes  Denies any issues with her current regimen  home glucose monitoring: are performed regularly      Current regimen:   Lantus 15 units twice a day   Trulucity 0 75 mg once weekly which she has taken 3 doses  She could not tolerate metformin due to diarrhea      Home blood glucose readings:   Before breakfast: 120 - 140  Before lunch: x  Before dinner: x  Bedtime: x    Hypoglycemic episodes:   H/o of hypoglycemia causing hospitalization or Intervention such as glucagon injection  or ambulance call : no  Hypoglycemia symptoms:none  Treatment of hypoglycemia:none      Medic alert tag: recommended:    Diabetes education: NO   Diet: 2 meals per day, 1-2  snacks per day  Breakfast: egg, toast, cereal,   Lunch: soup, pasta, at 2 pm    Physically active: No              Opthamology:UTD, last visit was in September 2022,   Podiatry: no      Has hypertension: followed by PCP; on Coreg   Has hyperlipidemia: followed by PCP; on Crestor 40 mg - tolerating well, no myalgias  Thyroid disorders: no  History of pancreatitis: no, no family or personal history of MTC or MEN      Patient Active Problem List   Diagnosis   • Cataract   • Chronic hoarseness   • Chronic low back pain   • Centrilobular emphysema (HCC)   • Deep vein thrombophlebitis of leg, unspecified laterality ([place] [address] )   • Depression   • Diabetes mellitus with neurological manifestation ([place] [address] )   • Gastroesophageal reflux disease with esophagitis   • Headache   • Hypercholesterolemia   • Hypercoagulable state ([place] [address] )   • Hypertension   • Migraine   • Myositis   • Neuropathy   • Pulmonary nodule seen on imaging study   • Type 2 diabetes mellitus with hyperglycemia, without long-term current use of insulin ([facility])   • Chronic hypoxemic respiratory failure ([facility])   • Class 2 severe obesity due to excess calories with serious comorbidity and body mass index (BMI) of 35 0 to 35 9 in [facility])   • Sleep apnea   • Family history of heart disease   • Pulmonary hypertension ([place] [address] )   • Primary fibromyalgia syndrome   • Primary cancer of upper outer quadrant of right breast ([place] [address] )   • Malignant neoplasm of upper-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, estrogen receptor positive ([place] [address] )   • Mucinous carcinoma of breast, right ([place] [address] )   • Tobacco abuse   • PAD (peripheral artery disease) ([facility])   • Tachycardia   • Stage 3a chronic kidney disease ([place] [address] )   • Chronic obstructive pulmonary disease with acute exacerbation ([facility])   • Kidney lesion   • Nonocclusive coronary atherosclerosis of native coronary artery   • Kidney cysts   • Hyponatremia   • Diarrhea   • Presence of IVC filter   • History of pulmonary embolism      Past Medical History:   Diagnosis Date   • Cancer ([place] [address] )     right breast   • Chronic back pain    • Colitis    • COPD (chronic obstructive pulmonary disease) ([facility])    • Depression    • Diabetes mellitus ([place] [address] )    • DVT of lower limb, acute ([place] [address] ) 2004   • GERD (gastroesophageal reflux disease)    • Hyperlipidemia    • Pneumonia    • Rapid heart rate    • Sleep apnea    • Stroke ([facility])     4 mini strokes      Past Surgical History:   Procedure Laterality Date   • BREAST LUMPECTOMY Right    • CARDIAC CATHETERIZATION N/A 10/28/2021    Procedure: Cardiac Coronary Angiogram;  Surgeon: Froylan Gutierres DO; Location: [facility];   Service: Cardiology   • IVC FILTER INSERTION     • MASTECTOMY     • MASTECTOMY W/ SENTINEL NODE BIOPSY Right 11/09/2021    Procedure: BREAST MASTECTOMY WITH BIOPSY LYMPH NODE SENTINEL and axillary node dissection  (Switzer Lymph Node Injection @ 12:30);  Surgeon: Chidi Pham MD;  Location: [facility] MAIN OR;  Service: General   • US GUIDANCE BREAST BIOPSY RIGHT EACH ADDITIONAL Right 10/13/2021   • US GUIDANCE BREAST BIOPSY RIGHT EACH ADDITIONAL Right 10/13/2021   • US GUIDED BREAST BIOPSY RIGHT COMPLETE Right 10/13/2021      Family History   Problem Relation Age of Onset   • Breast cancer Mother 67   • COPD Mother    • Coronary artery disease Mother    • Coronary artery disease Father    • Stroke Father    • Lung cancer Sister    • No Known Problems Sister    • No Known Problems Sister    • Breast cancer Maternal Grandmother    • Colon cancer Paternal Grandfather    • No Known Problems Maternal Aunt    • No Known Problems Maternal Aunt    • No Known Problems Maternal Aunt    • No Known Problems Paternal Aunt    • Breast cancer Cousin      Social History     Tobacco Use   • Smoking status: Every Day     Packs/day: 1 00     Types: Cigarettes   • Smokeless tobacco: Never   Substance Use Topics   • Alcohol use: Never     Allergies   Allergen Reactions   • Amoxicillin-Pot Clavulanate GI Intolerance   • Anastrozole Other (See Comments)     Diarrhea, Nausea, Stomach pain          Current Outpatient Medications:   •  albuterol (Ventolin HFA) 90 mcg/act inhaler, Inhale 2 puffs every 4 (four) hours as needed for wheezing, Disp: 18 g, Rfl: 4  •  amitriptyline (ELAVIL) 100 mg tablet, take 1/2 tablet by mouth once daily at bedtime, Disp: 45 tablet, Rfl: 1  •  apixaban (Eliquis) 5 mg, Take 1 tablet (5 mg total) by mouth 2 (two) times a day, Disp: 60 tablet, Rfl: 0  •  aspirin (ECOTRIN LOW STRENGTH) 81 mg EC tablet, Take 1 tablet (81 mg total) by mouth daily, Disp: 90 tablet, Rfl: 3  •  benztropine (COGENTIN) 0 5 mg tablet, Take 0 5 mg by mouth daily at bedtime  , Disp: , Rfl:   •  Blood Glucose Monitoring Suppl (ONE TOUCH ULTRA 2) w/Device KIT, by Does not apply route daily, Disp: 100 each, Rfl: 0  •  clonazePAM (KlonoPIN) 0 5 mg tablet, Take 0 5 mg by mouth daily as needed  , Disp: , Rfl:   •  doxepin (SINEquan) 100 mg capsule, take 1 to 2 capsules by mouth at bedtime if needed, Disp: , Rfl:   •  dulaglutide (Trulicity) 5 94 AK/6 4NP injection, Inject 0 5 mL (0 75 mg total) under the skin every 7 days, Disp: 6 mL, Rfl: 1  •  gabapentin (NEURONTIN) 600 MG tablet, take 1 tablet by mouth three times a day, Disp: 270 tablet, Rfl: 1  •  glucose blood (OneTouch Ultra) test strip, Use to test blood sugar daily, Disp: 100 strip, Rfl: 2  •  Insulin Glargine Solostar (Lantus SoloStar) 100 UNIT/ML SOPN, Inject 0 1 mL (10 Units total) under the skin daily at bedtime, Disp: 9 mL, Rfl: 1  •  Insulin Pen Needle (BD Pen Needle Jasmine 2nd Gen) 32G X 4 MM MISC, Inject under the skin in the morning, Disp: 90 each, Rfl: 1  •  loperamide (IMODIUM) 2 mg capsule, Take 1 capsule (2 mg total) by mouth 4 (four) times a day as needed for diarrhea, Disp: 30 capsule, Rfl: 0  •  OneTouch Delica Lancets 40Q MISC, Check blood sugars three times daily  Please substitute with appropriate alternative as covered by patient's insurance  Dx: E11 65, Disp: 300 each, Rfl: 3  •  pantoprazole (PROTONIX) 40 mg tablet, take 1 tablet by mouth daily if needed for GERD SYMPTOMS, Disp: 30 tablet, Rfl: 1  •  rosuvastatin (CRESTOR) 40 MG tablet, Take 1 tablet (40 mg total) by mouth daily, Disp: 90 tablet, Rfl: 3  •  tiotropium (Spiriva Respimat) 2 5 MCG/ACT AERS inhaler, Inhale 2 puffs daily, Disp: 4 g, Rfl: 3  •  vilazodone (VIIBRYD) 40 mg tablet, Take 40 mg by mouth daily  , Disp: , Rfl:   •  VRAYLAR 6 MG capsule, Take 6 mg by mouth daily, Disp: , Rfl: 0  •  carvedilol (COREG) 3 125 mg tablet, take 1 tablet by mouth twice a day with meals, Disp: 60 tablet, Rfl: 3  •  lidocaine (Lidoderm) 5 %, Apply 1 patch topically over 12 hours daily Remove & Discard patch within 12 hours or as directed by MD (Patient not taking: Reported on 2/20/2023), Disp: 15 patch, Rfl: 0  Review of Systems   Constitutional: Positive for appetite change, fatigue and unexpected weight change  Negative for activity change, chills, diaphoresis and fever  HENT: Negative for congestion, drooling, ear discharge, ear pain, trouble swallowing and voice change  Eyes: Negative for photophobia, pain, discharge, redness, itching and visual disturbance  Respiratory: Negative for cough, chest tightness, shortness of breath and wheezing  Cardiovascular: Negative for chest pain, palpitations and leg swelling  Gastrointestinal: Negative for abdominal distention, abdominal pain, blood in stool, diarrhea, nausea and vomiting  Endocrine: Positive for polydipsia  Negative for cold intolerance, heat intolerance, polyphagia and polyuria  Genitourinary: Negative for dysuria, flank pain, frequency and hematuria  Musculoskeletal: Negative for back pain, gait problem, joint swelling, myalgias, neck pain and neck stiffness  Skin: Negative for color change, pallor, rash and wound  Neurological: Negative for dizziness, tremors, seizures, syncope, speech difficulty, weakness, numbness and headaches  Psychiatric/Behavioral: Positive for sleep disturbance  Negative for agitation  All other systems reviewed and are negative  Physical Exam:  Body mass index is 36 32 kg/m²    /70   Pulse (!) 108   Temp 97 5 °F (36 4 °C)   Resp 20   Ht 5' 6" (1 676 m)   Wt 102 kg (225 lb)   SpO2 94%   BMI 36 32 kg/m²    Wt Readings from Last 3 Encounters:   03/07/23 102 kg (225 lb)   02/20/23 101 kg (222 lb)   02/16/23 95 3 kg (210 lb)       GEN: NAD  E/n/m nl facies, hearing intact bilat,   Eyes: no stare or proptosis, nl lids and conjunctiva, EOMI  Neck: trachea midline, thyroid NT to palpation, nl in size, no nodules or neck masses noted, no cervical LAD  CV; heart reg rate s1s2 nl, tachycardia, no ALESSANDRO  Resp: CTAB, good effort  Ab+BS  Neuro: no tremor, 2+ DTRs in BUE  MS: no c/c in digits, moves all 4 ext, nl muscle bulk, gait nl  Skin: warm and dry, no palmar erythema   Psych: nl mood and affect, no gross lapses in memory  Patient's shoes and socks removed  Right Foot/Ankle   Right Foot Inspection  Skin Exam: skin normal, dry skin, callus and callus  Skin not intact, no warmth, no erythema, no maceration, no abnormal color, no pre-ulcer and no ulcer  Toe Exam: No swelling, no tenderness, erythema and  no right toe deformity    Sensory   Vibration: intact  Proprioception: intact  Monofilament testing: intact    Vascular  Capillary refills: < 3 seconds  The right DP pulse is 2+  The right PT pulse is 2+  Left Foot/Ankle  Left Foot Inspection  Skin Exam: skin normal, dry skin and callus  Skin not intact, no warmth, no erythema, no maceration, normal color, no pre-ulcer and no ulcer  Toe Exam: No swelling, no tenderness, no erythema and no left toe deformity  Sensory   Vibration: intact  Proprioception: intact  Monofilament testing: intact    Vascular  Capillary refills: < 3 seconds  The left DP pulse is 2+  The left PT pulse is 2+       Assign Risk Category  No deformity present  No loss of protective sensation  No weak pulses  Risk: 0      Labs:   No components found for: HA1C  No components found for: GLU    Lab Results   Component Value Date    CREATININE 1 24 02/16/2023    CREATININE 1 24 02/14/2023    CREATININE 1 55 (H) 02/07/2023    BUN 16 02/16/2023     01/08/2016    K 4 4 02/16/2023    CL 91 (L) 02/16/2023    CO2 28 02/16/2023     eGFR   Date Value Ref Range Status   02/16/2023 46 ml/min/1 73sq m Final     No components found for: [facility]    Lab Results   Component Value Date    CHOL 234 09/07/2015    HDL 28 (L) 03/29/2022    TRIG 351 (H) 03/29/2022       Lab Results   Component Value Date    ALT 26 02/16/2023    AST 17 02/16/2023    ALKPHOS 89 02/16/2023    BILITOT 0 53 11/30/2015       Lab Results   Component Value Date    FREET4 1 03 04/27/2020      Latest Reference Range & Units Most Recent 08/03/22 14:04 11/08/22 14:49 11/08/22 15:12 02/15/23 15:02   Hemoglobin A1C 6 5  12 0 ! 2/15/23 15:02 7 1 ! 13 0 ! 11 7 (H) 12 0 !   !: Data is abnormal  (H): Data is abnormally high    Impression:  1  Type 2 diabetes mellitus with hyperglycemia, without long-term current use of insulin ([facility] [address] )    2  Mixed hyperlipidemia    3  Class 2 severe obesity with serious comorbidity and body mass index (BMI) of 35 0 to 35 9 in adult, unspecified obesity type ([address] )    4  Vitamin D deficiency    5  Primary hypertension           Plan:    Cari Benson was seen today for diabetes type 2  Diagnoses and all orders for this visit:    Type 2 diabetes mellitus with hyperglycemia, without long-term current use of insulin ([facility] [address] ):  Poorly controlled, A1c of 12%, A1c has ranged from 11 7% to 13%  I reviewed pathophysiology of type 2 diabetes and importance of glycemic control in order to prevent complications  I made following changes  I increased Trulicity to 1 5 mg once weekly  Decreased Lantus to 10 units twice daily  Counseled on adverse side effects of SGLT-2 inhibitors therapy including increased urinary frequency, risk of urinary tract infection, risk of yeast infection, risk of Ryan's gangrene  she understood these risks and wished to start therapy  I started Jardiance 25 mg once daily  She was advised to keep herself hydrated  I have asked the patient to check blood sugars 2-3 daily and send a record to the office in few weeks for review so that changes can be made to regimen, if applicable  We provided information on basic nutrition for diabetics  Extended counseling on disease management   Emphasized importance of daily exercise, weight loss, caloric reduction, small meals, consumption of multiple servings of fruits and vegetables and avoidance of concentrated sweets such as juice and soda  Reviewed concepts of diabetes self-management stressing the primary role of the patient in monitoring and maintaining control of diabetes  She was referred to diabetes educator for MNT  Recommended to follow-up with ophthalmology and podiatrist   Return back in 2 months  Labs prior to next visit      -     Insulin Glargine Solostar (Lantus SoloStar) 100 UNIT/ML SOPN; Inject 0 1 mL (10 Units total) under the skin 2 (two) times a day  -     Empagliflozin 25 MG TABS; Take 1 tablet (25 mg total) by mouth every morning  -     dulaglutide (Trulicity) 1 5 QI/8 6ZZ injection; Inject 0 5 mL (1 5 mg total) under the skin every 7 days  -     Hemoglobin A1C; Future  -     Comprehensive metabolic panel; Future  -     Lipid Panel with Direct LDL reflex; Future  -     Vitamin D 25 hydroxy; Future  -     Microalbumin / creatinine urine ratio; Future    Mixed hyperlipidemia:  She is taking Crestor 40 mg once daily, she is taking it regularly and tolerating well   -     Lipid Panel with Direct LDL reflex; Future    Class 2 severe obesity with serious comorbidity and body mass index (BMI) of 35 0 to 35 9 in adult, unspecified obesity type [facility]):  Trulicity 1 5 mg once weekly  We will eventually increase to 4 5 mg once weekly  -     Hemoglobin A1C; Future  -     Comprehensive metabolic panel; Future    Vitamin D deficiency  -     Vitamin D 25 hydroxy; Future    Primary hypertension:  Blood pressure at goal   Continue current regimen  -     Microalbumin / creatinine urine ratio; Future        Discussed with the patient and all questioned fully answered  She will call me if any problems arise      Counseled patient on diagnostic results, prognosis, risk and benefit of treatment options, instruction for management, importance of treatment compliance, Risk  factor reduction and impressions      Cassy Mcfarland MD

## 2023-03-14 ENCOUNTER — PATIENT OUTREACH (OUTPATIENT)
Dept: FAMILY MEDICINE CLINIC | Facility: HOME HEALTHCARE | Age: 63
End: 2023-03-14

## 2023-03-14 NOTE — PROGRESS NOTES
OutPatient Care Management Complex Care Plan Note:    Telephone call received from patient  She left me a voice mail with update that she had a consult with Endocrinology  I returned patient's phone call  Reviewed OV note from Dr Cassy Mcfarland and reviewed treatment plan with patient  Patient reports she has NOT been taking the Lantus 10U twice daily  Patient states she misunderstood the instructions  Patient is agreeable to start taking Lantus as prescribed  Patient reports she does not have an email account and does not know how to email the [facility] journal back to endocrinology office  RN suggested patient set up a free [employer] account on her phone, or she can drop off sheets at the office if this is not a hardship on her  Patient states she and her sister do their grocery shopping in [place], so it will be easy for them to drop off sheets at [employer]  I suggested to patient that she write a note on last week's page indicating she was not taking Lantus  Patient agreeable to this  Patient was very grateful for my return phone call  I plan to call patient in 3 weeks for follow up

## 2023-03-16 DIAGNOSIS — K21.9 GERD WITHOUT ESOPHAGITIS: ICD-10-CM

## 2023-03-16 RX ORDER — PANTOPRAZOLE SODIUM 40 MG/1
TABLET, DELAYED RELEASE ORAL
Qty: 30 TABLET | Refills: 1 | Status: SHIPPED | OUTPATIENT
Start: 2023-03-16

## 2023-03-21 ENCOUNTER — TELEPHONE (OUTPATIENT)
Dept: ENDOCRINOLOGY | Facility: CLINIC | Age: 63
End: 2023-03-21

## 2023-03-21 NOTE — TELEPHONE ENCOUNTER
Patient called and stated she was taking her Jardiance for about a week and started with severe diarrhea, she has since stopped the medication  She states she also that the same problem with metformin  She states her sugars have been under control without taking the medications

## 2023-03-22 ENCOUNTER — TELEPHONE (OUTPATIENT)
Dept: DIABETES SERVICES | Facility: OTHER | Age: 63
End: 2023-03-22

## 2023-03-22 NOTE — TELEPHONE ENCOUNTER
Voicemail left instructing patient per provider we need a sugar log sent to the office (email/[employer]) in order to see blood sugars and help with possible dose changes

## 2023-03-27 ENCOUNTER — TELEPHONE (OUTPATIENT)
Dept: ENDOCRINOLOGY | Facility: CLINIC | Age: 63
End: 2023-03-27

## 2023-03-27 DIAGNOSIS — E11.65 TYPE 2 DIABETES MELLITUS WITH HYPERGLYCEMIA, WITHOUT LONG-TERM CURRENT USE OF INSULIN (HCC): ICD-10-CM

## 2023-03-27 RX ORDER — LANCETS 33 GAUGE
EACH MISCELLANEOUS
Qty: 300 EACH | Refills: 3 | Status: SHIPPED | OUTPATIENT
Start: 2023-03-27

## 2023-03-27 NOTE — TELEPHONE ENCOUNTER
Patient called regarding get Lancets refilled  Sent to [facility] in [place] [facility] [address]  Patient said she was advised to take sugar levels before and after she eats   On the intsructions it says to check sugar 3 times daily please advise

## 2023-04-04 ENCOUNTER — OFFICE VISIT (OUTPATIENT)
Dept: CARDIOLOGY CLINIC | Facility: HOSPITAL | Age: 63
End: 2023-04-04

## 2023-04-04 ENCOUNTER — APPOINTMENT (OUTPATIENT)
Dept: LAB | Facility: HOSPITAL | Age: 63
End: 2023-04-04
Attending: STUDENT IN AN ORGANIZED HEALTH CARE EDUCATION/TRAINING PROGRAM

## 2023-04-04 VITALS
BODY MASS INDEX: 34.97 KG/M2 | HEART RATE: 104 BPM | WEIGHT: 217.6 LBS | HEIGHT: 66 IN | DIASTOLIC BLOOD PRESSURE: 68 MMHG | SYSTOLIC BLOOD PRESSURE: 124 MMHG | OXYGEN SATURATION: 92 %

## 2023-04-04 DIAGNOSIS — E11.65 TYPE 2 DIABETES MELLITUS WITH HYPERGLYCEMIA, WITHOUT LONG-TERM CURRENT USE OF INSULIN (HCC): Primary | ICD-10-CM

## 2023-04-04 DIAGNOSIS — I10 PRIMARY HYPERTENSION: ICD-10-CM

## 2023-04-04 DIAGNOSIS — E78.00 HYPERCHOLESTEROLEMIA: ICD-10-CM

## 2023-04-04 DIAGNOSIS — E78.5 DYSLIPIDEMIA: ICD-10-CM

## 2023-04-04 DIAGNOSIS — I73.9 PAD (PERIPHERAL ARTERY DISEASE) (HCC): ICD-10-CM

## 2023-04-04 DIAGNOSIS — E13.40 OTHER SPECIFIED DIABETES MELLITUS WITH DIABETIC NEUROPATHY, UNSPECIFIED WHETHER LONG TERM INSULIN USE (HCC): Primary | ICD-10-CM

## 2023-04-04 DIAGNOSIS — E78.2 MIXED HYPERLIPIDEMIA: ICD-10-CM

## 2023-04-04 DIAGNOSIS — I25.10 CORONARY ARTERY DISEASE INVOLVING NATIVE HEART WITHOUT ANGINA PECTORIS, UNSPECIFIED VESSEL OR LESION TYPE: ICD-10-CM

## 2023-04-04 DIAGNOSIS — E11.65 TYPE 2 DIABETES MELLITUS WITH HYPERGLYCEMIA, WITHOUT LONG-TERM CURRENT USE OF INSULIN (HCC): ICD-10-CM

## 2023-04-04 DIAGNOSIS — R00.0 TACHYCARDIA: ICD-10-CM

## 2023-04-04 DIAGNOSIS — I25.10 NONOCCLUSIVE CORONARY ATHEROSCLEROSIS OF NATIVE CORONARY ARTERY: ICD-10-CM

## 2023-04-04 DIAGNOSIS — E66.01 CLASS 2 SEVERE OBESITY WITH SERIOUS COMORBIDITY AND BODY MASS INDEX (BMI) OF 35.0 TO 35.9 IN ADULT, UNSPECIFIED OBESITY TYPE (HCC): ICD-10-CM

## 2023-04-04 DIAGNOSIS — E55.9 VITAMIN D DEFICIENCY: ICD-10-CM

## 2023-04-04 LAB
25(OH)D3 SERPL-MCNC: 28.8 NG/ML (ref 30–100)
ALBUMIN SERPL BCP-MCNC: 4.3 G/DL (ref 3.5–5)
ALP SERPL-CCNC: 62 U/L (ref 34–104)
ALT SERPL W P-5'-P-CCNC: 20 U/L (ref 7–52)
ANION GAP SERPL CALCULATED.3IONS-SCNC: 8 MMOL/L (ref 4–13)
AST SERPL W P-5'-P-CCNC: 18 U/L (ref 13–39)
BILIRUB SERPL-MCNC: 0.36 MG/DL (ref 0.2–1)
BUN SERPL-MCNC: 19 MG/DL (ref 5–25)
CALCIUM SERPL-MCNC: 9.2 MG/DL (ref 8.4–10.2)
CHLORIDE SERPL-SCNC: 101 MMOL/L (ref 96–108)
CHOLEST SERPL-MCNC: 140 MG/DL
CO2 SERPL-SCNC: 27 MMOL/L (ref 21–32)
CREAT SERPL-MCNC: 1.31 MG/DL (ref 0.6–1.3)
CREAT UR-MCNC: 88.4 MG/DL
GFR SERPL CREATININE-BSD FRML MDRD: 43 ML/MIN/1.73SQ M
GLUCOSE P FAST SERPL-MCNC: 124 MG/DL (ref 65–99)
HDLC SERPL-MCNC: 27 MG/DL
LDLC SERPL CALC-MCNC: 34 MG/DL (ref 0–100)
MICROALBUMIN UR-MCNC: 25 MG/L (ref 0–20)
MICROALBUMIN/CREAT 24H UR: 28 MG/G CREATININE (ref 0–30)
POTASSIUM SERPL-SCNC: 4.1 MMOL/L (ref 3.5–5.3)
PROT SERPL-MCNC: 6.9 G/DL (ref 6.4–8.4)
SODIUM SERPL-SCNC: 136 MMOL/L (ref 135–147)
TRIGL SERPL-MCNC: 396 MG/DL
TSH SERPL DL<=0.05 MIU/L-ACNC: 3.37 UIU/ML (ref 0.45–4.5)

## 2023-04-04 RX ORDER — ROSUVASTATIN CALCIUM 40 MG/1
40 TABLET, COATED ORAL DAILY
Qty: 90 TABLET | Refills: 3 | Status: SHIPPED | OUTPATIENT
Start: 2023-04-04 | End: 2023-04-04 | Stop reason: SDUPTHER

## 2023-04-04 RX ORDER — ROSUVASTATIN CALCIUM 40 MG/1
40 TABLET, COATED ORAL DAILY
Qty: 90 TABLET | Refills: 3 | Status: SHIPPED | OUTPATIENT
Start: 2023-04-04

## 2023-04-04 RX ORDER — DULAGLUTIDE 3 MG/.5ML
3 INJECTION, SOLUTION SUBCUTANEOUS
Qty: 6 ML | Refills: 0 | Status: SHIPPED | OUTPATIENT
Start: 2023-04-04 | End: 2023-07-03

## 2023-04-04 NOTE — PROGRESS NOTES
Cardiology Follow Up    Betsy Hoyos  1960  [id number]  [address]  [address] [facility] [address] [phone]    No diagnosis found  There are no diagnoses linked to this encounter  I had the pleasure of seeing Betsy Hoyos for a follow up visit  INTERVAL HISTORY: none    History of the presenting illness, Discussion/Summary and My Plan are as follows:::    Hong Larson is a pleasant 58-year-old lady with a history of diabetes since around 2010, for the last 2 years-poorly controlled, dyslipidemia with spotty compliance with statin, most recently still showing an elevated LDL around 160 0, chronic and ongoing tobacco use-about 66 pack years, currently smoking a pack a day, consequent COPD, a history of DVT and IVC filter      She also has a family history of vascular disease-father had a heart attack at 77, sister PVD at 61 and brother CABG at 48, they were all smokers  She originally presented for evaluation of chest pains and shortness of breath, underwent coronary angiography that did not demonstrate any stenotic lesions but did show moderate disease-October 2021  Cardiac physical exam is unremarkable  ECG showed normal sinus rhythm  Subsequently underwent a Holter which did not demonstrate any ST elevations that would suggest vasospasm  She also underwent an echocardiogram in August 2021 that was unremarkable, a nuclear stress test in 2014 was negative  At her last visit, was also having leg pains leading to arterial Dopplers that did show bilateral diffuse femoral-popliteal disease without significant focal stenosis although no significant change from 2015  She had seen vascular in that regard and was advised repeat Dopplers  She is only modestly active, walks mostly in the house without any cardiac symptoms    Her only complaint is some leg pains bilaterally in the buttocks and thighs  She was also started on carvedilol for borderline elevated blood pressures and tachycardia by nephrology in August 2022  She is not sure if she is taking a statin  She also has a history of breast cancer  Plan:     Ischemic evaluation: No current cardiac symptoms, in the past had symptoms suggestive of angina with coronary angiography showing up to 50% disease without significant epicardial obstructive lesions and hence no interventions were performed  Subsequently underwent a Holter that did not demonstrate any ST elevations that would suggest vasospasm as a cause of her symptoms  No further evaluation other than risk factor modification at this time      PVD:  Diagnosed many years ago, continues to have symptoms that would suggest claudication, has diffuse disease bilaterally without focal lesions, advised to stop smoking, restart her statin medication and a walking program       Dyslipidemia:  Compliance even in the past had been spotty, strongly advised to start rosuvastatin 40 mg and Will check in 3 months     Sinus tachycardia noted on prior ECG: Today after resting, baseline heart rate is around 97, no symptoms, normal TSH in June 2022, and normal exam, no further evaluation in this regard  Recheck TSH with next labs    She has a history of moderate CAD, PVD, breast cancer, smoking-related lung disease-respiratory bronchiolitis-advised strongly in no uncertain terms that she needs to quit smoking and comply with medications to improve longevity and prevent cardiac and cancer related events and death  Follow-up in about 6 months       Latest Reference Range & Units 09/13/21 10:34 10/18/21 10:20 03/29/22 11:47   Cholesterol See Comment mg/dL 280 (H) 228 (H) 267 (H)   Triglycerides See Comment mg/dL 599 (H) 348 (H) 351 (H)   HDL >=50 mg/dL 26 (L) 29 (L) 28 (L)   LDL Calculated 0 - 100 mg/dL See Comment 129 (H) 169 (H)   LDL CHOLESTEROL DIRECT 0 - 100 mg/dl 161 (H)     (H): Data is abnormally high  (L): Data is abnormally low     Latest Reference Range & Units 11/08/22 15:12 02/15/23 15:02   Hemoglobin A1C 6 5  11 7 (H) 12 0 !    (H): Data is abnormally high  !: Data is abnormal     Latest Reference Range & Units 09/13/21 10:34 06/30/22 11:09   TSH 3RD GENERATON 0 450 - 4 500 uIU/mL 2 370 4 234       Patient Active Problem List   Diagnosis   • Cataract   • Chronic hoarseness   • Chronic low back pain   • Centrilobular emphysema (HCC)   • Deep vein thrombophlebitis of leg, unspecified laterality (HCC)   • Depression   • Diabetes mellitus with neurological manifestation (HCC)   • Gastroesophageal reflux disease with esophagitis   • Headache   • Hypercholesterolemia   • Hypercoagulable state ([facility] )   • Hypertension   • Migraine   • Myositis   • Neuropathy   • Pulmonary nodule seen on imaging study   • Type 2 diabetes mellitus with hyperglycemia, without long-term current use of insulin (HCC)   • Chronic hypoxemic respiratory failure (HCC)   • Class 2 severe obesity due to excess calories with serious comorbidity and body mass index (BMI) of 35 0 to 35 9 in [facility])   • Sleep apnea   • Family history of heart disease   • Pulmonary hypertension (HCC)   • Primary fibromyalgia syndrome   • Primary cancer of upper outer quadrant of right breast ([facility] )   • Malignant neoplasm of upper-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, estrogen receptor positive ([facility] )   • Mucinous carcinoma of breast, 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[facility] )   • Tobacco abuse   • PAD (peripheral artery disease) (HCC)   • Tachycardia   • Stage 3a chronic kidney disease (HCC)   • Chronic obstructive pulmonary disease with acute exacerbation (HCC)   • Kidney lesion   • Nonocclusive coronary atherosclerosis of native coronary artery   • Kidney cysts   • Hyponatremia   • Diarrhea   • Presence of IVC filter   • History of pulmonary embolism     Past Medical History:   Diagnosis Date   • Cancer ([facility] )     right breast   • Chronic back pain    • Colitis    • COPD (chronic obstructive pulmonary disease) ([facility])    • Depression    • Diabetes mellitus ([facility] )    • DVT of lower limb, acute ([facility] ) 2004   • GERD (gastroesophageal reflux disease)    • Hyperlipidemia    • Pneumonia    • Rapid heart rate    • Sleep apnea    • Stroke ([facility])     4 mini strokes     Social History     Socioeconomic History   • Marital status: Legally      Spouse name: Not on file   • Number of children: Not on file   • Years of education: Not on file   • Highest education level: Not on file   Occupational History   • Not on file   Tobacco Use   • Smoking status: Every Day     Packs/day: 1 00     Types: Cigarettes   • Smokeless tobacco: Never   Vaping Use   • Vaping Use: Never used   Substance and Sexual Activity   • Alcohol use: Never   • Drug use: Never   • Sexual activity: Not Currently   Other Topics Concern   • Not on file   Social History Narrative   • Not on file     Social Determinants of Health     Financial Resource Strain: Not on file   Food Insecurity: No Food Insecurity   • Worried About Running Out of Food in the Last Year: Never true   • Ran Out of Food in the Last Year: Never true   Transportation Needs: No Transportation Needs   • Lack of Transportation (Medical): No   • Lack of Transportation (Non-Medical):  No   Physical Activity: Not on file   Stress: Not on file   Social Connections: Not on file   Intimate Partner Violence: Not on file   Housing Stability: Low Risk    • Unable to Pay for Housing in the Last Year: No   • Number of Places Lived in the Last Year: 1   • Unstable Housing in the Last Year: No      Family History   Problem Relation Age of Onset   • Breast cancer Mother 67   • COPD Mother    • Coronary artery disease Mother    • Coronary artery disease Father    • Stroke Father    • Lung cancer Sister    • No Known Problems Sister    • No Known Problems Sister    • Breast cancer Maternal Grandmother    • Colon cancer Paternal Grandfather    • No Known Problems Maternal Aunt • No Known Problems Maternal Aunt    • No Known Problems Maternal Aunt    • No Known Problems Paternal Aunt    • Breast cancer Cousin      Past Surgical History:   Procedure Laterality Date   • BREAST LUMPECTOMY Right    • CARDIAC CATHETERIZATION N/A 10/28/2021    Procedure: Cardiac Coronary Angiogram;  Surgeon: Maryjo Hearn DO;  Location:  CARDIAC CATH LAB;   Service: Cardiology   • IVC FILTER INSERTION     • MASTECTOMY     • MASTECTOMY W/ SENTINEL NODE BIOPSY Right 11/09/2021    Procedure: BREAST MASTECTOMY WITH BIOPSY LYMPH NODE SENTINEL and axillary node dissection  ([place] Lymph Node Injection @ 12:30);  Surgeon: Melinda Martinez MD;  Location: [address] [place] [address] OR;  Service: General   • US GUIDANCE BREAST BIOPSY RIGHT EACH ADDITIONAL Right 10/13/2021   • US GUIDANCE BREAST BIOPSY RIGHT EACH ADDITIONAL Right 10/13/2021   • US GUIDED BREAST BIOPSY RIGHT COMPLETE Right 10/13/2021       Current Outpatient Medications:   •  albuterol (Ventolin HFA) 90 mcg/act inhaler, Inhale 2 puffs every 4 (four) hours as needed for wheezing, Disp: 18 g, Rfl: 4  •  amitriptyline (ELAVIL) 100 mg tablet, take 1/2 tablet by mouth once daily at bedtime, Disp: 45 tablet, Rfl: 1  •  aspirin (ECOTRIN LOW STRENGTH) 81 mg EC tablet, Take 1 tablet (81 mg total) by mouth daily, Disp: 90 tablet, Rfl: 3  •  benztropine (COGENTIN) 0 5 mg tablet, Take 0 5 mg by mouth daily at bedtime  , Disp: , Rfl:   •  Blood Glucose Monitoring Suppl (ONE TOUCH ULTRA 2) w/Device KIT, by Does not apply route daily, Disp: 100 each, Rfl: 0  •  carvedilol (COREG) 3 125 mg tablet, take 1 tablet by mouth twice a day with meals, Disp: 60 tablet, Rfl: 3  •  clonazePAM (KlonoPIN) 0 5 mg tablet, Take 0 5 mg by mouth daily as needed  , Disp: , Rfl:   •  doxepin (SINEquan) 100 mg capsule, take 1 to 2 capsules by mouth at bedtime if needed, Disp: , Rfl:   •  dulaglutide (Trulicity) 3 HR/6 4JO injection, Inject 0 5 mL (3 mg total) under the skin every 7 days, Disp: 6 mL, Rfl: 0  •  gabapentin (NEURONTIN) 600 MG tablet, take 1 tablet by mouth three times a day, Disp: 270 tablet, Rfl: 1  •  glucose blood (OneTouch Ultra) test strip, Use to test blood sugar daily, Disp: 100 strip, Rfl: 2  •  Insulin Glargine Solostar (Lantus SoloStar) 100 UNIT/ML SOPN, Inject 0 1 mL (10 Units total) under the skin 2 (two) times a day, Disp: 18 mL, Rfl: 1  •  Insulin Pen Needle (BD Pen Needle Jasmine 2nd Gen) 32G X 4 MM MISC, Inject under the skin in the morning, Disp: 90 each, Rfl: 1  •  loperamide (IMODIUM) 2 mg capsule, Take 1 capsule (2 mg total) by mouth 4 (four) times a day as needed for diarrhea, Disp: 30 capsule, Rfl: 0  •  OneTouch Delica Lancets 26P MISC, Check blood sugars three times daily  Please substitute with appropriate alternative as covered by patient's insurance  Dx: E11 65, Disp: 300 each, Rfl: 3  •  pantoprazole (PROTONIX) 40 mg tablet, take 1 tablet by mouth daily if needed for GERD SYMPTOMS, Disp: 30 tablet, Rfl: 1  •  rosuvastatin (CRESTOR) 40 MG tablet, Take 1 tablet (40 mg total) by mouth daily, Disp: 90 tablet, Rfl: 3  •  tiotropium (Spiriva Respimat) 2 5 MCG/ACT AERS inhaler, Inhale 2 puffs daily, Disp: 4 g, Rfl: 3  •  vilazodone (VIIBRYD) 40 mg tablet, Take 40 mg by mouth daily  , Disp: , Rfl:   •  VRAYLAR 6 MG capsule, Take 6 mg by mouth daily, Disp: , Rfl: 0  •  apixaban (Eliquis) 5 mg, Take 1 tablet (5 mg total) by mouth 2 (two) times a day (Patient not taking: Reported on 4/4/2023), Disp: 60 tablet, Rfl: 0  •  lidocaine (Lidoderm) 5 %, Apply 1 patch topically over 12 hours daily Remove & Discard patch within 12 hours or as directed by MD (Patient not taking: Reported on 2/20/2023), Disp: 15 patch, Rfl: 0  Allergies   Allergen Reactions   • Amoxicillin-Pot Clavulanate GI Intolerance   • Anastrozole Other (See Comments)     Diarrhea, Nausea, Stomach pain        Imaging: NM lymphatic breast    Result Date: 11/9/2021  Narrative: SENTINEL NODE LYMPHOSCINTIGRAPHY INDICATION: "Right breast carcinoma FINDINGS: 0 537 mCi Tc-99m sulfur colloid (0 6 cc volume) was administered in divided doses by Dr Roma Garcia in the right breast periareolar region  Scintigraphic images were obtained over the right hemithorax and axilla in multiple projections  Initial imaging does not demonstrate a node  The patient was transferred to the operating room in satisfactory condition for kenneth localization  Impression: 1  Injection for sentinel lymph node localization  Workstation performed: [id number]       Review of Systems:  Review of Systems   Constitutional: Negative  HENT: Negative  Eyes: Negative  Respiratory: Negative  Negative for apnea, cough, choking, chest tightness, shortness of breath, wheezing and stridor  Cardiovascular: Positive for chest pain  Negative for palpitations and leg swelling  Endocrine: Negative  Musculoskeletal: Negative  Allergic/Immunologic: Negative  Physical Exam:  /68   Pulse 104   Ht 5' 6\" (1 676 m)   Wt 98 7 kg (217 lb 9 6 oz)   SpO2 92%   BMI 35 12 kg/m²   Physical Exam  Constitutional:       General: She is not in acute distress  Appearance: Normal appearance  She is not ill-appearing  HENT:      Head: Normocephalic  Nose: Nose normal  No congestion or rhinorrhea  Mouth/Throat:      Mouth: Mucous membranes are moist       Pharynx: Oropharynx is clear  No oropharyngeal exudate or posterior oropharyngeal erythema  Eyes:      General:         Right eye: No discharge  Left eye: No discharge  Pupils: Pupils are equal, round, and reactive to light  Neck:      Vascular: No carotid bruit  Cardiovascular:      Rate and Rhythm: Normal rate and regular rhythm  Heart sounds: No murmur heard  No friction rub  No gallop  Pulmonary:      Effort: No respiratory distress  Breath sounds: No stridor  No wheezing or rhonchi  Abdominal:      General: Abdomen is flat   Bowel sounds are normal  There is " "no distension  Tenderness: There is no abdominal tenderness  Hernia: No hernia is present  Musculoskeletal:      Cervical back: Normal range of motion  No rigidity or tenderness  Lymphadenopathy:      Cervical: No cervical adenopathy  Skin:     General: Skin is warm  Coloration: Skin is not jaundiced or pale  Findings: No bruising or erythema  Neurological:      Mental Status: She is alert  This note was completed in part utilizing [employer] direct voice recognition software  Grammatical errors, random word insertion, spelling mistakes, occasional wrong word or \"sound-alike\" substitutions and incomplete sentences may be an occasional consequence of the system secondary to software limitations, ambient noise and hardware issues  At the time of dictation, efforts were made to edit, clarify and /or correct errors  Please read the chart carefully and recognize, using context, where substitutions have occurred  If you have any questions or concerns about the context, text or information contained within the body of this dictation, please contact myself, the provider, for further clarification    "

## 2023-04-05 LAB
EST. AVERAGE GLUCOSE BLD GHB EST-MCNC: 212 MG/DL
HBA1C MFR BLD: 9 %

## 2023-04-13 ENCOUNTER — OFFICE VISIT (OUTPATIENT)
Dept: DIABETES SERVICES | Facility: OTHER | Age: 63
End: 2023-04-13

## 2023-04-13 DIAGNOSIS — E11.65 TYPE 2 DIABETES MELLITUS WITH HYPERGLYCEMIA, WITHOUT LONG-TERM CURRENT USE OF INSULIN (HCC): Primary | ICD-10-CM

## 2023-04-13 NOTE — PROGRESS NOTES
"Type 2 Diabetes Class Assessment    HPI: Met with Bob Cleveland for DSME Initial visit  Karol Kaba has Type 2 Diabetes  She denies having formal education as she thought she could manage on her own  However, in December in 2022, Juan Jose Sosa had an event that required hospitalization and it scared her  Since she has been making lifestyle changes  She stated \"I will do it but I won't quit smoking\"  She will participate in diabetes classes  Diabetes Assessment  Visit Type: Initial visit  Present at Session: patient and sister Hunter Zhong Diagnosis/ICD 8: E11 65 (ICD-10-CM) - Type 2 diabetes mellitus with hyperglycemia, without long-term current use of insulin ([facility] )  Special Learning Needs: No  Barriers to Learning: no barriers    How do you feel about making lifestyle changes at this time? \"I'll do it\"  How would you rate your current knowledge of diabetes? fair  How confident are you that will be able to take better control of your diabetes?: good    How long have you had diabetes? ~ 13 years  Have you had diabetes education in the past?: No  Do you have any family members with diabetes?: Yes  Do you monitor your blood sugar? yes  Type of blood sugar monitor: patient has 2 different glucometer; she is using her old one as she doesn't know who to use the new one  How old is your meter?: couple years old; does not know make  How often do you test your blood sugars?:2-4 x daily; most recently, BID  Do you keep a written record of your blood sugars? Yes   Blood sugar log with patient today and reviewed by educator?: Yes   Blood Sugar ranges:    Fasting: <200   Before meals:    2 hours after meals: <200  Any financial concerns pertaining to your diabetes supplies, medication or care?: No  Have you ever experienced hypoglycemia?:  No  Have you ever been hospitalized or gone to the ER due to your blood sugars?: No  How do you treat low blood sugars?: educated  How do you treat high blood sugars?  educated  Do " "you wear a Diabetes I D ?: no  Where do you dispose of your sharps (needles,lancetes)? : in garbage; educated patient on proper disposal of sharps    Ht Readings from Last 1 Encounters:   04/04/23 5' 6\" (1 676 m)     Wt Readings from Last 1 Encounters:   04/04/23 98 7 kg (217 lb 9 6 oz)     BMI 34 47, Class 1 obesity  Weight Change: denies any significant wt changes    Diet Assessment    Do you follow any special diet presently?: No  Who cooks at home?:  patient  Who does the grocery shopping?: patient and sister  How frequently do you eat out?: rare    Activity Assessment    Exercise: limited exercise, walks with cane; has pain in legs    Lifestyle/Social Assessment    Racial/ethnic group:                                       Primary Language: English  Marital Status:   Education Level: High School (No Diploma)   Work status: Disabled  Type of job and hours: na  Who lives in your household?: patient  Who is you primary support person(s): sibling(s) [facility] and Betty Jose (brother and sister)  Describe your quality of life currently?: good  Any concerns for your safety?: Yes  Has life alert    Concerned with blood sugar going to high and getting dizzy  Any Baptism or cultural practices that may affect your diabetes care: No  Do you have a decrease or loss of hearing?: No  Do you have a decrease or loss of vision?: Yes; had cataract surgery 1 year ago  When was the last time you had an ophthalmology exam?: at time of surgery  When was the last time you had dental exam?: has dentures  Do you check your feet for cracks, sores, debris?: Yes  When was the last time you had podiatry or foot exam?: she reports endocrinology was going to refer her to podiatry but she hasn't heard anything  Last flu shot?: refuses  Pneumonia shot?: No      Lab Results   Component Value Date    HGBA1C 9 0 (H) 04/04/2023     Lab Results   Component Value Date    CHOL 234 09/07/2015    HDL 27 (L) 04/04/2023    [address] 04/04/2023 " TRIG 396 (H) 04/04/2023     No results found for: Eloisa Trammell      The patient's history was reviewed and updated as appropriate: allergies, current medications  Intervention    Diabetes Overview :   Kinsey Eagle was instructed on basic concepts of diabetes, including identifying role of diabetes self management, basic pathophysiology and types of diabetes, A1c and blood sugar targets  Kinsey Eagle has good understanding of material covered  Taking Medications: Instructed patient on action, side effects, efficacy, prescribed dosage and appropriate timing and frequency of administration of her diabetes medication  Kinsey Eagle has good understanding of material covered  Monitoring Blood Sugars  Instructions for Meter Teaching- Patient instructed in the following:  Site selection and skin preparation, Loading strips and lancet device, meter activation, obtaining blood sample, test strip and lancet disposal and recording log book entries  Patient has good understanding of material covered and was able to test their own blood sugar in office today  Comments: Gave and instructed on *** meter, Patient demonstrated use, blood sugar in office *** mg/dl  at  ***  Lot #: ***  Exp Date: ***    Testing frequency: Encouraged pair testing  Test sugars before a meal and 2hr after the same meal, rotating between breakfast, lunch, and dinner  Test sugars twice a day (3 days a week, 7 days a week)  Goal Blood Sugars:   Premeal , even better <110  2hr after a meal <180, even better <140  A1C <7%, even better <6 5%  Hypoglycemia: Instructed patient on definition/risk of hypoglycemia, treatment, causes/symptoms, when to notify provider of lows, prevention of hypoglycemia and exercise precautions    Comments: Brutus Closs understanding of hypoglycemia concepts      Physical Activity: Discussed benefits of physical activity to optimize blood glucose control, encouraged activity at patient is physically able  Always consult a physician prior to starting an exercise program   Comments: Melba George understanding of hypoglycemia concepts        Diabetes Education Record  Caridad Gomez received the following handouts: ***      Patient response to instruction    Comprehension{PATIENT'S RESPONSE COMPREHENSION:6362718031}  Motivation{PATIENT'S RESPONSE MOTIVATION:2931303022}  Expected Compliance{PATIENT'S RESPONSE EXPECTED COMPLIANCE:2100000463}  Response to Teachback: 100%, demonstrated understanding    Begin Time: ***  End Time: ***  Referring Provider: ***    Thank you for referring your patient to [facility], it was a pleasure working with them today  Please feel free to call with any questions or concerns      Sloane Escobar, RD  [address] [facility] [address]  [place]  [phone]

## 2023-04-29 DIAGNOSIS — R00.0 TACHYCARDIA: ICD-10-CM

## 2023-05-01 ENCOUNTER — PATIENT OUTREACH (OUTPATIENT)
Dept: FAMILY MEDICINE CLINIC | Facility: HOME HEALTHCARE | Age: 63
End: 2023-05-01

## 2023-05-01 RX ORDER — CARVEDILOL 3.12 MG/1
TABLET ORAL
Qty: 60 TABLET | Refills: 3 | Status: SHIPPED | OUTPATIENT
Start: 2023-05-01

## 2023-05-02 ENCOUNTER — TELEPHONE (OUTPATIENT)
Dept: VASCULAR SURGERY | Facility: CLINIC | Age: 63
End: 2023-05-02

## 2023-05-02 ENCOUNTER — PATIENT OUTREACH (OUTPATIENT)
Dept: FAMILY MEDICINE CLINIC | Facility: HOME HEALTHCARE | Age: 63
End: 2023-05-02

## 2023-05-02 NOTE — PROGRESS NOTES
OutPatient Complex Care Management Note:  Phone call received from patient requesting assistance with transportation  Patient states her sister is no longer able to provide transportation to the patient for her medical appointments  Assessment completed reveals that patient had completed application with [employer] and in the past used their [employer]  Phone number for [employer] shared with patient, and patient was encouraged to call transportation company to inquire if her file is still open with them  Patient instructed to call back should she new to re-apply  Patient verbalized understanding and agreement

## 2023-05-02 NOTE — TELEPHONE ENCOUNTER
----- Message from Loren Posey sent at 2023  9:19 AM EDT -----  Regarding: RE: Tila Nearing 5/3/23  Scheduled for a 145p , thank you!  ----- Message -----  From: Brigida Carrasquillo  Sent: 2023   8:42 AM EDT  To: Patient Beau  Subject: Lyft Ride 5/3/23                                 Patients Name: Lamar Rutledge  : 1960  Phone Number: [phone]  Appointment Date: 5/3/23  Appointment time: 2:30 PM    Address: [address] [place] [address]  Drop off Facility/Office Name: [facility]  Drop off  Address: [address] [place] [address]: [phone]  Special notes/directions for   Note for scheduling team

## 2023-05-03 ENCOUNTER — OFFICE VISIT (OUTPATIENT)
Dept: VASCULAR SURGERY | Facility: HOSPITAL | Age: 63
End: 2023-05-03

## 2023-05-03 VITALS
DIASTOLIC BLOOD PRESSURE: 72 MMHG | HEIGHT: 66 IN | HEART RATE: 102 BPM | SYSTOLIC BLOOD PRESSURE: 128 MMHG | BODY MASS INDEX: 34.72 KG/M2 | WEIGHT: 216 LBS

## 2023-05-03 DIAGNOSIS — I80.209 DEEP VEIN THROMBOPHLEBITIS OF LEG, UNSPECIFIED LATERALITY (HCC): ICD-10-CM

## 2023-05-03 DIAGNOSIS — I73.9 PAD (PERIPHERAL ARTERY DISEASE) (HCC): Primary | ICD-10-CM

## 2023-05-03 NOTE — PROGRESS NOTES
Assessment/Plan:    PAD (peripheral artery disease) ([facility] [address] )  61 y o  female patient w/ a PMH significant for DM, HLD, chronic and ongoing tobacco use-about 64 pack years, currently smoking up to a pack and half a day, COPD, a history of DVT and IVC filter presents to the vascular office today for evaluation of bilateral, intermittent thigh pain  Imaging  CAESAR duplex (12/17/21): CAESAR duplex demonstrates diffuse disease throughout the BL femoral-popliteal arteries without significant focal stenosis  R STEFANI 1 13/140/94; L STEFANI 1 08/121/76  Recommendations  -Diffuse arterial disease throughout the femoral-popliteal arteries without focal stenosis  BL STEFANI's within the normal category and GT pressures are within healing range  Patient symptoms not consistent with arterial claudication  Denies rest pain or tissue loss  -Will obtain updated CAESAR to reassess perfusion given ongoing symptoms and last study performed in 2021  Will call with results   -We discussed the pathophysiology of arterial occlusive disease, available treatment options and indications for treatment    -Continue medical optimization  Currently on ASA and statin    -Goal hgb A1c<7 0, LDL<100, BP <140/90     -Recommend walking program, ambulating at least 30 minutes 3-5 times weekly   -Discussed the importance of smoking cessation  She defers    -Recommend moisturization of the lower extremities, avoidance of injury and close observation of bilateral feet for any new wounds   -Discussed that a portion of her symptoms could be neurogenic in nature as she does report a history of back pain  Review of a previous XR shows multilevel spondylosis with a grade 1 spondylolisthesis at the L3-4 level    -Instructed patient to contact office with any new symptoms or concerns  Deep vein thrombophlebitis of leg, unspecified laterality (HCC)  -Hx of LLE DVT, PE s/p IVC filter placement  -Most recent LEV 2/14/23 without evidence of DVT or SVT   Arterial waveforms triphasic    -Reports no longer on eliquis  Takes ASA 81 mg    -Denies use of compression stockings  Does elevate her legs  -Recommend conservative measures with use of daily compression hose (20-30 mmHg), lower extremity elevation, regular exercise, and diligent skin care  Diagnoses and all orders for this visit:    PAD (peripheral artery disease) ([facility])  -     VAS lower limb arterial duplex, complete bilateral; Future    Deep vein thrombophlebitis of leg, unspecified laterality ([facility])          Subjective:      Patient ID: Patt Ritter is a 61 y o  female  HPI  Olesya June presents today for evaluation of BLE pain  She was last seen in 2022 by [facility]  She reports intermittent, bilateral thigh pain when walking long distances  She reports her distance varies when she gets the pain and somedays has no pain at all  She reports she has been increasing her activity lately and has seen some improvement in her pain  She denies rest pain or tissue loss  She has a hx of LLE DVT, PE and has an IVC filter in place  She denies use of compression stockings but does elevate her legs  She has chronic low back pain  She is taking daily ASA 81 mg and statin  She smokes 1 ppd  The following portions of the patient's history were reviewed and updated as appropriate: allergies, current medications, past family history, past medical history, past social history, past surgical history and problem list     Review of Systems   Constitutional: Negative  HENT: Negative  Eyes: Negative  Respiratory: Negative  Cardiovascular: Negative  Gastrointestinal: Negative  Endocrine: Negative  Genitourinary: Negative  Musculoskeletal: Negative  Skin: Negative  Allergic/Immunologic: Negative  Neurological: Negative  Hematological: Negative  Psychiatric/Behavioral: Negative  I have personally reviewed and made appropriate changes to the ROS that was input by the medical assistant  "      Objective:        Vitals:    05/03/23 1431   BP: 128/72   BP Location: Left arm   Patient Position: Sitting   Cuff Size: Standard   Pulse: 102   Weight: 98 kg (216 lb)   Height: 5' 6\" (1 676 m)       Patient Active Problem List   Diagnosis    Cataract    Chronic hoarseness    Chronic low back pain    Centrilobular emphysema (HCC)    Deep vein thrombophlebitis of leg, unspecified laterality (HCC)    Depression    Diabetes mellitus with neurological manifestation (HCC)    Gastroesophageal reflux disease with esophagitis    Headache    Hypercholesterolemia    Hypercoagulable state ([address] )    Hypertension    Migraine    Myositis    Neuropathy    Pulmonary nodule seen on imaging study    Type 2 diabetes mellitus with hyperglycemia, without long-term current use of insulin (HCC)    Chronic hypoxemic respiratory failure ([facility])    Class 2 severe obesity due to excess calories with serious comorbidity and body mass index (BMI) of 35 0 to 35 9 in adult ([address] )    Sleep apnea    Family history of heart disease    Pulmonary hypertension ([address]    Primary fibromyalgia syndrome    Primary cancer of upper outer quadrant of right breast ([address] )    Malignant neoplasm of upper-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, estrogen receptor positive ([address] )    Mucinous carcinoma of breast, right ([address] )    Tobacco abuse    PAD (peripheral artery disease) ([address]    Tachycardia    Stage 3a chronic kidney disease ([address] )    Chronic obstructive pulmonary disease with acute exacerbation ([address] )    Kidney lesion    Nonocclusive coronary atherosclerosis of native coronary artery    Kidney cysts    Hyponatremia    Diarrhea    Presence of IVC filter    History of pulmonary embolism       Past Surgical History:   Procedure Laterality Date    BREAST LUMPECTOMY Right     CARDIAC CATHETERIZATION N/A 10/28/2021    Procedure: Cardiac Coronary Angiogram;  Surgeon: Angelina Mcdaniels DO;  Location: [facility];  " Service: Cardiology    IVC FILTER INSERTION      MASTECTOMY      MASTECTOMY W/ SENTINEL NODE BIOPSY Right 11/09/2021    Procedure: BREAST MASTECTOMY WITH BIOPSY LYMPH NODE SENTINEL and axillary node dissection  ([place] Lymph Node Injection @ 12:30);  Surgeon: Thanh Wagner MD;  Location: [address] [place] [address] OR;  Service: General    US GUIDANCE BREAST BIOPSY RIGHT EACH ADDITIONAL Right 10/13/2021    US GUIDANCE BREAST BIOPSY RIGHT EACH ADDITIONAL Right 10/13/2021    US GUIDED BREAST BIOPSY RIGHT COMPLETE Right 10/13/2021       Family History   Problem Relation Age of Onset    Breast cancer Mother 67    COPD Mother     Coronary artery disease Mother     Coronary artery disease Father     Stroke Father     Lung cancer Sister     No Known Problems Sister     No Known Problems Sister     Breast cancer Maternal Grandmother     Colon cancer Paternal Grandfather     No Known Problems Maternal Aunt     No Known Problems Maternal Aunt     No Known Problems Maternal Aunt     No Known Problems Paternal Aunt     Breast cancer Cousin        Social History     Socioeconomic History    Marital status: Legally      Spouse name: Not on file    Number of children: Not on file    Years of education: Not on file    Highest education level: Not on file   Occupational History    Not on file   Tobacco Use    Smoking status: Every Day     Packs/day: 1 00     Types: Cigarettes    Smokeless tobacco: Never   Vaping Use    Vaping Use: Never used   Substance and Sexual Activity    Alcohol use: Never    Drug use: Never    Sexual activity: Not Currently   Other Topics Concern    Not on file   Social History Narrative    Not on file     Social Determinants of Health     Financial Resource Strain: Not on file   Food Insecurity: No Food Insecurity    Worried About Running Out of Food in the Last Year: Never true    Nidia of Food in the Last Year: Never true   Transportation Needs: No Transportation Needs    Lack of Transportation (Medical): No    Lack of Transportation (Non-Medical):  No   Physical Activity: Not on file   Stress: Not on file   Social Connections: Not on file   Intimate Partner Violence: Not on file   Housing Stability: Low Risk     Unable to Pay for Housing in the Last Year: No    Number of Places Lived in the Last Year: 1    Unstable Housing in the Last Year: No       Allergies   Allergen Reactions    Amoxicillin-Pot Clavulanate GI Intolerance    Anastrozole Other (See Comments)     Diarrhea, Nausea, Stomach pain          Current Outpatient Medications:     albuterol (2 5 mg/3 mL) 0 083 % nebulizer solution, Take 3 mL (2 5 mg total) by nebulization every 6 (six) hours as needed for wheezing or shortness of breath, Disp: 90 mL, Rfl: 2    albuterol (Ventolin HFA) 90 mcg/act inhaler, Inhale 2 puffs every 4 (four) hours as needed for wheezing or shortness of breath, Disp: 18 g, Rfl: 5    amitriptyline (ELAVIL) 100 mg tablet, take 1/2 tablet by mouth once daily at bedtime, Disp: 45 tablet, Rfl: 1    apixaban (Eliquis) 5 mg, Take 1 tablet (5 mg total) by mouth 2 (two) times a day, Disp: 60 tablet, Rfl: 0    aspirin (ECOTRIN LOW STRENGTH) 81 mg EC tablet, Take 1 tablet (81 mg total) by mouth daily, Disp: 90 tablet, Rfl: 3    benztropine (COGENTIN) 0 5 mg tablet, Take 0 5 mg by mouth daily at bedtime  , Disp: , Rfl:     Blood Glucose Monitoring Suppl (ONE TOUCH ULTRA 2) w/Device KIT, by Does not apply route daily, Disp: 100 each, Rfl: 0    clonazePAM (KlonoPIN) 0 5 mg tablet, Take 0 5 mg by mouth daily as needed  , Disp: , Rfl:     doxepin (SINEquan) 100 mg capsule, take 1 to 2 capsules by mouth at bedtime if needed, Disp: , Rfl:     dulaglutide (Trulicity) 3 NC/0 8OZ injection, Inject 0 5 mL (3 mg total) under the skin every 7 days, Disp: 6 mL, Rfl: 0    gabapentin (NEURONTIN) 600 MG tablet, take 1 tablet by mouth three times a day, Disp: 270 tablet, Rfl: 1    glucose blood (OneTouch Ultra) "test strip, Use to test blood sugar daily, Disp: 100 strip, Rfl: 2    Insulin Glargine Solostar (Lantus SoloStar) 100 UNIT/ML SOPN, Inject 0 1 mL (10 Units total) under the skin 2 (two) times a day, Disp: 18 mL, Rfl: 1    Insulin Pen Needle (BD Pen Needle Jasmine 2nd Gen) 32G X 4 MM MISC, Inject under the skin in the morning, Disp: 90 each, Rfl: 1    loperamide (IMODIUM) 2 mg capsule, Take 1 capsule (2 mg total) by mouth 4 (four) times a day as needed for diarrhea, Disp: 30 capsule, Rfl: 0    OneTouch Delica Lancets 13T MISC, Check blood sugars three times daily  Please substitute with appropriate alternative as covered by patient's insurance  Dx: E11 65, Disp: 300 each, Rfl: 3    pantoprazole (PROTONIX) 40 mg tablet, take 1 tablet by mouth daily if needed for GERD SYMPTOMS, Disp: 30 tablet, Rfl: 1    rosuvastatin (CRESTOR) 40 MG tablet, Take 1 tablet (40 mg total) by mouth daily, Disp: 90 tablet, Rfl: 3    tiotropium (Spiriva Respimat) 2 5 MCG/ACT AERS inhaler, Inhale 2 puffs daily, Disp: 4 g, Rfl: 3    vilazodone (VIIBRYD) 40 mg tablet, Take 40 mg by mouth daily  , Disp: , Rfl:     VRAYLAR 6 MG capsule, Take 6 mg by mouth daily, Disp: , Rfl: 0    carvedilol (COREG) 3 125 mg tablet, take 1 tablet by mouth twice a day with meals (Patient not taking: Reported on 5/3/2023), Disp: 60 tablet, Rfl: 3    lidocaine (Lidoderm) 5 %, Apply 1 patch topically over 12 hours daily Remove & Discard patch within 12 hours or as directed by MD (Patient not taking: Reported on 2/20/2023), Disp: 15 patch, Rfl: 0    /72 (BP Location: Left arm, Patient Position: Sitting, Cuff Size: Standard)   Pulse 102   Ht 5' 6\" (1 676 m)   Wt 98 kg (216 lb)   BMI 34 86 kg/m²          Physical Exam  Vitals and nursing note reviewed  Constitutional:       Appearance: Normal appearance  She is obese  HENT:      Head: Normocephalic and atraumatic  Neck:      Vascular: No carotid bruit     Cardiovascular:      Rate and Rhythm: Normal " rate and regular rhythm  Pulses:           Carotid pulses are 2+ on the right side and 2+ on the left side  Radial pulses are 2+ on the right side and 2+ on the left side  Femoral pulses are 2+ on the right side and 2+ on the left side  Popliteal pulses are 2+ on the right side and 2+ on the left side  Dorsalis pedis pulses are 2+ on the right side and 2+ on the left side  Heart sounds: Normal heart sounds  Comments: No carotid bruit   Pulmonary:      Effort: Pulmonary effort is normal  No respiratory distress  Breath sounds: Normal breath sounds  Musculoskeletal:         General: No swelling  Normal range of motion  Cervical back: Normal range of motion and neck supple  Right lower leg: No edema  Left lower leg: No edema  Skin:     General: Skin is warm  Capillary Refill: Capillary refill takes less than 2 seconds  Neurological:      General: No focal deficit present  Mental Status: She is alert and oriented to person, place, and time  Psychiatric:         Mood and Affect: Mood normal          Behavior: Behavior normal        Adelina Villa PA-C  [facility]  [phone]    I have spent a total time of 30 minutes on 05/03/23 in caring for this patient including Diagnostic results, Prognosis, Risks and benefits of tx options, Instructions for management, Patient and family education, Importance of tx compliance, Risk factor reductions, Impressions, Counseling / Coordination of care, Documenting in the medical record, Reviewing / ordering tests, medicine, procedures   and Obtaining or reviewing history

## 2023-05-03 NOTE — PATIENT INSTRUCTIONS
-Educated patient on pathophysiology of peripheral arterial disease, available treatment options, and indications for treatment    -Discussed risk factor modification, including adequate glycemic and lipid control, smoking cessation, blood pressure, and lifestyle modifications    -Continue medical optimization with daily ASA 81mg and statin therapy with LDL goal <100    -Recommend starting a structured walking program 3-5 times/week for 30 minutes  Patient should walk until claudication symptoms occur, rest for 5-10 minutes, then continue to walk  Patient should increase distance each week  -Instructed patient to call the office with questions, concerns, or new symptoms  Peripheral Artery Disease   AMBULATORY CARE:   Peripheral artery disease (PAD)  is narrow, weak, or blocked arteries  It may affect any arteries outside of your heart and brain  PAD is usually the result of a buildup of fat and cholesterol, also called plaque, along your artery walls  Inflammation, a blood clot, or abnormal cell growth could also block your arteries  PAD prevents normal blood flow to your legs and arms  You are at risk of an amputation if poor blood flow keeps wounds from healing or causes gangrene (tissue death)  Without treatment, PAD can also cause a heart attack or stroke  Common symptoms include:  Mild PAD usually does not cause symptoms   As the disease worsens over time, you may have the following:  Pain or cramps in your leg or hip while you walk    A numb, weak, or heavy feeling in your legs    Dry, scaly, red, or pale skin on your legs    Thick or brittle nails, or hair loss on your arms and legs    Foot sores that will not heal    Burning or aching in your feet and toes while resting (this may be worse when you lie down)    Call your local emergency number (911 in the [address] [place] [address],[address]) if:   You have any of the following signs of a heart attack:      Squeezing, pressure, or pain in your chest    You may  also have any of the following:     Discomfort or pain in your back, neck, jaw, stomach, or arm    Shortness of breath    Nausea or vomiting    Lightheadedness or a sudden cold sweat    You have any of the following signs of a stroke:      Numbness or drooping on one side of your face     Weakness in an arm or leg    Confusion or difficulty speaking    Dizziness, a severe headache, or vision loss    Seek care immediately if:   You have sores or wounds that will not heal     You notice black or discolored skin on your arm or leg  Your skin is cool to the touch  Call your doctor if:   You have leg pain when you walk 1/8 mile (200 meters) or less, even with treatment  Your legs are red, dry, or pale, even with treatment  You have questions or concerns about your condition or care  Treatment for PAD  can help reduce your risk of a heart attack, stroke, or amputation  You may need more than one of the following:  Medicines  may be given to prevent blood clots and reduce the risk of a heart attack or stroke  You may be given medicine to help prevent your PAD from getting worse  A supervised exercise program  helps you stay active in normal daily activities  Healthcare providers will help you safely walk or do strength training exercises 3 times a week for 30 to 60 minutes  You will do this for several months, then transition to walking on your own  Angioplasty  is a procedure to open your artery so blood can flow through normally  A thin tube called a catheter is used to insert a small balloon into your artery  The balloon is inflated to open your blocked artery, and then removed  A tube called a stent may be placed in your artery to hold it open  Bypass surgery  is used to make a new connection to your artery with a vein from another part of your body, or an artificial graft  The vein or graft is attached to your artery above and below your blockage   This allows blood to flow around the blocked portion of your artery  Manage and prevent PAD:   Walk for 30 to 60 minutes at least 4 times a week  Your healthcare provider may also refer you to a supervised exercise program  The program helps increase how far you can walk without pain  It also helps you stay active in normal daily activities         Do not smoke  Nicotine and other chemicals in cigarettes and cigars can worsen PAD  They can also increase your risk for a heart attack or stroke  Ask your healthcare provider for information if you currently smoke and need help to quit  E-cigarettes or smokeless tobacco still contain nicotine  Talk to your healthcare provider before you use these products  Manage other health conditions  Take your medicines as directed and follow your healthcare provider's instructions if you have high blood pressure or high cholesterol  Perform foot care and check your blood sugar levels as directed if you have diabetes  Eat heart-healthy foods  Eat whole grains, fruits, and vegetables every day  Limit salt and high-fat foods  Ask your healthcare provider for more information on a heart healthy diet  Ask what a healthy weight is for you  Your healthcare provider can help you create a healthy weight-loss plan, if needed  Follow up with your doctor as directed:  Write down your questions so you remember to ask them during your visits  © Copyright [employer] Prakash 2022 Information is for End User's use only and may not be sold, redistributed or otherwise used for commercial purposes  The above information is an  only  It is not intended as medical advice for individual conditions or treatments  Talk to your doctor, nurse or pharmacist before following any medical regimen to see if it is safe and effective for you

## 2023-05-03 NOTE — ASSESSMENT & PLAN NOTE
-Hx of LLE DVT, PE s/p IVC filter placement  -Reports no longer on eliquis  Takes ASA 81 mg    -Denies use of compression stockings  Does elevate her legs  -Recommend conservative measures with use of daily compression hose (20-30 mmHg), lower extremity elevation, regular exercise, and diligent skin care

## 2023-05-03 NOTE — ASSESSMENT & PLAN NOTE
61 y o  female patient w/ a PMH significant for DM, HLD, chronic and ongoing tobacco use-about 64 pack years, currently smoking up to a pack and half a day, COPD, a history of DVT and IVC filter presents to the vascular office today for evaluation of bilateral, intermittent thigh pain  Imaging  CAESAR duplex (12/17/21): CAESAR duplex demonstrates diffuse disease throughout the BL femoral-popliteal arteries without significant focal stenosis  R STEFANI 1 13/140/94; L STEFANI 1 08/121/76  Recommendations  -Diffuse arterial disease throughout the femoral-popliteal arteries without focal stenosis  BL STEFANI's within the normal category and GT pressures are within healing range     -Will obtain updated CAESAR to reassess perfusion given ongoing symptoms  Will call with results   -We discussed the pathophysiology of arterial occlusive disease, available treatment options and indications for treatment    -Continue medical optimization  Currently on ASA and statin    -Goal hgb A1c<7 0, LDL<100, BP <140/90     -Recommend walking program, ambulating at least 30 minutes 3-5 times weekly   -Discussed the importance of smoking cessation  She defers    -Recommend moisturization of the lower extremities, avoidance of injury and close observation of bilateral feet for any new wounds   -Discussed that a portion of her symptoms could be neurogenic in nature as she does report a history of back pain  Review of a previous XR shows multilevel spondylosis with a grade 1 spondylolisthesis at the L3-4 level    -Instructed patient to contact office with any new symptoms or concerns

## 2023-05-05 ENCOUNTER — PATIENT OUTREACH (OUTPATIENT)
Dept: FAMILY MEDICINE CLINIC | Facility: HOME HEALTHCARE | Age: 63
End: 2023-05-05

## 2023-05-05 NOTE — PROGRESS NOTES
Outpatient Care Management Note:    Telephone outreach made for follow up on transportation needs  Spoke with patient  Patient informs me she has [facility] set up  She had an appointment with Endo on 5/9/23 and with Pulm on 5/18/23  Patient states she has no further needs at this time  Complex CM episode will be closed  I have removed myself from the care team   Patient has my contact information and was encouraged to contact me with any future needs or concerns

## 2023-05-09 ENCOUNTER — OFFICE VISIT (OUTPATIENT)
Dept: ENDOCRINOLOGY | Facility: CLINIC | Age: 63
End: 2023-05-09

## 2023-05-09 VITALS
DIASTOLIC BLOOD PRESSURE: 76 MMHG | BODY MASS INDEX: 34.72 KG/M2 | OXYGEN SATURATION: 96 % | WEIGHT: 216 LBS | HEIGHT: 66 IN | SYSTOLIC BLOOD PRESSURE: 128 MMHG | HEART RATE: 110 BPM | RESPIRATION RATE: 18 BRPM

## 2023-05-09 DIAGNOSIS — E11.65 TYPE 2 DIABETES MELLITUS WITH HYPERGLYCEMIA, WITH LONG-TERM CURRENT USE OF INSULIN (HCC): Primary | ICD-10-CM

## 2023-05-09 DIAGNOSIS — E78.2 MIXED HYPERLIPIDEMIA: ICD-10-CM

## 2023-05-09 DIAGNOSIS — E66.09 CLASS 1 OBESITY DUE TO EXCESS CALORIES WITH SERIOUS COMORBIDITY AND BODY MASS INDEX (BMI) OF 34.0 TO 34.9 IN ADULT: ICD-10-CM

## 2023-05-09 DIAGNOSIS — Z79.4 TYPE 2 DIABETES MELLITUS WITH HYPERGLYCEMIA, WITH LONG-TERM CURRENT USE OF INSULIN (HCC): Primary | ICD-10-CM

## 2023-05-09 DIAGNOSIS — I10 PRIMARY HYPERTENSION: ICD-10-CM

## 2023-05-09 DIAGNOSIS — E55.9 VITAMIN D DEFICIENCY: ICD-10-CM

## 2023-05-09 RX ORDER — DULAGLUTIDE 3 MG/.5ML
3 INJECTION, SOLUTION SUBCUTANEOUS
Qty: 2 ML | Refills: 2 | Status: SHIPPED | OUTPATIENT
Start: 2023-05-09 | End: 2023-08-07

## 2023-05-09 NOTE — PROGRESS NOTES
Established patient Progress Note      Cc: diabetes    Referring Provider  No referring provider defined for this encounter  History of Present Illness:   Jazzy Robbins is a 61 y o  female with a history of type 2 diabetes with long term use of insulin who presented for follow up  Last seen in the office in February 2023  Reports complications of Neuropathy and TIA  Denies recent illness or hospitalizations  Denies recent severe hypoglycemic or severe hyperglycemic episodes  Denies any issues with her current regimen  home glucose monitoring: are performed regularly      Current regimen:     Lantus 10 units twice a day  Jardiance 25 mg once daily ( has not taken since February)  Trulucity 1 5 mg once weekly      Home blood glucose readings:   Before breakfast: 140 - 180   Before lunch: 200 -220   Before dinner:x  Bedtime:188, 145, 220, 377, 250, 250 ,      Hypoglycemic episodes:   H/o of hypoglycemia causing hospitalization or Intervention such as glucagon injection  or ambulance call :no   Hypoglycemia symptoms:none   Treatment of hypoglycemia: none           Opthamology:overdue, will schedule    Podiatry: no    Has hypertension: followed by PCP; not on medication   Has hyperlipidemia: followed by PCP; on crestor 40 mg - tolerating well, no myalgias      Thyroid disorders: no   History of pancreatitis: no    Patient Active Problem List   Diagnosis   • Cataract   • Chronic hoarseness   • Chronic low back pain   • Centrilobular emphysema (HCC)   • Deep vein thrombophlebitis of leg, unspecified laterality (HCC)   • Depression   • Diabetes mellitus with neurological manifestation (HCC)   • Gastroesophageal reflux disease with esophagitis   • Headache   • Hypercholesterolemia   • Hypercoagulable state ([facility] [address] )   • Hypertension   • Migraine   • Myositis   • Neuropathy   • Pulmonary nodule seen on imaging study   • Type 2 diabetes mellitus with hyperglycemia, without long-term current use of insulin (HCC)   • Chronic hypoxemic respiratory failure (HCC)   • Class 2 severe obesity due to excess calories with serious comorbidity and body mass index (BMI) of 35 0 to 35 9 in adult [facility])   • Sleep apnea   • Family history of heart disease   • Pulmonary hypertension ([facility] )   • Primary fibromyalgia syndrome   • Primary cancer of upper outer quadrant of right breast ([facility] )   • Malignant neoplasm of upper-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, estrogen receptor positive ([place] [address] )   • Mucinous carcinoma of breast, right ([place] [address] )   • Tobacco abuse   • PAD (peripheral artery disease) ([facility])   • Tachycardia   • Stage 3a chronic kidney disease (HCC)   • Chronic obstructive pulmonary disease with acute exacerbation (HCC)   • Kidney lesion   • Nonocclusive coronary atherosclerosis of native coronary artery   • Kidney cysts   • Hyponatremia   • Diarrhea   • Presence of IVC filter   • History of pulmonary embolism      Past Medical History:   Diagnosis Date   • Cancer ([place] [address] )     right breast   • Chronic back pain    • Colitis    • COPD (chronic obstructive pulmonary disease) ([facility])    • Depression    • Diabetes mellitus ([facility] )    • DVT of lower limb, acute ([place] [address] ) 2004   • GERD (gastroesophageal reflux disease)    • Hyperlipidemia    • Pneumonia    • Rapid heart rate    • Sleep apnea    • Stroke (HCC)     4 mini strokes      Past Surgical History:   Procedure Laterality Date   • BREAST LUMPECTOMY Right    • CARDIAC CATHETERIZATION N/A 10/28/2021    Procedure: Cardiac Coronary Angiogram;  Surgeon: Larissa Vazquez DO;  Location: [facility];   Service: Cardiology   • IVC FILTER INSERTION     • MASTECTOMY     • MASTECTOMY W/ SENTINEL NODE BIOPSY Right 11/09/2021    Procedure: BREAST MASTECTOMY WITH BIOPSY LYMPH NODE SENTINEL and axillary node dissection  ([facility] Lymph Node Injection @ 12:30);  Surgeon: Maria M Martinez MD; Location: [facility] MAIN OR;  Service: General   • US GUIDANCE BREAST BIOPSY RIGHT EACH ADDITIONAL Right 10/13/2021   • US GUIDANCE BREAST BIOPSY RIGHT EACH ADDITIONAL Right 10/13/2021   • US GUIDED BREAST BIOPSY RIGHT COMPLETE Right 10/13/2021      Family History   Problem Relation Age of Onset   • Breast cancer Mother 67   • COPD Mother    • Coronary artery disease Mother    • Coronary artery disease Father    • Stroke Father    • Lung cancer Sister    • No Known Problems Sister    • No Known Problems Sister    • Breast cancer Maternal Grandmother    • Colon cancer Paternal Grandfather    • No Known Problems Maternal Aunt    • No Known Problems Maternal Aunt    • No Known Problems Maternal Aunt    • No Known Problems Paternal Aunt    • Breast cancer Cousin      Social History     Tobacco Use   • Smoking status: Every Day     Packs/day: 1 00     Types: Cigarettes   • Smokeless tobacco: Never   Substance Use Topics   • Alcohol use: Never     Allergies   Allergen Reactions   • Amoxicillin-Pot Clavulanate GI Intolerance   • Anastrozole Other (See Comments)     Diarrhea, Nausea, Stomach pain          Current Outpatient Medications:   •  albuterol (2 5 mg/3 mL) 0 083 % nebulizer solution, Take 3 mL (2 5 mg total) by nebulization every 6 (six) hours as needed for wheezing or shortness of breath, Disp: 90 mL, Rfl: 2  •  albuterol (Ventolin HFA) 90 mcg/act inhaler, Inhale 2 puffs every 4 (four) hours as needed for wheezing or shortness of breath, Disp: 18 g, Rfl: 5  •  amitriptyline (ELAVIL) 100 mg tablet, take 1/2 tablet by mouth once daily at bedtime, Disp: 45 tablet, Rfl: 1  •  apixaban (Eliquis) 5 mg, Take 1 tablet (5 mg total) by mouth 2 (two) times a day, Disp: 60 tablet, Rfl: 0  •  aspirin (ECOTRIN LOW STRENGTH) 81 mg EC tablet, Take 1 tablet (81 mg total) by mouth daily, Disp: 90 tablet, Rfl: 3  •  benztropine (COGENTIN) 0 5 mg tablet, Take 0 5 mg by mouth daily at bedtime  , Disp: , Rfl:   •  Blood Glucose Monitoring Suppl (ONE TOUCH ULTRA 2) w/Device KIT, by Does not apply route daily, Disp: 100 each, Rfl: 0  •  carvedilol (COREG) 3 125 mg tablet, take 1 tablet by mouth twice a day with meals (Patient not taking: Reported on 5/3/2023), Disp: 60 tablet, Rfl: 3  •  clonazePAM (KlonoPIN) 0 5 mg tablet, Take 0 5 mg by mouth daily as needed  , Disp: , Rfl:   •  doxepin (SINEquan) 100 mg capsule, take 1 to 2 capsules by mouth at bedtime if needed, Disp: , Rfl:   •  dulaglutide (Trulicity) 3 HG/0 6MW injection, Inject 0 5 mL (3 mg total) under the skin every 7 days, Disp: 6 mL, Rfl: 0  •  gabapentin (NEURONTIN) 600 MG tablet, take 1 tablet by mouth three times a day, Disp: 270 tablet, Rfl: 1  •  glucose blood (OneTouch Ultra) test strip, Use to test blood sugar daily, Disp: 100 strip, Rfl: 2  •  Insulin Glargine Solostar (Lantus SoloStar) 100 UNIT/ML SOPN, Inject 0 1 mL (10 Units total) under the skin 2 (two) times a day, Disp: 18 mL, Rfl: 1  •  Insulin Pen Needle (BD Pen Needle Jasmine 2nd Gen) 32G X 4 MM MISC, Inject under the skin in the morning, Disp: 90 each, Rfl: 1  •  lidocaine (Lidoderm) 5 %, Apply 1 patch topically over 12 hours daily Remove & Discard patch within 12 hours or as directed by MD (Patient not taking: Reported on 2/20/2023), Disp: 15 patch, Rfl: 0  •  loperamide (IMODIUM) 2 mg capsule, Take 1 capsule (2 mg total) by mouth 4 (four) times a day as needed for diarrhea, Disp: 30 capsule, Rfl: 0  •  OneTouch Delica Lancets 21J MISC, Check blood sugars three times daily  Please substitute with appropriate alternative as covered by patient's insurance   Dx: E11 65, Disp: 300 each, Rfl: 3  •  pantoprazole (PROTONIX) 40 mg tablet, take 1 tablet by mouth daily if needed for GERD SYMPTOMS, Disp: 30 tablet, Rfl: 1  •  rosuvastatin (CRESTOR) 40 MG tablet, Take 1 tablet (40 mg total) by mouth daily, Disp: 90 tablet, Rfl: 3  •  tiotropium (Spiriva Respimat) 2 5 MCG/ACT AERS inhaler, Inhale 2 puffs daily, Disp: 4 g, Rfl: 3  • vilazodone (VIIBRYD) 40 mg tablet, Take 40 mg by mouth daily  , Disp: , Rfl:   •  VRAYLAR 6 MG capsule, Take 6 mg by mouth daily, Disp: , Rfl: 0  Review of Systems   Constitutional: Negative for appetite change, fatigue and unexpected weight change  HENT: Negative for trouble swallowing and voice change  Eyes: Negative for visual disturbance  Respiratory: Negative for cough, shortness of breath and wheezing  Cardiovascular: Negative for palpitations and leg swelling  Gastrointestinal: Negative for abdominal pain, constipation, diarrhea, nausea and vomiting  Endocrine: Negative for cold intolerance, heat intolerance, polyphagia and polyuria  Musculoskeletal: Negative for arthralgias  Skin: Negative for color change, rash and wound  Neurological: Negative for dizziness, tremors, weakness, light-headedness, numbness and headaches  Psychiatric/Behavioral: Negative for agitation and sleep disturbance  The patient is not nervous/anxious  Physical Exam:  There is no height or weight on file to calculate BMI  There were no vitals taken for this visit     Wt Readings from Last 3 Encounters:   05/03/23 98 kg (216 lb)   04/18/23 98 2 kg (216 lb 9 6 oz)   04/04/23 98 7 kg (217 lb 9 6 oz)       GEN: NAD, obese   E/n/m nl facies,   Eyes: no stare or proptosis,   Neck: trachea midline, thyroid NT to palpation, nl in size, no nodules or neck masses noted, no cervical LAD  CV; heart reg rate s1s2 nl, tachycardia, no m/r/g appreciated, no ALESSANDRO  Resp: CTAB, good effort  Ab+BS  Neuro: no tremor, 2+ DTRs in BUE  MS: no c/c in digits, moves all 4 ext, nl muscle bulk, gait nl  Skin: warm and dry, no palmar erythema  Ext: no c/c in digits, no edema bilaterally, 2+ DP and PT pulses bilat,   Psych: nl mood and affect, no gross lapses in memory      Labs:   No components found for: HA1C  No components found for: GLU    Lab Results   Component Value Date    CREATININE 1 31 (H) 04/04/2023    CREATININE 1 24 02/16/2023 CREATININE 1 24 02/14/2023    BUN 19 04/04/2023     01/08/2016    K 4 1 04/04/2023     04/04/2023    CO2 27 04/04/2023     eGFR   Date Value Ref Range Status   04/04/2023 43 ml/min/1 73sq m Final     No components found for: [facility]    Lab Results   Component Value Date    CHOL 234 09/07/2015    HDL 27 (L) 04/04/2023    TRIG 396 (H) 04/04/2023       Lab Results   Component Value Date    ALT 20 04/04/2023    AST 18 04/04/2023    ALKPHOS 62 04/04/2023    BILITOT 0 53 11/30/2015       Lab Results   Component Value Date    FREET4 1 03 04/27/2020       Impression:  1  Type 2 diabetes mellitus with hyperglycemia, with long-term current use of insulin ([address] )    2  Primary hypertension    3  Class 1 obesity due to excess calories with serious comorbidity and body mass index (BMI) of 34 0 to 34 9 in adult    4  Mixed hyperlipidemia           Plan:    Vicki Duke was seen today for diabetes type 2  Diagnoses and all orders for this visit:    Type 2 diabetes mellitus with hyperglycemia, with long-term current use of insulin ([address] ):  Uncontrolled with A1c of 9% however glycemic control is improving significantly,  She has not taken Jardiance since February, she has chronic intermittent diarrhea for which she stopped taking Jardiance, she was experiencing this before starting Trulicity as well, otherwise she tolerated both medications well  I asked patient to resume taking Jardiance 25 mg once daily, I increased Trulicity to 3 mg once weekly  Continue Lantus 10 units twice a day  I have asked the patient to check blood sugars 2-3 daily and send a record to the office in few weeks for review so that changes can be made to regimen, if applicable  We provided information on basic nutrition for diabetics  Extended counseling on disease management   Emphasized importance of daily exercise, weight loss, caloric reduction, small meals, consumption of multiple servings of fruits and vegetables and avoidance of concentrated sweets such as juice and soda  Reviewed concepts of diabetes self-management stressing the primary role of the patient in monitoring and maintaining control of diabetes  Return back in 3 months   Lab prior to next visit  -     dulaglutide (Trulicity) 3 OA/3 0RK injection; Inject 0 5 mL (3 mg total) under the skin every 7 days  -     Hemoglobin A1C; Future  -     Comprehensive metabolic panel; Future  -     Lipid Panel with Direct LDL reflex; Future  -     Albumin / creatinine urine ratio; Future    Primary hypertension:  Controlled  -     Comprehensive metabolic panel; Future  -     Albumin / creatinine urine ratio; Future    Class 1 obesity due to excess calories with serious comorbidity and body mass index (BMI) of 34 0 to 46 3 in adult:  Trulicity 3 mg once weekly  -     Hemoglobin A1C; Future  -     Comprehensive metabolic panel; Future  -     Lipid Panel with Direct LDL reflex; Future    Mixed hyperlipidemia:  Crestor 40 mg once daily, she is taking regularly tolerating well  -     Lipid Panel with Direct LDL reflex; Future    Vitamin D deficiency:  Continue supplementation   -     Vitamin D 25 hydroxy; Future    Discussed with the patient and all questioned fully answered  She will call me if any problems arise      Counseled patient on diagnostic results, prognosis, risk and benefit of treatment options, instruction for management, importance of treatment compliance, Risk  factor reduction and impressions      Gio Rodriguez MD

## 2023-05-16 ENCOUNTER — TELEPHONE (OUTPATIENT)
Dept: BARIATRICS | Facility: CLINIC | Age: 63
End: 2023-05-16

## 2023-05-16 DIAGNOSIS — E11.65 TYPE 2 DIABETES MELLITUS WITH HYPERGLYCEMIA, WITHOUT LONG-TERM CURRENT USE OF INSULIN (HCC): ICD-10-CM

## 2023-05-16 RX ORDER — BLOOD SUGAR DIAGNOSTIC
STRIP MISCELLANEOUS
Qty: 100 STRIP | Refills: 2 | Status: SHIPPED | OUTPATIENT
Start: 2023-05-16

## 2023-05-16 NOTE — TELEPHONE ENCOUNTER
Pt called for refill of test strips and jardiance    [facility] AFFILIATED WITH [facility]

## 2023-05-18 DIAGNOSIS — K21.9 GERD WITHOUT ESOPHAGITIS: ICD-10-CM

## 2023-05-18 RX ORDER — PANTOPRAZOLE SODIUM 40 MG/1
TABLET, DELAYED RELEASE ORAL
Qty: 30 TABLET | Refills: 1 | Status: SHIPPED | OUTPATIENT
Start: 2023-05-18

## 2023-05-19 NOTE — TELEPHONE ENCOUNTER
Pt called that jardiance was not sent to her pharmacy  She stated she is leaving for [place] on Tuesday and will need this by then

## 2023-05-19 NOTE — TELEPHONE ENCOUNTER
Sent Jardiance 25 mg to [facility] as providers last notes indicated he wanted her to restart jardiance as she stopped it back in [place]   Provider approved

## 2023-06-06 ENCOUNTER — OFFICE VISIT (OUTPATIENT)
Dept: FAMILY MEDICINE CLINIC | Facility: HOME HEALTHCARE | Age: 63
End: 2023-06-06
Payer: COMMERCIAL

## 2023-06-06 VITALS
HEIGHT: 66 IN | WEIGHT: 213 LBS | DIASTOLIC BLOOD PRESSURE: 78 MMHG | BODY MASS INDEX: 34.23 KG/M2 | TEMPERATURE: 98.7 F | HEART RATE: 87 BPM | RESPIRATION RATE: 18 BRPM | SYSTOLIC BLOOD PRESSURE: 110 MMHG | OXYGEN SATURATION: 93 %

## 2023-06-06 DIAGNOSIS — Z00.00 ANNUAL PHYSICAL EXAM: Primary | ICD-10-CM

## 2023-06-06 DIAGNOSIS — Z12.4 SCREENING FOR CERVICAL CANCER: ICD-10-CM

## 2023-06-06 DIAGNOSIS — E11.65 TYPE 2 DIABETES MELLITUS WITH HYPERGLYCEMIA, WITH LONG-TERM CURRENT USE OF INSULIN (HCC): ICD-10-CM

## 2023-06-06 DIAGNOSIS — E66.09 CLASS 1 OBESITY DUE TO EXCESS CALORIES WITH SERIOUS COMORBIDITY AND BODY MASS INDEX (BMI) OF 34.0 TO 34.9 IN ADULT: ICD-10-CM

## 2023-06-06 DIAGNOSIS — Z79.4 TYPE 2 DIABETES MELLITUS WITH HYPERGLYCEMIA, WITH LONG-TERM CURRENT USE OF INSULIN (HCC): ICD-10-CM

## 2023-06-06 DIAGNOSIS — I10 PRIMARY HYPERTENSION: ICD-10-CM

## 2023-06-06 DIAGNOSIS — Z72.0 TOBACCO ABUSE: ICD-10-CM

## 2023-06-06 PROCEDURE — T1015 CLINIC SERVICE: HCPCS | Performed by: FAMILY MEDICINE

## 2023-06-06 NOTE — PROGRESS NOTES
[facility]    NAME: Mami Roa  AGE: 61 y o  SEX: female  : 1960     DATE: 2023     Assessment and Plan:     Problem List Items Addressed This Visit        Endocrine    Type 2 diabetes mellitus with hyperglycemia, with long-term current use of insulin ([address] )       Cardiovascular and Mediastinum    Hypertension       Other    Class 1 obesity due to excess calories with serious comorbidity and body mass index (BMI) of 34 0 to 34 9 in adult    Tobacco abuse   Other Visit Diagnoses     Annual physical exam    -  Primary    Screening for cervical cancer        Relevant Orders    Ambulatory Referral to Obstetrics / Gynecology        - Continue current medications as prescribed  - Labs as ordered by endocrinology  - Referral provided to GYN for PAP  - Follow up in 3 months    Immunizations and preventive care screenings were discussed with patient today  Appropriate education was printed on patient's after visit summary  Counseling:  Alcohol/drug use: discussed moderation in alcohol intake, the recommendations for healthy alcohol use, and avoidance of illicit drug use  Dental Health: discussed importance of regular tooth brushing, flossing, and dental visits  Injury prevention: discussed safety/seat belts, safety helmets, smoke detectors, carbon dioxide detectors, and smoking near bedding or upholstery  Sexual health: discussed sexually transmitted diseases, partner selection, use of condoms, avoidance of unintended pregnancy, and contraceptive alternatives  Exercise: the importance of regular exercise/physical activity was discussed  Recommend exercise 3-5 times per week for at least 30 minutes  BMI Counseling: Body mass index is 34 38 kg/m²   The BMI is above normal  Nutrition recommendations include decreasing portion sizes, encouraging healthy choices of fruits and vegetables, decreasing fast food intake, consuming healthier snacks, limiting drinks that contain sugar, moderation in carbohydrate intake, increasing intake of lean protein, reducing intake of saturated and trans fat and reducing intake of cholesterol  Exercise recommendations include moderate physical activity 150 minutes/week, exercising 3-5 times per week, obtaining a gym membership and strength training exercises  No pharmacotherapy was ordered  Rationale for BMI follow-up plan is due to patient being overweight or obese  Tobacco Cessation Counseling: Tobacco cessation counseling was provided  The patient is sincerely urged to quit consumption of tobacco  She is not ready to quit tobacco  Medication options not discussed  Return in about 3 months (around 9/6/2023) for Next scheduled follow up  Chief Complaint:     Chief Complaint   Patient presents with   • Follow-up      History of Present Illness:     Adult Annual Physical   Patient here for a comprehensive physical exam  The patient reports no problems  Diet and Physical Activity  Diet/Nutrition: well balanced diet, limited junk food and consuming 3-5 servings of fruits/vegetables daily  Exercise: walking, 5-7 times a week on average and less than 30 minutes on average  Depression Screening  PHQ-2/9 Depression Screening         General Health  Sleep: sleeps well and gets more than 8 hours of sleep on average  Hearing: normal - bilateral   Vision: no vision problems, goes for regular eye exams, most recent eye exam <1 year ago and wears glasses  Dental: regular dental visits  /GYN Health  Patient is: postmenopausal     Review of Systems:     Review of Systems   Constitutional: Negative for chills, diaphoresis and fever  HENT: Negative for congestion, ear pain, rhinorrhea, sinus pain and sore throat  Eyes: Negative for visual disturbance  Respiratory: Negative for cough, chest tightness, shortness of breath and wheezing      Cardiovascular: Negative for chest pain, palpitations and leg swelling  Gastrointestinal: Negative for abdominal pain, blood in stool, constipation, diarrhea, nausea and vomiting  Genitourinary: Negative for dysuria, frequency, hematuria and urgency  Skin: Negative for rash and wound  Neurological: Negative for dizziness, syncope, weakness, light-headedness and headaches  Psychiatric/Behavioral: Negative for dysphoric mood, self-injury, sleep disturbance and suicidal ideas  The patient is not nervous/anxious  All other systems reviewed and are negative  Past Medical History:     Past Medical History:   Diagnosis Date   • Cancer [facility])     right breast   • Chronic back pain    • Colitis    • COPD (chronic obstructive pulmonary disease) ([facility])    • Depression    • Diabetes mellitus ([facility] )    • DVT of lower limb, acute ([place] [address] ) 2004   • GERD (gastroesophageal reflux disease)    • Hyperlipidemia    • Pneumonia    • Rapid heart rate    • Sleep apnea    • Stroke ([place] [address] )     4 mini strokes      Past Surgical History:     Past Surgical History:   Procedure Laterality Date   • BREAST LUMPECTOMY Right    • CARDIAC CATHETERIZATION N/A 10/28/2021    Procedure: Cardiac Coronary Angiogram;  Surgeon: Irvin Staton DO;  Location: [facility] CARDIAC C[facility] LAB;   Service: Cardiology   • IVC FILTER INSERTION     • MASTECTOMY     • MASTECTOMY W/ SENTINEL NODE BIOPSY Right 11/09/2021    Procedure: BREAST MASTECTOMY WITH BIOPSY LYMPH NODE SENTINEL and axillary node dissection  ([place] Lymph Node Injection @ 12:30);  Surgeon: Wilma Reyes MD;  Location: [facility] MAIN OR;  Service: General   • US GUIDANCE BREAST BIOPSY RIGHT EACH ADDITIONAL Right 10/13/2021   • US GUIDANCE BREAST BIOPSY RIGHT EACH ADDITIONAL Right 10/13/2021   • US GUIDED BREAST BIOPSY RIGHT COMPLETE Right 10/13/2021      Social History:     Social History     Socioeconomic History   • Marital status: Legally      Spouse name: None   • Number of children: None   • Years of education: None   • Highest education level: None   Occupational History   • None   Tobacco Use   • Smoking status: Every Day     Packs/day: 1 00     Types: Cigarettes   • Smokeless tobacco: Never   Vaping Use   • Vaping Use: Never used   Substance and Sexual Activity   • Alcohol use: Never   • Drug use: Never   • Sexual activity: Not Currently   Other Topics Concern   • None   Social History Narrative   • None     Social Determinants of Health     Financial Resource Strain: Not on file   Food Insecurity: No Food Insecurity (1/30/2023)    Hunger Vital Sign    • Worried About Running Out of Food in the Last Year: Never true    • Ran Out of Food in the Last Year: Never true   Transportation Needs: No Transportation Needs (1/30/2023)    PRAPARE - Transportation    • Lack of Transportation (Medical): No    • Lack of Transportation (Non-Medical):  No   Physical Activity: Not on file   Stress: Not on file   Social Connections: Not on file   Intimate Partner Violence: Not on file   Housing Stability: Low Risk  (1/30/2023)    Housing Stability Vital Sign    • Unable to Pay for Housing in the Last Year: No    • Number of Places Lived in the Last Year: 1    • Unstable Housing in the Last Year: No      Family History:     Family History   Problem Relation Age of Onset   • Breast cancer Mother 67   • COPD Mother    • Coronary artery disease Mother    • Coronary artery disease Father    • Stroke Father    • Lung cancer Sister    • No Known Problems Sister    • No Known Problems Sister    • Breast cancer Maternal Grandmother    • Colon cancer Paternal Grandfather    • No Known Problems Maternal Aunt    • No Known Problems Maternal Aunt    • No Known Problems Maternal Aunt    • No Known Problems Paternal Aunt    • Breast cancer Cousin       Current Medications:     Current Outpatient Medications   Medication Sig Dispense Refill   • albuterol (2 5 mg/3 mL) 0 083 % nebulizer solution Take 3 mL (2 5 mg total) by nebulization every 6 (six) hours as needed for wheezing or shortness of breath 90 mL 2   • albuterol (Ventolin HFA) 90 mcg/act inhaler Inhale 2 puffs every 4 (four) hours as needed for wheezing or shortness of breath 18 g 5   • amitriptyline (ELAVIL) 100 mg tablet take 1/2 tablet by mouth once daily at bedtime 45 tablet 1   • apixaban (Eliquis) 5 mg Take 1 tablet (5 mg total) by mouth 2 (two) times a day 60 tablet 0   • aspirin (ECOTRIN LOW STRENGTH) 81 mg EC tablet Take 1 tablet (81 mg total) by mouth daily 90 tablet 3   • benztropine (COGENTIN) 0 5 mg tablet Take 0 5 mg by mouth daily at bedtime       • Blood Glucose Monitoring Suppl (ONE TOUCH ULTRA 2) w/Device KIT by Does not apply route daily 100 each 0   • clonazePAM (KlonoPIN) 0 5 mg tablet Take 0 5 mg by mouth daily as needed       • doxepin (SINEquan) 100 mg capsule take 1 to 2 capsules by mouth at bedtime if needed     • dulaglutide (Trulicity) 3 VG/5 4OW injection Inject 0 5 mL (3 mg total) under the skin every 7 days 2 mL 2   • Empagliflozin 25 MG TABS Take 1 tablet (25 mg total) by mouth every morning 30 tablet 2   • gabapentin (NEURONTIN) 600 MG tablet take 1 tablet by mouth three times a day 270 tablet 1   • glucose blood (OneTouch Ultra) test strip Use to test blood sugar daily 100 strip 2   • Insulin Glargine Solostar (Lantus SoloStar) 100 UNIT/ML SOPN Inject 0 1 mL (10 Units total) under the skin 2 (two) times a day 18 mL 1   • Insulin Pen Needle (BD Pen Needle Jasmine 2nd Gen) 32G X 4 MM MISC Inject under the skin in the morning 90 each 1   • lidocaine (Lidoderm) 5 % Apply 1 patch topically over 12 hours daily Remove & Discard patch within 12 hours or as directed by MD 15 patch 0   • loperamide (IMODIUM) 2 mg capsule Take 1 capsule (2 mg total) by mouth 4 (four) times a day as needed for diarrhea 30 capsule 0   • OneTouch Delica Lancets 72W MISC Check blood sugars three times daily  Please substitute with appropriate alternative as covered by patient's insurance   Dx: "E11 65 300 each 3   • pantoprazole (PROTONIX) 40 mg tablet take 1 tablet by mouth daily if needed for GERD SYMPTOMS 30 tablet 1   • rosuvastatin (CRESTOR) 40 MG tablet Take 1 tablet (40 mg total) by mouth daily 90 tablet 3   • tiotropium (Spiriva Respimat) 2 5 MCG/ACT AERS inhaler Inhale 2 puffs daily 4 g 3   • vilazodone (VIIBRYD) 40 mg tablet Take 40 mg by mouth daily  • VRAYLAR 6 MG capsule Take 6 mg by mouth daily  0   • carvedilol (COREG) 3 125 mg tablet take 1 tablet by mouth twice a day with meals (Patient not taking: Reported on 5/3/2023) 60 tablet 3     No current facility-administered medications for this visit  Allergies: Allergies   Allergen Reactions   • Amoxicillin-Pot Clavulanate GI Intolerance   • Anastrozole Other (See Comments)     Diarrhea, Nausea, Stomach pain       Physical Exam:     /78   Pulse 87   Temp 98 7 °F (37 1 °C)   Resp 18   Ht 5' 6\" (1 676 m)   Wt 96 6 kg (213 lb)   SpO2 93%   BMI 34 38 kg/m²     Physical Exam  Vitals and nursing note reviewed  Constitutional:       General: She is not in acute distress  Appearance: Normal appearance  Comments: Ambulating with a cane   HENT:      Head: Normocephalic and atraumatic  Eyes:      Extraocular Movements: Extraocular movements intact  Conjunctiva/sclera: Conjunctivae normal       Pupils: Pupils are equal, round, and reactive to light  Cardiovascular:      Rate and Rhythm: Normal rate and regular rhythm  Heart sounds: Normal heart sounds  No murmur heard  Pulmonary:      Effort: Pulmonary effort is normal  No respiratory distress  Breath sounds: Normal breath sounds  No wheezing  Musculoskeletal:      Cervical back: Normal range of motion and neck supple  Right lower leg: No edema  Left lower leg: No edema  Skin:     General: Skin is warm and dry  Neurological:      General: No focal deficit present        Mental Status: She is alert and oriented to person, place, and " time       Cranial Nerves: No cranial nerve deficit  Motor: No weakness     Psychiatric:         Mood and Affect: Mood normal          Behavior: Behavior normal           Shea Mahmood, [address] [place] [address]

## 2023-06-12 ENCOUNTER — HOSPITAL ENCOUNTER (OUTPATIENT)
Dept: NON INVASIVE DIAGNOSTICS | Facility: HOSPITAL | Age: 63
Discharge: HOME | End: 2023-06-12
Payer: COMMERCIAL

## 2023-06-12 ENCOUNTER — APPOINTMENT (OUTPATIENT)
Dept: LAB | Facility: HOSPITAL | Age: 63
End: 2023-06-12
Payer: COMMERCIAL

## 2023-06-12 DIAGNOSIS — I73.9 PAD (PERIPHERAL ARTERY DISEASE) (HCC): ICD-10-CM

## 2023-06-12 DIAGNOSIS — E78.2 MIXED HYPERLIPIDEMIA: ICD-10-CM

## 2023-06-12 DIAGNOSIS — E55.9 VITAMIN D DEFICIENCY: ICD-10-CM

## 2023-06-12 DIAGNOSIS — N18.31 STAGE 3A CHRONIC KIDNEY DISEASE (HCC): ICD-10-CM

## 2023-06-12 DIAGNOSIS — I10 PRIMARY HYPERTENSION: ICD-10-CM

## 2023-06-12 DIAGNOSIS — E66.09 CLASS 1 OBESITY DUE TO EXCESS CALORIES WITH SERIOUS COMORBIDITY AND BODY MASS INDEX (BMI) OF 34.0 TO 34.9 IN ADULT: ICD-10-CM

## 2023-06-12 DIAGNOSIS — Z79.4 TYPE 2 DIABETES MELLITUS WITH HYPERGLYCEMIA, WITH LONG-TERM CURRENT USE OF INSULIN (HCC): ICD-10-CM

## 2023-06-12 DIAGNOSIS — E11.65 TYPE 2 DIABETES MELLITUS WITH HYPERGLYCEMIA, WITH LONG-TERM CURRENT USE OF INSULIN (HCC): ICD-10-CM

## 2023-06-12 LAB
25(OH)D3 SERPL-MCNC: 34 NG/ML (ref 30–100)
ALBUMIN SERPL BCP-MCNC: 4.3 G/DL (ref 3.5–5)
ALP SERPL-CCNC: 58 U/L (ref 34–104)
ALT SERPL W P-5'-P-CCNC: 20 U/L (ref 7–52)
ANION GAP SERPL CALCULATED.3IONS-SCNC: 7 MMOL/L (ref 4–13)
AST SERPL W P-5'-P-CCNC: 17 U/L (ref 13–39)
BASOPHILS # BLD AUTO: 0.01 THOUSANDS/ÂΜL (ref 0–0.1)
BASOPHILS NFR BLD AUTO: 0 % (ref 0–1)
BILIRUB SERPL-MCNC: 0.32 MG/DL (ref 0.2–1)
BUN SERPL-MCNC: 16 MG/DL (ref 5–25)
CALCIUM SERPL-MCNC: 10.2 MG/DL (ref 8.4–10.2)
CHLORIDE SERPL-SCNC: 101 MMOL/L (ref 96–108)
CHOLEST SERPL-MCNC: 159 MG/DL
CO2 SERPL-SCNC: 29 MMOL/L (ref 21–32)
CREAT SERPL-MCNC: 1.29 MG/DL (ref 0.6–1.3)
EOSINOPHIL # BLD AUTO: 0 THOUSAND/ÂΜL (ref 0–0.61)
EOSINOPHIL NFR BLD AUTO: 0 % (ref 0–6)
ERYTHROCYTE [DISTWIDTH] IN BLOOD BY AUTOMATED COUNT: 16.5 % (ref 11.6–15.1)
GFR SERPL CREATININE-BSD FRML MDRD: 44 ML/MIN/1.73SQ M
GLUCOSE SERPL-MCNC: 151 MG/DL (ref 65–140)
HCT VFR BLD AUTO: 41.6 % (ref 34.8–46.1)
HDLC SERPL-MCNC: 31 MG/DL
HGB BLD-MCNC: 13.8 G/DL (ref 11.5–15.4)
IMM GRANULOCYTES # BLD AUTO: 0.03 THOUSAND/UL (ref 0–0.2)
IMM GRANULOCYTES NFR BLD AUTO: 1 % (ref 0–2)
LDLC SERPL DIRECT ASSAY-MCNC: 69 MG/DL (ref 0–100)
LYMPHOCYTES # BLD AUTO: 1.65 THOUSANDS/ÂΜL (ref 0.6–4.47)
LYMPHOCYTES NFR BLD AUTO: 28 % (ref 14–44)
MAGNESIUM SERPL-MCNC: 2 MG/DL (ref 1.9–2.7)
MCH RBC QN AUTO: 29 PG (ref 26.8–34.3)
MCHC RBC AUTO-ENTMCNC: 33.2 G/DL (ref 31.4–37.4)
MCV RBC AUTO: 87 FL (ref 82–98)
MONOCYTES # BLD AUTO: 0.46 THOUSAND/ÂΜL (ref 0.17–1.22)
MONOCYTES NFR BLD AUTO: 8 % (ref 4–12)
NEUTROPHILS # BLD AUTO: 3.81 THOUSANDS/ÂΜL (ref 1.85–7.62)
NEUTS SEG NFR BLD AUTO: 63 % (ref 43–75)
NRBC BLD AUTO-RTO: 0 /100 WBCS
PHOSPHATE SERPL-MCNC: 4.6 MG/DL (ref 2.3–4.1)
PLATELET # BLD AUTO: 201 THOUSANDS/UL (ref 149–390)
PMV BLD AUTO: 9.3 FL (ref 8.9–12.7)
POTASSIUM SERPL-SCNC: 4.3 MMOL/L (ref 3.5–5.3)
PROT SERPL-MCNC: 7.5 G/DL (ref 6.4–8.4)
PTH-INTACT SERPL-MCNC: 14.4 PG/ML (ref 12–88)
RBC # BLD AUTO: 4.76 MILLION/UL (ref 3.81–5.12)
SODIUM SERPL-SCNC: 137 MMOL/L (ref 135–147)
TRIGL SERPL-MCNC: 472 MG/DL
WBC # BLD AUTO: 5.96 THOUSAND/UL (ref 4.31–10.16)

## 2023-06-12 PROCEDURE — 82306 VITAMIN D 25 HYDROXY: CPT

## 2023-06-12 PROCEDURE — 80053 COMPREHEN METABOLIC PANEL: CPT

## 2023-06-12 PROCEDURE — 83970 ASSAY OF PARATHORMONE: CPT

## 2023-06-12 PROCEDURE — 36415 COLL VENOUS BLD VENIPUNCTURE: CPT

## 2023-06-12 PROCEDURE — 93922 UPR/L XTREMITY ART 2 LEVELS: CPT | Performed by: SURGERY

## 2023-06-12 PROCEDURE — 83735 ASSAY OF MAGNESIUM: CPT

## 2023-06-12 PROCEDURE — 84100 ASSAY OF PHOSPHORUS: CPT

## 2023-06-12 PROCEDURE — 83036 HEMOGLOBIN GLYCOSYLATED A1C: CPT

## 2023-06-12 PROCEDURE — 93925 LOWER EXTREMITY STUDY: CPT

## 2023-06-12 PROCEDURE — 93925 LOWER EXTREMITY STUDY: CPT | Performed by: SURGERY

## 2023-06-12 PROCEDURE — 85025 COMPLETE CBC W/AUTO DIFF WBC: CPT

## 2023-06-12 PROCEDURE — 83721 ASSAY OF BLOOD LIPOPROTEIN: CPT

## 2023-06-12 PROCEDURE — 80061 LIPID PANEL: CPT

## 2023-06-12 PROCEDURE — 93923 UPR/LXTR ART STDY 3+ LVLS: CPT

## 2023-06-13 LAB
EST. AVERAGE GLUCOSE BLD GHB EST-MCNC: 148 MG/DL
HBA1C MFR BLD: 6.8 %

## 2023-06-23 DIAGNOSIS — E11.65 TYPE 2 DIABETES MELLITUS WITH HYPERGLYCEMIA, WITHOUT LONG-TERM CURRENT USE OF INSULIN (HCC): ICD-10-CM

## 2023-06-23 RX ORDER — BLOOD SUGAR DIAGNOSTIC
STRIP MISCELLANEOUS
Qty: 100 STRIP | Refills: 2 | Status: SHIPPED | OUTPATIENT
Start: 2023-06-23

## 2023-07-05 DIAGNOSIS — E11.65 TYPE 2 DIABETES MELLITUS WITH HYPERGLYCEMIA, WITHOUT LONG-TERM CURRENT USE OF INSULIN (HCC): ICD-10-CM

## 2023-07-05 DIAGNOSIS — Z79.4 TYPE 2 DIABETES MELLITUS WITH HYPERGLYCEMIA, WITH LONG-TERM CURRENT USE OF INSULIN (HCC): ICD-10-CM

## 2023-07-05 DIAGNOSIS — E11.65 TYPE 2 DIABETES MELLITUS WITH HYPERGLYCEMIA, WITH LONG-TERM CURRENT USE OF INSULIN (HCC): ICD-10-CM

## 2023-07-05 RX ORDER — BLOOD SUGAR DIAGNOSTIC
STRIP MISCELLANEOUS
Qty: 100 STRIP | Refills: 2 | Status: SHIPPED | OUTPATIENT
Start: 2023-07-05

## 2023-07-05 RX ORDER — DULAGLUTIDE 3 MG/.5ML
3 INJECTION, SOLUTION SUBCUTANEOUS
Qty: 2 ML | Refills: 2 | Status: SHIPPED | OUTPATIENT
Start: 2023-07-05 | End: 2023-10-03

## 2023-07-08 DIAGNOSIS — G43.809 OTHER MIGRAINE WITHOUT STATUS MIGRAINOSUS, NOT INTRACTABLE: ICD-10-CM

## 2023-07-08 RX ORDER — AMITRIPTYLINE HYDROCHLORIDE 100 MG/1
TABLET, FILM COATED ORAL
Qty: 45 TABLET | Refills: 1 | Status: SHIPPED | OUTPATIENT
Start: 2023-07-08

## 2023-07-11 ENCOUNTER — VBI (OUTPATIENT)
Dept: ADMINISTRATIVE | Facility: OTHER | Age: 63
End: 2023-07-11

## 2023-07-11 DIAGNOSIS — E11.65 TYPE 2 DIABETES MELLITUS WITH HYPERGLYCEMIA, WITHOUT LONG-TERM CURRENT USE OF INSULIN (HCC): ICD-10-CM

## 2023-07-11 RX ORDER — PEN NEEDLE, DIABETIC 32GX 5/32"
NEEDLE, DISPOSABLE MISCELLANEOUS
Qty: 90 EACH | Refills: 3 | Status: SHIPPED | OUTPATIENT
Start: 2023-07-11 | End: 2023-07-18

## 2023-07-12 DIAGNOSIS — K21.9 GERD WITHOUT ESOPHAGITIS: ICD-10-CM

## 2023-07-12 RX ORDER — PANTOPRAZOLE SODIUM 40 MG/1
TABLET, DELAYED RELEASE ORAL
Qty: 30 TABLET | Refills: 1 | Status: SHIPPED | OUTPATIENT
Start: 2023-07-12

## 2023-07-18 DIAGNOSIS — E11.65 TYPE 2 DIABETES MELLITUS WITH HYPERGLYCEMIA, WITHOUT LONG-TERM CURRENT USE OF INSULIN (HCC): ICD-10-CM

## 2023-07-18 RX ORDER — PEN NEEDLE, DIABETIC 32GX 5/32"
NEEDLE, DISPOSABLE MISCELLANEOUS
Qty: 90 EACH | Refills: 0 | Status: SHIPPED | OUTPATIENT
Start: 2023-07-18

## 2023-07-18 NOTE — TELEPHONE ENCOUNTER
Patient needed script for pen needles sent to [facility] and updated to match lantus injection of twice daily

## 2023-07-21 RX ORDER — CYCLOBENZAPRINE HCL 10 MG
TABLET ORAL
COMMUNITY

## 2023-07-21 RX ORDER — LORATADINE 10 MG/1
TABLET ORAL
COMMUNITY

## 2023-07-21 RX ORDER — FENOFIBRATE 48 MG/1
TABLET, COATED ORAL
COMMUNITY

## 2023-07-24 ENCOUNTER — OFFICE VISIT (OUTPATIENT)
Dept: SURGERY | Facility: CLINIC | Age: 63
End: 2023-07-24
Payer: COMMERCIAL

## 2023-07-24 VITALS
RESPIRATION RATE: 18 BRPM | SYSTOLIC BLOOD PRESSURE: 140 MMHG | BODY MASS INDEX: 35.36 KG/M2 | DIASTOLIC BLOOD PRESSURE: 70 MMHG | WEIGHT: 220 LBS | HEIGHT: 66 IN | TEMPERATURE: 97.1 F | OXYGEN SATURATION: 95 % | HEART RATE: 105 BPM

## 2023-07-24 DIAGNOSIS — C50.411 PRIMARY CANCER OF UPPER OUTER QUADRANT OF RIGHT BREAST (HCC): Primary | ICD-10-CM

## 2023-07-24 PROCEDURE — 99213 OFFICE O/P EST LOW 20 MIN: CPT | Performed by: SURGERY

## 2023-07-24 NOTE — PROGRESS NOTES
Assessment/Plan:    Primary cancer of upper outer quadrant of right breast [facility])  The patient is a pleasant 42-year-old female with a past medical history significant for stage Ia multifocal right breast carcinoma in November 2021 returning for her routine breast health maintenance examination. Today she denies all complaints referable to her chest and breast.    She underwent left mammogram in September 2023. This was a BI-RADS 1 study. On physical examination she is well-appearing female no acute distress. Dennys competent reliable as a historian. She has no cervical supraclavicular or axillary lymphadenopathy bilaterally. The right chest scar is smooth and flat without signs of locally recurrent disease. The left breast is normal to palpation. The patient appears clinically and radiographically stable with regards to her history of stage Ia multifocal right breast carcinoma status post right modified radical mastectomy November 2021. I look forward to seeing her back in years time. Prescriptions were provided for her next mammogram and for a bra kit. All questions answered to the satisfaction of the patient. She has been encouraged to follow-up the practice at any point in the future with questions or concerns. \  Subjective:      Patient ID: Clotilde Chiu is a 61 y.o. female. Patient came in today for a Breast exam. Pt has a sx of a  rt mastectomy in 2021 by Dr. Yash Patrick. Patient denies hasn't noticed any new lumps/bumps, Pt denies nipple discharge, redness/ irritation, breast pain/ swelling or fevers/chills. Patient denies any issues on her left breast. Patient is due for a Mammo in [address] [place] [address]. Patient is also in today because she need a new prescription for bras. Harshal BRADFORD MA      The following portions of the patient's history were reviewed and updated as appropriate: allergies, current medications, past family history, past medical history, past social history, past surgical history and problem list.    Review of Systems   Constitutional: Negative for chills and fever. HENT: Negative for ear pain and sore throat. Eyes: Negative for pain and visual disturbance. Respiratory: Negative for cough and shortness of breath. Cardiovascular: Negative for chest pain and palpitations. Gastrointestinal: Negative for abdominal pain and vomiting. Genitourinary: Negative for dysuria and hematuria. Musculoskeletal: Negative for arthralgias and back pain. Skin: Negative for color change and rash. Neurological: Negative for seizures and syncope. All other systems reviewed and are negative. Objective:      /70 (BP Location: Left arm, Patient Position: Sitting, Cuff Size: Large)   Pulse 105   Temp (!) 97.1 °F (36.2 °C) (Temporal)   Resp 18   Ht 5' 6" (1.676 m)   Wt 99.8 kg (220 lb)   SpO2 95%   BMI 35.51 kg/m²          Physical Exam  Constitutional:       Appearance: She is well-developed. HENT:      Head: Normocephalic and atraumatic. Eyes:      Conjunctiva/sclera: Conjunctivae normal.      Pupils: Pupils are equal, round, and reactive to light. Cardiovascular:      Rate and Rhythm: Normal rate and regular rhythm. Pulmonary:      Effort: Pulmonary effort is normal.      Breath sounds: Normal breath sounds. Abdominal:      General: Bowel sounds are normal.      Palpations: Abdomen is soft. Musculoskeletal:         General: Normal range of motion. Cervical back: Normal range of motion and neck supple. Skin:     General: Skin is warm and dry. Comments: [place] exam as described above   Neurological:      Mental Status: She is alert and oriented to person, place, and time. Psychiatric:         Behavior: Behavior normal.         Thought Content:  Thought content normal.         Judgment: Judgment normal.

## 2023-07-24 NOTE — ASSESSMENT & PLAN NOTE
The patient is a pleasant 70-year-old female with a past medical history significant for stage Ia multifocal right breast carcinoma in November 2021 returning for her routine breast health maintenance examination. Today she denies all complaints referable to her chest and breast.    She underwent left mammogram in September 2023. This was a BI-RADS 1 study. On physical examination she is well-appearing female no acute distress. Dennys competent reliable as a historian. She has no cervical supraclavicular or axillary lymphadenopathy bilaterally. The right chest scar is smooth and flat without signs of locally recurrent disease. The left breast is normal to palpation. The patient appears clinically and radiographically stable with regards to her history of stage Ia multifocal right breast carcinoma status post right modified radical mastectomy November 2021. I look forward to seeing her back in years time. Prescriptions were provided for her next mammogram and for a bra kit. All questions answered to the satisfaction of the patient. She has been encouraged to follow-up the practice at any point in the future with questions or concerns.

## 2023-08-02 ENCOUNTER — VBI (OUTPATIENT)
Dept: ADMINISTRATIVE | Facility: OTHER | Age: 63
End: 2023-08-02

## 2023-08-16 ENCOUNTER — OFFICE VISIT (OUTPATIENT)
Dept: ENDOCRINOLOGY | Facility: CLINIC | Age: 63
End: 2023-08-16
Payer: COMMERCIAL

## 2023-08-16 VITALS
HEART RATE: 88 BPM | OXYGEN SATURATION: 95 % | DIASTOLIC BLOOD PRESSURE: 80 MMHG | SYSTOLIC BLOOD PRESSURE: 110 MMHG | TEMPERATURE: 97.8 F | HEIGHT: 66 IN | WEIGHT: 216.13 LBS | RESPIRATION RATE: 18 BRPM | BODY MASS INDEX: 34.73 KG/M2

## 2023-08-16 DIAGNOSIS — I10 PRIMARY HYPERTENSION: ICD-10-CM

## 2023-08-16 DIAGNOSIS — E78.2 MIXED HYPERLIPIDEMIA: ICD-10-CM

## 2023-08-16 DIAGNOSIS — Z79.4 TYPE 2 DIABETES MELLITUS WITHOUT COMPLICATION, WITH LONG-TERM CURRENT USE OF INSULIN (HCC): Primary | ICD-10-CM

## 2023-08-16 DIAGNOSIS — E66.09 CLASS 1 OBESITY DUE TO EXCESS CALORIES WITH SERIOUS COMORBIDITY AND BODY MASS INDEX (BMI) OF 34.0 TO 34.9 IN ADULT: ICD-10-CM

## 2023-08-16 DIAGNOSIS — E11.9 TYPE 2 DIABETES MELLITUS WITHOUT COMPLICATION, WITH LONG-TERM CURRENT USE OF INSULIN (HCC): Primary | ICD-10-CM

## 2023-08-16 DIAGNOSIS — E55.9 VITAMIN D DEFICIENCY: ICD-10-CM

## 2023-08-16 LAB — SL AMB POCT GLUCOSE BLD: 195

## 2023-08-16 PROCEDURE — 82948 REAGENT STRIP/BLOOD GLUCOSE: CPT | Performed by: STUDENT IN AN ORGANIZED HEALTH CARE EDUCATION/TRAINING PROGRAM

## 2023-08-16 PROCEDURE — 99214 OFFICE O/P EST MOD 30 MIN: CPT | Performed by: STUDENT IN AN ORGANIZED HEALTH CARE EDUCATION/TRAINING PROGRAM

## 2023-08-16 RX ORDER — CLONIDINE HYDROCHLORIDE 0.1 MG/1
0.1 TABLET ORAL DAILY PRN
COMMUNITY
Start: 2023-07-25

## 2023-08-16 NOTE — PROGRESS NOTES
Established patient Progress Note      Cc: diabetes    Referring Provider  No referring provider defined for this encounter. History of Present Illness:   Joshua Alvarado is a 61 y.o. female with a history of type 2 diabetes with long term use of insulin who presented for follow up. Last seen in the office in May 2023      Reports complications of Neuropathy and TIA. Denies recent illness or hospitalizations. Denies recent severe hypoglycemic or severe hyperglycemic episodes.  Denies any issues with her current regimen. home glucose monitoring: are performed regularly      Current regimen:   Lantus 10 units twice a day  Trulicity 3 mg once weekly  Jardiance 25 mg once daily    Home blood glucose readings:   Before breakfast: 100 - 150   Before lunch: 140 - 190   Before dinner: 150 - 190   Bedtime: 140 - 190     Hypoglycemic episodes:   H/o of hypoglycemia causing hospitalization or Intervention such as glucagon injection  or ambulance call : no  Hypoglycemia symptoms:none  Treatment of hypoglycemia:none           Opthamology:overdue: UTD   Podiatry: no     Has hypertension: followed by PCP; not on medication   Has hyperlipidemia: followed by PCP; on crestor 40 mg - tolerating well, no myalgias.     Thyroid disorders: no   History of pancreatitis: no    Patient Active Problem List   Diagnosis   • Cataract   • Chronic hoarseness   • Chronic low back pain   • Centrilobular emphysema (HCC)   • Deep vein thrombophlebitis of leg, unspecified laterality ([address])   • Depression   • Diabetes mellitus with neurological manifestation (HCC)   • Gastroesophageal reflux disease with esophagitis   • Headache   • Hypercholesterolemia   • Hypercoagulable state ([address])   • Hypertension   • Migraine   • Myositis   • Neuropathy   • Pulmonary nodule seen on imaging study   • Type 2 diabetes mellitus with hyperglycemia, with long-term current use of insulin (HCC)   • Chronic hypoxemic respiratory failure (HCC)   • Class 1 obesity due to excess calories with serious comorbidity and body mass index (BMI) of 34.0 to 34.9 in adult   • Sleep apnea   • Family history of heart disease   • Pulmonary hypertension ([address])   • Primary fibromyalgia syndrome   • Primary cancer of upper outer quadrant of right breast ([address])   • Malignant neoplasm of upper-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, estrogen receptor positive ([address])   • Mucinous carcinoma of breast, 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[address])   • Tobacco abuse   • PAD (peripheral artery disease) ([facility])   • Tachycardia   • Stage 3a chronic kidney disease (HCC)   • Chronic obstructive pulmonary disease with acute exacerbation ([facility])   • Kidney lesion   • Nonocclusive coronary atherosclerosis of native coronary artery   • Kidney cysts   • Hyponatremia   • Diarrhea   • Presence of IVC filter   • History of pulmonary embolism      Past Medical History:   Diagnosis Date   • Cancer ([address])     right breast   • Chronic back pain    • Colitis    • COPD (chronic obstructive pulmonary disease) ([facility])    • Depression    • Diabetes mellitus ([address])    • DVT of lower limb, acute ([address]) 2004   • GERD (gastroesophageal reflux disease)    • Hyperlipidemia    • Pneumonia    • Rapid heart rate    • Sleep apnea    • Stroke ([address])     4 mini strokes      Past Surgical History:   Procedure Laterality Date   • BREAST LUMPECTOMY Right    • CARDIAC CATHETERIZATION N/A 10/28/2021    Procedure: Cardiac Coronary Angiogram;  Surgeon: Jyotsna Greenwood DO;  Location: [facility];   Service: Cardiology   • IVC FILTER INSERTION     • MASTECTOMY     • MASTECTOMY W/ SENTINEL NODE BIOPSY Right 11/09/2021    Procedure: BREAST MASTECTOMY WITH BIOPSY LYMPH NODE SENTINEL and axillary node dissection  ([facility] Lymph Node Injection @ 12:30);  Surgeon: Abhilash Banks MD;  Location: [facility] MAIN OR;  Service: General   • [address] ADDITIONAL Right 10/13/2021   • US GUIDANCE BREAST BIOPSY RIGHT EACH ADDITIONAL Right 10/13/2021   • US GUIDED BREAST BIOPSY RIGHT COMPLETE Right 10/13/2021      Family History   Problem Relation Age of Onset   • Breast cancer Mother 67   • COPD Mother    • Coronary artery disease Mother    • Coronary artery disease Father    • Stroke Father    • Lung cancer Sister    • No Known Problems Sister    • No Known Problems Sister    • Breast cancer Maternal Grandmother    • Colon cancer Paternal Grandfather    • No Known Problems Maternal Aunt    • No Known Problems Maternal Aunt    • No Known Problems Maternal Aunt    • No Known Problems Paternal Aunt    • Breast cancer Cousin      Social History     Tobacco Use   • Smoking status: Every Day     Packs/day: 1.00     Types: Cigarettes   • Smokeless tobacco: Never   Substance Use Topics   • Alcohol use: Never     Allergies   Allergen Reactions   • Amoxicillin-Pot Clavulanate GI Intolerance   • Anastrozole Other (See Comments)     Diarrhea, Nausea, Stomach pain          Current Outpatient Medications:   •  albuterol (2.5 mg/3 mL) 0.083 % nebulizer solution, Take 3 mL (2.5 mg total) by nebulization every 6 (six) hours as needed for wheezing or shortness of breath, Disp: 90 mL, Rfl: 2  •  albuterol (Ventolin HFA) 90 mcg/act inhaler, Inhale 2 puffs every 4 (four) hours as needed for wheezing or shortness of breath, Disp: 18 g, Rfl: 5  •  amitriptyline (ELAVIL) 100 mg tablet, take 1/2 tablet by mouth once daily at bedtime, Disp: 45 tablet, Rfl: 1  •  apixaban (Eliquis) 5 mg, Take 1 tablet (5 mg total) by mouth 2 (two) times a day, Disp: 60 tablet, Rfl: 0  •  aspirin (ECOTRIN LOW STRENGTH) 81 mg EC tablet, Take 1 tablet (81 mg total) by mouth daily, Disp: 90 tablet, Rfl: 3  •  benztropine (COGENTIN) 0.5 mg tablet, Take 0.5 mg by mouth daily at bedtime  , Disp: , Rfl:   •  Blood Glucose Monitoring Suppl (ONE TOUCH ULTRA 2) w/Device KIT, by Does not apply route daily, Disp: 100 each, Rfl: 0  •  carvedilol (COREG) 3.125 mg tablet, take 1 tablet by mouth twice a day with meals (Patient not taking: Reported on 5/3/2023), Disp: 60 tablet, Rfl: 3  •  clonazePAM (KlonoPIN) 0.5 mg tablet, Take 0.5 mg by mouth daily as needed  , Disp: , Rfl:   •  cyclobenzaprine (FLEXERIL) 10 mg tablet, take 1 tablet by mouth three times a day if needed for muscle spasm for up to 14 days (Patient not taking: Reported on 7/24/2023), Disp: , Rfl:   •  doxepin (SINEquan) 100 mg capsule, take 1 to 2 capsules by mouth at bedtime if needed, Disp: , Rfl:   •  dulaglutide (Trulicity) 3 EJ/9.6NJ injection, Inject 0.5 mL (3 mg total) under the skin every 7 days, Disp: 2 mL, Rfl: 2  •  Empagliflozin 25 MG TABS, Take 1 tablet (25 mg total) by mouth every morning, Disp: 30 tablet, Rfl: 2  •  fenofibrate (TRICOR) 48 mg tablet, , Disp: , Rfl:   •  gabapentin (NEURONTIN) 600 MG tablet, take 1 tablet by mouth three times a day, Disp: 270 tablet, Rfl: 1  •  glucose blood (OneTouch Ultra) test strip, Use to test blood sugar three times a day, Disp: 100 strip, Rfl: 2  •  Insulin Glargine Solostar (Lantus SoloStar) 100 UNIT/ML SOPN, Inject 0.1 mL (10 Units total) under the skin 2 (two) times a day, Disp: 18 mL, Rfl: 1  •  Insulin Pen Needle (BD Pen Needle Jasmine 2nd Gen) 32G X 4 MM MISC, Use twice daily for injection, Disp: 90 each, Rfl: 0  •  lidocaine (Lidoderm) 5 %, Apply 1 patch topically over 12 hours daily Remove & Discard patch within 12 hours or as directed by MD (Patient not taking: Reported on 7/24/2023), Disp: 15 patch, Rfl: 0  •  loperamide (IMODIUM) 2 mg capsule, Take 1 capsule (2 mg total) by mouth 4 (four) times a day as needed for diarrhea, Disp: 30 capsule, Rfl: 0  •  loratadine (CLARITIN) 10 mg tablet, , Disp: , Rfl:   •  OneTouch Delica Lancets 98P MISC, Check blood sugars three times daily. Please substitute with appropriate alternative as covered by patient's insurance.  Dx: E11.65, Disp: 300 each, Rfl: 3  • pantoprazole (PROTONIX) 40 mg tablet, take 1 tablet by mouth daily if needed for GERD SYMPTOMS, Disp: 30 tablet, Rfl: 1  •  rosuvastatin (CRESTOR) 40 MG tablet, Take 1 tablet (40 mg total) by mouth daily, Disp: 90 tablet, Rfl: 3  •  tiotropium (Spiriva Respimat) 2.5 MCG/ACT AERS inhaler, Inhale 2 puffs daily, Disp: 4 g, Rfl: 3  •  vilazodone (VIIBRYD) 40 mg tablet, Take 40 mg by mouth daily. , Disp: , Rfl:   •  VRAYLAR 6 MG capsule, Take 6 mg by mouth daily, Disp: , Rfl: 0  Review of Systems   Constitutional: Negative for appetite change, fatigue and unexpected weight change. HENT: Negative for trouble swallowing and voice change. Eyes: Negative for visual disturbance. Respiratory: Negative for cough, shortness of breath and wheezing. Cardiovascular: Negative for palpitations and leg swelling. Gastrointestinal: Negative for abdominal pain, constipation, diarrhea, nausea and vomiting. Endocrine: Negative for cold intolerance, heat intolerance, polyphagia and polyuria. Musculoskeletal: Negative for arthralgias. Skin: Negative for color change, rash and wound. Neurological: Negative for dizziness, tremors, weakness, light-headedness, numbness and headaches. Psychiatric/Behavioral: Negative for agitation and sleep disturbance. The patient is not nervous/anxious. Physical Exam:  There is no height or weight on file to calculate BMI. There were no vitals taken for this visit.    Wt Readings from Last 3 Encounters:   07/24/23 99.8 kg (220 lb)   06/06/23 96.6 kg (213 lb)   05/09/23 98 kg (216 lb)       GEN: NAD, obese   E/n/m nl facies,   Eyes: no stare or proptosis,   Neck: trachea midline, thyroid NT to palpation, nl in size, no nodules or neck masses noted, no cervical LAD  CV; heart reg rate s1s2 nl, no m/r/g appreciated, no ALESSANDRO  Resp: CTAB, good effort  Ab+BS  Neuro: no tremor, 2+ DTRs in BUE  MS: no c/c in digits, moves all 4 ext, nl muscle bulk, gait nl  Skin: warm and dry, no palmar erythema  Ext: no c/c in digits, no edema bilaterally, 2+ DP and PT pulses bilat,   Psych: nl mood and affect, no gross lapses in memory      Labs:   No components found for: "HA1C"  No components found for: "GLU"    Lab Results   Component Value Date    CREATININE 1.29 06/12/2023    CREATININE 1.31 (H) 04/04/2023    CREATININE 1.24 02/16/2023    BUN 16 06/12/2023     01/08/2016    K 4.3 06/12/2023     06/12/2023    CO2 29 06/12/2023     eGFR   Date Value Ref Range Status   06/12/2023 44 ml/min/1.73sq m Final     No components found for: "MALBCRER"    Lab Results   Component Value Date    CHOL 234 09/07/2015    HDL 31 (L) 06/12/2023    TRIG 472 (H) 06/12/2023       Lab Results   Component Value Date    ALT 20 06/12/2023    AST 17 06/12/2023    ALKPHOS 58 06/12/2023    BILITOT 0.53 11/30/2015       Lab Results   Component Value Date    FREET4 1.03 04/27/2020     Component      Latest Ref Rng 6/12/2023   Sodium      135 - 147 mmol/L 137    Potassium      3.5 - 5.3 mmol/L 4.3    Chloride      96 - 108 mmol/L 101    CO2      21 - 32 mmol/L 29    Anion Gap      4 - 13 mmol/L 7    BUN      5 - 25 mg/dL 16    Creatinine      0.60 - 1.30 mg/dL 1.29    Glucose, Random      65 - 140 mg/dL 151 (H)    Calcium      8.4 - 10.2 mg/dL 10.2    AST      13 - 39 U/L 17    ALT      7 - 52 U/L 20    Alkaline Phosphatase      34 - 104 U/L 58    Total Protein      6.4 - 8.4 g/dL 7.5    Albumin      3.5 - 5.0 g/dL 4.3    TOTAL BILIRUBIN      0.20 - 1.00 mg/dL 0.32    eGFR      ml/min/1.73sq m 44    Cholesterol      See Comment mg/dL 159    Triglycerides      See Comment mg/dL 472 (H)    HDL      >=50 mg/dL 31 (L)    LDL Calculated --    Hemoglobin A1C      Normal 3.8-5.6%; PreDiabetic 5.7-6.4%;  Diabetic >=6.5%; Glycemic control for adults with diabetes <7.0% % 6.8 (H)    eAG, EST AVG Glucose      mg/dl 148    Magnesium      1.9 - 2.7 mg/dL 2.0    Phosphorus      2.3 - 4.1 mg/dL 4.6 (H)    PARATHYROID HORMONE      12.0 - 88.0 pg/mL 14.4    Vit D, 25-Hydroxy      30.0 - 100.0 ng/mL 34.0    LDL CHOLESTEROL DIRECT      0 - 100 mg/dl 69       Legend:  (H) High  (L) Low      Impression:  1. Type 2 diabetes mellitus without complication, with long-term current use of insulin ([facility])    2. Class 1 obesity due to excess calories with serious comorbidity and body mass index (BMI) of 34.0 to 34.9 in adult    3. Hypercholesterolemia    4. Primary hypertension           Plan:    Diagnoses and all orders for this visit:    Type 2 diabetes mellitus without complication, with long-term current use of insulin ([address]):  Well controlled with A1C of 6.8%, glycemic control improved, previous A1C of 9.0%. She was encouraged to keep up with the good work controlling her diabetes. She was instructed to continue monitoring her blood sugars, once or twice a day and send me a log in 1 month to review. Return back in 3 months. Labs prior to next visit. -     Hemoglobin A1C; Future  -     Comprehensive metabolic panel; Future  -     Lipid Panel with Direct LDL reflex; Future  -     Albumin / creatinine urine ratio; Future  -     POCT blood glucose    Class 1 obesity due to excess calories with serious comorbidity and body mass index (BMI) of 34.0 to 34.9 in adult:  Continue Trulicity 3 mg once weekly  -     Hemoglobin A1C; Future    Mixed hyperlipidemia:  Continue Crestor 40 mg daily.    -     Lipid Panel with Direct LDL reflex; Future    Primary hypertension:  Not on ACE or ARB, controlled, blood pressure at today's visit was 110/80.  -     Comprehensive metabolic panel; Future  -     Albumin / creatinine urine ratio; Future    Vitamin D deficiency:  Continue supplementation.  -     Vitamin D 25 hydroxy; Future      Discussed with the patient and all questioned fully answered. She will call me if any problems arise.     Counseled patient on diagnostic results, prognosis, risk and benefit of treatment options, instruction for management, importance of treatment compliance, Risk  factor reduction and impressions      Michelle Paredes MD

## 2023-09-05 DIAGNOSIS — E11.65 TYPE 2 DIABETES MELLITUS WITH HYPERGLYCEMIA, WITHOUT LONG-TERM CURRENT USE OF INSULIN (HCC): ICD-10-CM

## 2023-09-05 RX ORDER — PEN NEEDLE, DIABETIC 32GX 5/32"
NEEDLE, DISPOSABLE MISCELLANEOUS
Qty: 100 EACH | Refills: 1 | Status: SHIPPED | OUTPATIENT
Start: 2023-09-05

## 2023-09-06 ENCOUNTER — OFFICE VISIT (OUTPATIENT)
Dept: FAMILY MEDICINE CLINIC | Facility: HOME HEALTHCARE | Age: 63
End: 2023-09-06
Payer: COMMERCIAL

## 2023-09-06 VITALS
RESPIRATION RATE: 18 BRPM | OXYGEN SATURATION: 93 % | BODY MASS INDEX: 34.28 KG/M2 | TEMPERATURE: 98.6 F | SYSTOLIC BLOOD PRESSURE: 118 MMHG | HEIGHT: 67 IN | DIASTOLIC BLOOD PRESSURE: 64 MMHG | HEART RATE: 99 BPM | WEIGHT: 218.4 LBS

## 2023-09-06 DIAGNOSIS — E78.00 HYPERCHOLESTEROLEMIA: ICD-10-CM

## 2023-09-06 DIAGNOSIS — K21.9 GERD WITHOUT ESOPHAGITIS: ICD-10-CM

## 2023-09-06 DIAGNOSIS — M79.604 BILATERAL LEG PAIN: Primary | ICD-10-CM

## 2023-09-06 DIAGNOSIS — M79.605 BILATERAL LEG PAIN: Primary | ICD-10-CM

## 2023-09-06 DIAGNOSIS — Z79.4 TYPE 2 DIABETES MELLITUS WITHOUT COMPLICATION, WITH LONG-TERM CURRENT USE OF INSULIN (HCC): ICD-10-CM

## 2023-09-06 DIAGNOSIS — E11.9 TYPE 2 DIABETES MELLITUS WITHOUT COMPLICATION, WITH LONG-TERM CURRENT USE OF INSULIN (HCC): ICD-10-CM

## 2023-09-06 PROCEDURE — T1015 CLINIC SERVICE: HCPCS | Performed by: FAMILY MEDICINE

## 2023-09-06 RX ORDER — CYCLOBENZAPRINE HCL 5 MG
5 TABLET ORAL 3 TIMES DAILY PRN
Qty: 30 TABLET | Refills: 0 | Status: SHIPPED | OUTPATIENT
Start: 2023-09-06

## 2023-09-06 RX ORDER — PANTOPRAZOLE SODIUM 40 MG/1
40 TABLET, DELAYED RELEASE ORAL DAILY
Qty: 90 TABLET | Refills: 1 | Status: SHIPPED | OUTPATIENT
Start: 2023-09-06

## 2023-09-06 NOTE — PROGRESS NOTES
Assessment/Plan:     Diagnoses and all orders for this visit:    Bilateral leg pain  -     cyclobenzaprine (FLEXERIL) 5 mg tablet; Take 1 tablet (5 mg total) by mouth 3 (three) times a day as needed for muscle spasms    GERD without esophagitis  -     pantoprazole (PROTONIX) 40 mg tablet; Take 1 tablet (40 mg total) by mouth daily    Type 2 diabetes mellitus without complication, with long-term current use of insulin (HCC)    Hypercholesterolemia        - Continue current medications as prescribed  - Will trial flexeril PRN leg pain. Consider lumbar xray and referral to spine & pain pending response  - Follow up with endocrinology as scheduled    Return in about 3 months (around 12/6/2023) for Next scheduled follow up. Subjective:        Patient ID: Arron Sher is a 61 y.o. female. Chief Complaint   Patient presents with   • Follow-up     Pain in both legs       Irene Pealr is a 19-year-old female with history of hypertension, hyperlipidemia, CAD, PAD, history of DVT, stage III CKD, type 2 diabetes, GERD, COPD, tobacco use, breast cancer, and depression, presenting for follow up. Type 2 diabetes is currently managed with Trulicity 3 mg weekly, jardiance 25 mg, and Lantus 10 units BID. A1c 6/2023 was 6.8. Patient is following with endocrinology for management. HLD is managed with Crestor 40 mg daily. Lipid panel from 6/2023 with total cholesterol 159, LDL 69. GERD is well controlled on pantoprazole 40 mg daily. Colonoscopy done 8/2022 with recommendation to repeat in 5 years due to family history. Depression is currently managed by psychiatry with Cogentin, Elavil, doxepin, Klonopin, vraylar, and Viibryd. Today patient endorses "sciatica" pain in her upper legs. She notes sharp pains which radiate into both thighs with occasional numbness in her toes. Uses a cane for ambulation. Has not been taking anything for pain.   Was previously evaluated by vascular who noted diffuse arterial disease but did not believe this to be the cause of her pain. Patient is requesting a muscle relaxer.       The following portions of the patient's history were reviewed and updated as appropriate: allergies, current medications, past family history, past medical history, past social history, past surgical history and problem list.    Patient Active Problem List   Diagnosis   • Cataract   • Chronic hoarseness   • Chronic low back pain   • Centrilobular emphysema ([address])   • Deep vein thrombophlebitis of leg, unspecified laterality ([address])   • Depression   • Diabetes mellitus with neurological manifestation ([address])   • Gastroesophageal reflux disease with esophagitis   • Headache   • Hypercholesterolemia   • Hypercoagulable state ([address])   • Hypertension   • Migraine   • Myositis   • Neuropathy   • Pulmonary nodule seen on imaging study   • Type 2 diabetes mellitus without complication, with long-term current use of insulin (HCC)   • Chronic hypoxemic respiratory failure (HCC)   • Class 1 obesity due to excess calories with serious comorbidity and body mass index (BMI) of 34.0 to 34.9 in adult   • Sleep apnea   • Family history of heart disease   • Pulmonary hypertension ([address])   • Primary fibromyalgia syndrome   • Primary cancer of upper outer quadrant of right breast ([address])   • Malignant neoplasm of upper-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, estrogen receptor positive ([address])   • Mucinous carcinoma of breast, 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[address])   • Tobacco abuse   • PAD (peripheral artery disease) ([address])   • Tachycardia   • Stage 3a chronic kidney disease (HCC)   • Chronic obstructive pulmonary disease with acute exacerbation (HCC)   • Kidney lesion   • Nonocclusive coronary atherosclerosis of native coronary artery   • Kidney cysts   • Hyponatremia   • Diarrhea   • Presence of IVC filter   • History of pulmonary embolism       Current Outpatient Medications   Medication Sig Dispense Refill   • albuterol (2.5 mg/3 mL) 0.083 % nebulizer solution Take 3 mL (2.5 mg total) by nebulization every 6 (six) hours as needed for wheezing or shortness of breath 90 mL 2   • albuterol (Ventolin HFA) 90 mcg/act inhaler Inhale 2 puffs every 4 (four) hours as needed for wheezing or shortness of breath 18 g 5   • amitriptyline (ELAVIL) 100 mg tablet take 1/2 tablet by mouth once daily at bedtime 45 tablet 1   • apixaban (Eliquis) 5 mg Take 1 tablet (5 mg total) by mouth 2 (two) times a day 60 tablet 0   • aspirin (ECOTRIN LOW STRENGTH) 81 mg EC tablet Take 1 tablet (81 mg total) by mouth daily 90 tablet 3   • benztropine (COGENTIN) 0.5 mg tablet Take 0.5 mg by mouth daily at bedtime       • Blood Glucose Monitoring Suppl (ONE TOUCH ULTRA 2) w/Device KIT by Does not apply route daily 100 each 0   • clonazePAM (KlonoPIN) 0.5 mg tablet Take 0.5 mg by mouth daily as needed       • cloNIDine (CATAPRES) 0.1 mg tablet Take 0.1 mg by mouth daily as needed     • cyclobenzaprine (FLEXERIL) 5 mg tablet Take 1 tablet (5 mg total) by mouth 3 (three) times a day as needed for muscle spasms 30 tablet 0   • doxepin (SINEquan) 100 mg capsule take 1 to 2 capsules by mouth at bedtime if needed     • dulaglutide (Trulicity) 3 EH/0.9DC injection Inject 0.5 mL (3 mg total) under the skin every 7 days 2 mL 2   • Empagliflozin 25 MG TABS Take 1 tablet (25 mg total) by mouth every morning 30 tablet 2   • gabapentin (NEURONTIN) 600 MG tablet take 1 tablet by mouth three times a day 270 tablet 1   • glucose blood (OneTouch Ultra) test strip Use to test blood sugar three times a day 100 strip 2   • Insulin Glargine Solostar (Lantus SoloStar) 100 UNIT/ML SOPN Inject 0.1 mL (10 Units total) under the skin 2 (two) times a day 18 mL 1   • Insulin Pen Needle (Droplet Pen Needles) 32G X 4 MM MISC use twice a day for INJECTIONS 100 each 1   • loperamide (IMODIUM) 2 mg capsule Take 1 capsule (2 mg total) by mouth 4 (four) times a day as needed for diarrhea 30 capsule 0   • loratadine (CLARITIN) 10 mg tablet      • OneTouch Delica Lancets 29U MISC Check blood sugars three times daily. Please substitute with appropriate alternative as covered by patient's insurance. Dx: E11.65 300 each 3   • pantoprazole (PROTONIX) 40 mg tablet Take 1 tablet (40 mg total) by mouth daily 90 tablet 1   • rosuvastatin (CRESTOR) 40 MG tablet Take 1 tablet (40 mg total) by mouth daily 90 tablet 3   • tiotropium (Spiriva Respimat) 2.5 MCG/ACT AERS inhaler Inhale 2 puffs daily 4 g 3   • vilazodone (VIIBRYD) 40 mg tablet Take 40 mg by mouth daily. • VRAYLAR 6 MG capsule Take 6 mg by mouth daily  0   • carvedilol (COREG) 3.125 mg tablet take 1 tablet by mouth twice a day with meals (Patient not taking: Reported on 5/3/2023) 60 tablet 3   • lidocaine (Lidoderm) 5 % Apply 1 patch topically over 12 hours daily Remove & Discard patch within 12 hours or as directed by MD (Patient not taking: Reported on 7/24/2023) 15 patch 0     No current facility-administered medications for this visit. Past Medical History:   Diagnosis Date   • Cancer [facility])     right breast   • Chronic back pain    • Colitis    • COPD (chronic obstructive pulmonary disease) (HCC)    • Depression    • Diabetes mellitus ([address])    • DVT of lower limb, acute ([address]) 2004   • GERD (gastroesophageal reflux disease)    • Hyperlipidemia    • Pneumonia    • Rapid heart rate    • Sleep apnea    • Stroke [facility])     4 mini strokes        Past Surgical History:   Procedure Laterality Date   • BREAST LUMPECTOMY Right    • CARDIAC CATHETERIZATION N/A 10/28/2021    Procedure: Cardiac Coronary Angiogram;  Surgeon: Lydia Cruz DO;  Location: [facility];   Service: Cardiology   • IVC FILTER INSERTION     • MASTECTOMY     • MASTECTOMY W/ SENTINEL NODE BIOPSY Right 11/09/2021    Procedure: BREAST MASTECTOMY WITH BIOPSY LYMPH NODE SENTINEL and axillary node dissection  ([place] Lymph Node Injection @ 12:30);  Surgeon: Maude Arevalo MD;  Location: [address] [place] [address] OR; Service: General   • US GUIDANCE BREAST BIOPSY RIGHT EACH ADDITIONAL Right 10/13/2021   • US GUIDANCE BREAST BIOPSY RIGHT EACH ADDITIONAL Right 10/13/2021   • US GUIDED BREAST BIOPSY RIGHT COMPLETE Right 10/13/2021        Social History     Socioeconomic History   • Marital status: Legally      Spouse name: Not on file   • Number of children: Not on file   • Years of education: Not on file   • Highest education level: Not on file   Occupational History   • Not on file   Tobacco Use   • Smoking status: Every Day     Packs/day: 1.00     Types: Cigarettes   • Smokeless tobacco: Never   Vaping Use   • Vaping Use: Never used   Substance and Sexual Activity   • Alcohol use: Never   • Drug use: Never   • Sexual activity: Not Currently   Other Topics Concern   • Not on file   Social History Narrative   • Not on file     Social Determinants of Health     Financial Resource Strain: Not on file   Food Insecurity: No Food Insecurity (1/30/2023)    Hunger Vital Sign    • Worried About Running Out of Food in the Last Year: Never true    • Ran Out of Food in the Last Year: Never true   Transportation Needs: No Transportation Needs (1/30/2023)    PRAPARE - Transportation    • Lack of Transportation (Medical): No    • Lack of Transportation (Non-Medical): No   Physical Activity: Not on file   Stress: Not on file   Social Connections: Not on file   Intimate Partner Violence: Not on file   Housing Stability: Low Risk  (1/30/2023)    Housing Stability Vital Sign    • Unable to Pay for Housing in the Last Year: No    • Number of Places Lived in the Last Year: 1    • Unstable Housing in the Last Year: No        Review of Systems   Constitutional: Negative for chills, diaphoresis and fever. Respiratory: Negative for cough, chest tightness, shortness of breath and wheezing. Cardiovascular: Negative for chest pain, palpitations and leg swelling.    Gastrointestinal: Negative for abdominal pain, blood in stool, constipation, diarrhea, nausea and vomiting. Genitourinary: Negative for dysuria, frequency, hematuria and urgency. Musculoskeletal: Positive for myalgias. Skin: Negative for rash and wound. Neurological: Positive for numbness. Negative for dizziness, syncope, weakness, light-headedness and headaches. Psychiatric/Behavioral: Negative for dysphoric mood, self-injury, sleep disturbance and suicidal ideas. The patient is not nervous/anxious. All other systems reviewed and are negative. Objective:      /64 (BP Location: Left arm, Patient Position: Sitting, Cuff Size: Large)   Pulse 99   Temp 98.6 °F (37 °C) (Tympanic)   Resp 18   Ht 5' 7" (1.702 m)   Wt 99.1 kg (218 lb 6.4 oz)   SpO2 93%   BMI 34.21 kg/m²          Physical Exam  Vitals and nursing note reviewed. Constitutional:       General: She is not in acute distress. Appearance: Normal appearance. She is obese. Comments: Ambulating with a cane   HENT:      Head: Normocephalic and atraumatic. Eyes:      Extraocular Movements: Extraocular movements intact. Conjunctiva/sclera: Conjunctivae normal.      Pupils: Pupils are equal, round, and reactive to light. Cardiovascular:      Rate and Rhythm: Normal rate and regular rhythm. Heart sounds: Normal heart sounds. No murmur heard. Pulmonary:      Effort: Pulmonary effort is normal. No respiratory distress. Breath sounds: Normal breath sounds. No wheezing. Musculoskeletal:      Right lower leg: No edema. Left lower leg: No edema. Skin:     General: Skin is warm and dry. Neurological:      General: No focal deficit present. Mental Status: She is alert and oriented to person, place, and time. Cranial Nerves: No cranial nerve deficit. Motor: No weakness.       Gait: Gait abnormal.   Psychiatric:         Mood and Affect: Mood normal.         Behavior: Behavior normal.

## 2023-09-10 DIAGNOSIS — E11.65 TYPE 2 DIABETES MELLITUS WITH HYPERGLYCEMIA, WITHOUT LONG-TERM CURRENT USE OF INSULIN (HCC): ICD-10-CM

## 2023-09-11 RX ORDER — EMPAGLIFLOZIN 25 MG/1
TABLET, FILM COATED ORAL
Qty: 30 TABLET | Refills: 2 | Status: SHIPPED | OUTPATIENT
Start: 2023-09-11 | End: 2023-09-12 | Stop reason: SDUPTHER

## 2023-09-12 DIAGNOSIS — E11.65 TYPE 2 DIABETES MELLITUS WITH HYPERGLYCEMIA, WITHOUT LONG-TERM CURRENT USE OF INSULIN (HCC): ICD-10-CM

## 2023-09-12 NOTE — TELEPHONE ENCOUNTER
Name of medication/s patient is requesting to be refilled - Jardiance    Medication dosage 25 mg    How often is medication being taken 1 x daily    Is patient requesting 30 or 90 day supply, 90 if possible     Name of Pharmacy- [facility]     Last Visit 8/16/2023  Next Visit 11/16/2023

## 2023-09-25 DIAGNOSIS — M79.605 BILATERAL LEG PAIN: Primary | ICD-10-CM

## 2023-09-25 DIAGNOSIS — M79.604 BILATERAL LEG PAIN: Primary | ICD-10-CM

## 2023-10-03 ENCOUNTER — TELEPHONE (OUTPATIENT)
Dept: FAMILY MEDICINE CLINIC | Facility: CLINIC | Age: 63
End: 2023-10-03

## 2023-10-03 NOTE — TELEPHONE ENCOUNTER
Hi, this is Tod Book at [phone]. My birthday is [phone]. I'm calling to find out if Mireya Escalona ordered a CAT scan, CAT scan and X-rays on my spine and back. OK, thank you. Bye. I appreciate a call back.

## 2023-10-05 ENCOUNTER — APPOINTMENT (EMERGENCY)
Dept: RADIOLOGY | Facility: HOSPITAL | Age: 63
End: 2023-10-05
Payer: COMMERCIAL

## 2023-10-05 ENCOUNTER — HOSPITAL ENCOUNTER (EMERGENCY)
Facility: HOSPITAL | Age: 63
Discharge: HOME | End: 2023-10-05
Attending: EMERGENCY MEDICINE
Payer: COMMERCIAL

## 2023-10-05 VITALS
DIASTOLIC BLOOD PRESSURE: 69 MMHG | RESPIRATION RATE: 20 BRPM | OXYGEN SATURATION: 94 % | SYSTOLIC BLOOD PRESSURE: 134 MMHG | HEART RATE: 113 BPM | TEMPERATURE: 97.2 F

## 2023-10-05 DIAGNOSIS — M79.604 BILATERAL LEG PAIN: Primary | ICD-10-CM

## 2023-10-05 DIAGNOSIS — M79.605 BILATERAL LEG PAIN: Primary | ICD-10-CM

## 2023-10-05 PROCEDURE — 96372 THER/PROPH/DIAG INJ SC/IM: CPT

## 2023-10-05 PROCEDURE — 99284 EMERGENCY DEPT VISIT MOD MDM: CPT | Performed by: EMERGENCY MEDICINE

## 2023-10-05 PROCEDURE — 99283 EMERGENCY DEPT VISIT LOW MDM: CPT

## 2023-10-05 PROCEDURE — 72100 X-RAY EXAM L-S SPINE 2/3 VWS: CPT

## 2023-10-05 RX ORDER — KETOROLAC TROMETHAMINE 30 MG/ML
15 INJECTION, SOLUTION INTRAMUSCULAR; INTRAVENOUS ONCE
Status: COMPLETED | OUTPATIENT
Start: 2023-10-05 | End: 2023-10-05

## 2023-10-05 RX ADMIN — KETOROLAC TROMETHAMINE 15 MG: 30 INJECTION, SOLUTION INTRAMUSCULAR; INTRAVENOUS at 08:48

## 2023-10-05 NOTE — ED PROVIDER NOTES
History  Chief Complaint   Patient presents with   • Leg Pain     Bilateral leg pain for the past few months states that pain starts in her knees and shoots up to her hips. Pain worsens when walking. Seen by her primary for same, scheduled to have x-rays done today, but could not stand the pain. Took aleve this am.      HPI     59-year-old female with past medical history of diabetes, COPD, hypertension, and hyperlipidemia who presents for evaluation of bilateral leg pain. Patient states she has been having pain in her upper thighs for the past month. She states pain seems to come on when she is standing and improves when she is lying down. She also states pain improves when she leans forward. Denies any pain in her lower legs. She does have numbness in her feet secondary to neuropathy but denies any numbness or weakness in her legs. Denies fevers or chills. Denies any falls or trauma. Denies back pain. Patient denies bowel or bladder incontinence or saddle anesthesia. Patient did have lower extremity duplex performed a few months ago showed diffuse peripheral vascular disease but was seen by vascular who did not feel that this was causing her symptoms. Prior to Admission Medications   Prescriptions Last Dose Informant Patient Reported? Taking? Blood Glucose Monitoring Suppl (ONE TOUCH ULTRA 2) w/Device KIT  Self No No   Sig: by Does not apply route daily   Empagliflozin (Jardiance) 25 MG TABS   No No   Sig: Take 1 tablet (25 mg total) by mouth in the morning   Insulin Glargine Solostar (Lantus SoloStar) 100 UNIT/ML SOPN  Self No No   Sig: Inject 0.1 mL (10 Units total) under the skin 2 (two) times a day   Insulin Pen Needle (Droplet Pen Needles) 32G X 4 MM MISC   No No   Sig: use twice a day for INJECTIONS   OneTouch Delica Lancets 09M MISC  Self No No   Sig: Check blood sugars three times daily. Please substitute with appropriate alternative as covered by patient's insurance.  Dx: E11.65   VRAYLAR 6 MG capsule  Self Yes No   Sig: Take 6 mg by mouth daily   albuterol (2.5 mg/3 mL) 0.083 % nebulizer solution  Self No No   Sig: Take 3 mL (2.5 mg total) by nebulization every 6 (six) hours as needed for wheezing or shortness of breath   albuterol (Ventolin HFA) 90 mcg/act inhaler  Self No No   Sig: Inhale 2 puffs every 4 (four) hours as needed for wheezing or shortness of breath   amitriptyline (ELAVIL) 100 mg tablet  Self No No   Sig: take 1/2 tablet by mouth once daily at bedtime   apixaban (Eliquis) 5 mg  Self No No   Sig: Take 1 tablet (5 mg total) by mouth 2 (two) times a day   aspirin (ECOTRIN LOW STRENGTH) 81 mg EC tablet  Self No No   Sig: Take 1 tablet (81 mg total) by mouth daily   benztropine (COGENTIN) 0.5 mg tablet  Self Yes No   Sig: Take 0.5 mg by mouth daily at bedtime     carvedilol (COREG) 3.125 mg tablet  Self No No   Sig: take 1 tablet by mouth twice a day with meals   Patient not taking: Reported on 5/3/2023   cloNIDine (CATAPRES) 0.1 mg tablet   Yes No   Sig: Take 0.1 mg by mouth daily as needed   clonazePAM (KlonoPIN) 0.5 mg tablet  Self Yes No   Sig: Take 0.5 mg by mouth daily as needed     cyclobenzaprine (FLEXERIL) 5 mg tablet   No No   Sig: Take 1 tablet (5 mg total) by mouth 3 (three) times a day as needed for muscle spasms   doxepin (SINEquan) 100 mg capsule  Self Yes No   Sig: take 1 to 2 capsules by mouth at bedtime if needed   dulaglutide (Trulicity) 3 XI/8.0QO injection  Self No No   Sig: Inject 0.5 mL (3 mg total) under the skin every 7 days   gabapentin (NEURONTIN) 600 MG tablet  Self No No   Sig: take 1 tablet by mouth three times a day   glucose blood (OneTouch Ultra) test strip  Self No No   Sig: Use to test blood sugar three times a day   lidocaine (Lidoderm) 5 %  Self No No   Sig: Apply 1 patch topically over 12 hours daily Remove & Discard patch within 12 hours or as directed by MD   Patient not taking: Reported on 7/24/2023   loperamide (IMODIUM) 2 mg capsule  Self No No Sig: Take 1 capsule (2 mg total) by mouth 4 (four) times a day as needed for diarrhea   loratadine (CLARITIN) 10 mg tablet  Self Yes No   pantoprazole (PROTONIX) 40 mg tablet   No No   Sig: Take 1 tablet (40 mg total) by mouth daily   rosuvastatin (CRESTOR) 40 MG tablet  Self No No   Sig: Take 1 tablet (40 mg total) by mouth daily   tiotropium (Spiriva Respimat) 2.5 MCG/ACT AERS inhaler  Self No No   Sig: Inhale 2 puffs daily   vilazodone (VIIBRYD) 40 mg tablet  Self Yes No   Sig: Take 40 mg by mouth daily. Facility-Administered Medications: None       Past Medical History:   Diagnosis Date   • Cancer [facility])     right breast   • Chronic back pain    • Colitis    • COPD (chronic obstructive pulmonary disease) (HCC)    • Depression    • Diabetes mellitus ([address])    • DVT of lower limb, acute ([address]) 2004   • GERD (gastroesophageal reflux disease)    • Hyperlipidemia    • Pneumonia    • Rapid heart rate    • Sleep apnea    • Stroke ([address])     4 mini strokes       Past Surgical History:   Procedure Laterality Date   • BREAST LUMPECTOMY Right    • CARDIAC CATHETERIZATION N/A 10/28/2021    Procedure: Cardiac Coronary Angiogram;  Surgeon: Kelvin Nieves DO;  Location: [facility];   Service: Cardiology   • IVC FILTER INSERTION     • MASTECTOMY     • MASTECTOMY W/ SENTINEL NODE BIOPSY Right 11/09/2021    Procedure: BREAST MASTECTOMY WITH BIOPSY LYMPH NODE SENTINEL and axillary node dissection  ([place] Lymph Node Injection @ 12:30);  Surgeon: Thee Sheffield MD;  Location: [facility] MAIN OR;  Service: General   • US GUIDANCE BREAST BIOPSY RIGHT EACH ADDITIONAL Right 10/13/2021   • US GUIDANCE BREAST BIOPSY RIGHT EACH ADDITIONAL Right 10/13/2021   • US GUIDED BREAST BIOPSY RIGHT COMPLETE Right 10/13/2021       Family History   Problem Relation Age of Onset   • Breast cancer Mother 67   • COPD Mother    • Coronary artery disease Mother    • Coronary artery disease Father    • Stroke Father    • Lung cancer Sister • No Known Problems Sister    • No Known Problems Sister    • Breast cancer Maternal Grandmother    • Colon cancer Paternal Grandfather    • No Known Problems Maternal Aunt    • No Known Problems Maternal Aunt    • No Known Problems Maternal Aunt    • No Known Problems Paternal Aunt    • Breast cancer Cousin      I have reviewed and agree with the history as documented. E-Cigarette/Vaping   • E-Cigarette Use Never User      E-Cigarette/Vaping Substances   • Nicotine No    • THC No    • CBD No    • Flavoring No    • Other No    • Unknown No      Social History     Tobacco Use   • Smoking status: Every Day     Packs/day: 1.00     Types: Cigarettes   • Smokeless tobacco: Never   Vaping Use   • Vaping Use: Never used   Substance Use Topics   • Alcohol use: Never   • Drug use: Never       Review of Systems   Constitutional: Negative for appetite change, chills and fever. HENT: Negative for congestion, rhinorrhea and sore throat. Respiratory: Negative for cough and shortness of breath. Cardiovascular: Negative for chest pain. Gastrointestinal: Negative for abdominal pain, diarrhea, nausea and vomiting. Genitourinary: Negative for difficulty urinating. Musculoskeletal: Positive for myalgias. Negative for arthralgias. Skin: Negative for rash. Neurological: Negative for dizziness, weakness, light-headedness, numbness and headaches. All other systems reviewed and are negative. Physical Exam  Physical Exam  Vitals and nursing note reviewed. Constitutional:       General: She is not in acute distress. Appearance: Normal appearance. She is well-developed. She is obese. She is not ill-appearing, toxic-appearing or diaphoretic. HENT:      Head: Normocephalic and atraumatic. Right Ear: External ear normal.      Left Ear: External ear normal.      Nose: Nose normal.      Mouth/Throat:      Mouth: Mucous membranes are moist.      Pharynx: Oropharynx is clear.    Eyes:      Extraocular Movements: Extraocular movements intact. Conjunctiva/sclera: Conjunctivae normal.   Cardiovascular:      Rate and Rhythm: Normal rate and regular rhythm. Pulses: Normal pulses. Dorsalis pedis pulses are 2+ on the right side and 2+ on the left side. Posterior tibial pulses are 2+ on the right side and 2+ on the left side. Heart sounds: Normal heart sounds. No murmur heard. No friction rub. No gallop. Pulmonary:      Effort: Pulmonary effort is normal. No respiratory distress. Breath sounds: Normal breath sounds. No wheezing or rales. Abdominal:      General: There is no distension. Palpations: Abdomen is soft. Tenderness: There is no abdominal tenderness. There is no guarding or rebound. Musculoskeletal:         General: No tenderness. Cervical back: Neck supple. Comments: No tenderness to the legs or back. Skin:     General: Skin is warm and dry. Coloration: Skin is not pale. Findings: No erythema or rash. Neurological:      General: No focal deficit present. Mental Status: She is alert and oriented to person, place, and time. Cranial Nerves: No cranial nerve deficit. Sensory: No sensory deficit. Motor: No weakness. Comments: Strength 5/5 bilateral lower extremities with hip flexion, knee extension and flexion, ankle dorsiflexion and plantarflexion, and great toe extension. Sensation intact in all dermatomes of the lower extremities.    Psychiatric:         Mood and Affect: Mood normal.         Behavior: Behavior normal.         Vital Signs  ED Triage Vitals   Temperature Pulse Respirations Blood Pressure SpO2   10/05/23 0823 10/05/23 0818 10/05/23 0818 10/05/23 0818 10/05/23 0818   (!) 97.2 °F (36.2 °C) (!) 113 20 134/69 94 %      Temp Source Heart Rate Source Patient Position - Orthostatic VS BP Location FiO2 (%)   10/05/23 0823 10/05/23 0818 10/05/23 0818 10/05/23 0818 --   Temporal Monitor Lying Right arm Pain Score       10/05/23 0818       8           Vitals:    10/05/23 0818   BP: 134/69   Pulse: (!) 113   Patient Position - Orthostatic VS: Lying         Visual Acuity      ED Medications  Medications   ketorolac (TORADOL) injection 15 mg (15 mg Intramuscular Given 10/5/23 0848)       Diagnostic Studies  Results Reviewed     None                 XR spine lumbar 2 or 3 views injury   Final Result by Gelacio Rice MD (10/05 0158)      No acute osseous abnormality. Degenerative changes as described. Workstation performed: [id number]                    Procedures  Procedures         ED Course                               SBIRT 20yo+    Flowsheet Row Most Recent Value   Initial Alcohol Screen: US AUDIT-C     1. How often do you have a drink containing alcohol? 0 Filed at: 10/05/2023 0819   2. How many drinks containing alcohol do you have on a typical day you are drinking? 0 Filed at: 10/05/2023 0819   3b. FEMALE Any Age, or MALE 65+: How often do you have 4 or more drinks on one occassion? 0 Filed at: 10/05/2023 0819   Audit-C Score 0 Filed at: 10/05/2023 5175   HUSAM: How many times in the past year have you. .. Used an illegal drug or used a prescription medication for non-medical reasons? Never Filed at: 10/05/2023 0245                    MDM     80-year-old female with past medical history of diabetes, COPD, hypertension, and hyperlipidemia who presents for evaluation of bilateral leg pain the past month, no back pain. No bowel or bladder incontinence or saddle anesthesia. Normal lower extremity neurologic exam.  Normal lower extremity pulses. Differential diagnosis includes: Spinal stenosis, musculoskeletal pain. Patient does not have any signs of acute cord compression at this time, no bowel or bladder incontinence or saddle anesthesia. No need for emergent MRI. Will obtain x-ray to evaluate for fracture. Will treat symptomatically and reassess.   Reviewed and interpreted x-ray, no acute fracture. Patient states pain is slightly improved. We will have patient follow-up for physical therapy. Strict return precautions discussed. Patient expressed understanding and was agreeable for discharge. Disposition  Final diagnoses:   Bilateral leg pain     Time reflects when diagnosis was documented in both MDM as applicable and the Disposition within this note     Time User Action Codes Description Comment    10/5/2023  9:21 AM Ita Peralta [Z69.788,  M79.605] Bilateral leg pain       ED Disposition     ED Disposition   Discharge    Condition   Stable    Date/Time   Thu Oct 5, 2023  9:21 AM    [address] discharge to home/self care.                Follow-up Information     Follow up With Specialties Details Why Contact Info Additional 11[address] Niurka Wright PA-C Family Medicine   [address] [place] [address] Drive [address] [place] [address] [facility]   [phone]          Discharge Medication List as of 10/5/2023  9:22 AM      CONTINUE these medications which have NOT CHANGED    Details   albuterol (2.5 mg/3 mL) 0.083 % nebulizer solution Take 3 mL (2.5 mg total) by nebulization every 6 (six) hours as needed for wheezing or shortness of breath, Starting Tue 4/18/2023, Normal      albuterol (Ventolin HFA) 90 mcg/act inhaler Inhale 2 puffs every 4 (four) hours as needed for wheezing or shortness of breath, Starting Tue 4/18/2023, Normal      amitriptyline (ELAVIL) 100 mg tablet take 1/2 tablet by mouth once daily at bedtime, Normal      apixaban (Eliquis) 5 mg Take 1 tablet (5 mg total) by mouth 2 (two) times a day, Starting Mon 1/30/2023, Normal      aspirin (ECOTRIN LOW STRENGTH) 81 mg EC tablet Take 1 tablet (81 mg total) by mouth daily, Starting Tue 10/12/2021, No Print      benztropine (COGENTIN) 0.5 mg tablet Take 0.5 mg by mouth daily at bedtime  , Historical Med      Blood Glucose Monitoring Suppl (ONE TOUCH ULTRA 2) w/Device KIT by Does not apply route daily, Starting Thu 6/18/2020, Normal      carvedilol (COREG) 3.125 mg tablet take 1 tablet by mouth twice a day with meals, Normal      clonazePAM (KlonoPIN) 0.5 mg tablet Take 0.5 mg by mouth daily as needed  , Starting Wed 8/18/2021, Historical Med      cloNIDine (CATAPRES) 0.1 mg tablet Take 0.1 mg by mouth daily as needed, Starting Tue 7/25/2023, Historical Med      cyclobenzaprine (FLEXERIL) 5 mg tablet Take 1 tablet (5 mg total) by mouth 3 (three) times a day as needed for muscle spasms, Starting Wed 9/6/2023, Normal      doxepin (SINEquan) 100 mg capsule take 1 to 2 capsules by mouth at bedtime if needed, Historical Med      dulaglutide (Trulicity) 3 AD/0.9YZ injection Inject 0.5 mL (3 mg total) under the skin every 7 days, Starting Wed 7/5/2023, Until Tue 10/3/2023, Normal      Empagliflozin (Jardiance) 25 MG TABS Take 1 tablet (25 mg total) by mouth in the morning, Starting Wed 9/13/2023, Until Tue 12/12/2023, Normal      gabapentin (NEURONTIN) 600 MG tablet take 1 tablet by mouth three times a day, Normal      glucose blood (OneTouch Ultra) test strip Use to test blood sugar three times a day, Normal      Insulin Glargine Solostar (Lantus SoloStar) 100 UNIT/ML SOPN Inject 0.1 mL (10 Units total) under the skin 2 (two) times a day, Starting Thu 4/20/2023, Until Tue 10/17/2023, Normal      Insulin Pen Needle (Droplet Pen Needles) 32G X 4 MM MISC use twice a day for INJECTIONS, Normal      lidocaine (Lidoderm) 5 % Apply 1 patch topically over 12 hours daily Remove & Discard patch within 12 hours or as directed by MD, Starting Tue 2/14/2023, Normal      loperamide (IMODIUM) 2 mg capsule Take 1 capsule (2 mg total) by mouth 4 (four) times a day as needed for diarrhea, Starting Mon 1/30/2023, Normal      loratadine (CLARITIN) 10 mg tablet Historical Med      OneTouch Delica Lancets 82I MISC Check blood sugars three times daily.  Please substitute with appropriate alternative as covered by patient's insurance. Dx: E11.65, Normal      pantoprazole (PROTONIX) 40 mg tablet Take 1 tablet (40 mg total) by mouth daily, Starting Wed 9/6/2023, Normal      rosuvastatin (CRESTOR) 40 MG tablet Take 1 tablet (40 mg total) by mouth daily, Starting Tue 4/4/2023, Normal      tiotropium (Spiriva Respimat) 2.5 MCG/ACT AERS inhaler Inhale 2 puffs daily, Starting Wed 9/22/2021, Normal      vilazodone (VIIBRYD) 40 mg tablet Take 40 mg by mouth daily. , Historical Med      VRAYLAR 6 MG capsule Take 6 mg by mouth daily, Starting Thu 5/10/2018, Historical Med                 PDMP Review     None          ED Provider  Electronically Signed by           Luis Manuel Gramajo MD  10/08/23 7189

## 2023-10-05 NOTE — DISCHARGE INSTRUCTIONS
[facility] with [facility] for your leg pain. Please follow up with your primary care doctor.

## 2023-10-06 ENCOUNTER — TELEPHONE (OUTPATIENT)
Dept: PHYSICAL THERAPY | Facility: OTHER | Age: 63
End: 2023-10-06

## 2023-10-06 NOTE — TELEPHONE ENCOUNTER
Call placed to the patient per [facility] referral.    Spoke with the patient who states she is having no neck, back or spine pain. Her pain is in her thighs mainly. Pt did have a consult with PM for 10/3/23, which she was a no show for.  She states she changed her mind on "getting injections"    Pt states she is going to see a chiro for her leg pain    [facility] closed

## 2023-10-17 DIAGNOSIS — R00.0 TACHYCARDIA: ICD-10-CM

## 2023-10-18 RX ORDER — CARVEDILOL 3.12 MG/1
TABLET ORAL
Qty: 60 TABLET | Refills: 3 | OUTPATIENT
Start: 2023-10-18

## 2023-10-19 DIAGNOSIS — R00.0 TACHYCARDIA: Primary | ICD-10-CM

## 2023-10-19 RX ORDER — CARVEDILOL 3.12 MG/1
3.12 TABLET ORAL 2 TIMES DAILY WITH MEALS
Qty: 60 TABLET | Refills: 6 | Status: SHIPPED | OUTPATIENT
Start: 2023-10-19

## 2023-10-19 NOTE — TELEPHONE ENCOUNTER
Requested medication(s) are due for refill today: Yes  Patient has already received a courtesy refill: No  Other reason request has been forwarded to provider: JEFFRY

## 2023-10-20 ENCOUNTER — TELEPHONE (OUTPATIENT)
Dept: VASCULAR SURGERY | Facility: CLINIC | Age: 63
End: 2023-10-20

## 2023-10-20 NOTE — TELEPHONE ENCOUNTER
Patient contacted office stating that she has pain in bilateral legs that she stated that she is having difficulty walking. She stated that the pain is worse in the left leg than the right. She stated that the pain is there all day, but does come and go, but she mostly has the pain. She stated that laying down flat improves the pain. She stated that she was prescribed a muscle relaxer which did not help her pain, but Aleve sometimes does help. She stated that she has tingling and numbness in her feet, but she also has neuropathy, but gets this more often. She said her feet are cold, but they are normal color and she denies wounds on her legs or feet. She was requesting something for pain until she see Nawaf López PA-C on 12/6/23. She stated that she was in the ER last week and they gave her a shot of pain medication which worked, but the pain returned once it wore off. She had CAESAR in June 2023. Informed her that I would send this to the triage provider for recommendations and contact her back with recommendations. She was agreeable to same and requested any prescriptions be sent to [facility] in [place].

## 2023-10-20 NOTE — TELEPHONE ENCOUNTER
Reviewed chart. Recent ED visit for bilateral thigh pain. By description of pain in ED note and LEAD in June with normal STEFANI symptoms are not consistent with vascular etiology. I cannot prescribe pain medications for her current symptoms. She should see her PCP for this and if further work up or referral is needed.

## 2023-10-20 NOTE — TELEPHONE ENCOUNTER
Left message for patient to return call to review information from [facility], [address] [facility].

## 2023-10-23 ENCOUNTER — TELEPHONE (OUTPATIENT)
Dept: FAMILY MEDICINE CLINIC | Facility: HOME HEALTHCARE | Age: 63
End: 2023-10-23

## 2023-10-23 NOTE — TELEPHONE ENCOUNTER
Patient stated that the flexeril you prescribed for the leg pain only made her sleepy not helped with her pain. Wondered if you could prescribe something stronger that would help the pain.

## 2023-11-06 DIAGNOSIS — E11.65 TYPE 2 DIABETES MELLITUS WITH HYPERGLYCEMIA, WITHOUT LONG-TERM CURRENT USE OF INSULIN (HCC): ICD-10-CM

## 2023-11-06 RX ORDER — PEN NEEDLE, DIABETIC 32GX 5/32"
NEEDLE, DISPOSABLE MISCELLANEOUS
Qty: 100 EACH | Refills: 1 | Status: SHIPPED | OUTPATIENT
Start: 2023-11-06

## 2023-11-06 NOTE — TELEPHONE ENCOUNTER
Name of medication/s patient is requesting to be refilled- Lantus. ** I do not see in her med list to queue up for patient    Medication dosage ? How often is medication being taken 10 units am and 10 units pm .    Is patient requesting 30 or 90 day supply, 90. Name of medication/s patient is requesting to be refilled- Droplet pen needle . Medication dosage n/a    How often is medication being taken twice daily . Is patient requesting 30 or 90 day supply, 90.     Name of Pharmacy [facility]     Last Visit 9/12/2023  Next Visit 11/16/2023

## 2023-11-13 NOTE — TELEPHONE ENCOUNTER
Patient advised that she is checking blood sugars and they have been ok, but does report that she felt dizzy and lightheaded and did fall yesterday.

## 2023-11-15 ENCOUNTER — TELEPHONE (OUTPATIENT)
Dept: GASTROENTEROLOGY | Facility: CLINIC | Age: 63
End: 2023-11-15

## 2023-11-15 DIAGNOSIS — Z79.4 TYPE 2 DIABETES MELLITUS WITHOUT COMPLICATION, WITH LONG-TERM CURRENT USE OF INSULIN (HCC): Primary | ICD-10-CM

## 2023-11-15 DIAGNOSIS — E11.9 TYPE 2 DIABETES MELLITUS WITHOUT COMPLICATION, WITH LONG-TERM CURRENT USE OF INSULIN (HCC): Primary | ICD-10-CM

## 2023-11-15 RX ORDER — INSULIN GLARGINE 100 [IU]/ML
15 INJECTION, SOLUTION SUBCUTANEOUS 2 TIMES DAILY
Qty: 27 ML | Refills: 0 | Status: SHIPPED | OUTPATIENT
Start: 2023-11-15 | End: 2024-02-13

## 2023-11-15 NOTE — TELEPHONE ENCOUNTER
Patient calling back to check on the status of Lantus prescription.  Patient complains of feeling lightheaded and sugars are in the 300's

## 2023-11-29 DIAGNOSIS — Z79.4 TYPE 2 DIABETES MELLITUS WITHOUT COMPLICATION, WITH LONG-TERM CURRENT USE OF INSULIN (HCC): ICD-10-CM

## 2023-11-29 DIAGNOSIS — E11.65 TYPE 2 DIABETES MELLITUS WITH HYPERGLYCEMIA, WITHOUT LONG-TERM CURRENT USE OF INSULIN (HCC): ICD-10-CM

## 2023-11-29 DIAGNOSIS — E11.9 TYPE 2 DIABETES MELLITUS WITHOUT COMPLICATION, WITH LONG-TERM CURRENT USE OF INSULIN (HCC): ICD-10-CM

## 2023-11-29 DIAGNOSIS — Z79.4 TYPE 2 DIABETES MELLITUS WITH HYPERGLYCEMIA, WITH LONG-TERM CURRENT USE OF INSULIN (HCC): ICD-10-CM

## 2023-11-29 DIAGNOSIS — Z79.4 TYPE 2 DIABETES MELLITUS WITHOUT COMPLICATION, WITH LONG-TERM CURRENT USE OF INSULIN (HCC): Primary | ICD-10-CM

## 2023-11-29 DIAGNOSIS — E11.9 TYPE 2 DIABETES MELLITUS WITHOUT COMPLICATION, WITH LONG-TERM CURRENT USE OF INSULIN (HCC): Primary | ICD-10-CM

## 2023-11-29 DIAGNOSIS — E11.65 TYPE 2 DIABETES MELLITUS WITH HYPERGLYCEMIA, WITH LONG-TERM CURRENT USE OF INSULIN (HCC): ICD-10-CM

## 2023-11-29 RX ORDER — DULAGLUTIDE 3 MG/.5ML
3 INJECTION, SOLUTION SUBCUTANEOUS
Qty: 2 ML | Refills: 2 | Status: SHIPPED | OUTPATIENT
Start: 2023-11-29 | End: 2023-12-04 | Stop reason: SDUPTHER

## 2023-11-29 RX ORDER — PEN NEEDLE, DIABETIC 32GX 5/32"
NEEDLE, DISPOSABLE MISCELLANEOUS
Qty: 100 EACH | Refills: 1 | Status: SHIPPED | OUTPATIENT
Start: 2023-11-29 | End: 2023-12-04 | Stop reason: SDUPTHER

## 2023-11-29 RX ORDER — INSULIN GLARGINE 100 [IU]/ML
15 INJECTION, SOLUTION SUBCUTANEOUS 2 TIMES DAILY
Qty: 27 ML | Refills: 0 | Status: SHIPPED | OUTPATIENT
Start: 2023-11-29 | End: 2023-12-04 | Stop reason: SDUPTHER

## 2023-11-29 NOTE — TELEPHONE ENCOUNTER
Patient received call from insurance regarding prescription coverage. She is calling to request her prescriptions from Dr. Walter Conrad ( Lantus, Jardiance, Trulicity, and Pen needles) to be sent to [facility].

## 2023-11-30 ENCOUNTER — TELEPHONE (OUTPATIENT)
Dept: FAMILY MEDICINE CLINIC | Facility: HOME HEALTHCARE | Age: 63
End: 2023-11-30

## 2023-11-30 NOTE — TELEPHONE ENCOUNTER
Received call from [facility] mail order [facility]. They are asking if The Pen needles can be called in for a 90 day supply/ disp 200.  They are also asking in regards to Angelica Martino to see if this can be called in for a 90 day supply

## 2023-12-04 DIAGNOSIS — Z79.4 TYPE 2 DIABETES MELLITUS WITH HYPERGLYCEMIA, WITH LONG-TERM CURRENT USE OF INSULIN (HCC): ICD-10-CM

## 2023-12-04 DIAGNOSIS — E11.65 TYPE 2 DIABETES MELLITUS WITH HYPERGLYCEMIA, WITHOUT LONG-TERM CURRENT USE OF INSULIN (HCC): ICD-10-CM

## 2023-12-04 DIAGNOSIS — E11.9 TYPE 2 DIABETES MELLITUS WITHOUT COMPLICATION, WITH LONG-TERM CURRENT USE OF INSULIN (HCC): ICD-10-CM

## 2023-12-04 DIAGNOSIS — Z79.4 TYPE 2 DIABETES MELLITUS WITHOUT COMPLICATION, WITH LONG-TERM CURRENT USE OF INSULIN (HCC): ICD-10-CM

## 2023-12-04 DIAGNOSIS — E11.65 TYPE 2 DIABETES MELLITUS WITH HYPERGLYCEMIA, WITH LONG-TERM CURRENT USE OF INSULIN (HCC): ICD-10-CM

## 2023-12-04 RX ORDER — PEN NEEDLE, DIABETIC 32GX 5/32"
NEEDLE, DISPOSABLE MISCELLANEOUS
Qty: 200 EACH | Refills: 3 | Status: SHIPPED | OUTPATIENT
Start: 2023-12-04

## 2023-12-04 RX ORDER — INSULIN GLARGINE 100 [IU]/ML
15 INJECTION, SOLUTION SUBCUTANEOUS 2 TIMES DAILY
Qty: 27 ML | Refills: 0 | Status: SHIPPED | OUTPATIENT
Start: 2023-12-04 | End: 2024-03-03

## 2023-12-04 RX ORDER — DULAGLUTIDE 3 MG/.5ML
3 INJECTION, SOLUTION SUBCUTANEOUS
Qty: 6 ML | Refills: 0 | Status: SHIPPED | OUTPATIENT
Start: 2023-12-04 | End: 2024-03-03

## 2023-12-14 ENCOUNTER — OFFICE VISIT (OUTPATIENT)
Dept: FAMILY MEDICINE CLINIC | Facility: HOME HEALTHCARE | Age: 63
End: 2023-12-14
Payer: COMMERCIAL

## 2023-12-14 VITALS
RESPIRATION RATE: 18 BRPM | TEMPERATURE: 99 F | DIASTOLIC BLOOD PRESSURE: 62 MMHG | HEIGHT: 67 IN | OXYGEN SATURATION: 95 % | BODY MASS INDEX: 35.06 KG/M2 | HEART RATE: 95 BPM | SYSTOLIC BLOOD PRESSURE: 110 MMHG | WEIGHT: 223.4 LBS

## 2023-12-14 DIAGNOSIS — I73.9 PAD (PERIPHERAL ARTERY DISEASE) (HCC): Primary | ICD-10-CM

## 2023-12-14 DIAGNOSIS — E11.9 TYPE 2 DIABETES MELLITUS WITHOUT COMPLICATION, WITH LONG-TERM CURRENT USE OF INSULIN (HCC): ICD-10-CM

## 2023-12-14 DIAGNOSIS — Z79.4 TYPE 2 DIABETES MELLITUS WITHOUT COMPLICATION, WITH LONG-TERM CURRENT USE OF INSULIN (HCC): ICD-10-CM

## 2023-12-14 PROCEDURE — T1015 CLINIC SERVICE: HCPCS | Performed by: PHYSICIAN ASSISTANT

## 2023-12-14 NOTE — PROGRESS NOTES
Assessment/Plan:     Diagnoses and all orders for this visit:    PAD (peripheral artery disease) ([address])    Type 2 diabetes mellitus without complication, with long-term current use of insulin ([address])          - Will order walker through [facility]  - Continue current medications as prescribed  - Follow up with endocrinology as scheduled with labs prior    Return in about 3 months (around 3/14/2024) for Next scheduled follow up. Subjective:        Patient ID: Vivien Palomino is a 61 y.o. female. Chief Complaint   Patient presents with   • Follow-up       Laverne Lenz is a 58-year-old female with history of hypertension, hyperlipidemia, CAD, PAD, history of DVT, stage III CKD, type 2 diabetes, GERD, COPD, tobacco use, breast cancer, and depression, presenting for follow up. Type 2 diabetes is currently managed with Trulicity 3 mg weekly, jardiance 25 mg, and Lantus 10 units BID. A1c 6/2023 was 6.8. Patient is following with endocrinology for management. HTN is managed with carvedilol 3.125 BID. /62 today. Patient denies chest pain, palpitations, or LE edema. HLD is managed with Crestor 40 mg daily. Lipid panel from 6/2023 with total cholesterol 159, LDL 69. GERD is well controlled on pantoprazole 40 mg daily. Colonoscopy done 8/2022 with recommendation to repeat in 5 years due to family history. Depression is currently managed by psychiatry with Cogentin, Elavil, doxepin, Klonopin, vraylar, and Viibryd. Patient is requesting a walker due to bilateral leg pain with walking for extended periods. She is following with vascular regarding PAD.         The following portions of the patient's history were reviewed and updated as appropriate: allergies, current medications, past family history, past medical history, past social history, past surgical history and problem list.    Patient Active Problem List   Diagnosis   • Cataract   • Chronic hoarseness   • Chronic low back pain   • Centrilobular emphysema (HCC)   • Deep vein thrombophlebitis of leg, unspecified laterality (HCC)   • Depression   • Diabetes mellitus with neurological manifestation (HCC)   • Gastroesophageal reflux disease with esophagitis   • Headache   • Hypercholesterolemia   • Hypercoagulable state ([address])   • Hypertension   • Migraine   • Myositis   • Neuropathy   • Pulmonary nodule seen on imaging study   • Type 2 diabetes mellitus without complication, with long-term current use of insulin (HCC)   • Chronic hypoxemic respiratory failure (HCC)   • Class 1 obesity due to excess calories with serious comorbidity and body mass index (BMI) of 34.0 to 34.9 in adult   • Sleep apnea   • Family history of heart disease   • Pulmonary hypertension ([address])   • Primary fibromyalgia syndrome   • Primary cancer of upper outer quadrant of right breast ([address])   • Malignant neoplasm of upper-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, estrogen receptor positive    • Mucinous carcinoma of breast, right (HCC)   • Tobacco abuse   • PAD (peripheral artery disease) (HCC)   • Tachycardia   • Stage 3a chronic kidney disease ([facility])   • Chronic obstructive pulmonary disease with acute exacerbation ([facility])   • Kidney lesion   • Nonocclusive coronary atherosclerosis of native coronary artery   • Kidney cysts   • Hyponatremia   • Diarrhea   • Presence of IVC filter   • History of pulmonary embolism       Current Outpatient Medications   Medication Sig Dispense Refill   • albuterol (2.5 mg/3 mL) 0.083 % nebulizer solution Take 3 mL (2.5 mg total) by nebulization every 6 (six) hours as needed for wheezing or shortness of breath 90 mL 2   • albuterol (Ventolin HFA) 90 mcg/act inhaler Inhale 2 puffs every 4 (four) hours as needed for wheezing or shortness of breath 18 g 5   • amitriptyline (ELAVIL) 100 mg tablet take 1/2 tablet by mouth once daily at bedtime 45 tablet 1   • aspirin (ECOTRIN LOW STRENGTH) 81 mg EC tablet Take 1 tablet (81 mg total) by mouth daily 90 tablet 3   • benztropine (COGENTIN) 0.5 mg tablet Take 0.5 mg by mouth daily at bedtime       • Blood Glucose Monitoring Suppl (ONE TOUCH ULTRA 2) w/Device KIT by Does not apply route daily 100 each 0   • carvedilol (COREG) 3.125 mg tablet Take 1 tablet (3.125 mg total) by mouth 2 (two) times a day with meals 60 tablet 6   • clonazePAM (KlonoPIN) 0.5 mg tablet Take 0.5 mg by mouth daily as needed       • cloNIDine (CATAPRES) 0.1 mg tablet Take 0.1 mg by mouth daily as needed     • Continuous Blood Gluc  (FreeStyle Darius 2 Jewett) ANDERSON To check blood sugar continuously. 1 each 0   • Continuous Blood Gluc Sensor (FreeStyle Darisu 2 Sensor) MISC Use to check her blood sugars continuously and change it every 2 weeks 6 each 0   • doxepin (SINEquan) 100 mg capsule take 1 to 2 capsules by mouth at bedtime if needed     • dulaglutide (Trulicity) 3 EL/8.2CV injection Inject 0.5 mL (3 mg total) under the skin every 7 days 6 mL 0   • Empagliflozin (Jardiance) 25 MG TABS Take 1 tablet (25 mg total) by mouth in the morning 90 tablet 0   • gabapentin (NEURONTIN) 600 MG tablet take 1 tablet by mouth three times a day 270 tablet 1   • glucose blood (OneTouch Ultra) test strip Use to test blood sugar three times a day 100 strip 2   • Insulin Glargine Solostar (Lantus SoloStar) 100 UNIT/ML SOPN Inject 0.15 mL (15 Units total) under the skin 2 (two) times a day 27 mL 0   • Insulin Pen Needle (Droplet Pen Needles) 32G X 4 MM MISC use twice a day for INJECTIONS 200 each 3   • loperamide (IMODIUM) 2 mg capsule Take 1 capsule (2 mg total) by mouth 4 (four) times a day as needed for diarrhea 30 capsule 0   • loratadine (CLARITIN) 10 mg tablet      • OneTouch Delica Lancets 95J MISC Check blood sugars three times daily. Please substitute with appropriate alternative as covered by patient's insurance.  Dx: E11.65 300 each 3   • pantoprazole (PROTONIX) 40 mg tablet Take 1 tablet (40 mg total) by mouth daily 90 tablet 1   • rosuvastatin (CRESTOR) 40 MG tablet Take 1 tablet (40 mg total) by mouth daily 90 tablet 3   • tiotropium (Spiriva Respimat) 2.5 MCG/ACT AERS inhaler Inhale 2 puffs daily 4 g 3   • vilazodone (VIIBRYD) 40 mg tablet Take 40 mg by mouth daily. • VRAYLAR 6 MG capsule Take 6 mg by mouth daily  0     No current facility-administered medications for this visit. Past Medical History:   Diagnosis Date   • Cancer [facility])     right breast   • Chronic back pain    • Colitis    • COPD (chronic obstructive pulmonary disease) (HCC)    • Depression    • Diabetes mellitus ([address])    • DVT of lower limb, acute ([address]) 2004   • GERD (gastroesophageal reflux disease)    • Hyperlipidemia    • Pneumonia    • Rapid heart rate    • Sleep apnea    • Stroke [facility])     4 mini strokes        Past Surgical History:   Procedure Laterality Date   • BREAST LUMPECTOMY Right    • CARDIAC CATHETERIZATION N/A 10/28/2021    Procedure: Cardiac Coronary Angiogram;  Surgeon: Roxanne Mullen DO;  Location: [facility];   Service: Cardiology   • IVC FILTER INSERTION     • MASTECTOMY     • MASTECTOMY W/ SENTINEL NODE BIOPSY Right 11/09/2021    Procedure: BREAST MASTECTOMY WITH BIOPSY LYMPH NODE SENTINEL and axillary node dissection  ([place] Lymph Node Injection @ 12:30);  Surgeon: Lian Bosch MD;  Location: [address] [place] [address];  Service: General   • US GUIDANCE BREAST BIOPSY RIGHT EACH ADDITIONAL Right 10/13/2021   • US GUIDANCE BREAST BIOPSY RIGHT EACH ADDITIONAL Right 10/13/2021   • US GUIDED BREAST BIOPSY RIGHT COMPLETE Right 10/13/2021        Social History     Socioeconomic History   • Marital status: Legally      Spouse name: Not on file   • Number of children: Not on file   • Years of education: Not on file   • Highest education level: Not on file   Occupational History   • Not on file   Tobacco Use   • Smoking status: Every Day     Current packs/day: 1.00     Types: Cigarettes   • Smokeless tobacco: Never   Vaping Use   • Vaping status: Never Used   Substance and Sexual Activity   • Alcohol use: Never   • Drug use: Never   • Sexual activity: Not Currently   Other Topics Concern   • Not on file   Social History Narrative   • Not on file     Social Determinants of Health     Financial Resource Strain: Not on file   Food Insecurity: No Food Insecurity (2/13/2023)    Received from [facility] in the Last Year: Never true    • Ran Out of Food in the Last Year: Never true   Transportation Needs: No Transportation Needs (1/30/2023)    PRAPARE - Transportation    • Lack of Transportation (Medical): No    • Lack of Transportation (Non-Medical): No   Physical Activity: Not on file   Stress: Not on file   Social Connections: Not on file   Intimate Partner Violence: Not on file   Housing Stability: Low Risk  (1/30/2023)    Housing Stability Vital Sign    • Unable to Pay for Housing in the Last Year: No    • Number of Places Lived in the Last Year: 1    • Unstable Housing in the Last Year: No        Review of Systems   Constitutional:  Negative for chills, diaphoresis and fever. Respiratory:  Negative for cough, chest tightness, shortness of breath and wheezing. Cardiovascular:  Negative for chest pain, palpitations and leg swelling. Gastrointestinal:  Negative for abdominal pain, blood in stool, constipation, diarrhea, nausea and vomiting. Musculoskeletal:  Positive for myalgias. Skin:  Negative for rash and wound. Neurological:  Negative for dizziness, syncope, weakness, light-headedness and headaches. Psychiatric/Behavioral:  Negative for dysphoric mood, self-injury, sleep disturbance and suicidal ideas. The patient is not nervous/anxious. All other systems reviewed and are negative.         Objective:      /62 (BP Location: Left arm, Patient Position: Sitting, Cuff Size: Standard)   Pulse 95   Temp 99 °F (37.2 °C)   Resp 18   Ht 5' 7" (1.702 m)   Wt 101 kg (223 lb 6.4 oz) SpO2 95%   BMI 34.99 kg/m²          Physical Exam  Vitals and nursing note reviewed. Constitutional:       General: She is not in acute distress. Appearance: Normal appearance. She is obese. HENT:      Head: Normocephalic and atraumatic. Eyes:      Extraocular Movements: Extraocular movements intact. Conjunctiva/sclera: Conjunctivae normal.      Pupils: Pupils are equal, round, and reactive to light. Cardiovascular:      Rate and Rhythm: Normal rate and regular rhythm. Heart sounds: Normal heart sounds. No murmur heard. Pulmonary:      Effort: Pulmonary effort is normal. No respiratory distress. Breath sounds: Normal breath sounds. No wheezing. Musculoskeletal:      Right lower leg: No edema. Left lower leg: No edema. Skin:     General: Skin is warm and dry. Neurological:      General: No focal deficit present. Mental Status: She is alert and oriented to person, place, and time. Cranial Nerves: No cranial nerve deficit. Motor: No weakness.    Psychiatric:         Mood and Affect: Mood normal.         Behavior: Behavior normal.

## 2023-12-15 LAB
DME PARACHUTE DELIVERY DATE REQUESTED: NORMAL
DME PARACHUTE ITEM DESCRIPTION: NORMAL
DME PARACHUTE ITEM DESCRIPTION: NORMAL
DME PARACHUTE ORDER STATUS: NORMAL
DME PARACHUTE SUPPLIER NAME: NORMAL
DME PARACHUTE SUPPLIER PHONE: NORMAL

## 2023-12-18 LAB
DME PARACHUTE DELIVERY DATE ACTUAL: NORMAL
DME PARACHUTE DELIVERY DATE REQUESTED: NORMAL
DME PARACHUTE ITEM DESCRIPTION: NORMAL
DME PARACHUTE ITEM DESCRIPTION: NORMAL
DME PARACHUTE ORDER STATUS: NORMAL
DME PARACHUTE SUPPLIER NAME: NORMAL
DME PARACHUTE SUPPLIER PHONE: NORMAL

## 2023-12-20 ENCOUNTER — OFFICE VISIT (OUTPATIENT)
Dept: VASCULAR SURGERY | Facility: HOSPITAL | Age: 63
End: 2023-12-20
Payer: COMMERCIAL

## 2023-12-20 VITALS
HEIGHT: 67 IN | TEMPERATURE: 98.7 F | SYSTOLIC BLOOD PRESSURE: 128 MMHG | WEIGHT: 221.8 LBS | HEART RATE: 100 BPM | BODY MASS INDEX: 34.81 KG/M2 | DIASTOLIC BLOOD PRESSURE: 72 MMHG | OXYGEN SATURATION: 91 %

## 2023-12-20 DIAGNOSIS — I73.9 PAD (PERIPHERAL ARTERY DISEASE) (HCC): Primary | ICD-10-CM

## 2023-12-20 PROCEDURE — 99213 OFFICE O/P EST LOW 20 MIN: CPT

## 2023-12-20 NOTE — PATIENT INSTRUCTIONS
-Educated patient on pathophysiology of peripheral arterial disease, available treatment options, and indications for treatment.   -Repeat CAESAR in June   -Discussed risk factor modification, including adequate glycemic and lipid control, smoking cessation, blood pressure, and lifestyle modifications.   -Continue medical optimization with daily ASA 81mg and statin therapy with LDL goal <100.   -Recommend starting a structured walking program 3-5 times/week for 30 minutes. Patient should walk until claudication symptoms occur, rest for 5-10 minutes, then continue to walk. Patient should increase distance each week.   -Follow up in 6 months  -Instructed patient to call the office with questions, concerns, or new symptoms.

## 2023-12-20 NOTE — ASSESSMENT & PLAN NOTE
63 y.o. female patient w/ a PMH significant for DM, HLD, chronic and ongoing tobacco use-about 64 pack years, currently smoking up to a pack and half a day, COPD, a history of DVT and IVC filter presents to the vascular office today for evaluation of bilateral, intermittent thigh pain.     Imaging  CAESAR duplex (6/2023): CAESAR duplex demonstrates diffuse disease throughout the BL femoral-popliteal arteries with 50-75% mid femoral stenosis.  R STEFANI 1.16/139/100; L STEFANI 1.15/129/95    Recommendations  -Diffuse arterial disease throughout the femoral-popliteal arteries with moderate stenosis in left mid SFA.  BL STEFANI's within the normal category and GT pressures are within healing range.    -Denies disabling claudication or worsening symptoms. She reports her leg pain has improved since her last OV.  -We discussed the pathophysiology of arterial occlusive disease, available treatment options and indications for treatment.   -No vascular intervention recommended at this time. Will continue surveillance with CAESAR in June for yearly surveillance.   -Continue medical optimization. Currently on ASA and statin.   -Goal hgb A1c<7.0, LDL<100, BP <140/90.    -Recommend walking program, ambulating at least 30 minutes 3-5 times weekly.  -Discussed the importance of smoking cessation.  She defers.   -Recommend moisturization of the lower extremities, avoidance of injury and close observation of bilateral feet for any new wounds.  -Discussed that a portion of her symptoms could be neurogenic in nature as she does report a history of back pain. Review of a previous XR shows multilevel spondylosis with a grade 1 spondylolisthesis at the L3-4 level.   -Instructed patient to contact office with any new symptoms or concerns.

## 2023-12-20 NOTE — PROGRESS NOTES
Assessment/Plan:    PAD (peripheral artery disease) ([facility])  63 y.o. female patient w/ a PMH significant for DM, HLD, chronic and ongoing tobacco use-about 64 pack years, currently smoking 1ppd, COPD, a history of DVT and IVC filter presents to the vascular office today for evaluation of bilateral, intermittent thigh fatigue.      Imaging  CAESAR duplex (6/2023): CAESAR duplex demonstrates diffuse disease throughout the BL femoral-popliteal arteries with 50-75% mid femoral stenosis.  R STEFANI 1.16/139/100; L STEFANI 1.15/129/95    Recommendations  -Diffuse arterial disease throughout the femoral-popliteal arteries with moderate stenosis in left mid SFA.  BL STEFANI's within the normal category and GT pressures are within healing range.    -Denies disabling claudication or worsening symptoms. She reports her leg pain has improved since her last OV.  -We discussed the pathophysiology of arterial occlusive disease, available treatment options and indications for treatment.   -No vascular intervention recommended at this time. Will continue surveillance with CAESAR in June for yearly surveillance.   -Continue medical optimization. Currently on ASA and statin.   -Goal hgb A1c<7.0, LDL<100, BP <140/90.    -Recommend walking program, ambulating at least 30 minutes 3-5 times weekly.  -Discussed the importance of smoking cessation.  She defers.   -Recommend moisturization of the lower extremities, avoidance of injury and close observation of bilateral feet for any new wounds.  -Discussed that a portion of her symptoms could be neurogenic in nature as she does report a history of back pain. Review of a previous XR shows multilevel spondylosis with a grade 1 spondylolisthesis at the L3-4 level.   -Instructed patient to contact office with any new symptoms or concerns.         Diagnoses and all orders for this visit:    PAD (peripheral artery disease) ([facility])  -     VAS lower limb arterial duplex, complete bilateral; Future          Subjective:       "Patient ID: Whit Campos is a 63 y.o. female.          HPI  63 y.o. female patient w/ a PMH significant for DM, HLD, chronic and ongoing tobacco use-about 64 pack years, currently smoking 1 ppd  COPD, a history of DVT and IVC filter presents to the vascular office today for evaluation of bilateral, intermittent thigh pain. She reports improvement in her leg symptoms today. She states she had a bout of sciatica and once this resolved her leg pain also resolved. She continued to have bilateral thigh fatigue after walking around the grocery store. Her symptoms are not disabling at this time. She denies rest pain or tissue loss. She is taking aspirin and rosuvastatin.       The following portions of the patient's history were reviewed and updated as appropriate: allergies, current medications, past family history, past medical history, past social history, past surgical history, and problem list.    Review of Systems   Constitutional: Negative.    HENT: Negative.     Eyes: Negative.    Respiratory: Negative.     Cardiovascular: Negative.    Gastrointestinal: Negative.    Endocrine: Negative.    Genitourinary: Negative.    Musculoskeletal: Negative.    Skin: Negative.    Allergic/Immunologic: Negative.    Neurological: Negative.    Hematological: Negative.    Psychiatric/Behavioral: Negative.       I have personally reviewed and made appropriate changes to the ROS that was input by the medical assistant.       Objective:      Vitals:    12/20/23 1319   BP: 128/72   BP Location: Left arm   Patient Position: Sitting   Cuff Size: Standard   Pulse: 100   Temp: 98.7 °F (37.1 °C)   TempSrc: Tympanic   SpO2: 91%   Weight: 101 kg (221 lb 12.8 oz)   Height: 5' 7\" (1.702 m)       Patient Active Problem List   Diagnosis    Cataract    Chronic hoarseness    Chronic low back pain    Centrilobular emphysema (HCC)    Deep vein thrombophlebitis of leg, unspecified laterality (HCC)    Depression    Diabetes mellitus with " neurological manifestation (HCC)    Gastroesophageal reflux disease with esophagitis    Headache    Hypercholesterolemia    Hypercoagulable state (HCC)    Hypertension    Migraine    Myositis    Neuropathy    Pulmonary nodule seen on imaging study    Type 2 diabetes mellitus without complication, with long-term current use of insulin (HCC)    Chronic hypoxemic respiratory failure (HCC)    Class 1 obesity due to excess calories with serious comorbidity and body mass index (BMI) of 34.0 to 34.9 in adult    Sleep apnea    Family history of heart disease    Pulmonary hypertension (HCC)    Primary fibromyalgia syndrome    Primary cancer of upper outer quadrant of right breast (HCC)    Malignant neoplasm of upper-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, estrogen receptor positive     Mucinous carcinoma of breast, right (HCC)    Tobacco abuse    PAD (peripheral artery disease) (HCC)    Tachycardia    Stage 3a chronic kidney disease (HCC)    Chronic obstructive pulmonary disease with acute exacerbation (HCC)    Kidney lesion    Nonocclusive coronary atherosclerosis of native coronary artery    Kidney cysts    Hyponatremia    Diarrhea    Presence of IVC filter    History of pulmonary embolism       Past Surgical History:   Procedure Laterality Date    BREAST LUMPECTOMY Right     CARDIAC CATHETERIZATION N/A 10/28/2021    Procedure: Cardiac Coronary Angiogram;  Surgeon: Abdelrahman Jacinto DO;  Location:  CARDIAC CATH LAB;  Service: Cardiology    IVC FILTER INSERTION      MASTECTOMY      MASTECTOMY W/ SENTINEL NODE BIOPSY Right 11/09/2021    Procedure: BREAST MASTECTOMY WITH BIOPSY LYMPH NODE SENTINEL and axillary node dissection  (Gay Lymph Node Injection @ 12:30);  Surgeon: Jd Dong MD;  [facility]: [facility];  Service: General    US GUIDANCE BREAST BIOPSY RIGHT EACH ADDITIONAL Right 10/13/2021    US GUIDANCE BREAST BIOPSY RIGHT EACH ADDITIONAL Right 10/13/2021    US GUIDED BREAST BIOPSY RIGHT COMPLETE  Right 10/13/2021       Family History   Problem Relation Age of Onset    Breast cancer Mother 72    COPD Mother     Coronary artery disease Mother     Coronary artery disease Father     Stroke Father     Lung cancer Sister     No Known Problems Sister     No Known Problems Sister     Breast cancer Maternal Grandmother     Colon cancer Paternal Grandfather     No Known Problems Maternal Aunt     No Known Problems Maternal Aunt     No Known Problems Maternal Aunt     No Known Problems Paternal Aunt     Breast cancer Cousin        Social History     Socioeconomic History    Marital status: Legally      Spouse name: Not on file    Number of children: Not on file    Years of education: Not on file    Highest education level: Not on file   Occupational History    Not on file   Tobacco Use    Smoking status: Every Day     Current packs/day: 1.00     Types: Cigarettes    Smokeless tobacco: Never   Vaping Use    Vaping status: Never Used   Substance and Sexual Activity    Alcohol use: Never    Drug use: Never    Sexual activity: Not Currently   Other Topics Concern    Not on file   Social History Narrative    Not on file     Social Determinants of Health     Financial Resource Strain: Not on file   Food Insecurity: No Food Insecurity (2/13/2023)    Received from [facility]    Hunger Vital Sign     Worried About Running Out of Food in the Last Year: Never true     Ran Out of Food in the Last Year: Never true   Transportation Needs: No Transportation Needs (1/30/2023)    PRAPARE - Transportation     Lack of Transportation (Medical): No     Lack of Transportation (Non-Medical): No   Physical Activity: Not on file   Stress: Not on file   Social Connections: Not on file   Intimate Partner Violence: Not on file   Housing Stability: Low Risk  (1/30/2023)    Housing Stability Vital Sign     Unable to Pay for Housing in the Last Year: No     Number of Places Lived in the Last Year: 1     Unstable Housing in the Last Year: No        Allergies   Allergen Reactions    Amoxicillin-Pot Clavulanate GI Intolerance    Anastrozole Other (See Comments)     Diarrhea, Nausea, Stomach pain          Current Outpatient Medications:     albuterol (2.5 mg/3 mL) 0.083 % nebulizer solution, Take 3 mL (2.5 mg total) by nebulization every 6 (six) hours as needed for wheezing or shortness of breath, Disp: 90 mL, Rfl: 2    albuterol (Ventolin HFA) 90 mcg/act inhaler, Inhale 2 puffs every 4 (four) hours as needed for wheezing or shortness of breath, Disp: 18 g, Rfl: 5    amitriptyline (ELAVIL) 100 mg tablet, take 1/2 tablet by mouth once daily at bedtime, Disp: 45 tablet, Rfl: 1    aspirin (ECOTRIN LOW STRENGTH) 81 mg EC tablet, Take 1 tablet (81 mg total) by mouth daily, Disp: 90 tablet, Rfl: 3    benztropine (COGENTIN) 0.5 mg tablet, Take 0.5 mg by mouth daily at bedtime  , Disp: , Rfl:     Blood Glucose Monitoring Suppl (ONE TOUCH ULTRA 2) w/Device KIT, by Does not apply route daily, Disp: 100 each, Rfl: 0    carvedilol (COREG) 3.125 mg tablet, Take 1 tablet (3.125 mg total) by mouth 2 (two) times a day with meals, Disp: 60 tablet, Rfl: 6    clonazePAM (KlonoPIN) 0.5 mg tablet, Take 0.5 mg by mouth daily as needed  , Disp: , Rfl:     cloNIDine (CATAPRES) 0.1 mg tablet, Take 0.1 mg by mouth daily as needed, Disp: , Rfl:     Continuous Blood Gluc  (FreeStyle Darius 2 [place]) ANDERSON, To check blood sugar continuously., Disp: 1 each, Rfl: 0    Continuous Blood Gluc Sensor (FreeStyle Darius 2 Sensor) MISC, Use to check her blood sugars continuously and change it every 2 weeks, Disp: 6 each, Rfl: 0    doxepin (SINEquan) 100 mg capsule, take 1 to 2 capsules by mouth at bedtime if needed, Disp: , Rfl:     dulaglutide (Trulicity) 3 MG/0.5ML injection, Inject 0.5 mL (3 mg total) under the skin every 7 days, Disp: 6 mL, Rfl: 0    Empagliflozin (Jardiance) 25 MG TABS, Take 1 tablet (25 mg total) by mouth in the morning, Disp: 90 tablet, Rfl: 0    gabapentin  "(NEURONTIN) 600 MG tablet, take 1 tablet by mouth three times a day, Disp: 270 tablet, Rfl: 1    glucose blood (OneTouch Ultra) test strip, Use to test blood sugar three times a day, Disp: 100 strip, Rfl: 2    Insulin Glargine Solostar (Lantus SoloStar) 100 UNIT/ML SOPN, Inject 0.15 mL (15 Units total) under the skin 2 (two) times a day, Disp: 27 mL, Rfl: 0    Insulin Pen Needle (Droplet Pen Needles) 32G X 4 MM MISC, use twice a day for INJECTIONS, Disp: 200 each, Rfl: 3    loperamide (IMODIUM) 2 mg capsule, Take 1 capsule (2 mg total) by mouth 4 (four) times a day as needed for diarrhea, Disp: 30 capsule, Rfl: 0    OneTouch Delica Lancets 33G MISC, Check blood sugars three times daily. Please substitute with appropriate alternative as covered by patient's insurance. Dx: E11.65, Disp: 300 each, Rfl: 3    pantoprazole (PROTONIX) 40 mg tablet, Take 1 tablet (40 mg total) by mouth daily, Disp: 90 tablet, Rfl: 1    rosuvastatin (CRESTOR) 40 MG tablet, Take 1 tablet (40 mg total) by mouth daily, Disp: 90 tablet, Rfl: 3    vilazodone (VIIBRYD) 40 mg tablet, Take 40 mg by mouth daily., Disp: , Rfl:     VRAYLAR 6 MG capsule, Take 6 mg by mouth daily, Disp: , Rfl: 0    loratadine (CLARITIN) 10 mg tablet, , Disp: , Rfl:     tiotropium (Spiriva Respimat) 2.5 MCG/ACT AERS inhaler, Inhale 2 puffs daily (Patient not taking: Reported on 12/20/2023), Disp: 4 g, Rfl: 3      /72 (BP Location: Left arm, Patient Position: Sitting, Cuff Size: Standard)   Pulse 100   Temp 98.7 °F (37.1 °C) (Tympanic)   Ht 5' 7\" (1.702 m)   Wt 101 kg (221 lb 12.8 oz)   SpO2 91%   BMI 34.74 kg/m²          Physical Exam  Vitals and nursing note reviewed.   Constitutional:       Appearance: Normal appearance.   HENT:      Head: Normocephalic and atraumatic.   Neck:      Vascular: No carotid bruit.   Cardiovascular:      Rate and Rhythm: Normal rate and regular rhythm.      Pulses:           Carotid pulses are 2+ on the right side and 2+ on the " left side.       Radial pulses are 2+ on the right side and 2+ on the left side.        Femoral pulses are 2+ on the right side and 2+ on the left side.       Dorsalis pedis pulses are 1+ on the right side and 1+ on the left side.        Posterior tibial pulses are detected w/ Doppler on the right side and detected w/ Doppler on the left side.      Heart sounds: Normal heart sounds.      Comments: No carotid bruit     Pulmonary:      Effort: Pulmonary effort is normal. No respiratory distress.      Breath sounds: Wheezing present.   Abdominal:      General: Bowel sounds are normal. There is no distension.      Palpations: Abdomen is soft.      Comments: No abdominal bruit or pulsatile masses.    Musculoskeletal:         General: No swelling. Normal range of motion.      Cervical back: Normal range of motion and neck supple.   Skin:     General: Skin is warm and dry.      Capillary Refill: Capillary refill takes 2 to 3 seconds.   Neurological:      General: No focal deficit present.      Mental Status: She is alert and oriented to person, place, and time.   Psychiatric:         Mood and Affect: Mood normal.         Behavior: Behavior normal.         Adelina Villa PA-C  [facility]  [phone]    I have spent a total time of 20 minutes on 12/20/23 in caring for this patient including Diagnostic results, Prognosis, Risks and benefits of tx options, Instructions for management, Patient and family education, Importance of tx compliance, Risk factor reductions, Impressions, Counseling / Coordination of care, Documenting in the medical record, Reviewing / ordering tests, medicine, procedures  , and Obtaining or reviewing history  .

## 2023-12-21 ENCOUNTER — HOSPITAL ENCOUNTER (OUTPATIENT)
Dept: MAMMOGRAPHY | Facility: HOSPITAL | Age: 63
Discharge: HOME | End: 2023-12-21
Attending: SURGERY
Payer: COMMERCIAL

## 2023-12-21 ENCOUNTER — APPOINTMENT (OUTPATIENT)
Dept: LAB | Facility: HOSPITAL | Age: 63
End: 2023-12-21
Attending: STUDENT IN AN ORGANIZED HEALTH CARE EDUCATION/TRAINING PROGRAM
Payer: COMMERCIAL

## 2023-12-21 VITALS — WEIGHT: 221 LBS | BODY MASS INDEX: 34.69 KG/M2 | HEIGHT: 67 IN

## 2023-12-21 DIAGNOSIS — E66.09 CLASS 1 OBESITY DUE TO EXCESS CALORIES WITH SERIOUS COMORBIDITY AND BODY MASS INDEX (BMI) OF 34.0 TO 34.9 IN ADULT: ICD-10-CM

## 2023-12-21 DIAGNOSIS — C50.411 PRIMARY CANCER OF UPPER OUTER QUADRANT OF RIGHT BREAST (HCC): ICD-10-CM

## 2023-12-21 DIAGNOSIS — Z79.4 TYPE 2 DIABETES MELLITUS WITHOUT COMPLICATION, WITH LONG-TERM CURRENT USE OF INSULIN (HCC): ICD-10-CM

## 2023-12-21 DIAGNOSIS — Z12.31 SCREENING MAMMOGRAM FOR HIGH-RISK PATIENT: ICD-10-CM

## 2023-12-21 DIAGNOSIS — E78.2 MIXED HYPERLIPIDEMIA: ICD-10-CM

## 2023-12-21 DIAGNOSIS — I10 PRIMARY HYPERTENSION: ICD-10-CM

## 2023-12-21 DIAGNOSIS — E55.9 VITAMIN D DEFICIENCY: ICD-10-CM

## 2023-12-21 DIAGNOSIS — E11.9 TYPE 2 DIABETES MELLITUS WITHOUT COMPLICATION, WITH LONG-TERM CURRENT USE OF INSULIN (HCC): ICD-10-CM

## 2023-12-21 LAB
25(OH)D3 SERPL-MCNC: 29.1 NG/ML (ref 30–100)
ALBUMIN SERPL BCP-MCNC: 4.2 G/DL (ref 3.5–5)
ALP SERPL-CCNC: 67 U/L (ref 34–104)
ALT SERPL W P-5'-P-CCNC: 23 U/L (ref 7–52)
ANION GAP SERPL CALCULATED.3IONS-SCNC: 9 MMOL/L
AST SERPL W P-5'-P-CCNC: 19 U/L (ref 13–39)
BILIRUB SERPL-MCNC: 0.5 MG/DL (ref 0.2–1)
BUN SERPL-MCNC: 20 MG/DL (ref 5–25)
CALCIUM SERPL-MCNC: 9.7 MG/DL (ref 8.4–10.2)
CHLORIDE SERPL-SCNC: 101 MMOL/L (ref 96–108)
CHOLEST SERPL-MCNC: 97 MG/DL
CO2 SERPL-SCNC: 26 MMOL/L (ref 21–32)
CREAT SERPL-MCNC: 1.52 MG/DL (ref 0.6–1.3)
CREAT UR-MCNC: 91.3 MG/DL
EST. AVERAGE GLUCOSE BLD GHB EST-MCNC: 217 MG/DL
GFR SERPL CREATININE-BSD FRML MDRD: 36 ML/MIN/1.73SQ M
GLUCOSE P FAST SERPL-MCNC: 173 MG/DL (ref 65–99)
HBA1C MFR BLD: 9.2 %
HDLC SERPL-MCNC: 23 MG/DL
LDLC SERPL CALC-MCNC: 30 MG/DL (ref 0–100)
MICROALBUMIN UR-MCNC: 137.5 MG/L
MICROALBUMIN/CREAT 24H UR: 151 MG/G CREATININE (ref 0–30)
POTASSIUM SERPL-SCNC: 3.8 MMOL/L (ref 3.5–5.3)
PROT SERPL-MCNC: 7.2 G/DL (ref 6.4–8.4)
SODIUM SERPL-SCNC: 136 MMOL/L (ref 135–147)
TRIGL SERPL-MCNC: 219 MG/DL

## 2023-12-21 PROCEDURE — 82570 ASSAY OF URINE CREATININE: CPT

## 2023-12-21 PROCEDURE — 83036 HEMOGLOBIN GLYCOSYLATED A1C: CPT

## 2023-12-21 PROCEDURE — 77067 SCR MAMMO BI INCL CAD: CPT

## 2023-12-21 PROCEDURE — 82043 UR ALBUMIN QUANTITATIVE: CPT

## 2023-12-21 PROCEDURE — 80061 LIPID PANEL: CPT

## 2023-12-21 PROCEDURE — 77063 BREAST TOMOSYNTHESIS BI: CPT

## 2023-12-21 PROCEDURE — 80053 COMPREHEN METABOLIC PANEL: CPT

## 2023-12-21 PROCEDURE — 82306 VITAMIN D 25 HYDROXY: CPT

## 2023-12-21 PROCEDURE — 36415 COLL VENOUS BLD VENIPUNCTURE: CPT

## 2024-01-01 NOTE — PROGRESS NOTES
OutPatient Complex Care Management Note:  Missed phone call received from patient's phone number  No voice mail message received  Attempted to return patient's phone call  Left general contact information on voice mail message  27-May-2023 27-May-2023 27-May-2023

## 2024-01-19 NOTE — PROGRESS NOTES
Assessment/Plan:    Primary cancer of upper outer quadrant of right breast (HCC)  The patient is a pleasant 63-year-old female with a past medical history significant for stage Ia multifocal right breast carcinoma status post right modified radical mastectomy in November 2021 returning for routine breast health maintenance examination.    Patient denies all complaints referable to her breast or chest wall.    On physical examination the patient is well-appearing.  Pleasant competent reliable as a historian.  She has no cervical supraclavicular or axillary lymphadenopathy bilaterally.  The right chest wall scar is smooth and flat without signs of locally recurrent disease.  The left breast is pendulous without dominant lumps masses skin changes or nipple retraction.    Patient appears clinically and radiographically stable with regards to her history of stage Ia multifocal right breast carcinoma status post right modified radical mastectomy November 2021.    I look forward seeing the patient back in a years time.    A prescription was provided for a mammogram in a year.  She has been encouraged to follow-up to practice sooner on an as-needed basis.       Diagnoses and all orders for this visit:    Primary cancer of upper outer quadrant of right breast (HCC)  -     Mammo screening left w 3d & cad; Future          Subjective:      Patient ID: Whit Campos is a 63 y.o. female.    Patient is here for a f/u after having a mammo on 12/21/2023 due to having a mastectomy on 11/09/2021 because of right breast cancer. Patient states she suffers from pain where her surgery was performed and has concerns because there is no breast. Patient denies nipple discharge, redness/ irritation, breast swelling or fevers/chills and hasn't noticed any new lumps/bumps. JAMIE Reyes          Review of Systems   Constitutional:  Negative for chills and fever.   HENT:  Negative for ear pain and sore throat.    Eyes:  Negative for pain and  "visual disturbance.   Respiratory:  Negative for cough and shortness of breath.    Cardiovascular:  Negative for chest pain and palpitations.   Gastrointestinal:  Negative for abdominal pain and vomiting.   Genitourinary:  Negative for dysuria and hematuria.   Musculoskeletal:  Negative for arthralgias and back pain.   Skin:  Negative for color change and rash.   Neurological:  Negative for seizures and syncope.   All other systems reviewed and are negative.        Objective:      /80 (BP Location: Left arm, Patient Position: Sitting, Cuff Size: Standard)   Pulse 73   Temp (!) 97.3 °F (36.3 °C) (Temporal)   Resp 18   Ht 5' 7\" (1.702 m)   Wt 102 kg (225 lb)   SpO2 96%   BMI 35.24 kg/m²          Physical Exam  Constitutional:       Appearance: She is well-developed.   HENT:      Head: Normocephalic and atraumatic.   Eyes:      Conjunctiva/sclera: Conjunctivae normal.      Pupils: Pupils are equal, round, and reactive to light.   Cardiovascular:      Rate and Rhythm: Normal rate and regular rhythm.   Pulmonary:      Effort: Pulmonary effort is normal.      Breath sounds: Normal breath sounds.   Abdominal:      General: Bowel sounds are normal.      Palpations: Abdomen is soft.   Musculoskeletal:         General: Normal range of motion.      Cervical back: Normal range of motion and neck supple.   Skin:     General: Skin is warm and dry.      Comments: Right chest scar is smooth and flat   Neurological:      Mental Status: She is alert and oriented to person, place, and time.   Psychiatric:         Behavior: Behavior normal.         Thought Content: Thought content normal.         Judgment: Judgment normal.           "

## 2024-01-22 ENCOUNTER — OFFICE VISIT (OUTPATIENT)
Dept: SURGERY | Facility: CLINIC | Age: 64
End: 2024-01-22
Payer: COMMERCIAL

## 2024-01-22 VITALS
RESPIRATION RATE: 18 BRPM | HEART RATE: 73 BPM | DIASTOLIC BLOOD PRESSURE: 80 MMHG | WEIGHT: 225 LBS | SYSTOLIC BLOOD PRESSURE: 134 MMHG | OXYGEN SATURATION: 96 % | HEIGHT: 67 IN | TEMPERATURE: 97.3 F | BODY MASS INDEX: 35.31 KG/M2

## 2024-01-22 DIAGNOSIS — C50.411 PRIMARY CANCER OF UPPER OUTER QUADRANT OF RIGHT BREAST (HCC): Primary | ICD-10-CM

## 2024-01-22 PROCEDURE — 99213 OFFICE O/P EST LOW 20 MIN: CPT | Performed by: SURGERY

## 2024-01-22 NOTE — ASSESSMENT & PLAN NOTE
The patient is a pleasant 63-year-old female with a past medical history significant for stage Ia multifocal right breast carcinoma status post right modified radical mastectomy in November 2021 returning for routine breast health maintenance examination.    Patient denies all complaints referable to her breast or chest wall.    On physical examination the patient is well-appearing.  Dennys competent reliable as a historian.  She has no cervical supraclavicular or axillary lymphadenopathy bilaterally.  The right chest wall scar is smooth and flat without signs of locally recurrent disease.  The left breast is pendulous without dominant lumps masses skin changes or nipple retraction.    Patient appears clinically and radiographically stable with regards to her history of stage Ia multifocal right breast carcinoma status post right modified radical mastectomy November 2021.    I look forward seeing the patient back in a years time.    A prescription was provided for a mammogram in a year.  She has been encouraged to follow-up to practice sooner on an as-needed basis.

## 2024-01-24 DIAGNOSIS — Z23 ENCOUNTER FOR ADMINISTRATION OF VACCINE: Primary | ICD-10-CM

## 2024-01-26 ENCOUNTER — OFFICE VISIT (OUTPATIENT)
Dept: FAMILY MEDICINE CLINIC | Facility: HOME HEALTHCARE | Age: 64
End: 2024-01-26
Payer: COMMERCIAL

## 2024-01-26 VITALS
DIASTOLIC BLOOD PRESSURE: 62 MMHG | HEIGHT: 67 IN | SYSTOLIC BLOOD PRESSURE: 110 MMHG | RESPIRATION RATE: 18 BRPM | OXYGEN SATURATION: 93 % | TEMPERATURE: 98.4 F | WEIGHT: 224.2 LBS | BODY MASS INDEX: 35.19 KG/M2 | HEART RATE: 93 BPM

## 2024-01-26 DIAGNOSIS — G89.29 OTHER CHRONIC PAIN: ICD-10-CM

## 2024-01-26 DIAGNOSIS — Z91.81 H/O FALL: Primary | ICD-10-CM

## 2024-01-26 DIAGNOSIS — I10 PRIMARY HYPERTENSION: ICD-10-CM

## 2024-01-26 DIAGNOSIS — Z76.0 MEDICATION REFILL: ICD-10-CM

## 2024-01-26 DIAGNOSIS — R00.0 TACHYCARDIA: ICD-10-CM

## 2024-01-26 PROCEDURE — T1015 CLINIC SERVICE: HCPCS

## 2024-01-26 RX ORDER — CARVEDILOL 3.12 MG/1
3.12 TABLET ORAL 2 TIMES DAILY WITH MEALS
Qty: 180 TABLET | Refills: 0 | Status: CANCELLED | OUTPATIENT
Start: 2024-01-26

## 2024-01-26 RX ORDER — GABAPENTIN 600 MG/1
600 TABLET ORAL 3 TIMES DAILY
Qty: 270 TABLET | Refills: 1 | Status: CANCELLED | OUTPATIENT
Start: 2024-01-26

## 2024-01-26 RX ORDER — CARVEDILOL 3.12 MG/1
3.12 TABLET ORAL 2 TIMES DAILY WITH MEALS
Qty: 60 TABLET | Refills: 6 | Status: SHIPPED | OUTPATIENT
Start: 2024-01-26 | End: 2024-02-01 | Stop reason: SDUPTHER

## 2024-01-26 RX ORDER — GABAPENTIN 300 MG/1
300 CAPSULE ORAL 2 TIMES DAILY
Qty: 60 CAPSULE | Refills: 2 | Status: SHIPPED | OUTPATIENT
Start: 2024-01-26 | End: 2024-02-01 | Stop reason: SDUPTHER

## 2024-01-26 RX ORDER — GABAPENTIN 300 MG/1
300 CAPSULE ORAL 2 TIMES DAILY
Qty: 180 CAPSULE | Refills: 0 | Status: CANCELLED | OUTPATIENT
Start: 2024-01-26

## 2024-01-26 NOTE — PROGRESS NOTES
FAMILY MEDICINE OFFICE VISIT  [facility]      NAME: Whit Campos  AGE: 63 y.o. SEX: female    DATE OF ENCOUNTER: 1/26/2024    Assessment and Plan     1. H/O fall  Comments:  multiple episodes  last fall two months ago  no head  injury  Decreased Gabapentin to 300 mg BID  Physical therapy  F/up in 2- 3 weeks  Orders:  -     Ambulatory Referral to Physical Therapy; Future; Expected date: 01/26/2024  -     ECG 12 lead; Future    2. Other chronic pain  -     gabapentin (Neurontin) 300 mg capsule; Take 1 capsule (300 mg total) by mouth 2 (two) times a day    3. Primary hypertension  Comments:  BP normal   Continue current    4. Tachycardia  -     carvedilol (COREG) 3.125 mg tablet; Take 1 tablet (3.125 mg total) by mouth 2 (two) times a day with meals    5. Medication refill  -     carvedilol (COREG) 3.125 mg tablet; Take 1 tablet (3.125 mg total) by mouth 2 (two) times a day with meals          Whit Campos is 63 y.o. female presented to the office concern of fall.  Differential at this time include hypoglycemic episodes, medications, musculoskeletal and cardiac origin.  Gabapentin decreased to 300 mg twice daily.  Ordered EKG. Plan to or medication review during next visit. Advised patient to bring her medications for next visit.     Follow-up 2 to 3 weeks.    Chief Complaint     Chief Complaint   Patient presents with    Follow-up    Fall     Several falls       History of Present Illness     63-year-old female with hypertension, diabetes, HLD, COPD presented to the office with a concern of focal.  Patient reports that she had multiple falls last 6 months.  Last fall 2 months ago.  Mentions that typically she feels lightheaded and drained just before the fall but during last fall she did not feel ending signs before it happened.  Reports loss of consciousness during last fall.  Lasted for couple of minutes.  Regained consciousness spontaneously.  Patient was able to bring herself up to  standing position and walk without difficulty.  She denies any precipitating factors.  Patient lives alone in her apartment.  Mentions that her sister lives in the same building.  Patient is using cane to ambulate.  Reports pain or discomfort in left thigh on underside.  Patient's sister also mentions that patient is taking multiple medications and she does not know exact medical reason for her medications.            The following portions of the patient's history were reviewed and updated as appropriate: allergies, current medications, past family history, past medical history, past social history, past surgical history and problem list.    Review of Systems     Review of Systems   Constitutional:  Negative for activity change, appetite change, chills, fever and unexpected weight change.   HENT:  Negative for trouble swallowing and voice change.    Eyes:  Negative for pain and visual disturbance.   Respiratory:  Negative for cough, chest tightness, shortness of breath and wheezing.    Cardiovascular:  Negative for chest pain and palpitations.   Gastrointestinal:  Negative for abdominal pain, nausea and vomiting.   Genitourinary:  Negative for difficulty urinating.   Musculoskeletal:  Negative for gait problem.   Skin:  Negative for rash.   Neurological:  Negative for light-headedness.   Psychiatric/Behavioral:  The patient is not nervous/anxious.    All other systems reviewed and are negative.      Active Problem List     Patient Active Problem List   Diagnosis    Cataract    Chronic hoarseness    Chronic low back pain    Centrilobular emphysema (HCC)    Deep vein thrombophlebitis of leg, unspecified laterality (HCC)    Depression    Diabetes mellitus with neurological manifestation (HCC)    Gastroesophageal reflux disease with esophagitis    Headache    Hypercholesterolemia    Hypercoagulable state (HCC)    Hypertension    Migraine    Myositis    Neuropathy    Pulmonary nodule seen on imaging study    Type 2  "diabetes mellitus without complication, with long-term current use of insulin (HCC)    Chronic hypoxemic respiratory failure (HCC)    Class 1 obesity due to excess calories with serious comorbidity and body mass index (BMI) of 34.0 to 34.9 in adult    Sleep apnea    Family history of heart disease    Pulmonary hypertension (HCC)    Primary fibromyalgia syndrome    Primary cancer of upper outer quadrant of right breast (HCC)    Malignant neoplasm of upper-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, estrogen receptor positive     Mucinous carcinoma of breast, right (HCC)    Tobacco abuse    PAD (peripheral artery disease) (HCC)    Tachycardia    Stage 3a chronic kidney disease (HCC)    Chronic obstructive pulmonary disease with acute exacerbation (HCC)    Kidney lesion    Nonocclusive coronary atherosclerosis of native coronary artery    Kidney cysts    Hyponatremia    Diarrhea    Presence of IVC filter    History of pulmonary embolism       Objective     /62 (BP Location: Left arm, Patient Position: Sitting, Cuff Size: Large)   Pulse 93   Temp 98.4 °F (36.9 °C)   Resp 18   Ht 5' 7\" (1.702 m)   Wt 102 kg (224 lb 3.2 oz)   SpO2 93%   BMI 35.11 kg/m²     Physical Exam  Vitals and nursing note reviewed. Exam conducted with a chaperone present.   Constitutional:       General: She is not in acute distress.     Appearance: Normal appearance.   HENT:      Head: Normocephalic and atraumatic.      Right Ear: External ear normal.      Left Ear: External ear normal.      Nose: No congestion or rhinorrhea.      Mouth/Throat:      Mouth: Mucous membranes are moist.      Pharynx: Oropharynx is clear.   Eyes:      Extraocular Movements: Extraocular movements intact.      Conjunctiva/sclera: Conjunctivae normal.   Cardiovascular:      Rate and Rhythm: Normal rate and regular rhythm.      Pulses: Normal pulses.      Heart sounds: Normal heart sounds. No murmur heard.  Pulmonary:      Effort: Pulmonary effort is normal.     "  Breath sounds: Normal breath sounds. No wheezing.   Abdominal:      General: Bowel sounds are normal.      Palpations: Abdomen is soft.      Tenderness: There is no abdominal tenderness.   Musculoskeletal:      Cervical back: Neck supple.      Right lower leg: No edema.      Left lower leg: No edema.   Skin:     General: Skin is warm and dry.      Capillary Refill: Capillary refill takes less than 2 seconds.   Neurological:      General: No focal deficit present.      Mental Status: She is alert and oriented to person, place, and time.      Comments: Strength 4 / 5 on all extremities.  Change in sensation.  Cranial nerve exam normal  Finger-nose and knee heel test within normal limits  Get up and go test: 11 seconds   Psychiatric:         Behavior: Behavior normal.           Current Medications     Current Outpatient Medications:     albuterol (2.5 mg/3 mL) 0.083 % nebulizer solution, Take 3 mL (2.5 mg total) by nebulization every 6 (six) hours as needed for wheezing or shortness of breath, Disp: 90 mL, Rfl: 2    albuterol (Ventolin HFA) 90 mcg/act inhaler, Inhale 2 puffs every 4 (four) hours as needed for wheezing or shortness of breath, Disp: 18 g, Rfl: 5    amitriptyline (ELAVIL) 100 mg tablet, take 1/2 tablet by mouth once daily at bedtime, Disp: 45 tablet, Rfl: 1    aspirin (ECOTRIN LOW STRENGTH) 81 mg EC tablet, Take 1 tablet (81 mg total) by mouth daily, Disp: 90 tablet, Rfl: 3    benztropine (COGENTIN) 0.5 mg tablet, Take 0.5 mg by mouth daily at bedtime  , Disp: , Rfl:     Blood Glucose Monitoring Suppl (ONE TOUCH ULTRA 2) w/Device KIT, by Does not apply route daily, Disp: 100 each, Rfl: 0    carvedilol (COREG) 3.125 mg tablet, Take 1 tablet (3.125 mg total) by mouth 2 (two) times a day with meals, Disp: 60 tablet, Rfl: 6    clonazePAM (KlonoPIN) 0.5 mg tablet, Take 0.5 mg by mouth daily as needed  , Disp: , Rfl:     cloNIDine (CATAPRES) 0.1 mg tablet, Take 0.1 mg by mouth daily as needed, Disp: , Rfl:      Continuous Blood Gluc  (FreeStyle Darius 2 [place]) ANDERSON, To check blood sugar continuously., Disp: 1 each, Rfl: 0    Continuous Blood Gluc Sensor (FreeStyle Darius 2 Sensor) MISC, Use to check her blood sugars continuously and change it every 2 weeks, Disp: 6 each, Rfl: 0    doxepin (SINEquan) 100 mg capsule, take 1 to 2 capsules by mouth at bedtime if needed, Disp: , Rfl:     dulaglutide (Trulicity) 3 MG/0.5ML injection, Inject 0.5 mL (3 mg total) under the skin every 7 days, Disp: 6 mL, Rfl: 0    Empagliflozin (Jardiance) 25 MG TABS, Take 1 tablet (25 mg total) by mouth in the morning, Disp: 90 tablet, Rfl: 0    gabapentin (Neurontin) 300 mg capsule, Take 1 capsule (300 mg total) by mouth 2 (two) times a day, Disp: 60 capsule, Rfl: 2    glucose blood (OneTouch Ultra) test strip, Use to test blood sugar three times a day, Disp: 100 strip, Rfl: 2    Insulin Glargine Solostar (Lantus SoloStar) 100 UNIT/ML SOPN, Inject 0.15 mL (15 Units total) under the skin 2 (two) times a day, Disp: 27 mL, Rfl: 0    Insulin Pen Needle (Droplet Pen Needles) 32G X 4 MM MISC, use twice a day for INJECTIONS, Disp: 200 each, Rfl: 3    loperamide (IMODIUM) 2 mg capsule, Take 1 capsule (2 mg total) by mouth 4 (four) times a day as needed for diarrhea, Disp: 30 capsule, Rfl: 0    OneTouch Delica Lancets 33G MISC, Check blood sugars three times daily. Please substitute with appropriate alternative as covered by patient's insurance. Dx: E11.65, Disp: 300 each, Rfl: 3    pantoprazole (PROTONIX) 40 mg tablet, Take 1 tablet (40 mg total) by mouth daily, Disp: 90 tablet, Rfl: 1    rosuvastatin (CRESTOR) 40 MG tablet, Take 1 tablet (40 mg total) by mouth daily, Disp: 90 tablet, Rfl: 3    vilazodone (VIIBRYD) 40 mg tablet, Take 40 mg by mouth daily., Disp: , Rfl:     VRAYLAR 6 MG capsule, Take 6 mg by mouth daily, Disp: , Rfl: 0    loratadine (CLARITIN) 10 mg tablet, , Disp: , Rfl:     tiotropium (Spiriva Respimat) 2.5 MCG/ACT AERS inhaler,  Inhale 2 puffs daily (Patient not taking: Reported on 12/20/2023), Disp: 4 g, Rfl: 3    Health Maintenance     Health Maintenance   Topic Date Due    COVID-19 Vaccine (1) Never done    Depression Follow-up Plan  Never done    Cervical Cancer Screening  Never done    Zoster Vaccine (2 of 3) 10/09/2014    Influenza Vaccine (1) 09/01/2023    Diabetic Foot Exam  03/07/2024    HEMOGLOBIN A1C  03/21/2024    BMI: Followup Plan  06/06/2024    Annual Physical  06/06/2024    DM Eye Exam  09/28/2024    Depression Screening  12/14/2024    Kidney Health Evaluation: GFR  12/21/2024    Kidney Health Evaluation: Albumin/Creatinine Ratio  12/21/2024    Breast Cancer Screening: Mammogram  12/21/2024    BMI: Adult  01/22/2025    Pneumococcal Vaccine: Pediatrics (0 to 5 Years) and At-Risk Patients (6 to 64 Years) (3 - PPSV23 or PCV20) 04/16/2025    Colorectal Cancer Screening  08/01/2027    DTaP,Tdap,and Td Vaccines (2 - Td or Tdap) 09/17/2030    HIV Screening  Completed    Hepatitis C Screening  Completed    HIB Vaccine  Aged Out    IPV Vaccine  Aged Out    Hepatitis A Vaccine  Aged Out    Meningococcal ACWY Vaccine  Aged Out    HPV Vaccine  Aged Out     Immunization History   Administered Date(s) Administered    Hep A / Hep B 01/22/2019    INFLUENZA 01/04/2015    Influenza Quadrivalent Preservative Free 3 years and older IM 10/23/2015, 11/01/2016    Influenza, recombinant, quadrivalent,injectable, preservative free 01/22/2019, 11/27/2019, 10/09/2020    Influenza, seasonal, injectable 12/22/2013    Pneumococcal Conjugate 13-Valent 05/17/2022, 05/17/2022    Pneumococcal Polysaccharide PPV23 12/22/2013, 01/22/2019    Tdap 09/17/2020    Zoster 08/01/2014           Kim Sky MD   Family Medicine  PGY- 3  1/26/2024 4:05 PM

## 2024-01-26 NOTE — TELEPHONE ENCOUNTER
Good afternoon. My name is Anthony and I am calling from the [facility] mail order pharmacy. I was calling in regards to two prescriptions that we had received earlier today for a mutual patient. The patient's first name is Whit's last name is Beth, that's spelled M as in momLis WU's date of birth is [birth year] and the two prescriptions in question are for the Carvedilol 3.125 milligrams and the Gabapentin 300 milligrams. It looks like Doctor Mimi Sweet is not enrolled with the [place] State Medicaid program and so unfortunately any claims with their name on it will not be accepted by the insurance. So I was calling for two reasons. One to see if we could have another provider in the office who is enrolled with the Medicaid program to re prescribe the prescriptions. And then secondly, both of the prescriptions were only written for a 30 day supply. So whoever rewrites them if we could also have them rewritten for 90 days as well. Any questions or concerns or callback number is [phone] option zero to speak with the representative. And again, this is the [facility] order pharmacy. Thank you very much. Have a nice day. Bye, bye.      Needs to be ordered by [place] credentialed provider

## 2024-01-28 ENCOUNTER — OFFICE VISIT (OUTPATIENT)
Dept: LAB | Facility: HOSPITAL | Age: 64
End: 2024-01-28
Payer: COMMERCIAL

## 2024-01-28 DIAGNOSIS — Z91.81 H/O FALL: ICD-10-CM

## 2024-01-28 PROCEDURE — 93005 ELECTROCARDIOGRAM TRACING: CPT

## 2024-01-30 LAB
ATRIAL RATE: 94 BPM
P AXIS: 77 DEGREES
PR INTERVAL: 148 MS
QRS AXIS: 48 DEGREES
QRSD INTERVAL: 84 MS
QT INTERVAL: 344 MS
QTC INTERVAL: 430 MS
T WAVE AXIS: 66 DEGREES
VENTRICULAR RATE: 94 BPM

## 2024-01-31 ENCOUNTER — TELEPHONE (OUTPATIENT)
Dept: FAMILY MEDICINE CLINIC | Facility: CLINIC | Age: 64
End: 2024-01-31

## 2024-01-31 DIAGNOSIS — Z76.0 MEDICATION REFILL: ICD-10-CM

## 2024-01-31 DIAGNOSIS — G89.29 OTHER CHRONIC PAIN: ICD-10-CM

## 2024-01-31 DIAGNOSIS — R00.0 TACHYCARDIA: ICD-10-CM

## 2024-01-31 RX ORDER — GABAPENTIN 300 MG/1
300 CAPSULE ORAL 2 TIMES DAILY
Qty: 60 CAPSULE | Refills: 2 | Status: CANCELLED | OUTPATIENT
Start: 2024-01-31

## 2024-01-31 RX ORDER — CARVEDILOL 3.12 MG/1
3.12 TABLET ORAL 2 TIMES DAILY WITH MEALS
Qty: 60 TABLET | Refills: 6 | Status: CANCELLED | OUTPATIENT
Start: 2024-01-31

## 2024-01-31 NOTE — TELEPHONE ENCOUNTER
Hi, this is [facility] mail order pharmacy calling about a prescription, well, two prescriptions we received for a patient. Patients first name is Whit's last name is Paresh Campos, date of birth [birth year], calling about the carvedilol as well as a gabapentin. It looks like we're getting an error rejection As for the prescribers not enrolled in the state Medicaid program. So we could have those both rewritten by a different doctor for a 90 day supply as well and resent to us call back number [phone]. Thank you.      Needs to be ordered by [place] credentialed provider

## 2024-02-01 DIAGNOSIS — R00.0 TACHYCARDIA: ICD-10-CM

## 2024-02-01 DIAGNOSIS — Z76.0 MEDICATION REFILL: ICD-10-CM

## 2024-02-01 DIAGNOSIS — G89.29 OTHER CHRONIC PAIN: ICD-10-CM

## 2024-02-01 RX ORDER — CARVEDILOL 3.12 MG/1
3.12 TABLET ORAL 2 TIMES DAILY WITH MEALS
Qty: 60 TABLET | Refills: 6 | Status: SHIPPED | OUTPATIENT
Start: 2024-02-01

## 2024-02-01 RX ORDER — GABAPENTIN 300 MG/1
300 CAPSULE ORAL 2 TIMES DAILY
Qty: 60 CAPSULE | Refills: 2 | Status: SHIPPED | OUTPATIENT
Start: 2024-02-01

## 2024-02-06 DIAGNOSIS — G43.809 OTHER MIGRAINE WITHOUT STATUS MIGRAINOSUS, NOT INTRACTABLE: ICD-10-CM

## 2024-02-06 RX ORDER — AMITRIPTYLINE HYDROCHLORIDE 100 MG/1
50 TABLET ORAL
Qty: 45 TABLET | Refills: 1 | Status: SHIPPED | OUTPATIENT
Start: 2024-02-06

## 2024-02-09 ENCOUNTER — OFFICE VISIT (OUTPATIENT)
Dept: FAMILY MEDICINE CLINIC | Facility: HOME HEALTHCARE | Age: 64
End: 2024-02-09
Payer: COMMERCIAL

## 2024-02-09 VITALS
OXYGEN SATURATION: 94 % | HEIGHT: 67 IN | TEMPERATURE: 98 F | BODY MASS INDEX: 34.37 KG/M2 | SYSTOLIC BLOOD PRESSURE: 100 MMHG | HEART RATE: 102 BPM | DIASTOLIC BLOOD PRESSURE: 58 MMHG | WEIGHT: 219 LBS | RESPIRATION RATE: 18 BRPM

## 2024-02-09 DIAGNOSIS — Z91.81 H/O FALL: Primary | ICD-10-CM

## 2024-02-09 PROCEDURE — T1015 CLINIC SERVICE: HCPCS | Performed by: PHYSICIAN ASSISTANT

## 2024-02-09 NOTE — PROGRESS NOTES
Assessment/Plan:     Diagnoses and all orders for this visit:    H/O fall        - Patient has not had any falls since decreasing gabapentin to 300 mg BID.  Will continue current dose.    Return in about 5 weeks (around 3/15/2024) for Next scheduled follow up.         Tobacco Cessation Counseling: Tobacco cessation counseling was provided. The patient is sincerely urged to quit consumption of tobacco. She is not ready to quit tobacco. Medication options not discussed.           Subjective:        Patient ID: Whit Campos is a 63 y.o. female.    Chief Complaint   Patient presents with   • Hypertension       Whit is a 63-year-old female with history of hypertension, hyperlipidemia, CAD, PAD, history of DVT, stage III CKD, type 2 diabetes, GERD, COPD, tobacco use, breast cancer, and depression, presenting for follow up.    Patient was evaluated 1/26 with concern for frequent falls.  Gabapentin was decreased at that time.  EKG was ordered and was normal.  Patient reports no additional falls since decreasing the gabapentin dose.  She also feels that she has had more energy.  Denies any changes in pain.        The following portions of the patient's history were reviewed and updated as appropriate: allergies, current medications, past family history, past medical history, past social history, past surgical history and problem list.    Patient Active Problem List   Diagnosis   • Cataract   • Chronic hoarseness   • Chronic low back pain   • Centrilobular emphysema (HCC)   • Deep vein thrombophlebitis of leg, unspecified laterality (HCC)   • Depression   • Diabetes mellitus with neurological manifestation (HCC)   • Gastroesophageal reflux disease with esophagitis   • Headache   • Hypercholesterolemia   • Hypercoagulable state (HCC)   • Hypertension   • Migraine   • Myositis   • Neuropathy   • Pulmonary nodule seen on imaging study   • Type 2 diabetes mellitus without complication, with long-term current use of  insulin (HCC)   • Chronic hypoxemic respiratory failure (HCC)   • Class 1 obesity due to excess calories with serious comorbidity and body mass index (BMI) of 34.0 to 34.9 in adult   • Sleep apnea   • Family history of heart disease   • Pulmonary hypertension (HCC)   • Primary fibromyalgia syndrome   • Primary cancer of upper outer quadrant of right breast (HCC)   • Malignant neoplasm of upper-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, estrogen receptor positive    • Mucinous carcinoma of breast, right (HCC)   • Tobacco abuse   • PAD (peripheral artery disease) (HCC)   • Tachycardia   • Stage 3a chronic kidney disease (HCC)   • Chronic obstructive pulmonary disease with acute exacerbation (HCC)   • Kidney lesion   • Nonocclusive coronary atherosclerosis of native coronary artery   • Kidney cysts   • Hyponatremia   • Diarrhea   • Presence of IVC filter   • History of pulmonary embolism       Current Outpatient Medications   Medication Sig Dispense Refill   • albuterol (2.5 mg/3 mL) 0.083 % nebulizer solution Take 3 mL (2.5 mg total) by nebulization every 6 (six) hours as needed for wheezing or shortness of breath 90 mL 2   • albuterol (Ventolin HFA) 90 mcg/act inhaler Inhale 2 puffs every 4 (four) hours as needed for wheezing or shortness of breath 18 g 5   • amitriptyline (ELAVIL) 100 mg tablet Take 0.5 tablets (50 mg total) by mouth daily at bedtime 45 tablet 1   • aspirin (ECOTRIN LOW STRENGTH) 81 mg EC tablet Take 1 tablet (81 mg total) by mouth daily 90 tablet 3   • benztropine (COGENTIN) 0.5 mg tablet Take 0.5 mg by mouth daily at bedtime       • Blood Glucose Monitoring Suppl (ONE TOUCH ULTRA 2) w/Device KIT by Does not apply route daily 100 each 0   • carvedilol (COREG) 3.125 mg tablet Take 1 tablet (3.125 mg total) by mouth 2 (two) times a day with meals 60 tablet 6   • clonazePAM (KlonoPIN) 0.5 mg tablet Take 0.5 mg by mouth daily as needed       • cloNIDine (CATAPRES) 0.1 mg tablet Take 0.1 mg by mouth  daily as needed     • Continuous Blood Gluc  (FreeStyle Darius 2 [place]) ANDERSON To check blood sugar continuously. 1 each 0   • Continuous Blood Gluc Sensor (FreeStyle Darius 2 Sensor) MISC Use to check her blood sugars continuously and change it every 2 weeks 6 each 0   • doxepin (SINEquan) 100 mg capsule take 1 to 2 capsules by mouth at bedtime if needed     • dulaglutide (Trulicity) 3 MG/0.5ML injection Inject 0.5 mL (3 mg total) under the skin every 7 days 6 mL 0   • Empagliflozin (Jardiance) 25 MG TABS Take 1 tablet (25 mg total) by mouth in the morning 90 tablet 0   • gabapentin (Neurontin) 300 mg capsule Take 1 capsule (300 mg total) by mouth 2 (two) times a day 60 capsule 2   • glucose blood (OneTouch Ultra) test strip Use to test blood sugar three times a day 100 strip 2   • Insulin Glargine Solostar (Lantus SoloStar) 100 UNIT/ML SOPN Inject 0.15 mL (15 Units total) under the skin 2 (two) times a day 27 mL 0   • Insulin Pen Needle (Droplet Pen Needles) 32G X 4 MM MISC use twice a day for INJECTIONS 200 each 3   • loperamide (IMODIUM) 2 mg capsule Take 1 capsule (2 mg total) by mouth 4 (four) times a day as needed for diarrhea 30 capsule 0   • OneTouch Delica Lancets 33G MISC Check blood sugars three times daily. Please substitute with appropriate alternative as covered by patient's insurance. Dx: E11.65 300 each 3   • pantoprazole (PROTONIX) 40 mg tablet Take 1 tablet (40 mg total) by mouth daily 90 tablet 1   • rosuvastatin (CRESTOR) 40 MG tablet Take 1 tablet (40 mg total) by mouth daily 90 tablet 3   • vilazodone (VIIBRYD) 40 mg tablet Take 40 mg by mouth daily.     • VRAYLAR 6 MG capsule Take 6 mg by mouth daily  0     No current facility-administered medications for this visit.        Past Medical History:   Diagnosis Date   • Cancer (HCC)     right breast   • Chronic back pain    • Colitis    • COPD (chronic obstructive pulmonary disease) (HCC)    • Depression    • Diabetes mellitus (HCC)    • DVT  of lower limb, acute ([facility]) 2004   • GERD (gastroesophageal reflux disease)    • Hyperlipidemia    • Pneumonia    • Rapid heart rate    • Sleep apnea    • Stroke (HCC)     4 mini strokes        Past Surgical History:   Procedure Laterality Date   • BREAST LUMPECTOMY Right    • CARDIAC CATHETERIZATION N/A 10/28/2021    Procedure: Cardiac Coronary Angiogram;  Surgeon: Abdelrahman Jacinto DO;  Location:  CARDIAC CATH LAB;  Service: Cardiology   • IVC FILTER INSERTION     • MASTECTOMY Right    • MASTECTOMY W/ SENTINEL NODE BIOPSY Right 11/09/2021    Procedure: BREAST MASTECTOMY WITH BIOPSY LYMPH NODE SENTINEL and axillary node dissection  ([place] Lymph Node Injection @ 12:30);  Surgeon: Jd Dong MD;  Location:  MAIN OR;  Service: General   • US GUIDANCE BREAST BIOPSY RIGHT EACH ADDITIONAL Right 10/13/2021   • US GUIDANCE BREAST BIOPSY RIGHT EACH ADDITIONAL Right 10/13/2021   • US GUIDED BREAST BIOPSY RIGHT COMPLETE Right 10/13/2021        Social History     Socioeconomic History   • Marital status: Legally      Spouse name: Not on file   • Number of children: Not on file   • Years of education: Not on file   • Highest education level: Not on file   Occupational History   • Not on file   Tobacco Use   • Smoking status: Every Day     Current packs/day: 1.00     Types: Cigarettes   • Smokeless tobacco: Never   Vaping Use   • Vaping status: Never Used   Substance and Sexual Activity   • Alcohol use: Never   • Drug use: Never   • Sexual activity: Not Currently   Other Topics Concern   • Not on file   Social History Narrative   • Not on file     Social Determinants of Health     Financial Resource Strain: Not on file   Food Insecurity: No Food Insecurity (2/13/2023)    Received from [facility]    Hunger Vital Sign    • Worried About Running Out of Food in the Last Year: Never true    • Ran Out of Food in the Last Year: Never true   Transportation Needs: No Transportation Needs (1/30/2023)    PRAPARE -  "Transportation    • Lack of Transportation (Medical): No    • Lack of Transportation (Non-Medical): No   Physical Activity: Not on file   Stress: Not on file   Social Connections: Not on file   Intimate Partner Violence: Not on file   Housing Stability: Low Risk  (1/30/2023)    Housing Stability Vital Sign    • Unable to Pay for Housing in the Last Year: No    • Number of Places Lived in the Last Year: 1    • Unstable Housing in the Last Year: No        Review of Systems   Constitutional:  Negative for chills, diaphoresis and fever.   Respiratory:  Negative for cough, chest tightness, shortness of breath and wheezing.    Cardiovascular:  Negative for chest pain, palpitations and leg swelling.   Gastrointestinal:  Negative for abdominal pain, constipation, diarrhea, nausea and vomiting.   Skin:  Negative for rash and wound.   Neurological:  Negative for dizziness, syncope, light-headedness and headaches.         Objective:      /58 (BP Location: Left arm, Patient Position: Sitting, Cuff Size: Standard)   Pulse 102   Temp 98 °F (36.7 °C)   Resp 18   Ht 5' 7\" (1.702 m)   Wt 99.3 kg (219 lb)   SpO2 94%   BMI 34.30 kg/m²          Physical Exam  Vitals and nursing note reviewed.   Constitutional:       General: She is not in acute distress.     Appearance: Normal appearance. She is obese.      Comments: Ambulating with a cane   HENT:      Head: Normocephalic and atraumatic.   Eyes:      Extraocular Movements: Extraocular movements intact.      Conjunctiva/sclera: Conjunctivae normal.      Pupils: Pupils are equal, round, and reactive to light.   Cardiovascular:      Rate and Rhythm: Normal rate and regular rhythm.      Heart sounds: Normal heart sounds. No murmur heard.  Pulmonary:      Effort: Pulmonary effort is normal. No respiratory distress.      Breath sounds: Normal breath sounds. No wheezing.   Musculoskeletal:      Right lower leg: No edema.      Left lower leg: No edema.   Skin:     General: Skin is " warm and dry.   Neurological:      General: No focal deficit present.      Mental Status: She is alert and oriented to person, place, and time.      Cranial Nerves: No cranial nerve deficit.      Motor: No weakness.   Psychiatric:         Mood and Affect: Mood normal.         Behavior: Behavior normal.

## 2024-02-20 ENCOUNTER — TELEPHONE (OUTPATIENT)
Dept: PULMONOLOGY | Facility: CLINIC | Age: 64
End: 2024-02-20

## 2024-02-20 NOTE — TELEPHONE ENCOUNTER
Called and LM for patient explaining she hasn't been seen in our office since September of 2021 and is due for O2 Recert through [facility] (her DME) and would need to schedule an appt at her earliest convenience to be seen by a Doctor here at the office. Phone number left on  vm and patient asked to call office to schedule an appointment. Last OV was 09/22/2021 and patient was to return to office in January 2022.

## 2024-02-22 DIAGNOSIS — K21.9 GERD WITHOUT ESOPHAGITIS: ICD-10-CM

## 2024-02-22 RX ORDER — PANTOPRAZOLE SODIUM 40 MG/1
40 TABLET, DELAYED RELEASE ORAL DAILY
Qty: 90 TABLET | Refills: 1 | Status: SHIPPED | OUTPATIENT
Start: 2024-02-22

## 2024-02-26 ENCOUNTER — OFFICE VISIT (OUTPATIENT)
Dept: PULMONOLOGY | Facility: CLINIC | Age: 64
End: 2024-02-26
Payer: COMMERCIAL

## 2024-02-26 VITALS
HEIGHT: 67 IN | BODY MASS INDEX: 34.69 KG/M2 | WEIGHT: 221 LBS | TEMPERATURE: 98.4 F | DIASTOLIC BLOOD PRESSURE: 64 MMHG | SYSTOLIC BLOOD PRESSURE: 130 MMHG | OXYGEN SATURATION: 96 % | HEART RATE: 105 BPM

## 2024-02-26 DIAGNOSIS — J96.11 CHRONIC RESPIRATORY FAILURE WITH HYPOXIA (HCC): ICD-10-CM

## 2024-02-26 DIAGNOSIS — G47.33 OSA (OBSTRUCTIVE SLEEP APNEA): ICD-10-CM

## 2024-02-26 DIAGNOSIS — F17.200 NICOTINE DEPENDENCE, UNCOMPLICATED, UNSPECIFIED NICOTINE PRODUCT TYPE: ICD-10-CM

## 2024-02-26 DIAGNOSIS — J43.2 CENTRILOBULAR EMPHYSEMA (HCC): Primary | ICD-10-CM

## 2024-02-26 LAB
DME PARACHUTE DELIVERY DATE REQUESTED: NORMAL
DME PARACHUTE ITEM DESCRIPTION: NORMAL
DME PARACHUTE ORDER STATUS: NORMAL
DME PARACHUTE SUPPLIER NAME: NORMAL
DME PARACHUTE SUPPLIER PHONE: NORMAL

## 2024-02-26 PROCEDURE — 99214 OFFICE O/P EST MOD 30 MIN: CPT | Performed by: PHYSICIAN ASSISTANT

## 2024-02-26 PROCEDURE — 94618 PULMONARY STRESS TESTING: CPT | Performed by: PHYSICIAN ASSISTANT

## 2024-02-26 RX ORDER — TIOTROPIUM BROMIDE INHALATION SPRAY 3.12 UG/1
2 SPRAY, METERED RESPIRATORY (INHALATION) DAILY
Qty: 4 G | Refills: 11 | Status: SHIPPED | OUTPATIENT
Start: 2024-02-26

## 2024-02-28 PROBLEM — F17.200 NICOTINE DEPENDENCE, UNCOMPLICATED: Status: ACTIVE | Noted: 2024-02-28

## 2024-02-28 NOTE — PROGRESS NOTES
Pulmonary Follow Up Note   hWit Campos 63 y.o. female MRN: [id number]  2/28/2024      Assessment:    Chronic respiratory failure with hypoxia (HCC)  Oxygen titration study done in office today indicates that patient does not require supplemental oxygen during waking hours however will order overnight pulse ox to confirm her continued need for supplemental oxygen nightly. After testing continue 4 L nasal cannula nightly.    ZOË (obstructive sleep apnea)  Patient is agreeable to retesting for ZOË and considering PAP therapy again. Order for diagnostic sleep study placed.     Centrilobular emphysema (HCC)  Previous pulmonary function test are without fixed obstruction however patient does have emphysema on CT and notes benefit from Spiriva.  Will continue Spiriva 2 puffs daily and albuterol as needed.    Nicotine dependence, uncomplicated  Since due for LDCT screening.  Reviewed risk and benefits of continued screening and patient was agreeable.  Order placed.      Plan:    Diagnoses and all orders for this visit:    Centrilobular emphysema (HCC)  -     tiotropium (Spiriva Respimat) 2.5 MCG/ACT AERS inhaler; Inhale 2 puffs daily    ZOË (obstructive sleep apnea)  -     Ambulatory Referral to Sleep Medicine; Future    Chronic respiratory failure with hypoxia (HCC)  -     Pulse oximetry overnight  -     POCT Oxygen Titration    Nicotine dependence, uncomplicated, unspecified nicotine product type  -     CT lung screening program; Future    Other orders  -     Overnight Oximetry Test        No follow-ups on file.    History of Present Illness   HPI:  Whit Campos is a 63 y.o. female who presents to the office today for continued follow-up and oxygen recertification.  She has a history of centrilobular emphysema and nicotine dependence along with chronic hypoxic respiratory failure for which she uses 2 L nightly.  She has chronic dyspnea on exertion and chronic stable cough. Spiriva works well for her.     Review  of Systems   All other systems reviewed and are negative.      Historical Information   Past Medical History:   Diagnosis Date    Cancer (HCC)     right breast    Chronic back pain     Colitis     COPD (chronic obstructive pulmonary disease) (HCC)     Depression     Diabetes mellitus (HCC)     DVT of lower limb, acute (HCC) 2004    GERD (gastroesophageal reflux disease)     Hyperlipidemia     Pneumonia     Rapid heart rate     Sleep apnea     Stroke (HCC)     4 mini strokes     Past Surgical History:   Procedure Laterality Date    BREAST LUMPECTOMY Right     CARDIAC CATHETERIZATION N/A 10/28/2021    Procedure: Cardiac Coronary Angiogram;  Surgeon: Abdelrahman Jacinto DO;  Location:  CARDIAC CATH LAB;  Service: Cardiology    IVC FILTER INSERTION      MASTECTOMY Right     MASTECTOMY W/ SENTINEL NODE BIOPSY Right 11/09/2021    Procedure: BREAST MASTECTOMY WITH BIOPSY LYMPH NODE SENTINEL and axillary node dissection  ([place] Lymph Node Injection @ 12:30);  Surgeon: Jd Dong MD;  Location: [facility];  Service: General    US GUIDANCE BREAST BIOPSY RIGHT EACH ADDITIONAL Right 10/13/2021    US GUIDANCE BREAST BIOPSY RIGHT EACH ADDITIONAL Right 10/13/2021    US GUIDED BREAST BIOPSY RIGHT COMPLETE Right 10/13/2021     Family History   Problem Relation Age of Onset    Breast cancer Mother 72    COPD Mother     Coronary artery disease Mother     Coronary artery disease Father     Stroke Father     Lung cancer Sister     No Known Problems Sister     No Known Problems Sister     Breast cancer Maternal Grandmother     Colon cancer Paternal Grandfather     No Known Problems Maternal Aunt     No Known Problems Maternal Aunt     No Known Problems Maternal Aunt     No Known Problems Paternal Aunt     Breast cancer Cousin        Social History     Tobacco Use   Smoking Status Every Day    Current packs/day: 1.00    Types: Cigarettes   Smokeless Tobacco Never         Meds/Allergies     Current Outpatient Medications:      tiotropium (Spiriva Respimat) 2.5 MCG/ACT AERS inhaler, Inhale 2 puffs daily, Disp: 4 g, Rfl: 11    albuterol (2.5 mg/3 mL) 0.083 % nebulizer solution, Take 3 mL (2.5 mg total) by nebulization every 6 (six) hours as needed for wheezing or shortness of breath, Disp: 90 mL, Rfl: 2    albuterol (Ventolin HFA) 90 mcg/act inhaler, Inhale 2 puffs every 4 (four) hours as needed for wheezing or shortness of breath, Disp: 18 g, Rfl: 5    amitriptyline (ELAVIL) 100 mg tablet, Take 0.5 tablets (50 mg total) by mouth daily at bedtime, Disp: 45 tablet, Rfl: 1    aspirin (ECOTRIN LOW STRENGTH) 81 mg EC tablet, Take 1 tablet (81 mg total) by mouth daily, Disp: 90 tablet, Rfl: 3    benztropine (COGENTIN) 0.5 mg tablet, Take 0.5 mg by mouth daily at bedtime  , Disp: , Rfl:     Blood Glucose Monitoring Suppl (ONE TOUCH ULTRA 2) w/Device KIT, by Does not apply route daily, Disp: 100 each, Rfl: 0    carvedilol (COREG) 3.125 mg tablet, Take 1 tablet (3.125 mg total) by mouth 2 (two) times a day with meals, Disp: 60 tablet, Rfl: 6    clonazePAM (KlonoPIN) 0.5 mg tablet, Take 0.5 mg by mouth daily as needed  , Disp: , Rfl:     cloNIDine (CATAPRES) 0.1 mg tablet, Take 0.1 mg by mouth daily as needed, Disp: , Rfl:     Continuous Blood Gluc  (FreeStyle Darius 2 [place]) ANDERSON, To check blood sugar continuously., Disp: 1 each, Rfl: 0    Continuous Blood Gluc Sensor (FreeStyle Darius 2 Sensor) MISC, Use to check her blood sugars continuously and change it every 2 weeks, Disp: 6 each, Rfl: 0    doxepin (SINEquan) 100 mg capsule, take 1 to 2 capsules by mouth at bedtime if needed, Disp: , Rfl:     dulaglutide (Trulicity) 3 MG/0.5ML injection, Inject 0.5 mL (3 mg total) under the skin every 7 days, Disp: 6 mL, Rfl: 0    Empagliflozin (Jardiance) 25 MG TABS, Take 1 tablet (25 mg total) by mouth in the morning, Disp: 90 tablet, Rfl: 0    gabapentin (Neurontin) 300 mg capsule, Take 1 capsule (300 mg total) by mouth 2 (two) times a day, Disp: 60  "capsule, Rfl: 2    glucose blood (OneTouch Ultra) test strip, Use to test blood sugar three times a day, Disp: 100 strip, Rfl: 2    Insulin Glargine Solostar (Lantus SoloStar) 100 UNIT/ML SOPN, Inject 0.15 mL (15 Units total) under the skin 2 (two) times a day, Disp: 27 mL, Rfl: 0    Insulin Pen Needle (Droplet Pen Needles) 32G X 4 MM MISC, use twice a day for INJECTIONS, Disp: 200 each, Rfl: 3    loperamide (IMODIUM) 2 mg capsule, Take 1 capsule (2 mg total) by mouth 4 (four) times a day as needed for diarrhea, Disp: 30 capsule, Rfl: 0    OneTouch Delica Lancets 33G MISC, Check blood sugars three times daily. Please substitute with appropriate alternative as covered by patient's insurance. Dx: E11.65, Disp: 300 each, Rfl: 3    pantoprazole (PROTONIX) 40 mg tablet, take 1 tablet by mouth once daily, Disp: 90 tablet, Rfl: 1    rosuvastatin (CRESTOR) 40 MG tablet, Take 1 tablet (40 mg total) by mouth daily, Disp: 90 tablet, Rfl: 3    vilazodone (VIIBRYD) 40 mg tablet, Take 40 mg by mouth daily., Disp: , Rfl:     VRAYLAR 6 MG capsule, Take 6 mg by mouth daily, Disp: , Rfl: 0  Allergies   Allergen Reactions    Amoxicillin-Pot Clavulanate GI Intolerance    Anastrozole Other (See Comments)     Diarrhea, Nausea, Stomach pain        Vitals: Blood pressure 130/64, pulse 105, temperature 98.4 °F (36.9 °C), temperature source Temporal, height 5' 7\" (1.702 m), weight 100 kg (221 lb), SpO2 96%. Body mass index is 34.61 kg/m². Oxygen Therapy  SpO2: 96 %    Physical Exam  Physical Exam  Vitals reviewed.   Constitutional:       Appearance: Normal appearance. She is well-developed.   HENT:      Head: Normocephalic and atraumatic.      Nose: Nose normal.      Mouth/Throat:      Mouth: Mucous membranes are moist.   Eyes:      Extraocular Movements: Extraocular movements intact.   Cardiovascular:      Rate and Rhythm: Normal rate and regular rhythm.      Heart sounds: Normal heart sounds.   Pulmonary:      Effort: Pulmonary effort is " "normal. No respiratory distress.      Breath sounds: No wheezing, rhonchi or rales.   Musculoskeletal:         General: No swelling.   Skin:     General: Skin is warm and dry.   Neurological:      Mental Status: She is alert. Mental status is at baseline.   Psychiatric:         Mood and Affect: Mood normal.         Behavior: Behavior normal.         Labs: I have personally reviewed pertinent lab results., ABG: No results found for: \"PHART\", \"UTW8CQE\", \"PO2ART\", \"ZBR1DXA\", \"I3JEZRAB\", \"BEART\", \"SOURCE\", BNP: No results found for: \"BNP\", CBC: No results found for: \"WBC\", \"HGB\", \"HCT\", \"MCV\", \"PLT\", \"ADJUSTEDWBC\", \"RBC\", \"MCH\", \"MCHC\", \"RDW\", \"MPV\", \"NRBC\", CMP: No results found for: \"SODIUM\", \"K\", \"CL\", \"CO2\", \"ANIONGAP\", \"BUN\", \"CREATININE\", \"GLUCOSE\", \"CALCIUM\", \"AST\", \"ALT\", \"ALKPHOS\", \"PROT\", \"BILITOT\", \"EGFR\", PT/INR: No results found for: \"PT\", \"INR\", Troponin: No results found for: \"TROPONINI\"  Lab Results   Component Value Date    WBC 5.96 06/12/2023    HGB 13.8 06/12/2023    HCT 41.6 06/12/2023    MCV 87 06/12/2023     06/12/2023     Lab Results   Component Value Date    GLUCOSE 104 01/08/2016    CALCIUM 9.7 12/21/2023     01/08/2016    K 3.8 12/21/2023    CO2 26 12/21/2023     12/21/2023    BUN 20 12/21/2023    CREATININE 1.52 (H) 12/21/2023     Lab Results   Component Value Date    IGE 4 01/08/2016     Lab Results   Component Value Date    ALT 23 12/21/2023    AST 19 12/21/2023    ALKPHOS 67 12/21/2023    BILITOT 0.53 11/30/2015       Imaging and other studies: I have personally reviewed pertinent reports.   and I have personally reviewed pertinent films in PACS     CT chest without contrast 1/20/2023  No consolidations.  Mild subsegmental atelectasis or scarring in the inferior lingula.  Mild emphysema.  Stable 3 mm right upper lobe nodule.  Redemonstration of subtle upper lobe predominant centrilobular nodules.    Pulmonary function testing:  Performed 7/1/2021   FEV1/FVC ratio 78% "   FEV1 61% predicted  FVC 57% predicted  TLC 88 % predicted   % predicted  DLCO corrected for hemoglobin 41 % predicted  No large airway obstruction.  Decreased FVC with normal TLC and elevated RV consistent with air trapping.  Severe diffusion impairment.      Other Studies: I have personally reviewed pertinent reports.      Oxygen titration study in the office today  Resting saturation on room air 94% with heart rate 101 bpm.  Patient ambulated for a total of 6 minutes without SpO2 dropping below 93% with max heart rate 111 bpm.   Impression: Patient does not require supplemental oxygen at rest or with activities during waking hours.

## 2024-02-28 NOTE — ASSESSMENT & PLAN NOTE
Previous pulmonary function test are without fixed obstruction however patient does have emphysema on CT and notes benefit from Spiriva.  Will continue Spiriva 2 puffs daily and albuterol as needed.

## 2024-02-28 NOTE — ASSESSMENT & PLAN NOTE
Patient is agreeable to retesting for ZOË and considering PAP therapy again. Order for diagnostic sleep study placed.

## 2024-02-28 NOTE — ASSESSMENT & PLAN NOTE
Since due for LDCT screening.  Reviewed risk and benefits of continued screening and patient was agreeable.  Order placed.

## 2024-02-28 NOTE — ASSESSMENT & PLAN NOTE
Oxygen titration study done in office today indicates that patient does not require supplemental oxygen during waking hours however will order overnight pulse ox to confirm her continued need for supplemental oxygen nightly. After testing continue 4 L nasal cannula nightly.

## 2024-03-02 DIAGNOSIS — G47.33 OSA (OBSTRUCTIVE SLEEP APNEA): Primary | ICD-10-CM

## 2024-03-04 ENCOUNTER — HOSPITAL ENCOUNTER (OUTPATIENT)
Dept: CT IMAGING | Facility: HOSPITAL | Age: 64
Discharge: HOME | End: 2024-03-04
Payer: COMMERCIAL

## 2024-03-04 ENCOUNTER — TELEPHONE (OUTPATIENT)
Age: 64
End: 2024-03-04

## 2024-03-04 DIAGNOSIS — F17.200 NICOTINE DEPENDENCE, UNCOMPLICATED, UNSPECIFIED NICOTINE PRODUCT TYPE: ICD-10-CM

## 2024-03-04 PROCEDURE — 71271 CT THORAX LUNG CANCER SCR C-: CPT

## 2024-03-04 NOTE — TELEPHONE ENCOUNTER
Yamini from [facility] calling regarding a O2 re-cert form they sent on 2/22. She is asking if we received it and if we can fax it back once completed. Please fax to [phone]

## 2024-03-05 ENCOUNTER — TELEPHONE (OUTPATIENT)
Dept: PULMONOLOGY | Facility: CLINIC | Age: 64
End: 2024-03-05

## 2024-03-07 DIAGNOSIS — E11.65 TYPE 2 DIABETES MELLITUS WITH HYPERGLYCEMIA, WITHOUT LONG-TERM CURRENT USE OF INSULIN (HCC): ICD-10-CM

## 2024-03-07 RX ORDER — EMPAGLIFLOZIN 25 MG/1
TABLET, FILM COATED ORAL
Qty: 90 TABLET | Refills: 0 | Status: SHIPPED | OUTPATIENT
Start: 2024-03-07

## 2024-03-08 DIAGNOSIS — E11.9 TYPE 2 DIABETES MELLITUS WITHOUT COMPLICATION, WITH LONG-TERM CURRENT USE OF INSULIN (HCC): ICD-10-CM

## 2024-03-08 DIAGNOSIS — Z79.4 TYPE 2 DIABETES MELLITUS WITHOUT COMPLICATION, WITH LONG-TERM CURRENT USE OF INSULIN (HCC): ICD-10-CM

## 2024-03-08 RX ORDER — INSULIN GLARGINE 100 [IU]/ML
INJECTION, SOLUTION SUBCUTANEOUS
Qty: 30 ML | Refills: 0 | Status: SHIPPED | OUTPATIENT
Start: 2024-03-08 | End: 2024-03-15 | Stop reason: SDUPTHER

## 2024-03-14 ENCOUNTER — TELEPHONE (OUTPATIENT)
Dept: OTHER | Facility: HOSPITAL | Age: 64
End: 2024-03-14

## 2024-03-14 DIAGNOSIS — G47.34 NOCTURNAL HYPOXIA: Primary | ICD-10-CM

## 2024-03-14 NOTE — TELEPHONE ENCOUNTER
Reviewed overnight pulse ox done on room air.  It appears patient spent 4 hours 53 minutes and 26 seconds with SpO2 less than or equal to 88%.  Based off these results, would recommend continued supplemental oxygen at 4L nasal cannula nightly which she had been on previously.  Will fax order and results to [employer].

## 2024-03-15 ENCOUNTER — OFFICE VISIT (OUTPATIENT)
Dept: FAMILY MEDICINE CLINIC | Facility: HOME HEALTHCARE | Age: 64
End: 2024-03-15
Payer: COMMERCIAL

## 2024-03-15 VITALS
DIASTOLIC BLOOD PRESSURE: 62 MMHG | TEMPERATURE: 98.7 F | RESPIRATION RATE: 18 BRPM | HEIGHT: 67 IN | HEART RATE: 59 BPM | WEIGHT: 220.4 LBS | BODY MASS INDEX: 34.59 KG/M2 | OXYGEN SATURATION: 94 % | SYSTOLIC BLOOD PRESSURE: 108 MMHG

## 2024-03-15 DIAGNOSIS — E11.9 TYPE 2 DIABETES MELLITUS WITHOUT COMPLICATION, WITH LONG-TERM CURRENT USE OF INSULIN (HCC): ICD-10-CM

## 2024-03-15 DIAGNOSIS — N18.4 CKD STAGE 4 DUE TO TYPE 2 DIABETES MELLITUS (HCC): Primary | ICD-10-CM

## 2024-03-15 DIAGNOSIS — E11.22 CKD STAGE 4 DUE TO TYPE 2 DIABETES MELLITUS (HCC): Primary | ICD-10-CM

## 2024-03-15 DIAGNOSIS — Z79.4 TYPE 2 DIABETES MELLITUS WITHOUT COMPLICATION, WITH LONG-TERM CURRENT USE OF INSULIN (HCC): ICD-10-CM

## 2024-03-15 DIAGNOSIS — E55.9 VITAMIN D DEFICIENCY: ICD-10-CM

## 2024-03-15 PROCEDURE — T1015 CLINIC SERVICE: HCPCS | Performed by: FAMILY MEDICINE

## 2024-03-16 DIAGNOSIS — Z79.4 TYPE 2 DIABETES MELLITUS WITH HYPERGLYCEMIA, WITH LONG-TERM CURRENT USE OF INSULIN (HCC): ICD-10-CM

## 2024-03-16 DIAGNOSIS — E11.65 TYPE 2 DIABETES MELLITUS WITH HYPERGLYCEMIA, WITH LONG-TERM CURRENT USE OF INSULIN (HCC): ICD-10-CM

## 2024-03-18 PROBLEM — N18.4 CKD STAGE 4 DUE TO TYPE 2 DIABETES MELLITUS (HCC): Status: ACTIVE | Noted: 2024-03-18

## 2024-03-18 PROBLEM — E11.22 CKD STAGE 4 DUE TO TYPE 2 DIABETES MELLITUS (HCC): Status: ACTIVE | Noted: 2024-03-18

## 2024-03-18 PROBLEM — E55.9 VITAMIN D DEFICIENCY: Status: ACTIVE | Noted: 2024-03-18

## 2024-03-18 RX ORDER — DULAGLUTIDE 3 MG/.5ML
INJECTION, SOLUTION SUBCUTANEOUS
Qty: 6 ML | Refills: 0 | Status: SHIPPED | OUTPATIENT
Start: 2024-03-18

## 2024-03-18 NOTE — ASSESSMENT & PLAN NOTE
Lab Results   Component Value Date    HGBA1C 9.2 (H) 12/21/2023     3-month follow-up  H/o CKD in setting of uncontrolled diabetes  Reviewed labs with patient;  EGFR 35  Elevated creatinine --> FAM  Low vitamin D level  Elevated A1c  Established with endocrine; Dr. Alvarado, with follow-up in April.  Established with nephrology, was seeing Dr. Gamboa who retired at the end of 2023.  Pt complaining of feeling low energy and generally feels unwell  Concerns for needing dialysis in setting of worsening renal function  Plan:  Consume plenty of liquids and repeat CMP to assess FAM  Follow-up with endocrine sooner than April, ideally within 1 to 2 weeks  Referral to nephrology, schedule appointment as soon as possible

## 2024-03-18 NOTE — ASSESSMENT & PLAN NOTE
Vitamin D 29 in setting of CKD stage IV  Start vitamin D supplementation 1000 IU daily  Follow-up with nephrology

## 2024-03-18 NOTE — PROGRESS NOTES
Assessment/Plan:    CKD stage 4 due to type 2 diabetes mellitus (HCC)    Lab Results   Component Value Date    HGBA1C 9.2 (H) 12/21/2023     3-month follow-up  H/o CKD in setting of uncontrolled diabetes  Reviewed labs with patient;  EGFR 35  Elevated creatinine --> FAM  Low vitamin D level  Elevated A1c  Established with endocrine; Dr. Alvarado, with follow-up in April.  Established with nephrology, was seeing Dr. Gamboa who retired at the end of 2023.  Pt complaining of feeling low energy and generally feels unwell  Concerns for needing dialysis in setting of worsening renal function  Plan:  Consume plenty of liquids and repeat CMP to assess FAM  Follow-up with endocrine sooner than April, ideally within 1 to 2 weeks  Referral to nephrology, schedule appointment as soon as possible      Vitamin D deficiency  Vitamin D 29 in setting of CKD stage IV  Start vitamin D supplementation 1000 IU daily  Follow-up with nephrology     Diagnoses and all orders for this visit:    CKD stage 4 due to type 2 diabetes mellitus (HCC)  -     Ambulatory Referral to Nephrology; Future  -     Comprehensive metabolic panel; Future    Type 2 diabetes mellitus without complication, with long-term current use of insulin ([facility])    Vitamin D deficiency          Subjective:      Patient ID: Whit Campos is a 63 y.o. female.    Patient is a 63-year-old female seen in clinic for follow-up.  We reviewed patient's recent lab test.  Lab test shows worsening kidney function, uncontrolled diabetes, low vitamin D, acute kidney injury.  Patient states that she feels tired a lot and generally unwell.  No fever/chills, no feeling down or sad.  She follows with endocrine and has an appointment next month.  She previously followed nephrology with Dr. Gamboa but has no follow-up scheduled.  Patient is compliant with her diabetic medications.  However she does not follow a diet or exercise plan.  She does not consume water throughout the day.  We discussed  "diabetic nutritional counseling.  She will see endocrine sooner and make efforts to follow-up with nephrology.        The following portions of the patient's history were reviewed and updated as appropriate: allergies, current medications, past family history, past medical history, past social history, past surgical history, and problem list.    Review of Systems   Constitutional:  Positive for fatigue. Negative for chills and fever.   HENT:  Negative for ear pain and sore throat.    Eyes:  Negative for pain and visual disturbance.   Respiratory:  Negative for cough and shortness of breath.    Cardiovascular:  Negative for chest pain and palpitations.   Gastrointestinal:  Negative for abdominal pain and vomiting.   Genitourinary:  Negative for dysuria and hematuria.   Musculoskeletal:  Negative for arthralgias and back pain.   Skin:  Negative for color change and rash.   Neurological:  Negative for seizures and syncope.   All other systems reviewed and are negative.        Objective:      /62 (BP Location: Left arm, Patient Position: Sitting, Cuff Size: Standard)   Pulse 59   Temp 98.7 °F (37.1 °C) (Tympanic)   Resp 18   Ht 5' 7\" (1.702 m)   Wt 100 kg (220 lb 6.4 oz)   SpO2 94%   BMI 34.52 kg/m²          Physical Exam  Vitals and nursing note reviewed.   Constitutional:       Appearance: Normal appearance.   HENT:      Right Ear: External ear normal.      Left Ear: External ear normal.      Nose: No rhinorrhea.   Eyes:      General:         Right eye: No discharge.         Left eye: No discharge.      Conjunctiva/sclera: Conjunctivae normal.   Cardiovascular:      Rate and Rhythm: Normal rate and regular rhythm.      Heart sounds: Normal heart sounds.   Pulmonary:      Effort: Pulmonary effort is normal. No respiratory distress.      Breath sounds: Normal breath sounds.   Musculoskeletal:      Right lower leg: No edema.      Left lower leg: No edema.   Skin:     Coloration: Skin is not jaundiced.      " Findings: No erythema or rash.   Neurological:      Mental Status: She is alert.   Psychiatric:         Mood and Affect: Mood normal.         Behavior: Behavior normal.

## 2024-03-19 ENCOUNTER — OFFICE VISIT (OUTPATIENT)
Dept: NEPHROLOGY | Facility: CLINIC | Age: 64
End: 2024-03-19
Payer: COMMERCIAL

## 2024-03-19 VITALS
SYSTOLIC BLOOD PRESSURE: 132 MMHG | DIASTOLIC BLOOD PRESSURE: 72 MMHG | WEIGHT: 221 LBS | BODY MASS INDEX: 34.69 KG/M2 | HEIGHT: 67 IN | HEART RATE: 101 BPM | OXYGEN SATURATION: 92 %

## 2024-03-19 DIAGNOSIS — N18.30 BENIGN HYPERTENSION WITH CKD (CHRONIC KIDNEY DISEASE) STAGE III (HCC): ICD-10-CM

## 2024-03-19 DIAGNOSIS — I12.9 BENIGN HYPERTENSION WITH CKD (CHRONIC KIDNEY DISEASE) STAGE III (HCC): ICD-10-CM

## 2024-03-19 DIAGNOSIS — N18.32 STAGE 3B CHRONIC KIDNEY DISEASE (HCC): Primary | ICD-10-CM

## 2024-03-19 DIAGNOSIS — E66.09 CLASS 2 OBESITY DUE TO EXCESS CALORIES WITH BODY MASS INDEX (BMI) OF 35.0 TO 35.9 IN ADULT, UNSPECIFIED WHETHER SERIOUS COMORBIDITY PRESENT: ICD-10-CM

## 2024-03-19 DIAGNOSIS — E11.22 CKD STAGE 3 DUE TO TYPE 2 DIABETES MELLITUS (HCC): ICD-10-CM

## 2024-03-19 DIAGNOSIS — N25.81 SECONDARY HYPERPARATHYROIDISM OF RENAL ORIGIN (HCC): ICD-10-CM

## 2024-03-19 DIAGNOSIS — N18.30 CKD STAGE 3 DUE TO TYPE 2 DIABETES MELLITUS (HCC): ICD-10-CM

## 2024-03-19 DIAGNOSIS — E55.9 VITAMIN D DEFICIENCY: ICD-10-CM

## 2024-03-19 PROCEDURE — 99214 OFFICE O/P EST MOD 30 MIN: CPT | Performed by: NURSE PRACTITIONER

## 2024-03-19 NOTE — PROGRESS NOTES
Nephrology   Office Follow-Up  Whit Campos 63 y.o. female MRN: [id number]    Encounter: [id number]        Assessment & Plan    Whit Campos was seen in the [place] office today. All diagnoses and orders for visit:     1. Stage 3b chronic kidney disease (HCC)  Baseline creatinine appears to be around 1.3-1.5 mg/dL since 2022, previously around 1.1-1.2 mg/dL. Suffered FAM January of 2023. Grade a2 albuminuria. Probable left kidney cortical cyst on CT 2023.   Etiology presumed diabetic nephropathy, ? 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 Alvarado has f/u next month. Remains on Jardiance 25 mg. Previously on lisinopril unclear why dc'd. No change at present  BP goal <130/80 mmHg- appropriate today, no changes  BMI 34 on Trulicity- focus on calorie reduction   If kidney function declines Crestor and Neurontin will need to be renally adjusted  No updated labs or imaging to review- ordered for now  -      kidney and bladder; Future; Expected date: 03/19/2024  -     CBC and differential; Future  -     Comprehensive metabolic panel; Future  -     Magnesium; Future  -     Urinalysis with microscopic; Future  -     Phosphorus; Future  -     Albumin / creatinine urine ratio; Future  2. CKD stage 3 due to type 2 diabetes mellitus (HCC)  A1C 9.2%- goal per Dr. Alvarado has f/u next month. Remains on Jardiance 25 mg. Previously on lisinopril unclear why dc'd. No change at present  -     Ambulatory Referral to Nephrology  3. Benign hypertension with CKD (chronic kidney disease) stage III (HCC)  BP goal <130/80 mmHg- appropriate today, no changes  4. Vitamin D deficiency  -     Vitamin D 25 hydroxy; Future  5. Secondary hyperparathyroidism of renal origin ([facility])  -     PTH, intact; Future  6. Class 2 obesity due to excess calories with body mass index (BMI) of 35.0 to 35.9 in adult, unspecified whether serious comorbidity present  BMI 34 on Trulicity- advised to  "focus on calorie reduction         HPI: Whit Campos is a 63 y.o. female who is here for scheduled follow-up     Previously established renal care in this office but last visit was . She was re-referred by PCP.    Pertinent medical problems include CKD 3, T2DM since age 50, ? HTN, HLD, smoker, centrilobular emphysema, hx DVT, breast cancer s/p right mastectomy, obesity    Pertinent family hx include sister HD dependent now  unclear etiology, brother with potential CKD but not proven    Have ordered labs and renal ultrasound for patient to have done at earliest convenience. Provided \"CKD and diabetes handout.\"  Advised to avoid NSAIDs. Is on Jardiance. Previously on RAASi unclear what happened. Appt with Dr. Alvarado scheduled for . Return in July with nephrologist, sooner if indicated    ROS:   Review of Systems   Constitutional:  Negative for chills and fever.   HENT:  Negative for ear pain and sore throat.    Eyes:  Negative for pain and visual disturbance.   Respiratory:  Negative for cough and shortness of breath.    Cardiovascular:  Negative for chest pain and palpitations.   Gastrointestinal:  Negative for abdominal pain and vomiting.   Genitourinary:  Negative for dysuria and hematuria.   Musculoskeletal:  Negative for arthralgias and back pain.   Skin:  Negative for color change and rash.   Neurological:  Negative for seizures and syncope.   All other systems reviewed and are negative.      Allergies: Amoxicillin-pot clavulanate and Anastrozole    Medications:   Current Outpatient Medications:     albuterol (2.5 mg/3 mL) 0.083 % nebulizer solution, Take 3 mL (2.5 mg total) by nebulization every 6 (six) hours as needed for wheezing or shortness of breath, Disp: 90 mL, Rfl: 2    albuterol (Ventolin HFA) 90 mcg/act inhaler, Inhale 2 puffs every 4 (four) hours as needed for wheezing or shortness of breath, Disp: 18 g, Rfl: 5    amitriptyline (ELAVIL) 100 mg tablet, Take 0.5 tablets (50 mg total) " by mouth daily at bedtime, Disp: 45 tablet, Rfl: 1    aspirin (ECOTRIN LOW STRENGTH) 81 mg EC tablet, Take 1 tablet (81 mg total) by mouth daily, Disp: 90 tablet, Rfl: 3    benztropine (COGENTIN) 0.5 mg tablet, Take 0.5 mg by mouth daily at bedtime  , Disp: , Rfl:     Blood Glucose Monitoring Suppl (ONE TOUCH ULTRA 2) w/Device KIT, by Does not apply route daily, Disp: 100 each, Rfl: 0    carvedilol (COREG) 3.125 mg tablet, Take 1 tablet (3.125 mg total) by mouth 2 (two) times a day with meals, Disp: 60 tablet, Rfl: 6    clonazePAM (KlonoPIN) 0.5 mg tablet, Take 0.5 mg by mouth daily as needed  , Disp: , Rfl:     cloNIDine (CATAPRES) 0.1 mg tablet, Take 0.1 mg by mouth daily as needed, Disp: , Rfl:     doxepin (SINEquan) 100 mg capsule, take 1 to 2 capsules by mouth at bedtime if needed, Disp: , Rfl:     gabapentin (Neurontin) 300 mg capsule, Take 1 capsule (300 mg total) by mouth 2 (two) times a day, Disp: 60 capsule, Rfl: 2    glucose blood (OneTouch Ultra) test strip, Use to test blood sugar three times a day, Disp: 100 strip, Rfl: 2    Insulin Glargine Solostar (Lantus SoloStar) 100 UNIT/ML SOPN, Inject 0.1 mL (10 Units total) under the skin 2 (two) times a day, Disp: 18 mL, Rfl: 1    Insulin Pen Needle (Droplet Pen Needles) 32G X 4 MM MISC, use twice a day for INJECTIONS, Disp: 200 each, Rfl: 3    Jardiance 25 MG TABS, Take 1 tablet (25 mg total) by mouth in the morning, Disp: 90 tablet, Rfl: 0    loperamide (IMODIUM) 2 mg capsule, Take 1 capsule (2 mg total) by mouth 4 (four) times a day as needed for diarrhea, Disp: 30 capsule, Rfl: 0    OneTouch Delica Lancets 33G MISC, Check blood sugars three times daily. Please substitute with appropriate alternative as covered by patient's insurance. Dx: E11.65, Disp: 300 each, Rfl: 3    pantoprazole (PROTONIX) 40 mg tablet, take 1 tablet by mouth once daily, Disp: 90 tablet, Rfl: 1    rosuvastatin (CRESTOR) 40 MG tablet, Take 1 tablet (40 mg total) by mouth daily, Disp:  90 tablet, Rfl: 3    tiotropium (Spiriva Respimat) 2.5 MCG/ACT AERS inhaler, Inhale 2 puffs daily, Disp: 4 g, Rfl: 11    Trulicity 3 MG/0.5ML injection, Inject 3 mg under the skin every 7 days, Disp: 6 mL, Rfl: 0    vilazodone (VIIBRYD) 40 mg tablet, Take 40 mg by mouth daily., Disp: , Rfl:     VRAYLAR 6 MG capsule, Take 6 mg by mouth daily, Disp: , Rfl: 0    Continuous Blood Gluc  (FreeStyle Darius 2 [place]) ANDERSON, To check blood sugar continuously. (Patient not taking: Reported on 3/15/2024), Disp: 1 each, Rfl: 0    Continuous Blood Gluc Sensor (FreeStyle Darius 2 Sensor) MISC, Use to check her blood sugars continuously and change it every 2 weeks (Patient not taking: Reported on 3/15/2024), Disp: 6 each, Rfl: 0    Past Medical History:   Diagnosis Date    Cancer (HCC)     right breast    Chronic back pain     Colitis     COPD (chronic obstructive pulmonary disease) (HCC)     Depression     Diabetes mellitus (HCC)     DVT of lower limb, acute (HCC) 2004    GERD (gastroesophageal reflux disease)     Hyperlipidemia     Pneumonia     Rapid heart rate     Sleep apnea     Stroke (HCC)     4 mini strokes     Past Surgical History:   Procedure Laterality Date    BREAST LUMPECTOMY Right     CARDIAC CATHETERIZATION N/A 10/28/2021    Procedure: Cardiac Coronary Angiogram;  Surgeon: Abdelrahman Jacinto DO;  Location:  CARDIAC CATH LAB;  Service: Cardiology    IVC FILTER INSERTION      MASTECTOMY Right     MASTECTOMY W/ SENTINEL NODE BIOPSY Right 11/09/2021    Procedure: BREAST MASTECTOMY WITH BIOPSY LYMPH NODE SENTINEL and axillary node dissection  ([place] Lymph Node Injection @ 12:30);  Surgeon: Jd Dong MD;  Location: [facility] OR;  Service: General    US GUIDANCE BREAST BIOPSY RIGHT EACH ADDITIONAL Right 10/13/2021    US GUIDANCE BREAST BIOPSY RIGHT EACH ADDITIONAL Right 10/13/2021    US GUIDED BREAST BIOPSY RIGHT COMPLETE Right 10/13/2021     Family History   Problem Relation Age of Onset    Breast  "cancer Mother 72    COPD Mother     Coronary artery disease Mother     Coronary artery disease Father     Stroke Father     Lung cancer Sister     No Known Problems Sister     No Known Problems Sister     Breast cancer Maternal Grandmother     Colon cancer Paternal Grandfather     No Known Problems Maternal Aunt     No Known Problems Maternal Aunt     No Known Problems Maternal Aunt     No Known Problems Paternal Aunt     Breast cancer Cousin       reports that she has been smoking cigarettes. She has never used smokeless tobacco. She reports that she does not drink alcohol and does not use drugs.      Physical Exam:   Vitals:    03/19/24 1326   BP: 132/72   BP Location: Left arm   Patient Position: Sitting   Cuff Size: Standard   Pulse: 101   SpO2: 92%   Weight: 100 kg (221 lb)   Height: 5' 7\" (1.702 m)     Body mass index is 34.61 kg/m².    General: conscious, cooperative, in no acute distress, appears stated age  Eyes: conjunctivae pale, anicteric sclerae  ENT: lips and mucous membranes moist  Neck: supple, no JVD, no masses  Chest:  essentially clear breath sounds bilaterally, no crackles, ronchus or wheezings  CVS: S1 & S2, normal rate, regular rhythm  Abdomen: soft, non-tender, non-distended, normoactive bowel sounds, rounded obese  Extremities: no edema of both legs  Skin: no rash   Neuro: awake, alert, oriented       Diagnostic Data:  Lab: I have personally reviewed pertinent lab results.,   CBC:       CMP: No results found for: \"SODIUM\", \"K\", \"CL\", \"CO2\", \"ANIONGAP\", \"BUN\", \"CREATININE\", \"GLUCOSE\", \"CALCIUM\", \"AST\", \"ALT\", \"ALKPHOS\", \"PROT\", \"BILITOT\", \"EGFR\",   PT/INR: No results found for: \"PT\", \"INR\",   Magnesium: No components found for: \"MAG\",  Phosphorous: No results found for: \"PHOS\"    Patient Instructions   It was nice to see you today. We will do a work-up including labs and an ultrasound of kidneys and bladders    You appear to have stage 3b chronic kidney disease but will check labs " "now    Please avoid NSAIDs like Motrin, ibuprofen, naprosyn, Aleve, Advil    Focus on blood sugar control and quitting smoking     Portions of the record may have been created with voice recognition software. Occasional wrong word or \"sound a like\" substitutions may have occurred due to the inherent limitations of voice recognition software. Read the chart carefully and recognize, using context, where substitutions have occurred.    If you have any questions, please contact the dictating provider.  "

## 2024-03-19 NOTE — PATIENT INSTRUCTIONS
It was nice to see you today. We will do a work-up including labs and an ultrasound of kidneys and bladders    You appear to have stage 3b chronic kidney disease but will check labs now    Please avoid NSAIDs like Motrin, ibuprofen, naprosyn, Aleve, Advil    Focus on blood sugar control and quitting smoking

## 2024-03-20 ENCOUNTER — HOSPITAL ENCOUNTER (OUTPATIENT)
Dept: ULTRASOUND IMAGING | Facility: HOSPITAL | Age: 64
Discharge: HOME | End: 2024-03-20
Payer: COMMERCIAL

## 2024-03-20 DIAGNOSIS — N18.32 STAGE 3B CHRONIC KIDNEY DISEASE (HCC): ICD-10-CM

## 2024-03-20 PROBLEM — N25.81 SECONDARY HYPERPARATHYROIDISM OF RENAL ORIGIN (HCC): Status: ACTIVE | Noted: 2024-03-20

## 2024-03-20 PROBLEM — N18.30 BENIGN HYPERTENSION WITH CKD (CHRONIC KIDNEY DISEASE) STAGE III (HCC): Status: ACTIVE | Noted: 2022-07-20

## 2024-03-20 PROBLEM — I12.9 BENIGN HYPERTENSION WITH CKD (CHRONIC KIDNEY DISEASE) STAGE III (HCC): Status: ACTIVE | Noted: 2022-07-20

## 2024-03-20 PROCEDURE — 76775 US EXAM ABDO BACK WALL LIM: CPT

## 2024-03-21 ENCOUNTER — TELEPHONE (OUTPATIENT)
Age: 64
End: 2024-03-21

## 2024-03-21 NOTE — TELEPHONE ENCOUNTER
Patient calling regarding a text message she received stating great news [employer] your provide is sending you a home health equipment order .   Patient will like to know what she is being sent   please advise [phone]

## 2024-03-28 ENCOUNTER — TELEPHONE (OUTPATIENT)
Age: 64
End: 2024-03-28

## 2024-03-28 NOTE — TELEPHONE ENCOUNTER
I reviewed results of lung cancer screening CT and overnight pulse ox.  Recommended she continue using  4 L oxygen during all hours of sleep.  Strongly recommended she schedule her sleep study that she has not yet done.  She verbalized understanding and agrees to follow-up.

## 2024-04-01 ENCOUNTER — TELEPHONE (OUTPATIENT)
Dept: NEPHROLOGY | Facility: CLINIC | Age: 64
End: 2024-04-01

## 2024-04-01 DIAGNOSIS — N18.32 STAGE 3B CHRONIC KIDNEY DISEASE (HCC): Primary | ICD-10-CM

## 2024-04-01 PROCEDURE — 3066F NEPHROPATHY DOC TX: CPT | Performed by: SURGERY

## 2024-04-01 NOTE — TELEPHONE ENCOUNTER
I called and left a message for patient to callback to obtain her US results.       ----- Message from SHU Weber sent at 4/1/2024  8:40 AM EDT -----  No obstruction noted but right kidney is smaller. I recommend an ultrasound to look at the kidney arteries that supply blood to kidneys. Sometimes they become narrow. Please assist with scheduling

## 2024-04-02 ENCOUNTER — TELEPHONE (OUTPATIENT)
Dept: PULMONOLOGY | Facility: CLINIC | Age: 64
End: 2024-04-02

## 2024-04-02 NOTE — TELEPHONE ENCOUNTER
Bertin sent over order to be signed for portability. Patient only needs o2 nocturnally and does not have a need for Portability. OV Notes, Order for O2 nocturnally, and Results of ROSELINE faxed to Bertin

## 2024-04-16 DIAGNOSIS — E78.5 DYSLIPIDEMIA: ICD-10-CM

## 2024-04-16 RX ORDER — ROSUVASTATIN CALCIUM 40 MG/1
40 TABLET, COATED ORAL DAILY
Qty: 90 TABLET | Refills: 3 | Status: SHIPPED | OUTPATIENT
Start: 2024-04-16

## 2024-04-16 NOTE — TELEPHONE ENCOUNTER
Requested medication(s) are due for refill today: Yes  Patient has already received a courtesy refill: No  Other reason request has been forwarded to provider: pt has an appt with Urszula on Thursday

## 2024-04-17 NOTE — PROGRESS NOTES
Cardiology Follow Up    Whit M Beth  1960  [id number]  [facility] [address] [address] [phone]    1. Primary hypertension        2. PAD (peripheral artery disease) (HCC)        3. Nonocclusive coronary atherosclerosis of native coronary artery        4. ZOË (obstructive sleep apnea)        5. Type 2 diabetes mellitus without complication, with long-term current use of insulin (HCC)        6. Tobacco abuse        7. Hypercholesterolemia            Discussion/Summary:    CAD  LHC performed in 2021 for atypical chest pain showed first Diagonal 50% stenosis and mid RCA 45% stenosis  Today she offers no complaints of chest pain   BP/LDL at goal  Continue aspirin/stain    DM  Hgba1c 9.2 in December 2023  Defer to Endocrine team    Tobacco abuse  Currently smokes a little more than a pack a day  Complete smoking cessation advised    Hyperlipidemia  Lipid panel from December 2023  Triglycerides 219-- likely elevated due to uncontrolled DM  HDL 23  LDL 30  Continue Crestor 40 mg QD    PAD  Known hx of B/L LE PAD with some signs of claudication  Follow with Vascular team    Essential HTN  BP today is 120/64  She had been maintained on carvedilol 3.125 mg BID but she had been having issues with dizziness/lightheadedness and her SBP at home was low 100's so she self-discontinued this med  She has been monitoring her BP at home and notes systolic has not been higher than 120  For now, I will leave her off all antihypertensive medications and she will continue to monitor her blood pressure at home and if systolic blood pressure remains higher than mid 120s she will contact our office  Low sodium diet reinforced    ZOË  Repeat sleep study ordered by Pulmonary team but patient refused as she could not tolerate CPAP in the past    She will return to the office in 6 months to follow up with   [facility]    Interval History:   Whit Campos is a 64 y.o. female with PMH of DM, HLD, tobacco abuse, COPD/emphysema, DVT s/p IVC filter, PAD, breast cancer, CKD, CAD, HTN and ZOË returns to the office today for routine follow up visit.   Today, the patient returns for routine follow-up visit.  She has no acute cardiac complaints.  She is not very active as she is limited in her mobility secondary to chronic bilateral lower extremity pain probably related to PAD.  She does follow with vascular team and they have ordered updated bilateral lower extremity vascular arterial duplex which is due to be completed in the near future.  Secondary to her limited mobility in the setting of claudication she mostly performs activities of daily living around the house and can do so with no complaints of chest discomfort or palpitations.  She has chronic dyspnea with exertion but this is mainly with moderate exertion and this is likely in the setting of smoking.  She states it is chronic and not worsened in severity or frequency at least over the past year.  She has no lower extremity edema orthopnea.  As noted above, she had been having issues with dizziness/lightheadedness while on carvedilol 3.125 mg twice daily.  She has since self discontinued this medication and notes that her dizziness/lightheadedness episodes have mostly resolved.  She does get occasional lightheadedness now with standing but states this is rare.  Her blood pressure today is 120/64.  She does monitor her blood pressure at home since stopping carvedilol and states his systolic blood pressure at its highest is usually 120.  For now, I will leave her off all antihypertensive medications and she will continue to monitor her blood pressure at home.  I told her if her systolic blood pressure remains in the high 120s or higher she should contact our office for further recommendations.  I have asked her to hydrate well.  She does follow a low-sodium diet.  Her  most recent LDL was at goal.  Her diabetes is overall uncontrolled but she will be seeing endocrine in the near future and does admit to eating sugary type foods.  Complete smoking cessation was advised today.  Overall, the patient is stable from a cardiac standpoint and will follow-up in our office in 6 months.    Medical Problems       Problem List       Cataract    Chronic hoarseness    Chronic low back pain    Centrilobular emphysema (HCC)    Overview Signed 4/19/2018  2:35 PM by SHU River     Transitioned From: Chronic Cough         Deep vein thrombophlebitis of leg, unspecified laterality (HCC)    Depression    Diabetes mellitus with neurological manifestation (HCC)    Overview Signed 4/19/2018  2:35 PM by SHU River     Transitioned From: Diabetes Mellitus           Lab Results   Component Value Date    HGBA1C 9.2 (H) 12/21/2023         Gastroesophageal reflux disease with esophagitis    Headache    Hypercholesterolemia    Hypercoagulable state (HCC)    Hypertension    Migraine    Myositis    Neuropathy    Pulmonary nodule seen on imaging study    Type 2 diabetes mellitus without complication, with long-term current use of insulin (HCC)      Lab Results   Component Value Date    HGBA1C 9.2 (H) 12/21/2023         Chronic hypoxemic respiratory failure (HCC)    Class 2 obesity due to excess calories with body mass index (BMI) of 35.0 to 35.9 in adult    Overview Signed 6/23/2021  2:33 PM by Renetta Stephens,      BMI 37.12         ZOË (obstructive sleep apnea)    Family history of heart disease    Pulmonary hypertension (HCC)    Primary fibromyalgia syndrome    Primary cancer of upper outer quadrant of right breast (HCC)    Malignant neoplasm of upper-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, estrogen receptor positive (HCC)    Mucinous carcinoma of breast, right (HCC)    Tobacco abuse    PAD (peripheral artery disease) (HCC)    Tachycardia    Benign hypertension with CKD (chronic kidney  disease) stage III (HCC)    Lab Results   Component Value Date    EGFR 36 12/21/2023    EGFR 44 06/12/2023    EGFR 43 04/04/2023    CREATININE 1.52 (H) 12/21/2023    CREATININE 1.29 06/12/2023    CREATININE 1.31 (H) 04/04/2023         Chronic obstructive pulmonary disease with acute exacerbation (HCC)    Kidney lesion    Nonocclusive coronary atherosclerosis of native coronary artery    Kidney cysts    Hyponatremia    Diarrhea    Presence of IVC filter    History of pulmonary embolism    Chronic respiratory failure with hypoxia (HCC)    Nicotine dependence, uncomplicated    Nocturnal hypoxia    CKD stage 4 due to type 2 diabetes mellitus (HCC)      Lab Results   Component Value Date    HGBA1C 9.2 (H) 12/21/2023         Vitamin D deficiency    Secondary hyperparathyroidism of renal origin (HCC)        Past Medical History:   Diagnosis Date    Cancer (HCC)     right breast    Chronic back pain     Colitis     COPD (chronic obstructive pulmonary disease) (HCC)     Depression     Diabetes mellitus (HCC)     DVT of lower limb, acute (HCC) 2004    GERD (gastroesophageal reflux disease)     Hyperlipidemia     Pneumonia     Rapid heart rate     Sleep apnea     Stroke (HCC)     4 mini strokes     Social History     Socioeconomic History    Marital status: Legally      Spouse name: Not on file    Number of children: Not on file    Years of education: Not on file    Highest education level: Not on file   Occupational History    Not on file   Tobacco Use    Smoking status: Every Day     Current packs/day: 1.00     Types: Cigarettes    Smokeless tobacco: Never   Vaping Use    Vaping status: Never Used   Substance and Sexual Activity    Alcohol use: Never    Drug use: Never    Sexual activity: Not Currently   Other Topics Concern    Not on file   Social History Narrative    Not on file     Social Determinants of Health     Financial Resource Strain: Not on file   Food Insecurity: No Food Insecurity (2/13/2023)     Received from [facility]    Hunger Vital Sign     Worried About Running Out of Food in the Last Year: Never true     Ran Out of Food in the Last Year: Never true   Transportation Needs: No Transportation Needs (1/30/2023)    PRAPARE - Transportation     Lack of Transportation (Medical): No     Lack of Transportation (Non-Medical): No   Physical Activity: Not on file   Stress: Not on file   Social Connections: Not on file   Intimate Partner Violence: Not on file   Housing Stability: Low Risk  (1/30/2023)    Housing Stability Vital Sign     Unable to Pay for Housing in the Last Year: No     Number of Places Lived in the Last Year: 1     Unstable Housing in the Last Year: No      Family History   Problem Relation Age of Onset    Breast cancer Mother 72    COPD Mother     Coronary artery disease Mother     Coronary artery disease Father     Stroke Father     Lung cancer Sister     No Known Problems Sister     No Known Problems Sister     Breast cancer Maternal Grandmother     Colon cancer Paternal Grandfather     No Known Problems Maternal Aunt     No Known Problems Maternal Aunt     No Known Problems Maternal Aunt     No Known Problems Paternal Aunt     Breast cancer Cousin      Past Surgical History:   Procedure Laterality Date    BREAST LUMPECTOMY Right     CARDIAC CATHETERIZATION N/A 10/28/2021    Procedure: Cardiac Coronary Angiogram;  Surgeon: Abdelrahman Jacinto DO;  Location:  CARDIAC CATH LAB;  Service: Cardiology    IVC FILTER INSERTION      MASTECTOMY Right     MASTECTOMY W/ SENTINEL NODE BIOPSY Right 11/09/2021    Procedure: BREAST MASTECTOMY WITH BIOPSY LYMPH NODE SENTINEL and axillary node dissection  (Early Branch Lymph Node Injection @ 12:30);  Surgeon: Jd Dong MD;  Location: [facility];  Service: General    US GUIDANCE BREAST BIOPSY RIGHT EACH ADDITIONAL Right 10/13/2021    US GUIDANCE BREAST BIOPSY RIGHT EACH ADDITIONAL Right 10/13/2021    US GUIDED BREAST BIOPSY RIGHT COMPLETE Right  10/13/2021       Current Outpatient Medications:     albuterol (2.5 mg/3 mL) 0.083 % nebulizer solution, Take 3 mL (2.5 mg total) by nebulization every 6 (six) hours as needed for wheezing or shortness of breath, Disp: 90 mL, Rfl: 2    albuterol (Ventolin HFA) 90 mcg/act inhaler, Inhale 2 puffs every 4 (four) hours as needed for wheezing or shortness of breath, Disp: 18 g, Rfl: 5    amitriptyline (ELAVIL) 100 mg tablet, Take 0.5 tablets (50 mg total) by mouth daily at bedtime, Disp: 45 tablet, Rfl: 1    aspirin (ECOTRIN LOW STRENGTH) 81 mg EC tablet, Take 1 tablet (81 mg total) by mouth daily, Disp: 90 tablet, Rfl: 3    benztropine (COGENTIN) 0.5 mg tablet, Take 0.5 mg by mouth daily at bedtime  , Disp: , Rfl:     Blood Glucose Monitoring Suppl (ONE TOUCH ULTRA 2) w/Device KIT, by Does not apply route daily, Disp: 100 each, Rfl: 0    clonazePAM (KlonoPIN) 0.5 mg tablet, Take 0.5 mg by mouth daily as needed  , Disp: , Rfl:     cloNIDine (CATAPRES) 0.1 mg tablet, Take 0.1 mg by mouth daily as needed, Disp: , Rfl:     Continuous Blood Gluc  (FreeStyle Darius 2 Wingo) ANDERSON, To check blood sugar continuously., Disp: 1 each, Rfl: 0    Continuous Blood Gluc Sensor (FreeStyle Darius 2 Sensor) MISC, Use to check her blood sugars continuously and change it every 2 weeks, Disp: 6 each, Rfl: 0    doxepin (SINEquan) 100 mg capsule, take 1 to 2 capsules by mouth at bedtime if needed, Disp: , Rfl:     gabapentin (Neurontin) 300 mg capsule, Take 1 capsule (300 mg total) by mouth 2 (two) times a day, Disp: 60 capsule, Rfl: 2    glucose blood (OneTouch Ultra) test strip, Use to test blood sugar three times a day, Disp: 100 strip, Rfl: 2    Insulin Glargine Solostar (Lantus SoloStar) 100 UNIT/ML SOPN, Inject 0.1 mL (10 Units total) under the skin 2 (two) times a day, Disp: 18 mL, Rfl: 1    Insulin Pen Needle (Droplet Pen Needles) 32G X 4 MM MISC, use twice a day for INJECTIONS, Disp: 200 each, Rfl: 3    Jardiance 25 MG TABS,  Take 1 tablet (25 mg total) by mouth in the morning, Disp: 90 tablet, Rfl: 0    loperamide (IMODIUM) 2 mg capsule, Take 1 capsule (2 mg total) by mouth 4 (four) times a day as needed for diarrhea, Disp: 30 capsule, Rfl: 0    OneTouch Delica Lancets 33G MISC, Check blood sugars three times daily. Please substitute with appropriate alternative as covered by patient's insurance. Dx: E11.65, Disp: 300 each, Rfl: 3    pantoprazole (PROTONIX) 40 mg tablet, take 1 tablet by mouth once daily, Disp: 90 tablet, Rfl: 1    rosuvastatin (CRESTOR) 40 MG tablet, Take 1 tablet by mouth once daily, Disp: 90 tablet, Rfl: 3    tiotropium (Spiriva Respimat) 2.5 MCG/ACT AERS inhaler, Inhale 2 puffs daily, Disp: 4 g, Rfl: 11    Trulicity 3 MG/0.5ML injection, Inject 3 mg under the skin every 7 days, Disp: 6 mL, Rfl: 0    vilazodone (VIIBRYD) 40 mg tablet, Take 40 mg by mouth daily., Disp: , Rfl:     VRAYLAR 6 MG capsule, Take 6 mg by mouth daily, Disp: , Rfl: 0    carvedilol (COREG) 3.125 mg tablet, Take 1 tablet (3.125 mg total) by mouth 2 (two) times a day with meals (Patient not taking: Reported on 4/18/2024), Disp: 60 tablet, Rfl: 6  Allergies   Allergen Reactions    Amoxicillin-Pot Clavulanate GI Intolerance    Anastrozole Other (See Comments)     Diarrhea, Nausea, Stomach pain        Labs:     Chemistry        Component Value Date/Time     01/08/2016 1130    K 3.8 12/21/2023 1335    K 4.3 01/08/2016 1130     12/21/2023 1335     01/08/2016 1130    CO2 26 12/21/2023 1335    CO2 29.0 01/08/2016 1130    BUN 20 12/21/2023 1335    BUN 8 01/08/2016 1130    CREATININE 1.52 (H) 12/21/2023 1335    CREATININE 0.90 01/08/2016 1130        Component Value Date/Time    CALCIUM 9.7 12/21/2023 1335    CALCIUM 8.8 01/08/2016 1130    ALKPHOS 67 12/21/2023 1335    ALKPHOS 66 11/30/2015 1109    AST 19 12/21/2023 1335    AST 15 11/30/2015 1109    ALT 23 12/21/2023 1335    ALT 39 11/30/2015 1109    BILITOT 0.53 11/30/2015 1109     "        Lab Results   Component Value Date    CHOL 234 09/07/2015    CHOL 250 05/20/2014    CHOL 230 04/24/2014     Lab Results   Component Value Date    HDL 23 (L) 12/21/2023    HDL 31 (L) 06/12/2023    HDL 27 (L) 04/04/2023     Lab Results   Component Value Date    LDLCALC 30 12/21/2023    LDLCALC  06/12/2023      Comment:      Calculated LDL invalid, triglycerides >400 mg/dl    LDLCALC 34 04/04/2023     Lab Results   Component Value Date    TRIG 219 (H) 12/21/2023    TRIG 472 (H) 06/12/2023    TRIG 396 (H) 04/04/2023     No results found for: \"CHOLHDL\"    Imaging: US kidney and bladder    Result Date: 3/28/2024  Narrative: RENAL ULTRASOUND INDICATION: N18.32: Chronic kidney disease, stage 3b. COMPARISON: None TECHNIQUE: Ultrasound of the retroperitoneum was performed with a curvilinear transducer utilizing volumetric sweeps and still imaging techniques. FINDINGS: KIDNEYS: Right kidneys mildly smaller than the left Right kidney: 9.1 x 4.2 x 4.8 cm. Volume 95.6 mL Left kidney: 11.8 x 6.2 x 5.5 cm. Volume 206.7 mL Right kidney Normal echogenicity and contour. No mass is identified. No hydronephrosis. No shadowing calculi. No perinephric fluid collections. Left kidney Normal echogenicity and contour. Mid left renal cyst measuring 15 x 12 x 12 mm No hydronephrosis. No shadowing calculi. No perinephric fluid collections. URETERS: Nonvisualized. BLADDER: Normally distended. No focal thickening or mass lesions. Bilateral ureteral jets detected.     Impression: No hydronephrosis Asymmetry in the size of the kidneys with right kidney smaller than the left measuring 9.1 cm in the in length Simple mid left renal cyst measuring 15 x 12 x 12 mm Workstation performed: [id number]         Review of Systems   Constitutional: Negative for chills, fever and malaise/fatigue.   HENT:  Negative for congestion.    Cardiovascular:  Positive for claudication (B/L LE) and dyspnea on exertion (chronic). Negative for chest pain, leg " "swelling, orthopnea and palpitations.   Respiratory:  Negative for cough, shortness of breath (no SOB at rest) and wheezing.    Endocrine: Negative.    Hematologic/Lymphatic: Negative.    Skin: Negative.    Musculoskeletal: Negative.         B/L LE claudication     Gastrointestinal:  Negative for bloating, abdominal pain, nausea and vomiting.   Genitourinary: Negative.    Neurological:  Positive for light-headedness (occasional with standing). Negative for dizziness.   Psychiatric/Behavioral: Negative.     All other systems reviewed and are negative.      Vitals:    04/18/24 1300   BP: 120/64   Pulse: 100     Vitals:    04/18/24 1300   Weight: 102 kg (225 lb)     Height: 5' 7\" (170.2 cm)   Body mass index is 35.24 kg/m².    Physical Exam:  Physical Exam  Vitals reviewed.   Constitutional:       General: She is not in acute distress.     Appearance: She is obese.   HENT:      Head: Normocephalic and atraumatic.      Mouth/Throat:      Mouth: Mucous membranes are moist.   Cardiovascular:      Rate and Rhythm: Normal rate and regular rhythm.      Heart sounds: Normal heart sounds, S1 normal and S2 normal. No murmur heard.  Pulmonary:      Effort: Pulmonary effort is normal. No respiratory distress.      Breath sounds: Normal breath sounds.   Abdominal:      General: Bowel sounds are normal. There is no distension.      Palpations: Abdomen is soft.   Musculoskeletal:         General: Normal range of motion.      Cervical back: Normal range of motion and neck supple.      Right lower leg: No edema.      Left lower leg: No edema.   Skin:     General: Skin is warm and dry.   Neurological:      Mental Status: She is alert and oriented to person, place, and time.   Psychiatric:         Mood and Affect: Mood normal.         "

## 2024-04-18 ENCOUNTER — OFFICE VISIT (OUTPATIENT)
Dept: CARDIOLOGY CLINIC | Facility: HOSPITAL | Age: 64
End: 2024-04-18
Payer: COMMERCIAL

## 2024-04-18 VITALS
WEIGHT: 225 LBS | HEART RATE: 100 BPM | SYSTOLIC BLOOD PRESSURE: 120 MMHG | BODY MASS INDEX: 35.31 KG/M2 | DIASTOLIC BLOOD PRESSURE: 64 MMHG | HEIGHT: 67 IN

## 2024-04-18 DIAGNOSIS — Z72.0 TOBACCO ABUSE: ICD-10-CM

## 2024-04-18 DIAGNOSIS — E11.9 TYPE 2 DIABETES MELLITUS WITHOUT COMPLICATION, WITH LONG-TERM CURRENT USE OF INSULIN (HCC): ICD-10-CM

## 2024-04-18 DIAGNOSIS — Z79.4 TYPE 2 DIABETES MELLITUS WITHOUT COMPLICATION, WITH LONG-TERM CURRENT USE OF INSULIN (HCC): ICD-10-CM

## 2024-04-18 DIAGNOSIS — I25.10 NONOCCLUSIVE CORONARY ATHEROSCLEROSIS OF NATIVE CORONARY ARTERY: ICD-10-CM

## 2024-04-18 DIAGNOSIS — I73.9 PAD (PERIPHERAL ARTERY DISEASE) (HCC): ICD-10-CM

## 2024-04-18 DIAGNOSIS — I10 PRIMARY HYPERTENSION: Primary | ICD-10-CM

## 2024-04-18 DIAGNOSIS — G47.33 OSA (OBSTRUCTIVE SLEEP APNEA): ICD-10-CM

## 2024-04-18 DIAGNOSIS — E78.00 HYPERCHOLESTEROLEMIA: ICD-10-CM

## 2024-04-18 PROCEDURE — 99214 OFFICE O/P EST MOD 30 MIN: CPT | Performed by: NURSE PRACTITIONER

## 2024-04-18 NOTE — PATIENT INSTRUCTIONS
1.  Keep monitoring your blood pressure.  I would like the top number to be around 124 or less pretty consistently the bottom number should be 90 or less  2.  If your top number is consistently in the high 120s or higher then I want you to call our office   [phone]

## 2024-04-24 ENCOUNTER — OFFICE VISIT (OUTPATIENT)
Dept: ENDOCRINOLOGY | Facility: CLINIC | Age: 64
End: 2024-04-24
Payer: COMMERCIAL

## 2024-04-24 VITALS
OXYGEN SATURATION: 98 % | BODY MASS INDEX: 35.37 KG/M2 | SYSTOLIC BLOOD PRESSURE: 118 MMHG | DIASTOLIC BLOOD PRESSURE: 62 MMHG | WEIGHT: 225.8 LBS | TEMPERATURE: 98.7 F | HEART RATE: 98 BPM

## 2024-04-24 DIAGNOSIS — E55.9 VITAMIN D DEFICIENCY: ICD-10-CM

## 2024-04-24 DIAGNOSIS — I10 PRIMARY HYPERTENSION: ICD-10-CM

## 2024-04-24 DIAGNOSIS — Z79.4 TYPE 2 DIABETES MELLITUS WITHOUT COMPLICATION, WITH LONG-TERM CURRENT USE OF INSULIN (HCC): Primary | ICD-10-CM

## 2024-04-24 DIAGNOSIS — E11.65 TYPE 2 DIABETES MELLITUS WITH HYPERGLYCEMIA, WITHOUT LONG-TERM CURRENT USE OF INSULIN (HCC): ICD-10-CM

## 2024-04-24 DIAGNOSIS — E66.01 CLASS 2 SEVERE OBESITY DUE TO EXCESS CALORIES WITH SERIOUS COMORBIDITY AND BODY MASS INDEX (BMI) OF 35.0 TO 35.9 IN ADULT (HCC): ICD-10-CM

## 2024-04-24 DIAGNOSIS — E78.00 HYPERCHOLESTEROLEMIA: ICD-10-CM

## 2024-04-24 DIAGNOSIS — E11.9 TYPE 2 DIABETES MELLITUS WITHOUT COMPLICATION, WITH LONG-TERM CURRENT USE OF INSULIN (HCC): Primary | ICD-10-CM

## 2024-04-24 LAB — SL AMB POCT HEMOGLOBIN AIC: 9.7 (ref ?–6.5)

## 2024-04-24 PROCEDURE — 99214 OFFICE O/P EST MOD 30 MIN: CPT | Performed by: STUDENT IN AN ORGANIZED HEALTH CARE EDUCATION/TRAINING PROGRAM

## 2024-04-24 PROCEDURE — 3046F HEMOGLOBIN A1C LEVEL >9.0%: CPT | Performed by: SURGERY

## 2024-04-24 PROCEDURE — 83036 HEMOGLOBIN GLYCOSYLATED A1C: CPT | Performed by: STUDENT IN AN ORGANIZED HEALTH CARE EDUCATION/TRAINING PROGRAM

## 2024-04-24 RX ORDER — INSULIN GLARGINE 100 [IU]/ML
12 INJECTION, SOLUTION SUBCUTANEOUS 2 TIMES DAILY
Qty: 24 ML | Refills: 1 | Status: SHIPPED | OUTPATIENT
Start: 2024-04-24 | End: 2024-10-21

## 2024-04-24 NOTE — PROGRESS NOTES
Established patient Progress Note      Cc: diabetes    Referring Provider  No referring provider defined for this encounter.     History of Present Illness:   Whit Campos is a 64 y.o. female with a history of type 2 diabetes with long term use of insulin who presented for follow up.        Reports complications of neuropathy and TIA. Denies recent illness or hospitalizations. Denies recent severe hypoglycemic or severe hyperglycemic episodes. Denies any issues with her current regimen. home glucose monitoring: are performed regularly      Current regimen:   Lantus 10 units twice a day  Trulicity 3 mg once weekly  Jardiance 25 mg once daily    SMBG x 2, blood sugars ranging from in low 200s    Hypoglycemic episodes:   H/o of hypoglycemia causing hospitalization or Intervention such as glucagon injection  or ambulance call : no  Has awareness: yes       Opthamology:overdue: UTD   Podiatry: no     Has hypertension: followed by PCP; not on medication   Has hyperlipidemia: followed by PCP; on crestor 40 mg - tolerating well, no myalgias.     Thyroid disorders: no   History of pancreatitis: no    Patient Active Problem List   Diagnosis    Cataract    Chronic hoarseness    Chronic low back pain    Centrilobular emphysema (HCC)    Deep vein thrombophlebitis of leg, unspecified laterality (HCC)    Depression    Diabetes mellitus with neurological manifestation (HCC)    Gastroesophageal reflux disease with esophagitis    Headache    Hypercholesterolemia    Hypercoagulable state (HCC)    Hypertension    Migraine    Myositis    Neuropathy    Pulmonary nodule seen on imaging study    Type 2 diabetes mellitus without complication, with long-term current use of insulin (HCC)    Chronic hypoxemic respiratory failure (HCC)    Class 2 obesity due to excess calories with body mass index (BMI) of 35.0 to 35.9 in  adult    ZOË (obstructive sleep apnea)    Family history of heart disease    Pulmonary hypertension (HCC)    Primary fibromyalgia syndrome    Primary cancer of upper outer quadrant of right breast (HCC)    Malignant neoplasm of upper-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, estrogen receptor positive (HCC)    Mucinous carcinoma of breast, right (HCC)    Tobacco abuse    PAD (peripheral artery disease) (HCC)    Tachycardia    Benign hypertension with CKD (chronic kidney disease) stage III (HCC)    Chronic obstructive pulmonary disease with acute exacerbation (HCC)    Kidney lesion    Nonocclusive coronary atherosclerosis of native coronary artery    Kidney cysts    Hyponatremia    Diarrhea    Presence of IVC filter    History of pulmonary embolism    Chronic respiratory failure with hypoxia (HCC)    Nicotine dependence, uncomplicated    Nocturnal hypoxia    CKD stage 4 due to type 2 diabetes mellitus (HCC)    Vitamin D deficiency    Secondary hyperparathyroidism of renal origin (HCC)      Past Medical History:   Diagnosis Date    Cancer (HCC)     right breast    Chronic back pain     Colitis     COPD (chronic obstructive pulmonary disease) (HCC)     Depression     Diabetes mellitus (HCC)     DVT of lower limb, acute (HCC) 2004    GERD (gastroesophageal reflux disease)     Hyperlipidemia     Pneumonia     Rapid heart rate     Sleep apnea     Stroke (HCC)     4 mini strokes      Past Surgical History:   Procedure Laterality Date    BREAST LUMPECTOMY Right     CARDIAC CATHETERIZATION N/A 10/28/2021    Procedure: Cardiac Coronary Angiogram;  Surgeon: Abdelrahman Jacinto DO;  Location: [facility];  Service: Cardiology    IVC FILTER INSERTION      MASTECTOMY Right     MASTECTOMY W/ SENTINEL NODE BIOPSY Right 11/09/2021    Procedure: BREAST MASTECTOMY WITH BIOPSY LYMPH NODE SENTINEL and axillary node dissection  ([place] Lymph Node Injection @ 12:30);  Surgeon: Jd Dong MD;  Location: [facility] OR;   Service: General    US GUIDANCE BREAST BIOPSY RIGHT EACH ADDITIONAL Right 10/13/2021    US GUIDANCE BREAST BIOPSY RIGHT EACH ADDITIONAL Right 10/13/2021    US GUIDED BREAST BIOPSY RIGHT COMPLETE Right 10/13/2021      Family History   Problem Relation Age of Onset    Breast cancer Mother 72    COPD Mother     Coronary artery disease Mother     Coronary artery disease Father     Stroke Father     Lung cancer Sister     No Known Problems Sister     No Known Problems Sister     Breast cancer Maternal Grandmother     Colon cancer Paternal Grandfather     No Known Problems Maternal Aunt     No Known Problems Maternal Aunt     No Known Problems Maternal Aunt     No Known Problems Paternal Aunt     Breast cancer Cousin      Social History     Tobacco Use    Smoking status: Every Day     Current packs/day: 1.00     Types: Cigarettes    Smokeless tobacco: Never   Substance Use Topics    Alcohol use: Never     Allergies   Allergen Reactions    Amoxicillin-Pot Clavulanate GI Intolerance    Anastrozole Other (See Comments)     Diarrhea, Nausea, Stomach pain          Current Outpatient Medications:     albuterol (2.5 mg/3 mL) 0.083 % nebulizer solution, Take 3 mL (2.5 mg total) by nebulization every 6 (six) hours as needed for wheezing or shortness of breath, Disp: 90 mL, Rfl: 2    albuterol (Ventolin HFA) 90 mcg/act inhaler, Inhale 2 puffs every 4 (four) hours as needed for wheezing or shortness of breath, Disp: 18 g, Rfl: 5    amitriptyline (ELAVIL) 100 mg tablet, Take 0.5 tablets (50 mg total) by mouth daily at bedtime, Disp: 45 tablet, Rfl: 1    aspirin (ECOTRIN LOW STRENGTH) 81 mg EC tablet, Take 1 tablet (81 mg total) by mouth daily, Disp: 90 tablet, Rfl: 3    benztropine (COGENTIN) 0.5 mg tablet, Take 0.5 mg by mouth daily at bedtime  , Disp: , Rfl:     Blood Glucose Monitoring Suppl (ONE TOUCH ULTRA 2) w/Device KIT, by Does not apply route daily, Disp: 100 each, Rfl: 0    clonazePAM (KlonoPIN) 0.5 mg tablet, Take 0.5 mg  by mouth daily as needed  , Disp: , Rfl:     cloNIDine (CATAPRES) 0.1 mg tablet, Take 0.1 mg by mouth daily as needed, Disp: , Rfl:     doxepin (SINEquan) 100 mg capsule, take 1 to 2 capsules by mouth at bedtime if needed, Disp: , Rfl:     gabapentin (Neurontin) 300 mg capsule, Take 1 capsule (300 mg total) by mouth 2 (two) times a day, Disp: 60 capsule, Rfl: 2    glucose blood (OneTouch Ultra) test strip, Use to test blood sugar three times a day, Disp: 100 strip, Rfl: 2    Insulin Glargine Solostar (Lantus SoloStar) 100 UNIT/ML SOPN, Inject 0.1 mL (10 Units total) under the skin 2 (two) times a day, Disp: 18 mL, Rfl: 1    Insulin Pen Needle (Droplet Pen Needles) 32G X 4 MM MISC, use twice a day for INJECTIONS, Disp: 200 each, Rfl: 3    Jardiance 25 MG TABS, Take 1 tablet (25 mg total) by mouth in the morning, Disp: 90 tablet, Rfl: 0    loperamide (IMODIUM) 2 mg capsule, Take 1 capsule (2 mg total) by mouth 4 (four) times a day as needed for diarrhea, Disp: 30 capsule, Rfl: 0    OneTouch Delica Lancets 33G MISC, Check blood sugars three times daily. Please substitute with appropriate alternative as covered by patient's insurance. Dx: E11.65, Disp: 300 each, Rfl: 3    pantoprazole (PROTONIX) 40 mg tablet, take 1 tablet by mouth once daily, Disp: 90 tablet, Rfl: 1    rosuvastatin (CRESTOR) 40 MG tablet, Take 1 tablet by mouth once daily, Disp: 90 tablet, Rfl: 3    tiotropium (Spiriva Respimat) 2.5 MCG/ACT AERS inhaler, Inhale 2 puffs daily, Disp: 4 g, Rfl: 11    Trulicity 3 MG/0.5ML injection, Inject 3 mg under the skin every 7 days, Disp: 6 mL, Rfl: 0    vilazodone (VIIBRYD) 40 mg tablet, Take 40 mg by mouth daily., Disp: , Rfl:     VRAYLAR 6 MG capsule, Take 6 mg by mouth daily, Disp: , Rfl: 0    carvedilol (COREG) 3.125 mg tablet, Take 1 tablet (3.125 mg total) by mouth 2 (two) times a day with meals (Patient not taking: Reported on 4/18/2024), Disp: 60 tablet, Rfl: 6    Continuous Blood Gluc  (FreeStyle  "Darius 2 [place]) ANDERSON, To check blood sugar continuously. (Patient not taking: Reported on 4/24/2024), Disp: 1 each, Rfl: 0    Continuous Blood Gluc Sensor (FreeStyle Darius 2 Sensor) MISC, Use to check her blood sugars continuously and change it every 2 weeks (Patient not taking: Reported on 4/24/2024), Disp: 6 each, Rfl: 0  Review of Systems   Constitutional:  Positive for fatigue and unexpected weight change. Negative for appetite change.   HENT:  Negative for trouble swallowing and voice change.    Eyes:  Negative for visual disturbance.   Respiratory:  Negative for cough, shortness of breath and wheezing.    Cardiovascular:  Negative for palpitations and leg swelling.   Gastrointestinal:  Negative for abdominal pain, constipation, diarrhea, nausea and vomiting.   Endocrine: Negative for cold intolerance, heat intolerance, polyphagia and polyuria.   Musculoskeletal:  Positive for arthralgias and gait problem.   Skin:  Negative for color change, rash and wound.   Neurological:  Negative for dizziness, tremors, weakness, light-headedness, numbness and headaches.   Psychiatric/Behavioral:  Negative for agitation and sleep disturbance. The patient is not nervous/anxious.        Physical Exam:  Body mass index is 35.37 kg/m².  /62 (BP Location: Left arm, Patient Position: Sitting, Cuff Size: Adult)   Pulse 98   Temp 98.7 °F (37.1 °C) (Tympanic)   Wt 102 kg (225 lb 12.8 oz)   SpO2 98%   BMI 35.37 kg/m²    Wt Readings from Last 3 Encounters:   04/24/24 102 kg (225 lb 12.8 oz)   04/18/24 102 kg (225 lb)   03/19/24 100 kg (221 lb)       GEN: NAD  E/n/m nl facies,   Eyes: no stare or proptosis,   Ab+BS  Neuro: no tremor,   Skin: warm and dry,   Ext:  no edema bilaterally,   Psych: nl mood and affect, no gross lapses in memory      Labs:   No components found for: \"HA1C\"  No components found for: \"GLU\"    Lab Results   Component Value Date    CREATININE 1.52 (H) 12/21/2023    CREATININE 1.29 06/12/2023    " "CREATININE 1.31 (H) 04/04/2023    BUN 20 12/21/2023     01/08/2016    K 3.8 12/21/2023     12/21/2023    CO2 26 12/21/2023     eGFR   Date Value Ref Range Status   12/21/2023 36 ml/min/1.73sq m Final     No components found for: \"MALBCRER\"    Lab Results   Component Value Date    CHOL 234 09/07/2015    HDL 23 (L) 12/21/2023    TRIG 219 (H) 12/21/2023       Lab Results   Component Value Date    ALT 23 12/21/2023    AST 19 12/21/2023    ALKPHOS 67 12/21/2023    BILITOT 0.53 11/30/2015       Lab Results   Component Value Date    FREET4 1.03 04/27/2020       Impression:  1. Type 2 diabetes mellitus without complication, with long-term current use of insulin ([facility])    2. Type 2 diabetes mellitus with hyperglycemia, without long-term current use of insulin ([facility])    3. Primary hypertension    4. Class 2 severe obesity due to excess calories with serious comorbidity and body mass index (BMI) of 35.0 to 35.9 in adult ([facility])    5. Vitamin D deficiency    6. Hypercholesterolemia           Plan:    Problem List Items Addressed This Visit          Cardiovascular and Mediastinum    Hypertension     Controlled, 118/62.  She will continue following up with nephrology.         Relevant Orders    Comprehensive metabolic panel    Albumin / creatinine urine ratio       Endocrine    Type 2 diabetes mellitus without complication, with long-term current use of insulin ([facility]) - Primary       Lab Results   Component Value Date    HGBA1C 9.2 (H) 12/21/2023   Krzysztof presented for late follow-up, her glycemic control has worsened, with A1c of 9.7%, dietary indiscretion, physical inactivity plays a role in uncontrolled diabetes.  Emphasized importance of daily exercise, weight loss, caloric reduction, small meals, consumption of multiple servings of fruits and vegetables and avoidance of concentrated sweets such as juice and soda.   I increased her Trulicity to 4.5 mg weekly.  I increased Lantus to 12 units twice a day.  Will continue " Reed.  She was advised to check her blood sugar 2 times daily in different times and bring her sugar log to next visit.  She is not interested in CGM.  Return back in 3 months   Lab prior to next visit.                Relevant Medications    dulaglutide (Trulicity) 4.5 MG/0.5ML injection    Insulin Glargine Solostar (Lantus SoloStar) 100 UNIT/ML SOPN    Other Relevant Orders    POCT hemoglobin A1c    Comprehensive metabolic panel    Lipid Panel with Direct LDL reflex    Vitamin D 25 hydroxy       Other    Hypercholesterolemia     Continue Crestor.         Class 2 obesity due to excess calories with body mass index (BMI) of 35.0 to 35.9 in adult     Lifestyle modification with regular daily exercise and balanced diet was encouraged.  Trulicity was increased to 4.5 mg weekly.  Next option is to switch her to Mounjaro if she has coverage.         Relevant Orders    Comprehensive metabolic panel    Lipid Panel with Direct LDL reflex    Vitamin D deficiency     Continue vitamin D supplementation.         Relevant Orders    Vitamin D 25 hydroxy     Other Visit Diagnoses       Type 2 diabetes mellitus with hyperglycemia, without long-term current use of insulin (HCC)        Relevant Medications    dulaglutide (Trulicity) 4.5 MG/0.5ML injection    Insulin Glargine Solostar (Lantus SoloStar) 100 UNIT/ML SOPN                  Discussed with the patient and all questioned fully answered. She will call me if any problems arise.    Counseled patient on diagnostic results, prognosis, risk and benefit of treatment options, instruction for management, importance of treatment compliance, Risk  factor reduction and impressions      Lonnie Alvarado MD

## 2024-04-24 NOTE — ASSESSMENT & PLAN NOTE
Lab Results   Component Value Date    HGBA1C 9.2 (H) 12/21/2023   Krzysztof presented for late follow-up, her glycemic control has worsened, with A1c of 9.7%, dietary indiscretion, physical inactivity plays a role in uncontrolled diabetes.  Emphasized importance of daily exercise, weight loss, caloric reduction, small meals, consumption of multiple servings of fruits and vegetables and avoidance of concentrated sweets such as juice and soda.   I increased her Trulicity to 4.5 mg weekly.  I increased Lantus to 12 units twice a day.  Will continue Jardiance.  She was advised to check her blood sugar 2 times daily in different times and bring her sugar log to next visit.  She is not interested in CGM.  Return back in 3 months   Lab prior to next visit.

## 2024-04-24 NOTE — PATIENT INSTRUCTIONS
We increased lantus to 12 units twice a day   We increased Trulucity to 4.5 mg weekly  Continue Jardiance

## 2024-04-24 NOTE — ASSESSMENT & PLAN NOTE
Lifestyle modification with regular daily exercise and balanced diet was encouraged.  Trulicity was increased to 4.5 mg weekly.  Next option is to switch her to Mounjaro if she has coverage.

## 2024-04-26 NOTE — PROGRESS NOTES
Assessment/Plan:    No problem-specific Assessment & Plan notes found for this encounter.       {Assess/PlanSmartLinks:20098}      Subjective:      Patient ID: Whit Campos is a 64 y.o. female.    Patient is coming into the office for discuss removing right breast        Review of Systems      Objective:      There were no vitals taken for this visit.         Physical Exam

## 2024-04-29 ENCOUNTER — TELEPHONE (OUTPATIENT)
Dept: FAMILY MEDICINE CLINIC | Facility: HOME HEALTHCARE | Age: 64
End: 2024-04-29

## 2024-04-29 ENCOUNTER — TELEPHONE (OUTPATIENT)
Age: 64
End: 2024-04-29

## 2024-04-29 ENCOUNTER — OFFICE VISIT (OUTPATIENT)
Dept: SURGERY | Facility: CLINIC | Age: 64
End: 2024-04-29
Payer: COMMERCIAL

## 2024-04-29 VITALS
WEIGHT: 225 LBS | BODY MASS INDEX: 35.31 KG/M2 | HEART RATE: 114 BPM | TEMPERATURE: 97.7 F | HEIGHT: 67 IN | RESPIRATION RATE: 20 BRPM | OXYGEN SATURATION: 95 % | SYSTOLIC BLOOD PRESSURE: 140 MMHG | DIASTOLIC BLOOD PRESSURE: 70 MMHG

## 2024-04-29 DIAGNOSIS — C50.411 PRIMARY CANCER OF UPPER OUTER QUADRANT OF RIGHT BREAST (HCC): ICD-10-CM

## 2024-04-29 DIAGNOSIS — Z85.3 HISTORY OF RIGHT BREAST CANCER: Primary | ICD-10-CM

## 2024-04-29 PROCEDURE — 99214 OFFICE O/P EST MOD 30 MIN: CPT | Performed by: SURGERY

## 2024-04-29 RX ORDER — SODIUM CHLORIDE, SODIUM LACTATE, POTASSIUM CHLORIDE, CALCIUM CHLORIDE 600; 310; 30; 20 MG/100ML; MG/100ML; MG/100ML; MG/100ML
125 INJECTION, SOLUTION INTRAVENOUS CONTINUOUS
OUTPATIENT
Start: 2024-04-29

## 2024-04-29 NOTE — PROGRESS NOTES
Assessment/Plan:    History of right breast cancer  The patient is a pleasant 64-year-old female with past medical history significant for obesity presenting with complaints of chronic pain and discomfort secondary to a gross asymmetry of her chest wall status post right modified radical mastectomy for the treatment of her multifocal stage Ia right breast carcinoma.    Patient has a strong desire to undergo a simple left mastectomy to improve her quality of life.    On physical examination patient is a pleasant well-nourished well-developed female.  Competent reliable as a historian.  She is accompanied by her sister in the exam room today.  Right chest scar smooth and flat.  The left breast is pendulous.  No dominant lumps masses skin changes or nipple retraction is appreciated on today's exam.    I agree that the patient would benefit from an improved quality of life with a simple left mastectomy.    The technical details of the surgery as well as the benefits and risk reviewed.  Specific risks related to anesthesia bleeding infection hematoma formation and wound complications necessitating additional surgical interventions were reviewed.  The cosmetic implications were discussed.    The opportunity for consultation with plastic surgery was discussed with the patient.  She declined that offer.    All questions answered to the satisfaction of the patient and informed consent obtained to proceed.       Diagnoses and all orders for this visit:    History of right breast cancer    Primary cancer of upper outer quadrant of right breast (HCC)          Subjective:      Patient ID: Whit Campos is a 64 y.o. female.    Pt is here to discuss removing her left breast because it causes her to have a unsteady gait and she feels more heavier on the left side. Patient denies hasn't notice any new lumps/bumps, nipple d/c, breast pain/swelling, redness/ irritation or fevers/chills. Last Mammo was in 12/21/23.  "JAMIE Reyes            Review of Systems   Constitutional:  Negative for chills and fever.   HENT:  Negative for ear pain and sore throat.    Eyes:  Negative for pain and visual disturbance.   Respiratory:  Negative for cough and shortness of breath.    Cardiovascular:  Negative for chest pain and palpitations.   Gastrointestinal:  Negative for abdominal pain and vomiting.   Genitourinary:  Negative for dysuria and hematuria.   Musculoskeletal:  Negative for arthralgias and back pain.   Skin:  Negative for color change and rash.   Neurological:  Negative for seizures and syncope.   All other systems reviewed and are negative.        Objective:      /70 (BP Location: Left arm, Patient Position: Sitting, Cuff Size: Standard)   Pulse (!) 114   Temp 97.7 °F (36.5 °C) (Temporal)   Resp 20   Ht 5' 7\" (1.702 m)   Wt 102 kg (225 lb)   SpO2 95%   BMI 35.24 kg/m²          Physical Exam  Constitutional:       Appearance: She is well-developed.   HENT:      Head: Normocephalic and atraumatic.   Eyes:      Conjunctiva/sclera: Conjunctivae normal.      Pupils: Pupils are equal, round, and reactive to light.   Cardiovascular:      Rate and Rhythm: Normal rate and regular rhythm.   Pulmonary:      Effort: Pulmonary effort is normal.      Breath sounds: Normal breath sounds.   Abdominal:      General: Bowel sounds are normal.      Palpations: Abdomen is soft.   Musculoskeletal:         General: Normal range of motion.      Cervical back: Normal range of motion and neck supple.   Skin:     General: Skin is warm and dry.      Comments: Physical examination as described above   Neurological:      Mental Status: She is alert and oriented to person, place, and time.   Psychiatric:         Behavior: Behavior normal.         Thought Content: Thought content normal.         Judgment: Judgment normal.           "

## 2024-04-29 NOTE — ASSESSMENT & PLAN NOTE
The patient is a pleasant 64-year-old female with past medical history significant for obesity presenting with complaints of chronic pain and discomfort secondary to a gross asymmetry of her chest wall status post right modified radical mastectomy for the treatment of her multifocal stage Ia right breast carcinoma.    Patient has a strong desire to undergo a simple left mastectomy to improve her quality of life.    On physical examination patient is a pleasant well-nourished well-developed female.  Competent reliable as a historian.  She is accompanied by her sister in the exam room today.  Right chest scar smooth and flat.  The left breast is pendulous.  No dominant lumps masses skin changes or nipple retraction is appreciated on today's exam.    I agree that the patient would benefit from an improved quality of life with a simple left mastectomy.    The technical details of the surgery as well as the benefits and risk reviewed.  Specific risks related to anesthesia bleeding infection hematoma formation and wound complications necessitating additional surgical interventions were reviewed.  The cosmetic implications were discussed.    The opportunity for consultation with plastic surgery was discussed with the patient.  She declined that offer.    All questions answered to the satisfaction of the patient and informed consent obtained to proceed.

## 2024-05-01 DIAGNOSIS — Z79.4 TYPE 2 DIABETES MELLITUS WITHOUT COMPLICATION, WITH LONG-TERM CURRENT USE OF INSULIN (HCC): ICD-10-CM

## 2024-05-01 DIAGNOSIS — E11.65 TYPE 2 DIABETES MELLITUS WITH HYPERGLYCEMIA, WITHOUT LONG-TERM CURRENT USE OF INSULIN (HCC): ICD-10-CM

## 2024-05-01 DIAGNOSIS — E11.9 DIABETES MELLITUS WITHOUT COMPLICATION (HCC): ICD-10-CM

## 2024-05-01 DIAGNOSIS — E11.9 TYPE 2 DIABETES MELLITUS WITHOUT COMPLICATION, WITH LONG-TERM CURRENT USE OF INSULIN (HCC): ICD-10-CM

## 2024-05-01 RX ORDER — PEN NEEDLE, DIABETIC 32GX 5/32"
NEEDLE, DISPOSABLE MISCELLANEOUS
Qty: 200 EACH | Refills: 1 | Status: SHIPPED | OUTPATIENT
Start: 2024-05-01

## 2024-05-01 RX ORDER — INSULIN GLARGINE 100 [IU]/ML
12 INJECTION, SOLUTION SUBCUTANEOUS 2 TIMES DAILY
Qty: 24 ML | Refills: 0 | Status: CANCELLED | OUTPATIENT
Start: 2024-05-01 | End: 2024-10-28

## 2024-05-01 RX ORDER — BLOOD SUGAR DIAGNOSTIC
STRIP MISCELLANEOUS
Qty: 100 STRIP | Refills: 3 | Status: SHIPPED | OUTPATIENT
Start: 2024-05-01

## 2024-05-01 RX ORDER — BLOOD-GLUCOSE METER
EACH MISCELLANEOUS DAILY
Qty: 1 KIT | Refills: 0 | Status: SHIPPED | OUTPATIENT
Start: 2024-05-01 | End: 2024-05-03

## 2024-05-01 NOTE — TELEPHONE ENCOUNTER
Telephone call from patient that her blood glucose meter is broken so she needs a new one.     Reason for call:   [x] Refill   [] Prior Auth  [] Other:     Office:   [] PCP/Provider -   [x] Specialty/Provider - Endocrinology     Medication:     Insulin Pen Needles 32G X 4 MM MISC. Use twice a day for injections. #200 each     OneTouch Ultra test strips. Use to test blood sugar three times daily. #100 strips     OneTouch Ultra 2 w/ device kit. #1 each     Pharmacy: [id number]  [address] [phone]     Does the patient have enough for 3 days?   [] Yes   [x] No - Send as HP to POD

## 2024-05-03 ENCOUNTER — TELEPHONE (OUTPATIENT)
Dept: FAMILY MEDICINE CLINIC | Facility: HOME HEALTHCARE | Age: 64
End: 2024-05-03

## 2024-05-03 DIAGNOSIS — E11.9 DIABETES MELLITUS WITHOUT COMPLICATION (HCC): ICD-10-CM

## 2024-05-03 RX ORDER — BLOOD-GLUCOSE METER
EACH MISCELLANEOUS DAILY
Qty: 1 KIT | Refills: 0 | Status: SHIPPED | OUTPATIENT
Start: 2024-05-03

## 2024-05-06 ENCOUNTER — TELEPHONE (OUTPATIENT)
Dept: FAMILY MEDICINE CLINIC | Facility: CLINIC | Age: 64
End: 2024-05-06

## 2024-05-06 DIAGNOSIS — Z72.0 TOBACCO ABUSE: Primary | ICD-10-CM

## 2024-05-07 DIAGNOSIS — Z79.4 TYPE 2 DIABETES MELLITUS WITHOUT COMPLICATION, WITH LONG-TERM CURRENT USE OF INSULIN (HCC): ICD-10-CM

## 2024-05-07 DIAGNOSIS — E11.9 TYPE 2 DIABETES MELLITUS WITHOUT COMPLICATION, WITH LONG-TERM CURRENT USE OF INSULIN (HCC): ICD-10-CM

## 2024-05-12 DIAGNOSIS — G89.29 OTHER CHRONIC PAIN: ICD-10-CM

## 2024-05-14 RX ORDER — GABAPENTIN 300 MG/1
300 CAPSULE ORAL 2 TIMES DAILY
Qty: 60 CAPSULE | Refills: 2 | Status: SHIPPED | OUTPATIENT
Start: 2024-05-14

## 2024-05-16 ENCOUNTER — HOSPITAL ENCOUNTER (OUTPATIENT)
Dept: NON INVASIVE DIAGNOSTICS | Facility: HOSPITAL | Age: 64
Discharge: HOME | End: 2024-05-16
Payer: COMMERCIAL

## 2024-05-16 DIAGNOSIS — N18.32 STAGE 3B CHRONIC KIDNEY DISEASE (HCC): ICD-10-CM

## 2024-05-16 PROCEDURE — 93975 VASCULAR STUDY: CPT

## 2024-05-17 ENCOUNTER — HOSPITAL ENCOUNTER (OUTPATIENT)
Dept: RADIOLOGY | Facility: HOSPITAL | Age: 64
Discharge: HOME | End: 2024-05-17
Attending: SURGERY
Payer: COMMERCIAL

## 2024-05-17 ENCOUNTER — HOSPITAL ENCOUNTER (OUTPATIENT)
Dept: NON INVASIVE DIAGNOSTICS | Facility: HOSPITAL | Age: 64
Discharge: HOME | End: 2024-05-17
Payer: COMMERCIAL

## 2024-05-17 ENCOUNTER — TELEPHONE (OUTPATIENT)
Dept: FAMILY MEDICINE CLINIC | Facility: CLINIC | Age: 64
End: 2024-05-17

## 2024-05-17 ENCOUNTER — OFFICE VISIT (OUTPATIENT)
Dept: LAB | Facility: HOSPITAL | Age: 64
End: 2024-05-17
Attending: SURGERY
Payer: COMMERCIAL

## 2024-05-17 DIAGNOSIS — Z85.3 HISTORY OF RIGHT BREAST CANCER: ICD-10-CM

## 2024-05-17 DIAGNOSIS — I73.9 PAD (PERIPHERAL ARTERY DISEASE) (HCC): ICD-10-CM

## 2024-05-17 DIAGNOSIS — C50.411 PRIMARY CANCER OF UPPER OUTER QUADRANT OF RIGHT BREAST (HCC): ICD-10-CM

## 2024-05-17 LAB
ATRIAL RATE: 93 BPM
P AXIS: 62 DEGREES
PR INTERVAL: 150 MS
QRS AXIS: 30 DEGREES
QRSD INTERVAL: 88 MS
QT INTERVAL: 360 MS
QTC INTERVAL: 447 MS
T WAVE AXIS: 44 DEGREES
VENTRICULAR RATE: 93 BPM

## 2024-05-17 PROCEDURE — 93925 LOWER EXTREMITY STUDY: CPT

## 2024-05-17 PROCEDURE — 93975 VASCULAR STUDY: CPT | Performed by: SURGERY

## 2024-05-17 PROCEDURE — 71046 X-RAY EXAM CHEST 2 VIEWS: CPT

## 2024-05-17 PROCEDURE — 93923 UPR/LXTR ART STDY 3+ LVLS: CPT

## 2024-05-17 PROCEDURE — 93005 ELECTROCARDIOGRAM TRACING: CPT

## 2024-05-17 PROCEDURE — 93010 ELECTROCARDIOGRAM REPORT: CPT | Performed by: INTERNAL MEDICINE

## 2024-05-20 ENCOUNTER — TELEPHONE (OUTPATIENT)
Dept: NEPHROLOGY | Facility: CLINIC | Age: 64
End: 2024-05-20

## 2024-05-20 PROCEDURE — 93925 LOWER EXTREMITY STUDY: CPT | Performed by: SURGERY

## 2024-05-20 PROCEDURE — 93922 UPR/L XTREMITY ART 2 LEVELS: CPT | Performed by: SURGERY

## 2024-05-20 NOTE — TELEPHONE ENCOUNTER
----- Message from SHU Weber sent at 5/20/2024  7:17 AM EDT -----  Blood flow is appropriate to both kidneys and gut. Will be discussed in detail when reviewed at upcoming appt with nephrologist

## 2024-05-23 NOTE — PROGRESS NOTES
Ambulatory Visit  Name: Whit Campos      : 1960      MRN: [id number]  Encounter Provider: Edel Guidry MD  Encounter Date: 2024   Encounter department: [facility]    Assessment & Plan   1. Preop examination  Comments:  Has a strong desire to undergo simple left mastectomy to improve her quality of life.  No cancer concerns on the left side  2. Malignant neoplasm of upper-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, estrogen receptor positive (HCC)  Comments:  Status post right modified radical mastectomy  3. Primary cancer of upper outer quadrant of right breast (HCC)  -     Ambulatory referral to Family Practice  4. History of right breast cancer  Comments:  s/p  p right modified radical mastectomy  Orders:  -     Ambulatory referral to Family Practice  5. Type 2 diabetes mellitus with stage 3b chronic kidney disease, with long-term current use of insulin (HCC)  Comments:  Uncontrolled diabetes following up with Endo A1c 9.7  Lantus was increased by Endo Trulicity increased by Endo  Has upcoming appointment 2024 w/ endo        Deemed high risk for surgical complications and medically not optimized from family medicine standpoint for left mastectomy in the setting of uncontrolled diabetes.       History of Present Illness     HPI      She is here for preop clearance.  She is a new patient to me.    Patient is here for preop clearance for left mastectomy to improve her quality of life.      status post right modified radical mastectomy for the treatment of her multifocal stage Ia right breast carcinoma.  No concern of cancer on left side.      unfortunately she is not optimized for surgery due to uncontrolled diabetes.  Her A1c is 9.7 which was done  she is following up with endocrinology Dr. Lonnie Alvarado who increased her insulin dosage and increase her Trulicity.  Patient reports that she has not been taking her increased Trulicity since pharmacy  does not have medication.  Asked patient to call her endocrinologist and informed about the problem.  Discussed in detail of uncontrolled diabetes and how it can affect her post op healing process.  Patient verbalized understanding.    Patient has upcoming appointment with her endocrinology July 26, 2024.  Asked her to call to see if she can be seen earlier.  POCT hemoglobin A1c  Order: [id number]   Status: Final result       Visible to patient: No (inaccessible in [facility])       Next appt: 06/14/2024 at 11:30 AM in General Surgery (Margarito Curry PA-C)       Dx: Type 2 diabetes mellitus without comp...    0 Result Notes       1  Topic            Component  Ref Range & Units 4/24/24  3:08 PM 12/21/23  1:35 PM 6/12/23  9:34 AM 4/4/23  3:57 PM 2/15/23  3:02 PM 11/8/22  3:12 PM 11/8/22  2:49 PM   Hemoglobin A1C  6.5 9.7 Abnormal  9.2 High  R 6.8 High  R 9.0 High  R 12.0 Abnormal  11.7 High  R 13.0 Abnormal             Review of Systems   Constitutional:  Negative for chills, fever and unexpected weight change.   HENT:  Negative for congestion, ear discharge, ear pain and sore throat.    Eyes:  Negative for pain and visual disturbance.   Respiratory:  Negative for cough and shortness of breath.    Cardiovascular:  Negative for chest pain and palpitations.   Gastrointestinal:  Negative for abdominal distention, abdominal pain and vomiting.   Genitourinary:  Negative for dysuria and hematuria.   Skin:  Negative for color change and rash.   Neurological:  Negative for seizures and syncope.   All other systems reviewed and are negative.    Medical History Reviewed by provider this encounter:       Current Outpatient Medications on File Prior to Visit   Medication Sig Dispense Refill    albuterol (2.5 mg/3 mL) 0.083 % nebulizer solution Take 3 mL (2.5 mg total) by nebulization every 6 (six) hours as needed for wheezing or shortness of breath 90 mL 2    amitriptyline (ELAVIL) 100 mg tablet Take 0.5  tablets (50 mg total) by mouth daily at bedtime 45 tablet 1    aspirin (ECOTRIN LOW STRENGTH) 81 mg EC tablet Take 1 tablet (81 mg total) by mouth daily 90 tablet 3    Blood Glucose Monitoring Suppl (ONE TOUCH ULTRA 2) w/Device KIT Use daily 1 kit 0    clonazePAM (KlonoPIN) 0.5 mg tablet Take 0.5 mg by mouth daily as needed        cloNIDine (CATAPRES) 0.1 mg tablet Take 0.1 mg by mouth daily as needed      doxepin (SINEquan) 100 mg capsule take 1 to 2 capsules by mouth at bedtime if needed      gabapentin (NEURONTIN) 300 mg capsule take 1 capsule by mouth twice a day 60 capsule 2    glucose blood (OneTouch Ultra) test strip Use to test blood sugar three times a day 100 strip 3    Insulin Glargine Solostar (Lantus SoloStar) 100 UNIT/ML SOPN Inject 0.12 mL (12 Units total) under the skin 2 (two) times a day 24 mL 1    Insulin Pen Needle (Droplet Pen Needles) 32G X 4 MM MISC use twice a day for INJECTIONS 200 each 1    Jardiance 25 MG TABS Take 1 tablet (25 mg total) by mouth in the morning 90 tablet 0    loperamide (IMODIUM) 2 mg capsule Take 1 capsule (2 mg total) by mouth 4 (four) times a day as needed for diarrhea 30 capsule 0    nicotine polacrilex (COMMIT) 4 MG lozenge Apply 1 lozenge (4 mg total) to the mouth or throat as needed for smoking cessation 100 each 0    OneTouch Delica Lancets 33G MISC Check blood sugars three times daily. Please substitute with appropriate alternative as covered by patient's insurance. Dx: E11.65 300 each 3    pantoprazole (PROTONIX) 40 mg tablet take 1 tablet by mouth once daily 90 tablet 1    rosuvastatin (CRESTOR) 40 MG tablet Take 1 tablet by mouth once daily 90 tablet 3    tiotropium (Spiriva Respimat) 2.5 MCG/ACT AERS inhaler Inhale 2 puffs daily 4 g 11    vilazodone (VIIBRYD) 40 mg tablet Take 40 mg by mouth daily.      VRAYLAR 6 MG capsule Take 6 mg by mouth daily  0    albuterol (Ventolin HFA) 90 mcg/act inhaler Inhale 2 puffs every 4 (four) hours as needed for wheezing or  shortness of breath 18 g 5    benztropine (COGENTIN) 0.5 mg tablet Take 0.5 mg by mouth daily at bedtime   (Patient not taking: Reported on 5/3/2024)      carvedilol (COREG) 3.125 mg tablet Take 1 tablet (3.125 mg total) by mouth 2 (two) times a day with meals (Patient not taking: Reported on 4/18/2024) 60 tablet 6    Continuous Blood Gluc  (FreeStyle Darius 2 [place]) ANDERSON To check blood sugar continuously. (Patient not taking: Reported on 4/24/2024) 1 each 0    Continuous Blood Gluc Sensor (FreeStyle Darius 2 Sensor) MISC Use to check her blood sugars continuously and change it every 2 weeks (Patient not taking: Reported on 4/24/2024) 6 each 0     No current facility-administered medications on file prior to visit.      Social History     Tobacco Use    Smoking status: Every Day     Current packs/day: 1.00     Types: Cigarettes    Smokeless tobacco: Never   Vaping Use    Vaping status: Never Used   Substance and Sexual Activity    Alcohol use: Never    Drug use: Never    Sexual activity: Not Currently     Objective     /75   Pulse 99   Temp 97.8 °F (36.6 °C)   Resp 18   Wt 102 kg (224 lb 9.6 oz)   SpO2 96%   BMI 35.18 kg/m²     Physical Exam  Vitals and nursing note reviewed.   Constitutional:       General: She is not in acute distress.     Appearance: She is well-developed. She is obese.   HENT:      Head: Normocephalic and atraumatic.   Eyes:      Conjunctiva/sclera: Conjunctivae normal.   Cardiovascular:      Rate and Rhythm: Normal rate and regular rhythm.      Heart sounds: No murmur heard.  Pulmonary:      Effort: Pulmonary effort is normal. No respiratory distress.      Breath sounds: Normal breath sounds.   Abdominal:      Palpations: Abdomen is soft.      Tenderness: There is no abdominal tenderness.   Musculoskeletal:         General: No swelling.      Cervical back: Neck supple.   Skin:     General: Skin is warm and dry.      Capillary Refill: Capillary refill takes less than 2  seconds.   Neurological:      Mental Status: She is alert.   Psychiatric:         Mood and Affect: Mood normal.       Administrative Statements   I have spent a total time of 30 minutes on 05/28/24 In caring for this patient including Diagnostic results, Prognosis, Risks and benefits of tx options, Impressions, Reviewing / ordering tests, medicine, procedures  , and Obtaining or reviewing history  .

## 2024-05-24 ENCOUNTER — CONSULT (OUTPATIENT)
Dept: FAMILY MEDICINE CLINIC | Facility: HOME HEALTHCARE | Age: 64
End: 2024-05-24
Payer: COMMERCIAL

## 2024-05-24 ENCOUNTER — TELEPHONE (OUTPATIENT)
Age: 64
End: 2024-05-24

## 2024-05-24 VITALS
DIASTOLIC BLOOD PRESSURE: 75 MMHG | BODY MASS INDEX: 35.18 KG/M2 | WEIGHT: 224.6 LBS | OXYGEN SATURATION: 96 % | HEART RATE: 99 BPM | SYSTOLIC BLOOD PRESSURE: 117 MMHG | TEMPERATURE: 97.8 F | RESPIRATION RATE: 18 BRPM

## 2024-05-24 DIAGNOSIS — E11.22 TYPE 2 DIABETES MELLITUS WITH STAGE 3B CHRONIC KIDNEY DISEASE, WITH LONG-TERM CURRENT USE OF INSULIN (HCC): ICD-10-CM

## 2024-05-24 DIAGNOSIS — E11.9 TYPE 2 DIABETES MELLITUS WITHOUT COMPLICATION, WITH LONG-TERM CURRENT USE OF INSULIN (HCC): Primary | ICD-10-CM

## 2024-05-24 DIAGNOSIS — Z85.3 HISTORY OF RIGHT BREAST CANCER: ICD-10-CM

## 2024-05-24 DIAGNOSIS — Z79.4 TYPE 2 DIABETES MELLITUS WITH STAGE 3B CHRONIC KIDNEY DISEASE, WITH LONG-TERM CURRENT USE OF INSULIN (HCC): ICD-10-CM

## 2024-05-24 DIAGNOSIS — C50.211 MALIGNANT NEOPLASM OF UPPER-INNER QUADRANT OF RIGHT BREAST IN FEMALE, ESTROGEN RECEPTOR POSITIVE (HCC): ICD-10-CM

## 2024-05-24 DIAGNOSIS — N18.32 TYPE 2 DIABETES MELLITUS WITH STAGE 3B CHRONIC KIDNEY DISEASE, WITH LONG-TERM CURRENT USE OF INSULIN (HCC): ICD-10-CM

## 2024-05-24 DIAGNOSIS — C50.411 PRIMARY CANCER OF UPPER OUTER QUADRANT OF RIGHT BREAST (HCC): ICD-10-CM

## 2024-05-24 DIAGNOSIS — Z79.4 TYPE 2 DIABETES MELLITUS WITHOUT COMPLICATION, WITH LONG-TERM CURRENT USE OF INSULIN (HCC): Primary | ICD-10-CM

## 2024-05-24 DIAGNOSIS — Z01.818 PREOP EXAMINATION: Primary | ICD-10-CM

## 2024-05-24 DIAGNOSIS — Z17.0 MALIGNANT NEOPLASM OF UPPER-INNER QUADRANT OF RIGHT BREAST IN FEMALE, ESTROGEN RECEPTOR POSITIVE (HCC): ICD-10-CM

## 2024-05-24 PROCEDURE — 99213 OFFICE O/P EST LOW 20 MIN: CPT

## 2024-05-24 NOTE — TELEPHONE ENCOUNTER
Patient calling to let Dr. Alvarado know she unable to obtain Trulicity 4.5mg d/t shortage. She is worried about not having the medication and her A1C being at 9. She does not think she will be able to have the surgery. She would like recommendations. Please advise.

## 2024-05-28 ENCOUNTER — APPOINTMENT (OUTPATIENT)
Dept: LAB | Facility: HOSPITAL | Age: 64
End: 2024-05-28
Payer: COMMERCIAL

## 2024-05-28 DIAGNOSIS — N18.32 STAGE 3B CHRONIC KIDNEY DISEASE (HCC): ICD-10-CM

## 2024-05-28 DIAGNOSIS — E55.9 VITAMIN D DEFICIENCY: ICD-10-CM

## 2024-05-28 DIAGNOSIS — E11.9 TYPE 2 DIABETES MELLITUS WITHOUT COMPLICATION, WITH LONG-TERM CURRENT USE OF INSULIN (HCC): ICD-10-CM

## 2024-05-28 DIAGNOSIS — N25.81 SECONDARY HYPERPARATHYROIDISM OF RENAL ORIGIN (HCC): ICD-10-CM

## 2024-05-28 DIAGNOSIS — I10 PRIMARY HYPERTENSION: ICD-10-CM

## 2024-05-28 DIAGNOSIS — Z79.4 TYPE 2 DIABETES MELLITUS WITHOUT COMPLICATION, WITH LONG-TERM CURRENT USE OF INSULIN (HCC): ICD-10-CM

## 2024-05-28 DIAGNOSIS — E66.01 CLASS 2 SEVERE OBESITY DUE TO EXCESS CALORIES WITH SERIOUS COMORBIDITY AND BODY MASS INDEX (BMI) OF 35.0 TO 35.9 IN ADULT (HCC): ICD-10-CM

## 2024-05-28 LAB
25(OH)D3 SERPL-MCNC: 28.4 NG/ML (ref 30–100)
ALBUMIN SERPL BCP-MCNC: 4.3 G/DL (ref 3.5–5)
ALP SERPL-CCNC: 71 U/L (ref 34–104)
ALT SERPL W P-5'-P-CCNC: 14 U/L (ref 7–52)
ANION GAP SERPL CALCULATED.3IONS-SCNC: 10 MMOL/L (ref 4–13)
AST SERPL W P-5'-P-CCNC: 16 U/L (ref 13–39)
BACTERIA UR QL AUTO: ABNORMAL /HPF
BASOPHILS # BLD AUTO: 0 THOUSANDS/ÂΜL (ref 0–0.1)
BASOPHILS NFR BLD AUTO: 0 % (ref 0–1)
BILIRUB SERPL-MCNC: 0.34 MG/DL (ref 0.2–1)
BILIRUB UR QL STRIP: NEGATIVE
BUN SERPL-MCNC: 15 MG/DL (ref 5–25)
CALCIUM SERPL-MCNC: 10.1 MG/DL (ref 8.4–10.2)
CHLORIDE SERPL-SCNC: 99 MMOL/L (ref 96–108)
CHOLEST SERPL-MCNC: 132 MG/DL
CLARITY UR: CLEAR
CO2 SERPL-SCNC: 29 MMOL/L (ref 21–32)
COLOR UR: ABNORMAL
CREAT SERPL-MCNC: 1.61 MG/DL (ref 0.6–1.3)
CREAT UR-MCNC: 36.2 MG/DL
EOSINOPHIL # BLD AUTO: 0.01 THOUSAND/ÂΜL (ref 0–0.61)
EOSINOPHIL NFR BLD AUTO: 0 % (ref 0–6)
ERYTHROCYTE [DISTWIDTH] IN BLOOD BY AUTOMATED COUNT: 14 % (ref 11.6–15.1)
GFR SERPL CREATININE-BSD FRML MDRD: 33 ML/MIN/1.73SQ M
GLUCOSE P FAST SERPL-MCNC: 170 MG/DL (ref 65–99)
GLUCOSE UR STRIP-MCNC: ABNORMAL MG/DL
HCT VFR BLD AUTO: 42.3 % (ref 34.8–46.1)
HDLC SERPL-MCNC: 29 MG/DL
HGB BLD-MCNC: 13.4 G/DL (ref 11.5–15.4)
HGB UR QL STRIP.AUTO: NEGATIVE
IMM GRANULOCYTES # BLD AUTO: 0.04 THOUSAND/UL (ref 0–0.2)
IMM GRANULOCYTES NFR BLD AUTO: 1 % (ref 0–2)
KETONES UR STRIP-MCNC: NEGATIVE MG/DL
LDLC SERPL CALC-MCNC: 26 MG/DL (ref 0–100)
LEUKOCYTE ESTERASE UR QL STRIP: NEGATIVE
LYMPHOCYTES # BLD AUTO: 1.57 THOUSANDS/ÂΜL (ref 0.6–4.47)
LYMPHOCYTES NFR BLD AUTO: 25 % (ref 14–44)
MAGNESIUM SERPL-MCNC: 2 MG/DL (ref 1.9–2.7)
MCH RBC QN AUTO: 29.5 PG (ref 26.8–34.3)
MCHC RBC AUTO-ENTMCNC: 31.7 G/DL (ref 31.4–37.4)
MCV RBC AUTO: 93 FL (ref 82–98)
MICROALBUMIN UR-MCNC: 34 MG/L
MICROALBUMIN/CREAT 24H UR: 94 MG/G CREATININE (ref 0–30)
MONOCYTES # BLD AUTO: 0.42 THOUSAND/ÂΜL (ref 0.17–1.22)
MONOCYTES NFR BLD AUTO: 7 % (ref 4–12)
NEUTROPHILS # BLD AUTO: 4.22 THOUSANDS/ÂΜL (ref 1.85–7.62)
NEUTS SEG NFR BLD AUTO: 67 % (ref 43–75)
NITRITE UR QL STRIP: NEGATIVE
NON-SQ EPI CELLS URNS QL MICRO: ABNORMAL /HPF
NRBC BLD AUTO-RTO: 0 /100 WBCS
PH UR STRIP.AUTO: 6 [PH]
PHOSPHATE SERPL-MCNC: 3.7 MG/DL (ref 2.3–4.1)
PLATELET # BLD AUTO: 201 THOUSANDS/UL (ref 149–390)
PMV BLD AUTO: 9.3 FL (ref 8.9–12.7)
POTASSIUM SERPL-SCNC: 3.8 MMOL/L (ref 3.5–5.3)
PROT SERPL-MCNC: 7 G/DL (ref 6.4–8.4)
PROT UR STRIP-MCNC: ABNORMAL MG/DL
PTH-INTACT SERPL-MCNC: 23.2 PG/ML (ref 12–88)
RBC # BLD AUTO: 4.55 MILLION/UL (ref 3.81–5.12)
RBC #/AREA URNS AUTO: ABNORMAL /HPF
SODIUM SERPL-SCNC: 138 MMOL/L (ref 135–147)
SP GR UR STRIP.AUTO: 1.01 (ref 1–1.03)
TRIGL SERPL-MCNC: 384 MG/DL
UROBILINOGEN UR STRIP-ACNC: <2 MG/DL
WBC # BLD AUTO: 6.26 THOUSAND/UL (ref 4.31–10.16)
WBC #/AREA URNS AUTO: ABNORMAL /HPF

## 2024-05-28 PROCEDURE — 83970 ASSAY OF PARATHORMONE: CPT

## 2024-05-28 PROCEDURE — 36415 COLL VENOUS BLD VENIPUNCTURE: CPT

## 2024-05-28 PROCEDURE — 82043 UR ALBUMIN QUANTITATIVE: CPT

## 2024-05-28 PROCEDURE — 83735 ASSAY OF MAGNESIUM: CPT

## 2024-05-28 PROCEDURE — 81001 URINALYSIS AUTO W/SCOPE: CPT

## 2024-05-28 PROCEDURE — 82570 ASSAY OF URINE CREATININE: CPT

## 2024-05-28 PROCEDURE — 80061 LIPID PANEL: CPT

## 2024-05-28 PROCEDURE — 82306 VITAMIN D 25 HYDROXY: CPT

## 2024-05-28 PROCEDURE — 80053 COMPREHEN METABOLIC PANEL: CPT

## 2024-05-28 PROCEDURE — 85025 COMPLETE CBC W/AUTO DIFF WBC: CPT

## 2024-05-28 PROCEDURE — 84100 ASSAY OF PHOSPHORUS: CPT

## 2024-05-29 ENCOUNTER — TELEPHONE (OUTPATIENT)
Dept: SURGERY | Facility: CLINIC | Age: 64
End: 2024-05-29

## 2024-05-29 DIAGNOSIS — E11.65 TYPE 2 DIABETES MELLITUS WITH HYPERGLYCEMIA, WITHOUT LONG-TERM CURRENT USE OF INSULIN (HCC): ICD-10-CM

## 2024-05-29 NOTE — TELEPHONE ENCOUNTER
----- Message from Jd Dong MD sent at 5/28/2024  1:00 PM EDT -----  Regarding: Cancel surgery  Hi Adrianna,  Patient not medically stable for surgery.  I spoke to the patient.  Explained the operation is not likely to take place before the end of summer to allow Dr. Shepard time to get her diabetes under control.  Patient also needs to be abstinent from smoking for a month prior to surgery.  Please reschedule patient for office appointment in early September.  Thanks,  Sukhjinder  ----- Message -----  From: Edel Guidry MD  Sent: 5/28/2024  10:59 AM EDT  To: Jd Dong MD; Ruy Nguyen PA-C

## 2024-05-29 NOTE — TELEPHONE ENCOUNTER
Spoke with the patient to make her aware that we are cancelling her procedure for tomorrow 05/30/2024  that she would need to get her diabetes under control and stop smoking.  Patient is aware and is agreeable to stop smoking.  An appointment was made or late September for a follow up appointment.

## 2024-05-30 RX ORDER — EMPAGLIFLOZIN 25 MG/1
TABLET, FILM COATED ORAL
Qty: 90 TABLET | Refills: 0 | Status: SHIPPED | OUTPATIENT
Start: 2024-05-30

## 2024-05-30 NOTE — TELEPHONE ENCOUNTER
I called the patient to schedule her for a appointment with GI, and this patient stated she does not want a appointment with GI at this time.    Her script for the Jardiance she stated should go to ::    Lonnie Alvarado MD  [address], [place] [address]  Phone: [phone]   Fax: [phone]

## 2024-06-01 DIAGNOSIS — E11.65 TYPE 2 DIABETES MELLITUS WITH HYPERGLYCEMIA, WITHOUT LONG-TERM CURRENT USE OF INSULIN (HCC): ICD-10-CM

## 2024-06-01 RX ORDER — INSULIN GLARGINE 100 [IU]/ML
INJECTION, SOLUTION SUBCUTANEOUS
Qty: 30 ML | Refills: 1 | Status: SHIPPED | OUTPATIENT
Start: 2024-06-01

## 2024-06-03 ENCOUNTER — NURSE TRIAGE (OUTPATIENT)
Dept: VASCULAR SURGERY | Facility: CLINIC | Age: 64
End: 2024-06-03

## 2024-06-03 NOTE — TELEPHONE ENCOUNTER
Called patient to discuss pain bilateral lower extremities .  Per patient she said michael Villa in Dec 2023 and had an CAESAR done in May 2024 which showed normal STEFANI and no change from previous doppler study.  Patient is calling to move up her appt in [place] with Michael Villa because she has bilateral pain , not related to activity .  The pain is not typical of claudication, the pain is in thigh , both front and back and again same as when at rest.   Will see if scheduling has sooner opening at [place] office.

## 2024-06-03 NOTE — TELEPHONE ENCOUNTER
Regarding: Bilateral leg pain - worsening  ----- Message from Argelia MARES sent at 6/3/2024  9:27 AM EDT -----  Patient wants a sooner appointment due to worsening pain in both of her legs from the knees up. Current appointment scheduled for 7/17/24.

## 2024-06-04 ENCOUNTER — VBI (OUTPATIENT)
Dept: ADMINISTRATIVE | Facility: OTHER | Age: 64
End: 2024-06-04

## 2024-07-02 ENCOUNTER — TELEPHONE (OUTPATIENT)
Dept: SURGERY | Facility: CLINIC | Age: 64
End: 2024-07-02

## 2024-07-02 NOTE — TELEPHONE ENCOUNTER
Spoke with the patient in regards to rescheduling her mastectomy surgery. I stated that Dr. Dong would like her to stop smoking at least a month in before her procedure and also to make sure her diabetes in under control.   We moved her appointment up to the end of August.

## 2024-07-03 ENCOUNTER — OFFICE VISIT (OUTPATIENT)
Dept: FAMILY MEDICINE CLINIC | Facility: HOME HEALTHCARE | Age: 64
End: 2024-07-03
Payer: COMMERCIAL

## 2024-07-03 ENCOUNTER — TELEPHONE (OUTPATIENT)
Dept: FAMILY MEDICINE CLINIC | Facility: HOME HEALTHCARE | Age: 64
End: 2024-07-03

## 2024-07-03 VITALS
SYSTOLIC BLOOD PRESSURE: 110 MMHG | OXYGEN SATURATION: 90 % | RESPIRATION RATE: 18 BRPM | BODY MASS INDEX: 34.14 KG/M2 | TEMPERATURE: 98 F | WEIGHT: 218 LBS | HEART RATE: 92 BPM | DIASTOLIC BLOOD PRESSURE: 62 MMHG

## 2024-07-03 DIAGNOSIS — Z71.6 ENCOUNTER FOR SMOKING CESSATION COUNSELING: Primary | ICD-10-CM

## 2024-07-03 PROCEDURE — T1015 CLINIC SERVICE: HCPCS | Performed by: FAMILY MEDICINE

## 2024-07-03 RX ORDER — NICOTINE 21 MG/24HR
1 PATCH, TRANSDERMAL 24 HOURS TRANSDERMAL EVERY 24 HOURS
Qty: 28 PATCH | Refills: 0 | Status: SHIPPED | OUTPATIENT
Start: 2024-07-03

## 2024-07-03 NOTE — PROGRESS NOTES
Ambulatory Visit  Name: Whit Campos      : 1960      MRN: [id number]  Encounter Provider: Prosper Bower DO  Encounter Date: 7/3/2024   Encounter department: [facility]    Assessment & Plan   1. Encounter for smoking cessation counseling  Comments:  chronic smoker with ~70.5 pack year history. Pt tried chantix and lozenge before for quitting but resume smoking soon after. Patient is ready to quit again. Her goal is to quit completely. No hx of MI, seizure before.  - trial 4mg nicotine gum with nicotine patch 14mg/24hr for 4 weeks, f/u in 4-6 weeks  - will perform physical with pap smear next visit, labs done in 2024  - diabetic foot check in 4-6 weeks  Orders:  -     nicotine (NICODERM CQ) 14 mg/24hr TD 24 hr patch; Place 1 patch on the skin over 24 hours every 24 hours  -     nicotine polacrilex (NICORETTE) 4 mg gum; Chew 1 each (4 mg total) as needed for smoking cessation       History of Present Illness     HPI  Krzysztof Campos is a 65 yo female with PMH HTN, PAD, COPD, DM2 presents for smoking cessation counseling. Patient reported she has been smoking 2 packs for many years and in the past few years she decreased it down to 8-10 cigarettes daily. She tried quitting it couple times before especially when her mom and when she's diagnosed with breast cancer. However, the stresses made her resume smoking again. Patient tried chantix and lozenges but did not feel like it worked. She is open to try new methods. Patient would like to quit smoking completely. She denied any headache, dizziness, N/V, vision blurriness, CP, SOB. No hx of seizures or past MI.   Patient f/u with endocrinologist Dr. Lonnie Alvarado for DM2 and currently on trulicity and lantus. Patient hasn't had diabetic foot check up recently. She also f/u with Dr. Pearson for diabetic eye check up. Denied episodes of hypoglycemia at home, changes with bowel movements, or abdominal pain.     Review of Systems    Constitutional:  Negative for appetite change, chills and fever.   HENT:  Negative for trouble swallowing.    Eyes:  Negative for visual disturbance.   Respiratory:  Negative for cough, shortness of breath and wheezing.    Cardiovascular:  Negative for chest pain, palpitations and leg swelling.   Gastrointestinal:  Negative for abdominal pain, blood in stool, constipation, diarrhea, nausea and vomiting.   Genitourinary:  Negative for difficulty urinating and dysuria.   Skin:  Negative for color change and rash.   Neurological:  Negative for dizziness, seizures, weakness, numbness and headaches.   All other systems reviewed and are negative.    Medical History Reviewed by provider this encounter:       Current Outpatient Medications on File Prior to Visit   Medication Sig Dispense Refill    albuterol (2.5 mg/3 mL) 0.083 % nebulizer solution Take 3 mL (2.5 mg total) by nebulization every 6 (six) hours as needed for wheezing or shortness of breath 90 mL 2    albuterol (Ventolin HFA) 90 mcg/act inhaler Inhale 2 puffs every 4 (four) hours as needed for wheezing or shortness of breath 18 g 5    amitriptyline (ELAVIL) 100 mg tablet Take 0.5 tablets (50 mg total) by mouth daily at bedtime 45 tablet 1    aspirin (ECOTRIN LOW STRENGTH) 81 mg EC tablet Take 1 tablet (81 mg total) by mouth daily 90 tablet 3    Blood Glucose Monitoring Suppl (ONE TOUCH ULTRA 2) w/Device KIT Use daily 1 kit 0    clonazePAM (KlonoPIN) 0.5 mg tablet Take 0.5 mg by mouth daily as needed        cloNIDine (CATAPRES) 0.1 mg tablet Take 0.1 mg by mouth daily as needed      doxepin (SINEquan) 100 mg capsule take 1 to 2 capsules by mouth at bedtime if needed      gabapentin (NEURONTIN) 300 mg capsule take 1 capsule by mouth twice a day 60 capsule 2    glucose blood (OneTouch Ultra) test strip Use to test blood sugar three times a day 100 strip 3    Insulin Glargine Solostar (Lantus SoloStar) 100 UNIT/ML SOPN Inject 15 Units under the skin 2 times a day  30 mL 1    Insulin Pen Needle (Droplet Pen Needles) 32G X 4 MM MISC use twice a day for INJECTIONS 200 each 1    Jardiance 25 MG TABS Take 1 tablet (25 mg total) by mouth in the morning 90 tablet 0    loperamide (IMODIUM) 2 mg capsule Take 1 capsule (2 mg total) by mouth 4 (four) times a day as needed for diarrhea 30 capsule 0    nicotine polacrilex (COMMIT) 4 MG lozenge Apply 1 lozenge (4 mg total) to the mouth or throat as needed for smoking cessation 100 each 0    OneTouch Delica Lancets 33G MISC Check blood sugars three times daily. Please substitute with appropriate alternative as covered by patient's insurance. Dx: E11.65 300 each 3    pantoprazole (PROTONIX) 40 mg tablet take 1 tablet by mouth once daily 90 tablet 1    rosuvastatin (CRESTOR) 40 MG tablet Take 1 tablet by mouth once daily 90 tablet 3    semaglutide, 2 mg/dose, (Ozempic, 2 MG/DOSE,) 8 mg/ mL injection pen Inject 0.75 mL (2 mg total) under the skin every 7 days 9 mL 0    tiotropium (Spiriva Respimat) 2.5 MCG/ACT AERS inhaler Inhale 2 puffs daily 4 g 11    vilazodone (VIIBRYD) 40 mg tablet Take 40 mg by mouth daily.      VRAYLAR 6 MG capsule Take 6 mg by mouth daily  0    benztropine (COGENTIN) 0.5 mg tablet Take 0.5 mg by mouth daily at bedtime   (Patient not taking: Reported on 5/3/2024)      carvedilol (COREG) 3.125 mg tablet Take 1 tablet (3.125 mg total) by mouth 2 (two) times a day with meals (Patient not taking: Reported on 4/18/2024) 60 tablet 6    Continuous Blood Gluc  (FreeStyle Darius 2 [place]) ANDERSON To check blood sugar continuously. (Patient not taking: Reported on 4/24/2024) 1 each 0    Continuous Blood Gluc Sensor (FreeStyle Darius 2 Sensor) MISC Use to check her blood sugars continuously and change it every 2 weeks (Patient not taking: Reported on 4/24/2024) 6 each 0     No current facility-administered medications on file prior to visit.      Social History     Tobacco Use    Smoking status: Every Day     Current packs/day:  0.50     Average packs/day: 1.5 packs/day for 47.0 years (70.5 ttl pk-yrs)     Types: Cigarettes     Start date: 7/3/1977    Smokeless tobacco: Never   Vaping Use    Vaping status: Never Used   Substance and Sexual Activity    Alcohol use: Never    Drug use: Never    Sexual activity: Not Currently     Objective     /62   Pulse 92   Temp 98 °F (36.7 °C)   Resp 18   Wt 98.9 kg (218 lb)   SpO2 90%   BMI 34.14 kg/m²     Physical Exam  Vitals and nursing note reviewed.   Constitutional:       General: She is not in acute distress.     Appearance: Normal appearance. She is well-developed. She is not ill-appearing.   HENT:      Head: Normocephalic and atraumatic.      Nose: Nose normal. No congestion.   Eyes:      General:         Right eye: No discharge.         Left eye: No discharge.      Extraocular Movements: Extraocular movements intact.      Conjunctiva/sclera: Conjunctivae normal.      Pupils: Pupils are equal, round, and reactive to light.   Cardiovascular:      Rate and Rhythm: Normal rate and regular rhythm.      Pulses: Normal pulses.      Heart sounds: Normal heart sounds. No murmur heard.  Pulmonary:      Effort: Pulmonary effort is normal. No respiratory distress.      Breath sounds: Normal breath sounds. No wheezing.   Chest:      Chest wall: No tenderness.   Abdominal:      General: Abdomen is flat. Bowel sounds are normal. There is no distension.      Palpations: Abdomen is soft.      Tenderness: There is no abdominal tenderness. There is no guarding or rebound.   Musculoskeletal:         General: No swelling. Normal range of motion.      Cervical back: Normal range of motion and neck supple.      Right lower leg: No edema.      Left lower leg: No edema.   Skin:     General: Skin is warm and dry.      Capillary Refill: Capillary refill takes less than 2 seconds.      Findings: No rash.   Neurological:      General: No focal deficit present.      Mental Status: She is alert and oriented to  person, place, and time.      Sensory: No sensory deficit.   Psychiatric:         Mood and Affect: Mood normal.       Administrative Statements   I have spent a total time of 30 minutes in caring for this patient on the day of the visit/encounter including Instructions for management, Counseling / Coordination of care, Reviewing / ordering tests, medicine, procedures  , and Obtaining or reviewing history  .      Nanda Bower (Yi-Ling), DO, Family Medicine PGY-2, Date and Time: 07/03/24 3:06 PM

## 2024-07-05 DIAGNOSIS — Z79.4 TYPE 2 DIABETES MELLITUS WITHOUT COMPLICATION, WITH LONG-TERM CURRENT USE OF INSULIN (HCC): ICD-10-CM

## 2024-07-05 DIAGNOSIS — E11.9 TYPE 2 DIABETES MELLITUS WITHOUT COMPLICATION, WITH LONG-TERM CURRENT USE OF INSULIN (HCC): ICD-10-CM

## 2024-07-05 NOTE — TELEPHONE ENCOUNTER
Patient said she is out of medication and missed her dose yesterday. According to our records she got a 84 day supply filled on 5/30/24 at [facility]. Patient will call insurance for a lost medication override.    Reason for call:   [x] Refill   [] Prior Auth  [x] Other: lost medication resend to [facility]    Office:   [] PCP/Provider -   [x] Specialty/Provider - Charleen    Medication: Ozempic 2mg    Dose/Frequency: inject 0.75ml every 7 days    Quantity: 9    Pharmacy: [id number] - [place] - [address] [place] [address] [phone]     Does the patient have enough for 3 days?   [] Yes   [x] No - Send as HP to POD

## 2024-07-08 DIAGNOSIS — Z71.6 ENCOUNTER FOR SMOKING CESSATION COUNSELING: ICD-10-CM

## 2024-07-09 RX ORDER — NICOTINE 21 MG/24HR
1 PATCH, TRANSDERMAL 24 HOURS TRANSDERMAL EVERY 24 HOURS
Qty: 28 PATCH | Refills: 0 | Status: SHIPPED | OUTPATIENT
Start: 2024-07-09

## 2024-07-16 ENCOUNTER — TELEPHONE (OUTPATIENT)
Age: 64
End: 2024-07-16

## 2024-07-16 NOTE — TELEPHONE ENCOUNTER
Called and spoke with Mariam from [facility] [place]. This has been an ongoing issue withe the SWO having portability on the order and patient does not qualify for portability per notes and testing in February. [facility] confirmed they have the notes scanned into patients chart however I did refax the notes and testing documentation. Also faxed over ROSELINE study results stating patient is nocturnal o2 only.

## 2024-07-16 NOTE — TELEPHONE ENCOUNTER
Yamini from [facility] called in regarding the patient oxygen therapy  SWO . Script is scan in the patient chart under medica . Please have the form complete and fax back to [facility]     Fax# [phone]

## 2024-07-23 NOTE — TELEPHONE ENCOUNTER
Yamini calling from [facility] regarding oxygen therapy. Please have form completed and faxed back to [facility] as soon as possible.    [facility] has called several times to have form returned.    (Fax) [phone]

## 2024-07-24 NOTE — TELEPHONE ENCOUNTER
I have called and talked to Mariam and Lisandra regarding this patient. Patient does not qualify for portability. [facility] received the notes and testing (confirmed with [facility])  regarding this from February In which a 6 min walk test was performed and patient did not qualify. The order under MEDIA Yamini is requesting a signature is for portability and based on the results of the testing completed in February, patient does not qualify. However, patient does qualify for nocturnal based on ROSELINE study that [facility] also confirmed they received documentation for. Revised SWO removing portability can be faxed to office for provider to sign and return.

## 2024-07-29 DIAGNOSIS — G43.809 OTHER MIGRAINE WITHOUT STATUS MIGRAINOSUS, NOT INTRACTABLE: ICD-10-CM

## 2024-07-29 RX ORDER — AMITRIPTYLINE HYDROCHLORIDE 100 MG/1
TABLET ORAL
Qty: 45 TABLET | Refills: 1 | Status: SHIPPED | OUTPATIENT
Start: 2024-07-29

## 2024-07-30 ENCOUNTER — OFFICE VISIT (OUTPATIENT)
Dept: FAMILY MEDICINE CLINIC | Facility: HOME HEALTHCARE | Age: 64
End: 2024-07-30
Payer: COMMERCIAL

## 2024-07-30 VITALS
BODY MASS INDEX: 34.43 KG/M2 | SYSTOLIC BLOOD PRESSURE: 111 MMHG | DIASTOLIC BLOOD PRESSURE: 63 MMHG | HEART RATE: 95 BPM | TEMPERATURE: 98.9 F | OXYGEN SATURATION: 99 % | RESPIRATION RATE: 17 BRPM | WEIGHT: 219.8 LBS

## 2024-07-30 DIAGNOSIS — Z79.4 TYPE 2 DIABETES MELLITUS WITH STAGE 3B CHRONIC KIDNEY DISEASE, WITH LONG-TERM CURRENT USE OF INSULIN (HCC): ICD-10-CM

## 2024-07-30 DIAGNOSIS — E11.22 TYPE 2 DIABETES MELLITUS WITH STAGE 3B CHRONIC KIDNEY DISEASE, WITH LONG-TERM CURRENT USE OF INSULIN (HCC): ICD-10-CM

## 2024-07-30 DIAGNOSIS — N18.32 TYPE 2 DIABETES MELLITUS WITH STAGE 3B CHRONIC KIDNEY DISEASE, WITH LONG-TERM CURRENT USE OF INSULIN (HCC): ICD-10-CM

## 2024-07-30 DIAGNOSIS — Z71.6 ENCOUNTER FOR SMOKING CESSATION COUNSELING: ICD-10-CM

## 2024-07-30 DIAGNOSIS — Z00.00 ANNUAL PHYSICAL EXAM: Primary | ICD-10-CM

## 2024-07-30 PROBLEM — R19.7 DIARRHEA: Status: RESOLVED | Noted: 2023-01-28 | Resolved: 2024-07-30

## 2024-07-30 PROBLEM — J96.11 CHRONIC RESPIRATORY FAILURE WITH HYPOXIA (HCC): Status: ACTIVE | Noted: 2022-07-20

## 2024-07-30 PROBLEM — R00.0 TACHYCARDIA: Status: RESOLVED | Noted: 2022-07-20 | Resolved: 2024-07-30

## 2024-07-30 PROBLEM — J96.11 CHRONIC RESPIRATORY FAILURE WITH HYPOXIA (HCC): Status: ACTIVE | Noted: 2021-06-23

## 2024-07-30 LAB — SL AMB POCT HEMOGLOBIN AIC: 7.7 (ref ?–6.5)

## 2024-07-30 PROCEDURE — 83036 HEMOGLOBIN GLYCOSYLATED A1C: CPT | Performed by: FAMILY MEDICINE

## 2024-07-30 PROCEDURE — T1015 CLINIC SERVICE: HCPCS | Performed by: FAMILY MEDICINE

## 2024-07-30 NOTE — ASSESSMENT & PLAN NOTE
chronic smoker with ~70.5 pack year history. Pt tried chantix and lozenge before for quitting but resume smoking soon after. Patient is ready to quit again. Her goal is to quit completely. No hx of MI, seizure before.She received 4mg nicotine gum with nicotine patch 14mg/24hr 4 week supply last visit but hasn't started it.  CT lung screening done in 03/2024, Stable 3 mm right upper lobe nodule    - pt will make appt after she starts on the therapy

## 2024-07-30 NOTE — PATIENT INSTRUCTIONS
"Patient Education     Routine physical for adults   The Basics   Written by the doctors and editors at [facility]   What is a physical? -- A physical is a routine visit, or \"check-up,\" with your doctor. You might also hear it called a \"wellness visit\" or \"preventive visit.\"  During each visit, the doctor will:   Ask about your physical and mental health   Ask about your habits, behaviors, and lifestyle   Do an exam   Give you vaccines if needed   Talk to you about any medicines you take   Give advice about your health   Answer your questions  Getting regular check-ups is an important part of taking care of your health. It can help your doctor find and treat any problems you have. But it's also important for preventing health problems.  A routine physical is different from a \"sick visit.\" A sick visit is when you see a doctor because of a health concern or problem. Since physicals are scheduled ahead of time, you can think about what you want to ask the doctor.  How often should I get a physical? -- It depends on your age and health. In general, for people age 21 years and older:   If you are younger than 50 years, you might be able to get a physical every 3 years.   If you are 50 years or older, your doctor might recommend a physical every year.  If you have an ongoing health condition, like diabetes or high blood pressure, your doctor will probably want to see you more often.  What happens during a physical? -- In general, each visit will include:   Physical exam - The doctor or nurse will check your height, weight, heart rate, and blood pressure. They will also look at your eyes and ears. They will ask about how you are feeling and whether you have any symptoms that bother you.   Medicines - It's a good idea to bring a list of all the medicines you take to each doctor visit. Your doctor will talk to you about your medicines and answer any questions. Tell them if you are having any side effects that bother you. You " "should also tell them if you are having trouble paying for any of your medicines.   Habits and behaviors - This includes:   Your diet   Your exercise habits   Whether you smoke, drink alcohol, or use drugs   Whether you are sexually active   Whether you feel safe at home  Your doctor will talk to you about things you can do to improve your health and lower your risk of health problems. They will also offer help and support. For example, if you want to quit smoking, they can give you advice and might prescribe medicines. If you want to improve your diet or get more physical activity, they can help you with this, too.   Lab tests, if needed - The tests you get will depend on your age and situation. For example, your doctor might want to check your:   Cholesterol   Blood sugar   Iron level   Vaccines - The recommended vaccines will depend on your age, health, and what vaccines you already had. Vaccines are very important because they can prevent certain serious or deadly infections.   Discussion of screening - \"Screening\" means checking for diseases or other health problems before they cause symptoms. Your doctor can recommend screening based on your age, risk, and preferences. This might include tests to check for:   Cancer, such as breast, prostate, cervical, ovarian, colorectal, prostate, lung, or skin cancer   Sexually transmitted infections, such as chlamydia and gonorrhea   Mental health conditions like depression and anxiety  Your doctor will talk to you about the different types of screening tests. They can help you decide which screenings to have. They can also explain what the results might mean.   Answering questions - The physical is a good time to ask the doctor or nurse questions about your health. If needed, they can refer you to other doctors or specialists, too.  Adults older than 65 years often need other care, too. As you get older, your doctor will talk to you about:   How to prevent falling at " home   Hearing or vision tests   Memory testing   How to take your medicines safely   Making sure that you have the help and support you need at home  All topics are updated as new evidence becomes available and our peer review process is complete.  This topic retrieved from [employer] on: May 02, 2024.  Topic 600446 Version 1.0  Release: 32.4.3 - C32.122  © 2024 UpToDate, Inc. and/or its affiliates. All rights reserved.  Consumer Information Use and Disclaimer   Disclaimer: This generalized information is a limited summary of diagnosis, treatment, and/or medication information. It is not meant to be comprehensive and should be used as a tool to help the user understand and/or assess potential diagnostic and treatment options. It does NOT include all information about conditions, treatments, medications, side effects, or risks that may apply to a specific patient. It is not intended to be medical advice or a substitute for the medical advice, diagnosis, or treatment of a health care provider based on the health care provider's examination and assessment of a patient's specific and unique circumstances. Patients must speak with a health care provider for complete information about their health, medical questions, and treatment options, including any risks or benefits regarding use of medications. This information does not endorse any treatments or medications as safe, effective, or approved for treating a specific patient. UpToDate, Inc. and its affiliates disclaim any warranty or liability relating to this information or the use thereof.The use of this information is governed by the Terms of Use, available at https://www.w[employer]tersTetra Discoveryuwer.com/en/know/clinical-effectiveness-terms. 2024© UpToDate, Inc. and its affiliates and/or licensors. All rights reserved.  Copyright   © 2024 UpToDate, Inc. and/or its affiliates. All rights reserved.

## 2024-07-30 NOTE — PROGRESS NOTES
Adult Annual Physical  Name: Whit Campos      : 1960      MRN: [id number]  Encounter Provider: Prosper Bower DO  Encounter Date: 2024   Encounter department: [facility]    Assessment & Plan   1. Annual physical exam  Comments:  no acute complaints; POC A1c 7.7 (last 9.7 in April)  diabetic foot check with unremarkable findings or changes  Colonoscopy done in , f/u in 5 years ()  - ordered thyroid profile  - f/u in 1-2 mo for smoking cessation  - continue f/u with Dr. Jenny finney, for DM2 meds  2. Type 2 diabetes mellitus with stage 3b chronic kidney disease, with long-term current use of insulin (HCC)  Comments:  on Jardiance 25mg daily, Ozempic 2mg every 7 days, and Lantus 15U once in the morning and once at bedtime. A1c 9.7 >> 7.7. Lab in May shows album/Cr elevated 94, GFR 33. Diabetic foot check normal.  - continue f/u with Dr. Jenny finney, appt on 2024  - encourage patient to f/u with nephro  - f/u thyroid profile  Orders:  -     Diabetic foot exam; Future  -     POCT hemoglobin A1c  -     T4, free; Future  -     TSH, 3rd generation; Future  3. Encounter for smoking cessation counseling  Assessment & Plan:  chronic smoker with ~70.5 pack year history. Pt tried chantix and lozenge before for quitting but resume smoking soon after. Patient is ready to quit again. Her goal is to quit completely. No hx of MI, seizure before.She received 4mg nicotine gum with nicotine patch 14mg/24hr 4 week supply last visit but hasn't started it.  CT lung screening done in 2024, Stable 3 mm right upper lobe nodule    - pt will make appt after she starts on the therapy       Immunizations and preventive care screenings were discussed with patient today. Appropriate education was printed on patient's after visit summary.    Counseling:  Exercise: the importance of regular exercise/physical activity was discussed. Recommend exercise 3-5 times per week for at least 30  minutes.   Diet: discussed with patient the importance of avoiding processed food and sugary drinks in diet. Considering eating more vegetables. Eat white meat instead of red meat. Cutting carbs in diet like bread, rice, noodles.        Depression Screening and Follow-up Plan: Patient was screened for depression during today's encounter. They screened negative with a PHQ-9 score of 2.        History of Present Illness     Krzysztof Campos is a 63 yo female with PMH of chronic smoker, HTN, PAD, COPD, and DM2 w/ CKD3 on insulin, presented for physical and follow up for A1c. Patient has no acute complaints. Her A1c back in April was 9.9 and POC A1c today shows 7.7. She's taking Jardiance 25mg daily, Ozempic 2mg every 7 days, and insulin Lantus 15U once in the morning and at bedtime. Patient denied polyuria. Occasionally has some thirstiness. No changes with urination. Patient has no decreased in sensation of the feet, and no recent accident or injury to the feet. In regards to her smoking cessation, she received her nicotine patch and gum last visit but hasn't started it. She will hold on the smoking cessation meds and expresses she is still interested in starting it sometime soon. Occasionally she has dizziness for few seconds with postural change. She also has chronic diarrhea 2/2 IBS. Denied fever, chills, HA, CP, palpitation, SOB, changes with bowel movement, constipation, dysuria, or hematuria.     Adult Annual Physical:  Patient presents for annual physical.     Diet and Physical Activity:  - Diet/Nutrition:. not much carbs; likes to eat vegetables  - Exercise:. walking dog daily    General Health:  - Sleep: 7-8 hours of sleep on average.  - Hearing: normal hearing bilateral ears.  - Vision: no vision problems and most recent eye exam < 1 year ago. wear glasses; has cataract s/p removal in b/l eyes  - Dental: regular dental visits.    /GYN Health:  - Follows with GYN: no.   Last pap smear many years ago, patient  deferred pap this visit.    Review of Systems   Constitutional:  Negative for activity change, appetite change, chills, diaphoresis, fatigue and fever.        Lost few lbs from better diet   HENT:  Negative for congestion, dental problem, ear discharge and ear pain.    Eyes:  Negative for visual disturbance.   Respiratory:  Negative for cough, chest tightness and shortness of breath.    Cardiovascular:  Negative for chest pain, palpitations and leg swelling.   Gastrointestinal:  Positive for diarrhea. Negative for abdominal pain, blood in stool, constipation, nausea and vomiting.        Chronic diarrhea 2/2 IBS   Endocrine: Negative for polydipsia and polyuria.   Genitourinary:  Negative for difficulty urinating, dysuria, frequency and hematuria.   Musculoskeletal:  Negative for arthralgias and back pain.   Skin:  Negative for color change, rash and wound.   Neurological:  Negative for headaches.        Occasional dizziness with postural change   Psychiatric/Behavioral:  Negative for sleep disturbance.    All other systems reviewed and are negative.    Medical History Reviewed by provider this encounter:  Tobacco  Allergies  Meds  Problems  Med Hx  Surg Hx  Fam Hx       Current Outpatient Medications on File Prior to Visit   Medication Sig Dispense Refill    albuterol (2.5 mg/3 mL) 0.083 % nebulizer solution Take 3 mL (2.5 mg total) by nebulization every 6 (six) hours as needed for wheezing or shortness of breath 90 mL 2    albuterol (Ventolin HFA) 90 mcg/act inhaler Inhale 2 puffs every 4 (four) hours as needed for wheezing or shortness of breath 18 g 5    amitriptyline (ELAVIL) 100 mg tablet Take 1/2 tablet (50 mg total) by mouth daily at bedtime 45 tablet 1    aspirin (ECOTRIN LOW STRENGTH) 81 mg EC tablet Take 1 tablet (81 mg total) by mouth daily 90 tablet 3    Blood Glucose Monitoring Suppl (ONE TOUCH ULTRA 2) w/Device KIT Use daily 1 kit 0    clonazePAM (KlonoPIN) 0.5 mg tablet Take 0.5 mg by mouth  daily as needed        cloNIDine (CATAPRES) 0.1 mg tablet Take 0.1 mg by mouth daily as needed      doxepin (SINEquan) 100 mg capsule take 1 to 2 capsules by mouth at bedtime if needed      glucose blood (OneTouch Ultra) test strip Use to test blood sugar three times a day 100 strip 3    Insulin Glargine Solostar (Lantus SoloStar) 100 UNIT/ML SOPN Inject 15 Units under the skin 2 times a day 30 mL 1    Insulin Pen Needle (Droplet Pen Needles) 32G X 4 MM MISC use twice a day for INJECTIONS 200 each 1    Jardiance 25 MG TABS Take 1 tablet (25 mg total) by mouth in the morning 90 tablet 0    loperamide (IMODIUM) 2 mg capsule Take 1 capsule (2 mg total) by mouth 4 (four) times a day as needed for diarrhea 30 capsule 0    nicotine polacrilex (COMMIT) 4 MG lozenge Apply 1 lozenge (4 mg total) to the mouth or throat as needed for smoking cessation 100 each 0    nicotine polacrilex (NICORETTE) 4 mg gum Chew 1 each (4 mg total) as needed for smoking cessation 100 each 0    OneTouch Delica Lancets 33G MISC Check blood sugars three times daily. Please substitute with appropriate alternative as covered by patient's insurance. Dx: E11.65 300 each 3    rosuvastatin (CRESTOR) 40 MG tablet Take 1 tablet by mouth once daily 90 tablet 3    tiotropium (Spiriva Respimat) 2.5 MCG/ACT AERS inhaler Inhale 2 puffs daily 4 g 11    vilazodone (VIIBRYD) 40 mg tablet Take 40 mg by mouth daily.      VRAYLAR 6 MG capsule Take 6 mg by mouth daily  0    benztropine (COGENTIN) 0.5 mg tablet Take 0.5 mg by mouth daily at bedtime   (Patient not taking: Reported on 5/3/2024)      carvedilol (COREG) 3.125 mg tablet Take 1 tablet (3.125 mg total) by mouth 2 (two) times a day with meals (Patient not taking: Reported on 4/18/2024) 60 tablet 6    Continuous Blood Gluc  (FreeStyle Darius 2 [place]) ANDERSON To check blood sugar continuously. (Patient not taking: Reported on 4/24/2024) 1 each 0    Continuous Blood Gluc Sensor (FreeStyle Darius 2 Sensor)  MISC Use to check her blood sugars continuously and change it every 2 weeks (Patient not taking: Reported on 4/24/2024) 6 each 0    gabapentin (NEURONTIN) 300 mg capsule take 1 capsule by mouth twice a day (Patient not taking: Reported on 7/30/2024) 60 capsule 2    nicotine (NICODERM CQ) 14 mg/24hr TD 24 hr patch Place 1 patch on the skin over 24 hours every 24 hours (Patient not taking: Reported on 7/30/2024) 28 patch 0    pantoprazole (PROTONIX) 40 mg tablet take 1 tablet by mouth once daily (Patient not taking: Reported on 7/30/2024) 90 tablet 1    semaglutide, 2 mg/dose, (Ozempic, 2 MG/DOSE,) 8 mg/ mL injection pen Inject 0.75 mL (2 mg total) under the skin every 7 days (Patient not taking: Reported on 7/30/2024) 9 mL 1     No current facility-administered medications on file prior to visit.      Social History     Tobacco Use    Smoking status: Every Day     Current packs/day: 0.50     Average packs/day: 1.5 packs/day for 47.1 years (70.5 ttl pk-yrs)     Types: Cigarettes     Start date: 7/3/1977    Smokeless tobacco: Never   Vaping Use    Vaping status: Never Used   Substance and Sexual Activity    Alcohol use: Never    Drug use: Never    Sexual activity: Not Currently       Objective     /63 (BP Location: Left arm)   Pulse 95   Temp 98.9 °F (37.2 °C)   Resp 17   Wt 99.7 kg (219 lb 12.8 oz)   SpO2 99%   BMI 34.43 kg/m²     Physical Exam  Vitals and nursing note reviewed.   Constitutional:       General: She is not in acute distress.     Appearance: Normal appearance. She is well-developed. She is not ill-appearing.      Comments: Walk with cane   HENT:      Head: Normocephalic and atraumatic.      Right Ear: Tympanic membrane, ear canal and external ear normal. There is no impacted cerumen.      Left Ear: Tympanic membrane, ear canal and external ear normal. There is no impacted cerumen.      Nose: Nose normal. No congestion or rhinorrhea.      Mouth/Throat:      Mouth: Mucous membranes are moist.       Pharynx: Oropharynx is clear. No oropharyngeal exudate or posterior oropharyngeal erythema.   Eyes:      General:         Right eye: No discharge.         Left eye: No discharge.      Extraocular Movements: Extraocular movements intact.      Conjunctiva/sclera: Conjunctivae normal.      Pupils: Pupils are equal, round, and reactive to light.   Cardiovascular:      Rate and Rhythm: Normal rate and regular rhythm.      Pulses: Normal pulses.      Heart sounds: Normal heart sounds. No murmur heard.  Pulmonary:      Effort: Pulmonary effort is normal. No respiratory distress.      Breath sounds: Normal breath sounds. No wheezing or rales.   Chest:      Chest wall: No tenderness.   Abdominal:      General: Abdomen is flat. Bowel sounds are normal. There is no distension.      Palpations: Abdomen is soft. There is no mass.      Tenderness: There is no abdominal tenderness. There is no guarding or rebound.   Musculoskeletal:         General: No swelling, tenderness, deformity or signs of injury. Normal range of motion.      Cervical back: Normal range of motion and neck supple.      Right lower leg: No edema.      Left lower leg: No edema.   Lymphadenopathy:      Cervical: No cervical adenopathy.   Skin:     General: Skin is warm and dry.      Capillary Refill: Capillary refill takes less than 2 seconds.      Findings: No bruising, erythema, lesion or rash.   Neurological:      General: No focal deficit present.      Mental Status: She is alert and oriented to person, place, and time.      Sensory: No sensory deficit.      Motor: No weakness.      Coordination: Coordination normal.      Gait: Gait normal.   Psychiatric:         Mood and Affect: Mood normal.         Behavior: Behavior normal.       Administrative Statements   I have spent a total time of 30 minutes in caring for this patient on the day of the visit/encounter including Diagnostic results, Instructions for management, Importance of tx compliance,  Impressions, Counseling / Coordination of care, Documenting in the medical record, Reviewing / ordering tests, medicine, procedures  , and Obtaining or reviewing history  .    Nanda Bower (Yi-Ling), DO, Family Medicine PGY-2, Date and Time: 07/30/24 1:55 PM

## 2024-08-14 ENCOUNTER — OFFICE VISIT (OUTPATIENT)
Dept: ENDOCRINOLOGY | Facility: CLINIC | Age: 64
End: 2024-08-14
Payer: COMMERCIAL

## 2024-08-14 VITALS
WEIGHT: 222 LBS | HEART RATE: 96 BPM | SYSTOLIC BLOOD PRESSURE: 108 MMHG | TEMPERATURE: 97.9 F | HEIGHT: 67 IN | OXYGEN SATURATION: 99 % | BODY MASS INDEX: 34.84 KG/M2 | DIASTOLIC BLOOD PRESSURE: 60 MMHG

## 2024-08-14 DIAGNOSIS — I10 PRIMARY HYPERTENSION: ICD-10-CM

## 2024-08-14 DIAGNOSIS — E11.9 TYPE 2 DIABETES MELLITUS WITHOUT COMPLICATION, WITH LONG-TERM CURRENT USE OF INSULIN (HCC): ICD-10-CM

## 2024-08-14 DIAGNOSIS — Z79.4 TYPE 2 DIABETES MELLITUS WITHOUT COMPLICATION, WITH LONG-TERM CURRENT USE OF INSULIN (HCC): ICD-10-CM

## 2024-08-14 DIAGNOSIS — E78.00 HYPERCHOLESTEROLEMIA: ICD-10-CM

## 2024-08-14 DIAGNOSIS — N18.32 TYPE 2 DIABETES MELLITUS WITH STAGE 3B CHRONIC KIDNEY DISEASE, WITH LONG-TERM CURRENT USE OF INSULIN (HCC): Primary | ICD-10-CM

## 2024-08-14 DIAGNOSIS — E11.22 TYPE 2 DIABETES MELLITUS WITH STAGE 3B CHRONIC KIDNEY DISEASE, WITH LONG-TERM CURRENT USE OF INSULIN (HCC): Primary | ICD-10-CM

## 2024-08-14 DIAGNOSIS — Z79.4 TYPE 2 DIABETES MELLITUS WITH STAGE 3B CHRONIC KIDNEY DISEASE, WITH LONG-TERM CURRENT USE OF INSULIN (HCC): Primary | ICD-10-CM

## 2024-08-14 DIAGNOSIS — E55.9 VITAMIN D DEFICIENCY: ICD-10-CM

## 2024-08-14 PROCEDURE — 99214 OFFICE O/P EST MOD 30 MIN: CPT | Performed by: STUDENT IN AN ORGANIZED HEALTH CARE EDUCATION/TRAINING PROGRAM

## 2024-08-14 NOTE — PATIENT INSTRUCTIONS
Ozempic 0.25 once weekly for 2 weeks and then will increase to 0.5 mg once weekly, for 2 weeks and then 2 mg weekly,   When you start taking Ozempic 2 mg weekly if you notice your blood sugars are consistently below 80, decrease your Lantus to 12 units twice a day

## 2024-08-14 NOTE — PROGRESS NOTES
Established patient Progress Note      Cc: diabetes    Referring Provider  No referring provider defined for this encounter.     History of Present Illness:   Whit Campos is a 64 y.o. female with a history of type 2 diabetes with long term use of insulin who presented for follow up.        Reports complications of neuropathy and TIA. Denies recent illness or hospitalizations.    Current regimen:     Lantus 15 units twice a day  Ozempic 2 mg weekly, not taken for the last month or so.  Jardiance 25 mg once daily    SMBG : x 1,   Blood sugars ranging from 100 to 130 mg/dl    Hypoglycemic episodes:   H/o of hypoglycemia causing hospitalization or Intervention such as glucagon injection  or ambulance call : no  Has awareness: yes       Opthamology:  UTD;   Podiatry: No    on ACE inhibitor or ARB: no  on statin: Crestor 40 mg daily    Thyroid disorders: no  History of pancreatitis: no    Patient Active Problem List   Diagnosis    Cataract    Chronic hoarseness    Chronic low back pain    Centrilobular emphysema (HCC)    Deep vein thrombophlebitis of leg, unspecified laterality (HCC)    Depression    Gastroesophageal reflux disease with esophagitis    Hypercholesterolemia    Hypercoagulable state (HCC)    Hypertension    Migraine    Myositis    Neuropathy    Pulmonary nodule seen on imaging study    Class 2 obesity due to excess calories with body mass index (BMI) of 35.0 to 35.9 in adult    ZOË (obstructive sleep apnea)    Family history of heart disease    Pulmonary hypertension (HCC)    Primary fibromyalgia syndrome    Primary cancer of upper outer quadrant of right breast (HCC)    Malignant neoplasm of upper-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, estrogen receptor positive (HCC)    Mucinous carcinoma of breast, right (HCC)    Tobacco abuse    PAD (peripheral artery disease) (HCC)    Benign  hypertension with CKD (chronic kidney disease) stage III (HCC)    Kidney lesion    Nonocclusive coronary atherosclerosis of native coronary artery    Hyponatremia    Presence of IVC filter    History of pulmonary embolism    Chronic respiratory failure with hypoxia (HCC)    Nicotine dependence, uncomplicated    Nocturnal hypoxia    Type 2 diabetes mellitus with stage 3b chronic kidney disease, with long-term current use of insulin (HCC)    Vitamin D deficiency    Secondary hyperparathyroidism of renal origin (HCC)    Encounter for smoking cessation counseling      Past Medical History:   Diagnosis Date    Cancer (HCC)     right breast    Chronic back pain     Chronic kidney disease     Colitis     COPD (chronic obstructive pulmonary disease) (HCC)     Depression     Diabetes mellitus (HCC)     DVT of lower limb, acute (HCC) 2004    GERD (gastroesophageal reflux disease)     H/O blood clots     rt leg, lung    Hyperlipidemia     Pneumonia     Rapid heart rate     Sleep apnea     Stroke (HCC)     4 mini strokes      Past Surgical History:   Procedure Laterality Date    BREAST LUMPECTOMY Right     CARDIAC CATHETERIZATION N/A 10/28/2021    Procedure: Cardiac Coronary Angiogram;  Surgeon: Abdelrahman Jacinto DO;  Location:  CARDIAC CATH LAB;  Service: Cardiology    IVC FILTER INSERTION      MASTECTOMY Right     MASTECTOMY W/ SENTINEL NODE BIOPSY Right 11/09/2021    Procedure: BREAST MASTECTOMY WITH BIOPSY LYMPH NODE SENTINEL and axillary node dissection  ([place] Lymph Node Injection @ 12:30);  Surgeon: Jd Dong MD;  Location: [facility];  Service: General    US GUIDANCE BREAST BIOPSY RIGHT EACH ADDITIONAL Right 10/13/2021    US GUIDANCE BREAST BIOPSY RIGHT EACH ADDITIONAL Right 10/13/2021    US GUIDED BREAST BIOPSY RIGHT COMPLETE Right 10/13/2021      Family History   Problem Relation Age of Onset    Breast cancer Mother 72    COPD Mother     Coronary artery disease Mother     Coronary artery disease  Father     Stroke Father     Lung cancer Sister     No Known Problems Sister     No Known Problems Sister     Breast cancer Maternal Grandmother     Colon cancer Paternal Grandfather     No Known Problems Maternal Aunt     No Known Problems Maternal Aunt     No Known Problems Maternal Aunt     No Known Problems Paternal Aunt     Breast cancer Cousin      Social History     Tobacco Use    Smoking status: Every Day     Current packs/day: 0.50     Average packs/day: 1.5 packs/day for 47.1 years (70.5 ttl pk-yrs)     Types: Cigarettes     Start date: 7/3/1977    Smokeless tobacco: Never   Substance Use Topics    Alcohol use: Never     Allergies   Allergen Reactions    Amoxicillin-Pot Clavulanate GI Intolerance    Anastrozole Other (See Comments)     Diarrhea, Nausea, Stomach pain          Current Outpatient Medications:     albuterol (2.5 mg/3 mL) 0.083 % nebulizer solution, Take 3 mL (2.5 mg total) by nebulization every 6 (six) hours as needed for wheezing or shortness of breath, Disp: 90 mL, Rfl: 2    albuterol (Ventolin HFA) 90 mcg/act inhaler, Inhale 2 puffs every 4 (four) hours as needed for wheezing or shortness of breath, Disp: 18 g, Rfl: 5    amitriptyline (ELAVIL) 100 mg tablet, Take 1/2 tablet (50 mg total) by mouth daily at bedtime, Disp: 45 tablet, Rfl: 1    aspirin (ECOTRIN LOW STRENGTH) 81 mg EC tablet, Take 1 tablet (81 mg total) by mouth daily, Disp: 90 tablet, Rfl: 3    Blood Glucose Monitoring Suppl (ONE TOUCH ULTRA 2) w/Device KIT, Use daily, Disp: 1 kit, Rfl: 0    clonazePAM (KlonoPIN) 0.5 mg tablet, Take 0.5 mg by mouth daily as needed  , Disp: , Rfl:     doxepin (SINEquan) 100 mg capsule, take 1 to 2 capsules by mouth at bedtime if needed, Disp: , Rfl:     glucose blood (OneTouch Ultra) test strip, Use to test blood sugar three times a day, Disp: 100 strip, Rfl: 3    Insulin Glargine Solostar (Lantus SoloStar) 100 UNIT/ML SOPN, Inject 15 Units under the skin 2 times a day, Disp: 30 mL, Rfl: 1     Insulin Pen Needle (Droplet Pen Needles) 32G X 4 MM MISC, use twice a day for INJECTIONS, Disp: 200 each, Rfl: 1    Jardiance 25 MG TABS, Take 1 tablet (25 mg total) by mouth in the morning, Disp: 90 tablet, Rfl: 0    loperamide (IMODIUM) 2 mg capsule, Take 1 capsule (2 mg total) by mouth 4 (four) times a day as needed for diarrhea, Disp: 30 capsule, Rfl: 0    OneTouch Delica Lancets 33G MISC, Check blood sugars three times daily. Please substitute with appropriate alternative as covered by patient's insurance. Dx: E11.65, Disp: 300 each, Rfl: 3    semaglutide, 2 mg/dose, (Ozempic, 2 MG/DOSE,) 8 mg/ mL injection pen, Inject 0.75 mL (2 mg total) under the skin every 7 days, Disp: 9 mL, Rfl: 0    tiotropium (Spiriva Respimat) 2.5 MCG/ACT AERS inhaler, Inhale 2 puffs daily, Disp: 4 g, Rfl: 11    VRAYLAR 6 MG capsule, Take 6 mg by mouth daily, Disp: , Rfl: 0    benztropine (COGENTIN) 0.5 mg tablet, Take 0.5 mg by mouth daily at bedtime   (Patient not taking: Reported on 5/3/2024), Disp: , Rfl:     carvedilol (COREG) 3.125 mg tablet, Take 1 tablet (3.125 mg total) by mouth 2 (two) times a day with meals (Patient not taking: Reported on 4/18/2024), Disp: 60 tablet, Rfl: 6    cloNIDine (CATAPRES) 0.1 mg tablet, Take 0.1 mg by mouth daily as needed (Patient not taking: Reported on 8/14/2024), Disp: , Rfl:     Continuous Blood Gluc  (FreeStyle Darius 2 [place]) ANDERSON, To check blood sugar continuously. (Patient not taking: Reported on 4/24/2024), Disp: 1 each, Rfl: 0    Continuous Blood Gluc Sensor (FreeStyle Darius 2 Sensor) MISC, Use to check her blood sugars continuously and change it every 2 weeks (Patient not taking: Reported on 4/24/2024), Disp: 6 each, Rfl: 0    gabapentin (NEURONTIN) 300 mg capsule, take 1 capsule by mouth twice a day (Patient not taking: Reported on 7/30/2024), Disp: 60 capsule, Rfl: 2    nicotine (NICODERM CQ) 14 mg/24hr TD 24 hr patch, Place 1 patch on the skin over 24 hours every 24 hours  "(Patient not taking: Reported on 7/30/2024), Disp: 28 patch, Rfl: 0    nicotine polacrilex (COMMIT) 4 MG lozenge, Apply 1 lozenge (4 mg total) to the mouth or throat as needed for smoking cessation (Patient not taking: Reported on 8/14/2024), Disp: 100 each, Rfl: 0    nicotine polacrilex (NICORETTE) 4 mg gum, Chew 1 each (4 mg total) as needed for smoking cessation (Patient not taking: Reported on 8/14/2024), Disp: 100 each, Rfl: 0    pantoprazole (PROTONIX) 40 mg tablet, take 1 tablet by mouth once daily (Patient not taking: Reported on 7/30/2024), Disp: 90 tablet, Rfl: 1    rosuvastatin (CRESTOR) 40 MG tablet, Take 1 tablet by mouth once daily (Patient not taking: Reported on 8/14/2024), Disp: 90 tablet, Rfl: 3    vilazodone (VIIBRYD) 40 mg tablet, Take 40 mg by mouth daily., Disp: , Rfl:   Review of Systems   Constitutional:  Negative for appetite change, fatigue and unexpected weight change.   HENT:  Negative for trouble swallowing and voice change.    Cardiovascular:  Negative for chest pain and palpitations.   Gastrointestinal:  Negative for abdominal pain, constipation, diarrhea, nausea and vomiting.   Endocrine: Negative for cold intolerance, heat intolerance, polydipsia, polyphagia and polyuria.       Physical Exam:  Body mass index is 34.77 kg/m².  /60 (BP Location: Left arm, Patient Position: Sitting, Cuff Size: Adult)   Pulse 96   Temp 97.9 °F (36.6 °C)   Ht 5' 7\" (1.702 m)   Wt 101 kg (222 lb)   SpO2 99%   BMI 34.77 kg/m²    Wt Readings from Last 3 Encounters:   08/14/24 101 kg (222 lb)   07/30/24 99.7 kg (219 lb 12.8 oz)   07/03/24 98.9 kg (218 lb)       GEN: NAD  E/n/m nl facies,   Eyes: no stare or proptosis,   CV; heart reg rate s1s2 nl,   Resp: CTAB, good effort  Ab+BS  Neuro: no tremor,   Skin: warm and dry,   Ext:  no edema bilaterally,   Psych: nl mood and affect, no gross lapses in memory      Labs:   No components found for: \"HA1C\"  No components found for: \"GLU\"    Lab Results " "  Component Value Date    CREATININE 1.61 (H) 05/28/2024    CREATININE 1.52 (H) 12/21/2023    CREATININE 1.29 06/12/2023    BUN 15 05/28/2024     01/08/2016    K 3.8 05/28/2024    CL 99 05/28/2024    CO2 29 05/28/2024     eGFR   Date Value Ref Range Status   05/28/2024 33 ml/min/1.73sq m Final     No components found for: \"MALBCRER\"    Lab Results   Component Value Date    CHOL 234 09/07/2015    HDL 29 (L) 05/28/2024    TRIG 384 (H) 05/28/2024       Lab Results   Component Value Date    ALT 14 05/28/2024    AST 16 05/28/2024    ALKPHOS 71 05/28/2024    BILITOT 0.53 11/30/2015       Lab Results   Component Value Date    FREET4 1.03 04/27/2020       Impression:  1. Type 2 diabetes mellitus with stage 3b chronic kidney disease, with long-term current use of insulin ([facility])    2. Primary hypertension    3. Vitamin D deficiency    4. Hypercholesterolemia    5. Type 2 diabetes mellitus without complication, with long-term current use of insulin ([facility])           Plan:    Problem List Items Addressed This Visit          Cardiovascular and Mediastinum    Hypertension     Blood pressure is well-controlled.           Relevant Orders    Comprehensive metabolic panel       Endocrine    Type 2 diabetes mellitus with stage 3b chronic kidney disease, with long-term current use of insulin ([facility]) - Primary       Lab Results   Component Value Date    HGBA1C 7.7 (A) 07/30/2024   Glycemic control has improved to 7.7% A1c, goal less than 7%, she was encouraged to continue her efforts managing her diabetes, she has not received Ozempic for last month or so, I resumed 0.25 for 2 weeks and then 0.5 mg for 2 weeks and then 1 mg for 2 weeks and then 2 mg weekly.  Decrease Lantus to 12 units twice a day when Ozempic increased to 2 mg weekly.  She was instructed to check blood sugar once or twice a day and bring her sugar log to her next visit.  Return back in 3 months with  Ophthalmology and podiatry follow-up  Labs prior to next visit.      "    Relevant Medications    semaglutide, 2 mg/dose, (Ozempic, 2 MG/DOSE,) 8 mg/ mL injection pen    Other Relevant Orders    Hemoglobin A1C    Comprehensive metabolic panel    Lipid Panel with Direct LDL reflex    Albumin / creatinine urine ratio       Other    Hypercholesterolemia     She has stopped statin, I order lipid panel, will decide in regards to continuation of statin.           Vitamin D deficiency     Other Visit Diagnoses       Type 2 diabetes mellitus without complication, with long-term current use of insulin (HCC)        Relevant Medications    semaglutide, 2 mg/dose, (Ozempic, 2 MG/DOSE,) 8 mg/ mL injection pen                  Discussed with the patient and all questioned fully answered. She will call me if any problems arise.    Counseled patient on diagnostic results, prognosis, risk and benefit of treatment options, instruction for management, importance of treatment compliance, Risk  factor reduction and impressions      Lonnie Alvarado MD

## 2024-08-14 NOTE — ASSESSMENT & PLAN NOTE
Lab Results   Component Value Date    HGBA1C 7.7 (A) 07/30/2024   Glycemic control has improved to 7.7% A1c, goal less than 7%, she was encouraged to continue her efforts managing her diabetes, she has not received Ozempic for last month or so, I resumed 0.25 for 2 weeks and then 0.5 mg for 2 weeks and then 1 mg for 2 weeks and then 2 mg weekly.  Decrease Lantus to 12 units twice a day when Ozempic increased to 2 mg weekly.  She was instructed to check blood sugar once or twice a day and bring her sugar log to her next visit.  Return back in 3 months with  Ophthalmology and podiatry follow-up  Labs prior to next visit.

## 2024-08-27 DIAGNOSIS — E11.65 TYPE 2 DIABETES MELLITUS WITH HYPERGLYCEMIA, WITHOUT LONG-TERM CURRENT USE OF INSULIN (HCC): ICD-10-CM

## 2024-08-27 RX ORDER — EMPAGLIFLOZIN 25 MG/1
TABLET, FILM COATED ORAL
Qty: 90 TABLET | Refills: 1 | Status: SHIPPED | OUTPATIENT
Start: 2024-08-27

## 2024-09-13 ENCOUNTER — TELEPHONE (OUTPATIENT)
Age: 64
End: 2024-09-13

## 2024-09-13 NOTE — TELEPHONE ENCOUNTER
Patient called in to confirm her upcoming appointment and I advised it is to review a mammogram, I offered to transfer her to central scheduling to get her set up. Warm transferred to Tam[place] in central scheduling

## 2024-09-16 DIAGNOSIS — E11.65 TYPE 2 DIABETES MELLITUS WITH HYPERGLYCEMIA, WITHOUT LONG-TERM CURRENT USE OF INSULIN (HCC): ICD-10-CM

## 2024-09-17 RX ORDER — PEN NEEDLE, DIABETIC 32GX 5/32"
NEEDLE, DISPOSABLE MISCELLANEOUS
Qty: 200 EACH | Refills: 3 | Status: SHIPPED | OUTPATIENT
Start: 2024-09-17

## 2024-10-28 DIAGNOSIS — K52.9 CHRONIC DIARRHEA: ICD-10-CM

## 2024-10-28 DIAGNOSIS — K21.9 GERD WITHOUT ESOPHAGITIS: Primary | ICD-10-CM

## 2024-10-29 ENCOUNTER — TELEPHONE (OUTPATIENT)
Age: 64
End: 2024-10-29

## 2024-10-29 NOTE — TELEPHONE ENCOUNTER
Caller:  Krzysztof Campos    Doctor:  Seeing a PA Mery Villa     Reason for call:  Patient called and wanted to see if there may be an appointment after 1:00 pm for the same day and location. Please call her back and advise,    Call back#:  [phone]

## 2024-11-01 ENCOUNTER — VBI (OUTPATIENT)
Dept: ADMINISTRATIVE | Facility: OTHER | Age: 64
End: 2024-11-01

## 2024-11-01 NOTE — TELEPHONE ENCOUNTER
11/01/24 10:35 AM     Chart reviewed for Pap Smear (HPV) aka Cervical Cancer Screening was/were not submitted to the patient's insurance.     Vivi Pak MA   [facility]

## 2024-11-07 ENCOUNTER — CONSULT (OUTPATIENT)
Age: 64
End: 2024-11-07
Payer: COMMERCIAL

## 2024-11-07 VITALS
HEIGHT: 67 IN | HEART RATE: 97 BPM | BODY MASS INDEX: 33.27 KG/M2 | SYSTOLIC BLOOD PRESSURE: 120 MMHG | WEIGHT: 212 LBS | TEMPERATURE: 97.4 F | OXYGEN SATURATION: 95 % | DIASTOLIC BLOOD PRESSURE: 62 MMHG

## 2024-11-07 DIAGNOSIS — K52.9 CHRONIC DIARRHEA: ICD-10-CM

## 2024-11-07 DIAGNOSIS — K51.90 ULCERATIVE COLITIS WITHOUT COMPLICATIONS, UNSPECIFIED LOCATION (HCC): ICD-10-CM

## 2024-11-07 DIAGNOSIS — K21.9 GERD WITHOUT ESOPHAGITIS: Primary | ICD-10-CM

## 2024-11-07 DIAGNOSIS — K76.0 STEATOSIS OF LIVER: ICD-10-CM

## 2024-11-07 DIAGNOSIS — R16.0 HEPATOMEGALY: ICD-10-CM

## 2024-11-07 DIAGNOSIS — R10.13 EPIGASTRIC PAIN: ICD-10-CM

## 2024-11-07 DIAGNOSIS — Z72.0 TOBACCO ABUSE: ICD-10-CM

## 2024-11-07 DIAGNOSIS — Z80.0 FAMILY HISTORY OF COLON CANCER: ICD-10-CM

## 2024-11-07 DIAGNOSIS — Z86.0100 HISTORY OF COLON POLYPS: ICD-10-CM

## 2024-11-07 PROCEDURE — 99214 OFFICE O/P EST MOD 30 MIN: CPT | Performed by: NURSE PRACTITIONER

## 2024-11-07 RX ORDER — PANTOPRAZOLE SODIUM 40 MG/1
40 TABLET, DELAYED RELEASE ORAL DAILY
Qty: 90 TABLET | Refills: 1 | Status: SHIPPED | OUTPATIENT
Start: 2024-11-07

## 2024-11-07 RX ORDER — SODIUM CHLORIDE, SODIUM LACTATE, POTASSIUM CHLORIDE, CALCIUM CHLORIDE 600; 310; 30; 20 MG/100ML; MG/100ML; MG/100ML; MG/100ML
125 INJECTION, SOLUTION INTRAVENOUS CONTINUOUS
OUTPATIENT
Start: 2024-11-07

## 2024-11-07 RX ORDER — POLYETHYLENE GLYCOL 3350, SODIUM SULFATE ANHYDROUS, SODIUM BICARBONATE, SODIUM CHLORIDE, POTASSIUM CHLORIDE 236; 22.74; 6.74; 5.86; 2.97 G/4L; G/4L; G/4L; G/4L; G/4L
4000 POWDER, FOR SOLUTION ORAL ONCE
Qty: 4000 ML | Refills: 0 | Status: SHIPPED | OUTPATIENT
Start: 2024-11-07 | End: 2024-11-07

## 2024-11-07 RX ORDER — FAMOTIDINE 40 MG/1
40 TABLET, FILM COATED ORAL
Qty: 90 TABLET | Refills: 1 | Status: SHIPPED | OUTPATIENT
Start: 2024-11-07

## 2024-11-07 NOTE — ASSESSMENT & PLAN NOTE
Orders:    EGD; Future    pantoprazole (PROTONIX) 40 mg tablet; Take 1 tablet (40 mg total) by mouth daily    famotidine (PEPCID) 40 MG tablet; Take 1 tablet (40 mg total) by mouth daily at bedtime

## 2024-11-07 NOTE — PATIENT INSTRUCTIONS
Proceed with EGD/Colonoscopy.   Re-start Protonix 40 mg, 1 pill daily in AM prior to a meal.   Start Famotidine 40 mg, 1 pill daily at bedtime.   Encouraged the patient to avoid acidic or trigger foods including alcohol as much as possible.  Encouraged the patient to consider purchasing a wedge pillow to help prevent reflux symptoms while sleeping.  Encouraged the patient not to lay down or sleep for at least 3 to 4 hours after eating and to try to avoid late night snacking.  Discussed recommendations in regards to fatty liver including:   Strict control of contributing comorbidities (obesity, prediabetes/diabetes, hypertension, and hypertriglyceridemia).  Weight loss of approx 10-15% of patient's current body weight over a period of 6-12 months through low fat diet and cardiovascular exercise as tolerated.   Limiting alcohol consumption, preferably complete abstinence.  Monitor hepatic function every 6 months with routine labs.   Proceed with blood work now.   Proceed with Right Upper Quad U/S of liver.   Schedule a f/u OV after EGD/Colonoscopy.

## 2024-11-07 NOTE — PROGRESS NOTES
Ambulatory Visit  Name: Whit Campos      : 1960      MRN: [id number]  Encounter Provider: SHU Greene  Encounter Date: 2024   Encounter department: [facility]    Assessment & Plan  GERD without esophagitis  I discussed with the patient that with their current symptoms and exam findings, I feel it would be beneficial to proceed with an EGD to further evaluate any underlying etiology that could explain their symptoms, with differential diagnoses that could include but are not limited to; GERD, gastric ulcers, Gomez's esophagus, EOE, H. pylori, etc. They were agreeable and verbalized and understanding.     I discussed the risks of procedure with the patient including, but not limited to: bleeding, infection, sore throat, and perforation. The patient gave verbal understanding and is agreeable to proceed. Patient's questions were answered to the best of my ability and until they verbalized an understanding.     In the meantime, I discussed with the patient that at this point time since her symptoms have significantly worsened off of the Protonix I do feel would be beneficial to restart the Protonix 40 mg, 1 pill daily in the a.m. and then famotidine 40 mg, 1 pill daily at bedtime from now until after her procedures as at that point in time then we will regroup with regards to the information gathered and her current symptoms and treatment plan options.  The patient is to contact our office if she experiences any side effects or issues with the changes in her medication regimen.  The patient was agreeable and verbalized an understanding.    Encouraged the patient to avoid acidic or trigger foods including alcohol as much as possible.  Encouraged the patient to consider purchasing a wedge pillow to help prevent reflux symptoms while sleeping.  Encouraged the patient not to lay down or sleep for at least 3 to 4 hours after eating and to try to avoid late  night snacking.    Orders:    Ambulatory Referral to Gastroenterology    EGD; Future    pantoprazole (PROTONIX) 40 mg tablet; Take 1 tablet (40 mg total) by mouth daily    famotidine (PEPCID) 40 MG tablet; Take 1 tablet (40 mg total) by mouth daily at bedtime    Epigastric pain  Orders:    EGD; Future    pantoprazole (PROTONIX) 40 mg tablet; Take 1 tablet (40 mg total) by mouth daily    famotidine (PEPCID) 40 MG tablet; Take 1 tablet (40 mg total) by mouth daily at bedtime    Tobacco abuse  Orders:    EGD; Future    pantoprazole (PROTONIX) 40 mg tablet; Take 1 tablet (40 mg total) by mouth daily    famotidine (PEPCID) 40 MG tablet; Take 1 tablet (40 mg total) by mouth daily at bedtime    Ulcerative colitis without complications, unspecified location (HCC)  I discussed with the patient that at this point time with her history of ulcerative colitis since it has been 2 years and she continues with the chronic diarrhea and there is a family history of colon cancer, I do feel would be beneficial to proceed with a repeat colonoscopy at the same time as the EGD for continued surveillance and monitoring.  The patient was agreeable and verbalized an understanding.    I obtained informed verbal consent from the patient. The risks/benefits/alternatives of the procedure were discussed with the patient. Risks included, but not limited to, infection, bleeding, perforation, injury to organs in the abdomen, missed lesion, and incomplete procedure were discussed. The patient gave verbal understanding and is agreeable to proceed. Bowel prep instructions were reviewed and given as ordered. Patient's questions were answered to the best of my ability and until they verbalized an understanding.      Orders:    Colonoscopy; Future    polyethylene glycol (Golytely) 4000 mL solution; Take 4,000 mL by mouth once for 1 dose Take 4000 mL by mouth once for 1 dose. Use as directed    Chronic diarrhea  Orders:    Ambulatory Referral to  Gastroenterology    Colonoscopy; Future    polyethylene glycol (Golytely) 4000 mL solution; Take 4,000 mL by mouth once for 1 dose Take 4000 mL by mouth once for 1 dose. Use as directed    History of colon polyps  Orders:    Colonoscopy; Future    polyethylene glycol (Golytely) 4000 mL solution; Take 4,000 mL by mouth once for 1 dose Take 4000 mL by mouth once for 1 dose. Use as directed    Family history of colon cancer  Orders:    Colonoscopy; Future    polyethylene glycol (Golytely) 4000 mL solution; Take 4,000 mL by mouth once for 1 dose Take 4000 mL by mouth once for 1 dose. Use as directed    Steatosis of liver  Discussed recommendations in regards to fatty liver including:   Strict control of contributing comorbidities (obesity, prediabetes/diabetes, hypertension, and hypertriglyceridemia).  Weight loss of approx 10-15% of patient's current body weight over a period of 6-12 months through low fat diet and cardiovascular exercise as tolerated.   Limiting alcohol consumption, preferably complete abstinence.  Monitor hepatic function every 6 months with routine labs.    Orders:    CBC and differential; Future    Comprehensive metabolic panel; Future    Protime-INR; Future    Bilirubin, direct; Future    US right upper quadrant; Future    Hepatomegaly  Orders:    CBC and differential; Future    Comprehensive metabolic panel; Future    Protime-INR; Future    Bilirubin, direct; Future    US right upper quadrant; Future    The patient is to schedule a follow up office visit after her EGD and Colonoscopy, but understands to call or contact our offices with any issues before then or as needed.     History of Present Illness     Whit Campos is a 64 y.o. female who presents presents today for a follow-up office visit and reevaluation of her worsening GERD symptoms, epigastric pain, with a history of tobacco abuse, ulcerative colitis, chronic diarrhea, colon polyps, family history of colon cancer, steatosis of the  liver, and hepatomegaly.  The patient reports that she was instructed to stop the Protonix a month ago because of long-term effects on the stomach and GI tract, however, since then her GERD symptoms and epigastric pain have definitely worsened.  The patient was previously on Protonix twice a day and would like to go back on the Protonix that she felt it was definitely helping.    The patient also continues with chronic loose stools at least 2 or 3 times a day, which she reports has been her norm for many years and was previous to the diagnosed with ulcerative colitis.    The patient currently denies any nausea, dysphagia, vomiting, decreased appetite, or unplanned weight loss. Water Intake: Minimal per day.    The patient currently reports that they have a BM 3 x daily and reports that it is relieving, without any nocturnal BMs, constipation, straining, melena or bloody stools. Last BM: This morning. Flatus: Minimal.    Meds: Immodium PRN.   Daily NSAID Use: Denied    Tobacco: 1 PPD smoker, 40 + years  ETOH: Denied  Marijuana: Denied  Illicit Drug Use: Denied    Imaging: (1/28/23): CT of the abdomen pelvis without contrast: 1. No evidence of hydronephrosis or ureteral calculus  2. Increased fluid and air within the stomach, colon and rectum. Correlate for ileus/enteritis. No evidence of small bowel obstruction  3. Hepatosplenomegaly. Hepatic steatosis.  4. Circumferential wall thickening of the distal esophagus. This may be related to esophagitis    Endoscopy History: EGD: (2015 or longer per patient): GERD per patient.    COLONOSCOPY: (8/2/22): Dr. Barone with 1 tubular adenoma removed and a recommendation of a repeat colonoscopy in 5 years secondary to family history of colon cancer and personal history of colon polyps.    DUE: 8/2/27     History obtained from : patient  Review of Systems        Objective     /62 (BP Location: Left arm, Patient Position: Sitting, Cuff Size: Large)   Pulse 97   Temp (!)  "97.4 °F (36.3 °C) (Temporal)   Ht 5' 7\" (1.702 m)   Wt 96.2 kg (212 lb)   SpO2 95%   BMI 33.20 kg/m²     Physical Exam  Abdomen is soft, nondistended, with mild to moderate tenderness and upon exam in the epigastric region, without any guarding or rebound tenderness and upon exam, with hypoactive bowel sounds.  Trace edema noted of the b/l lower extremities upon exam today. Skin is non-icteric.       "

## 2024-11-11 DIAGNOSIS — E11.9 TYPE 2 DIABETES MELLITUS WITHOUT COMPLICATION, WITH LONG-TERM CURRENT USE OF INSULIN (HCC): ICD-10-CM

## 2024-11-11 DIAGNOSIS — Z79.4 TYPE 2 DIABETES MELLITUS WITHOUT COMPLICATION, WITH LONG-TERM CURRENT USE OF INSULIN (HCC): ICD-10-CM

## 2024-11-11 PROBLEM — K51.90 ULCERATIVE COLITIS WITHOUT COMPLICATIONS (HCC): Status: ACTIVE | Noted: 2024-11-11

## 2024-11-11 PROBLEM — K52.9 CHRONIC DIARRHEA: Status: ACTIVE | Noted: 2023-01-28

## 2024-11-11 PROBLEM — Z86.0100 HISTORY OF COLON POLYPS: Status: ACTIVE | Noted: 2024-11-11

## 2024-11-11 PROBLEM — K76.0 STEATOSIS OF LIVER: Status: ACTIVE | Noted: 2024-11-11

## 2024-11-11 PROBLEM — R10.13 EPIGASTRIC PAIN: Status: ACTIVE | Noted: 2024-11-11

## 2024-11-11 PROBLEM — K21.9 GERD WITHOUT ESOPHAGITIS: Status: ACTIVE | Noted: 2024-11-11

## 2024-11-11 PROBLEM — R16.0 HEPATOMEGALY: Status: ACTIVE | Noted: 2024-11-11

## 2024-11-11 PROBLEM — Z80.0 FAMILY HISTORY OF COLON CANCER: Status: ACTIVE | Noted: 2024-11-11

## 2024-11-11 NOTE — ASSESSMENT & PLAN NOTE
Orders:    Colonoscopy; Future    polyethylene glycol (Golytely) 4000 mL solution; Take 4,000 mL by mouth once for 1 dose Take 4000 mL by mouth once for 1 dose. Use as directed

## 2024-11-11 NOTE — ASSESSMENT & PLAN NOTE
Orders:    Ambulatory Referral to Gastroenterology    Colonoscopy; Future    polyethylene glycol (Golytely) 4000 mL solution; Take 4,000 mL by mouth once for 1 dose Take 4000 mL by mouth once for 1 dose. Use as directed

## 2024-11-11 NOTE — ASSESSMENT & PLAN NOTE
I discussed with the patient that at this point time with her history of ulcerative colitis since it has been 2 years and she continues with the chronic diarrhea and there is a family history of colon cancer, I do feel would be beneficial to proceed with a repeat colonoscopy at the same time as the EGD for continued surveillance and monitoring.  The patient was agreeable and verbalized an understanding.    I obtained informed verbal consent from the patient. The risks/benefits/alternatives of the procedure were discussed with the patient. Risks included, but not limited to, infection, bleeding, perforation, injury to organs in the abdomen, missed lesion, and incomplete procedure were discussed. The patient gave verbal understanding and is agreeable to proceed. Bowel prep instructions were reviewed and given as ordered. Patient's questions were answered to the best of my ability and until they verbalized an understanding.      Orders:    Colonoscopy; Future    polyethylene glycol (Golytely) 4000 mL solution; Take 4,000 mL by mouth once for 1 dose Take 4000 mL by mouth once for 1 dose. Use as directed

## 2024-11-11 NOTE — ASSESSMENT & PLAN NOTE
I discussed with the patient that with their current symptoms and exam findings, I feel it would be beneficial to proceed with an EGD to further evaluate any underlying etiology that could explain their symptoms, with differential diagnoses that could include but are not limited to; GERD, gastric ulcers, Gomez's esophagus, EOE, H. pylori, etc. They were agreeable and verbalized and understanding.     I discussed the risks of procedure with the patient including, but not limited to: bleeding, infection, sore throat, and perforation. The patient gave verbal understanding and is agreeable to proceed. Patient's questions were answered to the best of my ability and until they verbalized an understanding.     In the meantime, I discussed with the patient that at this point time since her symptoms have significantly worsened off of the Protonix I do feel would be beneficial to restart the Protonix 40 mg, 1 pill daily in the a.m. and then famotidine 40 mg, 1 pill daily at bedtime from now until after her procedures as at that point in time then we will regroup with regards to the information gathered and her current symptoms and treatment plan options.  The patient is to contact our office if she experiences any side effects or issues with the changes in her medication regimen.  The patient was agreeable and verbalized an understanding.    Encouraged the patient to avoid acidic or trigger foods including alcohol as much as possible.  Encouraged the patient to consider purchasing a wedge pillow to help prevent reflux symptoms while sleeping.  Encouraged the patient not to lay down or sleep for at least 3 to 4 hours after eating and to try to avoid late night snacking.    Orders:    Ambulatory Referral to Gastroenterology    EGD; Future    pantoprazole (PROTONIX) 40 mg tablet; Take 1 tablet (40 mg total) by mouth daily    famotidine (PEPCID) 40 MG tablet; Take 1 tablet (40 mg total) by mouth daily at bedtime

## 2024-11-11 NOTE — ASSESSMENT & PLAN NOTE
Orders:    CBC and differential; Future    Comprehensive metabolic panel; Future    Protime-INR; Future    Bilirubin, direct; Future    US right upper quadrant; Future

## 2024-11-11 NOTE — ASSESSMENT & PLAN NOTE
Discussed recommendations in regards to fatty liver including:   Strict control of contributing comorbidities (obesity, prediabetes/diabetes, hypertension, and hypertriglyceridemia).  Weight loss of approx 10-15% of patient's current body weight over a period of 6-12 months through low fat diet and cardiovascular exercise as tolerated.   Limiting alcohol consumption, preferably complete abstinence.  Monitor hepatic function every 6 months with routine labs.    Orders:    CBC and differential; Future    Comprehensive metabolic panel; Future    Protime-INR; Future    Bilirubin, direct; Future    US right upper quadrant; Future

## 2024-11-11 NOTE — TELEPHONE ENCOUNTER
Reason for call:   [x] Refill   [] Prior Auth  [] Other:     Office:   [] PCP/Provider -   [x] Specialty/Provider - endo/emerson malone    Medication: semaglutide, 2 mg/dose, (Ozempic, 2 MG/DOSE,) 8 mg/ mL injection pen     Dose/Frequency: Inject 0.75 mL (2 mg total) under the skin every 7 days     Quantity: 9mL    Pharmacy: [id number] - [place] [address] [phone]     Does the patient have enough for 3 days?   [x] Yes   [] No - Send as HP to POD

## 2024-11-12 ENCOUNTER — OFFICE VISIT (OUTPATIENT)
Dept: CARDIOLOGY CLINIC | Facility: HOSPITAL | Age: 64
End: 2024-11-12
Payer: COMMERCIAL

## 2024-11-12 ENCOUNTER — APPOINTMENT (OUTPATIENT)
Dept: LAB | Facility: HOSPITAL | Age: 64
End: 2024-11-12
Attending: STUDENT IN AN ORGANIZED HEALTH CARE EDUCATION/TRAINING PROGRAM
Payer: COMMERCIAL

## 2024-11-12 VITALS
DIASTOLIC BLOOD PRESSURE: 72 MMHG | HEART RATE: 93 BPM | HEIGHT: 67 IN | WEIGHT: 212.8 LBS | OXYGEN SATURATION: 96 % | BODY MASS INDEX: 33.4 KG/M2 | SYSTOLIC BLOOD PRESSURE: 126 MMHG

## 2024-11-12 DIAGNOSIS — R16.0 HEPATOMEGALY: ICD-10-CM

## 2024-11-12 DIAGNOSIS — I10 PRIMARY HYPERTENSION: Primary | ICD-10-CM

## 2024-11-12 DIAGNOSIS — Z79.4 TYPE 2 DIABETES MELLITUS WITH STAGE 3B CHRONIC KIDNEY DISEASE, WITH LONG-TERM CURRENT USE OF INSULIN (HCC): ICD-10-CM

## 2024-11-12 DIAGNOSIS — F17.200 NICOTINE DEPENDENCE, UNCOMPLICATED, UNSPECIFIED NICOTINE PRODUCT TYPE: ICD-10-CM

## 2024-11-12 DIAGNOSIS — N18.32 TYPE 2 DIABETES MELLITUS WITH STAGE 3B CHRONIC KIDNEY DISEASE, WITH LONG-TERM CURRENT USE OF INSULIN (HCC): ICD-10-CM

## 2024-11-12 DIAGNOSIS — I12.9 BENIGN HYPERTENSION WITH CKD (CHRONIC KIDNEY DISEASE) STAGE III (HCC): ICD-10-CM

## 2024-11-12 DIAGNOSIS — E78.00 HYPERCHOLESTEROLEMIA: ICD-10-CM

## 2024-11-12 DIAGNOSIS — K76.0 STEATOSIS OF LIVER: ICD-10-CM

## 2024-11-12 DIAGNOSIS — E11.22 TYPE 2 DIABETES MELLITUS WITH STAGE 3B CHRONIC KIDNEY DISEASE, WITH LONG-TERM CURRENT USE OF INSULIN (HCC): ICD-10-CM

## 2024-11-12 DIAGNOSIS — N18.30 BENIGN HYPERTENSION WITH CKD (CHRONIC KIDNEY DISEASE) STAGE III (HCC): ICD-10-CM

## 2024-11-12 DIAGNOSIS — Z71.6 ENCOUNTER FOR SMOKING CESSATION COUNSELING: ICD-10-CM

## 2024-11-12 DIAGNOSIS — I27.20 PULMONARY HYPERTENSION (HCC): ICD-10-CM

## 2024-11-12 DIAGNOSIS — I10 PRIMARY HYPERTENSION: ICD-10-CM

## 2024-11-12 LAB
ALBUMIN SERPL BCG-MCNC: 4.3 G/DL (ref 3.5–5)
ALP SERPL-CCNC: 66 U/L (ref 34–104)
ALT SERPL W P-5'-P-CCNC: 16 U/L (ref 7–52)
ANION GAP SERPL CALCULATED.3IONS-SCNC: 10 MMOL/L (ref 4–13)
AST SERPL W P-5'-P-CCNC: 13 U/L (ref 13–39)
BASOPHILS # BLD AUTO: 0 THOUSANDS/ÂΜL (ref 0–0.1)
BASOPHILS NFR BLD AUTO: 0 % (ref 0–1)
BILIRUB DIRECT SERPL-MCNC: 0.05 MG/DL (ref 0–0.2)
BILIRUB SERPL-MCNC: 0.33 MG/DL (ref 0.2–1)
BUN SERPL-MCNC: 15 MG/DL (ref 5–25)
CALCIUM SERPL-MCNC: 9.3 MG/DL (ref 8.4–10.2)
CHLORIDE SERPL-SCNC: 102 MMOL/L (ref 96–108)
CHOLEST SERPL-MCNC: 253 MG/DL
CO2 SERPL-SCNC: 25 MMOL/L (ref 21–32)
CREAT SERPL-MCNC: 1.36 MG/DL (ref 0.6–1.3)
EOSINOPHIL # BLD AUTO: 0.04 THOUSAND/ÂΜL (ref 0–0.61)
EOSINOPHIL NFR BLD AUTO: 1 % (ref 0–6)
ERYTHROCYTE [DISTWIDTH] IN BLOOD BY AUTOMATED COUNT: 15 % (ref 11.6–15.1)
EST. AVERAGE GLUCOSE BLD GHB EST-MCNC: 137 MG/DL
GFR SERPL CREATININE-BSD FRML MDRD: 41 ML/MIN/1.73SQ M
GLUCOSE P FAST SERPL-MCNC: 106 MG/DL (ref 65–99)
HBA1C MFR BLD: 6.4 %
HCT VFR BLD AUTO: 42.8 % (ref 34.8–46.1)
HDLC SERPL-MCNC: 30 MG/DL
HGB BLD-MCNC: 14.2 G/DL (ref 11.5–15.4)
IMM GRANULOCYTES # BLD AUTO: 0.02 THOUSAND/UL (ref 0–0.2)
IMM GRANULOCYTES NFR BLD AUTO: 0 % (ref 0–2)
INR PPP: 0.96 (ref 0.85–1.19)
LDLC SERPL CALC-MCNC: 150 MG/DL (ref 0–100)
LYMPHOCYTES # BLD AUTO: 1.63 THOUSANDS/ÂΜL (ref 0.6–4.47)
LYMPHOCYTES NFR BLD AUTO: 25 % (ref 14–44)
MCH RBC QN AUTO: 30.7 PG (ref 26.8–34.3)
MCHC RBC AUTO-ENTMCNC: 33.2 G/DL (ref 31.4–37.4)
MCV RBC AUTO: 92 FL (ref 82–98)
MONOCYTES # BLD AUTO: 0.49 THOUSAND/ÂΜL (ref 0.17–1.22)
MONOCYTES NFR BLD AUTO: 8 % (ref 4–12)
NEUTROPHILS # BLD AUTO: 4.35 THOUSANDS/ÂΜL (ref 1.85–7.62)
NEUTS SEG NFR BLD AUTO: 66 % (ref 43–75)
NRBC BLD AUTO-RTO: 0 /100 WBCS
PLATELET # BLD AUTO: 198 THOUSANDS/UL (ref 149–390)
PMV BLD AUTO: 9.3 FL (ref 8.9–12.7)
POTASSIUM SERPL-SCNC: 3.8 MMOL/L (ref 3.5–5.3)
PROT SERPL-MCNC: 6.9 G/DL (ref 6.4–8.4)
PROTHROMBIN TIME: 13.3 SECONDS (ref 12.3–15)
RBC # BLD AUTO: 4.63 MILLION/UL (ref 3.81–5.12)
SODIUM SERPL-SCNC: 137 MMOL/L (ref 135–147)
TRIGL SERPL-MCNC: 365 MG/DL
WBC # BLD AUTO: 6.53 THOUSAND/UL (ref 4.31–10.16)

## 2024-11-12 PROCEDURE — 99214 OFFICE O/P EST MOD 30 MIN: CPT | Performed by: INTERNAL MEDICINE

## 2024-11-12 PROCEDURE — 82248 BILIRUBIN DIRECT: CPT

## 2024-11-12 PROCEDURE — 80061 LIPID PANEL: CPT

## 2024-11-12 PROCEDURE — 80053 COMPREHEN METABOLIC PANEL: CPT

## 2024-11-12 PROCEDURE — 36415 COLL VENOUS BLD VENIPUNCTURE: CPT

## 2024-11-12 PROCEDURE — 85610 PROTHROMBIN TIME: CPT

## 2024-11-12 PROCEDURE — 83036 HEMOGLOBIN GLYCOSYLATED A1C: CPT

## 2024-11-12 PROCEDURE — 85025 COMPLETE CBC W/AUTO DIFF WBC: CPT

## 2024-11-12 NOTE — PROGRESS NOTES
Cardiology Follow Up    Whit Campos  1960  [id number]  [facility] [address] [id number] [phone]    1. Primary hypertension        2. Pulmonary hypertension (HCC)        3. Benign hypertension with CKD (chronic kidney disease) stage III (HCC)        4. Type 2 diabetes mellitus with stage 3b chronic kidney disease, with long-term current use of insulin (HCC)        5. Nicotine dependence, uncomplicated, unspecified nicotine product type        6. Hypercholesterolemia        7. Encounter for smoking cessation counseling  Lipid Panel with Direct LDL reflex          Diagnoses and all orders for this visit:    Primary hypertension    Pulmonary hypertension (HCC)    Benign hypertension with CKD (chronic kidney disease) stage III (HCC)    Type 2 diabetes mellitus with stage 3b chronic kidney disease, with long-term current use of insulin (HCC)    Nicotine dependence, uncomplicated, unspecified nicotine product type    Hypercholesterolemia    Encounter for smoking cessation counseling  -     Lipid Panel with Direct LDL reflex; Future      I had the pleasure of seeing Whit Campos for a follow up visit.     INTERVAL HISTORY: none    History of the presenting illness, Discussion/Summary and My Plan are as follows:::    Whit is a pleasant 64-year-old lady with a history of diabetes since around 2010, for the last 2 years-poorly controlled, dyslipidemia with spotty compliance with statin, most recently still showing an elevated LDL around 160, chronic and ongoing tobacco use-about 68 pack years, currently smoking a pack a day, consequent COPD, a history of DVT and IVC filter.     She also has a family history of vascular disease-father had a heart attack at 66, sister PVD at 60 and brother CABG at 50, they were all smokers.      She originally presented for evaluation of chest pains  and shortness of breath, underwent coronary angiography that did not demonstrate any stenotic lesions but did show moderate disease-October 2021.  Cardiac physical exam is unremarkable.  ECG showed normal sinus rhythm.    Subsequently underwent a Holter which did not demonstrate any ST elevations that would suggest vasospasm.  She also underwent an echocardiogram in August 2021 that was unremarkable, a nuclear stress test in 2014 was negative.    At prior visit, was also having leg pains leading to arterial Dopplers that did show bilateral diffuse femoral-popliteal disease without significant focal stenosis although no significant change from 2015.  She had seen vascular in that regard and was advised repeat Dopplers, last one was in May 2024 and unchanged, last saw vascular in December 2023.  She is only modestly active, walking more now, about 15 minutes a day outside.  Her only complaint is some leg pains bilaterally in the buttocks and thighs.    She was also started on carvedilol for borderline elevated blood pressures and tachycardia by nephrology in August 2022.  She is not sure if she is taking a statin.  She also has a history of breast cancer.     Plan:     Ischemic evaluation: No current cardiac symptoms, in the past had symptoms suggestive of angina with coronary angiography showing up to 50% disease without significant epicardial obstructive lesions and hence no interventions were performed.  Subsequently underwent a Holter that did not demonstrate any ST elevations that would suggest vasospasm as a cause of her symptoms.  No further evaluation other than risk factor modification at this time.     PVD:  Diagnosed many years ago, continues to have symptoms that would suggest claudication, has diffuse disease bilaterally without focal lesions, advised to stop smoking, restart her statin medication and a walking program.      Dyslipidemia:  Compliance even in the past had been spotty, strongly advised to  restart rosuvastatin 40 mg, since July 2024, has not been on the statin since her 'sister had side effects'. DM is better controlled but still elevated     She has a history of moderate CAD, PVD, breast cancer, smoking-related lung disease-respiratory bronchiolitis-advised strongly in no uncertain terms that she needs to quit smoking and comply with medications to improve longevity and prevent cardiac and cancer related events and death.    Follow-up in about 6 months.     Latest Reference Range & Units 12/21/23 13:35 05/28/24 13:41   Cholesterol See Comment mg/dL 97 132   Triglycerides See Comment mg/dL 219 (H) 384 (H)   HDL >=50 mg/dL 23 (L) 29 (L)   LDL Calculated 0 - 100 mg/dL 30 26   (H): Data is abnormally high  (L): Data is abnormally low     Latest Reference Range & Units 04/04/23 15:57 06/12/23 09:34 12/21/23 13:35 04/24/24 15:08 07/30/24 13:54   Hemoglobin A1C 6.5  9.0 (H) 6.8 (H) 9.2 (H) 9.7 ! 7.7 !   (H): Data is abnormally high  !: Data is abnormal   Latest Reference Range & Units 11/08/22 15:12 02/15/23 15:02   Hemoglobin A1C 6.5  11.7 (H) 12.0 !   (H): Data is abnormally high  !: Data is abnormal     Latest Reference Range & Units 09/13/21 10:34 06/30/22 11:09   TSH 3RD GENERATON 0.450 - 4.500 uIU/mL 2.370 4.234     2021 - Cor Angio    Left Main   The vessel exhibits minimal luminal irregularities.      Left Anterior Descending   The vessel exhibits minimal luminal irregularities.      First Diagonal Branch   1st Diag lesion is 50% stenosed.      Left Circumflex   The vessel exhibits minimal luminal irregularities.      Right Coronary Artery   Mid RCA lesion is 45% stenosed. The lesion is diffuse.        Patient Active Problem List   Diagnosis    Cataract    Chronic hoarseness    Chronic low back pain    Centrilobular emphysema (HCC)    Deep vein thrombophlebitis of leg, unspecified laterality (HCC)    Depression    Gastroesophageal reflux disease with esophagitis    Hypercholesterolemia     Hypercoagulable state (HCC)    Hypertension    Migraine    Myositis    Neuropathy    Pulmonary nodule seen on imaging study    Class 2 obesity due to excess calories with body mass index (BMI) of 35.0 to 35.9 in adult    ZOË (obstructive sleep apnea)    Family history of heart disease    Pulmonary hypertension (HCC)    Primary fibromyalgia syndrome    Primary cancer of upper outer quadrant of right breast (HCC)    Malignant neoplasm of upper-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, estrogen receptor positive (HCC)    Mucinous carcinoma of breast, right (HCC)    Tobacco abuse    PAD (peripheral artery disease) (HCC)    Benign hypertension with CKD (chronic kidney disease) stage III (HCC)    Kidney lesion    Nonocclusive coronary atherosclerosis of native coronary artery    Hyponatremia    Chronic diarrhea    Presence of IVC filter    History of pulmonary embolism    Chronic respiratory failure with hypoxia (HCC)    Nicotine dependence, uncomplicated    Nocturnal hypoxia    Type 2 diabetes mellitus with stage 3b chronic kidney disease, with long-term current use of insulin (HCC)    Vitamin D deficiency    Secondary hyperparathyroidism of renal origin (HCC)    Encounter for smoking cessation counseling    Family history of colon cancer    Hepatomegaly    Steatosis of liver    History of colon polyps    Ulcerative colitis without complications (HCC)    Epigastric pain    GERD without esophagitis     Past Medical History:   Diagnosis Date    Cancer (HCC)     right breast    Chronic back pain     Chronic kidney disease     Colitis     COPD (chronic obstructive pulmonary disease) (HCC)     Depression     Diabetes mellitus (HCC)     DVT of lower limb, acute (HCC) 2004    GERD (gastroesophageal reflux disease)     H/O blood clots     rt leg, lung    Hyperlipidemia     Pneumonia     Rapid heart rate     Sleep apnea     Stroke (HCC)     4 mini strokes     Social History     Socioeconomic History    Marital status: Legally       Spouse name: Not on file    Number of children: Not on file    Years of education: Not on file    Highest education level: Not on file   Occupational History    Not on file   Tobacco Use    Smoking status: Every Day     Current packs/day: 0.50     Average packs/day: 1.5 packs/day for 47.4 years (70.6 ttl pk-yrs)     Types: Cigarettes     Start date: 7/3/1977    Smokeless tobacco: Never   Vaping Use    Vaping status: Never Used   Substance and Sexual Activity    Alcohol use: Never    Drug use: Never    Sexual activity: Not Currently   Other Topics Concern    Not on file   Social History Narrative    Not on file     Social Determinants of Health     Financial Resource Strain: Not on file   Food Insecurity: No Food Insecurity (2/13/2023)    Received from [facility]    Hunger Vital Sign     Worried About Running Out of Food in the Last Year: Never true     Ran Out of Food in the Last Year: Never true   Transportation Needs: No Transportation Needs (1/30/2023)    PRAPARE - Transportation     Lack of Transportation (Medical): No     Lack of Transportation (Non-Medical): No   Physical Activity: Not on file   Stress: Not on file   Social Connections: Unknown (6/18/2024)    Received from [facility]    Social Connections     How often do you feel lonely or isolated from those around you? (Adult - for ages 18 years and over): Not on file   Intimate Partner Violence: Not on file   Housing Stability: Low Risk  (1/30/2023)    Housing Stability Vital Sign     Unable to Pay for Housing in the Last Year: No     Number of Places Lived in the Last Year: 1     Unstable Housing in the Last Year: No      Family History   Problem Relation Age of Onset    Breast cancer Mother 72    COPD Mother     Coronary artery disease Mother     Coronary artery disease Father     Stroke Father     Lung cancer Sister     No Known Problems Sister     No Known Problems Sister     Breast cancer Maternal Grandmother     Colon cancer  Paternal Grandfather     No Known Problems Maternal Aunt     No Known Problems Maternal Aunt     No Known Problems Maternal Aunt     No Known Problems Paternal Aunt     Breast cancer Cousin      Past Surgical History:   Procedure Laterality Date    BREAST LUMPECTOMY Right     CARDIAC CATHETERIZATION N/A 10/28/2021    Procedure: Cardiac Coronary Angiogram;  Surgeon: Abdelrahman Jacinto DO;  Location:  CARDIAC CATH LAB;  Service: Cardiology    IVC FILTER INSERTION      MASTECTOMY Right     MASTECTOMY W/ SENTINEL NODE BIOPSY Right 11/09/2021    Procedure: BREAST MASTECTOMY WITH BIOPSY LYMPH NODE SENTINEL and axillary node dissection  ([place] Lymph Node Injection @ 12:30);  Surgeon: Jd Dong MD;  Location:  MAIN OR;  Service: General    US GUIDANCE BREAST BIOPSY RIGHT EACH ADDITIONAL Right 10/13/2021    US GUIDANCE BREAST BIOPSY RIGHT EACH ADDITIONAL Right 10/13/2021    US GUIDED BREAST BIOPSY RIGHT COMPLETE Right 10/13/2021       Current Outpatient Medications:     albuterol (2.5 mg/3 mL) 0.083 % nebulizer solution, Take 3 mL (2.5 mg total) by nebulization every 6 (six) hours as needed for wheezing or shortness of breath, Disp: 90 mL, Rfl: 2    albuterol (Ventolin HFA) 90 mcg/act inhaler, Inhale 2 puffs every 4 (four) hours as needed for wheezing or shortness of breath, Disp: 18 g, Rfl: 5    amitriptyline (ELAVIL) 100 mg tablet, Take 1/2 tablet (50 mg total) by mouth daily at bedtime, Disp: 45 tablet, Rfl: 1    aspirin (ECOTRIN LOW STRENGTH) 81 mg EC tablet, Take 1 tablet (81 mg total) by mouth daily, Disp: 90 tablet, Rfl: 3    Blood Glucose Monitoring Suppl (ONE TOUCH ULTRA 2) w/Device KIT, Use daily, Disp: 1 kit, Rfl: 0    clonazePAM (KlonoPIN) 0.5 mg tablet, Take 0.5 mg by mouth daily as needed  , Disp: , Rfl:     doxepin (SINEquan) 100 mg capsule, take 1 to 2 capsules by mouth at bedtime if needed, Disp: , Rfl:     Empagliflozin (Jardiance) 25 MG TABS, Take 1 tablet (25 mg total) by mouth in the  morning, Disp: 90 tablet, Rfl: 1    glucose blood (OneTouch Ultra) test strip, Use to test blood sugar three times a day, Disp: 100 strip, Rfl: 3    Insulin Glargine Solostar (Lantus SoloStar) 100 UNIT/ML SOPN, Inject 15 Units under the skin 2 times a day, Disp: 30 mL, Rfl: 1    Insulin Pen Needle (Unifine Pentips) 32G X 4 MM MISC, use twice a day for INJECTIONS, Disp: 200 each, Rfl: 3    loperamide (IMODIUM) 2 mg capsule, Take 1 capsule (2 mg total) by mouth 4 (four) times a day as needed for diarrhea, Disp: 30 capsule, Rfl: 0    OneTouch Delica Lancets 33G MISC, Check blood sugars three times daily. Please substitute with appropriate alternative as covered by patient's insurance. Dx: E11.65, Disp: 300 each, Rfl: 3    pantoprazole (PROTONIX) 40 mg tablet, Take 1 tablet (40 mg total) by mouth daily, Disp: 90 tablet, Rfl: 1    semaglutide, 2 mg/dose, (Ozempic, 2 MG/DOSE,) 8 mg/ mL injection pen, Inject 0.75 mL (2 mg total) under the skin every 7 days, Disp: 9 mL, Rfl: 0    vilazodone (VIIBRYD) 40 mg tablet, Take 40 mg by mouth daily., Disp: , Rfl:     VRAYLAR 6 MG capsule, Take 6 mg by mouth daily, Disp: , Rfl: 0    benztropine (COGENTIN) 0.5 mg tablet, Take 0.5 mg by mouth daily at bedtime   (Patient not taking: Reported on 5/3/2024), Disp: , Rfl:     Continuous Blood Gluc Sensor (FreeStyle Darius 2 Sensor) MISC, Use to check her blood sugars continuously and change it every 2 weeks (Patient not taking: Reported on 4/24/2024), Disp: 6 each, Rfl: 0    famotidine (PEPCID) 40 MG tablet, Take 1 tablet (40 mg total) by mouth daily at bedtime (Patient not taking: Reported on 11/12/2024), Disp: 90 tablet, Rfl: 1    gabapentin (NEURONTIN) 300 mg capsule, take 1 capsule by mouth twice a day (Patient not taking: Reported on 7/30/2024), Disp: 60 capsule, Rfl: 2    polyethylene glycol (Golytely) 4000 mL solution, Take 4,000 mL by mouth once for 1 dose Take 4000 mL by mouth once for 1 dose. Use as directed (Patient not taking:  "Reported on 11/12/2024), Disp: 4000 mL, Rfl: 0    tiotropium (Spiriva Respimat) 2.5 MCG/ACT AERS inhaler, Inhale 2 puffs daily (Patient not taking: Reported on 11/12/2024), Disp: 4 g, Rfl: 11  Allergies   Allergen Reactions    Amoxicillin-Pot Clavulanate GI Intolerance    Anastrozole Other (See Comments)     Diarrhea, Nausea, Stomach pain        Imaging: NM lymphatic breast    Result Date: 11/9/2021  Narrative: SENTINEL NODE LYMPHOSCINTIGRAPHY INDICATION: Right breast carcinoma FINDINGS: 0.537 mCi Tc-99m sulfur colloid (0.6 cc volume) was administered in divided doses by Dr. Dong in the right breast periareolar region. Scintigraphic images were obtained over the right hemithorax and axilla in multiple projections. Initial imaging does not demonstrate a node.  The patient was transferred to the operating room in satisfactory condition for kenneth localization.     Impression: 1.  Injection for sentinel lymph node localization. Workstation performed: [id number]       Review of Systems:  Review of Systems   Constitutional: Negative.    HENT: Negative.     Eyes: Negative.    Respiratory: Negative.  Negative for apnea, cough, choking, chest tightness, shortness of breath, wheezing and stridor.    Cardiovascular:  Positive for chest pain. Negative for palpitations and leg swelling.   Endocrine: Negative.    Musculoskeletal: Negative.    Allergic/Immunologic: Negative.        Physical Exam:  /72   Pulse 93   Ht 5' 7\" (1.702 m)   Wt 96.5 kg (212 lb 12.8 oz)   SpO2 96%   BMI 33.33 kg/m²   Physical Exam  Constitutional:       General: She is not in acute distress.     Appearance: Normal appearance. She is not ill-appearing.   HENT:      Head: Normocephalic.      Nose: Nose normal. No congestion or rhinorrhea.      Mouth/Throat:      Mouth: Mucous membranes are moist.      Pharynx: Oropharynx is clear. No oropharyngeal exudate or posterior oropharyngeal erythema.   Eyes:      General:         Right eye: No " "discharge.         Left eye: No discharge.      Pupils: Pupils are equal, round, and reactive to light.   Neck:      Vascular: No carotid bruit.   Cardiovascular:      Rate and Rhythm: Normal rate and regular rhythm.      Heart sounds: No murmur heard.     No friction rub. No gallop.   Pulmonary:      Effort: No respiratory distress.      Breath sounds: No stridor. No wheezing or rhonchi.   Abdominal:      General: Abdomen is flat. Bowel sounds are normal. There is no distension.      Tenderness: There is no abdominal tenderness.      Hernia: No hernia is present.   Musculoskeletal:      Cervical back: Normal range of motion. No rigidity or tenderness.   Lymphadenopathy:      Cervical: No cervical adenopathy.   Skin:     General: Skin is warm.      Coloration: Skin is not jaundiced or pale.      Findings: No bruising or erythema.   Neurological:      Mental Status: She is alert.         This note was completed in part utilizing [employer] direct voice recognition software.   Grammatical errors, random word insertion, spelling mistakes, occasional wrong word or \"sound-alike\" substitutions and incomplete sentences may be an occasional consequence of the system secondary to software limitations, ambient noise and hardware issues. At the time of dictation, efforts were made to edit, clarify and /or correct errors.  Please read the chart carefully and recognize, using context, where substitutions have occurred.  If you have any questions or concerns about the context, text or information contained within the body of this dictation, please contact myself, the provider, for further clarification.  "

## 2024-11-15 ENCOUNTER — RESULTS FOLLOW-UP (OUTPATIENT)
Dept: OTHER | Facility: HOSPITAL | Age: 64
End: 2024-11-15

## 2024-11-15 ENCOUNTER — HOSPITAL ENCOUNTER (OUTPATIENT)
Dept: ULTRASOUND IMAGING | Facility: HOSPITAL | Age: 64
Discharge: HOME | End: 2024-11-15
Payer: COMMERCIAL

## 2024-11-15 ENCOUNTER — APPOINTMENT (OUTPATIENT)
Dept: LAB | Facility: HOSPITAL | Age: 64
End: 2024-11-15
Payer: COMMERCIAL

## 2024-11-15 DIAGNOSIS — R16.0 HEPATOMEGALY: ICD-10-CM

## 2024-11-15 DIAGNOSIS — K76.0 STEATOSIS OF LIVER: ICD-10-CM

## 2024-11-15 DIAGNOSIS — N18.32 STAGE 3B CHRONIC KIDNEY DISEASE (HCC): ICD-10-CM

## 2024-11-15 LAB
CREAT UR-MCNC: 95.1 MG/DL
MICROALBUMIN UR-MCNC: 15.2 MG/L
MICROALBUMIN/CREAT 24H UR: 16 MG/G CREATININE (ref 0–30)

## 2024-11-15 PROCEDURE — 76705 ECHO EXAM OF ABDOMEN: CPT

## 2024-11-15 PROCEDURE — 82570 ASSAY OF URINE CREATININE: CPT

## 2024-11-15 PROCEDURE — 82043 UR ALBUMIN QUANTITATIVE: CPT

## 2024-11-18 NOTE — TELEPHONE ENCOUNTER
I called and left a VM for Krzysztof to call the office back to discuss the following:    ----- Message from SHU Cheng sent at 11/15/2024  4:07 PM EST -----  Please advise patient the amount of urine is stable.  She was recommended to recheck in July and has not yet scheduled appointment, please ask if she would like to reschedule at this time.  If so please order labs prior to appointment.  Thank you.

## 2024-11-19 NOTE — TELEPHONE ENCOUNTER
I called and spoke with Krzysztof regarding the following:      ----- Message from SHU Cheng sent at 11/15/2024  4:07 PM EST -----  Please advise patient the amount of urine is stable.  She was recommended to recheck in July and has not yet scheduled appointment, please ask if she would like to reschedule at this time.  If so please order labs prior to appointment.  Thank you.      She is aware of results. She is scheduled to see Dex December 12th 2024 in [facility] for a follow up.

## 2024-11-20 LAB
LEFT EYE DIABETIC RETINOPATHY: NORMAL
RIGHT EYE DIABETIC RETINOPATHY: NORMAL
SEVERITY (EYE EXAM): NORMAL

## 2024-11-21 ENCOUNTER — RESULTS FOLLOW-UP (OUTPATIENT)
Age: 64
End: 2024-11-21

## 2024-11-21 NOTE — RESULT ENCOUNTER NOTE
Can you please call the patient and let her know that I just reviewed the results of her right upper quadrant ultrasound and it did show evidence of mild steatosis of the liver or otherwise known as fatty liver, but was otherwise normal.  She should continue the rest of the treatment plan we discussed at her last office visit and follow-up as scheduled.

## 2024-11-27 ENCOUNTER — TELEPHONE (OUTPATIENT)
Dept: VASCULAR SURGERY | Facility: CLINIC | Age: 64
End: 2024-11-27

## 2024-11-27 ENCOUNTER — TRANSCRIBE ORDERS (OUTPATIENT)
Dept: VASCULAR SURGERY | Facility: CLINIC | Age: 64
End: 2024-11-27

## 2024-11-27 DIAGNOSIS — I73.9 PAD (PERIPHERAL ARTERY DISEASE) (HCC): Primary | ICD-10-CM

## 2024-11-27 NOTE — TELEPHONE ENCOUNTER
Scheduled pt's [facility] for 12/5, pt prefers [facility] and the schedules are not up yet. Will call her to schedule OV when they are available

## 2024-11-28 DIAGNOSIS — E11.65 TYPE 2 DIABETES MELLITUS WITH HYPERGLYCEMIA, WITHOUT LONG-TERM CURRENT USE OF INSULIN (HCC): ICD-10-CM

## 2024-11-29 RX ORDER — INSULIN GLARGINE 100 [IU]/ML
INJECTION, SOLUTION SUBCUTANEOUS
Qty: 30 ML | Refills: 1 | Status: SHIPPED | OUTPATIENT
Start: 2024-11-29

## 2024-12-05 ENCOUNTER — HOSPITAL ENCOUNTER (OUTPATIENT)
Dept: NON INVASIVE DIAGNOSTICS | Facility: HOSPITAL | Age: 64
Discharge: HOME | End: 2024-12-05
Payer: COMMERCIAL

## 2024-12-05 DIAGNOSIS — I73.9 PAD (PERIPHERAL ARTERY DISEASE) (HCC): ICD-10-CM

## 2024-12-05 PROCEDURE — 93925 LOWER EXTREMITY STUDY: CPT | Performed by: SURGERY

## 2024-12-05 PROCEDURE — 93922 UPR/L XTREMITY ART 2 LEVELS: CPT | Performed by: SURGERY

## 2024-12-05 PROCEDURE — 93923 UPR/LXTR ART STDY 3+ LVLS: CPT

## 2024-12-05 PROCEDURE — 93925 LOWER EXTREMITY STUDY: CPT

## 2024-12-09 ENCOUNTER — NURSE TRIAGE (OUTPATIENT)
Age: 64
End: 2024-12-09

## 2024-12-09 NOTE — TELEPHONE ENCOUNTER
Consult 11/7/24 [facility] Hx of GERD  EGD Colonoscopy 12/18/24    Pt on Protonix 40 mg and Pepcid 40 mg and was doing well however notes worsening reflux as of yesterday. No other GI complaints. Pt to increase PPI to BID in the interim.    Reason for Disposition   The patient is on PPI once a day with continued epigastric discomfort, reflux, regurgitation    Answer Assessment - Initial Assessment Questions  1. Do you have a history of GERD?  Yes  2. When did your symptoms start? Please describe your symptoms and how often you experience these symptoms.  Worsening as of yesterday  3. Are you taking any acid reducing medications currently such as: Prilosec (Omeprazole), Protonix (Pantoprazole), Prevacid (Lansoprazole), Nexium (Esomeprazole), Aciphex (Rabeprazole), Dexilant (Dexlansoprazole)?  Protonix and Pepcid  4. Have you tried any OTC acid reducing medications? (If so, which medications have you tried?) Zantac (Ranitidine), Pepcid (Famotidine), Tagamet (Cimetidine) Tums, Rolaids, Maalox, Mylanta.  Denies  9. Have you had any recent blood work, imaging, or procedures done? If yes, what were those?   US    Protocols used: [facility]-ADULT-[facility]

## 2024-12-12 ENCOUNTER — TELEPHONE (OUTPATIENT)
Age: 64
End: 2024-12-12

## 2024-12-12 ENCOUNTER — OFFICE VISIT (OUTPATIENT)
Dept: NEPHROLOGY | Facility: CLINIC | Age: 64
End: 2024-12-12
Payer: COMMERCIAL

## 2024-12-12 VITALS
WEIGHT: 214 LBS | HEART RATE: 97 BPM | DIASTOLIC BLOOD PRESSURE: 64 MMHG | OXYGEN SATURATION: 95 % | HEIGHT: 67 IN | TEMPERATURE: 97.2 F | SYSTOLIC BLOOD PRESSURE: 110 MMHG | BODY MASS INDEX: 33.59 KG/M2

## 2024-12-12 DIAGNOSIS — N18.32 STAGE 3B CHRONIC KIDNEY DISEASE (HCC): Primary | ICD-10-CM

## 2024-12-12 PROCEDURE — 99214 OFFICE O/P EST MOD 30 MIN: CPT

## 2024-12-12 RX ORDER — CLONIDINE HYDROCHLORIDE 0.1 MG/1
TABLET ORAL
COMMUNITY
Start: 2024-12-02

## 2024-12-12 NOTE — PROGRESS NOTES
OFFICE FOLLOW UP - Nephrology   Whit Campos 64 y.o. female MRN: [id number]         Interim HPI:   Whit Campos has a PMH of  GERD, UC, HTN, DM2, COPD, tobacco use, DVT with IVC filter, breast cancer s/p right mastectomy and returns today in the renal clinic for the continued management of CKD. Patient was last seen on 3/19/24 and was stable from renal standpoint. Since the last visit, there has been no ER visits or hospitilizations. Patient has no complaints at this time and is feeling well. Patient denies any chest pain, shortness of breath or swelling. The last blood work was done on 11/12/24  which we have reviewed together.      ASSESSMENT and PLAN:  Krzysztof was seen today for follow-up.    Diagnoses and all orders for this visit:    Stage 3b chronic kidney disease (HCC)  -     CBC; Future  -     Comprehensive metabolic panel; Future  -     Magnesium; Future  -     Phosphorus; Future  -     PTH, intact; Future  -     Urinalysis with microscopic; Future  -     Vitamin D 25 hydroxy; Future        Chronic kidney disease stage 3b:  Etiology: Presumed diabetic nephropathy, tobacco related, obesity related, hypertension  Baseline creatinine 1.3-1.5 mg/dL  Current creatinine 1.36 mg/dL  Avoid NSAIDs  uACR improvign down to 16 mg/g. Continue with jardiance 25 mg daily for CKD progression  She was counseled to stop smoking, currently with a 66 pack year history. Most recent low dose CT lung screening protocol completed 3/4/24. Most recent PCP appointment reviewed, to potentially start on nicotine replacement therapy.   Kidney ultrasound showed right kidney slightly smaller than left, subsequent renal artery ultrasound revealed appropriate blood flow to both kidneys. No hydronephrosis     Hypertension:  Current /64  Currently on clonidine. Patient currently has clonidine listed on her medication list. Unclear if this for hypertension or some other reason. She is unaware if she is taking this medication and  will go home to find out and let us know.   Low sodium diet recommended  Home BP monitoring recommended  Recommend diet and low impact exercise weight loss and health benefits    Type 2 diabetes  Continue with current medications which include jardiance 25 mg daily and ozempic 2 mg weekly. Most recent A1c was 6.4% which is improved        Patient Instructions   It was nice seeing you today. Your kidney function is stable. Please follow up with us in 6 months. I have ordered lab work for you to get done before then. In the meantime, please avoid NSAIDs and have a healthy diet and exercise.  Find out if you are on clonidine when you go home and let us know          ROS:   Review of Systems   Constitutional:  Negative for chills and fever.   Respiratory:  Positive for cough and shortness of breath.    Cardiovascular:  Negative for chest pain.   Gastrointestinal:  Positive for diarrhea. Negative for abdominal pain, nausea and vomiting.   Genitourinary:  Negative for dysuria and hematuria.   Neurological:  Negative for dizziness and headaches.        Allergies: Amoxicillin-pot clavulanate and Anastrozole    Medications:   Current Outpatient Medications:     albuterol (2.5 mg/3 mL) 0.083 % nebulizer solution, Take 3 mL (2.5 mg total) by nebulization every 6 (six) hours as needed for wheezing or shortness of breath, Disp: 90 mL, Rfl: 2    albuterol (Ventolin HFA) 90 mcg/act inhaler, Inhale 2 puffs every 4 (four) hours as needed for wheezing or shortness of breath, Disp: 18 g, Rfl: 5    amitriptyline (ELAVIL) 100 mg tablet, Take 1/2 tablet (50 mg total) by mouth daily at bedtime, Disp: 45 tablet, Rfl: 1    aspirin (ECOTRIN LOW STRENGTH) 81 mg EC tablet, Take 1 tablet (81 mg total) by mouth daily, Disp: 90 tablet, Rfl: 3    Blood Glucose Monitoring Suppl (ONE TOUCH ULTRA 2) w/Device KIT, Use daily, Disp: 1 kit, Rfl: 0    clonazePAM (KlonoPIN) 0.5 mg tablet, Take 0.5 mg by mouth daily as needed  , Disp: , Rfl:     cloNIDine  (CATAPRES) 0.1 mg tablet, , Disp: , Rfl:     doxepin (SINEquan) 100 mg capsule, take 1 to 2 capsules by mouth at bedtime if needed, Disp: , Rfl:     Empagliflozin (Jardiance) 25 MG TABS, Take 1 tablet (25 mg total) by mouth in the morning, Disp: 90 tablet, Rfl: 1    glucose blood (OneTouch Ultra) test strip, Use to test blood sugar three times a day, Disp: 100 strip, Rfl: 3    Insulin Pen Needle (Unifine Pentips) 32G X 4 MM MISC, use twice a day for INJECTIONS, Disp: 200 each, Rfl: 3    Lantus SoloStar 100 units/mL SOPN, Inject 15 Units under the skin 2 times a day, Disp: 30 mL, Rfl: 1    loperamide (IMODIUM) 2 mg capsule, Take 1 capsule (2 mg total) by mouth 4 (four) times a day as needed for diarrhea, Disp: 30 capsule, Rfl: 0    OneTouch Delica Lancets 33G MISC, Check blood sugars three times daily. Please substitute with appropriate alternative as covered by patient's insurance. Dx: E11.65, Disp: 300 each, Rfl: 3    pantoprazole (PROTONIX) 40 mg tablet, Take 1 tablet (40 mg total) by mouth daily, Disp: 90 tablet, Rfl: 1    semaglutide, 2 mg/dose, (Ozempic, 2 MG/DOSE,) 8 mg/ mL injection pen, Inject 0.75 mL (2 mg total) under the skin every 7 days, Disp: 9 mL, Rfl: 0    vilazodone (VIIBRYD) 40 mg tablet, Take 40 mg by mouth daily., Disp: , Rfl:     VRAYLAR 6 MG capsule, Take 6 mg by mouth daily, Disp: , Rfl: 0    benztropine (COGENTIN) 0.5 mg tablet, Take 0.5 mg by mouth daily at bedtime   (Patient not taking: Reported on 5/3/2024), Disp: , Rfl:     Continuous Blood Gluc Sensor (FreeStyle Darius 2 Sensor) MISC, Use to check her blood sugars continuously and change it every 2 weeks (Patient not taking: Reported on 4/24/2024), Disp: 6 each, Rfl: 0    famotidine (PEPCID) 40 MG tablet, Take 1 tablet (40 mg total) by mouth daily at bedtime (Patient not taking: Reported on 11/12/2024), Disp: 90 tablet, Rfl: 1    gabapentin (NEURONTIN) 300 mg capsule, take 1 capsule by mouth twice a day (Patient not taking: Reported on  7/30/2024), Disp: 60 capsule, Rfl: 2    polyethylene glycol (Golytely) 4000 mL solution, Take 4,000 mL by mouth once for 1 dose Take 4000 mL by mouth once for 1 dose. Use as directed (Patient not taking: Reported on 11/12/2024), Disp: 4000 mL, Rfl: 0    tiotropium (Spiriva Respimat) 2.5 MCG/ACT AERS inhaler, Inhale 2 puffs daily (Patient not taking: Reported on 11/12/2024), Disp: 4 g, Rfl: 11    Past Medical History:   Diagnosis Date    Cancer (HCC)     right breast    Chronic back pain     Chronic kidney disease     Colitis     COPD (chronic obstructive pulmonary disease) (HCC)     Depression     Diabetes mellitus (HCC)     DVT of lower limb, acute (HCC) 2004    GERD (gastroesophageal reflux disease)     H/O blood clots     rt leg, lung    Hyperlipidemia     Pneumonia     Rapid heart rate     Sleep apnea     Stroke (HCC)     4 mini strokes     Past Surgical History:   Procedure Laterality Date    BREAST LUMPECTOMY Right     CARDIAC CATHETERIZATION N/A 10/28/2021    Procedure: Cardiac Coronary Angiogram;  Surgeon: Abdelrahman Jacinto DO;  Location:  CARDIAC CATH LAB;  Service: Cardiology    IVC FILTER INSERTION      MASTECTOMY Right     MASTECTOMY W/ SENTINEL NODE BIOPSY Right 11/09/2021    Procedure: BREAST MASTECTOMY WITH BIOPSY LYMPH NODE SENTINEL and axillary node dissection  ([facility] Lymph Node Injection @ 12:30);  Surgeon: Jd Dong MD;  Location: [facility];  Service: General    US GUIDANCE BREAST BIOPSY RIGHT EACH ADDITIONAL Right 10/13/2021    US GUIDANCE BREAST BIOPSY RIGHT EACH ADDITIONAL Right 10/13/2021    US GUIDED BREAST BIOPSY RIGHT COMPLETE Right 10/13/2021     Family History   Problem Relation Age of Onset    Breast cancer Mother 72    COPD Mother     Coronary artery disease Mother     Coronary artery disease Father     Stroke Father     Lung cancer Sister     No Known Problems Sister     No Known Problems Sister     Breast cancer Maternal Grandmother     Colon cancer Paternal  "Grandfather     No Known Problems Maternal Aunt     No Known Problems Maternal Aunt     No Known Problems Maternal Aunt     No Known Problems Paternal Aunt     Breast cancer Cousin       reports that she has been smoking cigarettes. She started smoking about 47 years ago. She has a 70.7 pack-year smoking history. She has never used smokeless tobacco. She reports that she does not drink alcohol and does not use drugs.      Physical Exam:   Vitals:    12/12/24 1340   BP: 110/64   BP Location: Left arm   Patient Position: Sitting   Cuff Size: Large   Pulse: 97   Temp: (!) 97.2 °F (36.2 °C)   SpO2: 95%   Weight: 97.1 kg (214 lb)   Height: 5' 7\" (1.702 m)     Body mass index is 33.52 kg/m².  Physical Exam  Constitutional:       General: She is not in acute distress.  HENT:      Head: Normocephalic and atraumatic.   Cardiovascular:      Rate and Rhythm: Normal rate and regular rhythm.      Pulses: Normal pulses.      Heart sounds: No murmur heard.  Pulmonary:      Effort: Pulmonary effort is normal. No respiratory distress.      Breath sounds: Normal breath sounds.   Abdominal:      General: Bowel sounds are normal.      Palpations: Abdomen is soft.      Tenderness: There is no abdominal tenderness.   Musculoskeletal:      Right lower leg: No edema.      Left lower leg: No edema.   Skin:     General: Skin is warm and dry.   Neurological:      General: No focal deficit present.      Mental Status: She is alert.   Psychiatric:         Mood and Affect: Mood normal.         Behavior: Behavior normal.            Lab Results:  Results for orders placed or performed in visit on 11/15/24   Albumin / creatinine urine ratio    Collection Time: 11/15/24  9:58 AM   Result Value Ref Range    Creatinine, Ur 95.1 Reference range not established. mg/dL    Albumin,U,Random 15.2 <20.0 mg/L    Albumin Creat Ratio 16 0 - 30 mg/g creatinine     *Note: Due to a large number of results and/or encounters for the requested time period, some " "results have not been displayed. A complete set of results can be found in Results Review.             Invalid input(s): \"ALBUMIN\"        Portions of the record may have been created with voice recognition software. Occasional wrong word or \"sound a like\" substitutions may have occurred due to the inherent limitations of voice recognition software. Read the chart carefully and recognize,    "

## 2024-12-13 ENCOUNTER — TELEPHONE (OUTPATIENT)
Age: 64
End: 2024-12-13

## 2024-12-13 NOTE — TELEPHONE ENCOUNTER
Patient called back to let the doctor know she is taking the Clonidine 0.1 mg daily. She states this was prescribed by her psychiatrist for depression.  She is asking if she should continue taking this?  Please advise

## 2024-12-18 ENCOUNTER — ANESTHESIA EVENT (OUTPATIENT)
Dept: PERIOP | Facility: HOSPITAL | Age: 64
End: 2024-12-18
Payer: COMMERCIAL

## 2024-12-18 ENCOUNTER — HOSPITAL ENCOUNTER (OUTPATIENT)
Dept: PERIOP | Facility: HOSPITAL | Age: 64
Setting detail: OUTPATIENT SURGERY
Discharge: HOME | End: 2024-12-18
Payer: COMMERCIAL

## 2024-12-18 ENCOUNTER — ANESTHESIA (OUTPATIENT)
Dept: PERIOP | Facility: HOSPITAL | Age: 64
End: 2024-12-18
Payer: COMMERCIAL

## 2024-12-18 ENCOUNTER — VBI (OUTPATIENT)
Dept: ADMINISTRATIVE | Facility: OTHER | Age: 64
End: 2024-12-18

## 2024-12-18 VITALS
DIASTOLIC BLOOD PRESSURE: 59 MMHG | BODY MASS INDEX: 33.59 KG/M2 | WEIGHT: 214 LBS | RESPIRATION RATE: 18 BRPM | OXYGEN SATURATION: 94 % | TEMPERATURE: 97.6 F | SYSTOLIC BLOOD PRESSURE: 120 MMHG | HEART RATE: 77 BPM | HEIGHT: 67 IN

## 2024-12-18 DIAGNOSIS — K21.9 GERD WITHOUT ESOPHAGITIS: ICD-10-CM

## 2024-12-18 DIAGNOSIS — Z86.0100 HISTORY OF COLON POLYPS: ICD-10-CM

## 2024-12-18 DIAGNOSIS — K51.90 ULCERATIVE COLITIS WITHOUT COMPLICATIONS, UNSPECIFIED LOCATION (HCC): ICD-10-CM

## 2024-12-18 DIAGNOSIS — R10.13 EPIGASTRIC PAIN: ICD-10-CM

## 2024-12-18 DIAGNOSIS — Z72.0 TOBACCO ABUSE: ICD-10-CM

## 2024-12-18 DIAGNOSIS — Z80.0 FAMILY HISTORY OF COLON CANCER: ICD-10-CM

## 2024-12-18 DIAGNOSIS — K52.9 CHRONIC DIARRHEA: ICD-10-CM

## 2024-12-18 LAB
GLUCOSE SERPL-MCNC: 152 MG/DL (ref 65–140)
GLUCOSE SERPL-MCNC: 86 MG/DL (ref 65–140)

## 2024-12-18 PROCEDURE — 82948 REAGENT STRIP/BLOOD GLUCOSE: CPT

## 2024-12-18 PROCEDURE — 43239 EGD BIOPSY SINGLE/MULTIPLE: CPT | Performed by: INTERNAL MEDICINE

## 2024-12-18 PROCEDURE — 88305 TISSUE EXAM BY PATHOLOGIST: CPT | Performed by: PATHOLOGY

## 2024-12-18 PROCEDURE — 45380 COLONOSCOPY AND BIOPSY: CPT | Performed by: INTERNAL MEDICINE

## 2024-12-18 PROCEDURE — 45381 COLONOSCOPY SUBMUCOUS NJX: CPT | Performed by: INTERNAL MEDICINE

## 2024-12-18 PROCEDURE — 45385 COLONOSCOPY W/LESION REMOVAL: CPT | Performed by: INTERNAL MEDICINE

## 2024-12-18 RX ORDER — ONDANSETRON 2 MG/ML
4 INJECTION INTRAMUSCULAR; INTRAVENOUS ONCE AS NEEDED
Status: DISCONTINUED | OUTPATIENT
Start: 2024-12-18 | End: 2024-12-22 | Stop reason: HOSPADM

## 2024-12-18 RX ORDER — PROPOFOL 10 MG/ML
INJECTION, EMULSION INTRAVENOUS AS NEEDED
Status: DISCONTINUED | OUTPATIENT
Start: 2024-12-18 | End: 2024-12-18

## 2024-12-18 RX ORDER — SODIUM CHLORIDE, SODIUM LACTATE, POTASSIUM CHLORIDE, CALCIUM CHLORIDE 600; 310; 30; 20 MG/100ML; MG/100ML; MG/100ML; MG/100ML
INJECTION, SOLUTION INTRAVENOUS CONTINUOUS PRN
Status: DISCONTINUED | OUTPATIENT
Start: 2024-12-18 | End: 2024-12-18

## 2024-12-18 RX ORDER — ALBUTEROL SULFATE 0.83 MG/ML
2.5 SOLUTION RESPIRATORY (INHALATION) ONCE AS NEEDED
Status: DISCONTINUED | OUTPATIENT
Start: 2024-12-18 | End: 2024-12-22 | Stop reason: HOSPADM

## 2024-12-18 RX ORDER — LIDOCAINE HYDROCHLORIDE 20 MG/ML
INJECTION, SOLUTION EPIDURAL; INFILTRATION; INTRACAUDAL; PERINEURAL AS NEEDED
Status: DISCONTINUED | OUTPATIENT
Start: 2024-12-18 | End: 2024-12-18

## 2024-12-18 RX ADMIN — SODIUM CHLORIDE, SODIUM LACTATE, POTASSIUM CHLORIDE, AND CALCIUM CHLORIDE: .6; .31; .03; .02 INJECTION, SOLUTION INTRAVENOUS at 07:25

## 2024-12-18 RX ADMIN — PROPOFOL 100 MG: 10 INJECTION, EMULSION INTRAVENOUS at 07:33

## 2024-12-18 RX ADMIN — LIDOCAINE HYDROCHLORIDE 100 MG: 20 INJECTION, SOLUTION EPIDURAL; INFILTRATION; INTRACAUDAL; PERINEURAL at 07:33

## 2024-12-18 RX ADMIN — PROPOFOL 150 MCG/KG/MIN: 10 INJECTION, EMULSION INTRAVENOUS at 07:34

## 2024-12-18 NOTE — TELEPHONE ENCOUNTER
I called and spoke with Krzysztof regarding her concern. She is aware to continue taking clonidine .1mg daily. She had no questions or concerns.

## 2024-12-18 NOTE — TELEPHONE ENCOUNTER
12/18/24 9:04 AM     Chart reviewed for Cervical Cancer Screening was/were not submitted to the patient's insurance.     Vivi Pak MA   [facility]

## 2024-12-18 NOTE — ANESTHESIA PREPROCEDURE EVALUATION
Procedure:  COLONOSCOPY  EGD    Relevant Problems   CARDIO   (+) Deep vein thrombophlebitis of leg, unspecified laterality (HCC)   (+) Hypercholesterolemia   (+) Hypertension   (+) Migraine   (+) Nonocclusive coronary atherosclerosis of native coronary artery   (+) PAD (peripheral artery disease) (HCC)   (+) Pulmonary hypertension (HCC)      ENDO   (+) Secondary hyperparathyroidism of renal origin (HCC)   (+) Type 2 diabetes mellitus with stage 3b chronic kidney disease, with long-term current use of insulin (HCC)      GI/HEPATIC   (+) GERD without esophagitis   (+) Gastroesophageal reflux disease with esophagitis   (+) Hepatomegaly   (+) Steatosis of liver      /RENAL   (+) Benign hypertension with CKD (chronic kidney disease) stage III (HCC)   (+) Kidney lesion      GYN   (+) Malignant neoplasm of upper-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, estrogen receptor positive (HCC)   (+) Mucinous carcinoma of breast, right (HCC)   (+) Primary cancer of upper outer quadrant of right breast (HCC)      HEMATOLOGY   (+) Hypercoagulable state (HCC)      MUSCULOSKELETAL   (+) Chronic low back pain   (+) Myositis   (+) Primary fibromyalgia syndrome      NEURO/PSYCH   (+) Chronic low back pain   (+) Depression   (+) Migraine   (+) Primary fibromyalgia syndrome      PULMONARY   (+) Centrilobular emphysema (HCC) (4L NC at nightime)   (+) Chronic respiratory failure with hypoxia (HCC)   (+) ZOË (obstructive sleep apnea)        Physical Exam    Airway    Mallampati score: I  TM Distance: >3 FB  Neck ROM: full     Dental    lower dentures and upper dentures    Cardiovascular      Pulmonary      Other Findings  post-pubertal.      Anesthesia Plan  ASA Score- 3     Anesthesia Type- IV sedation with anesthesia with ASA Monitors.         Additional Monitors:     Airway Plan:            Plan Factors-Exercise tolerance (METS): >4 METS.    Chart reviewed.   Existing labs reviewed. Patient summary reviewed.    Patient is a current  smoker.  Patient smoked on day of surgery.            Induction- intravenous.    Postoperative Plan-         Informed Consent- Anesthetic plan and risks discussed with patient.  I personally reviewed this patient with the CRNA. Discussed and agreed on the Anesthesia Plan with the CRNA..

## 2024-12-18 NOTE — H&P
History and Physical - [facility]  Whit Campos 64 y.o. female MRN: [id number]                  HPI: Whit Campos is a 64 y.o. year old female who presents for GERD, epigastric pain, diarrhea, UC, personal history of colon polyps      REVIEW OF SYSTEMS: Per the HPI, and otherwise unremarkable.    Historical Information   Past Medical History:   Diagnosis Date    Cancer (HCC)     right breast    Chronic back pain     Chronic kidney disease     Colitis     COPD (chronic obstructive pulmonary disease) (HCC)     Depression     Diabetes mellitus (HCC)     DVT of lower limb, acute (HCC) 2004    GERD (gastroesophageal reflux disease)     H/O blood clots     rt leg, lung    Hyperlipidemia     Pneumonia     Rapid heart rate     Sleep apnea     Stroke (HCC)     4 mini strokes     Past Surgical History:   Procedure Laterality Date    BREAST LUMPECTOMY Right     CARDIAC CATHETERIZATION N/A 10/28/2021    Procedure: Cardiac Coronary Angiogram;  Surgeon: Abdelrahman Jacinto DO;  Location:  CARDIAC CATH LAB;  Service: Cardiology    IVC FILTER INSERTION      MASTECTOMY Right     MASTECTOMY W/ SENTINEL NODE BIOPSY Right 11/09/2021    Procedure: BREAST MASTECTOMY WITH BIOPSY LYMPH NODE SENTINEL and axillary node dissection  ([place] Lymph Node Injection @ 12:30);  Surgeon: Jd Dong MD;  Location: [facility];  Service: General    US GUIDANCE BREAST BIOPSY RIGHT EACH ADDITIONAL Right 10/13/2021    US GUIDANCE BREAST BIOPSY RIGHT EACH ADDITIONAL Right 10/13/2021    US GUIDED BREAST BIOPSY RIGHT COMPLETE Right 10/13/2021     Social History   Social History     Substance and Sexual Activity   Alcohol Use Never     Social History     Substance and Sexual Activity   Drug Use Never     Social History     Tobacco Use   Smoking Status Every Day    Current packs/day: 0.50    Average packs/day: 1.5 packs/day for 47.5 years (70.7 ttl pk-yrs)    Types: Cigarettes    Start date: 7/3/1977   Smokeless  Tobacco Never     Family History   Problem Relation Age of Onset    Breast cancer Mother 72    COPD Mother     Coronary artery disease Mother     Coronary artery disease Father     Stroke Father     Lung cancer Sister     No Known Problems Sister     No Known Problems Sister     Breast cancer Maternal Grandmother     Colon cancer Paternal Grandfather     No Known Problems Maternal Aunt     No Known Problems Maternal Aunt     No Known Problems Maternal Aunt     No Known Problems Paternal Aunt     Breast cancer Cousin        Meds/Allergies     Not in a hospital admission.    Allergies   Allergen Reactions    Amoxicillin-Pot Clavulanate GI Intolerance    Anastrozole Other (See Comments)     Diarrhea, Nausea, Stomach pain        Objective     There were no vitals taken for this visit.      PHYSICAL EXAMINATION:    General Appearance:   Alert, cooperative, no distress   HEENT:  Normocephalic, atraumatic, anicteric. Neck supple, symmetrical, trachea midline.   Lungs:   Equal chest rise and unlabored breathing, normal effort, no coughing.   Cardiovascular:   No visualized JVD.   Abdomen:   No abdominal distension.   Skin:   No jaundice, rashes, or lesions.    Musculoskeletal:   Normal range of motion visualized.   Psych:  Normal affect and normal insight.   Neuro:  Alert and appropriate.           ASSESSMENT/PLAN:  This is a 64 y.o. year old female here for EGD and colonoscopy, and she is stable and optimized for her procedure.

## 2024-12-18 NOTE — ANESTHESIA POSTPROCEDURE EVALUATION
Post-Op Assessment Note    CV Status:  Stable  Pain Score: 0    Pain management: adequate       Mental Status:  Alert and awake   Hydration Status:  Euvolemic   PONV Controlled:  Controlled   Airway Patency:  Patent     Post Op Vitals Reviewed: Yes    No anethesia notable event occurred.    Staff: CRNA           Last Filed PACU Vitals:  Vitals Value Taken Time   Temp 97.0    Pulse 77 12/18/24 0839   /66    Resp 24 12/18/24 0839   SpO2 98 % 12/18/24 0839   Vitals shown include unfiled device data.    Modified James:  No data recorded

## 2024-12-20 NOTE — TELEPHONE ENCOUNTER
There are no urgent findings on patient's LEAD.  Please advise patient that if she wants to review results in detail we can certainly schedule her in the office sooner at a different location and place her on a cancellation list at [place].

## 2024-12-20 NOTE — TELEPHONE ENCOUNTER
Pt is not scheduled for office visit until 3/5/2025 with Derrell Villa PA-C because that is the soonest available since pt only wants the [place] office. Pt does not want to wait that long for the results. Advised pt will send a message to the provider to review.

## 2024-12-20 NOTE — TELEPHONE ENCOUNTER
ED General Adult HPI





- General


Chief complaint: Abdominal Pain


Stated complaint: ABD PAIN


Time Seen by Provider: 02/19/19 21:37


Source: patient, family


Mode of arrival: Ambulatory


Limitations: No Limitations





- History of Present Illness


Initial comments: 





Patient is fluent in English.





: [phone]








pediatrician: Dr Case





This is an 8-year-old female, reportedly up-to-date with vaccinations, with no 

chronic medical conditions, who presents to the emergency room with complaint of

nontraumatic abdominal pain.  The abdominal pain started earlier on today while 

she was in school.  The patient indicates that it is suprapubic, does not 

radiate anywhere, and she denies nausea, vomiting, diarrhea.  She denies 

irritative, obstructive urinary symptoms.  She drank orange juice earlier on 

today, and this reportedly worsened her symptoms.  She does not indicate that 

she is having right lower quadrant pain or tenderness in time.





The patient denies headache, sore throat, ear pain, upper abdominal pain, chest 

pain, leg pain.  She reports that she had a bowel movement earlier on today.


-: Gradual, hour(s)


Location: abdomen


Radiation: non-radiation


Severity scale (0 -10): 8


Quality: aching


Consistency: intermittent


Improves with: other


Worsens with: other


Associated Symptoms: denies: confusion, chest pain, cough, diaphoresis, 

fever/chills, headaches, loss of appetite, malaise, nausea/vomiting, rash, seizu

re, shortness of breath, syncope, weakness





- Related Data


                                  Previous Rx's











 Medication  Instructions  Recorded  Last Taken  Type


 


Cefuroxime Axetil [Ceftin] 500 mg PO Q12H #150 ml 02/20/19 Unknown Rx


 


Ibuprofen Oral Liqd [Motrin Oral 360 mg PO QID PRN #1 bottle 02/20/19 Unknown Rx





Liq 100 mg/5 ml]    











                                    Allergies











Allergy/AdvReac Type Severity Reaction Status Date / Time


 


No Known Allergies Allergy   Verified 02/19/19 21:36














ED Review of Systems


ROS: 


Stated complaint: ABD PAIN


Other details as noted in HPI





Constitutional: denies: fever


Eyes: denies: vision change


ENT: denies: epistaxis


Respiratory: denies: cough


Cardiovascular: denies: chest pain


Gastrointestinal: abdominal pain


Genitourinary: denies: urgency


Skin: denies: lesions


Neurological: denies: headache


Psychiatric: anxiety





ED Past Medical Hx





- Past Medical History


Hx Diabetes: No


Hx Renal Disease: No


Hx Sickle Cell Disease: No


Hx Seizures: No


Hx Asthma: No


Hx HIV: No





- Medications


Home Medications: 


                                Home Medications











 Medication  Instructions  Recorded  Confirmed  Last Taken  Type


 


Cefuroxime Axetil [Ceftin] 500 mg PO Q12H #150 ml 02/20/19  Unknown Rx


 


Ibuprofen Oral Liqd [Motrin Oral 360 mg PO QID PRN #1 bottle 02/20/19  Unknown 

Rx





Liq 100 mg/5 ml]     














ED Physical Exam





- General


Limitations: No Limitations


General appearance: alert, anxious





- Head


Head exam: Present: atraumatic, normocephalic





- Eye


Eye exam: Present: normal appearance, EOMI.  Absent: nystagmus





- ENT


ENT exam: Present: normal exam, normal orophraynx, mucous membranes moist, 

normal external ear exam





- Neck


Neck exam: Present: normal inspection, full ROM.  Absent: tenderness, 

meningismus





- Respiratory


Respiratory exam: Present: normal lung sounds bilaterally.  Absent: respiratory 

distress





- Cardiovascular


Cardiovascular Exam: Present: regular rate, normal rhythm, normal heart sounds. 

Absent: bradycardia, tachycardia, irregular rhythm, systolic murmur, diastolic 

murmur, rubs, gallop





- GI/Abdominal


GI/Abdominal exam: Present: soft, other (there is no right lower quadrant te

nderness.  There is no abdominal tenderness.  There is a negative Moya sign.  

There is negative Rovsing sign.  There is no pain or tenderness with heel 

percussion.  Patient able to jump up and down on each foot individually without 

difficulty.).  Absent: distended, tenderness, guarding, rebound, rigid, 

pulsatile mass





- Extremities Exam


Extremities exam: Present: normal inspection, full ROM, other (2+ pulses noted 

in the bilateral upper, lower extremities.  Compartments soft.  No long bony 

tenderness.  The pelvis is stable.).  Absent: pedal edema, joint swelling, calf 

tenderness





- Back Exam


Back exam: Present: normal inspection, full ROM.  Absent: tenderness, CVA 

tenderness (R), paraspinal tenderness, vertebral tenderness





- Neurological Exam


Neurological exam: Present: alert, normal gait, other (Extraocular movements 

intact.  Tongue midline.  No facial droop.  Facial sensation intact to light 

touch in the V1, V2, V3 distribution bilaterally.  5 and 5 strength in 4 ext

remities..  Sensation is intact to light touch in 4 extremities.).  Absent: 

motor sensory deficit





- Psychiatric


Psychiatric exam: Present: anxious





- Skin


Skin exam: Present: warm, dry, intact, normal color.  Absent: rash





ED Course





- Reevaluation(s)


Reevaluation #1: 





02/20/19 00:20


Vital signs: Temperature, 98.8F





Heart rate; 72 bpm





Blood pressure 103/72 mmHg





Respiratory rate 14-16 breaths per minute saturating at 96% on room air





Differential diagnosis: Constipation, heartburn, functional abdominal pain, 

urinary tract infection





Assessment and plan; 8-year-old female with reported history of lower abdominal 

pain for one day to this provider.  The patient is afebrile with reassuring 

vital signs and in no acute distress.  She is actively drinking juice without 

difficulty.  The patient appears to be quite anxious.  However, when she is 

distracted, there is no abdominal tenderness, rebound or guarding.  Her physical

examination is unremarkable.  Laboratory studies ordered prior to my evaluation 

are equally unremarkable.  Urinalysis is reviewed and appreciated, maybe early 

urinary tract infection.  We will start the patient on as needed pain 

medication, antibiotics, and patient's mother is instructed using a Thai 

 to follow-up with her primary care pediatrician for repeat 

checkup/evaluation in 48 hours.





The patient is afebrile, with reassuring vital signs, and not irritable or 

lethargic.  She has moist mucous membranes and is tolerating liquid feeds.








ED Medical Decision Making





- Lab Data


Result diagrams: 


                                 02/19/19 22:04





                                 02/19/19 22:04


Critical care attestation.: 


If time is entered above; I have spent that time in minutes in the direct care 

of this critically ill patient, excluding procedure time.








ED Disposition


Clinical Impression: 


 Lower abdominal pain





Disposition: DC-01 TO HOME OR SELFCARE


Is pt being admited?: No


Does the pt Need Aspirin: No


Condition: Stable


Instructions:  Urinary Tract Infection in Children (ED)


Additional Instructions: 


Cultures were sent today, and results will be available in the next 3-5 days.  

Have your primary care doctor contact the medical records department to obtain 

culture results.  Take the pain medication as directed, and antibiotics as 

directed.  Follow up in 2 days for a repeat checkup or evaluation.  Patient may 

follow-up with her primary care pediatrician, urgent care center, or the patient

may return to the emergency room for a repeat checkup/evaluation.  Please return

to the emergency room right away or projectile vomiting, change in mental 

status, confusion, inability to speak, new, worsening or different symptoms, 

inability to tolerate liquid feeds.  Advance diet as tolerated.





Las culturas se enviaron hoy, y los resultados estarn disponibles en los 

prximos 3-5 appiah. Jaime que santiago mdico de atencin primaria se ponga en contacto 

con el departamento de registros mdicos para obtener resultados de cultivo. 

[place] los medicamentos para el dolor segn las indicaciones y los antibiticos 

segn las indicaciones. Seguimiento en 2 appiah para un chequeo o evaluacin de 

repeticin. El paciente puede hacer un seguimiento con santiago pediatra de atencin 

primaria, centro de atencin de urgencias, o el paciente puede regresar a la 

nacho de emergencias para un chequeo / evaluacin repetidos. Regrese a la nacho de

emergencias de inmediato o vmitos con proyectiles, cambios en el estado mental,

confusin, incapacidad para hablar, sntomas nuevos, que empeoran o diferentes, 

incapacidad para tolerar alimentos lquidos. Dieta anticipada segn lo tolerado.


Referrals: 


ELKIN SHELDON MD [Staff Physician] - 3-5 Days


[facility] [Provider Group] - 3-5 Days


Print Language: Sinhala Patient verbalized understanding to recommendations.

## 2024-12-23 PROCEDURE — 88305 TISSUE EXAM BY PATHOLOGIST: CPT | Performed by: PATHOLOGY

## 2024-12-24 ENCOUNTER — RESULTS FOLLOW-UP (OUTPATIENT)
Age: 64
End: 2024-12-24

## 2025-01-02 ENCOUNTER — TELEPHONE (OUTPATIENT)
Age: 65
End: 2025-01-02

## 2025-01-02 NOTE — TELEPHONE ENCOUNTER
Patient called in to cancel her appointment on 1/7/25 with Dr. Dong. Patient's follow up appointment is not until 1/7/26. Patient had to cancel her mammogram on 12/23/24 due to not feeling well. She will call to reschedule her mammogram. Please call the patient at [phone] if a follow up is needed with Dr. Dong this year after she reschedules her mammogram.

## 2025-01-11 NOTE — PATIENT INSTRUCTIONS
Pt has eaten a sandwich, applesauce and oral fluids and has tolerated well.   Starting cholesterol medication-rosuvastatin 40 mg daily    Recheck cholesterol levels along with thyroid level-TSH in 3 months    Try to quit smoking completely    Follow-up in 6 months

## 2025-01-13 ENCOUNTER — HOSPITAL ENCOUNTER (OUTPATIENT)
Dept: MAMMOGRAPHY | Facility: HOSPITAL | Age: 65
Discharge: HOME | End: 2025-01-13
Attending: SURGERY
Payer: COMMERCIAL

## 2025-01-13 VITALS — WEIGHT: 214 LBS | HEIGHT: 67 IN | BODY MASS INDEX: 33.59 KG/M2

## 2025-01-13 DIAGNOSIS — Z85.3 PERSONAL HISTORY OF BREAST CANCER: ICD-10-CM

## 2025-01-13 DIAGNOSIS — C50.411 PRIMARY CANCER OF UPPER OUTER QUADRANT OF RIGHT BREAST (HCC): ICD-10-CM

## 2025-01-13 PROCEDURE — 77067 SCR MAMMO BI INCL CAD: CPT

## 2025-01-13 PROCEDURE — 77063 BREAST TOMOSYNTHESIS BI: CPT

## 2025-01-16 ENCOUNTER — TELEPHONE (OUTPATIENT)
Age: 65
End: 2025-01-16

## 2025-01-16 NOTE — TELEPHONE ENCOUNTER
Patient called in to request that someone from Dr Dong's office call her to go over her mammogram results.

## 2025-01-20 ENCOUNTER — TELEPHONE (OUTPATIENT)
Dept: SURGERY | Facility: CLINIC | Age: 65
End: 2025-01-20

## 2025-01-20 DIAGNOSIS — C50.411 PRIMARY CANCER OF UPPER OUTER QUADRANT OF RIGHT BREAST (HCC): Primary | ICD-10-CM

## 2025-01-20 NOTE — TELEPHONE ENCOUNTER
Called the patient at her request to discuss the results of her most recent mammogram imaging which is a BI-RADS 2 left breast mammogram.  She is status post right mastectomy for the treatment of her multi focal stage Ia right breast carcinoma.    Patient voiced no complaints referable to her chest or breast.  She prefers not to reschedule her office appointment at this time.    Our plan is to repeat her left breast mammogram in January 2026.  I will plan to see her back in the office after that study.    She has been encouraged to follow-up with the practice sooner on an as-needed basis.

## 2025-01-20 NOTE — TELEPHONE ENCOUNTER
Patient called in to cancel her appointment at 1:45 due to the weather. Patient declined rescheduling at the moment and would like a call to go over her results. She will reschedule her follow up if she absolutely needs to come in for an in person visit to review the results. Please call her back at [phone] to discuss.

## 2025-01-21 DIAGNOSIS — G43.809 OTHER MIGRAINE WITHOUT STATUS MIGRAINOSUS, NOT INTRACTABLE: ICD-10-CM

## 2025-01-21 RX ORDER — AMITRIPTYLINE HYDROCHLORIDE 100 MG/1
TABLET ORAL
Qty: 45 TABLET | Refills: 1 | Status: SHIPPED | OUTPATIENT
Start: 2025-01-21

## 2025-02-14 ENCOUNTER — HOSPITAL ENCOUNTER (EMERGENCY)
Facility: HOSPITAL | Age: 65
Discharge: HOME | End: 2025-02-14
Attending: EMERGENCY MEDICINE | Admitting: EMERGENCY MEDICINE
Payer: COMMERCIAL

## 2025-02-14 VITALS
TEMPERATURE: 97.2 F | BODY MASS INDEX: 33.59 KG/M2 | OXYGEN SATURATION: 98 % | WEIGHT: 214 LBS | HEART RATE: 99 BPM | SYSTOLIC BLOOD PRESSURE: 132 MMHG | HEIGHT: 67 IN | RESPIRATION RATE: 20 BRPM | DIASTOLIC BLOOD PRESSURE: 69 MMHG

## 2025-02-14 DIAGNOSIS — J02.9 PHARYNGITIS: Primary | ICD-10-CM

## 2025-02-14 PROCEDURE — 99284 EMERGENCY DEPT VISIT MOD MDM: CPT | Performed by: EMERGENCY MEDICINE

## 2025-02-14 PROCEDURE — 99284 EMERGENCY DEPT VISIT MOD MDM: CPT

## 2025-02-14 RX ORDER — AZITHROMYCIN 500 MG/1
500 TABLET, FILM COATED ORAL DAILY
Qty: 5 TABLET | Refills: 0 | Status: SHIPPED | OUTPATIENT
Start: 2025-02-14 | End: 2025-02-19

## 2025-02-14 RX ORDER — NAPROXEN 500 MG/1
500 TABLET ORAL 2 TIMES DAILY WITH MEALS
Qty: 10 TABLET | Refills: 0 | Status: SHIPPED | OUTPATIENT
Start: 2025-02-14 | End: 2025-02-19

## 2025-02-14 RX ORDER — LORATADINE 10 MG/1
10 TABLET ORAL DAILY
Qty: 10 TABLET | Refills: 0 | Status: SHIPPED | OUTPATIENT
Start: 2025-02-14 | End: 2025-02-24

## 2025-02-14 NOTE — ED PROVIDER NOTES
Final Diagnosis:  1. Pharyngitis        MDM:  -Patient presents for evaluation of ongoing throat pain.  Based on exam low suspicion for retropharyngeal abscess, Ludewig's, peritonsillar abscess.  No clear signs of bacterial infection.  Given ongoing symptoms reasonable to cover with short course antibiotics.  Antihistamines.  Return cautions reviewed.    Chief Complaint   Patient presents with    Sore Throat     Patient reports sore throat for the past two weeks. Taking otc medications without relief. States that pain is now radiating to right jaw and ear.     HPI  Patient presents for evaluation of throat pain.  Patient states that for the last week or 2 she has been having some generalized throat pain which is worse on the right compared to the left.  She states that she also has some pressure type sensation on the right side of her head.  She denies any cough or congestion.  She states that originally she had some fever but that went away.  No known sick contacts.  Denies any cough.      Unless otherwise specified:  - No language barrier.   - History obtained from patient.   - There are no limitations to the history obtained.  - Previous charting was reviewed    PMH:   has a past medical history of Breast cancer ([facility]), Cancer (HCC), Chronic back pain, Chronic kidney disease, Colitis, COPD (chronic obstructive pulmonary disease) ([facility]), Depression, Diabetes mellitus (HCC), DVT of lower limb, acute ([facility]) (2004), GERD (gastroesophageal reflux disease), H/O blood clots, Hyperlipidemia, Pneumonia, Rapid heart rate, Sleep apnea, and Stroke ([facility]).    PSH:   has a past surgical history that includes IVC FILTER INSERTION; Breast lumpectomy (Right); US guided breast biopsy right complete (Right, 10/13/2021); US guidance breast biopsy right each additional (Right, 10/13/2021); US guidance breast biopsy right each additional (Right, 10/13/2021); Cardiac catheterization (N/A, 10/28/2021); and Mastectomy w/ sentinel node biopsy  "(Right, 11/09/2021).       ROS:  Review of Systems   - 13 point ROS was performed and all are normal unless stated in the history above.   - Nursing note reviewed. Vitals reviewed.   - Orders placed by myself and/or advanced practitioner / resident.        PE:   Vitals:    02/14/25 0704 02/14/25 0719   BP: 132/69 132/69   BP Location: Right arm Right arm   Pulse: (!) 111 99   Resp: 20 20   Temp: (!) 97.1 °F (36.2 °C) (!) 97.2 °F (36.2 °C)   TempSrc: Temporal Temporal   SpO2: 98% 98%   Weight: 97.1 kg (214 lb)    Height: 5' 7\" (1.702 m)      Vitals reviewed by me.     Oropharynx is moist.  No pharyngeal exudate.  No unilateral throat swelling.  Uvula is midline.  No tenderness with palpation across neck.  No crepitus.  Unless otherwise specified above:    General: VS reviewed  Appears in NAD    Head: Normocephalic, atraumatic  Eyes: EOM-I.     ENT: Atraumatic external nose and ears.    No drooling.     Neck: No JVD.    CV: No pallor noted  Lungs:   No tachypnea  No respiratory distress    Abdomen:  Soft, non-tender, non-distended    MSK:   No obvious deformity    Skin: Dry, intact. No obvious rash.    Psychiatric/Behavioral: Appropriate mood and affect   Exam: deferred    Physical Exam     Procedures   - Nursing note reviewed.                      No orders to display     No orders of the defined types were placed in this encounter.    Labs Reviewed - No data to display      Final Diagnosis:  1. Pharyngitis              Unless otherwise noted the patient's medications were reviewed and they should continue as directed.    Unless otherwise specified:  CC is exclusive from any separately billable procedures  CC is exclusive of treating other patients  CC is exclusive of teaching time   All splints are static    Medications - No data to display  Time reflects when diagnosis was documented in both MDM as applicable and the Disposition within this note       Time User Action Codes Description Comment    2/14/2025  7:13 " AM Chris Marmloejo Add [J02.9] Pharyngitis     2/14/2025  7:14 AM Chris Marmolejo Add [C50.411] Primary cancer of upper outer quadrant of right breast (HCC)     2/14/2025  7:15 AM Chris Marmolejo Remove [C50.411] Primary cancer of upper outer quadrant of right breast (HCC)           ED Disposition       ED Disposition   Discharge    Condition   Stable    Date/Time   Fri Feb 14, 2025  7:13 AM    Comment   Whit Campos discharge to home/self care.                   Follow-up Information       Follow up With Specialties Details Why Contact Info    Ruy Nguyen PA-C Family Medicine   [address] [place] [address]  [phone]            Discharge Medication List as of 2/14/2025  7:15 AM        START taking these medications    Details   azithromycin (ZITHROMAX) 500 MG tablet Take 1 tablet (500 mg total) by mouth daily for 5 days, Starting Fri 2/14/2025, Until Wed 2/19/2025, Normal      loratadine (CLARITIN) 10 mg tablet Take 1 tablet (10 mg total) by mouth daily for 10 days, Starting Fri 2/14/2025, Until Mon 2/24/2025, Normal      naproxen (Naprosyn) 500 mg tablet Take 1 tablet (500 mg total) by mouth 2 (two) times a day with meals for 5 days, Starting Fri 2/14/2025, Until Wed 2/19/2025, Normal           CONTINUE these medications which have NOT CHANGED    Details   albuterol (2.5 mg/3 mL) 0.083 % nebulizer solution Take 3 mL (2.5 mg total) by nebulization every 6 (six) hours as needed for wheezing or shortness of breath, Starting Tue 4/18/2023, Normal      albuterol (Ventolin HFA) 90 mcg/act inhaler Inhale 2 puffs every 4 (four) hours as needed for wheezing or shortness of breath, Starting Tue 4/18/2023, Normal      amitriptyline (ELAVIL) 100 mg tablet Take 1/2 tablet (50 mg total) by mouth daily at bedtime, Normal      aspirin (ECOTRIN LOW STRENGTH) 81 mg EC tablet Take 1 tablet (81 mg total) by mouth daily, Starting Tue 10/12/2021, No Print      benztropine (COGENTIN) 0.5 mg  tablet Take 0.5 mg by mouth daily at bedtime  , Historical Med      Blood Glucose Monitoring Suppl (ONE TOUCH ULTRA 2) w/Device KIT Use daily, Starting Fri 5/3/2024, Normal      clonazePAM (KlonoPIN) 0.5 mg tablet Take 0.5 mg by mouth daily as needed  , Starting Wed 8/18/2021, Historical Med      cloNIDine (CATAPRES) 0.1 mg tablet Historical Med      Continuous Blood Gluc Sensor (FreeStyle Darius 2 Sensor) MISC Use to check her blood sugars continuously and change it every 2 weeks, Normal      doxepin (SINEquan) 100 mg capsule take 1 to 2 capsules by mouth at bedtime if needed, Historical Med      Empagliflozin (Jardiance) 25 MG TABS Take 1 tablet (25 mg total) by mouth in the morning, Normal      famotidine (PEPCID) 40 MG tablet Take 1 tablet (40 mg total) by mouth daily at bedtime, Starting Thu 11/7/2024, Normal      gabapentin (NEURONTIN) 300 mg capsule take 1 capsule by mouth twice a day, Starting Tue 5/14/2024, Normal      glucose blood (OneTouch Ultra) test strip Use to test blood sugar three times a day, Normal      Insulin Pen Needle (Unifine Pentips) 32G X 4 MM MISC use twice a day for INJECTIONS, Normal      Lantus SoloStar 100 units/mL SOPN Inject 15 Units under the skin 2 times a day, Normal      loperamide (IMODIUM) 2 mg capsule Take 1 capsule (2 mg total) by mouth 4 (four) times a day as needed for diarrhea, Starting Mon 1/30/2023, Normal      OneTouch Delica Lancets 33G MISC Check blood sugars three times daily. Please substitute with appropriate alternative as covered by patient's insurance. Dx: E11.65, Normal      pantoprazole (PROTONIX) 40 mg tablet Take 1 tablet (40 mg total) by mouth daily, Starting Thu 11/7/2024, Normal      polyethylene glycol (Golytely) 4000 mL solution Take 4,000 mL by mouth once for 1 dose Take 4000 mL by mouth once for 1 dose. Use as directed, Starting Thu 11/7/2024, Normal      tiotropium (Spiriva Respimat) 2.5 MCG/ACT AERS inhaler Inhale 2 puffs daily, Starting Mon  2/26/2024, Normal      vilazodone (VIIBRYD) 40 mg tablet Take 40 mg by mouth daily., Historical Med      VRAYLAR 6 MG capsule Take 6 mg by mouth daily, Starting Thu 5/10/2018, Historical Med           No discharge procedures on file.  Prior to Admission Medications   Prescriptions Last Dose Informant Patient Reported? Taking?   Blood Glucose Monitoring Suppl (ONE TOUCH ULTRA 2) w/Device KIT  Self No No   Sig: Use daily   Continuous Blood Gluc Sensor (FreeStyle Darius 2 Sensor) MISC  Self No No   Sig: Use to check her blood sugars continuously and change it every 2 weeks   Patient not taking: Reported on 4/24/2024   Empagliflozin (Jardiance) 25 MG TABS  Self No No   Sig: Take 1 tablet (25 mg total) by mouth in the morning   Insulin Pen Needle (Unifine Pentips) 32G X 4 MM MISC  Self No No   Sig: use twice a day for INJECTIONS   Lantus SoloStar 100 units/mL SOPN  Self No No   Sig: Inject 15 Units under the skin 2 times a day   OneTouch Delica Lancets 33G MISC  Self No No   Sig: Check blood sugars three times daily. Please substitute with appropriate alternative as covered by patient's insurance. Dx: E11.65   VRAYLAR 6 MG capsule  Self Yes No   Sig: Take 6 mg by mouth daily   albuterol (2.5 mg/3 mL) 0.083 % nebulizer solution  Self No No   Sig: Take 3 mL (2.5 mg total) by nebulization every 6 (six) hours as needed for wheezing or shortness of breath   albuterol (Ventolin HFA) 90 mcg/act inhaler  Self No No   Sig: Inhale 2 puffs every 4 (four) hours as needed for wheezing or shortness of breath   amitriptyline (ELAVIL) 100 mg tablet   No No   Sig: Take 1/2 tablet (50 mg total) by mouth daily at bedtime   aspirin (ECOTRIN LOW STRENGTH) 81 mg EC tablet  Self No No   Sig: Take 1 tablet (81 mg total) by mouth daily   benztropine (COGENTIN) 0.5 mg tablet  Self Yes No   Sig: Take 0.5 mg by mouth daily at bedtime     Patient not taking: Reported on 5/3/2024   cloNIDine (CATAPRES) 0.1 mg tablet  Self Yes No   clonazePAM  "(KlonoPIN) 0.5 mg tablet  Self Yes No   Sig: Take 0.5 mg by mouth daily as needed     doxepin (SINEquan) 100 mg capsule  Self Yes No   Sig: take 1 to 2 capsules by mouth at bedtime if needed   famotidine (PEPCID) 40 MG tablet  Self No No   Sig: Take 1 tablet (40 mg total) by mouth daily at bedtime   Patient not taking: Reported on 11/12/2024   gabapentin (NEURONTIN) 300 mg capsule  Self No No   Sig: take 1 capsule by mouth twice a day   Patient not taking: Reported on 7/30/2024   glucose blood (OneTouch Ultra) test strip  Self No No   Sig: Use to test blood sugar three times a day   loperamide (IMODIUM) 2 mg capsule  Self No No   Sig: Take 1 capsule (2 mg total) by mouth 4 (four) times a day as needed for diarrhea   pantoprazole (PROTONIX) 40 mg tablet  Self No No   Sig: Take 1 tablet (40 mg total) by mouth daily   polyethylene glycol (Golytely) 4000 mL solution   No No   Sig: Take 4,000 mL by mouth once for 1 dose Take 4000 mL by mouth once for 1 dose. Use as directed   Patient not taking: Reported on 11/12/2024   tiotropium (Spiriva Respimat) 2.5 MCG/ACT AERS inhaler  Self No No   Sig: Inhale 2 puffs daily   Patient not taking: Reported on 11/12/2024   vilazodone (VIIBRYD) 40 mg tablet  Self Yes No   Sig: Take 40 mg by mouth daily.      Facility-Administered Medications: None       Portions of the record may have been created with voice recognition software. Occasional wrong word or \"sound a like\" substitutions may have occurred due to the inherent limitations of voice recognition software. Read the chart carefully and recognize, using context, where substitutions have occurred.    Electronically signed by:  MD Chris Pruitt MD  02/14/25 0741    "

## 2025-02-18 DIAGNOSIS — E11.65 TYPE 2 DIABETES MELLITUS WITH HYPERGLYCEMIA, WITHOUT LONG-TERM CURRENT USE OF INSULIN (HCC): ICD-10-CM

## 2025-02-18 RX ORDER — EMPAGLIFLOZIN 25 MG/1
TABLET, FILM COATED ORAL
Qty: 90 TABLET | Refills: 1 | Status: SHIPPED | OUTPATIENT
Start: 2025-02-18

## 2025-03-03 DIAGNOSIS — Z79.4 TYPE 2 DIABETES MELLITUS WITHOUT COMPLICATION, WITH LONG-TERM CURRENT USE OF INSULIN (HCC): ICD-10-CM

## 2025-03-03 DIAGNOSIS — E11.9 TYPE 2 DIABETES MELLITUS WITHOUT COMPLICATION, WITH LONG-TERM CURRENT USE OF INSULIN (HCC): ICD-10-CM

## 2025-03-04 RX ORDER — SEMAGLUTIDE 2.68 MG/ML
2 INJECTION, SOLUTION SUBCUTANEOUS
Qty: 9 ML | Refills: 0 | Status: SHIPPED | OUTPATIENT
Start: 2025-03-04

## 2025-03-11 PROBLEM — Z71.6 ENCOUNTER FOR SMOKING CESSATION COUNSELING: Status: RESOLVED | Noted: 2024-07-30 | Resolved: 2025-03-11

## 2025-03-11 PROBLEM — E87.1 HYPONATREMIA: Status: RESOLVED | Noted: 2023-01-28 | Resolved: 2025-03-11

## 2025-03-12 ENCOUNTER — OFFICE VISIT (OUTPATIENT)
Dept: FAMILY MEDICINE CLINIC | Facility: HOME HEALTHCARE | Age: 65
End: 2025-03-12
Payer: COMMERCIAL

## 2025-03-12 VITALS
DIASTOLIC BLOOD PRESSURE: 70 MMHG | HEART RATE: 100 BPM | OXYGEN SATURATION: 98 % | HEIGHT: 67 IN | TEMPERATURE: 98 F | BODY MASS INDEX: 33.15 KG/M2 | WEIGHT: 211.2 LBS | RESPIRATION RATE: 18 BRPM | SYSTOLIC BLOOD PRESSURE: 130 MMHG

## 2025-03-12 DIAGNOSIS — N18.32 TYPE 2 DIABETES MELLITUS WITH STAGE 3B CHRONIC KIDNEY DISEASE, WITH LONG-TERM CURRENT USE OF INSULIN (HCC): ICD-10-CM

## 2025-03-12 DIAGNOSIS — J43.2 CENTRILOBULAR EMPHYSEMA (HCC): ICD-10-CM

## 2025-03-12 DIAGNOSIS — E11.22 TYPE 2 DIABETES MELLITUS WITH STAGE 3B CHRONIC KIDNEY DISEASE, WITH LONG-TERM CURRENT USE OF INSULIN (HCC): ICD-10-CM

## 2025-03-12 DIAGNOSIS — Z79.4 TYPE 2 DIABETES MELLITUS WITH STAGE 3B CHRONIC KIDNEY DISEASE, WITH LONG-TERM CURRENT USE OF INSULIN (HCC): ICD-10-CM

## 2025-03-12 DIAGNOSIS — K11.9 SALIVARY GLAND DYSFUNCTION: Primary | ICD-10-CM

## 2025-03-12 DIAGNOSIS — Z23 ENCOUNTER FOR IMMUNIZATION: ICD-10-CM

## 2025-03-12 DIAGNOSIS — Z12.2 SCREENING FOR LUNG CANCER: ICD-10-CM

## 2025-03-12 PROBLEM — J96.11 CHRONIC RESPIRATORY FAILURE WITH HYPOXIA (HCC): Status: RESOLVED | Noted: 2021-06-23 | Resolved: 2025-03-12

## 2025-03-12 PROBLEM — K52.9 CHRONIC DIARRHEA: Status: RESOLVED | Noted: 2023-01-28 | Resolved: 2025-03-12

## 2025-03-12 LAB — SL AMB POCT HEMOGLOBIN AIC: 6.3 (ref ?–6.5)

## 2025-03-12 PROCEDURE — T1015 CLINIC SERVICE: HCPCS | Performed by: FAMILY MEDICINE

## 2025-03-12 RX ORDER — TIOTROPIUM BROMIDE INHALATION SPRAY 3.12 UG/1
2 SPRAY, METERED RESPIRATORY (INHALATION) DAILY
Qty: 4 G | Refills: 11 | Status: SHIPPED | OUTPATIENT
Start: 2025-03-12

## 2025-03-12 RX ORDER — ATORVASTATIN CALCIUM 40 MG/1
40 TABLET, FILM COATED ORAL EVERY EVENING
Qty: 100 TABLET | Refills: 1 | Status: SHIPPED | OUTPATIENT
Start: 2025-03-12

## 2025-03-12 RX ORDER — ACYCLOVIR 800 MG/1
1 TABLET ORAL
Qty: 19.286 EACH | Refills: 3 | Status: SHIPPED | OUTPATIENT
Start: 2025-03-12 | End: 2025-03-12

## 2025-03-12 RX ORDER — ACYCLOVIR 800 MG/1
TABLET ORAL
Qty: 19.286 EACH | Refills: 3 | Status: SHIPPED | OUTPATIENT
Start: 2025-03-12 | End: 2025-03-13

## 2025-03-12 RX ORDER — ROSUVASTATIN CALCIUM 20 MG/1
20 TABLET, COATED ORAL EVERY EVENING
Qty: 100 TABLET | Refills: 1 | Status: SHIPPED | OUTPATIENT
Start: 2025-03-12

## 2025-03-12 RX ORDER — KETOROLAC TROMETHAMINE 30 MG/ML
1 INJECTION, SOLUTION INTRAMUSCULAR; INTRAVENOUS ONCE
Qty: 3.214 EACH | Refills: 0 | Status: SHIPPED | OUTPATIENT
Start: 2025-03-12 | End: 2025-03-12

## 2025-03-12 NOTE — PROGRESS NOTES
Name: Whit Campos      : 1960      MRN: [id number]  Encounter Provider: Magali Mitchell MD  Encounter Date: 3/12/2025   Encounter department: [facility]    64-year-old woman history of right breast cancer s/p resection, type 2 diabetes A1c 6.4% on insulin, COPD, current smoking, CKD stage III, CAD, depression, history of PE presents for 3 weeks of neck pain, physical exam consistent with salivary gland inflammation bilaterally.  She was advised to suck on lemon 3 times per day for 2 weeks for symptom relief.  Smoking cessation was encouraged.  All medications are updated.    Given her smoking history and cancer history, she was advised to return in 2 weeks if symptoms are persistent so we can obtain a CT scan, ideally with IV contrast, for head and neck to rule out head and neck cancer.  Assessment & Plan  Salivary gland dysfunction  Conservative treatment for salivary gland dysfunction with lemon or lemon drops for 2 weeks  If no improvement or significantly worsen, patient was instructed to seek care at the hospital  If no improvement then the neck step is CT head and neck with IV contrast       Centrilobular emphysema (HCC)  Medication has been refilled as patient states that she ran out.  Orders:    tiotropium (Spiriva Respimat) 2.5 MCG/ACT AERS inhaler; Inhale 2 puffs daily    Type 2 diabetes mellitus with stage 3b chronic kidney disease, with long-term current use of insulin (HCC)  A1c 6.4%, currently sees endocrinology  Added atorvastatin  Patient is interested to try continuous glucose monitor.  Prescription has been sent    Orders:    POCT hemoglobin A1c    atorvastatin (LIPITOR) 40 mg tablet; Take 1 tablet (40 mg total) by mouth every evening    rosuvastatin (CRESTOR) 20 MG tablet; Take 1 tablet (20 mg total) by mouth every evening    Continuous Glucose  (FreeStyle Darius 3 [place]) ANDERSON; Use 1 each 1 (one) time for 1 dose    Screening for lung cancer  I  discussed with her that she is a candidate for lung cancer CT screening.     The following Shared Decision-Making points were covered:  Benefits of screening were discussed, including the rates of reduction in death from lung cancer and other causes.  Harms of screening were reviewed, including false positive tests, radiation exposure levels, risks of invasive procedures, risks of complications of screening, and risk of overdiagnosis.  I counseled on the importance of adherence to annual lung cancer LDCT screening, impact of co-morbidities, and ability or willingness to undergo diagnosis and treatment.  I counseled on the importance of maintaining abstinence as a former smoker or was counseled on the importance of smoking cessation if a current smoker    Review of Eligibility Criteria: She meets all of the criteria for Lung Cancer Screening.   She is 64 y.o.   She has 20 pack year tobacco history and is a current smoker or has quit within the past 15 years  She presents no signs or symptoms of lung cancer    After discussion, the patient decided to elect lung cancer screening.    Orders:    CT lung screening program; Future    Encounter for immunization    Orders:    Hepatitis A hepatitis B combined vaccine IM           History of Present Illness   HPI    64-year-old woman history of right breast cancer s/p resection, type 2 diabetes A1c 6.4% on insulin, COPD, current smoking, CKD stage III, CAD, depression, history of PE presents for 3 weeks of neck pain,, located inside the oral cavity.  She wears dentures and have been brushing her gum cleaning every night.  Denies throat pain.  Has chronic cough and sputum production, without acute change.  Denies fever, chills, weight loss, was seen in the ED on 2/14 and not improving since then.    Review of Systems    Objective   /70 (BP Location: Left arm, Patient Position: Sitting, Cuff Size: Large)   Pulse 100   Temp 98 °F (36.7 °C) (Tympanic)   Resp 18   Ht 5'  "7\" (1.702 m)   Wt 95.8 kg (211 lb 3.2 oz)   SpO2 98%   BMI 33.08 kg/m²      Physical Exam  Vitals and nursing note reviewed.   Constitutional:       General: She is not in acute distress.     Appearance: She is well-developed.   HENT:      Head: Normocephalic and atraumatic.      Mouth/Throat:      Mouth: Mucous membranes are moist. No injury or oral lesions.      Dentition: Has dentures.      Tongue: No lesions.      Palate: No mass.      Pharynx: Oropharynx is clear. Uvula midline.      Tonsils: No tonsillar exudate or tonsillar abscesses.      Comments: No pharyngeal erythema or tonsillar exudate  Palpation of inner mouth elicited tenderness at sublingual salivary gland bilaterally.  No stone.  No mass.  Eyes:      Conjunctiva/sclera: Conjunctivae normal.   Cardiovascular:      Rate and Rhythm: Normal rate and regular rhythm.      Heart sounds: No murmur heard.  Pulmonary:      Effort: Pulmonary effort is normal. No respiratory distress.      Breath sounds: Wheezing and rales present.   Abdominal:      Palpations: Abdomen is soft.      Tenderness: There is no abdominal tenderness.   Musculoskeletal:         General: No swelling.      Cervical back: Neck supple.   Skin:     General: Skin is warm and dry.      Capillary Refill: Capillary refill takes less than 2 seconds.   Neurological:      Mental Status: She is alert.   Psychiatric:         Mood and Affect: Mood normal.         "

## 2025-03-12 NOTE — ASSESSMENT & PLAN NOTE
Medication has been refilled as patient states that she ran out.  Orders:    tiotropium (Spiriva Respimat) 2.5 MCG/ACT AERS inhaler; Inhale 2 puffs daily

## 2025-03-12 NOTE — ASSESSMENT & PLAN NOTE
A1c 6.4%, currently sees endocrinology  Added atorvastatin  Patient is interested to try continuous glucose monitor.  Prescription has been sent    Orders:    POCT hemoglobin A1c    atorvastatin (LIPITOR) 40 mg tablet; Take 1 tablet (40 mg total) by mouth every evening    rosuvastatin (CRESTOR) 20 MG tablet; Take 1 tablet (20 mg total) by mouth every evening    Continuous Glucose  (FreeStyle Darius 3 [place]) ANDERSON; Use 1 each 1 (one) time for 1 dose

## 2025-03-13 DIAGNOSIS — E11.22 TYPE 2 DIABETES MELLITUS WITH STAGE 3B CHRONIC KIDNEY DISEASE, WITH LONG-TERM CURRENT USE OF INSULIN (HCC): ICD-10-CM

## 2025-03-13 DIAGNOSIS — N18.32 TYPE 2 DIABETES MELLITUS WITH STAGE 3B CHRONIC KIDNEY DISEASE, WITH LONG-TERM CURRENT USE OF INSULIN (HCC): ICD-10-CM

## 2025-03-13 DIAGNOSIS — Z79.4 TYPE 2 DIABETES MELLITUS WITH STAGE 3B CHRONIC KIDNEY DISEASE, WITH LONG-TERM CURRENT USE OF INSULIN (HCC): ICD-10-CM

## 2025-03-13 RX ORDER — ACYCLOVIR 800 MG/1
TABLET ORAL
Qty: 2 EACH | Refills: 2 | Status: SHIPPED | OUTPATIENT
Start: 2025-03-13

## 2025-03-13 RX ORDER — ACYCLOVIR 800 MG/1
TABLET ORAL
Qty: 9 EACH | Refills: 3 | Status: SHIPPED | OUTPATIENT
Start: 2025-03-13

## 2025-03-13 RX ORDER — KETOROLAC TROMETHAMINE 30 MG/ML
INJECTION, SOLUTION INTRAMUSCULAR; INTRAVENOUS
Qty: 1 EACH | Refills: 0 | Status: SHIPPED | OUTPATIENT
Start: 2025-03-13

## 2025-03-13 RX ORDER — ACYCLOVIR 800 MG/1
TABLET ORAL
Qty: 1 EACH | Refills: 3 | Status: SHIPPED | OUTPATIENT
Start: 2025-03-13 | End: 2025-03-13 | Stop reason: SDUPTHER

## 2025-03-15 DIAGNOSIS — E11.65 TYPE 2 DIABETES MELLITUS WITH HYPERGLYCEMIA, WITHOUT LONG-TERM CURRENT USE OF INSULIN (HCC): ICD-10-CM

## 2025-03-17 RX ORDER — LANCETS 33 GAUGE
EACH MISCELLANEOUS
Qty: 300 EACH | Refills: 1 | Status: SHIPPED | OUTPATIENT
Start: 2025-03-17

## 2025-03-24 ENCOUNTER — HOSPITAL ENCOUNTER (EMERGENCY)
Facility: HOSPITAL | Age: 65
Discharge: HOME | End: 2025-03-24
Attending: EMERGENCY MEDICINE
Payer: COMMERCIAL

## 2025-03-24 ENCOUNTER — APPOINTMENT (EMERGENCY)
Dept: CT IMAGING | Facility: HOSPITAL | Age: 65
End: 2025-03-24
Payer: COMMERCIAL

## 2025-03-24 VITALS
TEMPERATURE: 98.1 F | WEIGHT: 201.28 LBS | BODY MASS INDEX: 31.52 KG/M2 | RESPIRATION RATE: 19 BRPM | OXYGEN SATURATION: 93 % | SYSTOLIC BLOOD PRESSURE: 142 MMHG | DIASTOLIC BLOOD PRESSURE: 80 MMHG | HEART RATE: 98 BPM

## 2025-03-24 DIAGNOSIS — R07.89 ATYPICAL CHEST PAIN: Primary | ICD-10-CM

## 2025-03-24 LAB
2HR DELTA HS TROPONIN: 1 NG/L
ALBUMIN SERPL BCG-MCNC: 4.6 G/DL (ref 3.5–5)
ALP SERPL-CCNC: 64 U/L (ref 34–104)
ALT SERPL W P-5'-P-CCNC: 14 U/L (ref 7–52)
ANION GAP SERPL CALCULATED.3IONS-SCNC: 11 MMOL/L (ref 4–13)
AST SERPL W P-5'-P-CCNC: 23 U/L (ref 13–39)
BACTERIA UR QL AUTO: NORMAL /HPF
BASOPHILS # BLD AUTO: 0.02 THOUSANDS/ÂΜL (ref 0–0.1)
BASOPHILS NFR BLD AUTO: 0 % (ref 0–1)
BILIRUB SERPL-MCNC: 0.56 MG/DL (ref 0.2–1)
BILIRUB UR QL STRIP: NEGATIVE
BNP SERPL-MCNC: 23 PG/ML (ref 0–100)
BUN SERPL-MCNC: 15 MG/DL (ref 5–25)
CALCIUM SERPL-MCNC: 9.9 MG/DL (ref 8.4–10.2)
CARDIAC TROPONIN I PNL SERPL HS: 5 NG/L (ref ?–50)
CARDIAC TROPONIN I PNL SERPL HS: 6 NG/L (ref ?–50)
CHLORIDE SERPL-SCNC: 104 MMOL/L (ref 96–108)
CLARITY UR: CLEAR
CO2 SERPL-SCNC: 24 MMOL/L (ref 21–32)
COLOR UR: YELLOW
CREAT SERPL-MCNC: 1.33 MG/DL (ref 0.6–1.3)
EOSINOPHIL # BLD AUTO: 0.05 THOUSAND/ÂΜL (ref 0–0.61)
EOSINOPHIL NFR BLD AUTO: 1 % (ref 0–6)
ERYTHROCYTE [DISTWIDTH] IN BLOOD BY AUTOMATED COUNT: 14.2 % (ref 11.6–15.1)
GFR SERPL CREATININE-BSD FRML MDRD: 42 ML/MIN/1.73SQ M
GLUCOSE SERPL-MCNC: 150 MG/DL (ref 65–140)
GLUCOSE UR STRIP-MCNC: ABNORMAL MG/DL
HCT VFR BLD AUTO: 49 % (ref 34.8–46.1)
HGB BLD-MCNC: 16.3 G/DL (ref 11.5–15.4)
HGB UR QL STRIP.AUTO: NEGATIVE
IMM GRANULOCYTES # BLD AUTO: 0.02 THOUSAND/UL (ref 0–0.2)
IMM GRANULOCYTES NFR BLD AUTO: 0 % (ref 0–2)
KETONES UR STRIP-MCNC: NEGATIVE MG/DL
LEUKOCYTE ESTERASE UR QL STRIP: NEGATIVE
LYMPHOCYTES # BLD AUTO: 2.05 THOUSANDS/ÂΜL (ref 0.6–4.47)
LYMPHOCYTES NFR BLD AUTO: 23 % (ref 14–44)
MCH RBC QN AUTO: 30.6 PG (ref 26.8–34.3)
MCHC RBC AUTO-ENTMCNC: 33.3 G/DL (ref 31.4–37.4)
MCV RBC AUTO: 92 FL (ref 82–98)
MONOCYTES # BLD AUTO: 0.69 THOUSAND/ÂΜL (ref 0.17–1.22)
MONOCYTES NFR BLD AUTO: 8 % (ref 4–12)
NEUTROPHILS # BLD AUTO: 5.93 THOUSANDS/ÂΜL (ref 1.85–7.62)
NEUTS SEG NFR BLD AUTO: 68 % (ref 43–75)
NITRITE UR QL STRIP: NEGATIVE
NON-SQ EPI CELLS URNS QL MICRO: NORMAL /HPF
NRBC BLD AUTO-RTO: 0 /100 WBCS
PH UR STRIP.AUTO: 6 [PH]
PLATELET # BLD AUTO: 247 THOUSANDS/UL (ref 149–390)
PMV BLD AUTO: 10.2 FL (ref 8.9–12.7)
POTASSIUM SERPL-SCNC: 3.9 MMOL/L (ref 3.5–5.3)
PROT SERPL-MCNC: 7.5 G/DL (ref 6.4–8.4)
PROT UR STRIP-MCNC: ABNORMAL MG/DL
RBC # BLD AUTO: 5.32 MILLION/UL (ref 3.81–5.12)
RBC #/AREA URNS AUTO: NORMAL /HPF
SODIUM SERPL-SCNC: 139 MMOL/L (ref 135–147)
SP GR UR STRIP.AUTO: 1.01 (ref 1–1.03)
UROBILINOGEN UR STRIP-ACNC: <2 MG/DL
WBC # BLD AUTO: 8.76 THOUSAND/UL (ref 4.31–10.16)
WBC #/AREA URNS AUTO: NORMAL /HPF

## 2025-03-24 PROCEDURE — 99285 EMERGENCY DEPT VISIT HI MDM: CPT

## 2025-03-24 PROCEDURE — 81001 URINALYSIS AUTO W/SCOPE: CPT | Performed by: EMERGENCY MEDICINE

## 2025-03-24 PROCEDURE — 83880 ASSAY OF NATRIURETIC PEPTIDE: CPT | Performed by: EMERGENCY MEDICINE

## 2025-03-24 PROCEDURE — 84484 ASSAY OF TROPONIN QUANT: CPT | Performed by: EMERGENCY MEDICINE

## 2025-03-24 PROCEDURE — 36415 COLL VENOUS BLD VENIPUNCTURE: CPT | Performed by: EMERGENCY MEDICINE

## 2025-03-24 PROCEDURE — 71275 CT ANGIOGRAPHY CHEST: CPT

## 2025-03-24 PROCEDURE — 99285 EMERGENCY DEPT VISIT HI MDM: CPT | Performed by: EMERGENCY MEDICINE

## 2025-03-24 PROCEDURE — 85025 COMPLETE CBC W/AUTO DIFF WBC: CPT | Performed by: EMERGENCY MEDICINE

## 2025-03-24 PROCEDURE — 80053 COMPREHEN METABOLIC PANEL: CPT | Performed by: EMERGENCY MEDICINE

## 2025-03-24 RX ADMIN — IOHEXOL 85 ML: 350 INJECTION, SOLUTION INTRAVENOUS at 10:09

## 2025-03-24 NOTE — ED PROVIDER NOTES
Time reflects when diagnosis was documented in both MDM as applicable and the Disposition within this note       Time User Action Codes Description Comment    3/24/2025 12:33 PM BoerneGela Paul Add [R07.89] Atypical chest pain           ED Disposition       ED Disposition   Discharge    Condition   Stable    Date/Time   Mon Mar 24, 2025 12:33 PM    Comment   Whit Campos discharge to home/self care.                   Assessment & Plan       Medical Decision Making  Amount and/or Complexity of Data Reviewed  Labs: ordered.  Radiology: ordered.    Risk  Prescription drug management.      64-year-old female with history of COPD, tobacco use, history of VTE no longer on anticoagulation, diabetes, and CKD presenting for evaluation of shortness of breath and chest pain for 2 days, patient slightly tachycardic, hypoxic to 90-91% on room air.  Lungs clear.  Differential diagnosis includes: PE, malignancy, pneumonia, ACS, arrhythmia, fluid overload.  Will check CBC to evaluate for anemia or leukocytosis, CMP to evaluate for metabolic abnormality, EKG and troponin to evaluate for ACS or arrhythmia, BNP to evaluate for heart strain or fluid overload, patient has high risk Wells, will proceed with CTA PE study to evaluate for PE or other lung pathology.  Review labs, no marked abnormalities.  CTA negative for PE, negative for any acute intrathoracic pathology.  Reassessed patient, she states pain is improved.  She feels comfortable with plan for discharge.  Discussed with patient strict return precautions.  Patient expressed understanding was agreeable for discharge.       Medications   iohexol (OMNIPAQUE) 350 MG/ML injection (MULTI-DOSE) 85 mL (85 mL Intravenous Given 3/24/25 1009)       ED Risk Strat Scores   HEART Risk Score      Flowsheet Row Most Recent Value   Heart Score Risk Calculator    History 0 Filed at: 03/24/2025 1157   ECG 0 Filed at: 03/24/2025 1157   Age 1 Filed at: 03/24/2025 1157   Risk Factors 1 Filed  at: 03/24/2025 1157   Troponin 0 Filed at: 03/24/2025 1157   HEART Score 2 Filed at: 03/24/2025 1157          HEART Risk Score      Flowsheet Row Most Recent Value   Heart Score Risk Calculator    History 0 Filed at: 03/24/2025 1157   ECG 0 Filed at: 03/24/2025 1157   Age 1 Filed at: 03/24/2025 1157   Risk Factors 1 Filed at: 03/24/2025 1157   Troponin 0 Filed at: 03/24/2025 1157   HEART Score 2 Filed at: 03/24/2025 1157                              SBIRT 20yo+      Flowsheet Row Most Recent Value   Initial Alcohol Screen:  AUDIT-C     1. How often do you have a drink containing alcohol? 0 Filed at: 03/24/2025 0855   2. How many drinks containing alcohol do you have on a typical day you are drinking?  0 Filed at: 03/24/2025 0855   3b. FEMALE Any Age, or MALE 65+: How often do you have 4 or more drinks on one occassion? 0 Filed at: 03/24/2025 0855   Audit-C Score 0 Filed at: 03/24/2025 0855   HUSAM: How many times in the past year have you...    Used an illegal drug or used a prescription medication for non-medical reasons? Never Filed at: 03/24/2025 0855            Wells' Criteria for PE      Flowsheet Row Most Recent Value   Wells' Criteria for PE    Clinical signs and symptoms of DVT 0 Filed at: 03/24/2025 0900   PE is primary diagnosis or equally likely 3 Filed at: 03/24/2025 0900   HR >100 1.5 Filed at: 03/24/2025 0900   Immobilization at least 3 days or Surgery in the previous 4 weeks 0 Filed at: 03/24/2025 0900   Previous, objectively diagnosed PE or DVT 1.5 Filed at: 03/24/2025 0900   Hemoptysis 0 Filed at: 03/24/2025 0900   Malignancy with treatment within 6 months or palliative 0 Filed at: 03/24/2025 0900   Wells' Criteria Total 6 Filed at: 03/24/2025 0900                        History of Present Illness       Chief Complaint   Patient presents with    Chest Pain     Chest pain for 2 days. SOB noted       Past Medical History:   Diagnosis Date    Breast cancer (HCC)     Cancer (HCC)     right breast     Chronic back pain     Chronic kidney disease     Colitis     COPD (chronic obstructive pulmonary disease) (HCC)     Depression     Diabetes mellitus (HCC)     DVT of lower limb, acute ([facility]) 2004    GERD (gastroesophageal reflux disease)     H/O blood clots     rt leg, lung    Hyperlipidemia     Pneumonia     Rapid heart rate     Sleep apnea     Stroke ([facility])     4 mini strokes      Past Surgical History:   Procedure Laterality Date    BREAST LUMPECTOMY Right     CARDIAC CATHETERIZATION N/A 10/28/2021    Procedure: Cardiac Coronary Angiogram;  Surgeon: Abdelrahman Jacinto DO;  Location:  CARDIAC CATH LAB;  Service: Cardiology    IVC FILTER INSERTION      MASTECTOMY W/ SENTINEL NODE BIOPSY Right 11/09/2021    Procedure: BREAST MASTECTOMY WITH BIOPSY LYMPH NODE SENTINEL and axillary node dissection  ([facility] Lymph Node Injection @ 12:30);  Surgeon: Jd Dong MD;  Location:  MAIN OR;  Service: General    US GUIDANCE BREAST BIOPSY RIGHT EACH ADDITIONAL Right 10/13/2021    US GUIDANCE BREAST BIOPSY RIGHT EACH ADDITIONAL Right 10/13/2021    US GUIDED BREAST BIOPSY RIGHT COMPLETE Right 10/13/2021      Family History   Problem Relation Age of Onset    Breast cancer Mother 72    COPD Mother     Coronary artery disease Mother     Coronary artery disease Father     Stroke Father     Lung cancer Sister     No Known Problems Sister     No Known Problems Sister     Breast cancer Maternal Grandmother     Colon cancer Paternal Grandfather     No Known Problems Maternal Aunt     No Known Problems Maternal Aunt     No Known Problems Maternal Aunt     No Known Problems Paternal Aunt     Breast cancer Cousin       Social History     Tobacco Use    Smoking status: Every Day     Current packs/day: 0.50     Average packs/day: 1.5 packs/day for 47.7 years (70.8 ttl pk-yrs)     Types: Cigarettes     Start date: 7/3/1977    Smokeless tobacco: Never   Vaping Use    Vaping status: Never Used   Substance Use Topics    Alcohol use:  Never    Drug use: Never      E-Cigarette/Vaping    E-Cigarette Use Never User       E-Cigarette/Vaping Substances    Nicotine No     THC No     CBD No     Flavoring No     Other No     Unknown No       I have reviewed and agree with the history as documented.     HPI    64-year-old female with history of COPD, tobacco use, history of VTE no longer on anticoagulation, diabetes, and CKD presenting for evaluation of shortness of breath and chest pain.  Patient has been having symptoms for the past 2 days.  Pain is in the center of her chest and radiates to the left side of the chest.  It feels like a sharp stabbing sensation.  Pain does seem to come on when at rest but is worse when she exerts herself.  She states she has had pain like this in the past but is not sure what it is from.  She states she has a chronic cough.  Denies fevers.  She states she has had some weight loss over the past few months.  Denies any abdominal pain, nausea, vomiting, or diarrhea.  Denies leg pain or swelling.    Review of Systems   Constitutional:  Negative for appetite change, chills and fever.   HENT:  Negative for congestion, rhinorrhea and sore throat.    Respiratory:  Positive for cough and shortness of breath.    Cardiovascular:  Positive for chest pain. Negative for leg swelling.   Gastrointestinal:  Negative for abdominal pain, diarrhea, nausea and vomiting.   Genitourinary:  Negative for dysuria, frequency, hematuria and urgency.   Musculoskeletal:  Negative for arthralgias and myalgias.   Skin:  Negative for rash.   Neurological:  Negative for dizziness, weakness, light-headedness, numbness and headaches.   All other systems reviewed and are negative.          Objective       ED Triage Vitals   Temperature Pulse Blood Pressure Respirations SpO2 Patient Position - Orthostatic VS   03/24/25 0850 03/24/25 0854 03/24/25 0854 03/24/25 0854 03/24/25 0854 03/24/25 0854   97.8 °F (36.6 °C) 103 140/82 21 91 % Sitting      Temp Source  Heart Rate Source BP Location FiO2 (%) Pain Score    03/24/25 0850 03/24/25 0854 03/24/25 0854 -- 03/24/25 0850    Temporal Monitor Left arm  7      Vitals      Date and Time Temp Pulse SpO2 Resp BP Pain Score FACES Pain Rating User   03/24/25 1230 -- 98 93 % 19 142/80 -- -- RD   03/24/25 1200 98.1 °F (36.7 °C) 100 94 % 18 150/104 -- -- RD   03/24/25 1100 -- 90 93 % 18 131/75 -- -- RD   03/24/25 1015 -- 97 95 % 19 149/80 -- -- RD   03/24/25 1000 -- 94 95 % 21 139/82 -- -- RD   03/24/25 0930 -- 96 91 % 23 119/79 -- -- RD   03/24/25 0854 -- 103 91 % 21 140/82 7 -- RD   03/24/25 0850 97.8 °F (36.6 °C) -- -- -- -- 7 -- KTR            Physical Exam  Vitals and nursing note reviewed.   Constitutional:       General: She is not in acute distress.     Appearance: Normal appearance. She is well-developed and normal weight. She is not ill-appearing, toxic-appearing or diaphoretic.   HENT:      Head: Normocephalic and atraumatic.      Right Ear: External ear normal.      Left Ear: External ear normal.      Nose: Nose normal.      Mouth/Throat:      Mouth: Mucous membranes are moist.      Pharynx: Oropharynx is clear.   Eyes:      Extraocular Movements: Extraocular movements intact.      Conjunctiva/sclera: Conjunctivae normal.   Cardiovascular:      Rate and Rhythm: Normal rate and regular rhythm.      Pulses: Normal pulses.      Heart sounds: Normal heart sounds. No murmur heard.     No friction rub. No gallop.   Pulmonary:      Effort: Pulmonary effort is normal. No respiratory distress.      Breath sounds: Normal breath sounds. No decreased breath sounds, wheezing, rhonchi or rales.   Abdominal:      General: There is no distension.      Palpations: Abdomen is soft.      Tenderness: There is no abdominal tenderness. There is no guarding or rebound.   Musculoskeletal:         General: No tenderness.      Cervical back: Neck supple.      Right lower leg: No tenderness. No edema.      Left lower leg: No tenderness. No edema.    Skin:     General: Skin is warm and dry.      Coloration: Skin is not pale.      Findings: No erythema or rash.   Neurological:      General: No focal deficit present.      Mental Status: She is alert and oriented to person, place, and time.      Cranial Nerves: No cranial nerve deficit.      Sensory: No sensory deficit.      Motor: No weakness.   Psychiatric:         Mood and Affect: Mood normal.         Behavior: Behavior normal.         Results Reviewed       Procedure Component Value Units Date/Time    Urine Microscopic [[id number]]  (Normal) Collected: 03/24/25 1159    Lab Status: Final result Specimen: Urine, Clean Catch Updated: 03/24/25 1220     RBC, UA 0-1 /hpf      WBC, UA 2-4 /hpf      Epithelial Cells None Seen /hpf      Bacteria, UA Occasional /hpf     UA w Reflex to Microscopic w Reflex to Culture [[id number]]  (Abnormal) Collected: 03/24/25 1159    Lab Status: Final result Specimen: Urine, Clean Catch Updated: 03/24/25 1208     Color, UA Yellow     Clarity, UA Clear     Specific Gravity, UA 1.010     pH, UA 6.0     Leukocytes, UA Negative     Nitrite, UA Negative     Protein, UA 30 (1+) mg/dl      Glucose, UA 1000 (1%) mg/dl      Ketones, UA Negative mg/dl      Urobilinogen, UA <2.0 mg/dl      Bilirubin, UA Negative     Occult Blood, UA Negative    HS Troponin I 2hr [[id number]]  (Normal) Collected: 03/24/25 1102    Lab Status: Final result Specimen: Blood from Arm, Left Updated: 03/24/25 1144     hs TnI 2hr 6 ng/L      Delta 2hr hsTnI 1 ng/L     B-Type Natriuretic Peptide(BNP) [[id number]]  (Normal) Collected: 03/24/25 0904    Lab Status: Final result Specimen: Blood from Arm, Left Updated: 03/24/25 1020     BNP 23 pg/mL     Comprehensive metabolic panel [[id number]]  (Abnormal) Collected: 03/24/25 0904    Lab Status: Final result Specimen: Blood from Arm, Left Updated: 03/24/25 0953     Sodium 139 mmol/L      Potassium 3.9 mmol/L      Chloride 104 mmol/L      CO2 24 mmol/L      ANION GAP 11 mmol/L       BUN 15 mg/dL      Creatinine 1.33 mg/dL      Glucose 150 mg/dL      Calcium 9.9 mg/dL      AST 23 U/L      ALT 14 U/L      Alkaline Phosphatase 64 U/L      Total Protein 7.5 g/dL      Albumin 4.6 g/dL      Total Bilirubin 0.56 mg/dL      eGFR 42 ml/min/1.73sq m     Narrative:      National Kid[employer] Foundation guidelines for Chronic Kidney Disease (CKD):     Stage 1 with normal or high GFR (GFR > 90 mL/min/1.73 square meters)    Stage 2 Mild CKD (GFR = 60-89 mL/min/1.73 square meters)    Stage 3A Moderate CKD (GFR = 45-59 mL/min/1.73 square meters)    Stage 3B Moderate CKD (GFR = 30-44 mL/min/1.73 square meters)    Stage 4 Severe CKD (GFR = 15-29 mL/min/1.73 square meters)    Stage 5 End Stage CKD (GFR <15 mL/min/1.73 square meters)  Note: GFR calculation is accurate only with a steady state creatinine    HS Troponin 0hr (reflex protocol) [[id number]]  (Normal) Collected: 03/24/25 0904    Lab Status: Final result Specimen: Blood from Arm, Left Updated: 03/24/25 0949     hs TnI 0hr 5 ng/L     CBC and differential [[id number]]  (Abnormal) Collected: 03/24/25 0904    Lab Status: Final result Specimen: Blood from Arm, Left Updated: 03/24/25 0914     WBC 8.76 Thousand/uL      RBC 5.32 Million/uL      Hemoglobin 16.3 g/dL      Hematocrit 49.0 %      MCV 92 fL      MCH 30.6 pg      MCHC 33.3 g/dL      RDW 14.2 %      MPV 10.2 fL      Platelets 247 Thousands/uL      nRBC 0 /100 WBCs      Segmented % 68 %      Immature Grans % 0 %      Lymphocytes % 23 %      Monocytes % 8 %      Eosinophils Relative 1 %      Basophils Relative 0 %      Absolute Neutrophils 5.93 Thousands/µL      Absolute Immature Grans 0.02 Thousand/uL      Absolute Lymphocytes 2.05 Thousands/µL      Absolute Monocytes 0.69 Thousand/µL      Eosinophils Absolute 0.05 Thousand/µL      Basophils Absolute 0.02 Thousands/µL             CTA chest pe study   Final Interpretation by Cielo Tobias MD (03/24 1027)      No pulmonary embolism identified.      No  consolidation pleural effusion or pneumothorax. Stable 6 mm groundglass nodule left upper lobe, and stable 3 mm right upper lobe pulmonary nodule. Using Fleischner guidelines, recommend continued follow-up low-dose noncontrast chest CT for the    groundglass nodule in an additional 12 months time to document continued stability.      Coronary artery atherosclerotic calcifications.                  Workstation performed: [id number]             ECG 12 Lead Documentation Only    Date/Time: 3/24/2025 9:18 AM    Performed by: Gela Muse MD  Authorized by: Gela Muse MD    Indications / Diagnosis:  Chest pain  ECG reviewed by me, the ED Provider: yes    Patient location:  ED  Previous ECG:     Previous ECG:  Compared to current    Similarity:  No change    Comparison to cardiac monitor: No    Interpretation:     Interpretation: normal    Rate:     ECG rate:  104    ECG rate assessment: tachycardic    Rhythm:     Rhythm: sinus tachycardia    QRS:     QRS axis:  Normal    QRS intervals:  Normal  Conduction:     Conduction: normal    ST segments:     ST segments:  Normal  T waves:     T waves: normal        ED Medication and Procedure Management   Prior to Admission Medications   Prescriptions Last Dose Informant Patient Reported? Taking?   Blood Glucose Monitoring Suppl (ONE TOUCH ULTRA 2) w/Device KIT  Self No No   Sig: Use daily   Continuous Glucose  (FreeStyle Darius 3 [place]) ANDERSON   No No   Sig: To check blood sugars continuously   Continuous Glucose Sensor (FreeStyle Darius 3 Sensor) MISC   No No   Sig: Use to check your blood sugar continuously and change every 2 weeks   Continuous Glucose Sensor (FreeStyle Darius 3 Sensor) MISC   No No   Sig: To check blood sugars continuously and to change every 14 days.   Insulin Pen Needle (Unifine Pentips) 32G X 4 MM MISC  Self No No   Sig: use twice a day for INJECTIONS   Jardiance 25 MG TABS   No No   Sig: Take 1 tablet (25 mg total) by mouth in the morning    Lancets (OneTouch Delica Plus [id number]) MISC   No No   Sig: use 1 LANCET to TEST BLOOD SUGAR three times a day   Lantus SoloStar 100 units/mL SOPN  Self No No   Sig: Inject 15 Units under the skin 2 times a day   Ozempic, 2 MG/DOSE, 8 MG/3ML injection pen   No No   Sig: INJECT 0.75ML UNDER THE SKIN EVERY 7 DAYS   VRAYLAR 6 MG capsule  Self Yes No   Sig: Take 6 mg by mouth daily   albuterol (2.5 mg/3 mL) 0.083 % nebulizer solution  Self No No   Sig: Take 3 mL (2.5 mg total) by nebulization every 6 (six) hours as needed for wheezing or shortness of breath   albuterol (Ventolin HFA) 90 mcg/act inhaler  Self No No   Sig: Inhale 2 puffs every 4 (four) hours as needed for wheezing or shortness of breath   amitriptyline (ELAVIL) 100 mg tablet   No No   Sig: Take 1/2 tablet (50 mg total) by mouth daily at bedtime   aspirin (ECOTRIN LOW STRENGTH) 81 mg EC tablet  Self No No   Sig: Take 1 tablet (81 mg total) by mouth daily   atorvastatin (LIPITOR) 40 mg tablet   No No   Sig: Take 1 tablet (40 mg total) by mouth every evening   cloNIDine (CATAPRES) 0.1 mg tablet  Self Yes No   clonazePAM (KlonoPIN) 0.5 mg tablet  Self Yes No   Sig: Take 0.5 mg by mouth daily as needed     glucose blood (OneTouch Ultra) test strip  Self No No   Sig: Use to test blood sugar three times a day   pantoprazole (PROTONIX) 40 mg tablet  Self No No   Sig: Take 1 tablet (40 mg total) by mouth daily   rosuvastatin (CRESTOR) 20 MG tablet   No No   Sig: Take 1 tablet (20 mg total) by mouth every evening   tiotropium (Spiriva Respimat) 2.5 MCG/ACT AERS inhaler   No No   Sig: Inhale 2 puffs daily   vilazodone (VIIBRYD) 40 mg tablet  Self Yes No   Sig: Take 40 mg by mouth daily.      Facility-Administered Medications: None     Discharge Medication List as of 3/24/2025 12:33 PM        CONTINUE these medications which have NOT CHANGED    Details   albuterol (2.5 mg/3 mL) 0.083 % nebulizer solution Take 3 mL (2.5 mg total) by nebulization every 6 (six) hours  as needed for wheezing or shortness of breath, Starting Tue 4/18/2023, Normal      albuterol (Ventolin HFA) 90 mcg/act inhaler Inhale 2 puffs every 4 (four) hours as needed for wheezing or shortness of breath, Starting Tue 4/18/2023, Normal      amitriptyline (ELAVIL) 100 mg tablet Take 1/2 tablet (50 mg total) by mouth daily at bedtime, Normal      aspirin (ECOTRIN LOW STRENGTH) 81 mg EC tablet Take 1 tablet (81 mg total) by mouth daily, Starting Tue 10/12/2021, No Print      atorvastatin (LIPITOR) 40 mg tablet Take 1 tablet (40 mg total) by mouth every evening, Starting Wed 3/12/2025, Normal      Blood Glucose Monitoring Suppl (ONE TOUCH ULTRA 2) w/Device KIT Use daily, Starting Fri 5/3/2024, Normal      clonazePAM (KlonoPIN) 0.5 mg tablet Take 0.5 mg by mouth daily as needed  , Starting Wed 8/18/2021, Historical Med      cloNIDine (CATAPRES) 0.1 mg tablet Historical Med      Continuous Glucose  (FreeStyle Darius 3 [place]) ANDERSON To check blood sugars continuously, Normal      !! Continuous Glucose Sensor (FreeStyle Darius 3 Sensor) MISC Use to check your blood sugar continuously and change every 2 weeks, Normal      !! Continuous Glucose Sensor (FreeStyle Darius 3 Sensor) MISC To check blood sugars continuously and to change every 14 days., Normal      glucose blood (OneTouch Ultra) test strip Use to test blood sugar three times a day, Normal      Insulin Pen Needle (Unifine Pentips) 32G X 4 MM MISC use twice a day for INJECTIONS, Normal      Jardiance 25 MG TABS Take 1 tablet (25 mg total) by mouth in the morning, Normal      Lancets (OneTouch Delica Plus [id number]) MISC use 1 LANCET to TEST BLOOD SUGAR three times a day, Normal      Lantus SoloStar 100 units/mL SOPN Inject 15 Units under the skin 2 times a day, Normal      Ozempic, 2 MG/DOSE, 8 MG/3ML injection pen INJECT 0.75ML UNDER THE SKIN EVERY 7 DAYS, Starting Tue 3/4/2025, Normal      pantoprazole (PROTONIX) 40 mg tablet Take 1 tablet (40 mg total) by  mouth daily, Starting Thu 11/7/2024, Normal      rosuvastatin (CRESTOR) 20 MG tablet Take 1 tablet (20 mg total) by mouth every evening, Starting Wed 3/12/2025, Normal      tiotropium (Spiriva Respimat) 2.5 MCG/ACT AERS inhaler Inhale 2 puffs daily, Starting Wed 3/12/2025, Normal      vilazodone (VIIBRYD) 40 mg tablet Take 40 mg by mouth daily., Historical Med      VRAYLAR 6 MG capsule Take 6 mg by mouth daily, Starting Thu 5/10/2018, Historical Med       !! - Potential duplicate medications found. Please discuss with provider.        No discharge procedures on file.  ED SEPSIS DOCUMENTATION   Time reflects when diagnosis was documented in both MDM as applicable and the Disposition within this note       Time User Action Codes Description Comment    3/24/2025 12:33 PM Gela Msue Add [R07.89] Atypical chest pain                  Gela Muse MD  03/24/25 8547

## 2025-03-25 LAB
ATRIAL RATE: 102 BPM
ATRIAL RATE: 104 BPM
P AXIS: 69 DEGREES
P AXIS: 70 DEGREES
PR INTERVAL: 132 MS
PR INTERVAL: 136 MS
QRS AXIS: 48 DEGREES
QRS AXIS: 51 DEGREES
QRSD INTERVAL: 82 MS
QRSD INTERVAL: 84 MS
QT INTERVAL: 338 MS
QT INTERVAL: 356 MS
QTC INTERVAL: 444 MS
QTC INTERVAL: 463 MS
T WAVE AXIS: 63 DEGREES
T WAVE AXIS: 66 DEGREES
VENTRICULAR RATE: 102 BPM
VENTRICULAR RATE: 104 BPM

## 2025-03-25 PROCEDURE — 93010 ELECTROCARDIOGRAM REPORT: CPT | Performed by: INTERNAL MEDICINE

## 2025-03-26 ENCOUNTER — OFFICE VISIT (OUTPATIENT)
Dept: FAMILY MEDICINE CLINIC | Facility: HOME HEALTHCARE | Age: 65
End: 2025-03-26
Payer: COMMERCIAL

## 2025-03-26 VITALS
HEIGHT: 67 IN | DIASTOLIC BLOOD PRESSURE: 78 MMHG | WEIGHT: 200 LBS | BODY MASS INDEX: 31.39 KG/M2 | HEART RATE: 84 BPM | SYSTOLIC BLOOD PRESSURE: 120 MMHG | OXYGEN SATURATION: 97 % | TEMPERATURE: 98.9 F | RESPIRATION RATE: 18 BRPM

## 2025-03-26 DIAGNOSIS — Z95.828 PRESENCE OF IVC FILTER: ICD-10-CM

## 2025-03-26 DIAGNOSIS — R07.9 CHEST PAIN AT REST: Primary | ICD-10-CM

## 2025-03-26 DIAGNOSIS — Z13.29 SCREENING FOR THYROID DISORDER: ICD-10-CM

## 2025-03-26 DIAGNOSIS — R29.818 SUSPECTED SLEEP APNEA: ICD-10-CM

## 2025-03-26 DIAGNOSIS — E78.2 MIXED HYPERLIPIDEMIA: ICD-10-CM

## 2025-03-26 PROBLEM — Z86.711 HISTORY OF PULMONARY EMBOLISM: Status: RESOLVED | Noted: 2023-02-15 | Resolved: 2025-03-26

## 2025-03-26 PROCEDURE — T1015 CLINIC SERVICE: HCPCS | Performed by: FAMILY MEDICINE

## 2025-03-26 RX ORDER — ATORVASTATIN CALCIUM 40 MG/1
80 TABLET, FILM COATED ORAL DAILY
Qty: 60 TABLET | Refills: 11 | Status: SHIPPED | OUTPATIENT
Start: 2025-03-26

## 2025-03-27 NOTE — PROGRESS NOTES
Name: Whit Campos      : 1960      MRN: [id number]  Encounter Provider: Magali Mitchell MD  Encounter Date: 3/26/2025   Encounter department: [facility]  :  Assessment & Plan  Chest pain at rest  -reviewed cardiology note from 24  -Cardiac cath in  showed 50% stenosis at first diagonal branch and 45% stenosis at mid RCA branch  -increased atorvastatin from 40mg daily to 80mg daily  -recheck lipid panel in 6 weeks, if still elevated then will add zetia  -I have reached out to cardiology department to discuss patient's chest pain symptoms, as she may need to obtain stress test and/or additional studies due to high risk factors.  Her history is suspicious for unstable angina as she is having increased frequency of chest pain at rest.       Mixed hyperlipidemia    Orders:    Lipid panel; Future    atorvastatin (LIPITOR) 40 mg tablet; Take 2 tablets (80 mg total) by mouth daily    Presence of IVC filter  -Patient has an IVC filter presents, last confirmed on CT scan in .  I am unable to find documentation regarding this procedure.  I will reach out to interventional radiology to discuss if IVC filter needs to be removed or replaced.  She did have a history of pulmonary embolism.       Suspected sleep apnea  -reports daytime fatigue, inability to stay awake during day time, BMI at 31  -since untreated sleep apnea can increase risk of cardiac events and hypertension, timely evaluation and f/u is encouraged  -Hemoglobin elevated at 16.3  Orders:    Ambulatory Referral to Sleep Medicine; Future    Screening for thyroid disorder    Orders:    TSH, 3rd generation; Future    T4, free; Future           History of Present Illness   HPI  64-year-old woman history of PAD, diabetes, CKD, breast cancer s/p mastectomy, COPD, current smoking, history of PE-presents for intermittent chest pain.  She was seen in the ER 3/24 for chest pain.  For the past few months, he is having  "chest pain about 2 times per week-described as dull, aching, intermittent and anterior to left side of the chest.  Pain on average is 8/10.  Denies dizziness, syncope, shortness of breath.  The pain is increasingly happening at rest, sometimes related to physical activity but not all the time.  She was previously seeing cardiology for multiple issues.  Cardiac cath in 2021 showed 50% stenosis at first diagonal branch and 45% stenosis at mid RCA branch.  She is compliant with statin but still with elevated cholesterol level.  Diabetes is under control.      Review of Systems    Objective   /78   Pulse 84   Temp 98.9 °F (37.2 °C)   Resp 18   Ht 5' 7\" (1.702 m)   Wt 90.7 kg (200 lb)   SpO2 97%   BMI 31.32 kg/m²      Physical Exam  Vitals reviewed.   Constitutional:       General: She is not in acute distress.     Appearance: She is well-developed.   HENT:      Head: Normocephalic and atraumatic.   Eyes:      Extraocular Movements: Extraocular movements intact.      Conjunctiva/sclera: Conjunctivae normal.   Cardiovascular:      Rate and Rhythm: Normal rate and regular rhythm.      Pulses: Normal pulses.      Heart sounds: Normal heart sounds. No murmur heard.  Pulmonary:      Effort: Pulmonary effort is normal. No respiratory distress.      Breath sounds: Normal breath sounds. No stridor. No wheezing or rales.   Musculoskeletal:      Cervical back: Neck supple.   Skin:     General: Skin is warm and dry.   Neurological:      General: No focal deficit present.      Mental Status: She is alert. Mental status is at baseline.   Psychiatric:         Mood and Affect: Mood normal.         Behavior: Behavior normal.             "

## 2025-03-27 NOTE — ASSESSMENT & PLAN NOTE
-Patient has an IVC filter presents, last confirmed on CT scan in 2023.  I am unable to find documentation regarding this procedure.  I will reach out to interventional radiology to discuss if IVC filter needs to be removed or replaced.  She did have a history of pulmonary embolism.

## 2025-03-28 ENCOUNTER — CLINICAL SUPPORT (OUTPATIENT)
Dept: FAMILY MEDICINE CLINIC | Facility: HOME HEALTHCARE | Age: 65
End: 2025-03-28
Payer: COMMERCIAL

## 2025-03-28 DIAGNOSIS — Z13.29 SCREENING FOR THYROID DISORDER: ICD-10-CM

## 2025-03-28 DIAGNOSIS — E78.2 MIXED HYPERLIPIDEMIA: ICD-10-CM

## 2025-03-28 LAB
CHOLEST SERPL-MCNC: 177 MG/DL (ref ?–200)
HDLC SERPL-MCNC: 38 MG/DL
LDLC SERPL CALC-MCNC: 103 MG/DL (ref 0–100)
NONHDLC SERPL-MCNC: 139 MG/DL
T4 FREE SERPL-MCNC: 0.88 NG/DL (ref 0.61–1.12)
TRIGL SERPL-MCNC: 182 MG/DL (ref ?–150)
TSH SERPL DL<=0.05 MIU/L-ACNC: 2.55 UIU/ML (ref 0.45–4.5)

## 2025-03-28 PROCEDURE — 84439 ASSAY OF FREE THYROXINE: CPT

## 2025-03-28 PROCEDURE — 80061 LIPID PANEL: CPT

## 2025-03-28 PROCEDURE — 84443 ASSAY THYROID STIM HORMONE: CPT

## 2025-03-31 ENCOUNTER — RESULTS FOLLOW-UP (OUTPATIENT)
Dept: FAMILY MEDICINE CLINIC | Facility: HOME HEALTHCARE | Age: 65
End: 2025-03-31

## 2025-04-01 ENCOUNTER — OFFICE VISIT (OUTPATIENT)
Dept: ENDOCRINOLOGY | Facility: CLINIC | Age: 65
End: 2025-04-01
Payer: MEDICARE

## 2025-04-01 VITALS
TEMPERATURE: 97.6 F | BODY MASS INDEX: 31.45 KG/M2 | HEIGHT: 67 IN | HEART RATE: 84 BPM | OXYGEN SATURATION: 98 % | WEIGHT: 200.4 LBS | DIASTOLIC BLOOD PRESSURE: 70 MMHG | SYSTOLIC BLOOD PRESSURE: 136 MMHG | RESPIRATION RATE: 18 BRPM

## 2025-04-01 DIAGNOSIS — Z79.4 TYPE 2 DIABETES MELLITUS WITH STAGE 3B CHRONIC KIDNEY DISEASE, WITH LONG-TERM CURRENT USE OF INSULIN (HCC): Primary | ICD-10-CM

## 2025-04-01 DIAGNOSIS — I10 PRIMARY HYPERTENSION: ICD-10-CM

## 2025-04-01 DIAGNOSIS — E78.2 MIXED HYPERLIPIDEMIA: ICD-10-CM

## 2025-04-01 DIAGNOSIS — R07.9 CHEST PAIN, UNSPECIFIED TYPE: Primary | ICD-10-CM

## 2025-04-01 DIAGNOSIS — N18.32 TYPE 2 DIABETES MELLITUS WITH STAGE 3B CHRONIC KIDNEY DISEASE, WITH LONG-TERM CURRENT USE OF INSULIN (HCC): Primary | ICD-10-CM

## 2025-04-01 DIAGNOSIS — E11.22 TYPE 2 DIABETES MELLITUS WITH STAGE 3B CHRONIC KIDNEY DISEASE, WITH LONG-TERM CURRENT USE OF INSULIN (HCC): Primary | ICD-10-CM

## 2025-04-01 PROBLEM — R91.1 PULMONARY NODULE SEEN ON IMAGING STUDY: Status: RESOLVED | Noted: 2017-12-05 | Resolved: 2025-04-01

## 2025-04-01 PROCEDURE — 99214 OFFICE O/P EST MOD 30 MIN: CPT | Performed by: STUDENT IN AN ORGANIZED HEALTH CARE EDUCATION/TRAINING PROGRAM

## 2025-04-01 NOTE — ASSESSMENT & PLAN NOTE
Lab Results   Component Value Date    HGBA1C 6.3 03/12/2025     A1c has decreased from 6.4% to 6.3%, indicating well-controlled diabetes. Blood sugar levels range from 100-130 most of the time. she is currently on Lantus 15 units twice a day, Ozempic 2 mg, and Jardiance 25 mg. she reports no episodes of hypoglycemia.  Current regimen will be maintained.    she is advised to apply lotion to her feet daily, avoid walking barefoot, and perform daily foot checks for any wounds or abnormalities. Blood work will be conducted two weeks prior to the next visit.      Follow-up  The patient will follow up in 6 months.    Orders:  •  Hemoglobin A1C; Future

## 2025-04-01 NOTE — PROGRESS NOTES
Name: Whit Campos      : 1960      MRN: [id number]  Encounter Provider: Lonnie Alvarado MD  Encounter Date: 2025   Encounter department: [facility] [place]    Chief Complaint   Patient presents with   • Follow-up   :  Assessment & Plan  Primary hypertension  Blood pressure is at goal 130/70, will continue current regimen.       Type 2 diabetes mellitus with stage 3b chronic kidney disease, with long-term current use of insulin ([facility])    Lab Results   Component Value Date    HGBA1C 6.3 2025     A1c has decreased from 6.4% to 6.3%, indicating well-controlled diabetes. Blood sugar levels range from 100-130 most of the time. she is currently on Lantus 15 units twice a day, Ozempic 2 mg, and Jardiance 25 mg. she reports no episodes of hypoglycemia.  Current regimen will be maintained.    she is advised to apply lotion to her feet daily, avoid walking barefoot, and perform daily foot checks for any wounds or abnormalities. Blood work will be conducted two weeks prior to the next visit.      Follow-up  The patient will follow up in 6 months.    Orders:  •  Hemoglobin A1C; Future    Mixed hyperlipidemia  cholesterol levels have improved, with total cholesterol decreasing from 253 to 177, triglycerides from 365 to 182, and LDL from 150 to 103.although still above goal.  she is currently taking atorvastatin 80 mg at bedtime which was recently increased by her PCP.. she is advised to continue his current medication regimen and ensure she does not miss any doses.         History of Present Illness   History of Present Illness    Whit Campos is a 64 y.o. female with type 2 diabetes seen in follow up. Reports complications of neuropathy and TIA. Denies recent severe hypoglycemic or severe hyperglycemic episodes. Denies any issues with her current regimen. Last A1C was 6,3%. Denies recent illness, hospitalization or steroid use.     She is currently on a regimen of  Lantus 15 units administered twice daily, Ozempic 2 mg, and Jardiance 25 mg.    she has been diligently monitoring  blood glucose levels, which have consistently ranged between 100 and 130. she reports no episodes of hypoglycemia, with the lowest recorded blood glucose level being 98. Her last ophthalmological examination was conducted in June 2024.   she is also taking Lipitor (atorvastatin) 80 mg at bedtime for cholesterol management, as prescribed by Dr. Mitchell. she reports no adverse effects from her medications.                  Last Eye Exam: 09/28/2022  Last Foot Exam: 03/07/2023  Health Maintenance   Topic Date Due   • Diabetic Eye Exam  09/28/2024   • Diabetic Foot Exam  04/01/2026     Pertinent Medical History           Review of Systems as per [facility]    Medical History Reviewed by provider this encounter:     .  Current Outpatient Medications on File Prior to Visit   Medication Sig Dispense Refill   • albuterol (2.5 mg/3 mL) 0.083 % nebulizer solution Take 3 mL (2.5 mg total) by nebulization every 6 (six) hours as needed for wheezing or shortness of breath 90 mL 2   • albuterol (Ventolin HFA) 90 mcg/act inhaler Inhale 2 puffs every 4 (four) hours as needed for wheezing or shortness of breath 18 g 5   • aspirin (ECOTRIN LOW STRENGTH) 81 mg EC tablet Take 1 tablet (81 mg total) by mouth daily 90 tablet 3   • atorvastatin (LIPITOR) 40 mg tablet Take 2 tablets (80 mg total) by mouth daily 60 tablet 11   • Blood Glucose Monitoring Suppl (ONE TOUCH ULTRA 2) w/Device KIT Use daily 1 kit 0   • clonazePAM (KlonoPIN) 0.5 mg tablet Take 0.5 mg by mouth daily as needed       • cloNIDine (CATAPRES) 0.1 mg tablet      • glucose blood (OneTouch Ultra) test strip Use to test blood sugar three times a day 100 strip 3   • Insulin Pen Needle (Unifine Pentips) 32G X 4 MM MISC use twice a day for INJECTIONS 200 each 3   • Jardiance 25 MG TABS Take 1 tablet (25 mg total) by mouth in the morning 90 tablet 1   • Lancets (OneTouch  "Delica Plus [id number]) MISC use 1 LANCET to TEST BLOOD SUGAR three times a day 300 each 1   • Lantus SoloStar 100 units/mL SOPN Inject 15 Units under the skin 2 times a day 30 mL 1   • Ozempic, 2 MG/DOSE, 8 MG/3ML injection pen INJECT 0.75ML UNDER THE SKIN EVERY 7 DAYS 9 mL 0   • pantoprazole (PROTONIX) 40 mg tablet Take 1 tablet (40 mg total) by mouth daily 90 tablet 1   • tiotropium (Spiriva Respimat) 2.5 MCG/ACT AERS inhaler Inhale 2 puffs daily 4 g 11   • vilazodone (VIIBRYD) 40 mg tablet Take 40 mg by mouth daily.     • VRAYLAR 6 MG capsule Take 6 mg by mouth daily  0   • Continuous Glucose  (FreeStyle Darius 3 [place]) ANDERSON To check blood sugars continuously (Patient not taking: Reported on 4/1/2025) 1 each 0   • Continuous Glucose Sensor (FreeStyle Darius 3 Sensor) MISC Use to check your blood sugar continuously and change every 2 weeks (Patient not taking: Reported on 4/1/2025) 2 each 2   • Continuous Glucose Sensor (FreeStyle Darius 3 Sensor) MISC To check blood sugars continuously and to change every 14 days. (Patient not taking: Reported on 4/1/2025) 9 each 3     No current facility-administered medications on file prior to visit.         Medical History Reviewed by provider this encounter:     .    Objective   /70 (BP Location: Left arm, Patient Position: Sitting, Cuff Size: Standard)   Pulse 84   Temp 97.6 °F (36.4 °C) (Temporal)   Resp 18   Ht 5' 7\" (1.702 m)   Wt 90.9 kg (200 lb 6.4 oz)   SpO2 98%   BMI 31.39 kg/m²      Body mass index is 31.39 kg/m².  Wt Readings from Last 3 Encounters:   04/01/25 90.9 kg (200 lb 6.4 oz)   03/26/25 90.7 kg (200 lb)   03/24/25 91.3 kg (201 lb 4.5 oz)     Physical Exam  Feet are dry. Pulses are normal.    Vital Signs  Blood pressure is 136/70.  Physical Exam  Constitutional:       General: She is not in acute distress.     Appearance: She is not ill-appearing.   HENT:      Head: Normocephalic and atraumatic.   Cardiovascular:      Pulses: no weak " pulses.           Dorsalis pedis pulses are 2+ on the right side and 2+ on the left side.        Posterior tibial pulses are 2+ on the right side and 2+ on the left side.   Pulmonary:      Effort: Pulmonary effort is normal. No respiratory distress.   Feet:      Right foot:      Skin integrity: Callus and dry skin present. No ulcer, skin breakdown, erythema or warmth.      Left foot:      Skin integrity: Dry skin present. No ulcer, skin breakdown, erythema, warmth or callus.   Neurological:      Mental Status: She is oriented to person, place, and time.     Patient's shoes and socks removed.    Right Foot/Ankle   Right Foot Inspection  Skin Exam: skin normal, dry skin, callus and callus. Skin not intact, no warmth, no erythema, no maceration, no abnormal color, no pre-ulcer and no ulcer.     Toe Exam: No swelling, no tenderness, erythema and  no right toe deformity    Sensory   Vibration: intact  Proprioception: intact  Monofilament testing: intact    Vascular  Capillary refills: < 3 seconds  The right DP pulse is 2+. The right PT pulse is 2+.     Left Foot/Ankle  Left Foot Inspection  Skin Exam: skin normal and dry skin. Skin not intact, no warmth, no erythema, no maceration, normal color, no pre-ulcer, no ulcer and no callus.     Toe Exam: No swelling, no tenderness, no erythema and no left toe deformity.     Sensory   Vibration: intact  Proprioception: intact  Monofilament testing: intact    Vascular  Capillary refills: < 3 seconds  The left DP pulse is 2+. The left PT pulse is 2+.     Assign Risk Category  No deformity present  No loss of protective sensation  No weak pulses  Risk: 0    Results  Laboratory Studies  Blood sugar at 6 AM was 122. A1c is 6.3%. Cholesterol is 177, triglyceride is 182, LDL is 103.  Labs:   Lab Results   Component Value Date    HGBA1C 6.3 03/12/2025    HGBA1C 6.4 (H) 11/12/2024    HGBA1C 7.7 (A) 07/30/2024     Lab Results   Component Value Date    CREATININE 1.33 (H) 03/24/2025     CREATININE 1.36 (H) 11/12/2024    CREATININE 1.61 (H) 05/28/2024    BUN 15 03/24/2025     01/08/2016    K 3.9 03/24/2025     03/24/2025    CO2 24 03/24/2025     eGFR   Date Value Ref Range Status   03/24/2025 42 ml/min/1.73sq m Final     Lab Results   Component Value Date    CHOL 234 09/07/2015    HDL 38 (L) 03/28/2025    TRIG 182 (H) 03/28/2025     Lab Results   Component Value Date    ALT 14 03/24/2025    AST 23 03/24/2025    ALKPHOS 64 03/24/2025    BILITOT 0.53 11/30/2015     Lab Results   Component Value Date    EAU9YLDYEWDS 2.550 03/28/2025    EYQ5ALRTJSKZ 3.365 04/04/2023    EOR9TGVVGYXW 4.234 06/30/2022       There are no Patient Instructions on file for this visit.    Discussed with the patient and all questioned fully answered. She will call me if any problems arise.

## 2025-04-01 NOTE — ASSESSMENT & PLAN NOTE
cholesterol levels have improved, with total cholesterol decreasing from 253 to 177, triglycerides from 365 to 182, and LDL from 150 to 103.although still above goal.  she is currently taking atorvastatin 80 mg at bedtime which was recently increased by her PCP.. she is advised to continue his current medication regimen and ensure she does not miss any doses.

## 2025-04-01 NOTE — PROGRESS NOTES
Patient with non-obstructive CAD, HTN, smoking hx, COPD, HLD, hx DVT/PE with permanent IVC filter, CKD 3, DM2, hx right breast cancer s/p masectomy 2021 - with ongoing chest pain and pressure symptoms, including at rest. From my history on the phone last week, she would benefit from stress test. Discussed with Dr. Bill Mandujano who agree to obtain dobutamine stress echo. Instruction discussed with patient on the phone. Of note, she is on waiting list for cardiology cancellation at [facility] and does not want to travel for sooner appointment.     If stress echo is abnormal then will f/u with cardiology department for next step.

## 2025-04-01 NOTE — PROGRESS NOTES
Diabetic Foot Exam    Patient's shoes and socks removed.    Right Foot/Ankle   Right Foot Inspection  Skin Exam: skin normal. Skin not intact, no dry skin, no warmth, no callus, no erythema, no maceration, no abnormal color, no pre-ulcer, no ulcer and no callus.     Toe Exam: No swelling, no tenderness, erythema and  no right toe deformity    Sensory   Vibration: intact  Proprioception: intact  Monofilament testing: intact    Vascular  Capillary refills: < 3 seconds  The right DP pulse is 2+. The right PT pulse is 2+.     Left Foot/Ankle  Left Foot Inspection  Skin Exam: skin normal. Skin not intact, no dry skin, no warmth, no erythema, no maceration, normal color, no pre-ulcer, no ulcer and no callus.     Toe Exam: No swelling, no tenderness, no erythema and no left toe deformity.     Sensory   Vibration: intact  Proprioception: intact  Monofilament testing: intact    Vascular  Capillary refills: < 3 seconds  The left DP pulse is 2+. The left PT pulse is 2+.     Assign Risk Category  No deformity present  No loss of protective sensation  No weak pulses  Risk: 0

## 2025-04-02 ENCOUNTER — TRANSCRIBE ORDERS (OUTPATIENT)
Dept: SLEEP CENTER | Facility: CLINIC | Age: 65
End: 2025-04-02

## 2025-04-02 DIAGNOSIS — R29.818 SUSPECTED SLEEP APNEA: Primary | ICD-10-CM

## 2025-04-03 ENCOUNTER — TELEPHONE (OUTPATIENT)
Dept: ADMINISTRATIVE | Facility: OTHER | Age: 65
End: 2025-04-03

## 2025-04-03 NOTE — TELEPHONE ENCOUNTER
Upon review of the In Basket request and the patient's chart, initial outreach has been made via fax to facility. Please see Contacts section for details.     Thank you  Ammy Mejia MA

## 2025-04-03 NOTE — LETTER
Diabetic Eye Exam Form    Date Requested: 25  Patient: Whit Campos  Patient : 1960   Referring Provider: Magali Mitchell MD      DIABETIC Eye Exam Date _______________________________      Type of Exam MUST be documented for Diabetic Eye Exams. Please CHECK ONE.     Retinal Exam       Dilated Retinal Exam       OCT       Optomap-Iris Exam      Fundus Photography       Left Eye - Please check Retinopathy or No Retinopathy        Exam did show retinopathy    Exam did not show retinopathy       Right Eye - Please check Retinopathy or No Retinopathy       Exam did show retinopathy    Exam did not show retinopathy       Comments __________________________________________________________    Practice Providing Exam ______________________________________________    Exam Performed By (print name) _______________________________________      Provider Signature ___________________________________________________      These reports are needed for  compliance.    Please fax this completed form and a copy of the Diabetic Eye Exam report to the [facility] Based Department as soon as possible via Fax [phone], attention Ammy: Phone [phone]. Our office is located at [address] [place] [address].     We thank you for your assistance in treating our mutual patient.

## 2025-04-03 NOTE — TELEPHONE ENCOUNTER
----- Message from Guerline SALINAS sent at 4/1/2025 12:37 PM EDT -----  04/01/25 12:38 PM    Hello, our patient Whit Campos has had Diabetic Eye Exam completed/performed. Please assist in updating the patient chart by making an External outreach to [facility] facility located in [place]. The date of service is 6 months ago.    Thank you,  Guerline Whatley MA  [facility] [place]

## 2025-04-04 NOTE — TELEPHONE ENCOUNTER
Upon review of the In Basket request we were able to locate, review, and update the patient chart as requested for Diabetic Eye Exam.    Any additional questions or concerns should be emailed to the Practice Liaisons via the appropriate education email address, please do not reply via In Basket.    Thank you  Ammy Mejia MA   [facility]

## 2025-04-07 DIAGNOSIS — R07.9 CHEST PAIN WITH HIGH RISK FOR CARDIAC ETIOLOGY: Primary | ICD-10-CM

## 2025-04-08 ENCOUNTER — HOSPITAL ENCOUNTER (OUTPATIENT)
Dept: NON INVASIVE DIAGNOSTICS | Facility: HOSPITAL | Age: 65
Discharge: HOME | End: 2025-04-08

## 2025-04-09 ENCOUNTER — TELEPHONE (OUTPATIENT)
Age: 65
End: 2025-04-09

## 2025-04-09 ENCOUNTER — HOSPITAL ENCOUNTER (OUTPATIENT)
Facility: HOSPITAL | Age: 65
Setting detail: OBSERVATION
Discharge: HOME | End: 2025-04-10
Attending: EMERGENCY MEDICINE | Admitting: FAMILY MEDICINE
Payer: MEDICARE

## 2025-04-09 ENCOUNTER — APPOINTMENT (EMERGENCY)
Dept: RADIOLOGY | Facility: HOSPITAL | Age: 65
End: 2025-04-09
Payer: MEDICARE

## 2025-04-09 DIAGNOSIS — E87.6 HYPOKALEMIA: ICD-10-CM

## 2025-04-09 DIAGNOSIS — R07.9 CHEST PAIN: Primary | ICD-10-CM

## 2025-04-09 DIAGNOSIS — E78.2 MIXED HYPERLIPIDEMIA: ICD-10-CM

## 2025-04-09 PROBLEM — R07.89 OTHER CHEST PAIN: Status: ACTIVE | Noted: 2021-10-12

## 2025-04-09 LAB
2HR DELTA HS TROPONIN: 0 NG/L
4HR DELTA HS TROPONIN: 0 NG/L
ALBUMIN SERPL BCG-MCNC: 4.3 G/DL (ref 3.5–5)
ALP SERPL-CCNC: 56 U/L (ref 34–104)
ALT SERPL W P-5'-P-CCNC: 14 U/L (ref 7–52)
ANION GAP SERPL CALCULATED.3IONS-SCNC: 11 MMOL/L (ref 4–13)
AST SERPL W P-5'-P-CCNC: 15 U/L (ref 13–39)
BASOPHILS # BLD AUTO: 0.02 THOUSANDS/ÂΜL (ref 0–0.1)
BASOPHILS NFR BLD AUTO: 0 % (ref 0–1)
BILIRUB SERPL-MCNC: 0.65 MG/DL (ref 0.2–1)
BNP SERPL-MCNC: 32 PG/ML (ref 0–100)
BUN SERPL-MCNC: 15 MG/DL (ref 5–25)
CALCIUM SERPL-MCNC: 9.6 MG/DL (ref 8.4–10.2)
CARDIAC TROPONIN I PNL SERPL HS: 5 NG/L (ref ?–50)
CHLORIDE SERPL-SCNC: 105 MMOL/L (ref 96–108)
CO2 SERPL-SCNC: 27 MMOL/L (ref 21–32)
CREAT SERPL-MCNC: 1.15 MG/DL (ref 0.6–1.3)
CRP SERPL QL: 2.9 MG/L
EOSINOPHIL # BLD AUTO: 0.11 THOUSAND/ÂΜL (ref 0–0.61)
EOSINOPHIL NFR BLD AUTO: 2 % (ref 0–6)
ERYTHROCYTE [DISTWIDTH] IN BLOOD BY AUTOMATED COUNT: 13.7 % (ref 11.6–15.1)
ERYTHROCYTE [SEDIMENTATION RATE] IN BLOOD: 7 MM/HOUR (ref 0–29)
GFR SERPL CREATININE-BSD FRML MDRD: 50 ML/MIN/1.73SQ M
GLUCOSE SERPL-MCNC: 100 MG/DL (ref 65–140)
GLUCOSE SERPL-MCNC: 105 MG/DL (ref 65–140)
GLUCOSE SERPL-MCNC: 79 MG/DL (ref 65–140)
HCT VFR BLD AUTO: 44.4 % (ref 34.8–46.1)
HGB BLD-MCNC: 14.8 G/DL (ref 11.5–15.4)
IMM GRANULOCYTES # BLD AUTO: 0.02 THOUSAND/UL (ref 0–0.2)
IMM GRANULOCYTES NFR BLD AUTO: 0 % (ref 0–2)
LIPASE SERPL-CCNC: 49 U/L (ref 11–82)
LYMPHOCYTES # BLD AUTO: 1.87 THOUSANDS/ÂΜL (ref 0.6–4.47)
LYMPHOCYTES NFR BLD AUTO: 25 % (ref 14–44)
MAGNESIUM SERPL-MCNC: 1.9 MG/DL (ref 1.9–2.7)
MCH RBC QN AUTO: 30.5 PG (ref 26.8–34.3)
MCHC RBC AUTO-ENTMCNC: 33.3 G/DL (ref 31.4–37.4)
MCV RBC AUTO: 91 FL (ref 82–98)
MONOCYTES # BLD AUTO: 0.49 THOUSAND/ÂΜL (ref 0.17–1.22)
MONOCYTES NFR BLD AUTO: 7 % (ref 4–12)
NEUTROPHILS # BLD AUTO: 4.95 THOUSANDS/ÂΜL (ref 1.85–7.62)
NEUTS SEG NFR BLD AUTO: 66 % (ref 43–75)
NRBC BLD AUTO-RTO: 0 /100 WBCS
PLATELET # BLD AUTO: 198 THOUSANDS/UL (ref 149–390)
PLATELET # BLD AUTO: 210 THOUSANDS/UL (ref 149–390)
PMV BLD AUTO: 10.2 FL (ref 8.9–12.7)
PMV BLD AUTO: 10.5 FL (ref 8.9–12.7)
POTASSIUM SERPL-SCNC: 3.2 MMOL/L (ref 3.5–5.3)
PROT SERPL-MCNC: 6.8 G/DL (ref 6.4–8.4)
RBC # BLD AUTO: 4.86 MILLION/UL (ref 3.81–5.12)
SODIUM SERPL-SCNC: 143 MMOL/L (ref 135–147)
WBC # BLD AUTO: 7.46 THOUSAND/UL (ref 4.31–10.16)

## 2025-04-09 PROCEDURE — 83690 ASSAY OF LIPASE: CPT | Performed by: PHYSICIAN ASSISTANT

## 2025-04-09 PROCEDURE — 85025 COMPLETE CBC W/AUTO DIFF WBC: CPT

## 2025-04-09 PROCEDURE — 36415 COLL VENOUS BLD VENIPUNCTURE: CPT

## 2025-04-09 PROCEDURE — 94760 N-INVAS EAR/PLS OXIMETRY 1: CPT

## 2025-04-09 PROCEDURE — 83735 ASSAY OF MAGNESIUM: CPT | Performed by: PHYSICIAN ASSISTANT

## 2025-04-09 PROCEDURE — 82948 REAGENT STRIP/BLOOD GLUCOSE: CPT

## 2025-04-09 PROCEDURE — 86140 C-REACTIVE PROTEIN: CPT | Performed by: FAMILY MEDICINE

## 2025-04-09 PROCEDURE — 80053 COMPREHEN METABOLIC PANEL: CPT

## 2025-04-09 PROCEDURE — 84484 ASSAY OF TROPONIN QUANT: CPT

## 2025-04-09 PROCEDURE — 93005 ELECTROCARDIOGRAM TRACING: CPT

## 2025-04-09 PROCEDURE — 94664 DEMO&/EVAL PT USE INHALER: CPT

## 2025-04-09 PROCEDURE — 85652 RBC SED RATE AUTOMATED: CPT | Performed by: FAMILY MEDICINE

## 2025-04-09 PROCEDURE — 99285 EMERGENCY DEPT VISIT HI MDM: CPT

## 2025-04-09 PROCEDURE — 83880 ASSAY OF NATRIURETIC PEPTIDE: CPT | Performed by: PHYSICIAN ASSISTANT

## 2025-04-09 PROCEDURE — 84484 ASSAY OF TROPONIN QUANT: CPT | Performed by: FAMILY MEDICINE

## 2025-04-09 PROCEDURE — 85049 AUTOMATED PLATELET COUNT: CPT | Performed by: FAMILY MEDICINE

## 2025-04-09 PROCEDURE — 99223 1ST HOSP IP/OBS HIGH 75: CPT | Performed by: FAMILY MEDICINE

## 2025-04-09 PROCEDURE — 71046 X-RAY EXAM CHEST 2 VIEWS: CPT

## 2025-04-09 RX ORDER — ASPIRIN 81 MG/1
81 TABLET ORAL DAILY
Status: DISCONTINUED | OUTPATIENT
Start: 2025-04-09 | End: 2025-04-10 | Stop reason: HOSPADM

## 2025-04-09 RX ORDER — ENOXAPARIN SODIUM 100 MG/ML
40 INJECTION SUBCUTANEOUS DAILY
Status: DISCONTINUED | OUTPATIENT
Start: 2025-04-09 | End: 2025-04-10 | Stop reason: HOSPADM

## 2025-04-09 RX ORDER — CLONAZEPAM 0.5 MG/1
0.5 TABLET ORAL DAILY PRN
Status: DISCONTINUED | OUTPATIENT
Start: 2025-04-09 | End: 2025-04-10 | Stop reason: HOSPADM

## 2025-04-09 RX ORDER — PANTOPRAZOLE SODIUM 40 MG/1
40 TABLET, DELAYED RELEASE ORAL DAILY
Status: DISCONTINUED | OUTPATIENT
Start: 2025-04-10 | End: 2025-04-10 | Stop reason: HOSPADM

## 2025-04-09 RX ORDER — NICOTINE 21 MG/24HR
1 PATCH, TRANSDERMAL 24 HOURS TRANSDERMAL DAILY
Status: DISCONTINUED | OUTPATIENT
Start: 2025-04-09 | End: 2025-04-10 | Stop reason: HOSPADM

## 2025-04-09 RX ORDER — ALBUTEROL SULFATE 0.83 MG/ML
2.5 SOLUTION RESPIRATORY (INHALATION) EVERY 6 HOURS PRN
Status: DISCONTINUED | OUTPATIENT
Start: 2025-04-09 | End: 2025-04-10 | Stop reason: HOSPADM

## 2025-04-09 RX ORDER — NITROGLYCERIN 0.4 MG/1
0.4 TABLET SUBLINGUAL
Status: DISCONTINUED | OUTPATIENT
Start: 2025-04-09 | End: 2025-04-10 | Stop reason: HOSPADM

## 2025-04-09 RX ORDER — INSULIN GLARGINE 100 [IU]/ML
15 INJECTION, SOLUTION SUBCUTANEOUS EVERY 12 HOURS SCHEDULED
Status: DISCONTINUED | OUTPATIENT
Start: 2025-04-09 | End: 2025-04-09

## 2025-04-09 RX ORDER — VILAZODONE HYDROCHLORIDE 20 MG/1
40 TABLET ORAL DAILY
Status: DISCONTINUED | OUTPATIENT
Start: 2025-04-10 | End: 2025-04-10 | Stop reason: HOSPADM

## 2025-04-09 RX ORDER — INSULIN GLARGINE 100 [IU]/ML
10 INJECTION, SOLUTION SUBCUTANEOUS EVERY 12 HOURS SCHEDULED
Status: DISCONTINUED | OUTPATIENT
Start: 2025-04-10 | End: 2025-04-10 | Stop reason: HOSPADM

## 2025-04-09 RX ORDER — ATORVASTATIN CALCIUM 40 MG/1
80 TABLET, FILM COATED ORAL DAILY
Status: DISCONTINUED | OUTPATIENT
Start: 2025-04-09 | End: 2025-04-10 | Stop reason: HOSPADM

## 2025-04-09 RX ORDER — POTASSIUM CHLORIDE 1500 MG/1
40 TABLET, EXTENDED RELEASE ORAL ONCE
Status: COMPLETED | OUTPATIENT
Start: 2025-04-09 | End: 2025-04-09

## 2025-04-09 RX ORDER — INSULIN LISPRO 100 [IU]/ML
1-6 INJECTION, SOLUTION INTRAVENOUS; SUBCUTANEOUS
Status: DISCONTINUED | OUTPATIENT
Start: 2025-04-09 | End: 2025-04-10 | Stop reason: HOSPADM

## 2025-04-09 RX ADMIN — ATORVASTATIN CALCIUM 80 MG: 40 TABLET, FILM COATED ORAL at 17:17

## 2025-04-09 RX ADMIN — UMECLIDINIUM 1 PUFF: 62.5 AEROSOL, POWDER ORAL at 17:18

## 2025-04-09 RX ADMIN — NITROGLYCERIN 0.4 MG: 0.4 TABLET SUBLINGUAL at 12:23

## 2025-04-09 RX ADMIN — ASPIRIN 81 MG: 81 TABLET, COATED ORAL at 17:18

## 2025-04-09 RX ADMIN — NITROGLYCERIN 0.4 MG: 0.4 TABLET SUBLINGUAL at 12:15

## 2025-04-09 RX ADMIN — NITROGLYCERIN 0.4 MG: 0.4 TABLET SUBLINGUAL at 12:09

## 2025-04-09 RX ADMIN — POTASSIUM CHLORIDE 40 MEQ: 1500 TABLET, EXTENDED RELEASE ORAL at 12:04

## 2025-04-09 RX ADMIN — NICOTINE 1 PATCH: 21 PATCH, EXTENDED RELEASE TRANSDERMAL at 17:17

## 2025-04-09 RX ADMIN — ENOXAPARIN SODIUM 40 MG: 40 INJECTION SUBCUTANEOUS at 17:17

## 2025-04-09 NOTE — ASSESSMENT & PLAN NOTE
Lab Results   Component Value Date    EGFR 50 04/09/2025    EGFR 42 03/24/2025    EGFR 41 11/12/2024    CREATININE 1.15 04/09/2025    CREATININE 1.33 (H) 03/24/2025    CREATININE 1.36 (H) 11/12/2024     At baseline creatinine  Clonidine .1mg daily

## 2025-04-09 NOTE — RESPIRATORY THERAPY NOTE
RT Protocol Note  Whit Campos 64 y.o. female MRN: [id number]  Unit/Bed#: 414-01 Encounter: [id number]    Assessment    Active Problems:    Centrilobular emphysema (HCC)    Gastroesophageal reflux disease with esophagitis    Other chest pain    Benign hypertension with CKD (chronic kidney disease) stage III (HCC)    Type 2 diabetes mellitus with stage 3b chronic kidney disease, with long-term current use of insulin (HCC)    Mixed hyperlipidemia      Home Pulmonary Medications:Home Devices/Therapy: Home O2, Other (Comment) (pt has spiriva respimat inhaler daily, pt has neb but doesn't use, pt uses 3 l/m nc at HS. pt denies using a CPAP or BIPAP at night)    Past Medical History:   Diagnosis Date    Breast cancer (HCC)     Cancer (HCC)     right breast    Chronic back pain     Chronic kidney disease     Colitis     COPD (chronic obstructive pulmonary disease) (HCC)     Depression     Diabetes mellitus (HCC)     DVT of lower limb, acute (HCC) 2004    GERD (gastroesophageal reflux disease)     H/O blood clots     rt leg, lung    History of pulmonary embolism 02/15/2023    Hyperlipidemia     Pneumonia     Sleep apnea     Stroke (HCC)     4 mini strokes     Social History     Socioeconomic History    Marital status: Legally      Spouse name: None    Number of children: None    Years of education: None    Highest education level: None   Occupational History    None   Tobacco Use    Smoking status: Every Day     Current packs/day: 0.50     Average packs/day: 1.5 packs/day for 47.8 years (70.9 ttl pk-yrs)     Types: Cigarettes     Start date: 7/3/1977    Smokeless tobacco: Never   Vaping Use    Vaping status: Never Used   Substance and Sexual Activity    Alcohol use: Never    Drug use: Never    Sexual activity: Not Currently   Other Topics Concern    None   Social History Narrative    None     Social Drivers of Health     Financial Resource Strain: Not on file   Food Insecurity: No Food Insecurity (4/9/2025)     "Nursing - Inadequate Food Risk Classification     Worried About Running Out of Food in the Last Year: Not on file     Ran Out of Food in the Last Year: Not on file     Ran Out of Food in the Last Year: Never true   Transportation Needs: No Transportation Needs (2025)    Nursing - Transportation Risk Classification     Lack of Transportation: Not on file     Lack of Transportation: No   Physical Activity: Not on file   Stress: Not on file   Social Connections: Unknown (2024)    Received from [facility]    Social Finjan     How often do you feel lonely or isolated from those around you? (Adult - for ages 18 years and over): Not on file   Intimate Partner Violence: Unknown (2025)    Nursing IPS     Feels Physically and Emotionally Safe: Not on file     Physically Hurt by Someone: Not on file     Humiliated or Emotionally Abused by Someone: Not on file     Physically Hurt by Someone: No     Hurt or Threatened by Someone: No   Housing Stability: Unknown (2025)    Nursing: Inadequate Housing Risk Classification     Has Housing: Not on file     Worried About Losing Housing: Not on file     Unable to Get Utilities: Not on file     Unable to Pay for Housing in the Last Year: No     Has Housin       Subjective         Objective    Physical Exam:   Assessment Type: Assess only  General Appearance: Alert, Awake  Respiratory Pattern: Symmetrical, Spontaneous, Normal  Chest Assessment: Chest expansion symmetrical, Trachea midline  Bilateral Breath Sounds: Clear (posteriorly)  Cough: Non-productive, Dry, Strong  O2 Device: 3 l/m nc at HS    Vitals:  Blood pressure 133/74, pulse 72, temperature 98.3 °F (36.8 °C), temperature source Oral, resp. rate 16, height 5' 6\" (1.676 m), weight 89.5 kg (197 lb 6.4 oz), SpO2 98%.          Imaging and other studies: xray clear lungs         Plan  Bronchodilator therapy with albuterol 0.083% Q6prn for SOB and wheezes, 4 l/m nc at HS               "

## 2025-04-09 NOTE — ASSESSMENT & PLAN NOTE
Troponin and BNP negative, CTA negative for P.E   Check CRP / ESR to r/o pericarditis  2D echo  Stress diet and Nuclear stress test in am  Telemetry monitoring   Mild erythema noted on EGD in November, ? Gastritis (less likely)

## 2025-04-09 NOTE — H&P
H&P - Hospitalist   Name: Whit Campos 64 y.o. female I MRN: [id number]  Unit/Bed#: RM16 I Date of Admission: 4/9/2025   Date of Service: 4/9/2025 I Hospital Day: 0     Assessment & Plan  Other chest pain  Troponin and BNP negative, CTA negative for P.E   Check CRP / ESR to r/o pericarditis  2D echo  Stress diet and Nuclear stress test in am  Telemetry monitoring   Mild erythema noted on EGD in November, ? Gastritis (less likely)      Centrilobular emphysema (HCC)  Respiratory protocol   Incruse inhaler daily  Advised smoking cessation  Gastroesophageal reflux disease with esophagitis  Protonix 40mg daily   Benign hypertension with CKD (chronic kidney disease) stage III ([facility])  Lab Results   Component Value Date    EGFR 50 04/09/2025    EGFR 42 03/24/2025    EGFR 41 11/12/2024    CREATININE 1.15 04/09/2025    CREATININE 1.33 (H) 03/24/2025    CREATININE 1.36 (H) 11/12/2024     At baseline creatinine  Clonidine .1mg daily   Type 2 diabetes mellitus with stage 3b chronic kidney disease, with long-term current use of insulin ([facility])  Lab Results   Component Value Date    HGBA1C 6.3 03/12/2025       Well controlled  Continue basal insulin (lantus 15 units q12)  Accu-checks with ISS coverage     Mixed hyperlipidemia  Lipitor 40mg HS   Check a fasting lipid panel       VTE Pharmacologic Prophylaxis:   Moderate Risk (Score 3-4) - Pharmacological DVT Prophylaxis Ordered: enoxaparin (Lovenox).  Code Status: Level 1 - Full Code Full  Discussion with family:     Anticipated Length of Stay: Patient will be admitted on an observation basis with an anticipated length of stay of less than 2 midnights secondary to Chest pain.    History of Present Illness   Chief Complaint: chest pain    Whit Campos is a 64 y.o. female with a PMH of diabetes, hypertension, CKD who presents with ongoing chest pain for a few weeks.she states that her chest pain is worsened with ambulation, now she gets chest pain when going from bedroom to  bathroom. Associated with shortness of breath and dizziness. The chest pain radiates to her left arm. It is not positional, but is relieved if she lies down after exertion. It is also worsened with deep inspiration. No GI symptoms such an nausea or heart burn.    Review of Systems   Respiratory:  Positive for shortness of breath.    Cardiovascular:  Positive for chest pain.   Neurological:  Positive for light-headedness.   All other systems reviewed and are negative.      Historical Information   Past Medical History:   Diagnosis Date    Breast cancer (HCC)     Cancer (HCC)     right breast    Chronic back pain     Chronic kidney disease     Colitis     COPD (chronic obstructive pulmonary disease) (HCC)     Depression     Diabetes mellitus (HCC)     DVT of lower limb, acute (HCC) 2004    GERD (gastroesophageal reflux disease)     H/O blood clots     rt leg, lung    History of pulmonary embolism 02/15/2023    Hyperlipidemia     Pneumonia     Sleep apnea     Stroke (HCC)     4 mini strokes     Past Surgical History:   Procedure Laterality Date    BREAST LUMPECTOMY Right     CARDIAC CATHETERIZATION N/A 10/28/2021    Procedure: Cardiac Coronary Angiogram;  Surgeon: Abdelrahman Jacinto DO;  Location:  CARDIAC CATH LAB;  Service: Cardiology    IVC FILTER INSERTION      She has a 'Trap-ease' filter. Those are permanent. No need for follow up.    MASTECTOMY W/ SENTINEL NODE BIOPSY Right 11/09/2021    Procedure: BREAST MASTECTOMY WITH BIOPSY LYMPH NODE SENTINEL and axillary node dissection  ([place] Lymph Node Injection @ 12:30);  Surgeon: Jd Dong MD;  Location: [facility];  Service: General    US GUIDANCE BREAST BIOPSY RIGHT EACH ADDITIONAL Right 10/13/2021    US GUIDANCE BREAST BIOPSY RIGHT EACH ADDITIONAL Right 10/13/2021    US GUIDED BREAST BIOPSY RIGHT COMPLETE Right 10/13/2021     Social History     Tobacco Use    Smoking status: Every Day     Current packs/day: 0.50     Average packs/day: 1.5 packs/day  for 47.8 years (70.9 ttl pk-yrs)     Types: Cigarettes     Start date: 7/3/1977    Smokeless tobacco: Never   Vaping Use    Vaping status: Never Used   Substance and Sexual Activity    Alcohol use: Never    Drug use: Never    Sexual activity: Not Currently     E-Cigarette/Vaping    E-Cigarette Use Never User      E-Cigarette/Vaping Substances    Nicotine No     THC No     CBD No     Flavoring No     Other No     Unknown No      Family history non-contributory  Social History:  Marital Status: Legally    Occupation:   Patient Pre-hospital Living Situation: Home  Patient Pre-hospital Level of Mobility: walks  Patient Pre-hospital Diet Restrictions:     Meds/Allergies   I have reviewed home medications using recent Epic encounter.  Prior to Admission medications    Medication Sig Start Date End Date Taking? Authorizing Provider   albuterol (2.5 mg/3 mL) 0.083 % nebulizer solution Take 3 mL (2.5 mg total) by nebulization every 6 (six) hours as needed for wheezing or shortness of breath 4/18/23   Ruy Nguyen PA-C   albuterol (Ventolin HFA) 90 mcg/act inhaler Inhale 2 puffs every 4 (four) hours as needed for wheezing or shortness of breath 4/18/23   Ruy Nguyen PA-C   aspirin (ECOTRIN LOW STRENGTH) 81 mg EC tablet Take 1 tablet (81 mg total) by mouth daily 10/12/21  Yes Loyda Scales MD   atorvastatin (LIPITOR) 40 mg tablet Take 2 tablets (80 mg total) by mouth daily 3/26/25  Yes Magali Mitchell MD   Blood Glucose Monitoring Suppl (ONE TOUCH ULTRA 2) w/Device KIT Use daily 5/3/24   Ashia Raymond PA-C   clonazePAM (KlonoPIN) 0.5 mg tablet Take 0.5 mg by mouth daily as needed   8/18/21  Yes Historical Provider, MD   cloNIDine (CATAPRES) 0.1 mg tablet  12/2/24   Historical Provider, MD   Continuous Glucose  (FreeStyle Darius 3 [place]) ANDERSON To check blood sugars continuously  Patient not taking: Reported on 4/9/2025 3/13/25   Lonnie Alvarado MD   Continuous Glucose Sensor (FreeStyle Darius 3 Sensor) MISC  Use to check your blood sugar continuously and change every 2 weeks  Patient not taking: Reported on 4/9/2025 3/13/25   Lonnie Alvarado MD   Continuous Glucose Sensor (FreeStyle Darius 3 Sensor) MISC To check blood sugars continuously and to change every 14 days.  Patient not taking: Reported on 4/9/2025 3/13/25   Lonnie Alvarado MD   glucose blood (OneTouch Ultra) test strip Use to test blood sugar three times a day 5/1/24   SHU Macdonald   Insulin Pen Needle (Unifine Pentips) 32G X 4 MM MISC use twice a day for INJECTIONS 9/17/24   Lonnie Alvarado MD   Jardiance 25 MG TABS Take 1 tablet (25 mg total) by mouth in the morning 2/18/25  Yes Lonnie Alvarado MD   Lancets (OneTouch Delica Plus [id number]) MISC use 1 LANCET to TEST BLOOD SUGAR three times a day 3/17/25   Lonnie Alvarado MD   Lantus SoloStar 100 units/mL SOPN Inject 15 Units under the skin 2 times a day 11/29/24  Yes Lonnie Alvarado MD   Ozempic, 2 MG/DOSE, 8 MG/3ML injection pen INJECT 0.75ML UNDER THE SKIN EVERY 7 DAYS 3/4/25  Yes Lonnie Alvarado MD   pantoprazole (PROTONIX) 40 mg tablet Take 1 tablet (40 mg total) by mouth daily 11/7/24  Yes SHU Greene   tiotropium (Spiriva Respimat) 2.5 MCG/ACT AERS inhaler Inhale 2 puffs daily 3/12/25  Yes Magali Mitchell MD   vilazodone (VIIBRYD) 40 mg tablet Take 40 mg by mouth daily.   Yes Historical Provider, MD   VRAYLAR 6 MG capsule Take 6 mg by mouth daily 5/10/18  Yes Historical Provider, MD     Allergies   Allergen Reactions    Amoxicillin-Pot Clavulanate GI Intolerance    Anastrozole Other (See Comments)     Diarrhea, Nausea, Stomach pain        Objective :  Temp:  [98.2 °F (36.8 °C)-98.6 °F (37 °C)] 98.6 °F (37 °C)  HR:  [86-93] 86  BP: (112-160)/(61-83) 132/65  Resp:  [18] 18  SpO2:  [92 %-93 %] 92 %  O2 Device: None (Room air)    Physical Exam  Constitutional:       Appearance: Normal appearance.   Eyes:      General: No scleral icterus.  Cardiovascular:      Rate and Rhythm: Normal rate and regular rhythm.       Pulses: Normal pulses.      Heart sounds: No murmur heard.  Pulmonary:      Effort: Pulmonary effort is normal. No respiratory distress.      Breath sounds: No wheezing or rales.   Abdominal:      General: Abdomen is flat. Bowel sounds are normal. There is no distension.      Tenderness: There is no abdominal tenderness. There is no guarding.   Musculoskeletal:      Right lower leg: No edema.      Left lower leg: No edema.   Skin:     General: Skin is warm and dry.      Capillary Refill: Capillary refill takes 2 to 3 seconds.      Coloration: Skin is not jaundiced.   Neurological:      General: No focal deficit present.      Mental Status: She is alert and oriented to person, place, and time.          Lines/Drains:            Lab Results: I have reviewed the following results:  Results from last 7 days   Lab Units 04/09/25  1110   WBC Thousand/uL 7.46   HEMOGLOBIN g/dL 14.8   HEMATOCRIT % 44.4   PLATELETS Thousands/uL 198   SEGS PCT % 66   LYMPHO PCT % 25   MONO PCT % 7   EOS PCT % 2     Results from last 7 days   Lab Units 04/09/25  1110   SODIUM mmol/L 143   POTASSIUM mmol/L 3.2*   CHLORIDE mmol/L 105   CO2 mmol/L 27   BUN mg/dL 15   CREATININE mg/dL 1.15   ANION GAP mmol/L 11   CALCIUM mg/dL 9.6   ALBUMIN g/dL 4.3   TOTAL BILIRUBIN mg/dL 0.65   ALK PHOS U/L 56   ALT U/L 14   AST U/L 15   GLUCOSE RANDOM mg/dL 105             Lab Results   Component Value Date    HGBA1C 6.3 03/12/2025    HGBA1C 6.4 (H) 11/12/2024    HGBA1C 7.7 (A) 07/30/2024           Imaging Results Review: I reviewed radiology reports from this admission including: CT chest.  Other Study Results Review: EKG was reviewed.     Administrative Statements       ** Please Note: This note has been constructed using a voice recognition system. **

## 2025-04-09 NOTE — TELEPHONE ENCOUNTER
Spoke with patient, requesting to speak with Nichol; wanted to relay that she is currently in the ED at [facility], with chest pain. Unsure if she is being admitted.    Inquiring if while she is there if the NM stress test can be done. Advised it would be dependent on if she is admitted but she can certainly ask the provider if it can be done.    Krzysztof can be reached at [phone]

## 2025-04-09 NOTE — ED PROVIDER NOTES
Time reflects when diagnosis was documented in both MDM as applicable and the Disposition within this note       Time User Action Codes Description Comment    4/9/2025  2:13 PM Carrie Ramirez [R07.9] Chest pain     4/9/2025  2:13 PM Carrie Ramirez [E87.6] Hypokalemia           ED Disposition       ED Disposition   Admit    Condition   Stable    Date/Time   Wed Apr 9, 2025  2:13 PM    Comment   Case was discussed with Dr. Steward and the patient's admission status was agreed to be Admission Status: observation status to the service of Dr. Mejias.               Assessment & Plan       Medical Decision Making  63 yo female presenting for evaluation of chest pain.  Prior records reviewed.  She was recently seen in the ER and had CTA chest which was negative for PE.  Will obtain EKG, CXR, labs.  She received aspirin.  Will provide nitro.    Work up obtained as noted above. Serial EKG and troponins not c/w ACS although her story is concerning and she has risk factors and known coronary calcifications seen on CT scan. CXR does not reveal pneumonia, pneumothorax, vascular congestion or pleural effusion.  No noted leukocytosis, no anemia.  No infectious concerns.  No hypo or hyperglycemia.  Renal function within normal limits. Electrolytes within normal limits except for a mild hypokalemia which was orally repleted.    Pt reported relief from the nitro.  Discussed admission for further observation which pt is agreeable to.  SLIM consulted for admission and accepts in obs.  Plan for NM stress test as ordered in the inpatient setting.  Pt admitted in stable condition and in agreeance with plan of care.    Please refer to above ER course for further details/discussion.      Problems Addressed:  Chest pain: acute illness or injury  Hypokalemia: acute illness or injury    Amount and/or Complexity of Data Reviewed  External Data Reviewed: labs, radiology, ECG and notes.  Labs: ordered. Decision-making details documented  in ED Course.  Radiology: ordered and independent interpretation performed. Decision-making details documented in ED Course.  ECG/medicine tests: ordered and independent interpretation performed. Decision-making details documented in ED Course.  Discussion of management or test interpretation with external provider(s): SL    Risk  Prescription drug management.  Decision regarding hospitalization.        ED Course as of 04/09/25 1528   Wed Apr 09, 2025   1113 Is scheduled for NM stress test on 4/11/25.   1115 TSH performed on 3/28/25 was normal limits.   1116 WBC: 7.46   1116 Hemoglobin: 14.8   1116 Platelet Count: 198   1141 GLUCOSE: 105   1141 Creatinine: 1.15  Stable, improved, values ranging 1.33-1.61 over the past 10 months   1141 BUN: 15   1141 Sodium: 143   1141 Potassium(!): 3.2  Decreased, will replete   1141 Chloride: 105   1141 Carbon Dioxide: 27   1141 ANION GAP: 11   1141 Calcium: 9.6   1141 AST: 15   1141 ALT: 14   1141 ALK PHOS: 56   1141 Total Protein: 6.8   1141 Albumin: 4.3   1141 Total Bilirubin: 0.65   1141 GFR, Calculated: 50   1141 hs TnI 0hr: 5  Value of 5 two weeks ago   1155 BNP: 32   1156 XR chest 2 views  Independently viewed and interpreted by me - no acute cardiopulmonary process; pending official read.   1215 LIPASE: 49   1215 MAGNESIUM: 1.9   1328 Repeat troponin collected.  Pt reassessed.  She reports discomfort still present but was relieved in intensity with the nitro.   1359 Delta 2hr hsTnI: 0   1400 Pt reassessed.  Resting comfortably.  She is agreeable with plan for admission.   1404 Epic secure chat to on call IM to discuss   1412 Reviewed with Dr. Steward; accepts in obs Dr. Mejias       Medications   nitroglycerin (NITROSTAT) SL tablet 0.4 mg (0.4 mg Sublingual Given 4/9/25 1223)   aspirin (ECOTRIN LOW STRENGTH) EC tablet 81 mg (has no administration in time range)   atorvastatin (LIPITOR) tablet 80 mg (has no administration in time range)   clonazePAM (KlonoPIN) tablet 0.5 mg  "(has no administration in time range)   insulin glargine (LANTUS) subcutaneous injection 15 Units 0.15 mL (has no administration in time range)   pantoprazole (PROTONIX) EC tablet 40 mg (has no administration in time range)   umeclidinium 62.5 mcg/actuation inhaler AEPB 1 puff (has no administration in time range)   vilazodone (VIIBRYD) tablet 40 mg (has no administration in time range)   cariprazine (VRAYLAR) capsule 6 mg (has no administration in time range)   nicotine (NICODERM CQ) 21 mg/24 hr TD 24 hr patch 1 patch (has no administration in time range)   enoxaparin (LOVENOX) subcutaneous injection 40 mg (has no administration in time range)   insulin lispro (HumALOG/ADMELOG) 100 units/mL subcutaneous injection 1-6 Units (has no administration in time range)   potassium chloride (Klor-Con M20) CR tablet 40 mEq (40 mEq Oral Given 4/9/25 1204)       ED Risk Strat Scores   HEART Risk Score      Flowsheet Row Most Recent Value   Heart Score Risk Calculator    History 1 Filed at: 04/09/2025 1148   ECG 0 Filed at: 04/09/2025 1148   Age 1 Filed at: 04/09/2025 1148   Risk Factors 2 Filed at: 04/09/2025 1148   Troponin 0 Filed at: 04/09/2025 1148   HEART Score 4 Filed at: 04/09/2025 1148          HEART Risk Score      Flowsheet Row Most Recent Value   Heart Score Risk Calculator    History 1 Filed at: 04/09/2025 1148   ECG 0 Filed at: 04/09/2025 1148   Age 1 Filed at: 04/09/2025 1148   Risk Factors 2 Filed at: 04/09/2025 1148   Troponin 0 Filed at: 04/09/2025 1148   HEART Score 4 Filed at: 04/09/2025 1148          Reviewed prior records.  Was seen in ER on 3/24/25. Had CTA chest PE study - \"IMPRESSION:     No pulmonary embolism identified.     No consolidation pleural effusion or pneumothorax. Stable 6 mm groundglass nodule left upper lobe, and stable 3 mm right upper lobe pulmonary nodule. Using Fleischner guidelines, recommend continued follow-up low-dose noncontrast chest CT for the   groundglass nodule in an " "additional 12 months time to document continued stability.     Coronary artery atherosclerotic calcifications.\"    She was seen in follow up on 3/25/25 by PCP Dr. Mitchell who also reviewed with cardiology.  Her lipitor was increased and she was ordered an outpatient stress test which is scheduled for this Friday.        No data recorded        SBIRT 20yo+      Flowsheet Row Most Recent Value   Initial Alcohol Screen: US AUDIT-C     1. How often do you have a drink containing alcohol? 0 Filed at: 04/09/2025 1055   2. How many drinks containing alcohol do you have on a typical day you are drinking?  0 Filed at: 04/09/2025 1055   3a. Male UNDER 65: How often do you have five or more drinks on one occasion? 0 Filed at: 04/09/2025 1055   3b. FEMALE Any Age, or MALE 65+: How often do you have 4 or more drinks on one occassion? 0 Filed at: 04/09/2025 1055   Audit-C Score 0 Filed at: 04/09/2025 1055   HUSAM: How many times in the past year have you...    Used an illegal drug or used a prescription medication for non-medical reasons? Never Filed at: 04/09/2025 1055                            History of Present Illness       Chief Complaint   Patient presents with    Chest Pain     Patient reports intermittent sharp chest pain for \"weeks\" ems gave asa prior to arrival       Past Medical History:   Diagnosis Date    Breast cancer (HCC)     Cancer (HCC)     right breast    Chronic back pain     Chronic kidney disease     Colitis     COPD (chronic obstructive pulmonary disease) (HCC)     Depression     Diabetes mellitus (HCC)     DVT of lower limb, acute (HCC) 2004    GERD (gastroesophageal reflux disease)     H/O blood clots     rt leg, lung    History of pulmonary embolism 02/15/2023    Hyperlipidemia     Pneumonia     Sleep apnea     Stroke (HCC)     4 mini strokes      Past Surgical History:   Procedure Laterality Date    BREAST LUMPECTOMY Right     CARDIAC CATHETERIZATION N/A 10/28/2021    Procedure: Cardiac Coronary Angiogram; "  Surgeon: Abdelrahman Jacinto DO;  Location:  CARDIAC CATH LAB;  Service: Cardiology    IVC FILTER INSERTION      She has a 'Trap-ease' filter. Those are permanent. No need for follow up.    MASTECTOMY W/ SENTINEL NODE BIOPSY Right 11/09/2021    Procedure: BREAST MASTECTOMY WITH BIOPSY LYMPH NODE SENTINEL and axillary node dissection  (Jayess Lymph Node Injection @ 12:30);  Surgeon: Jd Dong MD;  Location:  MAIN OR;  Service: General    US GUIDANCE BREAST BIOPSY RIGHT EACH ADDITIONAL Right 10/13/2021    US GUIDANCE BREAST BIOPSY RIGHT EACH ADDITIONAL Right 10/13/2021    US GUIDED BREAST BIOPSY RIGHT COMPLETE Right 10/13/2021      Family History   Problem Relation Age of Onset    Breast cancer Mother 72    COPD Mother     Coronary artery disease Mother     Coronary artery disease Father     Stroke Father     Lung cancer Sister     No Known Problems Sister     No Known Problems Sister     Breast cancer Maternal Grandmother     Colon cancer Paternal Grandfather     No Known Problems Maternal Aunt     No Known Problems Maternal Aunt     No Known Problems Maternal Aunt     No Known Problems Paternal Aunt     Breast cancer Cousin       Social History     Tobacco Use    Smoking status: Every Day     Current packs/day: 0.50     Average packs/day: 1.5 packs/day for 47.8 years (70.9 ttl pk-yrs)     Types: Cigarettes     Start date: 7/3/1977    Smokeless tobacco: Never   Vaping Use    Vaping status: Never Used   Substance Use Topics    Alcohol use: Never    Drug use: Never      E-Cigarette/Vaping    E-Cigarette Use Never User       E-Cigarette/Vaping Substances    Nicotine No     THC No     CBD No     Flavoring No     Other No     Unknown No       I have reviewed and agree with the history as documented.     64 year old female with PMH HTN, HLD, DM, tobacco use, h/o breast cancer, COPD, h/o DVT/PE, ZOË presenting via EMS from home for evaluation of chest pain.  Pt reports she has been having symptoms for a few  weeks that comes and goes.  Chest pain for a few weeks. Describes sharp pain in center of chest.  Radiates to the left chest.  Also feels short of breath.  She feels symptoms are worse when she is up and moving around.  She reports feeling better with laying down.  Denies dizziness, lightheadedness.  No syncope.  No reported falls/injury or trauma.  Denies N/V/D, abdominal pain.  Denies acid reflux or indigestion.  Denies fevers.  Reports chills.  Notes some coughing which she contributes to her COPD.  She notes she was previously evaluated for the same.  She did see her doctor in follow up and she is scheduled for a stress test.  She tells me she's had a prior cardiac cath which did show plaque build up.    She reports taking a statin which was recently increased and also on baby aspirin.  She received aspirin prior to arrival.  She reports still having some discomfort in her chest.      History provided by:  Medical records and patient   used: No    Chest Pain  Pain location:  Substernal area  Pain quality: sharp    Radiates to: left chest.  Pain radiates to the back: no    Timing:  Intermittent  Chronicity:  Recurrent  Relieved by:  Nothing  Worsened by:  Exertion  Associated symptoms: cough, headache and shortness of breath    Associated symptoms: no abdominal pain, no altered mental status, no back pain, no diaphoresis, no dizziness, no fatigue, no fever, no heartburn, no lower extremity edema, no nausea, no near-syncope, no numbness, no palpitations, no syncope, not vomiting and no weakness    Risk factors: diabetes mellitus, high cholesterol, hypertension and smoking    Risk factors: no prior DVT/PE and no surgery        Review of Systems   Constitutional: Negative.  Negative for chills, diaphoresis, fatigue and fever.   HENT: Negative.  Negative for congestion, rhinorrhea and sore throat.    Eyes: Negative.  Negative for visual disturbance.   Respiratory:  Positive for cough and shortness  of breath. Negative for wheezing.    Cardiovascular:  Positive for chest pain. Negative for palpitations, leg swelling, syncope and near-syncope.   Gastrointestinal: Negative.  Negative for abdominal pain, constipation, diarrhea, heartburn, nausea and vomiting.   Genitourinary: Negative.  Negative for dysuria, flank pain, frequency and hematuria.   Musculoskeletal: Negative.  Negative for back pain and myalgias.   Skin: Negative.  Negative for rash.   Neurological:  Positive for headaches. Negative for dizziness, weakness, light-headedness and numbness.   Psychiatric/Behavioral: Negative.     All other systems reviewed and are negative.          Objective       ED Triage Vitals [04/09/25 1055]   Temperature Pulse Blood Pressure Respirations SpO2 Patient Position - Orthostatic VS   98.2 °F (36.8 °C) 93 160/83 18 93 % Sitting      Temp Source Heart Rate Source BP Location FiO2 (%) Pain Score    Temporal Monitor Left arm -- 8      Vitals      Date and Time Temp Pulse SpO2 Resp BP Pain Score FACES Pain Rating User   04/09/25 1512 98.3 °F (36.8 °C) 89 92 % 16 133/74 -- -- DII   04/09/25 1504 -- -- -- -- -- 4 -- AP   04/09/25 1415 98.6 °F (37 °C) 86 92 % 18 132/65 4 -- KTR   04/09/25 1300 98.2 °F (36.8 °C) 87 92 % 18 112/68 4 -- KTR   04/09/25 1215 98.2 °F (36.8 °C) 90 92 % 18 131/61 7 -- KTR   04/09/25 1055 98.2 °F (36.8 °C) 93 93 % 18 160/83 8 -- PP            Physical Exam  Vitals and nursing note reviewed.   Constitutional:       General: She is awake. She is not in acute distress.     Appearance: She is well-developed and overweight. She is not toxic-appearing or diaphoretic.   HENT:      Head: Normocephalic and atraumatic.      Right Ear: Hearing and external ear normal.      Left Ear: Hearing and external ear normal.      Nose: Nose normal.      Mouth/Throat:      Mouth: Mucous membranes are moist.      Pharynx: Oropharynx is clear. Uvula midline.   Eyes:      General: Lids are normal. No scleral icterus.      Conjunctiva/sclera: Conjunctivae normal.      Pupils: Pupils are equal, round, and reactive to light.   Neck:      Trachea: Trachea and phonation normal. No tracheal deviation.   Cardiovascular:      Rate and Rhythm: Normal rate and regular rhythm.      Pulses: Normal pulses.           Radial pulses are 2+ on the right side and 2+ on the left side.        Dorsalis pedis pulses are 2+ on the right side and 2+ on the left side.        Posterior tibial pulses are 2+ on the right side and 2+ on the left side.      Heart sounds: Normal heart sounds, S1 normal and S2 normal.   Pulmonary:      Effort: Pulmonary effort is normal. No tachypnea or respiratory distress.      Breath sounds: Normal breath sounds. No wheezing, rhonchi or rales.   Chest:      Chest wall: No tenderness.   Abdominal:      General: Bowel sounds are normal. There is no distension.      Palpations: Abdomen is soft.      Tenderness: There is no abdominal tenderness. There is no guarding or rebound.   Musculoskeletal:         General: No tenderness.      Cervical back: Normal range of motion and neck supple.      Right lower leg: No tenderness. No edema.      Left lower leg: No tenderness. No edema.   Skin:     General: Skin is warm and dry.      Capillary Refill: Capillary refill takes less than 2 seconds.      Findings: No rash.   Neurological:      General: No focal deficit present.      Mental Status: She is alert and oriented to person, place, and time.      GCS: GCS eye subscore is 4. GCS verbal subscore is 5. GCS motor subscore is 6.      Sensory: No sensory deficit.      Motor: No abnormal muscle tone.   Psychiatric:         Mood and Affect: Mood normal.         Speech: Speech normal.         Behavior: Behavior normal. Behavior is cooperative.         Results Reviewed       Procedure Component Value Units Date/Time    Platelet count [[id number]]     Lab Status: No result Specimen: Blood     Sedimentation rate, automated [[id number]]     Lab Status:  No result Specimen: Blood     C-reactive protein [[id number]]     Lab Status: No result Specimen: Blood     HS Troponin I 2hr [[id number]]  (Normal) Collected: 04/09/25 1326    Lab Status: Final result Specimen: Blood from Hand, Left Updated: 04/09/25 1357     hs TnI 2hr 5 ng/L      Delta 2hr hsTnI 0 ng/L     Lipase [[id number]]  (Normal) Collected: 04/09/25 1110    Lab Status: Final result Specimen: Blood Updated: 04/09/25 1209     Lipase 49 u/L     Magnesium [[id number]]  (Normal) Collected: 04/09/25 1110    Lab Status: Final result Specimen: Blood Updated: 04/09/25 1209     Magnesium 1.9 mg/dL     HS Troponin I 4hr [[id number]]     Lab Status: No result Specimen: Blood     B-Type Natriuretic Peptide(BNP) [[id number]]  (Normal) Collected: 04/09/25 1110    Lab Status: Final result Specimen: Blood Updated: 04/09/25 1152     BNP 32 pg/mL     HS Troponin 0hr (reflex protocol) [[id number]]  (Normal) Collected: 04/09/25 1110    Lab Status: Final result Specimen: Blood from Hand, Left Updated: 04/09/25 1141     hs TnI 0hr 5 ng/L     Comprehensive metabolic panel [[id number]]  (Abnormal) Collected: 04/09/25 1110    Lab Status: Final result Specimen: Blood from Hand, Left Updated: 04/09/25 1135     Sodium 143 mmol/L      Potassium 3.2 mmol/L      Chloride 105 mmol/L      CO2 27 mmol/L      ANION GAP 11 mmol/L      BUN 15 mg/dL      Creatinine 1.15 mg/dL      Glucose 105 mg/dL      Calcium 9.6 mg/dL      AST 15 U/L      ALT 14 U/L      Alkaline Phosphatase 56 U/L      Total Protein 6.8 g/dL      Albumin 4.3 g/dL      Total Bilirubin 0.65 mg/dL      eGFR 50 ml/min/1.73sq m     Narrative:      National Kid[employer] Disease Foundation guidelines for Chronic Kidney Disease (CKD):     Stage 1 with normal or high GFR (GFR > 90 mL/min/1.73 square meters)    Stage 2 Mild CKD (GFR = 60-89 mL/min/1.73 square meters)    Stage 3A Moderate CKD (GFR = 45-59 mL/min/1.73 square meters)    Stage 3B Moderate CKD (GFR = 30-44 mL/min/1.73 square meters)     Stage 4 Severe CKD (GFR = 15-29 mL/min/1.73 square meters)    Stage 5 End Stage CKD (GFR <15 mL/min/1.73 square meters)  Note: GFR calculation is accurate only with a steady state creatinine    CBC and differential [[id number]] Collected: 04/09/25 1110    Lab Status: Final result Specimen: Blood from Hand, Left Updated: 04/09/25 1116     WBC 7.46 Thousand/uL      RBC 4.86 Million/uL      Hemoglobin 14.8 g/dL      Hematocrit 44.4 %      MCV 91 fL      MCH 30.5 pg      MCHC 33.3 g/dL      RDW 13.7 %      MPV 10.2 fL      Platelets 198 Thousands/uL      nRBC 0 /100 WBCs      Segmented % 66 %      Immature Grans % 0 %      Lymphocytes % 25 %      Monocytes % 7 %      Eosinophils Relative 2 %      Basophils Relative 0 %      Absolute Neutrophils 4.95 Thousands/µL      Absolute Immature Grans 0.02 Thousand/uL      Absolute Lymphocytes 1.87 Thousands/µL      Absolute Monocytes 0.49 Thousand/µL      Eosinophils Absolute 0.11 Thousand/µL      Basophils Absolute 0.02 Thousands/µL             XR chest 2 views   Final Interpretation by Mark Noguera MD (04/09 1154)      No acute cardiopulmonary disease.            Resident: Braulio Silva I, the attending radiologist, have reviewed the images and agree with the final report above.      Workstation performed: [id number]             ECG 12 Lead Documentation Only    Date/Time: 4/9/2025 11:06 AM    Performed by: Carrie Ramirez PA-C  Authorized by: Carrie Ramirez PA-C    Indications / Diagnosis:  Chest pain  ECG reviewed by me, the ED Provider: yes    Patient location:  ED  Previous ECG:     Previous ECG:  Compared to current    Comparison ECG info:  3/24/25    Similarity:  No change    Comparison to cardiac monitor: Yes    Interpretation:     Interpretation: non-specific    Rate:     ECG rate:  85    ECG rate assessment: normal    Rhythm:     Rhythm: sinus rhythm    Ectopy:     Ectopy: none    QRS:     QRS axis:  Normal    QRS intervals:  Normal  Conduction:      Conduction: normal    ST segments:     ST segments:  Non-specific  T waves:     T waves: normal    Comments:      , QRS 80, QT//452; no acute ischemic changes, no significant interval change from prior  ECG 12 Lead Documentation Only    Date/Time: 4/9/2025 12:11 PM    Performed by: Carrie Ramirez PA-C  Authorized by: Carrie Ramirez PA-C    Indications / Diagnosis:  Chest pain  ECG reviewed by me, the ED Provider: yes    Patient location:  ED  Previous ECG:     Comparison to cardiac monitor: Yes    Interpretation:     Interpretation: non-specific    Rate:     ECG rate:  83    ECG rate assessment: normal    Rhythm:     Rhythm: sinus rhythm    Ectopy:     Ectopy: none    QRS:     QRS axis:  Normal    QRS intervals:  Normal  Conduction:     Conduction: normal    ST segments:     ST segments:  Non-specific  T waves:     T waves: normal    Comments:      , QRS 80, QT//444; no significant interval change from prior  ECG 12 Lead Documentation Only    Date/Time: 4/9/2025 12:18 PM    Performed by: Carrie Ramirez PA-C  Authorized by: Carrie Ramirez PA-C    Indications / Diagnosis:  Chest pain  ECG reviewed by me, the ED Provider: yes    Patient location:  ED  Previous ECG:     Previous ECG:  Compared to current    Similarity:  No change    Comparison to cardiac monitor: Yes    Interpretation:     Interpretation: non-specific    Rate:     ECG rate:  90    ECG rate assessment: normal    Rhythm:     Rhythm: sinus rhythm    Ectopy:     Ectopy: none    QRS:     QRS axis:  Normal    QRS intervals:  Normal  Conduction:     Conduction: normal    ST segments:     ST segments:  Non-specific  T waves:     T waves: normal    Comments:      , QRS 82, QT//462; no acute ischemic changes, no significant change from prior  ECG 12 Lead Documentation Only    Date/Time: 4/9/2025 12:25 PM    Performed by: Carrie Ramirez PA-C  Authorized by: Carrie Ramirez PA-C    Indications /  Diagnosis:  Chest pain  ECG reviewed by me, the ED Provider: yes    Patient location:  ED  Previous ECG:     Previous ECG:  Compared to current    Similarity:  No change    Comparison to cardiac monitor: Yes    Interpretation:     Interpretation: non-specific    Rate:     ECG rate:  90    ECG rate assessment: normal    Rhythm:     Rhythm: sinus rhythm    Ectopy:     Ectopy: none    QRS:     QRS axis:  Normal    QRS intervals:  Normal  Conduction:     Conduction: normal    ST segments:     ST segments:  Non-specific  T waves:     T waves: normal    Comments:      , QRS 78, QT//457; no acute ischemic changes, no significant interval change from prior  ECG 12 Lead Documentation Only    Date/Time: 4/9/2025 1:35 PM    Performed by: Carrie Ramirez PA-C  Authorized by: Carrie Ramirez PA-C    Indications / Diagnosis:  Chest pain  ECG reviewed by me, the ED Provider: yes    Patient location:  ED  Previous ECG:     Previous ECG:  Compared to current    Similarity:  No change    Comparison to cardiac monitor: Yes    Interpretation:     Interpretation: non-specific    Rate:     ECG rate:  81    ECG rate assessment: normal    Rhythm:     Rhythm: sinus rhythm    Ectopy:     Ectopy: none    QRS:     QRS axis:  Normal    QRS intervals:  Normal  Conduction:     Conduction: normal    ST segments:     ST segments:  Non-specific  T waves:     T waves: normal    Comments:      , QRS 82, QT//436; no acute ischemic changes      ED Medication and Procedure Management   Prior to Admission Medications   Prescriptions Last Dose Informant Patient Reported? Taking?   Blood Glucose Monitoring Suppl (ONE TOUCH ULTRA 2) w/Device KIT Unknown Self No No   Sig: Use daily   Continuous Glucose  (FreeStyle Darius 3 Malone) ANDERSON Not Taking  No No   Sig: To check blood sugars continuously   Patient not taking: Reported on 4/9/2025   Continuous Glucose Sensor (FreeStyle Darius 3 Sensor) MISC Not Taking  No No   Sig:  Use to check your blood sugar continuously and change every 2 weeks   Patient not taking: Reported on 4/9/2025   Continuous Glucose Sensor (FreeStyle Darius 3 Sensor) MISC Not Taking  No No   Sig: To check blood sugars continuously and to change every 14 days.   Patient not taking: Reported on 4/9/2025   Insulin Pen Needle (Unifine Pentips) 32G X 4 MM MISC Unknown Self No No   Sig: use twice a day for INJECTIONS   Jardiance 25 MG TABS   No Yes   Sig: Take 1 tablet (25 mg total) by mouth in the morning   Lancets (OneTouch Delica Plus [id number]) MISC Unknown  No No   Sig: use 1 LANCET to TEST BLOOD SUGAR three times a day   Lantus SoloStar 100 units/mL SOPN  Self No Yes   Sig: Inject 15 Units under the skin 2 times a day   Ozempic, 2 MG/DOSE, 8 MG/3ML injection pen   No Yes   Sig: INJECT 0.75ML UNDER THE SKIN EVERY 7 DAYS   VRAYLAR 6 MG capsule  Self Yes Yes   Sig: Take 6 mg by mouth daily   albuterol (2.5 mg/3 mL) 0.083 % nebulizer solution Not Taking Self No No   Sig: Take 3 mL (2.5 mg total) by nebulization every 6 (six) hours as needed for wheezing or shortness of breath   Patient not taking: Reported on 4/9/2025   albuterol (Ventolin HFA) 90 mcg/act inhaler Not Taking Self No No   Sig: Inhale 2 puffs every 4 (four) hours as needed for wheezing or shortness of breath   Patient not taking: Reported on 4/9/2025   aspirin (ECOTRIN LOW STRENGTH) 81 mg EC tablet  Self No Yes   Sig: Take 1 tablet (81 mg total) by mouth daily   atorvastatin (LIPITOR) 40 mg tablet   No Yes   Sig: Take 2 tablets (80 mg total) by mouth daily   cloNIDine (CATAPRES) 0.1 mg tablet Not Taking Self Yes No   Patient not taking: Reported on 4/9/2025   clonazePAM (KlonoPIN) 0.5 mg tablet  Self Yes Yes   Sig: Take 0.5 mg by mouth daily as needed     glucose blood (OneTouch Ultra) test strip Unknown Self No No   Sig: Use to test blood sugar three times a day   pantoprazole (PROTONIX) 40 mg tablet  Self No Yes   Sig: Take 1 tablet (40 mg total) by  mouth daily   tiotropium (Spiriva Respimat) 2.5 MCG/ACT AERS inhaler   No Yes   Sig: Inhale 2 puffs daily   vilazodone (VIIBRYD) 40 mg tablet  Self Yes Yes   Sig: Take 40 mg by mouth daily.      Facility-Administered Medications: None     Current Discharge Medication List        CONTINUE these medications which have NOT CHANGED    Details   aspirin (ECOTRIN LOW STRENGTH) 81 mg EC tablet Take 1 tablet (81 mg total) by mouth daily  Qty: 90 tablet, Refills: 3    Associated Diagnoses: Chest pain, unspecified type; MARQUEZ (dyspnea on exertion)      atorvastatin (LIPITOR) 40 mg tablet Take 2 tablets (80 mg total) by mouth daily  Qty: 60 tablet, Refills: 11    Associated Diagnoses: Mixed hyperlipidemia      clonazePAM (KlonoPIN) 0.5 mg tablet Take 0.5 mg by mouth daily as needed        Jardiance 25 MG TABS Take 1 tablet (25 mg total) by mouth in the morning  Qty: 90 tablet, Refills: 1    Associated Diagnoses: Type 2 diabetes mellitus with hyperglycemia, without long-term current use of insulin (HCC)      Lantus SoloStar 100 units/mL SOPN Inject 15 Units under the skin 2 times a day  Qty: 30 mL, Refills: 1    Associated Diagnoses: Type 2 diabetes mellitus with hyperglycemia, without long-term current use of insulin (HCC)      Ozempic, 2 MG/DOSE, 8 MG/3ML injection pen INJECT 0.75ML UNDER THE SKIN EVERY 7 DAYS  Qty: 9 mL, Refills: 0    Associated Diagnoses: Type 2 diabetes mellitus without complication, with long-term current use of insulin (HCC)      pantoprazole (PROTONIX) 40 mg tablet Take 1 tablet (40 mg total) by mouth daily  Qty: 90 tablet, Refills: 1    Associated Diagnoses: GERD without esophagitis; Epigastric pain; Tobacco abuse      tiotropium (Spiriva Respimat) 2.5 MCG/ACT AERS inhaler Inhale 2 puffs daily  Qty: 4 g, Refills: 11    Associated Diagnoses: Centrilobular emphysema (HCC)      vilazodone (VIIBRYD) 40 mg tablet Take 40 mg by mouth daily.      VRAYLAR 6 MG capsule Take 6 mg by mouth daily  Refills: 0       albuterol (2.5 mg/3 mL) 0.083 % nebulizer solution Take 3 mL (2.5 mg total) by nebulization every 6 (six) hours as needed for wheezing or shortness of breath  Qty: 90 mL, Refills: 2    Associated Diagnoses: Centrilobular emphysema (HCC)      albuterol (Ventolin HFA) 90 mcg/act inhaler Inhale 2 puffs every 4 (four) hours as needed for wheezing or shortness of breath  Qty: 18 g, Refills: 5    Comments: Substitution to a formulary equivalent within the same pharmaceutical class is authorized.  Associated Diagnoses: Centrilobular emphysema (HCC)      Blood Glucose Monitoring Suppl (ONE TOUCH ULTRA 2) w/Device KIT Use daily  Qty: 1 kit, Refills: 0    Associated Diagnoses: Diabetes mellitus without complication ([facility])      cloNIDine (CATAPRES) 0.1 mg tablet       Continuous Glucose  (FreeStyle Darius 3 [place]) ANDERSON To check blood sugars continuously  Qty: 1 each, Refills: 0    Associated Diagnoses: Type 2 diabetes mellitus with stage 3b chronic kidney disease, with long-term current use of insulin ([facility])      !! Continuous Glucose Sensor (FreeStyle Darius 3 Sensor) MISC Use to check your blood sugar continuously and change every 2 weeks  Qty: 2 each, Refills: 2    Associated Diagnoses: Type 2 diabetes mellitus with stage 3b chronic kidney disease, with long-term current use of insulin ([facility])      !! Continuous Glucose Sensor (FreeStyle Darius 3 Sensor) MISC To check blood sugars continuously and to change every 14 days.  Qty: 9 each, Refills: 3    Associated Diagnoses: Type 2 diabetes mellitus with stage 3b chronic kidney disease, with long-term current use of insulin ([facility])      glucose blood (OneTouch Ultra) test strip Use to test blood sugar three times a day  Qty: 100 strip, Refills: 3    Associated Diagnoses: Type 2 diabetes mellitus with hyperglycemia, without long-term current use of insulin ([facility])      Insulin Pen Needle (Unifine Pentips) 32G X 4 MM MISC use twice a day for INJECTIONS  Qty: 200 each, Refills: 3     Associated Diagnoses: Type 2 diabetes mellitus with hyperglycemia, without long-term current use of insulin (HCC)      Lancets (OneTouch Delica Plus [id number]) MISC use 1 LANCET to TEST BLOOD SUGAR three times a day  Qty: 300 each, Refills: 1    Associated Diagnoses: Type 2 diabetes mellitus with hyperglycemia, without long-term current use of insulin (HCC)       !! - Potential duplicate medications found. Please discuss with provider.        No discharge procedures on file.  ED SEPSIS DOCUMENTATION   Time reflects when diagnosis was documented in both MDM as applicable and the Disposition within this note       Time User Action Codes Description Comment    4/9/2025  2:13 PM Carrie Ramirez [R07.9] Chest pain     4/9/2025  2:13 PM Carrie Ramirez [E87.6] Hypokalemia                  Carrie Ramirez PA-C  04/09/25 1528

## 2025-04-09 NOTE — ASSESSMENT & PLAN NOTE
Lab Results   Component Value Date    HGBA1C 6.3 03/12/2025       Well controlled  Continue basal insulin (lantus 15 units q12)  Accu-checks with ISS coverage

## 2025-04-10 ENCOUNTER — TRANSITIONAL CARE MANAGEMENT (OUTPATIENT)
Dept: FAMILY MEDICINE CLINIC | Facility: CLINIC | Age: 65
End: 2025-04-10

## 2025-04-10 ENCOUNTER — APPOINTMENT (OUTPATIENT)
Dept: NON INVASIVE DIAGNOSTICS | Facility: HOSPITAL | Age: 65
End: 2025-04-10
Payer: MEDICARE

## 2025-04-10 ENCOUNTER — APPOINTMENT (OUTPATIENT)
Dept: NUCLEAR MEDICINE | Facility: HOSPITAL | Age: 65
End: 2025-04-10
Payer: MEDICARE

## 2025-04-10 VITALS
HEIGHT: 66 IN | DIASTOLIC BLOOD PRESSURE: 85 MMHG | SYSTOLIC BLOOD PRESSURE: 149 MMHG | HEART RATE: 89 BPM | BODY MASS INDEX: 31.71 KG/M2 | OXYGEN SATURATION: 94 % | TEMPERATURE: 97.5 F | WEIGHT: 197.31 LBS | RESPIRATION RATE: 18 BRPM

## 2025-04-10 LAB
ANION GAP SERPL CALCULATED.3IONS-SCNC: 10 MMOL/L (ref 4–13)
AORTIC ROOT: 3.3 CM
ASCENDING AORTA: 3.6 CM
BASOPHILS # BLD AUTO: 0.02 THOUSANDS/ÂΜL (ref 0–0.1)
BASOPHILS NFR BLD AUTO: 0 % (ref 0–1)
BSA FOR ECHO PROCEDURE: 1.99 M2
BUN SERPL-MCNC: 14 MG/DL (ref 5–25)
CALCIUM SERPL-MCNC: 9.4 MG/DL (ref 8.4–10.2)
CARDIAC TROPONIN I PNL SERPL HS: 6 NG/L (ref 8–18)
CHEST PAIN STATEMENT: NORMAL
CHLORIDE SERPL-SCNC: 107 MMOL/L (ref 96–108)
CHOLEST SERPL-MCNC: 146 MG/DL (ref ?–200)
CO2 SERPL-SCNC: 26 MMOL/L (ref 21–32)
CREAT SERPL-MCNC: 1.1 MG/DL (ref 0.6–1.3)
E WAVE DECELERATION TIME: 240 MS
E/A RATIO: 0.76
EOSINOPHIL # BLD AUTO: 0.13 THOUSAND/ÂΜL (ref 0–0.61)
EOSINOPHIL NFR BLD AUTO: 2 % (ref 0–6)
ERYTHROCYTE [DISTWIDTH] IN BLOOD BY AUTOMATED COUNT: 13.9 % (ref 11.6–15.1)
FRACTIONAL SHORTENING: 33 (ref 28–44)
GFR SERPL CREATININE-BSD FRML MDRD: 53 ML/MIN/1.73SQ M
GLUCOSE P FAST SERPL-MCNC: 111 MG/DL (ref 65–99)
GLUCOSE SERPL-MCNC: 111 MG/DL (ref 65–140)
GLUCOSE SERPL-MCNC: 130 MG/DL (ref 65–140)
GLUCOSE SERPL-MCNC: 99 MG/DL (ref 65–140)
HCT VFR BLD AUTO: 42.8 % (ref 34.8–46.1)
HDLC SERPL-MCNC: 31 MG/DL
HGB BLD-MCNC: 14.5 G/DL (ref 11.5–15.4)
IMM GRANULOCYTES # BLD AUTO: 0.02 THOUSAND/UL (ref 0–0.2)
IMM GRANULOCYTES NFR BLD AUTO: 0 % (ref 0–2)
INTERVENTRICULAR SEPTUM IN DIASTOLE (PARASTERNAL SHORT AXIS VIEW): 1.3 CM
INTERVENTRICULAR SEPTUM: 1.3 CM (ref 0.6–1.1)
LAAS-AP2: 14.9 CM2
LAAS-AP4: 12.1 CM2
LDLC SERPL CALC-MCNC: 65 MG/DL (ref 0–100)
LEFT ATRIUM SIZE: 4.3 CM
LEFT ATRIUM VOLUME (MOD BIPLANE): 32 ML
LEFT ATRIUM VOLUME INDEX (MOD BIPLANE): 16.1 ML/M2
LEFT INTERNAL DIMENSION IN SYSTOLE: 2.9 CM (ref 2.1–4)
LEFT VENTRICULAR INTERNAL DIMENSION IN DIASTOLE: 4.3 CM (ref 3.5–6)
LEFT VENTRICULAR POSTERIOR WALL IN END DIASTOLE: 1.2 CM
LEFT VENTRICULAR STROKE VOLUME: 49 ML
LV EF US.2D.A4C+ESTIMATED: 67 %
LVSV (TEICH): 49 ML
LYMPHOCYTES # BLD AUTO: 1.77 THOUSANDS/ÂΜL (ref 0.6–4.47)
LYMPHOCYTES NFR BLD AUTO: 24 % (ref 14–44)
MAX DIASTOLIC BP: 74 MMHG
MAX HR PERCENT: 66 %
MAX HR: 104 BPM
MAX PREDICTED HEART RATE: 156 BPM
MCH RBC QN AUTO: 31 PG (ref 26.8–34.3)
MCHC RBC AUTO-ENTMCNC: 33.9 G/DL (ref 31.4–37.4)
MCV RBC AUTO: 92 FL (ref 82–98)
MONOCYTES # BLD AUTO: 0.6 THOUSAND/ÂΜL (ref 0.17–1.22)
MONOCYTES NFR BLD AUTO: 8 % (ref 4–12)
MV E'TISSUE VEL-LAT: 7 CM/S
MV E'TISSUE VEL-SEP: 7 CM/S
MV PEAK A VEL: 0.76 M/S
MV PEAK E VEL: 58 CM/S
MV STENOSIS PRESSURE HALF TIME: 70 MS
MV VALVE AREA P 1/2 METHOD: 3.14
NEUTROPHILS # BLD AUTO: 4.92 THOUSANDS/ÂΜL (ref 1.85–7.62)
NEUTS SEG NFR BLD AUTO: 66 % (ref 43–75)
NRBC BLD AUTO-RTO: 0 /100 WBCS
PLATELET # BLD AUTO: 211 THOUSANDS/UL (ref 149–390)
PMV BLD AUTO: 10.1 FL (ref 8.9–12.7)
POTASSIUM SERPL-SCNC: 3.3 MMOL/L (ref 3.5–5.3)
PROTOCOL NAME: NORMAL
RATE PRESSURE PRODUCT: 9984
RBC # BLD AUTO: 4.68 MILLION/UL (ref 3.81–5.12)
REASON FOR TERMINATION: NORMAL
RIGHT ATRIUM AREA SYSTOLE A4C: 14.2 CM2
RIGHT VENTRICLE ID DIMENSION: 2.9 CM
SL CV LEFT ATRIUM LENGTH A2C: 4.3 CM
SL CV LV EF: 65
SL CV PED ECHO LEFT VENTRICLE DIASTOLIC VOLUME (MOD BIPLANE) 2D: 82 ML
SL CV PED ECHO LEFT VENTRICLE SYSTOLIC VOLUME (MOD BIPLANE) 2D: 33 ML
SL CV REST NUCLEAR ISOTOPE DOSE: 10.5 MCI
SL CV STRESS NUCLEAR ISOTOPE DOSE: 32.6 MCI
SL CV STRESS RECOVERY BP: NORMAL MMHG
SL CV STRESS RECOVERY HR: 98 BPM
SL CV STRESS RECOVERY O2 SAT: 93 %
SODIUM SERPL-SCNC: 143 MMOL/L (ref 135–147)
SPECT HRT GATED+EF W RNC IV: 62 %
STRESS BASELINE BP: NORMAL MMHG
STRESS BASELINE HR: 87 BPM
STRESS O2 SAT REST: 94 %
STRESS PEAK HR: 104 BPM
STRESS POST ESTIMATED WORKLOAD: 1 METS
STRESS POST EXERCISE DUR MIN: 3 MIN
STRESS POST EXERCISE DUR SEC: 0 SEC
STRESS POST O2 SAT PEAK: 96 %
STRESS POST PEAK BP: 96 MMHG
STRESS POST PEAK HR: 104 BPM
STRESS POST PEAK SYSTOLIC BP: 134 MMHG
STRESS/REST PERFUSION RATIO: 1.3
TARGET HR FORMULA: NORMAL
TEST INDICATION: NORMAL
TRICUSPID ANNULAR PLANE SYSTOLIC EXCURSION: 2.6 CM
TRIGL SERPL-MCNC: 250 MG/DL (ref ?–150)
WBC # BLD AUTO: 7.46 THOUSAND/UL (ref 4.31–10.16)

## 2025-04-10 PROCEDURE — 78452 HT MUSCLE IMAGE SPECT MULT: CPT

## 2025-04-10 PROCEDURE — 93306 TTE W/DOPPLER COMPLETE: CPT | Performed by: INTERNAL MEDICINE

## 2025-04-10 PROCEDURE — 85025 COMPLETE CBC W/AUTO DIFF WBC: CPT | Performed by: FAMILY MEDICINE

## 2025-04-10 PROCEDURE — 78452 HT MUSCLE IMAGE SPECT MULT: CPT | Performed by: INTERNAL MEDICINE

## 2025-04-10 PROCEDURE — 80061 LIPID PANEL: CPT | Performed by: FAMILY MEDICINE

## 2025-04-10 PROCEDURE — 99239 HOSP IP/OBS DSCHRG MGMT >30: CPT | Performed by: HOSPITALIST

## 2025-04-10 PROCEDURE — 84484 ASSAY OF TROPONIN QUANT: CPT | Performed by: FAMILY MEDICINE

## 2025-04-10 PROCEDURE — 93016 CV STRESS TEST SUPVJ ONLY: CPT | Performed by: INTERNAL MEDICINE

## 2025-04-10 PROCEDURE — 82948 REAGENT STRIP/BLOOD GLUCOSE: CPT

## 2025-04-10 PROCEDURE — 93017 CV STRESS TEST TRACING ONLY: CPT

## 2025-04-10 PROCEDURE — C8929 TTE W OR WO FOL WCON,DOPPLER: HCPCS

## 2025-04-10 PROCEDURE — 93018 CV STRESS TEST I&R ONLY: CPT | Performed by: INTERNAL MEDICINE

## 2025-04-10 PROCEDURE — 80048 BASIC METABOLIC PNL TOTAL CA: CPT | Performed by: FAMILY MEDICINE

## 2025-04-10 PROCEDURE — A9502 TC99M TETROFOSMIN: HCPCS

## 2025-04-10 RX ORDER — REGADENOSON 0.08 MG/ML
0.4 INJECTION, SOLUTION INTRAVENOUS ONCE
Status: COMPLETED | OUTPATIENT
Start: 2025-04-10 | End: 2025-04-10

## 2025-04-10 RX ORDER — POTASSIUM CHLORIDE 1500 MG/1
40 TABLET, EXTENDED RELEASE ORAL ONCE
Status: COMPLETED | OUTPATIENT
Start: 2025-04-10 | End: 2025-04-10

## 2025-04-10 RX ADMIN — PERFLUTREN 3 ML/MIN: 6.52 INJECTION, SUSPENSION INTRAVENOUS at 07:30

## 2025-04-10 RX ADMIN — VILAZODONE HYDROCHLORIDE 40 MG: 20 TABLET ORAL at 08:20

## 2025-04-10 RX ADMIN — POTASSIUM CHLORIDE 40 MEQ: 1500 TABLET, EXTENDED RELEASE ORAL at 08:19

## 2025-04-10 RX ADMIN — ASPIRIN 81 MG: 81 TABLET, COATED ORAL at 08:19

## 2025-04-10 RX ADMIN — REGADENOSON 0.4 MG: 0.08 INJECTION, SOLUTION INTRAVENOUS at 10:28

## 2025-04-10 RX ADMIN — INSULIN GLARGINE 10 UNITS: 100 INJECTION, SOLUTION SUBCUTANEOUS at 08:19

## 2025-04-10 RX ADMIN — PANTOPRAZOLE SODIUM 40 MG: 40 TABLET, DELAYED RELEASE ORAL at 08:19

## 2025-04-10 RX ADMIN — ATORVASTATIN CALCIUM 80 MG: 40 TABLET, FILM COATED ORAL at 08:19

## 2025-04-10 RX ADMIN — UMECLIDINIUM 1 PUFF: 62.5 AEROSOL, POWDER ORAL at 08:19

## 2025-04-10 NOTE — CASE MANAGEMENT
Case Management Discharge Planning Note    Patient name Whit Campos  Location /414-01 MRN [id number]  : 1960 Date 4/10/2025       Current Admission Date: 2025  Current Admission Diagnosis:Other chest pain   Patient Active Problem List    Diagnosis Date Noted Date Diagnosed    Mixed hyperlipidemia 2025     Hepatomegaly 2024     Steatosis of liver 2024     History of colon polyps 2024     Ulcerative colitis without complications (HCC) 2024     GERD without esophagitis 2024     Secondary hyperparathyroidism of renal origin (HCC) 2024     Vitamin D deficiency 2024     Nocturnal hypoxia 2024     Nicotine dependence, uncomplicated 2024     Presence of IVC filter 02/15/2023     Nonocclusive coronary atherosclerosis of native coronary artery 2022     Kidney lesion 2022     Benign hypertension with CKD (chronic kidney disease) stage III (HCC) 2022     PAD (peripheral artery disease) (HCC) 2021     Primary cancer of upper outer quadrant of right breast (HCC) 10/27/2021     Malignant neoplasm of upper-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, estrogen receptor positive (HCC) 10/27/2021     Mucinous carcinoma of breast, 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     Pulmonary hypertension (HCC) 10/12/2021     Other chest pain 10/12/2021     ZOË (obstructive sleep apnea)      Migraine 2017     Neuropathy 2016     Cataract 2014     Type 2 diabetes mellitus with stage 3b chronic kidney disease, with long-term current use of insulin (HCC) 2014     Centrilobular emphysema (HCC) 2013     Depression 2013     Hypercoagulable state (HCC) 2013     Gastroesophageal reflux disease with esophagitis 2012     Hypertension 08/15/2012     Primary fibromyalgia syndrome 2012       LOS (days): 0  Geometric Mean LOS (GMLOS) (days):   Days to GMLOS:     OBJECTIVE:            Current admission status:  Observation   Preferred Pharmacy:   [id number] - [place] - [address]  [address]  [place] [address]  Phone: [phone] Fax: [phone]    [facility] - [place] - [address]  [address]  [place] [address]  Phone: [phone] Fax: [phone]    Primary Care Provider: Magali Mitchell MD    Primary Insurance: MEDICARE  Secondary Insurance: [facility]    DISCHARGE DETAILS:  Pt is being dc'd home on this date and will be following up with PCP and Cardiology after dc.

## 2025-04-10 NOTE — PLAN OF CARE
Problem: PAIN - ADULT  Goal: Verbalizes/displays adequate comfort level or baseline comfort level  Description: Interventions:- Encourage patient to monitor pain and request assistance- Assess pain using appropriate pain scale- Administer analgesics based on type and severity of pain and evaluate response- Implement non-pharmacological measures as appropriate and evaluate response- Consider cultural and social influences on pain and pain management- Notify physician/advanced practitioner if interventions unsuccessful or patient reports new pain  Outcome: Progressing     Problem: INFECTION - ADULT  Goal: Absence or prevention of progression during hospitalization  Description: INTERVENTIONS:- Assess and monitor for signs and symptoms of infection- Monitor lab/diagnostic results- Monitor all insertion sites, i.e. indwelling lines, tubes, and drains- Monitor endotracheal if appropriate and nasal secretions for changes in amount and color- [place] appropriate cooling/warming therapies per order- Administer medications as ordered- Instruct and encourage patient and family to use good hand hygiene technique- Identify and instruct in appropriate isolation precautions for identified infection/condition  Outcome: Progressing  Goal: Absence of fever/infection during neutropenic period  Description: INTERVENTIONS:- Monitor WBC  Outcome: Progressing     Problem: SAFETY ADULT  Goal: Patient will remain free of falls  Description: INTERVENTIONS:- Educate patient/family on patient safety including physical limitations- Instruct patient to call for assistance with activity - Consult OT/PT to assist with strengthening/mobility - Keep Call bell within reach- Keep bed low and locked with side rails adjusted as appropriate- Keep care items and personal belongings within reach- Initiate and maintain comfort rounds- Make Fall Risk Sign visible to staff- Offer Toileting every 2 Hours, in advance of need- Initiate/Maintain bed/chair  alarm- Obtain necessary fall risk management equipment: non skid footwear- Apply yellow socks and bracelet for high fall risk patients- Consider moving patient to room near nurses station  Outcome: Progressing  Goal: Maintain or return to baseline ADL function  Description: INTERVENTIONS:-  Assess patient's ability to carry out ADLs; assess patient's baseline for ADL function and identify physical deficits which impact ability to perform ADLs (bathing, care of mouth/teeth, toileting, grooming, dressing, etc.)- Assess/evaluate cause of self-care deficits - Assess range of motion- Assess patient's mobility; develop plan if impaired- Assess patient's need for assistive devices and provide as appropriate- Encourage maximum independence but intervene and supervise when necessary- Involve family in performance of ADLs- Assess for home care needs following discharge - Consider OT consult to assist with ADL evaluation and planning for discharge- Provide patient education as appropriate  Outcome: Progressing  Goal: Maintains/Returns to pre admission functional level  Description: INTERVENTIONS:- Perform AM-PAC 6 Click Basic Mobility/ Daily Activity assessment daily.- Set and communicate daily mobility goal to care team and patient/family/caregiver. - Collaborate with rehabilitation services on mobility goals if consulted- Out of bed for toileting- Record patient progress and toleration of activity level   Outcome: Progressing     Problem: DISCHARGE PLANNING  Goal: Discharge to home or other facility with appropriate resources  Description: INTERVENTIONS:- Identify barriers to discharge w/patient and caregiver- Arrange for needed discharge resources and transportation as appropriate- Identify discharge learning needs (meds, wound care, etc.)- Arrange for interpretive services to assist at discharge as needed- Refer to Case Management Department for coordinating discharge planning if the patient needs post-hospital services  based on physician/advanced practitioner order or complex needs related to functional status, cognitive ability, or social support system  Outcome: Progressing     Problem: Knowledge Deficit  Goal: Patient/family/caregiver demonstrates understanding of disease process, treatment plan, medications, and discharge instructions  Description: Complete learning assessment and assess knowledge base.Interventions:- Provide teaching at level of understanding- Provide teaching via preferred learning methods  Outcome: Progressing

## 2025-04-10 NOTE — ASSESSMENT & PLAN NOTE
Lab Results   Component Value Date    EGFR 53 04/10/2025    EGFR 50 04/09/2025    EGFR 42 03/24/2025    CREATININE 1.10 04/10/2025    CREATININE 1.15 04/09/2025    CREATININE 1.33 (H) 03/24/2025     At baseline creatinine  Clonidine .1mg daily

## 2025-04-10 NOTE — ASSESSMENT & PLAN NOTE
Troponin and BNP negative, CTA negative for P.E   Checked CRP / ESR to r/o pericarditis -- negative  2D echo: EF 65%, no WMA  NM Stress test: conclusion negative for ischemia. Was a paradoxical uptake reduction of non-stress perfusion which was suspected artifact.   Telemetry monitoring - no significant findings    Question this is more likely GERD given history, though uncertain. Recommend she follow up with her PCP and placed an ambulatory follow up to Cardiology

## 2025-04-10 NOTE — CASE MANAGEMENT
Case Management Discharge Planning Note    Patient name Whit Campos  Location /414-01 MRN [id number]  : 1960 Date 4/10/2025       Current Admission Date: 2025  Current Admission Diagnosis:Centrilobular emphysema (HCC)   Patient Active Problem List    Diagnosis Date Noted Date Diagnosed    Mixed hyperlipidemia 2025     Hepatomegaly 2024     Steatosis of liver 2024     History of colon polyps 2024     Ulcerative colitis without complications (HCC) 2024     GERD without esophagitis 2024     Secondary hyperparathyroidism of renal origin (HCC) 2024     Vitamin D deficiency 2024     Nocturnal hypoxia 2024     Nicotine dependence, uncomplicated 2024     Presence of IVC filter 02/15/2023     Nonocclusive coronary atherosclerosis of native coronary artery 2022     Kidney lesion 2022     Benign hypertension with CKD (chronic kidney disease) stage III (HCC) 2022     PAD (peripheral artery disease) (HCC) 2021     Primary cancer of upper outer quadrant of right breast (HCC) 10/27/2021     Malignant neoplasm of upper-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, estrogen receptor positive (HCC) 10/27/2021     Mucinous carcinoma of breast, 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     Pulmonary hypertension (HCC) 10/12/2021     Other chest pain 10/12/2021     ZOË (obstructive sleep apnea)      Migraine 2017     Neuropathy 2016     Cataract 2014     Type 2 diabetes mellitus with stage 3b chronic kidney disease, with long-term current use of insulin (HCC) 2014     Centrilobular emphysema (HCC) 2013     Depression 2013     Hypercoagulable state (HCC) 2013     Gastroesophageal reflux disease with esophagitis 2012     Hypertension 08/15/2012     Primary fibromyalgia syndrome 2012       LOS (days): 0  Geometric Mean LOS (GMLOS) (days):   Days to GMLOS:     OBJECTIVE:            Current admission  status: Observation   Preferred Pharmacy:   [id number] - [place] - [address]  [address]  [place] [address]  Phone: [phone] Fax: [phone]    [facility] - [place] - [address]  [address]  [place] [address]  Phone: [phone] Fax: [phone]    Primary Care Provider: Magali Mitchell MD    Primary Insurance: MEDICARE  Secondary Insurance: [facility]    DISCHARGE DETAILS:  Chart review done and discussed pt in interdisciplinary team meeting. Pt with dx of CP. Nuclear stress test today; check echo. Possible dc home on this date pending results.

## 2025-04-10 NOTE — PLAN OF CARE
Problem: PAIN - ADULT  Goal: Verbalizes/displays adequate comfort level or baseline comfort level  Description: Interventions:- Encourage patient to monitor pain and request assistance- Assess pain using appropriate pain scale- Administer analgesics based on type and severity of pain and evaluate response- Implement non-pharmacological measures as appropriate and evaluate response- Consider cultural and social influences on pain and pain management- Notify physician/advanced practitioner if interventions unsuccessful or patient reports new pain  Outcome: Progressing     Problem: INFECTION - ADULT  Goal: Absence or prevention of progression during hospitalization  Description: INTERVENTIONS:- Assess and monitor for signs and symptoms of infection- Monitor lab/diagnostic results- Monitor all insertion sites, i.e. indwelling lines, tubes, and drains- Monitor endotracheal if appropriate and nasal secretions for changes in amount and color- [place] appropriate cooling/warming therapies per order- Administer medications as ordered- Instruct and encourage patient and family to use good hand hygiene technique- Identify and instruct in appropriate isolation precautions for identified infection/condition  Outcome: Progressing  Goal: Absence of fever/infection during neutropenic period  Description: INTERVENTIONS:- Monitor WBC  Outcome: Progressing     Problem: SAFETY ADULT  Goal: Patient will remain free of falls  Description: INTERVENTIONS:- Educate patient/family on patient safety including physical limitations- Instruct patient to call for assistance with activity - Consult OT/PT to assist with strengthening/mobility - Keep Call bell within reach- Keep bed low and locked with side rails adjusted as appropriate- Keep care items and personal belongings within reach- Initiate and maintain comfort rounds- Make Fall Risk Sign visible to staff- Offer Toileting every 2 Hours, in advance of need- Initiate/Maintain bed alarm-  [de-identified] : 9/12/24 sinus rhythm  Obtain necessary fall risk management equipment: nonskid socks- Apply yellow socks and bracelet for high fall risk patients- Consider moving patient to room near nurses station  Outcome: Progressing  Goal: Maintain or return to baseline ADL function  Description: INTERVENTIONS:-  Assess patient's ability to carry out ADLs; assess patient's baseline for ADL function and identify physical deficits which impact ability to perform ADLs (bathing, care of mouth/teeth, toileting, grooming, dressing, etc.)- Assess/evaluate cause of self-care deficits - Assess range of motion- Assess patient's mobility; develop plan if impaired- Assess patient's need for assistive devices and provide as appropriate- Encourage maximum independence but intervene and supervise when necessary- Involve family in performance of ADLs- Assess for home care needs following discharge - Consider OT consult to assist with ADL evaluation and planning for discharge- Provide patient education as appropriate  Outcome: Progressing  Goal: Maintains/Returns to pre admission functional level  Description: INTERVENTIONS:- Perform AM-PAC 6 Click Basic Mobility/ Daily Activity assessment daily.- Set and communicate daily mobility goal to care team and patient/family/caregiver. - Collaborate with rehabilitation services on mobility goals if consulted- Perform Range of Motion 2 times a day.- Reposition patient every 2 hours.- Dangle patient 2 times a day- Stand patient 2 times a day- Ambulate patient 2 times a day- Out of bed to chair 2 times a day - Out of bed for meals 2 times a day- Out of bed for toileting- Record patient progress and toleration of activity level   Outcome: Progressing     Problem: DISCHARGE PLANNING  Goal: Discharge to home or other facility with appropriate resources  Description: INTERVENTIONS:- Identify barriers to discharge w/patient and caregiver- Arrange for needed discharge resources and transportation as appropriate- Identify discharge  learning needs (meds, wound care, etc.)- Arrange for interpretive services to assist at discharge as needed- Refer to Case Management Department for coordinating discharge planning if the patient needs post-hospital services based on physician/advanced practitioner order or complex needs related to functional status, cognitive ability, or social support system  Outcome: Progressing     Problem: Knowledge Deficit  Goal: Patient/family/caregiver demonstrates understanding of disease process, treatment plan, medications, and discharge instructions  Description: Complete learning assessment and assess knowledge base.Interventions:- Provide teaching at level of understanding- Provide teaching via preferred learning methods  Outcome: Progressing

## 2025-04-10 NOTE — DISCHARGE SUMMARY
Discharge Summary - Hospitalist   Name: Whit Campos 64 y.o. female I MRN: [id number]  Unit/Bed#: 414-01 I Date of Admission: 4/9/2025   Date of Service: 4/10/2025 I Hospital Day: 0     Assessment & Plan  Other chest pain  Troponin and BNP negative, CTA negative for P.E   Checked CRP / ESR to r/o pericarditis -- negative  2D echo: EF 65%, no WMA  NM Stress test: conclusion negative for ischemia. Was a paradoxical uptake reduction of non-stress perfusion which was suspected artifact.   Telemetry monitoring - no significant findings    Question this is more likely GERD given history, though uncertain. Recommend she follow up with her PCP and placed an ambulatory follow up to Cardiology     Centrilobular emphysema (HCC)  Respiratory protocol   Incruse inhaler daily  Advised smoking cessation  Gastroesophageal reflux disease with esophagitis  Protonix 40mg daily   Benign hypertension with CKD (chronic kidney disease) stage III ([facility])  Lab Results   Component Value Date    EGFR 53 04/10/2025    EGFR 50 04/09/2025    EGFR 42 03/24/2025    CREATININE 1.10 04/10/2025    CREATININE 1.15 04/09/2025    CREATININE 1.33 (H) 03/24/2025     At baseline creatinine  Clonidine .1mg daily   Type 2 diabetes mellitus with stage 3b chronic kidney disease, with long-term current use of insulin ([facility])  Lab Results   Component Value Date    HGBA1C 6.3 03/12/2025       Well controlled  Continue basal insulin (lantus 15 units q12)  Accu-checks with ISS coverage     Mixed hyperlipidemia  Lipitor 40mg HS   Check a fasting lipid panel      Medical Problems       Resolved Problems  Date Reviewed: 4/9/2025   None       Discharging Physician / Practitioner: Tushar Narayanan DO  PCP: Magali Mitchell MD  Admission Date:   Admission Orders (From admission, onward)       Ordered        04/09/25 1413  Place in Observation  Once                          Discharge Date: 04/10/25    Consultations During Hospital Stay:  none    Procedures Performed:    none    Significant Findings / Test Results:   XR chest 2 views   Final Result by Mark Noguera MD (04/09 1154)      No acute cardiopulmonary disease.            Resident: Braulio Silva I, the attending radiologist, have reviewed the images and agree with the final report above.      Workstation performed: [id number]           Lab Results   Component Value Date    WBC 7.46 04/10/2025    HGB 14.5 04/10/2025    HCT 42.8 04/10/2025    MCV 92 04/10/2025     04/10/2025     Lab Results   Component Value Date    SODIUM 143 04/10/2025    K 3.3 (L) 04/10/2025     04/10/2025    CO2 26 04/10/2025    BUN 14 04/10/2025    CREATININE 1.10 04/10/2025    GLUC 111 04/10/2025    CALCIUM 9.4 04/10/2025         Incidental Findings:   none   I reviewed the above mentioned incidental findings with the patient and/or family and they expressed understanding.    Test Results Pending at Discharge (will require follow up):   none     Outpatient Tests Requested:  none    Complications:  none    Reason for Admission: CP obs    Hospital Course:   Whit Campos is a 64 y.o. female patient who originally presented to the hospital on 4/9/2025 due to chest pain.  Patient was admitted for chest pain observation.  She had her troponins trended which were negative.  She had a CT PE which was negative for pulmonary embolus.  She underwent evaluation with an echocardiogram as well as a nuclear med stress test.  There is no evidence of ischemia on either imaging.  Patient resolution of her symptoms during hospitalization.  Patient is medically stable for discharge, recommend following with cardiology and her primary care provider as outpatient.          Please see above list of diagnoses and related plan for additional information.     Condition at Discharge: good    Discharge Day Visit / Exam:   Subjective:  patient denies any CP overnight  Vitals: Blood Pressure: 149/85 (04/10/25 1153)  Pulse: 89 (04/10/25  "1153)  Temperature: 97.5 °F (36.4 °C) (04/10/25 1153)  Temp Source: Oral (04/10/25 1153)  Respirations: 18 (04/10/25 0819)  Height: 5' 6\" (167.6 cm) (04/10/25 0707)  Weight - Scale: 89.5 kg (197 lb 5 oz) (04/10/25 0707)  SpO2: 94 % (04/10/25 1153)  Physical Exam  Vitals and nursing note reviewed.   Constitutional:       General: She is not in acute distress.     Appearance: She is well-developed. She is obese.   HENT:      Head: Normocephalic and atraumatic.   Eyes:      Conjunctiva/sclera: Conjunctivae normal.   Cardiovascular:      Rate and Rhythm: Normal rate and regular rhythm.      Heart sounds: No murmur heard.  Pulmonary:      Effort: Pulmonary effort is normal. No respiratory distress.      Breath sounds: Normal breath sounds.   Abdominal:      Palpations: Abdomen is soft.      Tenderness: There is no abdominal tenderness.   Musculoskeletal:         General: No swelling.      Cervical back: Neck supple.   Skin:     General: Skin is warm and dry.   Neurological:      Mental Status: She is alert.   Psychiatric:         Mood and Affect: Mood normal.          Discussion with Family: Patient declined call to .     Discharge instructions/Information to patient and family:   See after visit summary for information provided to patient and family.      Provisions for Follow-Up Care:  See after visit summary for information related to follow-up care and any pertinent home health orders.      Mobility at time of Discharge:   Basic Mobility Inpatient Raw Score: 23  JH-HLM Goal: 7: Walk 25 feet or more  JH-HLM Achieved: 7: Walk 25 feet or more  HLM Goal achieved. Continue to encourage appropriate mobility.     Disposition:   Home    Planned Readmission: no    Discharge Medications:  See after visit summary for reconciled discharge medications provided to patient and/or family.      Administrative Statements   Discharge Statement:  I have spent a total time of 32 minutes in caring for this patient on the day " of the visit/encounter. >30 minutes of time was spent on: Diagnostic results, Impressions, Counseling / Coordination of care, Documenting in the medical record, and Reviewing / ordering tests, medicine, procedures  .    **Please Note: This note may have been constructed using a voice recognition system**

## 2025-04-11 LAB
ATRIAL RATE: 74 BPM
ATRIAL RATE: 81 BPM
ATRIAL RATE: 83 BPM
ATRIAL RATE: 85 BPM
ATRIAL RATE: 90 BPM
ATRIAL RATE: 90 BPM
P AXIS: 54 DEGREES
P AXIS: 54 DEGREES
P AXIS: 61 DEGREES
P AXIS: 64 DEGREES
P AXIS: 65 DEGREES
P AXIS: 72 DEGREES
PR INTERVAL: 136 MS
PR INTERVAL: 138 MS
PR INTERVAL: 140 MS
PR INTERVAL: 148 MS
QRS AXIS: 16 DEGREES
QRS AXIS: 17 DEGREES
QRS AXIS: 18 DEGREES
QRS AXIS: 29 DEGREES
QRS AXIS: 30 DEGREES
QRS AXIS: 31 DEGREES
QRSD INTERVAL: 78 MS
QRSD INTERVAL: 80 MS
QRSD INTERVAL: 80 MS
QRSD INTERVAL: 82 MS
QT INTERVAL: 374 MS
QT INTERVAL: 376 MS
QT INTERVAL: 378 MS
QT INTERVAL: 378 MS
QT INTERVAL: 380 MS
QT INTERVAL: 390 MS
QTC INTERVAL: 433 MS
QTC INTERVAL: 436 MS
QTC INTERVAL: 444 MS
QTC INTERVAL: 452 MS
QTC INTERVAL: 457 MS
QTC INTERVAL: 462 MS
T WAVE AXIS: 45 DEGREES
T WAVE AXIS: 51 DEGREES
T WAVE AXIS: 55 DEGREES
T WAVE AXIS: 75 DEGREES
VENTRICULAR RATE: 74 BPM
VENTRICULAR RATE: 81 BPM
VENTRICULAR RATE: 83 BPM
VENTRICULAR RATE: 85 BPM
VENTRICULAR RATE: 90 BPM
VENTRICULAR RATE: 90 BPM

## 2025-04-11 PROCEDURE — 93010 ELECTROCARDIOGRAM REPORT: CPT | Performed by: INTERNAL MEDICINE

## 2025-04-16 LAB
CHEST PAIN STATEMENT: NORMAL
MAX DIASTOLIC BP: 74 MMHG
MAX PREDICTED HEART RATE: 156 BPM
PROTOCOL NAME: NORMAL
REASON FOR TERMINATION: NORMAL
STRESS POST EXERCISE DUR MIN: 3 MIN
STRESS POST EXERCISE DUR SEC: 0 SEC
STRESS POST PEAK HR: 104 BPM
STRESS POST PEAK SYSTOLIC BP: 134 MMHG
TARGET HR FORMULA: NORMAL
TEST INDICATION: NORMAL

## 2025-04-24 ENCOUNTER — TELEPHONE (OUTPATIENT)
Dept: ENDOCRINOLOGY | Facility: CLINIC | Age: 65
End: 2025-04-24

## 2025-04-24 DIAGNOSIS — E11.22 TYPE 2 DIABETES MELLITUS WITH STAGE 3B CHRONIC KIDNEY DISEASE, WITH LONG-TERM CURRENT USE OF INSULIN (HCC): Primary | ICD-10-CM

## 2025-04-24 DIAGNOSIS — Z79.4 TYPE 2 DIABETES MELLITUS WITH STAGE 3B CHRONIC KIDNEY DISEASE, WITH LONG-TERM CURRENT USE OF INSULIN (HCC): Primary | ICD-10-CM

## 2025-04-24 DIAGNOSIS — N18.32 TYPE 2 DIABETES MELLITUS WITH STAGE 3B CHRONIC KIDNEY DISEASE, WITH LONG-TERM CURRENT USE OF INSULIN (HCC): Primary | ICD-10-CM

## 2025-04-24 RX ORDER — INSULIN GLARGINE-YFGN 100 [IU]/ML
15 INJECTION, SOLUTION SUBCUTANEOUS 2 TIMES DAILY
Qty: 27 ML | Refills: 1 | Status: SHIPPED | OUTPATIENT
Start: 2025-04-24

## 2025-04-24 NOTE — TELEPHONE ENCOUNTER
Letter received from [facility].  They provider patient with temporary 30 day supply supply of Lantus.  Alternative given   Insulin Glargine-yfgn pen

## 2025-05-07 DIAGNOSIS — R10.13 EPIGASTRIC PAIN: ICD-10-CM

## 2025-05-07 DIAGNOSIS — Z72.0 TOBACCO ABUSE: ICD-10-CM

## 2025-05-07 DIAGNOSIS — K21.9 GERD WITHOUT ESOPHAGITIS: ICD-10-CM

## 2025-05-07 RX ORDER — PANTOPRAZOLE SODIUM 40 MG/1
40 TABLET, DELAYED RELEASE ORAL DAILY
Qty: 90 TABLET | Refills: 1 | Status: SHIPPED | OUTPATIENT
Start: 2025-05-07

## 2025-05-12 ENCOUNTER — TELEPHONE (OUTPATIENT)
Age: 65
End: 2025-05-12

## 2025-05-20 ENCOUNTER — OFFICE VISIT (OUTPATIENT)
Dept: CARDIOLOGY CLINIC | Facility: HOSPITAL | Age: 65
End: 2025-05-20
Payer: MEDICARE

## 2025-05-20 VITALS
DIASTOLIC BLOOD PRESSURE: 58 MMHG | HEART RATE: 95 BPM | SYSTOLIC BLOOD PRESSURE: 106 MMHG | HEIGHT: 66 IN | BODY MASS INDEX: 32.37 KG/M2 | WEIGHT: 201.4 LBS | OXYGEN SATURATION: 95 %

## 2025-05-20 DIAGNOSIS — I25.10 NONOCCLUSIVE CORONARY ATHEROSCLEROSIS OF NATIVE CORONARY ARTERY: ICD-10-CM

## 2025-05-20 DIAGNOSIS — E78.2 MIXED HYPERLIPIDEMIA: ICD-10-CM

## 2025-05-20 DIAGNOSIS — I10 PRIMARY HYPERTENSION: Primary | ICD-10-CM

## 2025-05-20 DIAGNOSIS — N18.30 BENIGN HYPERTENSION WITH CKD (CHRONIC KIDNEY DISEASE) STAGE III (HCC): ICD-10-CM

## 2025-05-20 DIAGNOSIS — F17.200 NICOTINE DEPENDENCE, UNCOMPLICATED, UNSPECIFIED NICOTINE PRODUCT TYPE: ICD-10-CM

## 2025-05-20 DIAGNOSIS — I73.9 PAD (PERIPHERAL ARTERY DISEASE) (HCC): ICD-10-CM

## 2025-05-20 DIAGNOSIS — Z95.828 PRESENCE OF IVC FILTER: ICD-10-CM

## 2025-05-20 DIAGNOSIS — I12.9 BENIGN HYPERTENSION WITH CKD (CHRONIC KIDNEY DISEASE) STAGE III (HCC): ICD-10-CM

## 2025-05-20 PROCEDURE — 99214 OFFICE O/P EST MOD 30 MIN: CPT | Performed by: INTERNAL MEDICINE

## 2025-05-20 NOTE — PROGRESS NOTES
Cardiology Follow Up    Whit Campos  1960  [id number]  [facility] [address] [id number] [phone]    No diagnosis found.      There are no diagnoses linked to this encounter.    I had the pleasure of seeing Whit Campos for a follow up visit.     INTERVAL HISTORY: none    History of the presenting illness, Discussion/Summary and My Plan are as follows:::    Whit is a pleasant 65-year-old lady with a history of diabetes since around 2010, for the last 2 years-poorly controlled, dyslipidemia with spotty compliance with statin, most recently still showing an elevated LDL around 160, chronic and ongoing tobacco use-about 68 pack years, currently smoking 0.5-1 pack a day, consequent COPD, a history of DVT and IVC filter.     She also has a family history of vascular disease-father had a heart attack at 66, sister PVD at 60 and brother CABG at 50, they were all smokers.      She originally presented for evaluation of chest pains and shortness of breath, underwent coronary angiography that did not demonstrate any stenotic lesions but did show moderate disease-October 2021.  Cardiac physical exam is unremarkable.  ECG showed normal sinus rhythm.    Subsequently underwent a Holter which did not demonstrate any ST elevations that would suggest vasospasm.  She also underwent an echocardiogram in August 2021 that was unremarkable, a nuclear stress test in 2014 was negative.    At prior visit, was also having leg pains leading to arterial Dopplers that did show bilateral diffuse femoral-popliteal disease without significant focal stenosis and no significant change from 2015.  However most recent Dopplers in December 2024 showed new left mid SFA stenosis send decrease in left STEFANI.  She has not seen vascular since those results.  She is moderately active, sometimes walking up to an hour  with her dog outside, without any limitations.  Outside.      Recently-April 2025, admitted with chest pains, with negative ischemic evaluation including a stress test and echo, reviewed her stress test and ECGs and blood work     Plan:     Ischemic evaluation: No current cardiac symptoms, in the past had symptoms suggestive of angina with coronary angiography showing up to 50% disease without significant epicardial obstructive lesions and hence no interventions were performed.  Subsequently underwent a Holter that did not demonstrate any ST elevations that would suggest vasospasm as a cause of her symptoms.  Negative nuclear stress test and echo-April 2025 previously had  No further evaluation other than risk factor modification at this time.     PVD:  Diagnosed many years ago, previously had symptoms that would suggest claudication,, advised to see vascular, advised to stop smoking, continue statin medication, continue walking program    Dyslipidemia:  Compliance even in the past had been spotty, it was unclear if she was on rosuvastatin at the last visit but current on atorvastatin started about 2 months ago,We will simply recheck, aim for goal non-HDL cholesterol less than 100 mg/dL, currently 115    She has a history of moderate CAD, PVD, breast cancer, smoking-related lung disease-respiratory bronchiolitis-advised strongly in no uncertain terms that she needs to quit smoking and comply with medications to improve longevity and prevent cardiac and cancer related events and death.    Follow-up in about 6 months.     Latest Reference Range & Units 03/12/25 17:06 03/28/25 11:24 04/10/25 05:08   Cholesterol See Comment mg/dL  177 146   Triglycerides See Comment mg/dL  182 (H) 250 (H)   HDL >=50 mg/dL  38 (L) 31 (L)   Non-HDL Cholesterol mg/dl  139    LDL Calculated 0 - 100 mg/dL  103 (H) 65   Hemoglobin A1C <=6.5  6.3     (H): Data is abnormally high  (L): Data is abnormally low     Latest Reference Range & Units  12/21/23 13:35 05/28/24 13:41   Cholesterol See Comment mg/dL 97 132   Triglycerides See Comment mg/dL 219 (H) 384 (H)   HDL >=50 mg/dL 23 (L) 29 (L)   LDL Calculated 0 - 100 mg/dL 30 26   (H): Data is abnormally high  (L): Data is abnormally low     Latest Reference Range & Units 04/04/23 15:57 06/12/23 09:34 12/21/23 13:35 04/24/24 15:08 07/30/24 13:54   Hemoglobin A1C 6.5  9.0 (H) 6.8 (H) 9.2 (H) 9.7 ! 7.7 !   (H): Data is abnormally high  !: Data is abnormal   Latest Reference Range & Units 11/08/22 15:12 02/15/23 15:02   Hemoglobin A1C 6.5  11.7 (H) 12.0 !   (H): Data is abnormally high  !: Data is abnormal     Latest Reference Range & Units 09/13/21 10:34 06/30/22 11:09   TSH 3RD GENERATON 0.450 - 4.500 uIU/mL 2.370 4.234     2021 - Cor Angio    Left Main   The vessel exhibits minimal luminal irregularities.      Left Anterior Descending   The vessel exhibits minimal luminal irregularities.      First Diagonal Branch   1st Diag lesion is 50% stenosed.      Left Circumflex   The vessel exhibits minimal luminal irregularities.      Right Coronary Artery   Mid RCA lesion is 45% stenosed. The lesion is diffuse.        Patient Active Problem List   Diagnosis    Cataract    Centrilobular emphysema (HCC)    Depression    Gastroesophageal reflux disease with esophagitis    Hypercoagulable state (HCC)    Hypertension    Migraine    Neuropathy    ZOË (obstructive sleep apnea)    Pulmonary hypertension (HCC)    Other chest pain    Primary fibromyalgia syndrome    Primary cancer of upper outer quadrant of right breast (HCC)    Malignant neoplasm of upper-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, estrogen receptor positive (HCC)    Mucinous carcinoma of breast, right (HCC)    PAD (peripheral artery disease) (HCC)    Benign hypertension with CKD (chronic kidney disease) stage III (HCC)    Kidney lesion    Nonocclusive coronary atherosclerosis of native coronary artery    Presence of IVC filter    Nicotine dependence,  uncomplicated    Nocturnal hypoxia    Type 2 diabetes mellitus with stage 3b chronic kidney disease, with long-term current use of insulin (HCC)    Vitamin D deficiency    Secondary hyperparathyroidism of renal origin (HCC)    Hepatomegaly    Steatosis of liver    History of colon polyps    Ulcerative colitis without complications (HCC)    GERD without esophagitis    Mixed hyperlipidemia     Past Medical History:   Diagnosis Date    Breast cancer (HCC)     Cancer (HCC)     right breast    Chronic back pain     Chronic kidney disease     Colitis     COPD (chronic obstructive pulmonary disease) (HCC)     Depression     Diabetes mellitus (HCC)     DVT of lower limb, acute (HCC) 2004    GERD (gastroesophageal reflux disease)     H/O blood clots     rt leg, lung    History of pulmonary embolism 02/15/2023    Hyperlipidemia     Pneumonia     Sleep apnea     Stroke (HCC)     4 mini strokes     Social History     Socioeconomic History    Marital status: Legally      Spouse name: Not on file    Number of children: Not on file    Years of education: Not on file    Highest education level: Not on file   Occupational History    Not on file   Tobacco Use    Smoking status: Every Day     Current packs/day: 0.50     Average packs/day: 1.5 packs/day for 47.9 years (70.9 ttl pk-yrs)     Types: Cigarettes     Start date: 7/3/1977    Smokeless tobacco: Never   Vaping Use    Vaping status: Never Used   Substance and Sexual Activity    Alcohol use: Never    Drug use: Never    Sexual activity: Not Currently   Other Topics Concern    Not on file   Social History Narrative    Not on file     Social Drivers of Health     Financial Resource Strain: Not on file   Food Insecurity: No Food Insecurity (4/9/2025)    Nursing - Inadequate Food Risk Classification     Worried About Running Out of Food in the Last Year: Not on file     Ran Out of Food in the Last Year: Not on file     Ran Out of Food in the Last Year: Never true    Transportation Needs: No Transportation Needs (2025)    Nursing - Transportation Risk Classification     Lack of Transportation: Not on file     Lack of Transportation: No   Physical Activity: Not on file   Stress: Not on file   Social Connections: Unknown (2024)    Received from [facility]     How often do you feel lonely or isolated from those around you? (Adult - for ages 18 years and over): Not on file   Intimate Partner Violence: Unknown (2025)    Nursing IPS     Feels Physically and Emotionally Safe: Not on file     Physically Hurt by Someone: Not on file     Humiliated or Emotionally Abused by Someone: Not on file     Physically Hurt by Someone: No     Hurt or Threatened by Someone: No   Housing Stability: Unknown (2025)    Nursing: Inadequate Housing Risk Classification     Has Housing: Not on file     Worried About Losing Housing: Not on file     Unable to Get Utilities: Not on file     Unable to Pay for Housing in the Last Year: No     Has Housin      Family History   Problem Relation Age of Onset    Breast cancer Mother 72    COPD Mother     Coronary artery disease Mother     Coronary artery disease Father     Stroke Father     Lung cancer Sister     No Known Problems Sister     No Known Problems Sister     Breast cancer Maternal Grandmother     Colon cancer Paternal Grandfather     No Known Problems Maternal Aunt     No Known Problems Maternal Aunt     No Known Problems Maternal Aunt     No Known Problems Paternal Aunt     Breast cancer Cousin      Past Surgical History:   Procedure Laterality Date    BREAST LUMPECTOMY Right     CARDIAC CATHETERIZATION N/A 10/28/2021    Procedure: Cardiac Coronary Angiogram;  Surgeon: Abdelrahman Jacinto DO;  Location: [facility];  Service: Cardiology    IVC FILTER INSERTION      She has a 'Trap-ease' filter. Those are permanent. No need for follow up.    MASTECTOMY W/ SENTINEL NODE BIOPSY Right 2021     Procedure: BREAST MASTECTOMY WITH BIOPSY LYMPH NODE SENTINEL and axillary node dissection  ([place] Lymph Node Injection @ 12:30);  Surgeon: Jd Dong MD;  Location:  MAIN OR;  Service: General    US GUIDANCE BREAST BIOPSY RIGHT EACH ADDITIONAL Right 10/13/2021    US GUIDANCE BREAST BIOPSY RIGHT EACH ADDITIONAL Right 10/13/2021    US GUIDED BREAST BIOPSY RIGHT COMPLETE Right 10/13/2021       Current Outpatient Medications:     albuterol (2.5 mg/3 mL) 0.083 % nebulizer solution, Take 3 mL (2.5 mg total) by nebulization every 6 (six) hours as needed for wheezing or shortness of breath, Disp: 90 mL, Rfl: 2    albuterol (Ventolin HFA) 90 mcg/act inhaler, Inhale 2 puffs every 4 (four) hours as needed for wheezing or shortness of breath, Disp: 18 g, Rfl: 5    aspirin (ECOTRIN LOW STRENGTH) 81 mg EC tablet, Take 1 tablet (81 mg total) by mouth daily, Disp: 90 tablet, Rfl: 3    atorvastatin (LIPITOR) 40 mg tablet, Take 2 tablets (80 mg total) by mouth daily, Disp: 60 tablet, Rfl: 11    Blood Glucose Monitoring Suppl (ONE TOUCH ULTRA 2) w/Device KIT, Use daily, Disp: 1 kit, Rfl: 0    clonazePAM (KlonoPIN) 0.5 mg tablet, Take 0.5 mg by mouth daily as needed, Disp: , Rfl:     glucose blood (OneTouch Ultra) test strip, Use to test blood sugar three times a day, Disp: 100 strip, Rfl: 3    Insulin Glargine-yfgn 100 UNIT/ML SOPN, Inject 0.15 mL (15 Units total) under the skin 2 (two) times a day, Disp: 27 mL, Rfl: 1    Insulin Pen Needle (Unifine Pentips) 32G X 4 MM MISC, use twice a day for INJECTIONS, Disp: 200 each, Rfl: 3    Jardiance 25 MG TABS, Take 1 tablet (25 mg total) by mouth in the morning, Disp: 90 tablet, Rfl: 1    Ozempic, 2 MG/DOSE, 8 MG/3ML injection pen, INJECT 0.75ML UNDER THE SKIN EVERY 7 DAYS, Disp: 9 mL, Rfl: 0    pantoprazole (PROTONIX) 40 mg tablet, take 1 tablet by mouth once daily, Disp: 90 tablet, Rfl: 1    tiotropium (Spiriva Respimat) 2.5 MCG/ACT AERS inhaler, Inhale 2 puffs daily, Disp: 4  g, Rfl: 11    vilazodone (VIIBRYD) 40 mg tablet, Take 40 mg by mouth in the morning., Disp: , Rfl:     VRAYLAR 6 MG capsule, Take 6 mg by mouth in the morning., Disp: , Rfl: 0    cloNIDine (CATAPRES) 0.1 mg tablet, , Disp: , Rfl:     Continuous Glucose  (FreeStyle Darius 3 Sugar Run) ANDERSON, To check blood sugars continuously (Patient not taking: Reported on 4/9/2025), Disp: 1 each, Rfl: 0    Continuous Glucose Sensor (FreeStyle Darius 3 Sensor) MISC, Use to check your blood sugar continuously and change every 2 weeks (Patient not taking: Reported on 4/1/2025), Disp: 2 each, Rfl: 2    Continuous Glucose Sensor (FreeStyle Darius 3 Sensor) MISC, To check blood sugars continuously and to change every 14 days. (Patient not taking: Reported on 4/9/2025), Disp: 9 each, Rfl: 3    Lancets (OneTouch Delica Plus Mroxeq56N) MISC, use 1 LANCET to TEST BLOOD SUGAR three times a day, Disp: 300 each, Rfl: 1  Allergies   Allergen Reactions    Amoxicillin-Pot Clavulanate GI Intolerance    Anastrozole Other (See Comments)     Diarrhea, Nausea, Stomach pain        Imaging: NM lymphatic breast    Result Date: 11/9/2021  Narrative: SENTINEL NODE LYMPHOSCINTIGRAPHY INDICATION: Right breast carcinoma FINDINGS: 0.537 mCi Tc-99m sulfur colloid (0.6 cc volume) was administered in divided doses by Dr. Dong in the right breast periareolar region. Scintigraphic images were obtained over the right hemithorax and axilla in multiple projections. Initial imaging does not demonstrate a node.  The patient was transferred to the operating room in satisfactory condition for kenneth localization.     Impression: 1.  Injection for sentinel lymph node localization. Workstation performed: [id number]       Review of Systems:  Review of Systems   Constitutional: Negative.    HENT: Negative.     Eyes: Negative.    Respiratory: Negative.  Negative for apnea, cough, choking, chest tightness, shortness of breath, wheezing and stridor.    Cardiovascular:   "Negative for chest pain, palpitations and leg swelling.   Endocrine: Negative.    Musculoskeletal: Negative.    Allergic/Immunologic: Negative.        Physical Exam:  /58 (BP Location: Left arm, Patient Position: Sitting, Cuff Size: Large)   Pulse 95   Ht 5' 6\" (1.676 m)   Wt 91.4 kg (201 lb 6.4 oz)   SpO2 95%   BMI 32.51 kg/m²   Physical Exam  Constitutional:       General: She is not in acute distress.     Appearance: Normal appearance. She is not ill-appearing.   HENT:      Head: Normocephalic.      Nose: Nose normal. No congestion or rhinorrhea.      Mouth/Throat:      Mouth: Mucous membranes are moist.      Pharynx: Oropharynx is clear. No oropharyngeal exudate or posterior oropharyngeal erythema.     Eyes:      General:         Right eye: No discharge.         Left eye: No discharge.      Pupils: Pupils are equal, round, and reactive to light.     Neck:      Vascular: No carotid bruit.     Cardiovascular:      Rate and Rhythm: Normal rate and regular rhythm.      Heart sounds: No murmur heard.     No friction rub. No gallop.   Pulmonary:      Effort: No respiratory distress.      Breath sounds: No stridor. No wheezing or rhonchi.   Abdominal:      General: Abdomen is flat. Bowel sounds are normal. There is no distension.      Tenderness: There is no abdominal tenderness.      Hernia: No hernia is present.     Musculoskeletal:      Cervical back: Normal range of motion. No rigidity or tenderness.   Lymphadenopathy:      Cervical: No cervical adenopathy.     Skin:     General: Skin is warm.      Coloration: Skin is not jaundiced or pale.      Findings: No bruising or erythema.     Neurological:      Mental Status: She is alert.         This note was completed in part utilizing [employer] direct voice recognition software.   Grammatical errors, random word insertion, spelling mistakes, occasional wrong word or \"sound-alike\" substitutions and incomplete sentences may be an occasional consequence of " the system secondary to software limitations, ambient noise and hardware issues. At the time of dictation, efforts were made to edit, clarify and /or correct errors.  Please read the chart carefully and recognize, using context, where substitutions have occurred.  If you have any questions or concerns about the context, text or information contained within the body of this dictation, please contact myself, the provider, for further clarification.

## 2025-05-21 DIAGNOSIS — E78.2 MIXED HYPERLIPIDEMIA: ICD-10-CM

## 2025-05-21 RX ORDER — ATORVASTATIN CALCIUM 40 MG/1
80 TABLET, FILM COATED ORAL DAILY
Qty: 60 TABLET | Refills: 11 | Status: SHIPPED | OUTPATIENT
Start: 2025-05-21

## 2025-05-22 ENCOUNTER — TELEPHONE (OUTPATIENT)
Age: 65
End: 2025-05-22

## 2025-05-22 NOTE — TELEPHONE ENCOUNTER
"Hello,    The following message was sent via e-mail to the leadership team:     Please advise if you can help facilitate the following overbook request:    Patient Name: Krzysztof Campos    Patient MRN: [id number]    Call back #: [phone]    Insurance: [id number]    Department:Vascular    Speciality: [facility]    Reason for overbook request: OTHER (PLEASE WRITE REASON IN COMMENT SECTION)    Comments (Write \"N/a\" if no comments): PAD-CAESAR 12/5/24-l/s GIL 12/20/23- I offered patient [facility] in September and she would like [place].    Requested doctor and location: [facility]    Date of current appointment: none      Thank you.    "

## 2025-05-22 NOTE — ASSESSMENT & PLAN NOTE
Lab Results   Component Value Date    HGBA1C 11 7 (H) 11/08/2022       Recent Labs     01/28/23  2103 01/29/23  0732 01/29/23  1053   POCGLU 280* 296* 371*       Blood Sugar Average: Last 72 hrs:  (P) 130 [id number]   Current regimen includes Victoza    Start sliding scale insulin  With solumedrol, add on 5 units lantus at night  Hypoglycemia protocol Paxil is for mood. Not a sleep aid but I can send a refill to mail order

## 2025-05-29 ENCOUNTER — NURSE TRIAGE (OUTPATIENT)
Age: 65
End: 2025-05-29

## 2025-05-29 NOTE — TELEPHONE ENCOUNTER
"REASON FOR CONVERSATION: Leg Pain    SYMPTOMS: ongoing pain BLE L>R    OTHER HEALTH INFORMATION: pt called asking for LEAD results from 12/05/24. (See telephone encounter from 12/12/24 for additional info). She recently saw cardiologist and was advised her LLE is worse per testing.  Pt has ongoing pain BLE that has been increasing steadily.  She c/o general ache BLE constantly, if she walks 60-90\" she develops severe pain BLE, L >R, above her knees anteriorly and posteriorly. She denies rest pain or wounds, no swelling. Pt does have numbness in all toes and \"pins and needles\" in great toes, she states she has been dx w/ neuropathy.  Feet are normal temp and color.   PROTOCOL DISPOSITION: Discuss with Provider and Call Back Patient    CARE ADVICE PROVIDED: pt requesting call back re: results, advised I would make provider aware, also asking if office can assist in moving up September apt.     PRACTICE FOLLOW-UP: Pt was scheduled for f/u visit in March but was being tx for cardiac issues at that time and had to cx, she now r/s to 09/02/25 w/ Dr. Grey. She is on wait list and also an overbook request was made on 05/22/25.  Pt does not drive and relies on transportation, she can only go to [facility].           Answer Assessment - Initial Assessment Questions  1. ONSET: \"When did the pain start?\"       Pain BLE L>R, has been present but gradually getting worse  2. LOCATION: \"Where is the pain located?\"       BLE above knees ant and post w/ ambulating, general ache entire leg constant  3. PAIN: \"How bad is the pain?\"    (Scale 1-10; or mild, moderate, severe)      After ambulating 60-90\" rates as a 9,   4. WORK OR EXERCISE: \"Has there been any recent work or exercise that involved this part of the body?\"       no  5. CAUSE: \"What do you think is causing the leg pain?\"      Unsure   6. OTHER SYMPTOMS: \"Do you have any other symptoms?\" (e.g., chest pain, back pain, breathing difficulty, swelling, rash, fever, " "numbness, weakness)      Denies swelling, feet normal temp and color, no wounds, has numbness in all toes and \"needles\" both big toes, pt states she has been dx w/ neuropathy    Protocols used: Leg Pain-Adult-[place]    "

## 2025-05-29 NOTE — TELEPHONE ENCOUNTER
Imaging reviewed, yes there is a a change since prior imaging, however findings are not consistent to account for above knee anterior/posterior pain she describes. This can be reviewed in full at OV, and symptoms can be evaluated.   Symptoms do not sound to be lifestylelimiting claudication. She has no rest pain or wounds     Please move up/ sooner OV can be given since symptoms are gradually worsening and testing was from December.

## 2025-05-30 ENCOUNTER — TRANSCRIBE ORDERS (OUTPATIENT)
Dept: VASCULAR SURGERY | Facility: CLINIC | Age: 65
End: 2025-05-30

## 2025-05-30 DIAGNOSIS — I77.9 PAOD (PERIPHERAL ARTERIAL OCCLUSIVE DISEASE) (HCC): ICD-10-CM

## 2025-05-30 DIAGNOSIS — I73.9 PAD (PERIPHERAL ARTERY DISEASE) (HCC): Primary | ICD-10-CM

## 2025-05-30 NOTE — TELEPHONE ENCOUNTER
Patient calling back for results. Informed her of information above. She expressed understanding.

## 2025-06-04 ENCOUNTER — HOSPITAL ENCOUNTER (OUTPATIENT)
Dept: NON INVASIVE DIAGNOSTICS | Facility: HOSPITAL | Age: 65
Discharge: HOME | End: 2025-06-04
Attending: SURGERY
Payer: MEDICARE

## 2025-06-04 DIAGNOSIS — I73.9 PAD (PERIPHERAL ARTERY DISEASE) (HCC): ICD-10-CM

## 2025-06-04 DIAGNOSIS — I77.9 PAOD (PERIPHERAL ARTERIAL OCCLUSIVE DISEASE) (HCC): ICD-10-CM

## 2025-06-04 PROCEDURE — 93922 UPR/L XTREMITY ART 2 LEVELS: CPT | Performed by: SURGERY

## 2025-06-04 PROCEDURE — 93978 VASCULAR STUDY: CPT

## 2025-06-04 PROCEDURE — 93923 UPR/LXTR ART STDY 3+ LVLS: CPT

## 2025-06-04 PROCEDURE — 93925 LOWER EXTREMITY STUDY: CPT | Performed by: SURGERY

## 2025-06-04 PROCEDURE — 93925 LOWER EXTREMITY STUDY: CPT

## 2025-06-04 PROCEDURE — 93978 VASCULAR STUDY: CPT | Performed by: SURGERY

## 2025-06-04 NOTE — PROGRESS NOTES
"Name: Whit Campos      : 1960      MRN: [id number]  Encounter Provider: Jd Kirby MD  Encounter Date: 2025   Enc[facility] department: [facility] [place]  :  Assessment & Plan  PAD (peripheral artery disease) (HCC)  65-year-old female past medical history of hypertension, CKD, CAD, COPD, tobacco use presents the office to discuss her arterial duplex results.  Patient had originally seen us in the office due to complaints of bilateral thigh pain with walking.  Patient states today that pain has fully resolved and she has no lower extremity complaints.  Patient had AOIL and lower extremity arterial duplex which I reviewed.  The AOIL shows no aortoiliac disease.  Her lower extremity arterial duplex shows normal ABIs bilaterally with no significant arterial disease.  On exam she has palpable pedal pulses.    From the standpoint no further vascular imaging needed at this time.  Patient has no lower extremity symptoms.  No indication for intervention.  We did discuss the importance of smoking cessation.  Patient will continue her medications as currently prescribed.  Patient will follow-up with me as needed           History of Present Illness   HPI  Whit Campos is a 65 y.o. female who presents     Pt here for RR of CAESAR and AOIL on 25. Pt is asym. Pt smokes 1 ppd.  History obtained from: patient    Review of Systems  Medical History Reviewed by provider this encounter:     .     Objective   /72 (BP Location: Left arm, Patient Position: Sitting)   Pulse 74   Ht 5' 6\" (1.676 m)   Wt 89.4 kg (197 lb)   SpO2 91%   BMI 31.80 kg/m²      Physical Exam  Vitals and nursing note reviewed.   Constitutional:       General: She is not in acute distress.     Appearance: She is well-developed.   HENT:      Head: Normocephalic and atraumatic.     Eyes:      Conjunctiva/sclera: Conjunctivae normal.       Cardiovascular:      Rate and Rhythm: Normal rate and regular rhythm.      " Pulses:           Dorsalis pedis pulses are 2+ on the right side and 2+ on the left side.        Posterior tibial pulses are 2+ on the right side and 2+ on the left side.   Pulmonary:      Effort: Pulmonary effort is normal.      Breath sounds: Normal breath sounds.   Abdominal:      Palpations: Abdomen is soft.      Tenderness: There is no abdominal tenderness.     Musculoskeletal:      Cervical back: Neck supple.     Skin:     General: Skin is warm and dry.      Capillary Refill: Capillary refill takes less than 2 seconds.     Neurological:      Mental Status: She is alert.     Psychiatric:         Mood and Affect: Mood normal.         Administrative Statements   I have spent a total time of 25 minutes in caring for this patient on the day of the visit/encounter including Diagnostic results, Prognosis, Risks and benefits of tx options, Instructions for management, Patient and family education, Importance of tx compliance, Risk factor reductions, Impressions, Counseling / Coordination of care, Documenting in the medical record, Reviewing/placing orders in the medical record (including tests, medications, and/or procedures), and Obtaining or reviewing history  .

## 2025-06-06 DIAGNOSIS — E78.2 MIXED HYPERLIPIDEMIA: ICD-10-CM

## 2025-06-06 DIAGNOSIS — J44.9 COPD (CHRONIC OBSTRUCTIVE PULMONARY DISEASE) (HCC): ICD-10-CM

## 2025-06-06 DIAGNOSIS — J43.2 CENTRILOBULAR EMPHYSEMA (HCC): Primary | ICD-10-CM

## 2025-06-06 DIAGNOSIS — J43.2 CENTRILOBULAR EMPHYSEMA (HCC): ICD-10-CM

## 2025-06-06 RX ORDER — ATORVASTATIN CALCIUM 40 MG/1
80 TABLET, FILM COATED ORAL DAILY
Qty: 60 TABLET | Refills: 11 | Status: SHIPPED | OUTPATIENT
Start: 2025-06-06

## 2025-06-06 RX ORDER — TIOTROPIUM BROMIDE INHALATION SPRAY 3.12 UG/1
2 SPRAY, METERED RESPIRATORY (INHALATION) DAILY
Qty: 4 G | Refills: 11 | Status: SHIPPED | OUTPATIENT
Start: 2025-06-06

## 2025-06-06 NOTE — TELEPHONE ENCOUNTER
Is asking for a refill of her med amitriptylin 100 mg  I do not see it on her chart , so maybe it is called something else  sates it is for migraines

## 2025-06-09 ENCOUNTER — OFFICE VISIT (OUTPATIENT)
Dept: VASCULAR SURGERY | Facility: HOSPITAL | Age: 65
End: 2025-06-09
Payer: MEDICARE

## 2025-06-09 ENCOUNTER — TELEPHONE (OUTPATIENT)
Age: 65
End: 2025-06-09

## 2025-06-09 VITALS
DIASTOLIC BLOOD PRESSURE: 72 MMHG | SYSTOLIC BLOOD PRESSURE: 115 MMHG | HEIGHT: 66 IN | HEART RATE: 74 BPM | WEIGHT: 197 LBS | BODY MASS INDEX: 31.66 KG/M2 | OXYGEN SATURATION: 91 %

## 2025-06-09 DIAGNOSIS — N18.32 STAGE 3B CHRONIC KIDNEY DISEASE (HCC): Primary | ICD-10-CM

## 2025-06-09 DIAGNOSIS — I73.9 PAD (PERIPHERAL ARTERY DISEASE) (HCC): Primary | ICD-10-CM

## 2025-06-09 PROCEDURE — 99214 OFFICE O/P EST MOD 30 MIN: CPT | Performed by: SURGERY

## 2025-06-09 NOTE — TELEPHONE ENCOUNTER
I attempted to contact Krzysztof regarding completing blood work prior to upcoming appt on 06/16/2025 but no answer. There was no VM box to leave a message.

## 2025-06-09 NOTE — ASSESSMENT & PLAN NOTE
65-year-old female past medical history of hypertension, CKD, CAD, COPD, tobacco use presents the office to discuss her arterial duplex results.  Patient had originally seen us in the office due to complaints of bilateral thigh pain with walking.  Patient states today that pain has fully resolved and she has no lower extremity complaints.  Patient had AOIL and lower extremity arterial duplex which I reviewed.  The AOIL shows no aortoiliac disease.  Her lower extremity arterial duplex shows normal ABIs bilaterally with no significant arterial disease.  On exam she has palpable pedal pulses.    From the standpoint no further vascular imaging needed at this time.  Patient has no lower extremity symptoms.  No indication for intervention.  We did discuss the importance of smoking cessation.  Patient will continue her medications as currently prescribed.  Patient will follow-up with me as needed

## 2025-06-10 DIAGNOSIS — E11.9 TYPE 2 DIABETES MELLITUS WITHOUT COMPLICATION, WITH LONG-TERM CURRENT USE OF INSULIN (HCC): ICD-10-CM

## 2025-06-10 DIAGNOSIS — Z79.4 TYPE 2 DIABETES MELLITUS WITHOUT COMPLICATION, WITH LONG-TERM CURRENT USE OF INSULIN (HCC): ICD-10-CM

## 2025-06-10 DIAGNOSIS — J43.2 CENTRILOBULAR EMPHYSEMA (HCC): ICD-10-CM

## 2025-06-10 RX ORDER — UMECLIDINIUM 62.5 UG/1
AEROSOL, POWDER ORAL
Refills: 0 | OUTPATIENT
Start: 2025-06-10

## 2025-06-10 RX ORDER — GLYCOPYRROLATE AND FORMOTEROL FUMARATE 9; 4.8 UG/1; UG/1
2 AEROSOL, METERED RESPIRATORY (INHALATION) 2 TIMES DAILY
Qty: 32.1 G | Refills: 0 | Status: SHIPPED | OUTPATIENT
Start: 2025-06-10

## 2025-06-10 NOTE — TELEPHONE ENCOUNTER
Reason for call:   [x] Refill   [] Prior Auth  [] Other:     Office:   [] PCP/Provider -   [x] Specialty/Provider - [facility]     Medication: Ozempic, 2 MG/DOSE, 8 MG/3ML injection pen     Dose/Frequency: 2 mg, Every 7 days     Quantity: 9 mL    Pharmacy: [id number]    Local Pharmacy   Does the patient have enough for 3 days?   [x] Yes   [] No - Send as HP to POD    Mail Away Pharmacy   Does the patient have enough for 10 days?   [] Yes   [] No - Send as HP to POD

## 2025-06-16 ENCOUNTER — OFFICE VISIT (OUTPATIENT)
Dept: NEPHROLOGY | Facility: CLINIC | Age: 65
End: 2025-06-16
Payer: MEDICARE

## 2025-06-16 VITALS
TEMPERATURE: 98.6 F | HEART RATE: 91 BPM | BODY MASS INDEX: 30.89 KG/M2 | SYSTOLIC BLOOD PRESSURE: 124 MMHG | DIASTOLIC BLOOD PRESSURE: 76 MMHG | WEIGHT: 196.8 LBS | HEIGHT: 67 IN | OXYGEN SATURATION: 95 %

## 2025-06-16 DIAGNOSIS — E87.6 HYPOKALEMIA: ICD-10-CM

## 2025-06-16 DIAGNOSIS — N18.32 STAGE 3B CHRONIC KIDNEY DISEASE (HCC): Primary | ICD-10-CM

## 2025-06-16 PROCEDURE — 99213 OFFICE O/P EST LOW 20 MIN: CPT

## 2025-06-16 RX ORDER — TRAZODONE HYDROCHLORIDE 300 MG/1
300 TABLET ORAL
COMMUNITY
Start: 2025-05-19

## 2025-06-16 NOTE — PATIENT INSTRUCTIONS
It was nice seeing you today. Your kidney function is stable. Please follow up with us in 6 months. I have ordered lab work for you to get done before then. In the meantime, please avoid NSAIDs and have a healthy diet and exercise.

## 2025-06-16 NOTE — PROGRESS NOTES
Name: Whit Campos      : 1960      MRN: [id number]  Encounter Provider: Dex Salomon PA-C  Encounter Date: 2025   Encounter department: [facility]  :  Assessment & Plan  Stage 3b chronic kidney disease (HCC)  Lab Results   Component Value Date    EGFR 53 04/10/2025    EGFR 50 2025    EGFR 42 2025    CREATININE 1.10 04/10/2025    CREATININE 1.15 2025    CREATININE 1.33 (H) 2025   Etiology: Presumed diabetic nephropathy, tobacco related, obesity related, hypertension  Baseline creatinine 1.3-1.5 mg/dL  No recent labs to review, have requested she obtain updated labs  Most recent UA with glycosuria, 1+ protein. uACR was 16 mg/g  Continue jardiance 25 mg daily for CKD progression      Orders:    CBC; Future    Comprehensive metabolic panel; Future    Magnesium; Future    Phosphorus; Future    PTH, intact; Future    Urinalysis with microscopic; Future    Albumin / creatinine urine ratio; Future    Hypokalemia  Unclear cause at this time, patient not maintained on diuretics. Will have patient obtain repeat BMP           There are no Patient Instructions on file for this visit.    It was a pleasure evaluating your patient in the office today. Thank you for allowing our team to participate in the care of  Whit Campos. Please do not hesitate to contact our team if further issues/questions shall arise in the interim.     History of Present Illness   HPI  Whit Campos is a 65 y.o. female who presents to nephrology office for follow up appointment. She was recently hospitalized for chest pain. Currently she has no complaints.     Pertinent Medical History     Review of Systems   Constitutional:  Negative for chills and fever.   Respiratory:  Negative for cough and shortness of breath.    Cardiovascular:  Negative for chest pain and leg swelling.   Gastrointestinal:  Negative for abdominal pain, nausea and vomiting.  "  Genitourinary:  Negative for dysuria and hematuria.   Neurological:  Negative for dizziness and headaches.     Medical History Reviewed by provider this encounter:     .       Medications Ordered Prior to Encounter[1]  Objective   /76 (BP Location: Left arm, Patient Position: Sitting, Cuff Size: Standard)   Pulse 91   Temp 98.6 °F (37 °C) (Temporal)   Ht 5' 7\" (1.702 m)   Wt 89.3 kg (196 lb 12.8 oz)   SpO2 95%   BMI 30.82 kg/m²      Physical Exam      Laboratory Results:        Invalid input(s): \"ALBUMIN\"    Results for orders placed or performed during the hospital encounter of 04/09/25   CBC and differential   Result Value Ref Range    WBC 7.46 4.31 - 10.16 Thousand/uL    RBC 4.86 3.81 - 5.12 Million/uL    Hemoglobin 14.8 11.5 - 15.4 g/dL    Hematocrit 44.4 34.8 - 46.1 %    MCV 91 82 - 98 fL    MCH 30.5 26.8 - 34.3 pg    MCHC 33.3 31.4 - 37.4 g/dL    RDW 13.7 11.6 - 15.1 %    MPV 10.2 8.9 - 12.7 fL    Platelets 198 149 - 390 Thousands/uL    nRBC 0 /100 WBCs    Segmented % 66 43 - 75 %    Immature Grans % 0 0 - 2 %    Lymphocytes % 25 14 - 44 %    Monocytes % 7 4 - 12 %    Eosinophils Relative 2 0 - 6 %    Basophils Relative 0 0 - 1 %    Absolute Neutrophils 4.95 1.85 - 7.62 Thousands/µL    Absolute Immature Grans 0.02 0.00 - 0.20 Thousand/uL    Absolute Lymphocytes 1.87 0.60 - 4.47 Thousands/µL    Absolute Monocytes 0.49 0.17 - 1.22 Thousand/µL    Eosinophils Absolute 0.11 0.00 - 0.61 Thousand/µL    Basophils Absolute 0.02 0.00 - 0.10 Thousands/µL   Comprehensive metabolic panel   Result Value Ref Range    Sodium 143 135 - 147 mmol/L    Potassium 3.2 (L) 3.5 - 5.3 mmol/L    Chloride 105 96 - 108 mmol/L    CO2 27 21 - 32 mmol/L    ANION GAP 11 4 - 13 mmol/L    BUN 15 5 - 25 mg/dL    Creatinine 1.15 0.60 - 1.30 mg/dL    Glucose 105 65 - 140 mg/dL    Calcium 9.6 8.4 - 10.2 mg/dL    AST 15 13 - 39 U/L    ALT 14 7 - 52 U/L    Alkaline Phosphatase 56 34 - 104 U/L    Total Protein 6.8 6.4 - 8.4 g/dL    " "Albumin 4.3 3.5 - 5.0 g/dL    Total Bilirubin 0.65 0.20 - 1.00 mg/dL    eGFR 50 ml/min/1.73sq m   HS Troponin 0hr (reflex protocol)   Result Value Ref Range    hs TnI 0hr 5 \"Refer to ACS Flowchart\"- see link ng/L   Lipase   Result Value Ref Range    Lipase 49 11 - 82 u/L   B-Type Natriuretic Peptide(BNP)   Result Value Ref Range    BNP 32 0 - 100 pg/mL   HS Troponin I 2hr   Result Value Ref Range    hs TnI 2hr 5 \"Refer to ACS Flowchart\"- see link ng/L    Delta 2hr hsTnI 0 <20 ng/L   Magnesium   Result Value Ref Range    Magnesium 1.9 1.9 - 2.7 mg/dL   HS Troponin I 4hr   Result Value Ref Range    hs TnI 4hr 5 \"Refer to ACS Flowchart\"- see link ng/L    Delta 4hr hsTnI 0 <20 ng/L   Platelet count   Result Value Ref Range    Platelets 210 149 - 390 Thousands/uL    MPV 10.5 8.9 - 12.7 fL   Sedimentation rate, automated   Result Value Ref Range    Sed Rate 7 0 - 29 mm/hour   C-reactive protein   Result Value Ref Range    CRP 2.9 <3.0 mg/L   CBC and differential   Result Value Ref Range    WBC 7.46 4.31 - 10.16 Thousand/uL    RBC 4.68 3.81 - 5.12 Million/uL    Hemoglobin 14.5 11.5 - 15.4 g/dL    Hematocrit 42.8 34.8 - 46.1 %    MCV 92 82 - 98 fL    MCH 31.0 26.8 - 34.3 pg    MCHC 33.9 31.4 - 37.4 g/dL    RDW 13.9 11.6 - 15.1 %    MPV 10.1 8.9 - 12.7 fL    Platelets 211 149 - 390 Thousands/uL    nRBC 0 /100 WBCs    Segmented % 66 43 - 75 %    Immature Grans % 0 0 - 2 %    Lymphocytes % 24 14 - 44 %    Monocytes % 8 4 - 12 %    Eosinophils Relative 2 0 - 6 %    Basophils Relative 0 0 - 1 %    Absolute Neutrophils 4.92 1.85 - 7.62 Thousands/µL    Absolute Immature Grans 0.02 0.00 - 0.20 Thousand/uL    Absolute Lymphocytes 1.77 0.60 - 4.47 Thousands/µL    Absolute Monocytes 0.60 0.17 - 1.22 Thousand/µL    Eosinophils Absolute 0.13 0.00 - 0.61 Thousand/µL    Basophils Absolute 0.02 0.00 - 0.10 Thousands/µL   Basic metabolic panel   Result Value Ref Range    Sodium 143 135 - 147 mmol/L    Potassium 3.3 (L) 3.5 - 5.3 mmol/L    " Chloride 107 96 - 108 mmol/L    CO2 26 21 - 32 mmol/L    ANION GAP 10 4 - 13 mmol/L    BUN 14 5 - 25 mg/dL    Creatinine 1.10 0.60 - 1.30 mg/dL    Glucose 111 65 - 140 mg/dL    Glucose, Fasting 111 (H) 65 - 99 mg/dL    Calcium 9.4 8.4 - 10.2 mg/dL    eGFR 53 ml/min/1.73sq m   High Sensitivity Troponin I Random   Result Value Ref Range    HS TnI random 6 (L) 8 - 18 ng/L   Lipid Panel with Direct LDL reflex   Result Value Ref Range    Cholesterol 146 See Comment mg/dL    Triglycerides 250 (H) See Comment mg/dL    HDL, Direct 31 (L) >=50 mg/dL    LDL Calculated 65 0 - 100 mg/dL   Stress strip   Result Value Ref Range    Protocol Name BETITOIWISABELL     Exercise duration (min) 3 min    Exercise duration (sec) 0 sec    Post Peak Systolic  mmHg    Max Diastolic Bp 74 mmHg    Peak  BPM    Max Predicted Heart Rate 156 BPM    Reason for Termination Test Complete..     Test Indication CHEST PAIN     Target Hr Formular (220 - Age)*85%     Arrhy During Ex      ECG Interp Before Ex      ECG Interp during Ex      Ex Summary Comment      Chest Pain Statement resolved spontaneously     Overall Hr Response To Exercise      Overall BP Response To Exercise     ECG 12 lead   Result Value Ref Range    Ventricular Rate 85 BPM    Atrial Rate 85 BPM    IN Interval 136 ms    QRSD Interval 80 ms    QT Interval 380 ms    QTC Interval 452 ms    P Axis 65 degrees    QRS Axis 29 degrees    T Wave Axis 75 degrees   ECG 12 lead   Result Value Ref Range    Ventricular Rate 83 BPM    Atrial Rate 83 BPM    IN Interval 136 ms    QRSD Interval 80 ms    QT Interval 378 ms    QTC Interval 444 ms    P Axis 54 degrees    QRS Axis 17 degrees    T Wave Axis 55 degrees   ECG 12 lead   Result Value Ref Range    Ventricular Rate 90 BPM    Atrial Rate 90 BPM    IN Interval 136 ms    QRSD Interval 82 ms    QT Interval 378 ms    QTC Interval 462 ms    P Axis 61 degrees    QRS Axis 18 degrees    T Wave Axis 51 degrees   ECG 12 lead   Result Value Ref Range     Ventricular Rate 90 BPM    Atrial Rate 90 BPM    AZ Interval 140 ms    QRSD Interval 78 ms    QT Interval 374 ms    QTC Interval 457 ms    P Axis 54 degrees    QRS Axis 16 degrees    T Wave Axis 45 degrees   ECG 12 lead   Result Value Ref Range    Ventricular Rate 81 BPM    Atrial Rate 81 BPM    AZ Interval 138 ms    QRSD Interval 82 ms    QT Interval 376 ms    QTC Interval 436 ms    P Winslow 64 degrees    QRS Axis 31 degrees    T Wave Axis 51 degrees   ECG 12 lead   Result Value Ref Range    Ventricular Rate 74 BPM    Atrial Rate 74 BPM    AZ Interval 148 ms    QRSD Interval 82 ms    QT Interval 390 ms    QTC Interval 433 ms    P Axis 72 degrees    QRS Axis 30 degrees    T Wave Winslow 51 degrees   Echo complete w/ contrast if indicated   Result Value Ref Range    RAA A4C 14.2 cm2    LA Volume Index (BP) 16.1 mL/m2    MV Peak A Ashok 0.76 m/s    MV stenosis pressure 1/2 time 70 ms    MV Peak E Ashok 58 cm/s    E wave deceleration time 240 ms    E/A ratio 0.76     MV valve area p 1/2 method 3.14     RVID d 2.9 cm    A4C EF 67 %    Left ventricular stroke volume (2D) 49.00 mL    IVSd 1.30 cm    Tricuspid annular plane systolic excursion 2.60 cm    Ao root 3.30 cm    LVPWd 1.20 cm    LA size 4.3 cm    Asc Ao 3.6 cm    LA volume (BP) 32 mL    FS 33 28 - 44    LVIDS 2.90 cm    IVS 1.3 cm    LVIDd 4.30 cm    LA length (A2C) 4.30 cm    LEFT VENTRICLE SYSTOLIC VOLUME (MOD BIPLANE) 2D 33 mL    LV DIASTOLIC VOLUME (MOD BIPLANE) 2D 82 mL    Left Atrium Area-systolic Four Chamber 12.1 cm2    Left Atrium Area-systolic Apical Two Chamber 14.9 cm2    MV E' Tissue Velocity Lateral 7 cm/s    MV E' Tissue Velocity Septal 7 cm/s    LVSV, 2D 49 mL    BSA 1.99 m2    LV EF 65    Fingerstick Glucose (POCT)   Result Value Ref Range    POC Glucose 79 65 - 140 mg/dl   Fingerstick Glucose (POCT)   Result Value Ref Range    POC Glucose 100 65 - 140 mg/dl   Fingerstick Glucose (POCT)   Result Value Ref Range    POC Glucose 99 65 - 140 mg/dl    Fingerstick Glucose (POCT)   Result Value Ref Range    POC Glucose 130 65 - 140 mg/dl   NM Myocardial Perfusion Spect (Exercise Induced Stress and/or Rest)   Result Value Ref Range    Baseline HR 87 bpm    Baseline /70 mmHg    O2 sat rest 94 %    Stress peak  bpm    Post peak BP 96 mmHg    O2 sat peak 96 %    Recovery HR 98 bpm    Recovery /74 mmHg    O2 sat recovery 93 %    Max  bpm    Max HR Percent 66 %    Estimated workload 1.0 METS    Rate Pressure Product 9,984.0     Rest Nuclear Isotope Dose 10.50 mCi    Stress Nuclear Isotope Dose 32.60 mCi    Stress/rest perfusion ratio 1.30     EF (%) 62 %     *Note: Due to a large number of results and/or encounters for the requested time period, some results have not been displayed. A complete set of results can be found in Results Review.                [1]   Current Outpatient Medications on File Prior to Visit   Medication Sig Dispense Refill    albuterol (2.5 mg/3 mL) 0.083 % nebulizer solution Take 3 mL (2.5 mg total) by nebulization every 6 (six) hours as needed for wheezing or shortness of breath 90 mL 2    albuterol (Ventolin HFA) 90 mcg/act inhaler Inhale 2 puffs every 4 (four) hours as needed for wheezing or shortness of breath 18 g 5    aspirin (ECOTRIN LOW STRENGTH) 81 mg EC tablet Take 1 tablet (81 mg total) by mouth daily 90 tablet 3    atorvastatin (LIPITOR) 40 mg tablet Take 2 tablets (80 mg total) by mouth daily 60 tablet 11    Blood Glucose Monitoring Suppl (ONE TOUCH ULTRA 2) w/Device KIT Use daily 1 kit 0    clonazePAM (KlonoPIN) 0.5 mg tablet Take 0.5 mg by mouth daily as needed      glucose blood (OneTouch Ultra) test strip Use to test blood sugar three times a day 100 strip 3    glycopyrrolate-formoterol (Bevespi Aerosphere) 9-4.8 MCG/ACT inhaler Inhale 2 puffs 2 (two) times a day 32.1 g 0    Insulin Glargine-yfgn 100 UNIT/ML SOPN Inject 0.15 mL (15 Units total) under the skin 2 (two) times a day 27 mL 1    Insulin Pen  Needle (Unifine Pentips) 32G X 4 MM MISC use twice a day for INJECTIONS 200 each 3    Jardiance 25 MG TABS Take 1 tablet (25 mg total) by mouth in the morning 90 tablet 1    Lancets (OneTouch Delica Plus [id number]) MISC use 1 LANCET to TEST BLOOD SUGAR three times a day 300 each 1    pantoprazole (PROTONIX) 40 mg tablet take 1 tablet by mouth once daily 90 tablet 1    semaglutide, 2 mg/dose, (Ozempic, 2 MG/DOSE,) 8 mg/ mL injection pen Inject 0.75 mL (2 mg total) under the skin every 7 days 9 mL 0    tiotropium (Spiriva Respimat) 2.5 MCG/ACT AERS inhaler Inhale 2 puffs daily 4 g 11    traZODone (DESYREL) 300 MG tablet Take 300 mg by mouth daily at bedtime as needed      vilazodone (VIIBRYD) 40 mg tablet Take 40 mg by mouth in the morning.      VRAYLAR 6 MG capsule Take 6 mg by mouth in the morning.  0    Continuous Glucose Sensor (FreeStyle Darius 3 Sensor) MISC Use to check your blood sugar continuously and change every 2 weeks (Patient not taking: Reported on 4/1/2025) 2 each 2    Melatonin 5 MG TABS Take 5 mg by mouth daily (Patient not taking: Reported on 6/16/2025)       No current facility-administered medications on file prior to visit.

## 2025-06-17 DIAGNOSIS — E78.2 MIXED HYPERLIPIDEMIA: ICD-10-CM

## 2025-06-17 RX ORDER — ATORVASTATIN CALCIUM 40 MG/1
80 TABLET, FILM COATED ORAL DAILY
Qty: 60 TABLET | Refills: 11 | Status: SHIPPED | OUTPATIENT
Start: 2025-06-17 | End: 2025-06-18

## 2025-06-18 RX ORDER — ATORVASTATIN CALCIUM 40 MG/1
80 TABLET, FILM COATED ORAL DAILY
Qty: 60 TABLET | Refills: 11 | OUTPATIENT
Start: 2025-06-18

## 2025-06-27 ENCOUNTER — APPOINTMENT (OUTPATIENT)
Dept: LAB | Facility: HOSPITAL | Age: 65
End: 2025-06-27
Payer: MEDICARE

## 2025-06-27 ENCOUNTER — RESULTS FOLLOW-UP (OUTPATIENT)
Dept: NEPHROLOGY | Facility: CLINIC | Age: 65
End: 2025-06-27

## 2025-06-27 DIAGNOSIS — N18.32 STAGE 3B CHRONIC KIDNEY DISEASE (HCC): ICD-10-CM

## 2025-06-27 LAB
25(OH)D3 SERPL-MCNC: 40.3 NG/ML (ref 30–100)
ALBUMIN SERPL BCG-MCNC: 4.5 G/DL (ref 3.5–5)
ALP SERPL-CCNC: 72 U/L (ref 34–104)
ALT SERPL W P-5'-P-CCNC: 12 U/L (ref 7–52)
ANION GAP SERPL CALCULATED.3IONS-SCNC: 8 MMOL/L (ref 4–13)
AST SERPL W P-5'-P-CCNC: 12 U/L (ref 13–39)
BACTERIA UR QL AUTO: ABNORMAL /HPF
BASOPHILS # BLD AUTO: 0.02 THOUSANDS/ÂΜL (ref 0–0.1)
BASOPHILS NFR BLD AUTO: 0 % (ref 0–1)
BILIRUB SERPL-MCNC: 0.47 MG/DL (ref 0.2–1)
BILIRUB UR QL STRIP: NEGATIVE
BUDDING YEAST: PRESENT
BUN SERPL-MCNC: 16 MG/DL (ref 5–25)
CALCIUM SERPL-MCNC: 9.8 MG/DL (ref 8.4–10.2)
CHLORIDE SERPL-SCNC: 101 MMOL/L (ref 96–108)
CLARITY UR: CLEAR
CO2 SERPL-SCNC: 31 MMOL/L (ref 21–32)
COLOR UR: YELLOW
CREAT SERPL-MCNC: 1.24 MG/DL (ref 0.6–1.3)
EOSINOPHIL # BLD AUTO: 0.21 THOUSAND/ÂΜL (ref 0–0.61)
EOSINOPHIL NFR BLD AUTO: 3 % (ref 0–6)
ERYTHROCYTE [DISTWIDTH] IN BLOOD BY AUTOMATED COUNT: 13.5 % (ref 11.6–15.1)
GFR SERPL CREATININE-BSD FRML MDRD: 45 ML/MIN/1.73SQ M
GLUCOSE P FAST SERPL-MCNC: 102 MG/DL (ref 65–99)
GLUCOSE UR STRIP-MCNC: ABNORMAL MG/DL
HCT VFR BLD AUTO: 44.7 % (ref 34.8–46.1)
HGB BLD-MCNC: 15 G/DL (ref 11.5–15.4)
HGB UR QL STRIP.AUTO: NEGATIVE
IMM GRANULOCYTES # BLD AUTO: 0.02 THOUSAND/UL (ref 0–0.2)
IMM GRANULOCYTES NFR BLD AUTO: 0 % (ref 0–2)
KETONES UR STRIP-MCNC: NEGATIVE MG/DL
LEUKOCYTE ESTERASE UR QL STRIP: ABNORMAL
LYMPHOCYTES # BLD AUTO: 2.29 THOUSANDS/ÂΜL (ref 0.6–4.47)
LYMPHOCYTES NFR BLD AUTO: 28 % (ref 14–44)
MAGNESIUM SERPL-MCNC: 2.1 MG/DL (ref 1.9–2.7)
MCH RBC QN AUTO: 30.7 PG (ref 26.8–34.3)
MCHC RBC AUTO-ENTMCNC: 33.6 G/DL (ref 31.4–37.4)
MCV RBC AUTO: 92 FL (ref 82–98)
MONOCYTES # BLD AUTO: 0.65 THOUSAND/ÂΜL (ref 0.17–1.22)
MONOCYTES NFR BLD AUTO: 8 % (ref 4–12)
NEUTROPHILS # BLD AUTO: 4.92 THOUSANDS/ÂΜL (ref 1.85–7.62)
NEUTS SEG NFR BLD AUTO: 61 % (ref 43–75)
NITRITE UR QL STRIP: NEGATIVE
NON-SQ EPI CELLS URNS QL MICRO: ABNORMAL /HPF
NRBC BLD AUTO-RTO: 0 /100 WBCS
PH UR STRIP.AUTO: 6 [PH]
PHOSPHATE SERPL-MCNC: 4 MG/DL (ref 2.3–4.1)
PLATELET # BLD AUTO: 219 THOUSANDS/UL (ref 149–390)
PMV BLD AUTO: 9.4 FL (ref 8.9–12.7)
POTASSIUM SERPL-SCNC: 3.8 MMOL/L (ref 3.5–5.3)
PROT SERPL-MCNC: 7 G/DL (ref 6.4–8.4)
PROT UR STRIP-MCNC: ABNORMAL MG/DL
PTH-INTACT SERPL-MCNC: 34.2 PG/ML (ref 12–88)
RBC # BLD AUTO: 4.88 MILLION/UL (ref 3.81–5.12)
RBC #/AREA URNS AUTO: ABNORMAL /HPF
SODIUM SERPL-SCNC: 140 MMOL/L (ref 135–147)
SP GR UR STRIP.AUTO: >=1.03 (ref 1–1.03)
UROBILINOGEN UR STRIP-ACNC: <2 MG/DL
WBC # BLD AUTO: 8.11 THOUSAND/UL (ref 4.31–10.16)
WBC #/AREA URNS AUTO: ABNORMAL /HPF

## 2025-06-27 PROCEDURE — 83735 ASSAY OF MAGNESIUM: CPT

## 2025-06-27 PROCEDURE — 80053 COMPREHEN METABOLIC PANEL: CPT

## 2025-06-27 PROCEDURE — 84100 ASSAY OF PHOSPHORUS: CPT

## 2025-06-27 PROCEDURE — 81001 URINALYSIS AUTO W/SCOPE: CPT

## 2025-06-27 PROCEDURE — 85025 COMPLETE CBC W/AUTO DIFF WBC: CPT

## 2025-06-27 PROCEDURE — 36415 COLL VENOUS BLD VENIPUNCTURE: CPT

## 2025-06-27 PROCEDURE — 82306 VITAMIN D 25 HYDROXY: CPT

## 2025-06-27 PROCEDURE — 83970 ASSAY OF PARATHORMONE: CPT

## 2025-06-27 NOTE — TELEPHONE ENCOUNTER
Patient returning call and made aware:    Dex Salomon PA-C to Nephrology [facility]       6/27/25  3:03 PM  Result Note  Results reviewed, kidney function stable at 45%.    Patient verbalized understanding.

## 2025-07-17 ENCOUNTER — TELEPHONE (OUTPATIENT)
Dept: SURGERY | Facility: CLINIC | Age: 65
End: 2025-07-17

## 2025-07-17 DIAGNOSIS — Z85.3 HISTORY OF RIGHT BREAST CANCER: ICD-10-CM

## 2025-07-17 DIAGNOSIS — C50.411 PRIMARY CANCER OF UPPER OUTER QUADRANT OF RIGHT BREAST (HCC): Primary | ICD-10-CM

## 2025-07-17 NOTE — TELEPHONE ENCOUNTER
Patient needs an order for 4 bras and prosthetic to be sent to [facility]. Order can be faxed to [phone]

## 2025-08-21 ENCOUNTER — OFFICE VISIT (OUTPATIENT)
Age: 65
End: 2025-08-21

## 2025-08-21 VITALS
HEART RATE: 83 BPM | WEIGHT: 191.2 LBS | OXYGEN SATURATION: 93 % | DIASTOLIC BLOOD PRESSURE: 62 MMHG | HEIGHT: 67 IN | SYSTOLIC BLOOD PRESSURE: 118 MMHG | TEMPERATURE: 98.5 F | RESPIRATION RATE: 18 BRPM | BODY MASS INDEX: 30.01 KG/M2

## 2025-08-21 DIAGNOSIS — Z12.11 SCREEN FOR COLON CANCER: ICD-10-CM

## 2025-08-21 DIAGNOSIS — E78.1 HIGH TRIGLYCERIDES: ICD-10-CM

## 2025-08-21 DIAGNOSIS — K21.9 GERD WITHOUT ESOPHAGITIS: Primary | ICD-10-CM

## 2025-08-21 DIAGNOSIS — Z71.89 ENCOUNTER FOR DIABETES EDUCATION: ICD-10-CM

## 2025-08-21 DIAGNOSIS — Z23 NEED FOR SHINGLES VACCINE: ICD-10-CM

## 2025-08-21 PROBLEM — R07.89 OTHER CHEST PAIN: Status: RESOLVED | Noted: 2021-10-12 | Resolved: 2025-08-21

## 2025-08-21 RX ORDER — PANTOPRAZOLE SODIUM 40 MG/1
40 TABLET, DELAYED RELEASE ORAL
Qty: 30 TABLET | Refills: 0 | Status: SHIPPED | OUTPATIENT
Start: 2025-08-21

## 2025-08-21 RX ORDER — PANTOPRAZOLE SODIUM 40 MG/1
40 TABLET, DELAYED RELEASE ORAL
Qty: 90 TABLET | Refills: 1 | Status: SHIPPED | OUTPATIENT
Start: 2025-08-21

## 2025-08-21 RX ORDER — BUPROPION HYDROCHLORIDE 100 MG/1
100 TABLET, EXTENDED RELEASE ORAL EVERY MORNING
COMMUNITY
Start: 2025-08-14

## (undated) DEVICE — DGW .035 FC J3MM 260CM TEF: Brand: EMERALD

## (undated) DEVICE — GARMENT,MEDLINE,DVT,INT,CALF,FOAM,MED: Brand: MEDLINE

## (undated) DEVICE — STAPLER SKIN 35 WIDE ULC APPOSE

## (undated) DEVICE — SUT VICRYL 3-0 SH 27 IN J416H

## (undated) DEVICE — DRAPE PROBE NEO-PROBE/ULTRASOUND

## (undated) DEVICE — HARMONIC BLUE HAND PIECE: Brand: HARMONIC

## (undated) DEVICE — SUT SILK 2-0 SH 30 IN K833H

## (undated) DEVICE — SUT VICRYL 2-0 REEL 54 IN J286G

## (undated) DEVICE — CHLORAPREP HI-LITE 26ML ORANGE

## (undated) DEVICE — TR BAND RADIAL ARTERY COMPRESSION DEVICE: Brand: TR BAND

## (undated) DEVICE — GLOVE SRG BIOGEL 7.5

## (undated) DEVICE — HARMONIC FOCUS SHEARS 9CM LENGTH + ADAPTIVE TISSUE TECHNOLOGY FOR USE WITH BLUE HAND PIECE ONLY: Brand: HARMONIC FOCUS

## (undated) DEVICE — Device

## (undated) DEVICE — SUT MONOCRYL 4-0 PS-2 27 IN Y426H

## (undated) DEVICE — ADHESIVE SKIN HIGH VISCOSITY EXOFIN 1ML

## (undated) DEVICE — MARGIN MARKER SET

## (undated) DEVICE — GLOVE SRG BIOGEL 7

## (undated) DEVICE — GUIDEWIRE WHOLEY HI TORQUE INTERM MOD J .035 145CM

## (undated) DEVICE — GLOVE INDICATOR PI UNDERGLOVE SZ 7.5 BLUE

## (undated) DEVICE — ABDOMINAL PAD: Brand: DERMACEA

## (undated) DEVICE — GAUZE SPONGES,16 PLY: Brand: CURITY

## (undated) DEVICE — SUT VICRYL 3-0 REEL 54 IN J285G

## (undated) DEVICE — SKIN MARKER DUAL TIP WITH RULER CAP, FLEXIBLE RULER AND LABELS: Brand: DEVON

## (undated) DEVICE — TIBURON LAPAROTOMY DRAPE: Brand: CONVERTORS

## (undated) DEVICE — GLIDESHEATH SLENDER STAINLESS STEEL KIT: Brand: GLIDESHEATH SLENDER

## (undated) DEVICE — BINDER ABDOMINAL 9 X 45IN 3 PANEL UNIVERSAL

## (undated) DEVICE — MAJOR LAPAROTOMY PACK: Brand: MEDLINE INDUSTRIES, INC.

## (undated) DEVICE — PICO 7 SINGLE 10X30CM: Brand: PICO™ 7

## (undated) DEVICE — INTENDED FOR TISSUE SEPARATION, AND OTHER PROCEDURES THAT REQUIRE A SHARP SURGICAL BLADE TO PUNCTURE OR CUT.: Brand: BARD-PARKER ® CARBON RIB-BACK BLADES

## (undated) DEVICE — Device: Brand: PROWATER

## (undated) DEVICE — 4-PORT MANIFOLD: Brand: NEPTUNE 2

## (undated) DEVICE — PLUMEPEN PRO 10FT

## (undated) DEVICE — SUT PROLENE 0 CT-2 30 IN 8412H

## (undated) DEVICE — RADIFOCUS GLIDEWIRE: Brand: GLIDEWIRE

## (undated) DEVICE — RADIFOCUS OPTITORQUE ANGIOGRAPHIC CATHETER: Brand: OPTITORQUE